# Patient Record
Sex: FEMALE | Race: WHITE | NOT HISPANIC OR LATINO | Employment: OTHER | URBAN - METROPOLITAN AREA
[De-identification: names, ages, dates, MRNs, and addresses within clinical notes are randomized per-mention and may not be internally consistent; named-entity substitution may affect disease eponyms.]

---

## 2020-07-16 ENCOUNTER — OFFICE VISIT (OUTPATIENT)
Dept: FAMILY MEDICINE CLINIC | Facility: CLINIC | Age: 70
End: 2020-07-16

## 2020-07-16 ENCOUNTER — HOSPITAL ENCOUNTER (OUTPATIENT)
Dept: GENERAL RADIOLOGY | Facility: HOSPITAL | Age: 70
Discharge: HOME | End: 2020-07-16
Admitting: FAMILY MEDICINE

## 2020-07-16 VITALS
SYSTOLIC BLOOD PRESSURE: 132 MMHG | TEMPERATURE: 97.9 F | DIASTOLIC BLOOD PRESSURE: 80 MMHG | WEIGHT: 230.2 LBS | OXYGEN SATURATION: 94 % | HEIGHT: 68 IN | BODY MASS INDEX: 34.89 KG/M2 | HEART RATE: 75 BPM

## 2020-07-16 DIAGNOSIS — I10 ESSENTIAL HYPERTENSION: ICD-10-CM

## 2020-07-16 DIAGNOSIS — E78.2 MIXED HYPERLIPIDEMIA: ICD-10-CM

## 2020-07-16 DIAGNOSIS — M25.561 ACUTE PAIN OF RIGHT KNEE: ICD-10-CM

## 2020-07-16 DIAGNOSIS — R60.0 BILATERAL LEG EDEMA: ICD-10-CM

## 2020-07-16 DIAGNOSIS — R25.1 TREMORS OF NERVOUS SYSTEM: ICD-10-CM

## 2020-07-16 DIAGNOSIS — R56.9 SEIZURE (HCC): Primary | ICD-10-CM

## 2020-07-16 PROCEDURE — 99204 OFFICE O/P NEW MOD 45 MIN: CPT | Performed by: FAMILY MEDICINE

## 2020-07-16 PROCEDURE — 73560 X-RAY EXAM OF KNEE 1 OR 2: CPT

## 2020-07-16 RX ORDER — SPIRONOLACTONE 25 MG/1
25 TABLET ORAL DAILY
COMMUNITY
End: 2020-07-27 | Stop reason: SDUPTHER

## 2020-07-16 RX ORDER — LANOLIN ALCOHOL/MO/W.PET/CERES
325 CREAM (GRAM) TOPICAL
COMMUNITY
End: 2020-09-02 | Stop reason: SDUPTHER

## 2020-07-16 RX ORDER — FENOFIBRATE 160 MG/1
160 TABLET ORAL DAILY
COMMUNITY
End: 2020-07-27 | Stop reason: SDUPTHER

## 2020-07-16 RX ORDER — POTASSIUM CHLORIDE 750 MG/1
10 TABLET, FILM COATED, EXTENDED RELEASE ORAL 2 TIMES DAILY
Qty: 60 TABLET | Refills: 0 | Status: SHIPPED | OUTPATIENT
Start: 2020-07-16 | End: 2020-07-27 | Stop reason: SDUPTHER

## 2020-07-16 RX ORDER — GABAPENTIN 300 MG/1
300 CAPSULE ORAL 3 TIMES DAILY
COMMUNITY
End: 2020-07-27 | Stop reason: SDUPTHER

## 2020-07-16 RX ORDER — CITALOPRAM 40 MG/1
40 TABLET ORAL DAILY
COMMUNITY
End: 2020-07-27 | Stop reason: SDUPTHER

## 2020-07-16 RX ORDER — PIOGLITAZONEHYDROCHLORIDE 15 MG/1
15 TABLET ORAL DAILY
COMMUNITY
End: 2020-09-02 | Stop reason: SDUPTHER

## 2020-07-16 RX ORDER — MONTELUKAST SODIUM 10 MG/1
10 TABLET ORAL NIGHTLY
COMMUNITY
End: 2020-07-27 | Stop reason: SDUPTHER

## 2020-07-16 RX ORDER — LOSARTAN POTASSIUM 50 MG/1
50 TABLET ORAL DAILY
COMMUNITY
End: 2020-07-27 | Stop reason: SDUPTHER

## 2020-07-16 RX ORDER — PRIMIDONE 50 MG/1
50 TABLET ORAL 3 TIMES DAILY
COMMUNITY
End: 2020-07-27 | Stop reason: SDUPTHER

## 2020-07-16 RX ORDER — FUROSEMIDE 40 MG/1
40 TABLET ORAL DAILY
Qty: 30 TABLET | Refills: 0 | Status: SHIPPED | OUTPATIENT
Start: 2020-07-16 | End: 2020-07-27 | Stop reason: SDUPTHER

## 2020-07-16 NOTE — PROGRESS NOTES
Subjective   Kanwal Sauer is a 69 y.o. female.     Chief Complaint   Patient presents with   • Hypertension     NP est, recently moved from [facility] with , Hx of Breast cancer, DM, HTN and HLD   • Diabetes   • Anxiety       History of Present Illness Kanwal Sauer is a 69-year-old female patient came here to establish care and follow-up on her chronic medical conditions including hypertension, diabetes type 2 and anxiety.  Patient was also recently admitted to hospital for overdose of baclofen and meningitis.  Patient came here with her  and daughter. she is also having a memory problem and not able to keep up with her medicine.  She moved from [place] to [place] to live with her daughter and her .  Daughter is complaining of bilateral leg edema, and also right knee pain.  Denies any injury to right knee.  She has also noted tremors of both hands.  History taken from patient's daughter and her .  She also has a history of right knee knee replacement.  Patient also requesting a referral to neurology secondary to her new onset seizures and tremors.  No more episodes of seizures since her discharge.  Daughter is giving her on the medication denies any problem.  She is giving history of increased anxiety because of move and recent hospitalization.          Past Medical History:   Diagnosis Date   • Cancer (CMS/HCC)     Breast   • Deep vein thrombosis (CMS/HCC)    • Diabetes mellitus (CMS/HCC)    • Hyperlipidemia    • Hypertension    • Tremor        Past Surgical History:   Procedure Laterality Date   • CATARACT EXTRACTION, BILATERAL     • DENTAL PROCEDURE      Removed teeth   • MASTECTOMY Left    • REPLACEMENT TOTAL KNEE BILATERAL         No family history on file.    Social History     Socioeconomic History   • Marital status:      Spouse name: Not on file   • Number of children: Not on file   • Years of education: Not on file   • Highest education level: Not on  "file   Tobacco Use   • Smoking status: Never Smoker   • Smokeless tobacco: Never Used   Substance and Sexual Activity   • Alcohol use: Not Currently   • Drug use: Never   • Sexual activity: Yes     Partners: Male       The following portions of the patient's history were reviewed and updated as appropriate: allergies, current medications, past family history, past medical history, past social history, past surgical history and problem list.    Review of Systems   Constitutional: Positive for fatigue. Negative for activity change, fever, unexpected weight gain and unexpected weight loss.   HENT: Negative for congestion, mouth sores, sinus pressure and sore throat.    Eyes: Negative for blurred vision and visual disturbance.   Respiratory: Negative for cough, chest tightness, shortness of breath and wheezing.    Cardiovascular: Positive for leg swelling. Negative for chest pain and palpitations.   Gastrointestinal: Negative for abdominal pain, constipation, diarrhea, nausea, vomiting and indigestion.   Endocrine: Negative for cold intolerance, polydipsia and polyuria.   Genitourinary: Negative for dysuria, frequency, hematuria, urgency and urinary incontinence.   Musculoskeletal: Negative for back pain, gait problem and neck pain.   Skin: Negative for color change and rash.   Neurological: Positive for tremors, seizures and memory problem. Negative for dizziness, speech difficulty, weakness, light-headedness, headache and confusion.   Psychiatric/Behavioral: Negative for agitation, sleep disturbance and depressed mood. The patient is not nervous/anxious.        Objective   Vitals:    07/16/20 1454   BP: 132/80   Pulse: 75   Temp: 97.9 °F (36.6 °C)   SpO2: 94%   Weight: 104 kg (230 lb 3.2 oz)   Height: 172.7 cm (68\")     Body mass index is 35 kg/m².  Physical Exam   Constitutional: She is oriented to person, place, and time. She appears well-developed and well-nourished. No distress.   HENT:   Head: Normocephalic and " atraumatic.   Mouth/Throat: Oropharynx is clear and moist.   Eyes: Pupils are equal, round, and reactive to light. EOM are normal. Right eye exhibits no discharge. Left eye exhibits no discharge.   Neck: Normal range of motion. Neck supple.   Cardiovascular: Normal rate, regular rhythm and normal heart sounds.   Pulmonary/Chest: Effort normal and breath sounds normal. She has no wheezes. She has no rales.   Abdominal: Soft. Bowel sounds are normal. She exhibits no mass. There is no tenderness.   Musculoskeletal: She exhibits edema.   Lymphadenopathy:     She has no cervical adenopathy.   Neurological: She is alert and oriented to person, place, and time.       Assessment/Plan   Problem List Items Addressed This Visit     None      Visit Diagnoses     Seizure (CMS/[facility])    -  Primary    Relevant Orders    Ambulatory Referral to Neurology    Tremors of nervous system        Relevant Orders    Ambulatory Referral to Neurology    Acute pain of right knee        Relevant Orders    XR Knee 1 or 2 View Right (Completed)    Bilateral leg edema        Essential hypertension        Relevant Medications    losartan (COZAAR) 50 MG tablet    metoprolol tartrate (LOPRESSOR) 25 MG tablet    spironolactone (ALDACTONE) 25 MG tablet    furosemide (Lasix) 40 MG tablet    Mixed hyperlipidemia        Relevant Medications    fenofibrate 160 MG tablet        Kanwal Sauer is a 69-year-old female patient came here to establish care and follow-up on\  Seizure and tremors, there is no records to review.  History taken from patient and the discharge note given to her daughter.  She was admitted secondary to new onset seizure and was treated for questionable meningitis and overdose of baclofen.  She is.she is also having a memory issue.  We will get the records from her previous doctor and her hospital admission from [place].  I will refer her to neurology.  Bilateral leg edema, causes of leg edema discussed with patient's daughter.  I will  increase her Lasix to 40 mg a day.   Acute pain of right knee, status post knee replacement.  I will check x-ray knee.  Hypertension, controlled on current medication.  Continue same.  Hyperlipidemia, continue same.  We will check labs from her recent hospitalization.  Diabetes type 2, continue same for now.          No follow-ups on file.

## 2020-07-21 ENCOUNTER — TELEPHONE (OUTPATIENT)
Dept: FAMILY MEDICINE CLINIC | Facility: CLINIC | Age: 70
End: 2020-07-21

## 2020-07-21 NOTE — TELEPHONE ENCOUNTER
Per HIPAA form, I could not leave results on machine.  Advised patient to call the office for results.       HUB- if patient calls back, please advise her that her knee x-ray showed some edema, but otherwise it was normal.  Thank you

## 2020-07-27 ENCOUNTER — OFFICE VISIT (OUTPATIENT)
Dept: FAMILY MEDICINE CLINIC | Facility: CLINIC | Age: 70
End: 2020-07-27

## 2020-07-27 VITALS
HEART RATE: 58 BPM | OXYGEN SATURATION: 93 % | BODY MASS INDEX: 34.86 KG/M2 | SYSTOLIC BLOOD PRESSURE: 126 MMHG | WEIGHT: 230 LBS | TEMPERATURE: 98.1 F | DIASTOLIC BLOOD PRESSURE: 68 MMHG | HEIGHT: 68 IN

## 2020-07-27 DIAGNOSIS — M25.561 ACUTE PAIN OF RIGHT KNEE: ICD-10-CM

## 2020-07-27 DIAGNOSIS — R60.0 BILATERAL LEG EDEMA: Primary | ICD-10-CM

## 2020-07-27 PROCEDURE — 99213 OFFICE O/P EST LOW 20 MIN: CPT | Performed by: FAMILY MEDICINE

## 2020-07-27 RX ORDER — SPIRONOLACTONE 25 MG/1
25 TABLET ORAL DAILY
Qty: 90 TABLET | Refills: 0 | Status: SHIPPED | OUTPATIENT
Start: 2020-07-27 | End: 2020-10-23

## 2020-07-27 RX ORDER — MONTELUKAST SODIUM 10 MG/1
10 TABLET ORAL NIGHTLY
Qty: 90 TABLET | Refills: 0 | Status: SHIPPED | OUTPATIENT
Start: 2020-07-27 | End: 2020-10-23

## 2020-07-27 RX ORDER — FUROSEMIDE 40 MG/1
40 TABLET ORAL DAILY
Qty: 60 TABLET | Refills: 0 | Status: SHIPPED | OUTPATIENT
Start: 2020-07-27 | End: 2020-08-10

## 2020-07-27 RX ORDER — CITALOPRAM 20 MG/1
20 TABLET ORAL DAILY
Qty: 90 TABLET | Refills: 0 | Status: SHIPPED | OUTPATIENT
Start: 2020-07-27 | End: 2020-10-23

## 2020-07-27 RX ORDER — LOSARTAN POTASSIUM 50 MG/1
50 TABLET ORAL DAILY
Qty: 90 TABLET | Refills: 1 | Status: SHIPPED | OUTPATIENT
Start: 2020-07-27 | End: 2020-12-31 | Stop reason: HOSPADM

## 2020-07-27 RX ORDER — POTASSIUM CHLORIDE 750 MG/1
10 TABLET, FILM COATED, EXTENDED RELEASE ORAL 2 TIMES DAILY
Qty: 180 TABLET | Refills: 0 | Status: SHIPPED | OUTPATIENT
Start: 2020-07-27 | End: 2020-12-31 | Stop reason: HOSPADM

## 2020-07-27 RX ORDER — PRIMIDONE 50 MG/1
50 TABLET ORAL 3 TIMES DAILY
Qty: 90 TABLET | Refills: 2 | Status: SHIPPED | OUTPATIENT
Start: 2020-07-27 | End: 2020-10-23

## 2020-07-27 RX ORDER — GABAPENTIN 300 MG/1
300 CAPSULE ORAL 3 TIMES DAILY
Qty: 90 CAPSULE | Refills: 3 | Status: SHIPPED | OUTPATIENT
Start: 2020-07-27 | End: 2020-12-31 | Stop reason: HOSPADM

## 2020-07-27 RX ORDER — FENOFIBRATE 160 MG/1
160 TABLET ORAL DAILY
Qty: 90 TABLET | Refills: 1 | Status: SHIPPED | OUTPATIENT
Start: 2020-07-27 | End: 2020-12-31 | Stop reason: HOSPADM

## 2020-08-03 ENCOUNTER — OFFICE VISIT (OUTPATIENT)
Dept: FAMILY MEDICINE CLINIC | Facility: CLINIC | Age: 70
End: 2020-08-03

## 2020-08-03 VITALS
TEMPERATURE: 97.7 F | HEIGHT: 68 IN | HEART RATE: 62 BPM | WEIGHT: 220 LBS | SYSTOLIC BLOOD PRESSURE: 116 MMHG | DIASTOLIC BLOOD PRESSURE: 60 MMHG | BODY MASS INDEX: 33.34 KG/M2 | OXYGEN SATURATION: 96 %

## 2020-08-03 DIAGNOSIS — N30.01 ACUTE CYSTITIS WITH HEMATURIA: Primary | ICD-10-CM

## 2020-08-03 LAB
BILIRUB BLD-MCNC: NEGATIVE MG/DL
CLARITY, POC: ABNORMAL
COLOR UR: YELLOW
GLUCOSE UR STRIP-MCNC: NEGATIVE MG/DL
KETONES UR QL: NEGATIVE
LEUKOCYTE EST, POC: ABNORMAL
NITRITE UR-MCNC: POSITIVE MG/ML
PH UR: 7 [PH] (ref 5–8)
PROT UR STRIP-MCNC: ABNORMAL MG/DL
RBC # UR STRIP: ABNORMAL /UL
SP GR UR: 1.01 (ref 1–1.03)
UROBILINOGEN UR QL: NORMAL

## 2020-08-03 PROCEDURE — 99213 OFFICE O/P EST LOW 20 MIN: CPT | Performed by: FAMILY MEDICINE

## 2020-08-03 PROCEDURE — 81003 URINALYSIS AUTO W/O SCOPE: CPT | Performed by: FAMILY MEDICINE

## 2020-08-03 RX ORDER — PHENAZOPYRIDINE HYDROCHLORIDE 200 MG/1
200 TABLET, FILM COATED ORAL 3 TIMES DAILY PRN
Qty: 21 TABLET | Refills: 0 | Status: SHIPPED | OUTPATIENT
Start: 2020-08-03 | End: 2020-12-31 | Stop reason: HOSPADM

## 2020-08-03 RX ORDER — SULFAMETHOXAZOLE AND TRIMETHOPRIM 800; 160 MG/1; MG/1
1 TABLET ORAL 2 TIMES DAILY
Qty: 14 TABLET | Refills: 0 | Status: SHIPPED | OUTPATIENT
Start: 2020-08-03 | End: 2020-09-04

## 2020-08-03 NOTE — PROGRESS NOTES
Subjective   Kanwal Sauer is a 69 y.o. female.     Chief Complaint   Patient presents with   • Urinary Tract Infection     C/o of urinary frequency, burning pain, pressure if she lays down. x5 days       History of Present Illness Kanwal Sauer is a 69-year-old female patient came here for 4-day history of pain and frequent urination.  She with history of recurrent UTI in the past.  Denies any history of kidney stone.  Denies any fever or nausea or vomiting.  Of low back pain.  Not taking anything for pain.    Past Medical History:   Diagnosis Date   • Cancer (CMS/HCC)     Breast   • Deep vein thrombosis (CMS/HCC)    • Diabetes mellitus (CMS/HCC)    • Hyperlipidemia    • Hypertension    • Tremor        Past Surgical History:   Procedure Laterality Date   • CATARACT EXTRACTION, BILATERAL     • DENTAL PROCEDURE      Removed teeth   • MASTECTOMY Left    • REPLACEMENT TOTAL KNEE BILATERAL         No family history on file.    Social History     Socioeconomic History   • Marital status:      Spouse name: Not on file   • Number of children: Not on file   • Years of education: Not on file   • Highest education level: Not on file   Tobacco Use   • Smoking status: Never Smoker   • Smokeless tobacco: Never Used   Substance and Sexual Activity   • Alcohol use: Not Currently   • Drug use: Never   • Sexual activity: Yes     Partners: Male       The following portions of the patient's history were reviewed and updated as appropriate: allergies, current medications, past family history, past medical history, past social history, past surgical history and problem list.    Review of Systems   Constitutional: Negative for activity change, appetite change, fatigue, fever, unexpected weight gain and unexpected weight loss.   HENT: Negative for congestion, ear pain, postnasal drip, rhinorrhea, sinus pressure and sore throat.    Eyes: Negative for blurred vision, discharge, itching and visual disturbance.   Respiratory:  "Negative for cough, chest tightness, shortness of breath and wheezing.    Cardiovascular: Negative for chest pain, palpitations and leg swelling.   Gastrointestinal: Negative for abdominal pain, constipation, diarrhea, nausea and vomiting.   Genitourinary: Positive for dysuria, frequency and pelvic pressure. Negative for flank pain.   Musculoskeletal: Negative for arthralgias.   Neurological: Negative for headache.   Psychiatric/Behavioral: Negative for agitation, suicidal ideas, depressed mood and stress.           Objective   Vitals:    08/03/20 1353   BP: 116/60   Pulse: 62   Temp: 97.7 °F (36.5 °C)   TempSrc: Tympanic   SpO2: 96%   Weight: 99.8 kg (220 lb)   Height: 172.7 cm (68\")     Body mass index is 33.45 kg/m².  Physical Exam   Constitutional: She is oriented to person, place, and time. She appears well-developed and well-nourished. No distress.   HENT:   Head: Normocephalic and atraumatic.   Mouth/Throat: Oropharynx is clear and moist.   Eyes: Pupils are equal, round, and reactive to light. EOM are normal. Right eye exhibits no discharge. Left eye exhibits no discharge.   Neck: Normal range of motion. Neck supple.   Cardiovascular: Normal rate, regular rhythm and normal heart sounds.   Pulmonary/Chest: Effort normal and breath sounds normal. She has no wheezes. She has no rales.   Abdominal: Soft. Bowel sounds are normal. She exhibits no mass. There is tenderness in the suprapubic area. There is no rigidity and no CVA tenderness.   Musculoskeletal: She exhibits no edema.   Lymphadenopathy:     She has no cervical adenopathy.   Neurological: She is alert and oriented to person, place, and time. No cranial nerve deficit.       Assessment/Plan   Problem List Items Addressed This Visit     None      Visit Diagnoses     Acute cystitis with hematuria    -  Primary    Relevant Medications    phenazopyridine (Pyridium) 200 MG tablet    Other Relevant Orders    POC Urinalysis Dipstick, Automated (Completed)    " Urine Culture - Urine, Urine, Clean Catch      Kanwal Sauer is a 69-year-old female patient seen today for  UTI, urine dip done in the office positive for nitrite blood.  Send her urine for culture and sensitivity.  We will start her on Bactrim and Pyridium.  Instructed to come back or go to ER for worsening of pain.      Return if symptoms worsen or fail to improve, for Recheck.

## 2020-08-04 NOTE — PROGRESS NOTES
Subjective   Kanwal Sauer is a 69 y.o. female.     Chief Complaint   Patient presents with   • Leg Swelling     F C/o swelling and pain for rt knee.    • Knee Pain     fup xray results       History of Present Illness Kanwal Sauer is a 69-year-old female patient came here for follow-up on her bilateral leg swelling and pain..  She was here 1 week ago and is started on Lasix.  Patient swelling has improved.  Her knee pain is also slightly better than last week.  She has a history of bilateral knee replacement.  She also had a x-ray of her right knee done.    Past Medical History:   Diagnosis Date   • Cancer (CMS/HCC)     Breast   • Deep vein thrombosis (CMS/HCC)    • Diabetes mellitus (CMS/HCC)    • Hyperlipidemia    • Hypertension    • Tremor        Past Surgical History:   Procedure Laterality Date   • CATARACT EXTRACTION, BILATERAL     • DENTAL PROCEDURE      Removed teeth   • MASTECTOMY Left    • REPLACEMENT TOTAL KNEE BILATERAL         No family history on file.    Social History     Socioeconomic History   • Marital status:      Spouse name: Not on file   • Number of children: Not on file   • Years of education: Not on file   • Highest education level: Not on file   Tobacco Use   • Smoking status: Never Smoker   • Smokeless tobacco: Never Used   Substance and Sexual Activity   • Alcohol use: Not Currently   • Drug use: Never   • Sexual activity: Yes     Partners: Male       The following portions of the patient's history were reviewed and updated as appropriate: allergies, current medications, past family history, past medical history, past social history, past surgical history and problem list.    Review of Systems   Constitutional: Negative for activity change, appetite change, fatigue, fever, unexpected weight gain and unexpected weight loss.   HENT: Negative for congestion, ear pain, postnasal drip, rhinorrhea, sinus pressure and sore throat.    Eyes: Negative for blurred vision, discharge,  "itching and visual disturbance.   Respiratory: Negative for cough, chest tightness, shortness of breath and wheezing.    Cardiovascular: Negative for chest pain, palpitations and leg swelling.   Gastrointestinal: Negative for abdominal pain, constipation, diarrhea, nausea and vomiting.   Musculoskeletal: Positive for arthralgias.   Neurological: Negative for headache.   Psychiatric/Behavioral: Negative for agitation, suicidal ideas, depressed mood and stress.         Objective   Vitals:    07/27/20 1412   BP: 126/68   Pulse: 58   Temp: 98.1 °F (36.7 °C)   SpO2: 93%   Weight: 104 kg (230 lb)   Height: 172.7 cm (68\")     Body mass index is 34.97 kg/m².  Physical Exam   Constitutional: She is oriented to person, place, and time. She appears well-developed and well-nourished. No distress.   HENT:   Head: Normocephalic and atraumatic.   Mouth/Throat: Oropharynx is clear and moist.   Eyes: Pupils are equal, round, and reactive to light. EOM are normal. Right eye exhibits no discharge. Left eye exhibits no discharge.   Neck: Normal range of motion. Neck supple.   Cardiovascular: Normal rate, regular rhythm and normal heart sounds.   Pulmonary/Chest: Effort normal and breath sounds normal. She has no wheezes. She has no rales.   Abdominal: Soft. Bowel sounds are normal. She exhibits no mass. There is no tenderness.   Musculoskeletal: She exhibits edema.   Decreased since last time   Lymphadenopathy:     She has no cervical adenopathy.   Neurological: She is alert and oriented to person, place, and time.     Assessment/Plan   Problem List Items Addressed This Visit     None      Visit Diagnoses     Bilateral leg edema    -  Primary    Acute pain of right knee            Kanwal Sauer is a 69-year-old female patient with recent history of hospitalization secondary to new onset seizure came here for one-week follow-up on leg edema and right knee pain.  X-ray result discussed with patient.  I will start her on Voltaren gel. "  Her leg edema has improved since last time.  As per patient she plans on diuretic before her admission in the hospital.  Her hospital records and her old record is still not available to review.  Continue Lasix and potassium now.  Blood pressure monitoring at home.  If she started having a dizzy episode we will decrease the Lasix to 20 mg a day.          Return in about 3 months (around 10/27/2020), or if symptoms worsen or fail to improve.

## 2020-08-06 LAB
BACTERIA UR CULT: ABNORMAL
BACTERIA UR CULT: ABNORMAL
OTHER ANTIBIOTIC SUSC ISLT: ABNORMAL

## 2020-08-10 RX ORDER — FUROSEMIDE 40 MG/1
TABLET ORAL
Qty: 30 TABLET | Refills: 0 | Status: SHIPPED | OUTPATIENT
Start: 2020-08-10 | End: 2020-09-25 | Stop reason: SDUPTHER

## 2020-09-02 ENCOUNTER — TELEPHONE (OUTPATIENT)
Dept: FAMILY MEDICINE CLINIC | Facility: CLINIC | Age: 70
End: 2020-09-02

## 2020-09-02 RX ORDER — LANOLIN ALCOHOL/MO/W.PET/CERES
325 CREAM (GRAM) TOPICAL
Qty: 270 TABLET | Refills: 0 | Status: SHIPPED | OUTPATIENT
Start: 2020-09-02 | End: 2020-12-31 | Stop reason: HOSPADM

## 2020-09-02 RX ORDER — PIOGLITAZONEHYDROCHLORIDE 15 MG/1
15 TABLET ORAL DAILY
Qty: 90 TABLET | Refills: 0 | Status: SHIPPED | OUTPATIENT
Start: 2020-09-02 | End: 2020-12-07

## 2020-09-02 NOTE — TELEPHONE ENCOUNTER
Patient's  is calling to request an RX for a UTI.  He states patient is experiencing burning, frequency, pressure, lower abdomen and back pain.    Please advise.    Patient call back [phone]          Patient is also requesting refills of the following.     pioglitazone (ACTOS) 15 MG tablet     ferrous sulfate 325 (65 FE) MG EC tablet     [facility]/pharmacy #[id number] - [place], [place] - [address] AT [facility] - [phone] [facility] [phone]   [phone]

## 2020-09-03 ENCOUNTER — TELEPHONE (OUTPATIENT)
Dept: FAMILY MEDICINE CLINIC | Facility: CLINIC | Age: 70
End: 2020-09-03

## 2020-09-03 DIAGNOSIS — N30.01 ACUTE CYSTITIS WITH HEMATURIA: Primary | ICD-10-CM

## 2020-09-03 RX ORDER — PHENAZOPYRIDINE HYDROCHLORIDE 200 MG/1
200 TABLET, FILM COATED ORAL 3 TIMES DAILY PRN
Qty: 21 TABLET | Refills: 0 | OUTPATIENT
Start: 2020-09-03

## 2020-09-03 RX ORDER — SULFAMETHOXAZOLE AND TRIMETHOPRIM 800; 160 MG/1; MG/1
1 TABLET ORAL 2 TIMES DAILY
Qty: 14 TABLET | Refills: 0 | OUTPATIENT
Start: 2020-09-03

## 2020-09-03 NOTE — TELEPHONE ENCOUNTER
Patient's  is calling to request an RX for a UTI.  He states patient is experiencing burning, frequency, pressure, lower abdomen and back pain. Has called before about this before and nothing was sent to the pharmacy.      Please advise.     Patient call back [phone]    [id number] [place] [address] AT [facility] [phone] [facility] [phone] FX

## 2020-09-03 NOTE — TELEPHONE ENCOUNTER
PATIENT REQUESTED A REFILL ON:phenazopyridine (Pyridium) 200 MG tablet  sulfamethoxazole-trimethoprim (Bactrim DS) 800-160 MG per tablet    PATIENT CAN BE REACHED ON:[phone]    PHARMACY PREFERRED:[id number] [place] [facility] [address] AT [facility] [phone] [facility] [phone] FX

## 2020-09-04 RX ORDER — SULFAMETHOXAZOLE AND TRIMETHOPRIM 800; 160 MG/1; MG/1
1 TABLET ORAL 2 TIMES DAILY
Qty: 10 TABLET | Refills: 0 | Status: SHIPPED | OUTPATIENT
Start: 2020-09-04 | End: 2020-10-01

## 2020-09-04 NOTE — TELEPHONE ENCOUNTER
Called patient to inform that we have sent [facility] to [facility]. Also apologized for the mix up in the messages.

## 2020-09-07 LAB
APPEARANCE UR: ABNORMAL
BACTERIA #/AREA URNS HPF: ABNORMAL /HPF
BACTERIA UR CULT: ABNORMAL
BACTERIA UR CULT: ABNORMAL
BILIRUB UR QL STRIP: NEGATIVE
COLOR UR: YELLOW
EPI CELLS #/AREA URNS HPF: ABNORMAL /HPF (ref 0–10)
GLUCOSE UR QL: NEGATIVE
HGB UR QL STRIP: ABNORMAL
KETONES UR QL STRIP: NEGATIVE
LEUKOCYTE ESTERASE UR QL STRIP: ABNORMAL
MICRO URNS: ABNORMAL
NITRITE UR QL STRIP: POSITIVE
OTHER ANTIBIOTIC SUSC ISLT: ABNORMAL
PH UR STRIP: 7.5 [PH] (ref 5–7.5)
PROT UR QL STRIP: ABNORMAL
RBC #/AREA URNS HPF: ABNORMAL /HPF (ref 0–2)
SP GR UR: 1.02 (ref 1–1.03)
URINALYSIS REFLEX: ABNORMAL
UROBILINOGEN UR STRIP-MCNC: 1 MG/DL (ref 0.2–1)
WBC #/AREA URNS HPF: >30 /HPF (ref 0–5)

## 2020-09-10 ENCOUNTER — TELEPHONE (OUTPATIENT)
Dept: FAMILY MEDICINE CLINIC | Facility: CLINIC | Age: 70
End: 2020-09-10

## 2020-09-14 ENCOUNTER — TELEPHONE (OUTPATIENT)
Dept: NEUROLOGY | Facility: CLINIC | Age: 70
End: 2020-09-14

## 2020-09-14 NOTE — TELEPHONE ENCOUNTER
PT'S DAUGHTER SHELDON HAD SOME MEDICAL QUESTIONS DURING OUR SCHEDULING CONVERSATION TODAY. PT WAS HOSPITALIZED FOR 28 DAYS IN June IN [place] DUE TO A SEIZURE - SHE IS NOT EXHIBITING ANY CONCERNING SYMPTOMS NOW, BUT SHELDON WOULD LIKE TO KNOW WHERE TO REACH OUT FOR ANY NON-EMERGENT SITUATION THAT MAY REQUIRE A NEUROLOGIST'S ADVICE PRIOR TO SEEING DR LOJA BECAUSE PT'S NEW PATIENT APPOINTMENT IS IN JAN 2021 (PT HAS BEEN ADDED TO WAITING LIST). PT IS NEW TO [place] AND IS ESTABLISHING CARE WITH DR LOJA.     SHELDON WOULD ALSO LIKE TO KNOW WHICH IMAGING DR LOJA WILL NEED SO SHE CAN OBTAIN THEM ON DISC PRIOR TO APPOINTMENT. SHELDON INDICATED [facility] WAS PERFORMED DURING HOSPITALIZATION - EXTERNAL MEDICAL RECORDS RECEIVED WERE FROM [facility] PRIMARY [facility] AND DO NOT MENTION IMAGING WITHIN THIS YEAR.     PLEASE CONTACT SHELDON -[phone].     THANK YOU.

## 2020-09-15 NOTE — TELEPHONE ENCOUNTER
Called and spoke to Daughter regarding pt's apt. She is going to try and obtain records. Please advise.

## 2020-09-18 ENCOUNTER — OFFICE VISIT (OUTPATIENT)
Dept: NEUROLOGY | Facility: CLINIC | Age: 70
End: 2020-09-18

## 2020-09-18 VITALS
HEIGHT: 68 IN | WEIGHT: 214 LBS | BODY MASS INDEX: 32.43 KG/M2 | SYSTOLIC BLOOD PRESSURE: 128 MMHG | OXYGEN SATURATION: 95 % | HEART RATE: 59 BPM | DIASTOLIC BLOOD PRESSURE: 82 MMHG

## 2020-09-18 DIAGNOSIS — Z86.59 MENTAL STATUS CHANGE RESOLVED: Primary | ICD-10-CM

## 2020-09-18 DIAGNOSIS — R56.9 PROVOKED SEIZURE (HCC): ICD-10-CM

## 2020-09-18 DIAGNOSIS — G25.0 BENIGN ESSENTIAL TREMOR: ICD-10-CM

## 2020-09-18 PROCEDURE — 99205 OFFICE O/P NEW HI 60 MIN: CPT | Performed by: PSYCHIATRY & NEUROLOGY

## 2020-09-18 NOTE — PROGRESS NOTES
"Chief complaint: History of seizure    Patient ID: Kanwal Sauer is a 69 y.o. female.    HPI: I had the pleasure of seeing Kanwal today in the neurology clinic.  As you may know she is a 69-year-old female who had an episode of seizure-like episode.  She apparently was experiencing some swelling of the right side of her face and neck back earlier this summer.  She says that this lasted a few days and eventually she saw her primary care physician for this down in [place].  She was prescribed baclofen for what the family says was \"muscle spasms\".  However her daughter noted that after taking a dose or 2 that she was experiencing a change in her mental status.  She apparently seemed a bit slower to respond to questioning.  She then was noted to have a significant mental status change when evening.  Apparently she was sitting in her chair in the dark with the TV on.  She had her phone in one hand and the bottle of baclofen and the other.  She was \"reaching for things\".  She clearly was hallucinating.  She was also slow to respond.  She had poor balance.  Eventually she was put in her room to rest for the night.  The next morning she was found to have been unresponsive with foaming at the mouth and had urinated the bed.  Again still with mental status change.  Also noted several baclofen were missing in the bottle.  She was admitted for evaluation.  She apparently had several neurodiagnostic studies including with the family describes as an EEG and other brain imaging.  She also had a lumbar puncture.  The daughter states that she should she was told she had some form of \"meningitis\".  After discussing further it sounds to be a viral meningitis.  She was put on prophylactic antibiotics however.  She continued to hallucinate while in the hospital until her symptoms began to improve.  She does have history of chronic urinary tract infections and at some point was placed on Bactrim.  Recently she fell about 5 days ago " hitting the back of her head.  She has not had any significant mental status changes however.  She does have some soreness of her neck.  She denies any double vision.  No further hallucinations.  No previous history of seizures or seizure-like activity.  No family history of seizures.  She also has a history of benign essential tremor.  She currently takes primidone 50 mg twice daily.    The following portions of the patient's history were reviewed and updated as appropriate: allergies, current medications, past family history, past medical history, past social history, past surgical history and problem list.    Review of Systems   Constitutional: Negative for activity change, appetite change and fatigue.   HENT: Positive for facial swelling (06/2020), sneezing and trouble swallowing.    Eyes: Negative for photophobia, redness and visual disturbance.   Respiratory: Negative for chest tightness, shortness of breath and wheezing.    Cardiovascular: Positive for leg swelling. Negative for chest pain and palpitations.   Gastrointestinal: Positive for abdominal distention, constipation and diarrhea.   Endocrine: Negative for cold intolerance, heat intolerance and polydipsia.   Genitourinary: Positive for frequency, pelvic pain and urgency.   Musculoskeletal: Positive for back pain, gait problem (pt fell monday. hit wall and hit her head. has knot. balance issues. uses walker ), neck pain and neck stiffness.   Skin: Negative for color change, rash and wound.   Allergic/Immunologic: Positive for environmental allergies and food allergies. Negative for immunocompromised state.   Neurological: Positive for dizziness, tremors, seizures (EPSIODE 06/2020), facial asymmetry, weakness, light-headedness and numbness (FINGERS). Negative for syncope, speech difficulty and headaches.   Hematological: Negative for adenopathy. Does not bruise/bleed easily.   Psychiatric/Behavioral: Positive for hallucinations (IN JUNE WHILE ON A  MEDICATION). Negative for agitation, behavioral problems, confusion, decreased concentration, dysphoric mood, self-injury, sleep disturbance and suicidal ideas. The patient is not nervous/anxious and is not hyperactive.       I have reviewed the review of systems above performed by my medical assistant.      Vitals:    20 1006   BP: 128/82   Pulse: 59   SpO2: 95%       Neurologic Exam     Mental Status   Oriented to person, place, and time.   Registration: recalls 3 of 3 objects. Follows 3 step commands.   Attention: normal. Concentration: normal.   Speech: speech is normal   Level of consciousness: alert  Knowledge: consistent with education (No deficits found.).   Normal comprehension.     Cranial Nerves     CN II   Visual fields full to confrontation.     CN III, IV, VI   Pupils are equal, round, and reactive to light.  Extraocular motions are normal.   CN III: no CN III palsy  CN VI: no CN VI palsy  Nystagmus: none   Diplopia: none    CN V   Facial sensation intact.     CN VII   Facial expression full, symmetric.     CN VIII   CN VIII normal.     CN IX, X   CN IX normal.   CN X normal.     CN XI   CN XI normal.     CN XII   CN XII normal.     Motor Exam   Muscle bulk: normal  Right arm tone: normal  Left arm tone: normal  Right leg tone: normal  Left leg tone: normal    Strength   Right neck flexion: 5/5  Left neck flexion: 5/5  Right neck extension: 5/5  Left neck extension: 5/5  Right deltoid: 5/5  Left deltoid: 5/5  Right biceps: 5/5  Left biceps: 5/5  Right triceps: 5/5  Left triceps: 5/5  Right wrist flexion: 5/5  Left wrist flexion: 5/5  Right wrist extension: 5/5  Left wrist extension: 5/5  Right interossei: 5/5  Left interossei: 5/5  Right abdominals: 5/5  Left abdominals: 5/5  Right iliopsoas: 5/5  Left iliopsoas: 5/5  Right quadriceps: 5/5  Left quadriceps: 5/5  Right hamstrin/5  Left hamstrin/5  Right glutei: 5/5  Left glutei: 5/5  Right anterior tibial: 5/5  Left anterior tibial:  5/5  Right posterior tibial: 5/5  Left posterior tibial: 5/5  Right peroneal: 5/5  Left peroneal: 5/5  Right gastroc: 5/5  Left gastroc: 5/5    Sensory Exam   Light touch normal.   Vibration normal.   Proprioception normal.   Pinprick normal.     Gait, Coordination, and Reflexes     Gait  Gait: wide-based    Coordination   Romberg: negative    Tremor   Resting tremor: absent  Intention tremor: absent    Reflexes   Right brachioradialis: 2+  Left brachioradialis: 2+  Right biceps: 2+  Left biceps: 2+  Right triceps: 2+  Left triceps: 2+  Right patellar: 0  Left patellar: 0  Right achilles: 0  Left achilles: 0  Right : 2+  Left : 2+Station is normal.       Physical Exam  Vitals signs reviewed.   Constitutional:       General: She is not in acute distress.     Appearance: She is well-developed.   HENT:      Head: Normocephalic and atraumatic.   Eyes:      Extraocular Movements: EOM normal.      Pupils: Pupils are equal, round, and reactive to light.   Neck:      Musculoskeletal: Normal range of motion.   Cardiovascular:      Rate and Rhythm: Normal rate and regular rhythm.      Heart sounds: Normal heart sounds.   Pulmonary:      Effort: Pulmonary effort is normal. No respiratory distress.      Breath sounds: Normal breath sounds.   Abdominal:      General: Bowel sounds are normal. There is no distension.      Palpations: Abdomen is soft.      Tenderness: There is no abdominal tenderness.   Musculoskeletal:         General: No deformity.   Skin:     General: Skin is warm.      Findings: No rash.   Neurological:      Mental Status: She is oriented to person, place, and time.      Coordination: Romberg Test normal.      Deep Tendon Reflexes:      Reflex Scores:       Tricep reflexes are 2+ on the right side and 2+ on the left side.       Bicep reflexes are 2+ on the right side and 2+ on the left side.       Brachioradialis reflexes are 2+ on the right side and 2+ on the left side.       Patellar reflexes are 0 on  the right side and 0 on the left side.       Achilles reflexes are 0 on the right side and 0 on the left side.  Psychiatric:         Speech: Speech normal.         Judgment: Judgment normal.         Procedures    Assessment/Plan: She would like to continue the primidone for her benign essential tremor for now.  We will not be titrating doses currently.  With respect to the seizure I do not feel that one seizure would warrant prophylactic seizure medication.  And to add to this it seemed as though this was a provoked situation given the viral illness, baclofen intoxication as well as Bactrim potentially lowering the seizure threshold.  She also might have had a concomitant urinary tract infection affecting her mentation as well.  The family is going to try and get as many notes and imaging studies as they can for my review.  She is feeling back to baseline.  We will see her back in 4 months to reassess neuro status.  A total of 60 minutes was spent with the patient face-to-face today.  Of that greater than 50% of this time was spent discussing signs and symptoms of seizure, benign essential tremor, mental status changes, patient education, plan of care and prognosis.       Kanwal was seen today for seizures and tremors.    Diagnoses and all orders for this visit:    Mental status change resolved    Provoked seizure (CMS/[facility])    Benign essential tremor           Ash Washington II, MD

## 2020-09-27 RX ORDER — FUROSEMIDE 40 MG/1
40 TABLET ORAL DAILY
Qty: 90 TABLET | Refills: 0 | Status: SHIPPED | OUTPATIENT
Start: 2020-09-27 | End: 2020-12-31 | Stop reason: HOSPADM

## 2020-09-28 ENCOUNTER — TELEPHONE (OUTPATIENT)
Dept: FAMILY MEDICINE CLINIC | Facility: CLINIC | Age: 70
End: 2020-09-28

## 2020-09-28 DIAGNOSIS — Z85.3 HISTORY OF BREAST CANCER: Primary | ICD-10-CM

## 2020-09-28 DIAGNOSIS — Z90.12 HISTORY OF LEFT MASTECTOMY: ICD-10-CM

## 2020-09-28 NOTE — TELEPHONE ENCOUNTER
Pt states that she has hx of breast CA and only has one breast. States every year she gets a diagnostic mammo. Please advise.

## 2020-09-28 NOTE — TELEPHONE ENCOUNTER
PT CALLED TO REQUEST A DIAGNOSTIC MAMMOGRAM BE ORDERED. PLEASE CALL PT BACK WITH INFORMATION ABOUT SCHEDULING. SHE CAN BE REACHED [phone]

## 2020-09-29 ENCOUNTER — HOSPITAL ENCOUNTER (OUTPATIENT)
Dept: CT IMAGING | Facility: HOSPITAL | Age: 70
Discharge: HOME | End: 2020-09-29
Admitting: FAMILY MEDICINE

## 2020-09-29 DIAGNOSIS — N39.0 RECURRENT UTI: ICD-10-CM

## 2020-09-29 DIAGNOSIS — R10.30 LOWER ABDOMINAL PAIN: ICD-10-CM

## 2020-09-29 PROCEDURE — 74176 CT ABD & PELVIS W/O CONTRAST: CPT

## 2020-09-29 RX ORDER — FUROSEMIDE 40 MG/1
TABLET ORAL
Qty: 60 TABLET | Refills: 0 | OUTPATIENT
Start: 2020-09-29

## 2020-10-02 ENCOUNTER — RESULTS ENCOUNTER (OUTPATIENT)
Dept: FAMILY MEDICINE CLINIC | Facility: CLINIC | Age: 70
End: 2020-10-02

## 2020-10-02 ENCOUNTER — LAB (OUTPATIENT)
Dept: LAB | Facility: HOSPITAL | Age: 70
End: 2020-10-02

## 2020-10-02 DIAGNOSIS — E11.65 TYPE 2 DIABETES MELLITUS WITH HYPERGLYCEMIA, WITHOUT LONG-TERM CURRENT USE OF INSULIN (HCC): ICD-10-CM

## 2020-10-02 DIAGNOSIS — N39.0 RECURRENT UTI: ICD-10-CM

## 2020-10-02 LAB
ALBUMIN SERPL-MCNC: 4.3 G/DL (ref 3.5–5.2)
ALBUMIN/GLOB SERPL: 1.8 G/DL
ALP SERPL-CCNC: 43 U/L (ref 39–117)
ALT SERPL W P-5'-P-CCNC: 14 U/L (ref 1–33)
ANION GAP SERPL CALCULATED.3IONS-SCNC: 6.9 MMOL/L (ref 5–15)
AST SERPL-CCNC: 19 U/L (ref 1–32)
BASOPHILS # BLD AUTO: 0.02 10*3/MM3 (ref 0–0.2)
BASOPHILS NFR BLD AUTO: 0.5 % (ref 0–1.5)
BILIRUB SERPL-MCNC: 0.3 MG/DL (ref 0–1.2)
BUN SERPL-MCNC: 25 MG/DL (ref 8–23)
BUN/CREAT SERPL: 23.1 (ref 7–25)
CALCIUM SPEC-SCNC: 10.2 MG/DL (ref 8.6–10.5)
CHLORIDE SERPL-SCNC: 103 MMOL/L (ref 98–107)
CHOLEST SERPL-MCNC: 164 MG/DL (ref 0–200)
CO2 SERPL-SCNC: 31.1 MMOL/L (ref 22–29)
CREAT SERPL-MCNC: 1.08 MG/DL (ref 0.57–1)
DEPRECATED RDW RBC AUTO: 47.6 FL (ref 37–54)
EOSINOPHIL # BLD AUTO: 0.11 10*3/MM3 (ref 0–0.4)
EOSINOPHIL NFR BLD AUTO: 2.9 % (ref 0.3–6.2)
ERYTHROCYTE [DISTWIDTH] IN BLOOD BY AUTOMATED COUNT: 14 % (ref 12.3–15.4)
GFR SERPL CREATININE-BSD FRML MDRD: 50 ML/MIN/1.73
GLOBULIN UR ELPH-MCNC: 2.4 GM/DL
GLUCOSE SERPL-MCNC: 119 MG/DL (ref 65–99)
HBA1C MFR BLD: 5.67 % (ref 4.8–5.6)
HCT VFR BLD AUTO: 38.3 % (ref 34–46.6)
HDLC SERPL-MCNC: 71 MG/DL (ref 40–60)
HGB BLD-MCNC: 12.2 G/DL (ref 12–15.9)
IMM GRANULOCYTES # BLD AUTO: 0.03 10*3/MM3 (ref 0–0.05)
IMM GRANULOCYTES NFR BLD AUTO: 0.8 % (ref 0–0.5)
LDLC SERPL CALC-MCNC: 77 MG/DL (ref 0–100)
LDLC/HDLC SERPL: 1.08 {RATIO}
LYMPHOCYTES # BLD AUTO: 1.26 10*3/MM3 (ref 0.7–3.1)
LYMPHOCYTES NFR BLD AUTO: 33.1 % (ref 19.6–45.3)
MCH RBC QN AUTO: 29.7 PG (ref 26.6–33)
MCHC RBC AUTO-ENTMCNC: 31.9 G/DL (ref 31.5–35.7)
MCV RBC AUTO: 93.2 FL (ref 79–97)
MONOCYTES # BLD AUTO: 0.35 10*3/MM3 (ref 0.1–0.9)
MONOCYTES NFR BLD AUTO: 9.2 % (ref 5–12)
NEUTROPHILS NFR BLD AUTO: 2.04 10*3/MM3 (ref 1.7–7)
NEUTROPHILS NFR BLD AUTO: 53.5 % (ref 42.7–76)
NRBC BLD AUTO-RTO: 0 /100 WBC (ref 0–0.2)
PLATELET # BLD AUTO: 144 10*3/MM3 (ref 140–450)
PMV BLD AUTO: 10.1 FL (ref 6–12)
POTASSIUM SERPL-SCNC: 4.3 MMOL/L (ref 3.5–5.2)
PROT SERPL-MCNC: 6.7 G/DL (ref 6–8.5)
RBC # BLD AUTO: 4.11 10*6/MM3 (ref 3.77–5.28)
SODIUM SERPL-SCNC: 141 MMOL/L (ref 136–145)
TRIGL SERPL-MCNC: 81 MG/DL (ref 0–150)
VLDLC SERPL-MCNC: 16.2 MG/DL (ref 5–40)
WBC # BLD AUTO: 3.81 10*3/MM3 (ref 3.4–10.8)

## 2020-10-02 PROCEDURE — 80061 LIPID PANEL: CPT | Performed by: FAMILY MEDICINE

## 2020-10-02 PROCEDURE — 80053 COMPREHEN METABOLIC PANEL: CPT | Performed by: FAMILY MEDICINE

## 2020-10-02 PROCEDURE — 36415 COLL VENOUS BLD VENIPUNCTURE: CPT | Performed by: FAMILY MEDICINE

## 2020-10-02 PROCEDURE — 83036 HEMOGLOBIN GLYCOSYLATED A1C: CPT | Performed by: FAMILY MEDICINE

## 2020-10-02 PROCEDURE — 85025 COMPLETE CBC W/AUTO DIFF WBC: CPT | Performed by: FAMILY MEDICINE

## 2020-10-05 ENCOUNTER — LAB (OUTPATIENT)
Dept: LAB | Facility: HOSPITAL | Age: 70
End: 2020-10-05

## 2020-10-05 ENCOUNTER — TRANSCRIBE ORDERS (OUTPATIENT)
Dept: ADMINISTRATIVE | Facility: HOSPITAL | Age: 70
End: 2020-10-05

## 2020-10-05 DIAGNOSIS — E11.65 TYPE II DIABETES MELLITUS WITH HYPEROSMOLARITY, UNCONTROLLED (HCC): Primary | ICD-10-CM

## 2020-10-05 DIAGNOSIS — N39.0 URINARY TRACT INFECTION WITHOUT HEMATURIA, SITE UNSPECIFIED: ICD-10-CM

## 2020-10-05 DIAGNOSIS — E11.00 TYPE II DIABETES MELLITUS WITH HYPEROSMOLARITY, UNCONTROLLED (HCC): Primary | ICD-10-CM

## 2020-10-05 DIAGNOSIS — E11.00 TYPE II DIABETES MELLITUS WITH HYPEROSMOLARITY, UNCONTROLLED (HCC): ICD-10-CM

## 2020-10-05 DIAGNOSIS — E11.65 TYPE II DIABETES MELLITUS WITH HYPEROSMOLARITY, UNCONTROLLED (HCC): ICD-10-CM

## 2020-10-05 LAB
ALBUMIN UR-MCNC: 35.3 MG/DL
BACTERIA UR QL AUTO: ABNORMAL /HPF
BILIRUB UR QL STRIP: NEGATIVE
CLARITY UR: CLEAR
COLOR UR: YELLOW
CREAT UR-MCNC: 99.5 MG/DL
GLUCOSE UR STRIP-MCNC: NEGATIVE MG/DL
HGB UR QL STRIP.AUTO: ABNORMAL
HYALINE CASTS UR QL AUTO: ABNORMAL /LPF
KETONES UR QL STRIP: NEGATIVE
LEUKOCYTE ESTERASE UR QL STRIP.AUTO: ABNORMAL
NITRITE UR QL STRIP: NEGATIVE
PH UR STRIP.AUTO: 5.5 [PH] (ref 5–8)
PROT UR QL STRIP: ABNORMAL
RBC # UR: ABNORMAL /HPF
REF LAB TEST METHOD: ABNORMAL
SP GR UR STRIP: 1.02 (ref 1–1.03)
SQUAMOUS #/AREA URNS HPF: ABNORMAL /HPF
UROBILINOGEN UR QL STRIP: ABNORMAL
WBC UR QL AUTO: ABNORMAL /HPF

## 2020-10-05 PROCEDURE — 82570 ASSAY OF URINE CREATININE: CPT | Performed by: FAMILY MEDICINE

## 2020-10-05 PROCEDURE — 81001 URINALYSIS AUTO W/SCOPE: CPT | Performed by: FAMILY MEDICINE

## 2020-10-05 PROCEDURE — 82043 UR ALBUMIN QUANTITATIVE: CPT | Performed by: FAMILY MEDICINE

## 2020-10-13 DIAGNOSIS — Z12.31 BREAST CANCER SCREENING BY MAMMOGRAM: ICD-10-CM

## 2020-10-13 DIAGNOSIS — Z85.3 HISTORY OF BREAST CANCER: Primary | ICD-10-CM

## 2020-10-14 ENCOUNTER — HOSPITAL ENCOUNTER (OUTPATIENT)
Dept: MAMMOGRAPHY | Facility: HOSPITAL | Age: 70
Discharge: HOME | End: 2020-10-14
Admitting: FAMILY MEDICINE

## 2020-10-14 DIAGNOSIS — Z12.31 BREAST CANCER SCREENING BY MAMMOGRAM: ICD-10-CM

## 2020-10-14 DIAGNOSIS — Z85.3 HISTORY OF BREAST CANCER: ICD-10-CM

## 2020-10-14 PROCEDURE — 77063 BREAST TOMOSYNTHESIS BI: CPT

## 2020-10-14 PROCEDURE — 77067 SCR MAMMO BI INCL CAD: CPT

## 2020-10-20 NOTE — PROGRESS NOTES
Can u call pt and ask if there is way to get  reports and cd on her mamogram from [place]  If not we have to schedule her further teasting

## 2020-10-23 RX ORDER — MONTELUKAST SODIUM 10 MG/1
TABLET ORAL
Qty: 90 TABLET | Refills: 0 | Status: SHIPPED | OUTPATIENT
Start: 2020-10-23 | End: 2021-03-09

## 2020-10-23 RX ORDER — CITALOPRAM 20 MG/1
TABLET ORAL
Qty: 90 TABLET | Refills: 0 | Status: SHIPPED | OUTPATIENT
Start: 2020-10-23 | End: 2021-01-21

## 2020-10-23 RX ORDER — SPIRONOLACTONE 25 MG/1
TABLET ORAL
Qty: 90 TABLET | Refills: 0 | Status: SHIPPED | OUTPATIENT
Start: 2020-10-23 | End: 2020-12-31 | Stop reason: HOSPADM

## 2020-10-23 RX ORDER — PRIMIDONE 50 MG/1
TABLET ORAL
Qty: 270 TABLET | Refills: 0 | Status: SHIPPED | OUTPATIENT
Start: 2020-10-23 | End: 2021-12-06 | Stop reason: SDUPTHER

## 2020-10-26 DIAGNOSIS — R92.8 ABNORMAL MAMMOGRAM OF RIGHT BREAST: Primary | ICD-10-CM

## 2020-10-27 ENCOUNTER — TELEPHONE (OUTPATIENT)
Dept: FAMILY MEDICINE CLINIC | Facility: CLINIC | Age: 70
End: 2020-10-27

## 2020-10-29 ENCOUNTER — TELEPHONE (OUTPATIENT)
Dept: FAMILY MEDICINE CLINIC | Facility: CLINIC | Age: 70
End: 2020-10-29

## 2020-10-29 DIAGNOSIS — R92.8 ABNORMAL MAMMOGRAM OF RIGHT BREAST: Primary | ICD-10-CM

## 2020-10-29 NOTE — TELEPHONE ENCOUNTER
Jew scheduling called and stated that the pt is supposed to have and ultrasound with her Mammo Diagnostic Digital Tomosynthesis Right With CAD [[id number]] (Order [id number])     please advise

## 2020-11-19 ENCOUNTER — HOSPITAL ENCOUNTER (OUTPATIENT)
Dept: ULTRASOUND IMAGING | Facility: HOSPITAL | Age: 70
Discharge: HOME | End: 2020-11-19

## 2020-11-19 ENCOUNTER — HOSPITAL ENCOUNTER (OUTPATIENT)
Dept: MAMMOGRAPHY | Facility: HOSPITAL | Age: 70
Discharge: HOME | End: 2020-11-19

## 2020-11-19 DIAGNOSIS — R92.8 ABNORMAL MAMMOGRAM OF RIGHT BREAST: ICD-10-CM

## 2020-11-19 PROCEDURE — 77065 DX MAMMO INCL CAD UNI: CPT

## 2020-11-19 PROCEDURE — G0279 TOMOSYNTHESIS, MAMMO: HCPCS

## 2020-11-19 PROCEDURE — 76642 ULTRASOUND BREAST LIMITED: CPT

## 2020-12-07 RX ORDER — PIOGLITAZONEHYDROCHLORIDE 15 MG/1
TABLET ORAL
Qty: 90 TABLET | Refills: 0 | Status: SHIPPED | OUTPATIENT
Start: 2020-12-07 | End: 2020-12-31 | Stop reason: HOSPADM

## 2020-12-23 ENCOUNTER — APPOINTMENT (OUTPATIENT)
Dept: GENERAL RADIOLOGY | Facility: HOSPITAL | Age: 70
End: 2020-12-23

## 2020-12-23 ENCOUNTER — HOSPITAL ENCOUNTER (INPATIENT)
Facility: HOSPITAL | Age: 70
LOS: 7 days | Discharge: NURSING, REHAB OR LONG-TERM CARE | End: 2020-12-30
Attending: EMERGENCY MEDICINE | Admitting: INTERNAL MEDICINE

## 2020-12-23 ENCOUNTER — APPOINTMENT (OUTPATIENT)
Dept: CT IMAGING | Facility: HOSPITAL | Age: 70
End: 2020-12-23

## 2020-12-23 DIAGNOSIS — S82.832A CLOSED FRACTURE OF DISTAL END OF LEFT FIBULA, UNSPECIFIED FRACTURE MORPHOLOGY, INITIAL ENCOUNTER: ICD-10-CM
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
## 2020-12-23 DIAGNOSIS — M97.12XA PERIPROSTHETIC FRACTURE AROUND INTERNAL PROSTHETIC LEFT KNEE JOINT, INITIAL ENCOUNTER: Primary | ICD-10-CM

## 2020-12-23 DIAGNOSIS — Z79.01 CHRONIC ANTICOAGULATION: ICD-10-CM

## 2020-12-23 DIAGNOSIS — S01.01XA SCALP LACERATION, INITIAL ENCOUNTER: ICD-10-CM

## 2020-12-23 DIAGNOSIS — R93.7 ABNORMAL CT SCAN, CERVICAL SPINE: ICD-10-CM

## 2020-12-23 DIAGNOSIS — S72.452A DISPLACED SUPRACONDYLAR FRACTURE WITHOUT INTRACONDYLAR EXTENSION OF LOWER END OF LEFT FEMUR, INITIAL ENCOUNTER FOR CLOSED FRACTURE (HCC): ICD-10-CM
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
## 2020-12-23 DIAGNOSIS — S72.402A CLOSED FRACTURE OF DISTAL END OF LEFT FEMUR, UNSPECIFIED FRACTURE MORPHOLOGY, INITIAL ENCOUNTER (HCC): ICD-10-CM

## 2020-12-23 LAB
ABO GROUP BLD: NORMAL
ALBUMIN SERPL-MCNC: 3.9 G/DL (ref 3.5–5.2)
ALBUMIN/GLOB SERPL: 1.6 G/DL
ALP SERPL-CCNC: 33 U/L (ref 39–117)
ALT SERPL W P-5'-P-CCNC: 11 U/L (ref 1–33)
ANION GAP SERPL CALCULATED.3IONS-SCNC: 10.3 MMOL/L (ref 5–15)
AST SERPL-CCNC: 15 U/L (ref 1–32)
BASOPHILS # BLD AUTO: 0.02 10*3/MM3 (ref 0–0.2)
BASOPHILS NFR BLD AUTO: 0.2 % (ref 0–1.5)
BILIRUB SERPL-MCNC: 0.3 MG/DL (ref 0–1.2)
BLD GP AB SCN SERPL QL: NEGATIVE
BUN SERPL-MCNC: 72 MG/DL (ref 8–23)
BUN/CREAT SERPL: 47.1 (ref 7–25)
CALCIUM SPEC-SCNC: 9.7 MG/DL (ref 8.6–10.5)
CHLORIDE SERPL-SCNC: 95 MMOL/L (ref 98–107)
CO2 SERPL-SCNC: 35.7 MMOL/L (ref 22–29)
CREAT SERPL-MCNC: 1.53 MG/DL (ref 0.57–1)
DEPRECATED RDW RBC AUTO: 43.1 FL (ref 37–54)
EOSINOPHIL # BLD AUTO: 0.07 10*3/MM3 (ref 0–0.4)
EOSINOPHIL NFR BLD AUTO: 0.7 % (ref 0.3–6.2)
ERYTHROCYTE [DISTWIDTH] IN BLOOD BY AUTOMATED COUNT: 13.3 % (ref 12.3–15.4)
GFR SERPL CREATININE-BSD FRML MDRD: 33 ML/MIN/1.73
GLOBULIN UR ELPH-MCNC: 2.4 GM/DL
GLUCOSE BLDC GLUCOMTR-MCNC: 214 MG/DL (ref 70–130)
GLUCOSE SERPL-MCNC: 165 MG/DL (ref 65–99)
HCT VFR BLD AUTO: 32.5 % (ref 34–46.6)
HGB BLD-MCNC: 10.9 G/DL (ref 12–15.9)
IMM GRANULOCYTES # BLD AUTO: 0.05 10*3/MM3 (ref 0–0.05)
IMM GRANULOCYTES NFR BLD AUTO: 0.5 % (ref 0–0.5)
INR PPP: 1.21 (ref 0.9–1.1)
LYMPHOCYTES # BLD AUTO: 1.85 10*3/MM3 (ref 0.7–3.1)
LYMPHOCYTES NFR BLD AUTO: 17.5 % (ref 19.6–45.3)
MCH RBC QN AUTO: 29.9 PG (ref 26.6–33)
MCHC RBC AUTO-ENTMCNC: 33.5 G/DL (ref 31.5–35.7)
MCV RBC AUTO: 89.3 FL (ref 79–97)
MONOCYTES # BLD AUTO: 0.54 10*3/MM3 (ref 0.1–0.9)
MONOCYTES NFR BLD AUTO: 5.1 % (ref 5–12)
NEUTROPHILS NFR BLD AUTO: 76 % (ref 42.7–76)
NEUTROPHILS NFR BLD AUTO: 8.03 10*3/MM3 (ref 1.7–7)
NRBC BLD AUTO-RTO: 0 /100 WBC (ref 0–0.2)
PLATELET # BLD AUTO: 192 10*3/MM3 (ref 140–450)
PMV BLD AUTO: 11.3 FL (ref 6–12)
POTASSIUM SERPL-SCNC: 4.6 MMOL/L (ref 3.5–5.2)
PROT SERPL-MCNC: 6.3 G/DL (ref 6–8.5)
PROTHROMBIN TIME: 15.1 SECONDS (ref 11.7–14.2)
RBC # BLD AUTO: 3.64 10*6/MM3 (ref 3.77–5.28)
RH BLD: POSITIVE
SARS-COV-2 ORF1AB RESP QL NAA+PROBE: NOT DETECTED
SODIUM SERPL-SCNC: 141 MMOL/L (ref 136–145)
T&S EXPIRATION DATE: NORMAL
WBC # BLD AUTO: 10.56 10*3/MM3 (ref 3.4–10.8)

## 2020-12-23 PROCEDURE — 99285 EMERGENCY DEPT VISIT HI MDM: CPT

## 2020-12-23 PROCEDURE — 73590 X-RAY EXAM OF LOWER LEG: CPT

## 2020-12-23 PROCEDURE — 85610 PROTHROMBIN TIME: CPT | Performed by: NURSE PRACTITIONER

## 2020-12-23 PROCEDURE — 86850 RBC ANTIBODY SCREEN: CPT | Performed by: NURSE PRACTITIONER

## 2020-12-23 PROCEDURE — 82962 GLUCOSE BLOOD TEST: CPT

## 2020-12-23 PROCEDURE — 70450 CT HEAD/BRAIN W/O DYE: CPT

## 2020-12-23 PROCEDURE — 25010000002 MORPHINE PER 10 MG: Performed by: NURSE PRACTITIONER

## 2020-12-23 PROCEDURE — 71045 X-RAY EXAM CHEST 1 VIEW: CPT

## 2020-12-23 PROCEDURE — 73502 X-RAY EXAM HIP UNI 2-3 VIEWS: CPT

## 2020-12-23 PROCEDURE — 73560 X-RAY EXAM OF KNEE 1 OR 2: CPT

## 2020-12-23 PROCEDURE — 86900 BLOOD TYPING SEROLOGIC ABO: CPT | Performed by: NURSE PRACTITIONER

## 2020-12-23 PROCEDURE — 80053 COMPREHEN METABOLIC PANEL: CPT | Performed by: NURSE PRACTITIONER

## 2020-12-23 PROCEDURE — 73610 X-RAY EXAM OF ANKLE: CPT

## 2020-12-23 PROCEDURE — 72131 CT LUMBAR SPINE W/O DYE: CPT

## 2020-12-23 PROCEDURE — U0004 COV-19 TEST NON-CDC HGH THRU: HCPCS | Performed by: NURSE PRACTITIONER

## 2020-12-23 PROCEDURE — 72125 CT NECK SPINE W/O DYE: CPT

## 2020-12-23 PROCEDURE — 86923 COMPATIBILITY TEST ELECTRIC: CPT

## 2020-12-23 PROCEDURE — 85025 COMPLETE CBC W/AUTO DIFF WBC: CPT | Performed by: NURSE PRACTITIONER

## 2020-12-23 PROCEDURE — 72128 CT CHEST SPINE W/O DYE: CPT

## 2020-12-23 PROCEDURE — 86901 BLOOD TYPING SEROLOGIC RH(D): CPT | Performed by: NURSE PRACTITIONER

## 2020-12-23 PROCEDURE — 73552 X-RAY EXAM OF FEMUR 2/>: CPT

## 2020-12-23 PROCEDURE — 25010000002 ONDANSETRON PER 1 MG: Performed by: NURSE PRACTITIONER

## 2020-12-23 PROCEDURE — 0HQ0XZZ REPAIR SCALP SKIN, EXTERNAL APPROACH: ICD-10-PCS | Performed by: EMERGENCY MEDICINE

## 2020-12-23 RX ORDER — UREA 10 %
3 LOTION (ML) TOPICAL NIGHTLY PRN
Status: DISCONTINUED | OUTPATIENT
Start: 2020-12-23 | End: 2020-12-24 | Stop reason: HOSPADM

## 2020-12-23 RX ORDER — ACETAMINOPHEN 325 MG/1
650 TABLET ORAL EVERY 4 HOURS PRN
Status: DISCONTINUED | OUTPATIENT
Start: 2020-12-23 | End: 2020-12-24 | Stop reason: HOSPADM

## 2020-12-23 RX ORDER — LOSARTAN POTASSIUM 50 MG/1
50 TABLET ORAL DAILY
Status: DISCONTINUED | OUTPATIENT
Start: 2020-12-24 | End: 2020-12-24 | Stop reason: HOSPADM

## 2020-12-23 RX ORDER — NITROGLYCERIN 0.4 MG/1
0.4 TABLET SUBLINGUAL
Status: DISCONTINUED | OUTPATIENT
Start: 2020-12-23 | End: 2020-12-24 | Stop reason: HOSPADM

## 2020-12-23 RX ORDER — NICOTINE POLACRILEX 4 MG
15 LOZENGE BUCCAL
Status: DISCONTINUED | OUTPATIENT
Start: 2020-12-23 | End: 2020-12-24 | Stop reason: HOSPADM

## 2020-12-23 RX ORDER — BUDESONIDE AND FORMOTEROL FUMARATE DIHYDRATE 80; 4.5 UG/1; UG/1
2 AEROSOL RESPIRATORY (INHALATION)
Status: DISCONTINUED | OUTPATIENT
Start: 2020-12-24 | End: 2020-12-24 | Stop reason: HOSPADM

## 2020-12-23 RX ORDER — DEXTROSE MONOHYDRATE 25 G/50ML
25 INJECTION, SOLUTION INTRAVENOUS
Status: DISCONTINUED | OUTPATIENT
Start: 2020-12-23 | End: 2020-12-24 | Stop reason: HOSPADM

## 2020-12-23 RX ORDER — MORPHINE SULFATE 2 MG/ML
4 INJECTION, SOLUTION INTRAMUSCULAR; INTRAVENOUS EVERY 4 HOURS PRN
Status: DISCONTINUED | OUTPATIENT
Start: 2020-12-23 | End: 2020-12-24 | Stop reason: HOSPADM

## 2020-12-23 RX ORDER — SODIUM CHLORIDE 0.9 % (FLUSH) 0.9 %
10 SYRINGE (ML) INJECTION AS NEEDED
Status: DISCONTINUED | OUTPATIENT
Start: 2020-12-23 | End: 2020-12-24 | Stop reason: HOSPADM

## 2020-12-23 RX ORDER — MONTELUKAST SODIUM 10 MG/1
10 TABLET ORAL NIGHTLY
Status: DISCONTINUED | OUTPATIENT
Start: 2020-12-24 | End: 2020-12-24 | Stop reason: HOSPADM

## 2020-12-23 RX ORDER — LIDOCAINE HYDROCHLORIDE AND EPINEPHRINE 10; 10 MG/ML; UG/ML
10 INJECTION, SOLUTION INFILTRATION; PERINEURAL ONCE
Status: COMPLETED | OUTPATIENT
Start: 2020-12-23 | End: 2020-12-23

## 2020-12-23 RX ORDER — MORPHINE SULFATE 2 MG/ML
4 INJECTION, SOLUTION INTRAMUSCULAR; INTRAVENOUS ONCE
Status: COMPLETED | OUTPATIENT
Start: 2020-12-23 | End: 2020-12-23

## 2020-12-23 RX ORDER — SPIRONOLACTONE 25 MG/1
25 TABLET ORAL DAILY
Status: DISCONTINUED | OUTPATIENT
Start: 2020-12-24 | End: 2020-12-24 | Stop reason: HOSPADM

## 2020-12-23 RX ORDER — SODIUM CHLORIDE 9 MG/ML
75 INJECTION, SOLUTION INTRAVENOUS CONTINUOUS
Status: DISCONTINUED | OUTPATIENT
Start: 2020-12-24 | End: 2020-12-24 | Stop reason: HOSPADM

## 2020-12-23 RX ORDER — ONDANSETRON 4 MG/1
4 TABLET, FILM COATED ORAL EVERY 6 HOURS PRN
Status: DISCONTINUED | OUTPATIENT
Start: 2020-12-23 | End: 2020-12-24 | Stop reason: HOSPADM

## 2020-12-23 RX ORDER — GABAPENTIN 300 MG/1
300 CAPSULE ORAL 3 TIMES DAILY
Status: DISCONTINUED | OUTPATIENT
Start: 2020-12-24 | End: 2020-12-24 | Stop reason: HOSPADM

## 2020-12-23 RX ORDER — CITALOPRAM 20 MG/1
20 TABLET ORAL DAILY
Status: DISCONTINUED | OUTPATIENT
Start: 2020-12-24 | End: 2020-12-24 | Stop reason: HOSPADM

## 2020-12-23 RX ORDER — ONDANSETRON 2 MG/ML
4 INJECTION INTRAMUSCULAR; INTRAVENOUS EVERY 6 HOURS PRN
Status: DISCONTINUED | OUTPATIENT
Start: 2020-12-23 | End: 2020-12-24 | Stop reason: HOSPADM

## 2020-12-23 RX ORDER — INSULIN LISPRO 100 [IU]/ML
0-9 INJECTION, SOLUTION INTRAVENOUS; SUBCUTANEOUS
Status: DISCONTINUED | OUTPATIENT
Start: 2020-12-24 | End: 2020-12-24 | Stop reason: HOSPADM

## 2020-12-23 RX ORDER — ONDANSETRON 2 MG/ML
4 INJECTION INTRAMUSCULAR; INTRAVENOUS ONCE
Status: COMPLETED | OUTPATIENT
Start: 2020-12-23 | End: 2020-12-23

## 2020-12-23 RX ORDER — OXYBUTYNIN CHLORIDE 5 MG/1
5 TABLET, EXTENDED RELEASE ORAL DAILY
Status: DISCONTINUED | OUTPATIENT
Start: 2020-12-24 | End: 2020-12-24 | Stop reason: HOSPADM

## 2020-12-23 RX ORDER — PRIMIDONE 50 MG/1
50 TABLET ORAL 3 TIMES DAILY
Status: DISCONTINUED | OUTPATIENT
Start: 2020-12-24 | End: 2020-12-24 | Stop reason: HOSPADM

## 2020-12-23 RX ADMIN — LIDOCAINE HYDROCHLORIDE AND EPINEPHRINE 10 ML: 10; 10 INJECTION, SOLUTION INFILTRATION; PERINEURAL at 15:44

## 2020-12-23 RX ADMIN — ONDANSETRON 4 MG: 2 INJECTION INTRAMUSCULAR; INTRAVENOUS at 15:44

## 2020-12-23 RX ADMIN — SODIUM CHLORIDE 1000 ML: 9 INJECTION, SOLUTION INTRAVENOUS at 16:00

## 2020-12-23 RX ADMIN — MORPHINE SULFATE 4 MG: 2 INJECTION, SOLUTION INTRAMUSCULAR; INTRAVENOUS at 15:44

## 2020-12-24 ENCOUNTER — ANESTHESIA (OUTPATIENT)
Dept: PERIOP | Facility: HOSPITAL | Age: 70
End: 2020-12-24

## 2020-12-24 ENCOUNTER — ANESTHESIA EVENT (OUTPATIENT)
Dept: PERIOP | Facility: HOSPITAL | Age: 70
End: 2020-12-24

## 2020-12-24 ENCOUNTER — APPOINTMENT (OUTPATIENT)
Dept: GENERAL RADIOLOGY | Facility: HOSPITAL | Age: 70
End: 2020-12-24

## 2020-12-24 PROBLEM — N17.9 AKI (ACUTE KIDNEY INJURY): Status: ACTIVE | Noted: 2020-12-24

## 2020-12-24 PROBLEM — E11.59 TYPE 2 DIABETES MELLITUS WITH CIRCULATORY DISORDER, WITHOUT LONG-TERM CURRENT USE OF INSULIN: Status: ACTIVE | Noted: 2020-12-24

## 2020-12-24 PROBLEM — R56.9 SEIZURE: Status: ACTIVE | Noted: 2020-12-24

## 2020-12-24 PROBLEM — I10 ESSENTIAL HYPERTENSION: Status: ACTIVE | Noted: 2020-12-24

## 2020-12-24 PROBLEM — D62 ACUTE BLOOD LOSS ANEMIA: Status: ACTIVE | Noted: 2020-12-24

## 2020-12-24 LAB
ANION GAP SERPL CALCULATED.3IONS-SCNC: 10.2 MMOL/L (ref 5–15)
BACTERIA UR QL AUTO: ABNORMAL /HPF
BILIRUB UR QL STRIP: NEGATIVE
BUN SERPL-MCNC: 79 MG/DL (ref 8–23)
BUN/CREAT SERPL: 43.6 (ref 7–25)
CALCIUM SPEC-SCNC: 8.9 MG/DL (ref 8.6–10.5)
CHLORIDE SERPL-SCNC: 95 MMOL/L (ref 98–107)
CLARITY UR: CLEAR
CO2 SERPL-SCNC: 32.8 MMOL/L (ref 22–29)
COLOR UR: YELLOW
CREAT SERPL-MCNC: 1.81 MG/DL (ref 0.57–1)
DEPRECATED RDW RBC AUTO: 43 FL (ref 37–54)
ERYTHROCYTE [DISTWIDTH] IN BLOOD BY AUTOMATED COUNT: 13 % (ref 12.3–15.4)
GFR SERPL CREATININE-BSD FRML MDRD: 27 ML/MIN/1.73
GLUCOSE BLDC GLUCOMTR-MCNC: 164 MG/DL (ref 70–130)
GLUCOSE BLDC GLUCOMTR-MCNC: 170 MG/DL (ref 70–130)
GLUCOSE SERPL-MCNC: 160 MG/DL (ref 65–99)
GLUCOSE UR STRIP-MCNC: NEGATIVE MG/DL
HBA1C MFR BLD: 5.5 % (ref 4.8–5.6)
HCT VFR BLD AUTO: 29 % (ref 34–46.6)
HGB BLD-MCNC: 9.5 G/DL (ref 12–15.9)
HGB UR QL STRIP.AUTO: NEGATIVE
HYALINE CASTS UR QL AUTO: ABNORMAL /LPF
KETONES UR QL STRIP: NEGATIVE
LEUKOCYTE ESTERASE UR QL STRIP.AUTO: ABNORMAL
MCH RBC QN AUTO: 29.9 PG (ref 26.6–33)
MCHC RBC AUTO-ENTMCNC: 32.8 G/DL (ref 31.5–35.7)
MCV RBC AUTO: 91.2 FL (ref 79–97)
NITRITE UR QL STRIP: NEGATIVE
PH UR STRIP.AUTO: 5.5 [PH] (ref 5–8)
PLATELET # BLD AUTO: 160 10*3/MM3 (ref 140–450)
PMV BLD AUTO: 12 FL (ref 6–12)
POTASSIUM SERPL-SCNC: 4.8 MMOL/L (ref 3.5–5.2)
PROT UR QL STRIP: ABNORMAL
RBC # BLD AUTO: 3.18 10*6/MM3 (ref 3.77–5.28)
RBC # UR: ABNORMAL /HPF
REF LAB TEST METHOD: ABNORMAL
SODIUM SERPL-SCNC: 138 MMOL/L (ref 136–145)
SODIUM UR-SCNC: 41 MMOL/L
SP GR UR STRIP: 1.01 (ref 1–1.03)
SQUAMOUS #/AREA URNS HPF: ABNORMAL /HPF
UROBILINOGEN UR QL STRIP: ABNORMAL
WBC # BLD AUTO: 6.82 10*3/MM3 (ref 3.4–10.8)
WBC UR QL AUTO: ABNORMAL /HPF

## 2020-12-24 PROCEDURE — C1713 ANCHOR/SCREW BN/BN,TIS/BN: HCPCS | Performed by: ORTHOPAEDIC SURGERY

## 2020-12-24 PROCEDURE — 25010000003 CEFAZOLIN IN DEXTROSE 2-4 GM/100ML-% SOLUTION: Performed by: ORTHOPAEDIC SURGERY

## 2020-12-24 PROCEDURE — 81001 URINALYSIS AUTO W/SCOPE: CPT | Performed by: INTERNAL MEDICINE

## 2020-12-24 PROCEDURE — 80048 BASIC METABOLIC PNL TOTAL CA: CPT | Performed by: INTERNAL MEDICINE

## 2020-12-24 PROCEDURE — 76000 FLUOROSCOPY <1 HR PHYS/QHP: CPT

## 2020-12-24 PROCEDURE — 84300 ASSAY OF URINE SODIUM: CPT | Performed by: INTERNAL MEDICINE

## 2020-12-24 PROCEDURE — 94640 AIRWAY INHALATION TREATMENT: CPT

## 2020-12-24 PROCEDURE — L1830 KO IMMOB CANVAS LONG PRE OTS: HCPCS | Performed by: ORTHOPAEDIC SURGERY

## 2020-12-24 PROCEDURE — 82962 GLUCOSE BLOOD TEST: CPT

## 2020-12-24 PROCEDURE — 25010000002 VANCOMYCIN 10 G RECONSTITUTED SOLUTION: Performed by: ORTHOPAEDIC SURGERY

## 2020-12-24 PROCEDURE — 73560 X-RAY EXAM OF KNEE 1 OR 2: CPT

## 2020-12-24 PROCEDURE — 93010 ELECTROCARDIOGRAM REPORT: CPT | Performed by: INTERNAL MEDICINE

## 2020-12-24 PROCEDURE — 94799 UNLISTED PULMONARY SVC/PX: CPT

## 2020-12-24 PROCEDURE — 25010000002 PHENYLEPHRINE PER 1 ML: Performed by: NURSE ANESTHETIST, CERTIFIED REGISTERED

## 2020-12-24 PROCEDURE — 73552 X-RAY EXAM OF FEMUR 2/>: CPT

## 2020-12-24 PROCEDURE — L4386 NON-PNEUM WALK BOOT PRE CST: HCPCS | Performed by: ORTHOPAEDIC SURGERY

## 2020-12-24 PROCEDURE — 94664 DEMO&/EVAL PT USE INHALER: CPT

## 2020-12-24 PROCEDURE — 25010000002 HYDROMORPHONE PER 4 MG: Performed by: NURSE ANESTHETIST, CERTIFIED REGISTERED

## 2020-12-24 PROCEDURE — 83036 HEMOGLOBIN GLYCOSYLATED A1C: CPT | Performed by: INTERNAL MEDICINE

## 2020-12-24 PROCEDURE — 93005 ELECTROCARDIOGRAM TRACING: CPT | Performed by: INTERNAL MEDICINE

## 2020-12-24 PROCEDURE — 85027 COMPLETE CBC AUTOMATED: CPT | Performed by: INTERNAL MEDICINE

## 2020-12-24 PROCEDURE — 0QSC34Z REPOSITION LEFT LOWER FEMUR WITH INTERNAL FIXATION DEVICE, PERCUTANEOUS APPROACH: ICD-10-PCS | Performed by: ORTHOPAEDIC SURGERY

## 2020-12-24 PROCEDURE — 25010000002 NEOSTIGMINE PER 0.5 MG: Performed by: NURSE ANESTHETIST, CERTIFIED REGISTERED

## 2020-12-24 DEVICE — LOCKING SCREW
Type: IMPLANTABLE DEVICE | Site: FEMUR | Status: FUNCTIONAL
Brand: AXSOS

## 2020-12-24 DEVICE — K-WIRE DRILL TIP
Type: IMPLANTABLE DEVICE | Site: FEMUR | Status: FUNCTIONAL
Brand: AXSOS

## 2020-12-24 DEVICE — DISTAL LATERAL FEMUR PLATE
Type: IMPLANTABLE DEVICE | Site: FEMUR | Status: FUNCTIONAL
Brand: AXSOS

## 2020-12-24 RX ORDER — BUDESONIDE AND FORMOTEROL FUMARATE DIHYDRATE 80; 4.5 UG/1; UG/1
2 AEROSOL RESPIRATORY (INHALATION)
Status: DISCONTINUED | OUTPATIENT
Start: 2020-12-24 | End: 2020-12-31 | Stop reason: HOSPADM

## 2020-12-24 RX ORDER — ONDANSETRON 2 MG/ML
4 INJECTION INTRAMUSCULAR; INTRAVENOUS ONCE AS NEEDED
Status: DISCONTINUED | OUTPATIENT
Start: 2020-12-24 | End: 2020-12-24 | Stop reason: HOSPADM

## 2020-12-24 RX ORDER — CEFAZOLIN SODIUM 2 G/100ML
2 INJECTION, SOLUTION INTRAVENOUS EVERY 8 HOURS
Status: COMPLETED | OUTPATIENT
Start: 2020-12-24 | End: 2020-12-25

## 2020-12-24 RX ORDER — SODIUM CHLORIDE 0.9 % (FLUSH) 0.9 %
3 SYRINGE (ML) INJECTION EVERY 12 HOURS SCHEDULED
Status: DISCONTINUED | OUTPATIENT
Start: 2020-12-24 | End: 2020-12-24 | Stop reason: HOSPADM

## 2020-12-24 RX ORDER — SODIUM CHLORIDE 9 MG/ML
INJECTION, SOLUTION INTRAVENOUS AS NEEDED
Status: DISCONTINUED | OUTPATIENT
Start: 2020-12-24 | End: 2020-12-24 | Stop reason: HOSPADM

## 2020-12-24 RX ORDER — FAMOTIDINE 10 MG/ML
20 INJECTION, SOLUTION INTRAVENOUS ONCE
Status: COMPLETED | OUTPATIENT
Start: 2020-12-24 | End: 2020-12-24

## 2020-12-24 RX ORDER — HYDRALAZINE HYDROCHLORIDE 20 MG/ML
5 INJECTION INTRAMUSCULAR; INTRAVENOUS
Status: DISCONTINUED | OUTPATIENT
Start: 2020-12-24 | End: 2020-12-24 | Stop reason: HOSPADM

## 2020-12-24 RX ORDER — DIPHENHYDRAMINE HYDROCHLORIDE 50 MG/ML
12.5 INJECTION INTRAMUSCULAR; INTRAVENOUS
Status: DISCONTINUED | OUTPATIENT
Start: 2020-12-24 | End: 2020-12-24 | Stop reason: HOSPADM

## 2020-12-24 RX ORDER — FLUMAZENIL 0.1 MG/ML
0.2 INJECTION INTRAVENOUS AS NEEDED
Status: DISCONTINUED | OUTPATIENT
Start: 2020-12-24 | End: 2020-12-24 | Stop reason: HOSPADM

## 2020-12-24 RX ORDER — HYDROMORPHONE HCL 110MG/55ML
PATIENT CONTROLLED ANALGESIA SYRINGE INTRAVENOUS AS NEEDED
Status: DISCONTINUED | OUTPATIENT
Start: 2020-12-24 | End: 2020-12-24 | Stop reason: SURG

## 2020-12-24 RX ORDER — EPHEDRINE SULFATE 50 MG/ML
INJECTION, SOLUTION INTRAVENOUS AS NEEDED
Status: DISCONTINUED | OUTPATIENT
Start: 2020-12-24 | End: 2020-12-24 | Stop reason: SURG

## 2020-12-24 RX ORDER — HYDROMORPHONE HYDROCHLORIDE 1 MG/ML
0.5 INJECTION, SOLUTION INTRAMUSCULAR; INTRAVENOUS; SUBCUTANEOUS
Status: DISCONTINUED | OUTPATIENT
Start: 2020-12-24 | End: 2020-12-24 | Stop reason: HOSPADM

## 2020-12-24 RX ORDER — FENTANYL CITRATE 50 UG/ML
50 INJECTION, SOLUTION INTRAMUSCULAR; INTRAVENOUS
Status: DISCONTINUED | OUTPATIENT
Start: 2020-12-24 | End: 2020-12-24 | Stop reason: HOSPADM

## 2020-12-24 RX ORDER — GLYCOPYRROLATE 0.2 MG/ML
INJECTION INTRAMUSCULAR; INTRAVENOUS AS NEEDED
Status: DISCONTINUED | OUTPATIENT
Start: 2020-12-24 | End: 2020-12-24 | Stop reason: SURG

## 2020-12-24 RX ORDER — EPHEDRINE SULFATE 50 MG/ML
5 INJECTION, SOLUTION INTRAVENOUS ONCE AS NEEDED
Status: DISCONTINUED | OUTPATIENT
Start: 2020-12-24 | End: 2020-12-24 | Stop reason: HOSPADM

## 2020-12-24 RX ORDER — SODIUM CHLORIDE 0.9 % (FLUSH) 0.9 %
3-10 SYRINGE (ML) INJECTION AS NEEDED
Status: DISCONTINUED | OUTPATIENT
Start: 2020-12-24 | End: 2020-12-24 | Stop reason: HOSPADM

## 2020-12-24 RX ORDER — DIPHENHYDRAMINE HCL 25 MG
25 CAPSULE ORAL
Status: DISCONTINUED | OUTPATIENT
Start: 2020-12-24 | End: 2020-12-24 | Stop reason: HOSPADM

## 2020-12-24 RX ORDER — NALOXONE HCL 0.4 MG/ML
0.2 VIAL (ML) INJECTION AS NEEDED
Status: DISCONTINUED | OUTPATIENT
Start: 2020-12-24 | End: 2020-12-24 | Stop reason: HOSPADM

## 2020-12-24 RX ORDER — ETOMIDATE 2 MG/ML
INJECTION INTRAVENOUS AS NEEDED
Status: DISCONTINUED | OUTPATIENT
Start: 2020-12-24 | End: 2020-12-24 | Stop reason: SURG

## 2020-12-24 RX ORDER — SODIUM CHLORIDE 9 MG/ML
50 INJECTION, SOLUTION INTRAVENOUS CONTINUOUS
Status: DISCONTINUED | OUTPATIENT
Start: 2020-12-24 | End: 2020-12-25

## 2020-12-24 RX ORDER — LIDOCAINE HYDROCHLORIDE 20 MG/ML
INJECTION, SOLUTION INFILTRATION; PERINEURAL AS NEEDED
Status: DISCONTINUED | OUTPATIENT
Start: 2020-12-24 | End: 2020-12-24 | Stop reason: SURG

## 2020-12-24 RX ORDER — LIDOCAINE HYDROCHLORIDE 10 MG/ML
0.5 INJECTION, SOLUTION EPIDURAL; INFILTRATION; INTRACAUDAL; PERINEURAL ONCE AS NEEDED
Status: DISCONTINUED | OUTPATIENT
Start: 2020-12-24 | End: 2020-12-24 | Stop reason: HOSPADM

## 2020-12-24 RX ORDER — PRIMIDONE 50 MG/1
50 TABLET ORAL 3 TIMES DAILY
Status: DISCONTINUED | OUTPATIENT
Start: 2020-12-24 | End: 2020-12-31 | Stop reason: HOSPADM

## 2020-12-24 RX ORDER — SODIUM CHLORIDE, SODIUM LACTATE, POTASSIUM CHLORIDE, CALCIUM CHLORIDE 600; 310; 30; 20 MG/100ML; MG/100ML; MG/100ML; MG/100ML
9 INJECTION, SOLUTION INTRAVENOUS CONTINUOUS
Status: DISCONTINUED | OUTPATIENT
Start: 2020-12-24 | End: 2020-12-24 | Stop reason: HOSPADM

## 2020-12-24 RX ORDER — CITALOPRAM 20 MG/1
20 TABLET ORAL DAILY
Status: DISCONTINUED | OUTPATIENT
Start: 2020-12-24 | End: 2020-12-31 | Stop reason: HOSPADM

## 2020-12-24 RX ORDER — ROCURONIUM BROMIDE 10 MG/ML
INJECTION, SOLUTION INTRAVENOUS AS NEEDED
Status: DISCONTINUED | OUTPATIENT
Start: 2020-12-24 | End: 2020-12-24 | Stop reason: SURG

## 2020-12-24 RX ORDER — HYDROCODONE BITARTRATE AND ACETAMINOPHEN 7.5; 325 MG/1; MG/1
1 TABLET ORAL ONCE AS NEEDED
Status: DISCONTINUED | OUTPATIENT
Start: 2020-12-24 | End: 2020-12-24 | Stop reason: HOSPADM

## 2020-12-24 RX ORDER — LABETALOL HYDROCHLORIDE 5 MG/ML
5 INJECTION, SOLUTION INTRAVENOUS
Status: DISCONTINUED | OUTPATIENT
Start: 2020-12-24 | End: 2020-12-24 | Stop reason: HOSPADM

## 2020-12-24 RX ORDER — CEFAZOLIN SODIUM 2 G/100ML
2 INJECTION, SOLUTION INTRAVENOUS ONCE
Status: COMPLETED | OUTPATIENT
Start: 2020-12-24 | End: 2020-12-24

## 2020-12-24 RX ORDER — OXYCODONE AND ACETAMINOPHEN 7.5; 325 MG/1; MG/1
1 TABLET ORAL ONCE AS NEEDED
Status: DISCONTINUED | OUTPATIENT
Start: 2020-12-24 | End: 2020-12-24 | Stop reason: HOSPADM

## 2020-12-24 RX ORDER — MONTELUKAST SODIUM 10 MG/1
10 TABLET ORAL NIGHTLY
Status: DISCONTINUED | OUTPATIENT
Start: 2020-12-24 | End: 2020-12-31 | Stop reason: HOSPADM

## 2020-12-24 RX ORDER — MIDAZOLAM HYDROCHLORIDE 1 MG/ML
1 INJECTION INTRAMUSCULAR; INTRAVENOUS
Status: DISCONTINUED | OUTPATIENT
Start: 2020-12-24 | End: 2020-12-24 | Stop reason: HOSPADM

## 2020-12-24 RX ADMIN — PRIMIDONE 50 MG: 50 TABLET ORAL at 21:49

## 2020-12-24 RX ADMIN — ETOMIDATE 20 MG: 2 INJECTION, SOLUTION INTRAVENOUS at 08:36

## 2020-12-24 RX ADMIN — SODIUM CHLORIDE 75 ML/HR: 9 INJECTION, SOLUTION INTRAVENOUS at 01:07

## 2020-12-24 RX ADMIN — SODIUM CHLORIDE, POTASSIUM CHLORIDE, SODIUM LACTATE AND CALCIUM CHLORIDE 9 ML/HR: 600; 310; 30; 20 INJECTION, SOLUTION INTRAVENOUS at 08:15

## 2020-12-24 RX ADMIN — NEOSTIGMINE METHYLSULFATE 2.5 MG: 1 INJECTION INTRAMUSCULAR; INTRAVENOUS; SUBCUTANEOUS at 10:29

## 2020-12-24 RX ADMIN — ACETAMINOPHEN 650 MG: 325 TABLET, FILM COATED ORAL at 01:07

## 2020-12-24 RX ADMIN — SODIUM CHLORIDE, POTASSIUM CHLORIDE, SODIUM LACTATE AND CALCIUM CHLORIDE: 600; 310; 30; 20 INJECTION, SOLUTION INTRAVENOUS at 10:08

## 2020-12-24 RX ADMIN — ROCURONIUM BROMIDE 40 MG: 10 INJECTION INTRAVENOUS at 08:36

## 2020-12-24 RX ADMIN — CEFAZOLIN SODIUM 2 G: 2 INJECTION, SOLUTION INTRAVENOUS at 23:46

## 2020-12-24 RX ADMIN — VANCOMYCIN HYDROCHLORIDE 1500 MG: 10 INJECTION, POWDER, LYOPHILIZED, FOR SOLUTION INTRAVENOUS at 08:13

## 2020-12-24 RX ADMIN — EPHEDRINE SULFATE 10 MG: 50 INJECTION INTRAVENOUS at 10:10

## 2020-12-24 RX ADMIN — ROCURONIUM BROMIDE 20 MG: 10 INJECTION INTRAVENOUS at 09:59

## 2020-12-24 RX ADMIN — GLYCOPYRROLATE 0.4 MG: 0.2 INJECTION INTRAMUSCULAR; INTRAVENOUS at 10:29

## 2020-12-24 RX ADMIN — HYDROMORPHONE HYDROCHLORIDE 0.25 MG: 2 INJECTION, SOLUTION INTRAMUSCULAR; INTRAVENOUS; SUBCUTANEOUS at 10:26

## 2020-12-24 RX ADMIN — CITALOPRAM 20 MG: 20 TABLET, FILM COATED ORAL at 21:49

## 2020-12-24 RX ADMIN — HYDROMORPHONE HYDROCHLORIDE 0.25 MG: 2 INJECTION, SOLUTION INTRAMUSCULAR; INTRAVENOUS; SUBCUTANEOUS at 10:04

## 2020-12-24 RX ADMIN — METOPROLOL TARTRATE 12.5 MG: 25 TABLET, FILM COATED ORAL at 21:49

## 2020-12-24 RX ADMIN — BUDESONIDE AND FORMOTEROL FUMARATE DIHYDRATE 2 PUFF: 80; 4.5 AEROSOL RESPIRATORY (INHALATION) at 21:23

## 2020-12-24 RX ADMIN — GLYCOPYRROLATE 0.3 MG: 0.2 INJECTION INTRAMUSCULAR; INTRAVENOUS at 08:45

## 2020-12-24 RX ADMIN — CEFAZOLIN SODIUM 2 G: 2 INJECTION, SOLUTION INTRAVENOUS at 16:23

## 2020-12-24 RX ADMIN — PHENYLEPHRINE HYDROCHLORIDE 200 MCG: 10 INJECTION INTRAVENOUS at 08:55

## 2020-12-24 RX ADMIN — EPHEDRINE SULFATE 10 MG: 50 INJECTION INTRAVENOUS at 08:47

## 2020-12-24 RX ADMIN — MONTELUKAST SODIUM 10 MG: 10 TABLET, FILM COATED ORAL at 21:49

## 2020-12-24 RX ADMIN — ROCURONIUM BROMIDE 20 MG: 10 INJECTION INTRAVENOUS at 09:25

## 2020-12-24 RX ADMIN — CEFAZOLIN SODIUM 2 G: 2 INJECTION, SOLUTION INTRAVENOUS at 08:45

## 2020-12-24 RX ADMIN — LIDOCAINE HYDROCHLORIDE 100 MG: 20 INJECTION, SOLUTION INFILTRATION; PERINEURAL at 08:34

## 2020-12-24 RX ADMIN — SODIUM CHLORIDE 75 ML/HR: 9 INJECTION, SOLUTION INTRAVENOUS at 21:49

## 2020-12-24 RX ADMIN — HYDROMORPHONE HYDROCHLORIDE 0.25 MG: 2 INJECTION, SOLUTION INTRAMUSCULAR; INTRAVENOUS; SUBCUTANEOUS at 10:33

## 2020-12-24 RX ADMIN — HYDROMORPHONE HYDROCHLORIDE 0.25 MG: 2 INJECTION, SOLUTION INTRAMUSCULAR; INTRAVENOUS; SUBCUTANEOUS at 10:12

## 2020-12-24 RX ADMIN — MONTELUKAST SODIUM 10 MG: 10 TABLET, FILM COATED ORAL at 01:08

## 2020-12-24 RX ADMIN — METOPROLOL TARTRATE 25 MG: 25 TABLET, FILM COATED ORAL at 07:21

## 2020-12-24 RX ADMIN — FAMOTIDINE 20 MG: 10 INJECTION INTRAVENOUS at 08:15

## 2020-12-24 NOTE — ANESTHESIA PROCEDURE NOTES
Airway  Urgency: elective    Date/Time: 12/24/2020 8:38 AM  Airway not difficult    General Information and Staff    Patient location during procedure: OR  Anesthesiologist: Alisha Caldwell MD  CRNA: Black Romero CRNA    Indications and Patient Condition  Indications for airway management: airway protection    Preoxygenated: yes  Mask difficulty assessment: 1 - vent by mask    Final Airway Details  Final airway type: endotracheal airway      Successful airway: ETT  Cuffed: yes   Successful intubation technique: direct laryngoscopy  Endotracheal tube insertion site: oral  Blade: Spears  Blade size: 2  ETT size (mm): 7.0  Cormack-Lehane Classification: grade I - full view of glottis  Placement verified by: chest auscultation and capnometry   Measured from: lips  ETT/EBT  to lips (cm): 22  Number of attempts at approach: 1  Assessment: lips, teeth, and gum same as pre-op and atraumatic intubation    Additional Comments  Pre 02 100%, SIVI, DL x1, atraumatic intubation, BLBS, Positive ETC02.

## 2020-12-24 NOTE — ANESTHESIA POSTPROCEDURE EVALUATION
"Patient: Kanwal Sauer    Procedure Summary     Date: 12/24/20 Room / Location: [facility] OR [address] [place] [address] MAIN OR    Anesthesia Start: 0830 Anesthesia Stop: 1119    Procedure: DISTAL FEMUR OPEN REDUCTION INTERNAL FIXATION (Left Knee) Diagnosis:       Periprosthetic fracture around internal prosthetic left knee joint, subsequent encounter      Closed comminuted supracondylar fracture of left femur (CMS/HCC)      Closed fracture of left lateral malleolus      (Periprosthetic fracture around internal prosthetic left knee joint, subsequent encounter )    Surgeon: Marley Hernandez MD Provider: Alisha Caldwell MD    Anesthesia Type: general ASA Status: 3          Anesthesia Type: general    Vitals  Vitals Value Taken Time   /52 12/24/20 1155   Temp 37 °C (98.6 °F) 12/24/20 1115   Pulse 61 12/24/20 1208   Resp 14 12/24/20 1155   SpO2 99 % 12/24/20 1208   Vitals shown include unvalidated device data.        Post Anesthesia Care and Evaluation    Patient location during evaluation: bedside  Patient participation: complete - patient participated  Level of consciousness: sleepy but conscious  Pain score: 0  Pain management: adequate  Airway patency: patent  Anesthetic complications: No anesthetic complications    Cardiovascular status: acceptable  Respiratory status: acceptable  Hydration status: acceptable    Comments: /52   Pulse 63   Temp 37 °C (98.6 °F) (Oral)   Resp 14   Ht 177.8 cm (70\")   Wt 99.3 kg (218 lb 14.7 oz)   SpO2 99%   BMI 31.41 kg/m²         "

## 2020-12-24 NOTE — ANESTHESIA PREPROCEDURE EVALUATION
Anesthesia Evaluation                  Airway   Mallampati: I  TM distance: >3 FB  Neck ROM: full  No difficulty expected  Dental    (+) edentulous    Pulmonary - normal exam   (+) asthma,sleep apnea on CPAP,   Cardiovascular     Rhythm: irregular    (+) hypertension, dysrhythmias Atrial Fib, DVT, hyperlipidemia,       Neuro/Psych  (+) seizures,       ROS Comment: Does not recall having a seizur  GI/Hepatic/Renal/Endo    (+)   diabetes mellitus type 2 well controlled,     Musculoskeletal     Abdominal    Substance History      OB/GYN          Other      history of cancer                    Anesthesia Plan    ASA 3     general     intravenous induction     Anesthetic plan, all risks, benefits, and alternatives have been provided, discussed and informed consent has been obtained with: patient.

## 2020-12-24 NOTE — ADDENDUM NOTE
Addendum  created 12/24/20 1303 by Alisha Caldwell MD    Attestation recorded in Intraprocedure, Intraprocedure Attestations filed

## 2020-12-25 LAB
ANION GAP SERPL CALCULATED.3IONS-SCNC: 11 MMOL/L (ref 5–15)
BUN SERPL-MCNC: 54 MG/DL (ref 8–23)
BUN/CREAT SERPL: 36.2 (ref 7–25)
CALCIUM SPEC-SCNC: 9 MG/DL (ref 8.6–10.5)
CHLORIDE SERPL-SCNC: 96 MMOL/L (ref 98–107)
CO2 SERPL-SCNC: 31 MMOL/L (ref 22–29)
CREAT SERPL-MCNC: 1.49 MG/DL (ref 0.57–1)
DEPRECATED RDW RBC AUTO: 45.2 FL (ref 37–54)
ERYTHROCYTE [DISTWIDTH] IN BLOOD BY AUTOMATED COUNT: 13.3 % (ref 12.3–15.4)
GFR SERPL CREATININE-BSD FRML MDRD: 34 ML/MIN/1.73
GLUCOSE SERPL-MCNC: 199 MG/DL (ref 65–99)
HCT VFR BLD AUTO: 24.8 % (ref 34–46.6)
HGB BLD-MCNC: 8.1 G/DL (ref 12–15.9)
MCH RBC QN AUTO: 30.6 PG (ref 26.6–33)
MCHC RBC AUTO-ENTMCNC: 32.7 G/DL (ref 31.5–35.7)
MCV RBC AUTO: 93.6 FL (ref 79–97)
PLATELET # BLD AUTO: 115 10*3/MM3 (ref 140–450)
PMV BLD AUTO: 11.8 FL (ref 6–12)
POTASSIUM SERPL-SCNC: 4.8 MMOL/L (ref 3.5–5.2)
QT INTERVAL: 396 MS
RBC # BLD AUTO: 2.65 10*6/MM3 (ref 3.77–5.28)
SODIUM SERPL-SCNC: 138 MMOL/L (ref 136–145)
URATE SERPL-MCNC: 7 MG/DL (ref 2.4–5.7)
WBC # BLD AUTO: 4.64 10*3/MM3 (ref 3.4–10.8)

## 2020-12-25 PROCEDURE — 80048 BASIC METABOLIC PNL TOTAL CA: CPT | Performed by: INTERNAL MEDICINE

## 2020-12-25 PROCEDURE — 94799 UNLISTED PULMONARY SVC/PX: CPT

## 2020-12-25 PROCEDURE — 86901 BLOOD TYPING SEROLOGIC RH(D): CPT

## 2020-12-25 PROCEDURE — 36430 TRANSFUSION BLD/BLD COMPNT: CPT

## 2020-12-25 PROCEDURE — P9016 RBC LEUKOCYTES REDUCED: HCPCS

## 2020-12-25 PROCEDURE — 86900 BLOOD TYPING SEROLOGIC ABO: CPT

## 2020-12-25 PROCEDURE — 84550 ASSAY OF BLOOD/URIC ACID: CPT | Performed by: INTERNAL MEDICINE

## 2020-12-25 PROCEDURE — 85027 COMPLETE CBC AUTOMATED: CPT | Performed by: INTERNAL MEDICINE

## 2020-12-25 RX ORDER — HYDROCODONE BITARTRATE AND ACETAMINOPHEN 5; 325 MG/1; MG/1
2 TABLET ORAL EVERY 4 HOURS PRN
Status: DISCONTINUED | OUTPATIENT
Start: 2020-12-25 | End: 2020-12-31 | Stop reason: HOSPADM

## 2020-12-25 RX ORDER — HYDROCODONE BITARTRATE AND ACETAMINOPHEN 5; 325 MG/1; MG/1
1 TABLET ORAL EVERY 4 HOURS PRN
Status: DISCONTINUED | OUTPATIENT
Start: 2020-12-25 | End: 2020-12-31 | Stop reason: HOSPADM

## 2020-12-25 RX ORDER — ACETAMINOPHEN 325 MG/1
650 TABLET ORAL EVERY 4 HOURS PRN
Status: DISCONTINUED | OUTPATIENT
Start: 2020-12-25 | End: 2020-12-31 | Stop reason: HOSPADM

## 2020-12-25 RX ADMIN — MONTELUKAST SODIUM 10 MG: 10 TABLET, FILM COATED ORAL at 20:35

## 2020-12-25 RX ADMIN — BUDESONIDE AND FORMOTEROL FUMARATE DIHYDRATE 2 PUFF: 80; 4.5 AEROSOL RESPIRATORY (INHALATION) at 19:47

## 2020-12-25 RX ADMIN — PRIMIDONE 50 MG: 50 TABLET ORAL at 20:35

## 2020-12-25 RX ADMIN — PRIMIDONE 50 MG: 50 TABLET ORAL at 17:22

## 2020-12-25 RX ADMIN — PRIMIDONE 50 MG: 50 TABLET ORAL at 09:43

## 2020-12-25 RX ADMIN — HYDROCODONE BITARTRATE AND ACETAMINOPHEN 2 TABLET: 5; 325 TABLET ORAL at 23:39

## 2020-12-25 RX ADMIN — METOPROLOL TARTRATE 12.5 MG: 25 TABLET, FILM COATED ORAL at 09:43

## 2020-12-25 RX ADMIN — HYDROCODONE BITARTRATE AND ACETAMINOPHEN 1 TABLET: 5; 325 TABLET ORAL at 17:23

## 2020-12-25 RX ADMIN — BUDESONIDE AND FORMOTEROL FUMARATE DIHYDRATE 2 PUFF: 80; 4.5 AEROSOL RESPIRATORY (INHALATION) at 08:00

## 2020-12-25 RX ADMIN — METOPROLOL TARTRATE 12.5 MG: 25 TABLET, FILM COATED ORAL at 20:35

## 2020-12-25 RX ADMIN — ACETAMINOPHEN 650 MG: 325 TABLET, FILM COATED ORAL at 20:35

## 2020-12-25 RX ADMIN — APIXABAN 2.5 MG: 2.5 TABLET, FILM COATED ORAL at 09:43

## 2020-12-25 RX ADMIN — APIXABAN 2.5 MG: 2.5 TABLET, FILM COATED ORAL at 20:36

## 2020-12-26 PROBLEM — R25.1 TREMORS OF NERVOUS SYSTEM: Status: ACTIVE | Noted: 2020-12-26

## 2020-12-26 LAB
ANION GAP SERPL CALCULATED.3IONS-SCNC: 5.8 MMOL/L (ref 5–15)
BH BB BLOOD EXPIRATION DATE: NORMAL
BH BB BLOOD TYPE BARCODE: 5100
BH BB DISPENSE STATUS: NORMAL
BH BB PRODUCT CODE: NORMAL
BH BB UNIT NUMBER: NORMAL
BUN SERPL-MCNC: 32 MG/DL (ref 8–23)
BUN/CREAT SERPL: 35.6 (ref 7–25)
CALCIUM SPEC-SCNC: 8.9 MG/DL (ref 8.6–10.5)
CHLORIDE SERPL-SCNC: 99 MMOL/L (ref 98–107)
CO2 SERPL-SCNC: 34.2 MMOL/L (ref 22–29)
CREAT SERPL-MCNC: 0.9 MG/DL (ref 0.57–1)
CROSSMATCH INTERPRETATION: NORMAL
DEPRECATED RDW RBC AUTO: 46.1 FL (ref 37–54)
ERYTHROCYTE [DISTWIDTH] IN BLOOD BY AUTOMATED COUNT: 13.7 % (ref 12.3–15.4)
GFR SERPL CREATININE-BSD FRML MDRD: 62 ML/MIN/1.73
GLUCOSE SERPL-MCNC: 175 MG/DL (ref 65–99)
HCT VFR BLD AUTO: 24.1 % (ref 34–46.6)
HGB BLD-MCNC: 7.9 G/DL (ref 12–15.9)
MCH RBC QN AUTO: 29.9 PG (ref 26.6–33)
MCHC RBC AUTO-ENTMCNC: 32.8 G/DL (ref 31.5–35.7)
MCV RBC AUTO: 91.3 FL (ref 79–97)
PLATELET # BLD AUTO: 89 10*3/MM3 (ref 140–450)
PMV BLD AUTO: 12.1 FL (ref 6–12)
POTASSIUM SERPL-SCNC: 3.8 MMOL/L (ref 3.5–5.2)
RBC # BLD AUTO: 2.64 10*6/MM3 (ref 3.77–5.28)
SODIUM SERPL-SCNC: 139 MMOL/L (ref 136–145)
UNIT  ABO: NORMAL
UNIT  RH: NORMAL
WBC # BLD AUTO: 4 10*3/MM3 (ref 3.4–10.8)

## 2020-12-26 PROCEDURE — 94799 UNLISTED PULMONARY SVC/PX: CPT

## 2020-12-26 PROCEDURE — 80048 BASIC METABOLIC PNL TOTAL CA: CPT | Performed by: INTERNAL MEDICINE

## 2020-12-26 PROCEDURE — 97530 THERAPEUTIC ACTIVITIES: CPT

## 2020-12-26 PROCEDURE — 97162 PT EVAL MOD COMPLEX 30 MIN: CPT

## 2020-12-26 PROCEDURE — 85027 COMPLETE CBC AUTOMATED: CPT | Performed by: INTERNAL MEDICINE

## 2020-12-26 RX ADMIN — APIXABAN 5 MG: 5 TABLET, FILM COATED ORAL at 20:43

## 2020-12-26 RX ADMIN — HYDROCODONE BITARTRATE AND ACETAMINOPHEN 1 TABLET: 5; 325 TABLET ORAL at 14:29

## 2020-12-26 RX ADMIN — METOPROLOL TARTRATE 12.5 MG: 25 TABLET, FILM COATED ORAL at 20:43

## 2020-12-26 RX ADMIN — CITALOPRAM 20 MG: 20 TABLET, FILM COATED ORAL at 09:30

## 2020-12-26 RX ADMIN — BUDESONIDE AND FORMOTEROL FUMARATE DIHYDRATE 2 PUFF: 80; 4.5 AEROSOL RESPIRATORY (INHALATION) at 19:22

## 2020-12-26 RX ADMIN — HYDROCODONE BITARTRATE AND ACETAMINOPHEN 1 TABLET: 5; 325 TABLET ORAL at 20:52

## 2020-12-26 RX ADMIN — MONTELUKAST SODIUM 10 MG: 10 TABLET, FILM COATED ORAL at 20:43

## 2020-12-26 RX ADMIN — PRIMIDONE 50 MG: 50 TABLET ORAL at 17:01

## 2020-12-26 RX ADMIN — HYDROCODONE BITARTRATE AND ACETAMINOPHEN 1 TABLET: 5; 325 TABLET ORAL at 09:30

## 2020-12-26 RX ADMIN — BUDESONIDE AND FORMOTEROL FUMARATE DIHYDRATE 2 PUFF: 80; 4.5 AEROSOL RESPIRATORY (INHALATION) at 09:06

## 2020-12-26 RX ADMIN — METOPROLOL TARTRATE 12.5 MG: 25 TABLET, FILM COATED ORAL at 09:30

## 2020-12-26 RX ADMIN — PRIMIDONE 50 MG: 50 TABLET ORAL at 20:43

## 2020-12-26 RX ADMIN — APIXABAN 5 MG: 5 TABLET, FILM COATED ORAL at 09:30

## 2020-12-26 RX ADMIN — PRIMIDONE 50 MG: 50 TABLET ORAL at 09:30

## 2020-12-27 PROBLEM — G47.33 OSA (OBSTRUCTIVE SLEEP APNEA): Status: ACTIVE | Noted: 2020-12-27

## 2020-12-27 PROBLEM — J96.11 CHRONIC RESPIRATORY FAILURE WITH HYPOXIA (HCC): Status: ACTIVE | Noted: 2020-12-27

## 2020-12-27 PROBLEM — I48.0 PAF (PAROXYSMAL ATRIAL FIBRILLATION): Status: ACTIVE | Noted: 2020-12-27

## 2020-12-27 PROBLEM — N17.9 AKI (ACUTE KIDNEY INJURY): Status: RESOLVED | Noted: 2020-12-24 | Resolved: 2020-12-27

## 2020-12-27 LAB
ABO GROUP BLD: NORMAL
ANION GAP SERPL CALCULATED.3IONS-SCNC: 8.6 MMOL/L (ref 5–15)
BLD GP AB SCN SERPL QL: NEGATIVE
BUN SERPL-MCNC: 24 MG/DL (ref 8–23)
BUN/CREAT SERPL: 33.8 (ref 7–25)
CALCIUM SPEC-SCNC: 9 MG/DL (ref 8.6–10.5)
CHLORIDE SERPL-SCNC: 100 MMOL/L (ref 98–107)
CO2 SERPL-SCNC: 30.4 MMOL/L (ref 22–29)
CREAT SERPL-MCNC: 0.71 MG/DL (ref 0.57–1)
DEPRECATED RDW RBC AUTO: 44.9 FL (ref 37–54)
ERYTHROCYTE [DISTWIDTH] IN BLOOD BY AUTOMATED COUNT: 13.4 % (ref 12.3–15.4)
GFR SERPL CREATININE-BSD FRML MDRD: 81 ML/MIN/1.73
GLUCOSE BLDC GLUCOMTR-MCNC: 190 MG/DL (ref 70–130)
GLUCOSE BLDC GLUCOMTR-MCNC: 192 MG/DL (ref 70–130)
GLUCOSE BLDC GLUCOMTR-MCNC: 216 MG/DL (ref 70–130)
GLUCOSE BLDC GLUCOMTR-MCNC: 224 MG/DL (ref 70–130)
GLUCOSE SERPL-MCNC: 168 MG/DL (ref 65–99)
HCT VFR BLD AUTO: 21.7 % (ref 34–46.6)
HGB BLD-MCNC: 7.4 G/DL (ref 12–15.9)
MCH RBC QN AUTO: 30.7 PG (ref 26.6–33)
MCHC RBC AUTO-ENTMCNC: 34.1 G/DL (ref 31.5–35.7)
MCV RBC AUTO: 90 FL (ref 79–97)
PLATELET # BLD AUTO: 109 10*3/MM3 (ref 140–450)
PLATELET # BLD AUTO: 109 10*3/MM3 (ref 140–450)
PLATELETS.RETICULATED NFR BLD AUTO: 6.7 % (ref 0.9–6.5)
PMV BLD AUTO: 11.5 FL (ref 6–12)
POTASSIUM SERPL-SCNC: 3.9 MMOL/L (ref 3.5–5.2)
RBC # BLD AUTO: 2.41 10*6/MM3 (ref 3.77–5.28)
RH BLD: POSITIVE
SODIUM SERPL-SCNC: 139 MMOL/L (ref 136–145)
T&S EXPIRATION DATE: NORMAL
WBC # BLD AUTO: 3.68 10*3/MM3 (ref 3.4–10.8)

## 2020-12-27 PROCEDURE — 94799 UNLISTED PULMONARY SVC/PX: CPT

## 2020-12-27 PROCEDURE — 85055 RETICULATED PLATELET ASSAY: CPT | Performed by: INTERNAL MEDICINE

## 2020-12-27 PROCEDURE — P9016 RBC LEUKOCYTES REDUCED: HCPCS

## 2020-12-27 PROCEDURE — 82962 GLUCOSE BLOOD TEST: CPT

## 2020-12-27 PROCEDURE — 85027 COMPLETE CBC AUTOMATED: CPT | Performed by: INTERNAL MEDICINE

## 2020-12-27 PROCEDURE — 86900 BLOOD TYPING SEROLOGIC ABO: CPT | Performed by: INTERNAL MEDICINE

## 2020-12-27 PROCEDURE — 80048 BASIC METABOLIC PNL TOTAL CA: CPT | Performed by: INTERNAL MEDICINE

## 2020-12-27 PROCEDURE — P9040 RBC LEUKOREDUCED IRRADIATED: HCPCS

## 2020-12-27 PROCEDURE — 97110 THERAPEUTIC EXERCISES: CPT

## 2020-12-27 PROCEDURE — 86850 RBC ANTIBODY SCREEN: CPT | Performed by: INTERNAL MEDICINE

## 2020-12-27 PROCEDURE — 86900 BLOOD TYPING SEROLOGIC ABO: CPT

## 2020-12-27 PROCEDURE — 86901 BLOOD TYPING SEROLOGIC RH(D): CPT | Performed by: INTERNAL MEDICINE

## 2020-12-27 PROCEDURE — 86923 COMPATIBILITY TEST ELECTRIC: CPT

## 2020-12-27 PROCEDURE — 36430 TRANSFUSION BLD/BLD COMPNT: CPT

## 2020-12-27 RX ORDER — DOCUSATE SODIUM 100 MG/1
100 CAPSULE, LIQUID FILLED ORAL 2 TIMES DAILY
Status: DISCONTINUED | OUTPATIENT
Start: 2020-12-27 | End: 2020-12-31 | Stop reason: HOSPADM

## 2020-12-27 RX ORDER — POLYETHYLENE GLYCOL 3350 17 G/17G
17 POWDER, FOR SOLUTION ORAL DAILY
Status: DISCONTINUED | OUTPATIENT
Start: 2020-12-27 | End: 2020-12-31 | Stop reason: HOSPADM

## 2020-12-27 RX ORDER — BISACODYL 10 MG
10 SUPPOSITORY, RECTAL RECTAL DAILY PRN
Status: DISCONTINUED | OUTPATIENT
Start: 2020-12-27 | End: 2020-12-31 | Stop reason: HOSPADM

## 2020-12-27 RX ADMIN — CITALOPRAM 20 MG: 20 TABLET, FILM COATED ORAL at 09:24

## 2020-12-27 RX ADMIN — PRIMIDONE 50 MG: 50 TABLET ORAL at 09:24

## 2020-12-27 RX ADMIN — DOCUSATE SODIUM 100 MG: 100 CAPSULE ORAL at 21:52

## 2020-12-27 RX ADMIN — HYDROCODONE BITARTRATE AND ACETAMINOPHEN 1 TABLET: 5; 325 TABLET ORAL at 09:24

## 2020-12-27 RX ADMIN — POLYETHYLENE GLYCOL 3350 17 G: 17 POWDER, FOR SOLUTION ORAL at 21:50

## 2020-12-27 RX ADMIN — PRIMIDONE 50 MG: 50 TABLET ORAL at 21:51

## 2020-12-27 RX ADMIN — BUDESONIDE AND FORMOTEROL FUMARATE DIHYDRATE 2 PUFF: 80; 4.5 AEROSOL RESPIRATORY (INHALATION) at 18:55

## 2020-12-27 RX ADMIN — PRIMIDONE 50 MG: 50 TABLET ORAL at 16:30

## 2020-12-27 RX ADMIN — METOPROLOL TARTRATE 12.5 MG: 25 TABLET, FILM COATED ORAL at 21:52

## 2020-12-27 RX ADMIN — METOPROLOL TARTRATE 12.5 MG: 25 TABLET, FILM COATED ORAL at 09:24

## 2020-12-27 RX ADMIN — MONTELUKAST SODIUM 10 MG: 10 TABLET, FILM COATED ORAL at 21:50

## 2020-12-28 LAB
ANION GAP SERPL CALCULATED.3IONS-SCNC: 8.9 MMOL/L (ref 5–15)
BH BB BLOOD EXPIRATION DATE: NORMAL
BH BB BLOOD EXPIRATION DATE: NORMAL
BH BB BLOOD TYPE BARCODE: 5100
BH BB BLOOD TYPE BARCODE: 5100
BH BB DISPENSE STATUS: NORMAL
BH BB DISPENSE STATUS: NORMAL
BH BB PRODUCT CODE: NORMAL
BH BB PRODUCT CODE: NORMAL
BH BB UNIT NUMBER: NORMAL
BH BB UNIT NUMBER: NORMAL
BUN SERPL-MCNC: 22 MG/DL (ref 8–23)
BUN/CREAT SERPL: 28.2 (ref 7–25)
CALCIUM SPEC-SCNC: 8.9 MG/DL (ref 8.6–10.5)
CHLORIDE SERPL-SCNC: 99 MMOL/L (ref 98–107)
CO2 SERPL-SCNC: 29.1 MMOL/L (ref 22–29)
CREAT SERPL-MCNC: 0.78 MG/DL (ref 0.57–1)
CROSSMATCH INTERPRETATION: NORMAL
CROSSMATCH INTERPRETATION: NORMAL
DEPRECATED RDW RBC AUTO: 47.3 FL (ref 37–54)
ERYTHROCYTE [DISTWIDTH] IN BLOOD BY AUTOMATED COUNT: 13.8 % (ref 12.3–15.4)
GFR SERPL CREATININE-BSD FRML MDRD: 73 ML/MIN/1.73
GLUCOSE BLDC GLUCOMTR-MCNC: 168 MG/DL (ref 70–130)
GLUCOSE BLDC GLUCOMTR-MCNC: 178 MG/DL (ref 70–130)
GLUCOSE BLDC GLUCOMTR-MCNC: 209 MG/DL (ref 70–130)
GLUCOSE BLDC GLUCOMTR-MCNC: 263 MG/DL (ref 70–130)
GLUCOSE SERPL-MCNC: 163 MG/DL (ref 65–99)
HCT VFR BLD AUTO: 27.1 % (ref 34–46.6)
HGB BLD-MCNC: 9.1 G/DL (ref 12–15.9)
MCH RBC QN AUTO: 31 PG (ref 26.6–33)
MCHC RBC AUTO-ENTMCNC: 33.6 G/DL (ref 31.5–35.7)
MCV RBC AUTO: 92.2 FL (ref 79–97)
PLATELET # BLD AUTO: 129 10*3/MM3 (ref 140–450)
PMV BLD AUTO: 11.5 FL (ref 6–12)
POTASSIUM SERPL-SCNC: 3.9 MMOL/L (ref 3.5–5.2)
RBC # BLD AUTO: 2.94 10*6/MM3 (ref 3.77–5.28)
SODIUM SERPL-SCNC: 137 MMOL/L (ref 136–145)
UNIT  ABO: NORMAL
UNIT  ABO: NORMAL
UNIT  RH: NORMAL
UNIT  RH: NORMAL
WBC # BLD AUTO: 3.9 10*3/MM3 (ref 3.4–10.8)

## 2020-12-28 PROCEDURE — 85027 COMPLETE CBC AUTOMATED: CPT | Performed by: INTERNAL MEDICINE

## 2020-12-28 PROCEDURE — 94799 UNLISTED PULMONARY SVC/PX: CPT

## 2020-12-28 PROCEDURE — 97110 THERAPEUTIC EXERCISES: CPT

## 2020-12-28 PROCEDURE — 82962 GLUCOSE BLOOD TEST: CPT

## 2020-12-28 PROCEDURE — 80048 BASIC METABOLIC PNL TOTAL CA: CPT | Performed by: INTERNAL MEDICINE

## 2020-12-28 RX ADMIN — HYDROCODONE BITARTRATE AND ACETAMINOPHEN 2 TABLET: 5; 325 TABLET ORAL at 13:17

## 2020-12-28 RX ADMIN — METOPROLOL TARTRATE 12.5 MG: 25 TABLET, FILM COATED ORAL at 20:53

## 2020-12-28 RX ADMIN — PRIMIDONE 50 MG: 50 TABLET ORAL at 20:53

## 2020-12-28 RX ADMIN — BUDESONIDE AND FORMOTEROL FUMARATE DIHYDRATE 2 PUFF: 80; 4.5 AEROSOL RESPIRATORY (INHALATION) at 19:13

## 2020-12-28 RX ADMIN — HYDROCODONE BITARTRATE AND ACETAMINOPHEN 1 TABLET: 5; 325 TABLET ORAL at 08:57

## 2020-12-28 RX ADMIN — PRIMIDONE 50 MG: 50 TABLET ORAL at 16:49

## 2020-12-28 RX ADMIN — CITALOPRAM 20 MG: 20 TABLET, FILM COATED ORAL at 08:57

## 2020-12-28 RX ADMIN — BUDESONIDE AND FORMOTEROL FUMARATE DIHYDRATE 2 PUFF: 80; 4.5 AEROSOL RESPIRATORY (INHALATION) at 07:35

## 2020-12-28 RX ADMIN — METFORMIN HYDROCHLORIDE 500 MG: 500 TABLET ORAL at 08:57

## 2020-12-28 RX ADMIN — APIXABAN 5 MG: 5 TABLET, FILM COATED ORAL at 20:53

## 2020-12-28 RX ADMIN — DOCUSATE SODIUM 100 MG: 100 CAPSULE ORAL at 08:57

## 2020-12-28 RX ADMIN — DOCUSATE SODIUM 100 MG: 100 CAPSULE ORAL at 20:54

## 2020-12-28 RX ADMIN — APIXABAN 5 MG: 5 TABLET, FILM COATED ORAL at 08:57

## 2020-12-28 RX ADMIN — MONTELUKAST SODIUM 10 MG: 10 TABLET, FILM COATED ORAL at 20:54

## 2020-12-28 RX ADMIN — PRIMIDONE 50 MG: 50 TABLET ORAL at 08:57

## 2020-12-28 RX ADMIN — HYDROCODONE BITARTRATE AND ACETAMINOPHEN 2 TABLET: 5; 325 TABLET ORAL at 17:43

## 2020-12-28 RX ADMIN — POLYETHYLENE GLYCOL 3350 17 G: 17 POWDER, FOR SOLUTION ORAL at 10:10

## 2020-12-28 RX ADMIN — METOPROLOL TARTRATE 12.5 MG: 25 TABLET, FILM COATED ORAL at 08:57

## 2020-12-29 LAB
ANION GAP SERPL CALCULATED.3IONS-SCNC: 8.1 MMOL/L (ref 5–15)
BUN SERPL-MCNC: 21 MG/DL (ref 8–23)
BUN/CREAT SERPL: 30.4 (ref 7–25)
CALCIUM SPEC-SCNC: 9.5 MG/DL (ref 8.6–10.5)
CHLORIDE SERPL-SCNC: 98 MMOL/L (ref 98–107)
CO2 SERPL-SCNC: 30.9 MMOL/L (ref 22–29)
CREAT SERPL-MCNC: 0.69 MG/DL (ref 0.57–1)
GFR SERPL CREATININE-BSD FRML MDRD: 84 ML/MIN/1.73
GLUCOSE BLDC GLUCOMTR-MCNC: 155 MG/DL (ref 70–130)
GLUCOSE BLDC GLUCOMTR-MCNC: 163 MG/DL (ref 70–130)
GLUCOSE BLDC GLUCOMTR-MCNC: 183 MG/DL (ref 70–130)
GLUCOSE BLDC GLUCOMTR-MCNC: 262 MG/DL (ref 70–130)
GLUCOSE SERPL-MCNC: 162 MG/DL (ref 65–99)
HCT VFR BLD AUTO: 29.8 % (ref 34–46.6)
HGB BLD-MCNC: 10.1 G/DL (ref 12–15.9)
POTASSIUM SERPL-SCNC: 4 MMOL/L (ref 3.5–5.2)
SARS-COV-2 RNA RESP QL NAA+PROBE: NOT DETECTED
SODIUM SERPL-SCNC: 137 MMOL/L (ref 136–145)

## 2020-12-29 PROCEDURE — 85014 HEMATOCRIT: CPT | Performed by: INTERNAL MEDICINE

## 2020-12-29 PROCEDURE — U0003 INFECTIOUS AGENT DETECTION BY NUCLEIC ACID (DNA OR RNA); SEVERE ACUTE RESPIRATORY SYNDROME CORONAVIRUS 2 (SARS-COV-2) (CORONAVIRUS DISEASE [COVID-19]), AMPLIFIED PROBE TECHNIQUE, MAKING USE OF HIGH THROUGHPUT TECHNOLOGIES AS DESCRIBED BY CMS-2020-01-R: HCPCS | Performed by: INTERNAL MEDICINE

## 2020-12-29 PROCEDURE — 80048 BASIC METABOLIC PNL TOTAL CA: CPT | Performed by: INTERNAL MEDICINE

## 2020-12-29 PROCEDURE — 94799 UNLISTED PULMONARY SVC/PX: CPT

## 2020-12-29 PROCEDURE — 97110 THERAPEUTIC EXERCISES: CPT

## 2020-12-29 PROCEDURE — 85018 HEMOGLOBIN: CPT | Performed by: INTERNAL MEDICINE

## 2020-12-29 PROCEDURE — 82962 GLUCOSE BLOOD TEST: CPT

## 2020-12-29 RX ADMIN — METOPROLOL TARTRATE 12.5 MG: 25 TABLET, FILM COATED ORAL at 20:06

## 2020-12-29 RX ADMIN — APIXABAN 5 MG: 5 TABLET, FILM COATED ORAL at 20:06

## 2020-12-29 RX ADMIN — HYDROCODONE BITARTRATE AND ACETAMINOPHEN 2 TABLET: 5; 325 TABLET ORAL at 16:39

## 2020-12-29 RX ADMIN — POLYETHYLENE GLYCOL 3350 17 G: 17 POWDER, FOR SOLUTION ORAL at 08:58

## 2020-12-29 RX ADMIN — DOCUSATE SODIUM 100 MG: 100 CAPSULE ORAL at 08:57

## 2020-12-29 RX ADMIN — DOCUSATE SODIUM 100 MG: 100 CAPSULE ORAL at 20:06

## 2020-12-29 RX ADMIN — HYDROCODONE BITARTRATE AND ACETAMINOPHEN 2 TABLET: 5; 325 TABLET ORAL at 12:54

## 2020-12-29 RX ADMIN — HYDROCODONE BITARTRATE AND ACETAMINOPHEN 2 TABLET: 5; 325 TABLET ORAL at 22:38

## 2020-12-29 RX ADMIN — MONTELUKAST SODIUM 10 MG: 10 TABLET, FILM COATED ORAL at 20:06

## 2020-12-29 RX ADMIN — PRIMIDONE 50 MG: 50 TABLET ORAL at 08:57

## 2020-12-29 RX ADMIN — METFORMIN HYDROCHLORIDE 500 MG: 500 TABLET ORAL at 08:57

## 2020-12-29 RX ADMIN — BUDESONIDE AND FORMOTEROL FUMARATE DIHYDRATE 2 PUFF: 80; 4.5 AEROSOL RESPIRATORY (INHALATION) at 08:16

## 2020-12-29 RX ADMIN — PRIMIDONE 50 MG: 50 TABLET ORAL at 20:06

## 2020-12-29 RX ADMIN — CITALOPRAM 20 MG: 20 TABLET, FILM COATED ORAL at 08:57

## 2020-12-29 RX ADMIN — HYDROCODONE BITARTRATE AND ACETAMINOPHEN 2 TABLET: 5; 325 TABLET ORAL at 09:03

## 2020-12-29 RX ADMIN — ACETAMINOPHEN 650 MG: 325 TABLET, FILM COATED ORAL at 20:10

## 2020-12-29 RX ADMIN — BUDESONIDE AND FORMOTEROL FUMARATE DIHYDRATE 2 PUFF: 80; 4.5 AEROSOL RESPIRATORY (INHALATION) at 21:44

## 2020-12-29 RX ADMIN — APIXABAN 5 MG: 5 TABLET, FILM COATED ORAL at 08:57

## 2020-12-29 RX ADMIN — PRIMIDONE 50 MG: 50 TABLET ORAL at 16:39

## 2020-12-29 RX ADMIN — METOPROLOL TARTRATE 12.5 MG: 25 TABLET, FILM COATED ORAL at 08:57

## 2020-12-30 VITALS
HEIGHT: 70 IN | SYSTOLIC BLOOD PRESSURE: 113 MMHG | DIASTOLIC BLOOD PRESSURE: 83 MMHG | BODY MASS INDEX: 31.34 KG/M2 | TEMPERATURE: 98.3 F | HEART RATE: 66 BPM | RESPIRATION RATE: 20 BRPM | WEIGHT: 218.92 LBS | OXYGEN SATURATION: 96 %

## 2020-12-30 PROBLEM — D62 ACUTE BLOOD LOSS ANEMIA: Status: RESOLVED | Noted: 2020-12-24 | Resolved: 2020-12-30

## 2020-12-30 LAB
GLUCOSE BLDC GLUCOMTR-MCNC: 162 MG/DL (ref 70–130)
GLUCOSE BLDC GLUCOMTR-MCNC: 187 MG/DL (ref 70–130)
GLUCOSE BLDC GLUCOMTR-MCNC: 208 MG/DL (ref 70–130)

## 2020-12-30 PROCEDURE — 82962 GLUCOSE BLOOD TEST: CPT

## 2020-12-30 PROCEDURE — 94799 UNLISTED PULMONARY SVC/PX: CPT

## 2020-12-30 RX ORDER — HYDROCODONE BITARTRATE AND ACETAMINOPHEN 5; 325 MG/1; MG/1
1 TABLET ORAL EVERY 4 HOURS PRN
Qty: 12 TABLET | Refills: 0 | Status: SHIPPED | OUTPATIENT
Start: 2020-12-30 | End: 2021-01-01

## 2020-12-30 RX ORDER — ACETAMINOPHEN 325 MG/1
650 TABLET ORAL EVERY 4 HOURS PRN
Start: 2020-12-30

## 2020-12-30 RX ORDER — POLYETHYLENE GLYCOL 3350 17 G/17G
17 POWDER, FOR SOLUTION ORAL DAILY
Start: 2020-12-31 | End: 2021-10-13

## 2020-12-30 RX ADMIN — CITALOPRAM 20 MG: 20 TABLET, FILM COATED ORAL at 08:23

## 2020-12-30 RX ADMIN — POLYETHYLENE GLYCOL 3350 17 G: 17 POWDER, FOR SOLUTION ORAL at 08:22

## 2020-12-30 RX ADMIN — PRIMIDONE 50 MG: 50 TABLET ORAL at 16:45

## 2020-12-30 RX ADMIN — METFORMIN HYDROCHLORIDE 500 MG: 500 TABLET ORAL at 08:23

## 2020-12-30 RX ADMIN — HYDROCODONE BITARTRATE AND ACETAMINOPHEN 2 TABLET: 5; 325 TABLET ORAL at 08:22

## 2020-12-30 RX ADMIN — BUDESONIDE AND FORMOTEROL FUMARATE DIHYDRATE 2 PUFF: 80; 4.5 AEROSOL RESPIRATORY (INHALATION) at 07:22

## 2020-12-30 RX ADMIN — PRIMIDONE 50 MG: 50 TABLET ORAL at 08:23

## 2020-12-30 RX ADMIN — DOCUSATE SODIUM 100 MG: 100 CAPSULE ORAL at 08:23

## 2020-12-30 RX ADMIN — APIXABAN 5 MG: 5 TABLET, FILM COATED ORAL at 08:23

## 2020-12-30 RX ADMIN — METOPROLOL TARTRATE 12.5 MG: 25 TABLET, FILM COATED ORAL at 08:22

## 2020-12-30 RX ADMIN — HYDROCODONE BITARTRATE AND ACETAMINOPHEN 2 TABLET: 5; 325 TABLET ORAL at 11:56

## 2020-12-30 RX ADMIN — HYDROCODONE BITARTRATE AND ACETAMINOPHEN 2 TABLET: 5; 325 TABLET ORAL at 16:45

## 2021-01-21 RX ORDER — CITALOPRAM 20 MG/1
TABLET ORAL
Qty: 90 TABLET | Refills: 0 | Status: SHIPPED | OUTPATIENT
Start: 2021-01-21 | End: 2021-08-05

## 2021-01-21 RX ORDER — SPIRONOLACTONE 25 MG/1
TABLET ORAL
Qty: 90 TABLET | Refills: 0 | Status: SHIPPED | OUTPATIENT
Start: 2021-01-21 | End: 2021-03-19

## 2021-01-21 RX ORDER — LOSARTAN POTASSIUM 50 MG/1
TABLET ORAL
Qty: 90 TABLET | Refills: 1 | Status: SHIPPED | OUTPATIENT
Start: 2021-01-21 | End: 2022-02-07

## 2021-01-25 ENCOUNTER — TELEPHONE (OUTPATIENT)
Dept: FAMILY MEDICINE CLINIC | Facility: CLINIC | Age: 71
End: 2021-01-25

## 2021-01-25 DIAGNOSIS — R93.89 ABNORMAL CT SCAN: Primary | ICD-10-CM

## 2021-01-25 NOTE — TELEPHONE ENCOUNTER
Caller: SHELDON TYSON    Relationship to patient: Emergency Contact    Best call back number: [phone]    Patient is needing: PT DAUGHTER CALLED STATING SHE WAS TOLD TO CALL BACK THIS MONTH REGARDING A SPOT ON PTS PANCREAS. DAUGHTER IS WANTING TO KNOW WHAT THE NEXT STEPS ARE TO TAKE. DAUGHTER IS UNSURE IF PT IS NEEDING TO DO ANOTHER TEST OR GET A REFERRAL

## 2021-01-25 NOTE — TELEPHONE ENCOUNTER
LVM for daughter.    HUB ok to inform-    Dr. Britton has ordered a CT scan to further evaluate this spot as to get a better idea of what the next steps shall be. Scheduling should be calling her to get this set up. If she hasn't heard back in 3 days, the number for Scheduling is [phone].

## 2021-01-27 ENCOUNTER — OFFICE VISIT (OUTPATIENT)
Dept: NEUROLOGY | Facility: CLINIC | Age: 71
End: 2021-01-27

## 2021-01-27 VITALS
HEART RATE: 71 BPM | HEIGHT: 70 IN | BODY MASS INDEX: 31.21 KG/M2 | WEIGHT: 218 LBS | OXYGEN SATURATION: 94 % | DIASTOLIC BLOOD PRESSURE: 86 MMHG | SYSTOLIC BLOOD PRESSURE: 124 MMHG

## 2021-01-27 DIAGNOSIS — G25.0 BENIGN ESSENTIAL TREMOR: Primary | ICD-10-CM

## 2021-01-27 PROCEDURE — 99214 OFFICE O/P EST MOD 30 MIN: CPT | Performed by: PSYCHIATRY & NEUROLOGY

## 2021-01-27 RX ORDER — GABAPENTIN 300 MG/1
CAPSULE ORAL
Qty: 90 CAPSULE | OUTPATIENT
Start: 2021-01-27

## 2021-01-27 NOTE — PROGRESS NOTES
Chief Complaint   Patient presents with   • Seizures     Pt is here today for a 4 month f/u for seizure, tremors, and altered mental status change. she is accompanied by her daughter jagjit who we have permission to speak with during visit today. She is in wheelchair due to a fall in December 2020 that resulted in a broken leg which she had to have surgery. Pt is currently in Rehab facility.    • Tremors   • Altered Mental Status       Patient ID: Kanwal Sauer is a 70 y.o. female.    HPI: I had the pleasure of seeing your patient again today.  As you may know she is a 70-year-old female whom I am seen previously for history of seizure as well as benign essential tremor.  She apparently had a significant fall since her last follow-up.  She apparently suffered a fall hitting the back of her head.  She apparently did not lose consciousness.  She did suffer a fracture of her left lower extremity.  She has not seen a significant worsening of essential tremor.  She is currently taking primidone 50 mg 3 times daily.  She denies any side effects.  She denies any episodes of altered mental status since last follow-up.  No recent seizure-like activity.    The following portions of the patient's history were reviewed and updated as appropriate: allergies, current medications, past family history, past medical history, past social history, past surgical history and problem list.    Review of Systems   Constitutional: Negative for activity change, appetite change and fatigue.   HENT: Negative for ear pain, facial swelling and trouble swallowing.    Eyes: Negative for photophobia, pain and visual disturbance.   Respiratory: Negative for chest tightness, shortness of breath and wheezing.    Cardiovascular: Negative for chest pain, palpitations and leg swelling.   Gastrointestinal: Negative for abdominal pain, nausea and vomiting.   Endocrine: Negative for cold intolerance, heat intolerance and polydipsia.   Musculoskeletal:  Positive for gait problem (pt states she fell in december 23rd. she fell hit her head and broke her left leg. ). Negative for back pain, neck pain and neck stiffness.   Skin: Negative for color change, rash and wound.   Allergic/Immunologic: Negative for environmental allergies, food allergies and immunocompromised state.   Neurological: Positive for dizziness, tremors (pt states she thinks they are worse. ) and light-headedness. Negative for seizures, syncope, facial asymmetry, speech difficulty, weakness, numbness and headaches.   Hematological: Negative for adenopathy. Does not bruise/bleed easily.   Psychiatric/Behavioral: Negative for agitation, behavioral problems, confusion, decreased concentration, dysphoric mood, hallucinations, self-injury, sleep disturbance and suicidal ideas. The patient is not nervous/anxious and is not hyperactive.       I have reviewed the review of systems above performed by my medical assistant.      Vitals:    01/27/21 1046   BP: 124/86   Pulse: 71   SpO2: 94%       Neurologic Exam     Mental Status   Oriented to person, place, and time.   Concentration: normal.   Level of consciousness: alert  Knowledge: consistent with education (No deficits found.).     Cranial Nerves     CN II   Visual fields full to confrontation.     CN III, IV, VI   Pupils are equal, round, and reactive to light.  Extraocular motions are normal.   CN III: no CN III palsy  CN VI: no CN VI palsy    CN V   Facial sensation intact.     CN VII   Facial expression full, symmetric.     CN VIII   CN VIII normal.     CN IX, X   CN IX normal.   CN X normal.     CN XI   CN XI normal.     CN XII   CN XII normal.     Motor Exam     Strength   Right neck flexion: 5/5  Left neck flexion: 5/5  Right neck extension: 5/5  Left neck extension: 5/5  Right deltoid: 5/5  Left deltoid: 5/5  Right biceps: 5/5  Left biceps: 5/5  Right triceps: 5/5  Left triceps: 5/5  Right wrist flexion: 5/5  Left wrist flexion: 5/5  Right wrist  extension: 5/5  Left wrist extension: 5/5  Right interossei: 5/5  Left interossei: 5/5  Right abdominals: 5/5  Left abdominals: 5/5  Right iliopsoas: 5/5  Right quadriceps: 5/5  Right hamstrin/5  Right glutei: 5/5  Right anterior tibial: 5/5  Right posterior tibial: 5/5  Right peroneal: 5/5  Right gastroc: 5/5    Sensory Exam   Light touch normal.   Vibration normal.     Gait, Coordination, and Reflexes     Gait  Gait: normal    Reflexes   Right brachioradialis: 2+  Left brachioradialis: 2+  Right biceps: 2+  Left biceps: 2+  Right triceps: 2+  Left triceps: 2+  Right patellar: 2+  Left patellar reflex grade: Patient has immobilizer of left lower extremity.  Right achilles: 2+  Right : 2+Station is normal.       Physical Exam  Vitals signs reviewed.   Constitutional:       Appearance: She is well-developed.   HENT:      Head: Normocephalic and atraumatic.   Eyes:      Extraocular Movements: EOM normal.      Pupils: Pupils are equal, round, and reactive to light.   Cardiovascular:      Rate and Rhythm: Normal rate and regular rhythm.   Pulmonary:      Breath sounds: Normal breath sounds.   Musculoskeletal: Normal range of motion.   Skin:     General: Skin is warm.   Neurological:      Mental Status: She is oriented to person, place, and time.      Gait: Gait is intact.      Deep Tendon Reflexes:      Reflex Scores:       Tricep reflexes are 2+ on the right side and 2+ on the left side.       Bicep reflexes are 2+ on the right side and 2+ on the left side.       Brachioradialis reflexes are 2+ on the right side and 2+ on the left side.       Patellar reflexes are 2+ on the right side.       Achilles reflexes are 2+ on the right side.        Procedures    Assessment/Plan: I would like to continue the current dose of primidone for the essential tremor.  We will see her back in 6 months or sooner if needed.  A total of 35 minutes was spent face-to-face with the patient today.  Of that greater than 50% of this  time was spent discussing signs and symptoms of essential tremor, patient education, plan of care and prognosis.       Diagnoses and all orders for this visit:    1. Benign essential tremor (Primary)           Ash Washington II, MD

## 2021-01-29 ENCOUNTER — TELEPHONE (OUTPATIENT)
Dept: FAMILY MEDICINE CLINIC | Facility: CLINIC | Age: 71
End: 2021-01-29

## 2021-01-29 NOTE — TELEPHONE ENCOUNTER
"Caller: SHELDON TYSON    Relationship: Emergency Contact    Best call back number: [phone]    What orders are you requesting (i.e. lab or imaging): IMAGING       Additional notes: PATIENTS DAUGHTER WAS TOLD TO CALL IN January TO HAVE AN ORDER REQUESTED FOR A \"SCAN\" FOR THE PATIENTS PANCREAS. PATIENT BROKE HER FIBULA AND FEMUR 12/23/2020. PATIENT HAD EMERGENCY SURGERY 12/24/2020 WITH \"PLATES AND SCREWS\" INSERTED IN FEMUR. PATIENT HAS BEEN IN REHAB AT THE [facility] ON [facility] SINCE SURGERY. PATIENT HAD SCAN AND THERE WAS A \"SPOT\" FOUND ON HER 10TH RIB AND 1ST CERVICAL SHARMIN. PATIENT WAS NOT CHECKED FOR PANCREAS WHILE IN THE HOSPITAL.     "

## 2021-01-29 NOTE — TELEPHONE ENCOUNTER
She is wondering if we can get the CT placed for the C1 and Rib spots as well to help due to transport.  Please advise

## 2021-02-03 ENCOUNTER — HOSPITAL ENCOUNTER (EMERGENCY)
Facility: HOSPITAL | Age: 71
Discharge: HOME | End: 2021-02-03
Attending: EMERGENCY MEDICINE | Admitting: EMERGENCY MEDICINE

## 2021-02-03 ENCOUNTER — APPOINTMENT (OUTPATIENT)
Dept: CT IMAGING | Facility: HOSPITAL | Age: 71
End: 2021-02-03

## 2021-02-03 VITALS
TEMPERATURE: 98.8 F | OXYGEN SATURATION: 87 % | BODY MASS INDEX: 34.1 KG/M2 | HEIGHT: 68 IN | DIASTOLIC BLOOD PRESSURE: 71 MMHG | HEART RATE: 56 BPM | WEIGHT: 225 LBS | RESPIRATION RATE: 19 BRPM | SYSTOLIC BLOOD PRESSURE: 146 MMHG

## 2021-02-03 DIAGNOSIS — M54.2 MUSCULOSKELETAL NECK PAIN: ICD-10-CM

## 2021-02-03 DIAGNOSIS — G25.0 BENIGN ESSENTIAL TREMOR: ICD-10-CM

## 2021-02-03 DIAGNOSIS — N39.0 ACUTE UTI: Primary | ICD-10-CM

## 2021-02-03 LAB
ALBUMIN SERPL-MCNC: 3.9 G/DL (ref 3.5–5.2)
ALBUMIN/GLOB SERPL: 1.4 G/DL
ALP SERPL-CCNC: 100 U/L (ref 39–117)
ALT SERPL W P-5'-P-CCNC: 5 U/L (ref 1–33)
ANION GAP SERPL CALCULATED.3IONS-SCNC: 8.3 MMOL/L (ref 5–15)
AST SERPL-CCNC: 14 U/L (ref 1–32)
BACTERIA UR QL AUTO: ABNORMAL /HPF
BASOPHILS # BLD AUTO: 0.02 10*3/MM3 (ref 0–0.2)
BASOPHILS NFR BLD AUTO: 0.5 % (ref 0–1.5)
BILIRUB SERPL-MCNC: 0.4 MG/DL (ref 0–1.2)
BILIRUB UR QL STRIP: NEGATIVE
BUN SERPL-MCNC: 16 MG/DL (ref 8–23)
BUN/CREAT SERPL: 20.8 (ref 7–25)
CALCIUM SPEC-SCNC: 10.1 MG/DL (ref 8.6–10.5)
CHLORIDE SERPL-SCNC: 98 MMOL/L (ref 98–107)
CLARITY UR: ABNORMAL
CO2 SERPL-SCNC: 32.7 MMOL/L (ref 22–29)
COLOR UR: YELLOW
CREAT SERPL-MCNC: 0.77 MG/DL (ref 0.57–1)
DEPRECATED RDW RBC AUTO: 51.7 FL (ref 37–54)
EOSINOPHIL # BLD AUTO: 0.09 10*3/MM3 (ref 0–0.4)
EOSINOPHIL NFR BLD AUTO: 2 % (ref 0.3–6.2)
ERYTHROCYTE [DISTWIDTH] IN BLOOD BY AUTOMATED COUNT: 15.2 % (ref 12.3–15.4)
GFR SERPL CREATININE-BSD FRML MDRD: 74 ML/MIN/1.73
GLOBULIN UR ELPH-MCNC: 2.7 GM/DL
GLUCOSE SERPL-MCNC: 196 MG/DL (ref 65–99)
GLUCOSE UR STRIP-MCNC: NEGATIVE MG/DL
HCT VFR BLD AUTO: 34.5 % (ref 34–46.6)
HGB BLD-MCNC: 11.2 G/DL (ref 12–15.9)
HGB UR QL STRIP.AUTO: ABNORMAL
HOLD SPECIMEN: NORMAL
HOLD SPECIMEN: NORMAL
HYALINE CASTS UR QL AUTO: ABNORMAL /LPF
IMM GRANULOCYTES # BLD AUTO: 0.06 10*3/MM3 (ref 0–0.05)
IMM GRANULOCYTES NFR BLD AUTO: 1.4 % (ref 0–0.5)
INR PPP: 1.08 (ref 0.9–1.1)
KETONES UR QL STRIP: NEGATIVE
LEUKOCYTE ESTERASE UR QL STRIP.AUTO: ABNORMAL
LYMPHOCYTES # BLD AUTO: 1 10*3/MM3 (ref 0.7–3.1)
LYMPHOCYTES NFR BLD AUTO: 22.7 % (ref 19.6–45.3)
MAGNESIUM SERPL-MCNC: 1.4 MG/DL (ref 1.6–2.4)
MCH RBC QN AUTO: 29.9 PG (ref 26.6–33)
MCHC RBC AUTO-ENTMCNC: 32.5 G/DL (ref 31.5–35.7)
MCV RBC AUTO: 92.2 FL (ref 79–97)
MONOCYTES # BLD AUTO: 0.34 10*3/MM3 (ref 0.1–0.9)
MONOCYTES NFR BLD AUTO: 7.7 % (ref 5–12)
NEUTROPHILS NFR BLD AUTO: 2.9 10*3/MM3 (ref 1.7–7)
NEUTROPHILS NFR BLD AUTO: 65.7 % (ref 42.7–76)
NITRITE UR QL STRIP: NEGATIVE
NRBC BLD AUTO-RTO: 0.2 /100 WBC (ref 0–0.2)
PH UR STRIP.AUTO: 7 [PH] (ref 5–8)
PLATELET # BLD AUTO: 149 10*3/MM3 (ref 140–450)
PMV BLD AUTO: 10.9 FL (ref 6–12)
POTASSIUM SERPL-SCNC: 4.1 MMOL/L (ref 3.5–5.2)
PROT SERPL-MCNC: 6.6 G/DL (ref 6–8.5)
PROT UR QL STRIP: ABNORMAL
PROTHROMBIN TIME: 13.8 SECONDS (ref 11.7–14.2)
RBC # BLD AUTO: 3.74 10*6/MM3 (ref 3.77–5.28)
RBC # UR: ABNORMAL /HPF
REF LAB TEST METHOD: ABNORMAL
SODIUM SERPL-SCNC: 139 MMOL/L (ref 136–145)
SP GR UR STRIP: 1.02 (ref 1–1.03)
SQUAMOUS #/AREA URNS HPF: ABNORMAL /HPF
T4 FREE SERPL-MCNC: 1.3 NG/DL (ref 0.93–1.7)
TSH SERPL DL<=0.05 MIU/L-ACNC: 1.87 UIU/ML (ref 0.27–4.2)
UROBILINOGEN UR QL STRIP: ABNORMAL
WBC # BLD AUTO: 4.41 10*3/MM3 (ref 3.4–10.8)
WBC UR QL AUTO: ABNORMAL /HPF
WHOLE BLOOD HOLD SPECIMEN: NORMAL
WHOLE BLOOD HOLD SPECIMEN: NORMAL

## 2021-02-03 PROCEDURE — 85610 PROTHROMBIN TIME: CPT | Performed by: EMERGENCY MEDICINE

## 2021-02-03 PROCEDURE — 96365 THER/PROPH/DIAG IV INF INIT: CPT

## 2021-02-03 PROCEDURE — 25010000002 CEFTRIAXONE PER 250 MG: Performed by: EMERGENCY MEDICINE

## 2021-02-03 PROCEDURE — 83735 ASSAY OF MAGNESIUM: CPT | Performed by: EMERGENCY MEDICINE

## 2021-02-03 PROCEDURE — 72125 CT NECK SPINE W/O DYE: CPT

## 2021-02-03 PROCEDURE — P9612 CATHETERIZE FOR URINE SPEC: HCPCS

## 2021-02-03 PROCEDURE — 96376 TX/PRO/DX INJ SAME DRUG ADON: CPT

## 2021-02-03 PROCEDURE — 96375 TX/PRO/DX INJ NEW DRUG ADDON: CPT

## 2021-02-03 PROCEDURE — 84439 ASSAY OF FREE THYROXINE: CPT | Performed by: EMERGENCY MEDICINE

## 2021-02-03 PROCEDURE — 25010000002 MORPHINE PER 10 MG: Performed by: EMERGENCY MEDICINE

## 2021-02-03 PROCEDURE — 87086 URINE CULTURE/COLONY COUNT: CPT | Performed by: EMERGENCY MEDICINE

## 2021-02-03 PROCEDURE — 99284 EMERGENCY DEPT VISIT MOD MDM: CPT

## 2021-02-03 PROCEDURE — 80053 COMPREHEN METABOLIC PANEL: CPT | Performed by: EMERGENCY MEDICINE

## 2021-02-03 PROCEDURE — 85025 COMPLETE CBC W/AUTO DIFF WBC: CPT | Performed by: EMERGENCY MEDICINE

## 2021-02-03 PROCEDURE — 87077 CULTURE AEROBIC IDENTIFY: CPT | Performed by: EMERGENCY MEDICINE

## 2021-02-03 PROCEDURE — 70450 CT HEAD/BRAIN W/O DYE: CPT

## 2021-02-03 PROCEDURE — 81001 URINALYSIS AUTO W/SCOPE: CPT | Performed by: EMERGENCY MEDICINE

## 2021-02-03 PROCEDURE — 84443 ASSAY THYROID STIM HORMONE: CPT | Performed by: EMERGENCY MEDICINE

## 2021-02-03 PROCEDURE — 25010000002 ONDANSETRON PER 1 MG: Performed by: EMERGENCY MEDICINE

## 2021-02-03 RX ORDER — ONDANSETRON 2 MG/ML
4 INJECTION INTRAMUSCULAR; INTRAVENOUS ONCE
Status: COMPLETED | OUTPATIENT
Start: 2021-02-03 | End: 2021-02-03

## 2021-02-03 RX ORDER — CEFTRIAXONE SODIUM 1 G/50ML
1 INJECTION, SOLUTION INTRAVENOUS ONCE
Status: COMPLETED | OUTPATIENT
Start: 2021-02-03 | End: 2021-02-03

## 2021-02-03 RX ORDER — HYDROCODONE BITARTRATE AND ACETAMINOPHEN 7.5; 325 MG/1; MG/1
1 TABLET ORAL ONCE
Status: DISCONTINUED | OUTPATIENT
Start: 2021-02-03 | End: 2021-02-03

## 2021-02-03 RX ORDER — MORPHINE SULFATE 2 MG/ML
4 INJECTION, SOLUTION INTRAMUSCULAR; INTRAVENOUS ONCE
Status: COMPLETED | OUTPATIENT
Start: 2021-02-03 | End: 2021-02-03

## 2021-02-03 RX ORDER — SODIUM CHLORIDE 0.9 % (FLUSH) 0.9 %
10 SYRINGE (ML) INJECTION AS NEEDED
Status: DISCONTINUED | OUTPATIENT
Start: 2021-02-03 | End: 2021-02-03 | Stop reason: HOSPADM

## 2021-02-03 RX ORDER — CEPHALEXIN 500 MG/1
500 CAPSULE ORAL 2 TIMES DAILY
Qty: 14 CAPSULE | Refills: 0 | Status: SHIPPED | OUTPATIENT
Start: 2021-02-03 | End: 2021-03-16

## 2021-02-03 RX ORDER — CEPHALEXIN 500 MG/1
500 CAPSULE ORAL 2 TIMES DAILY
Qty: 14 CAPSULE | Refills: 0 | Status: SHIPPED | OUTPATIENT
Start: 2021-02-03 | End: 2021-02-03 | Stop reason: SDUPTHER

## 2021-02-03 RX ADMIN — ONDANSETRON 4 MG: 2 INJECTION INTRAMUSCULAR; INTRAVENOUS at 11:45

## 2021-02-03 RX ADMIN — MORPHINE SULFATE 4 MG: 2 INJECTION, SOLUTION INTRAMUSCULAR; INTRAVENOUS at 16:49

## 2021-02-03 RX ADMIN — CEFTRIAXONE SODIUM 1 G: 1 INJECTION, SOLUTION INTRAVENOUS at 13:51

## 2021-02-03 RX ADMIN — SODIUM CHLORIDE, PRESERVATIVE FREE 10 ML: 5 INJECTION INTRAVENOUS at 11:46

## 2021-02-03 RX ADMIN — ONDANSETRON 4 MG: 2 INJECTION INTRAMUSCULAR; INTRAVENOUS at 16:48

## 2021-02-03 RX ADMIN — MORPHINE SULFATE 4 MG: 2 INJECTION, SOLUTION INTRAMUSCULAR; INTRAVENOUS at 11:45

## 2021-02-03 NOTE — ED NOTES
Pt arrives via EMS from [facility] [place] [phone]). LUCILLE Burns gave report.   Pt is being sent for evaluation of tremors of the arms bilaterally. Pt normally has tremors at baseline, but per staff, “is having more than usual today.” Pt also c/o neck pain x 1week after a towel rack fell and hit patient in the head- pt on eliquis. They did an xray at the time of injury and it was negative. Pt ambulates with walker and is A&O x3 at baseline.      Elzbieta Avila, RN  02/03/21 1000

## 2021-02-03 NOTE — TELEPHONE ENCOUNTER
I called patient's daughter. Pt is currently in hospital. Daughter will call back to schedule once she is out of hospital.

## 2021-02-03 NOTE — ED PROVIDER NOTES
EMERGENCY DEPARTMENT ENCOUNTER    Room Number:  23/23  Date of encounter:  2/3/2021  PCP: Nisha Britton MD  Historian: Patient      HPI:  Chief Complaint: Worsening tremors      Context: Kanwal Sauer is a 70 y.o. female who presents to the ED c/o worsening benign essential tremor today.  The patient arrives to the ER via EMS from the forearm.  Her nurse called and report states that the patient has a baseline tremor but is more than usual today.  She states the patient has been complaining of neck pain after a fall a week ago where a towel rack fell and hit the patient in the back of the head and neck.  She states an x-ray at that time was negative.  The patient does see Dr. Washington from neurology about her benign essential tremor he actually saw her on January 27 and kept her primidone dose unchanged.  The patient denies headache but does complain of neck pain, she denies chest pain, cough, fever, chills, shortness of breath, COVID-19 exposure, abdominal pain, vomiting, diarrhea or focal neuro deficit.      PAST MEDICAL HISTORY  Active Ambulatory Problems     Diagnosis Date Noted   • Closed fracture of left distal femur (CMS/Formerly [facility]) 12/23/2020   • Type 2 diabetes mellitus with circulatory disorder, without long-term current use of insulin (CMS/Formerly [facility]) 12/24/2020   • Essential hypertension 12/24/2020   • Tremors of nervous system 12/26/2020   • PAF (paroxysmal atrial fibrillation) (CMS/Formerly [facility]) 12/27/2020   • TARSHA (obstructive sleep apnea) 12/27/2020   • Chronic respiratory failure with hypoxia (CMS/Formerly [facility]) 12/27/2020     Resolved Ambulatory Problems     Diagnosis Date Noted   • CANDICE (acute kidney injury) (CMS/Formerly [facility]) 12/24/2020   • Acute blood loss anemia 12/24/2020     Past Medical History:   Diagnosis Date   • Anxiety    • Asthma    • Atrial fibrillation (CMS/HCC)    • Cancer (CMS/Formerly [facility])    • Deep vein thrombosis (CMS/HCC)    • Diabetes mellitus (CMS/HCC)    • Difficulty walking    • Drug therapy    • Environmental allergies    •  Fractures    • Headache, tension-type    • Hyperlipidemia    • Hypertension    • Neuropathy in diabetes (CMS/HCC)    • Seizures (CMS/HCC)    • Sleep apnea    • Tremor    • Weakness          PAST SURGICAL HISTORY  Past Surgical History:   Procedure Laterality Date   • BREAST BIOPSY     • CATARACT EXTRACTION, BILATERAL     • DENTAL PROCEDURE      Removed teeth   • FEMUR OPEN REDUCTION INTERNAL FIXATION Left 12/24/2020    Procedure: DISTAL FEMUR OPEN REDUCTION INTERNAL FIXATION;  Surgeon: Marley Hernandez MD;  Location: [facility] OR;  Service: Orthopedics;  Laterality: Left;   • MASTECTOMY Left    • REPLACEMENT TOTAL KNEE BILATERAL           FAMILY HISTORY  Family History   Problem Relation Age of Onset   • Arthritis Mother    • Lung cancer Mother    • Brain cancer Mother    • Hypertension Mother    • Hypertension Father    • Arthritis Sister    • Breast cancer Sister    • Diabetes Sister    • Hypertension Sister    • Dementia Sister          SOCIAL HISTORY  Social History     Socioeconomic History   • Marital status:      Spouse name: Not on file   • Number of children: Not on file   • Years of education: Not on file   • Highest education level: Not on file   Tobacco Use   • Smoking status: Never Smoker   • Smokeless tobacco: Never Used   Substance and Sexual Activity   • Alcohol use: Not Currently   • Drug use: Never   • Sexual activity: Yes     Partners: Male         ALLERGIES  Baclofen; Eggs or egg-derived products; Iodine; Milk-related compounds; Msg [monosodium glutamate]; Penicillins; Metronidazole; Ezetimibe; Latex, natural rubber; Nylon; and Simvastatin        REVIEW OF SYSTEMS  Review of Systems         All systems reviewed and negative except for those discussed in HPI.     PHYSICAL EXAM    I have reviewed the triage vital signs and nursing notes.    ED Triage Vitals [02/03/21 1015]   Temp Heart Rate Resp BP SpO2   98.8 °F (37.1 °C) 62 19 163/66 91 %      Temp src Heart Rate Source Patient  Position BP Location FiO2 (%)   -- -- -- -- --       GENERAL: 70-year-old well developed, well nourished in mild distress  HENT: NCAT but tender in her occiput, neck supple with right paraspinal tenderness, trachea midline  EYES: no scleral icterus, PERRL, normal conjunctiva  CV: regular rhythm, regular rate, no murmur  RESPIRATORY: unlabored effort, CTAB  ABDOMEN: soft, non-tender, non-distended, bowel sounds present  MUSCULOSKELETAL: no gross deformity, no pedal edema, no calf tenderness: The patient has a boot on her left leg secondary to her recent leg fracture.  NEURO: alert,  sensory and motor function of extremities intact, speech clear, mental status normal.  The patient does have a marked benign essential tremor of her right upper extremity and a moderate tremor in her left upper extremity  SKIN: warm, dry, no rash  PSYCH:  Appropriate mood and affect      PPE  Pt does not present with symptoms for COVID19; however, I was wearing a mask and goggles throughout all patient interaction.    Vital signs and nursing notes reviewed.      LAB RESULTS  Recent Results (from the past 24 hour(s))   Comprehensive Metabolic Panel    Collection Time: 02/03/21 10:46 AM    Specimen: Arm, Right; Blood   Result Value Ref Range    Glucose 196 (H) 65 - 99 mg/dL    BUN 16 8 - 23 mg/dL    Creatinine 0.77 0.57 - 1.00 mg/dL    Sodium 139 136 - 145 mmol/L    Potassium 4.1 3.5 - 5.2 mmol/L    Chloride 98 98 - 107 mmol/L    CO2 32.7 (H) 22.0 - 29.0 mmol/L    Calcium 10.1 8.6 - 10.5 mg/dL    Total Protein 6.6 6.0 - 8.5 g/dL    Albumin 3.90 3.50 - 5.20 g/dL    ALT (SGPT) 5 1 - 33 U/L    AST (SGOT) 14 1 - 32 U/L    Alkaline Phosphatase 100 39 - 117 U/L    Total Bilirubin 0.4 0.0 - 1.2 mg/dL    eGFR Non African Amer 74 >60 mL/min/1.73    Globulin 2.7 gm/dL    A/G Ratio 1.4 g/dL    BUN/Creatinine Ratio 20.8 7.0 - 25.0    Anion Gap 8.3 5.0 - 15.0 mmol/L   Light Blue Top    Collection Time: 02/03/21 10:46 AM   Result Value Ref Range    Extra  Tube hold for add-on    Green Top (Gel)    Collection Time: 02/03/21 10:46 AM   Result Value Ref Range    Extra Tube Hold for add-ons.    Lavender Top    Collection Time: 02/03/21 10:46 AM   Result Value Ref Range    Extra Tube hold for add-on    Gold Top - SST    Collection Time: 02/03/21 10:46 AM   Result Value Ref Range    Extra Tube Hold for add-ons.    CBC Auto Differential    Collection Time: 02/03/21 10:46 AM    Specimen: Arm, Right; Blood   Result Value Ref Range    WBC 4.41 3.40 - 10.80 10*3/mm3    RBC 3.74 (L) 3.77 - 5.28 10*6/mm3    Hemoglobin 11.2 (L) 12.0 - 15.9 g/dL    Hematocrit 34.5 34.0 - 46.6 %    MCV 92.2 79.0 - 97.0 fL    MCH 29.9 26.6 - 33.0 pg    MCHC 32.5 31.5 - 35.7 g/dL    RDW 15.2 12.3 - 15.4 %    RDW-SD 51.7 37.0 - 54.0 fl    MPV 10.9 6.0 - 12.0 fL    Platelets 149 140 - 450 10*3/mm3    Neutrophil % 65.7 42.7 - 76.0 %    Lymphocyte % 22.7 19.6 - 45.3 %    Monocyte % 7.7 5.0 - 12.0 %    Eosinophil % 2.0 0.3 - 6.2 %    Basophil % 0.5 0.0 - 1.5 %    Immature Grans % 1.4 (H) 0.0 - 0.5 %    Neutrophils, Absolute 2.90 1.70 - 7.00 10*3/mm3    Lymphocytes, Absolute 1.00 0.70 - 3.10 10*3/mm3    Monocytes, Absolute 0.34 0.10 - 0.90 10*3/mm3    Eosinophils, Absolute 0.09 0.00 - 0.40 10*3/mm3    Basophils, Absolute 0.02 0.00 - 0.20 10*3/mm3    Immature Grans, Absolute 0.06 (H) 0.00 - 0.05 10*3/mm3    nRBC 0.2 0.0 - 0.2 /100 WBC   Protime-INR    Collection Time: 02/03/21 10:46 AM    Specimen: Arm, Right; Blood   Result Value Ref Range    Protime 13.8 11.7 - 14.2 Seconds    INR 1.08 0.90 - 1.10   Magnesium    Collection Time: 02/03/21 10:46 AM    Specimen: Arm, Right; Blood   Result Value Ref Range    Magnesium 1.4 (L) 1.6 - 2.4 mg/dL   TSH    Collection Time: 02/03/21 10:46 AM    Specimen: Arm, Right; Blood   Result Value Ref Range    TSH 1.870 0.270 - 4.200 uIU/mL   T4, Free    Collection Time: 02/03/21 10:46 AM    Specimen: Arm, Right; Blood   Result Value Ref Range    Free T4 1.30 0.93 - 1.70 ng/dL    Urinalysis With Microscopic If Indicated (No Culture) - Urine, Catheter    Collection Time: 02/03/21 12:38 PM    Specimen: Urine, Catheter   Result Value Ref Range    Color, UA Yellow Yellow, Straw    Appearance, UA Turbid (A) Clear    pH, UA 7.0 5.0 - 8.0    Specific Gravity, UA 1.020 1.005 - 1.030    Glucose, UA Negative Negative    Ketones, UA Negative Negative    Bilirubin, UA Negative Negative    Blood, UA Small (1+) (A) Negative    Protein, UA 30 mg/dL (1+) (A) Negative    Leuk Esterase, UA Large (3+) (A) Negative    Nitrite, UA Negative Negative    Urobilinogen, UA 1.0 E.U./dL 0.2 - 1.0 E.U./dL   Urinalysis, Microscopic Only - Urine, Catheter    Collection Time: 02/03/21 12:38 PM    Specimen: Urine, Catheter   Result Value Ref Range    RBC, UA 0-2 None Seen, 0-2 /HPF    WBC, UA Too Numerous to Count (A) None Seen, 0-2 /HPF    Bacteria, UA 2+ (A) None Seen /HPF    Squamous Epithelial Cells, UA 0-2 None Seen, 0-2 /HPF    Hyaline Casts, UA None Seen None Seen /LPF    Methodology Manual Light Microscopy        Ordered the above labs and independently reviewed the results.        RADIOLOGY  Ct Head Without Contrast    Result Date: 2/3/2021  CT HEAD WITHOUT CONTRAST AND CT CERVICAL SPINE WITHOUT CONTRAST  HISTORY: Fall, hit head, headache, neck pain.  COMPARISON: CT examination of the head and cervical spine 12/23/2020.  CT HEAD WITHOUT CONTRAST: The brain and ventricles are symmetrical. There is no evidence of hemorrhage, hydrocephalus or of abnormal extra-axial fluid. No focal area of decreased attenuation to suggest acute infarction is identified. Bone windows showed no evidence of a calvarial fracture. Moderate vascular calcification is noted.      No evidence of fracture or of intracranial hemorrhage.    CT EXAMINATION CERVICAL SPINE WITHOUT CONTRAST:  There is a grade 1 anterolisthesis of C3 upon C4, unchanged. There is no evidence of prevertebral edema or fracture.  Sclerosis involving the lateral aspect  of the anterior arch of C1 to the left is noted, unchanged. The appearance is nonspecific. This likely represents a bone island. Comparison with prior studies is recommended. If no prior study is available for comparison, further evaluation could be performed with a bone scan.  C2-3: A left paracentral disc osteophyte complex is present which results in mild flattening of the ventral surface of the thecal sac. Mild facet degenerative disease is present bilaterally.  C3-4: Severe facet degenerative disease and mild neural foraminal compromise on the left is again noted.  C4-5: A mild broad-based disc osteophyte complex is present. Moderate facet degenerative disease is present bilaterally.  C5-6: A broad-based disc osteophyte complex is present which results in mild flattening of the ventral surface of the thecal sac. Moderate facet degenerative disease is present on the right as well as moderate neural foraminal compromise on the right secondary to the facet hypertrophy and to uncovertebral degenerative disease.  C6-7: There is no evidence of disc bulge or herniation.  C7-T1: There is no evidence of disc bulge or herniation.  IMPRESSION: No evidence of fracture. Multilevel degenerative disease as described. Area of sclerosis involving the anterior arch of C1 laterally to the left. If no prior CT examination of the head or cervical spine is available for comparison, further evaluation could be performed with a bone scan.  The above information was called to Dr. Goldsmith.      Radiation dose reduction techniques were utilized, including automated exposure control and exposure modulation based on body size.  This report was finalized on 2/3/2021 2:42 PM by Dr. Patrick Milligan M.D.      Ct Cervical Spine Without Contrast    Result Date: 2/3/2021  CT HEAD WITHOUT CONTRAST AND CT CERVICAL SPINE WITHOUT CONTRAST  HISTORY: Fall, hit head, headache, neck pain.  COMPARISON: CT examination of the head and cervical spine  12/23/2020.  CT HEAD WITHOUT CONTRAST: The brain and ventricles are symmetrical. There is no evidence of hemorrhage, hydrocephalus or of abnormal extra-axial fluid. No focal area of decreased attenuation to suggest acute infarction is identified. Bone windows showed no evidence of a calvarial fracture. Moderate vascular calcification is noted.      No evidence of fracture or of intracranial hemorrhage.    CT EXAMINATION CERVICAL SPINE WITHOUT CONTRAST:  There is a grade 1 anterolisthesis of C3 upon C4, unchanged. There is no evidence of prevertebral edema or fracture.  Sclerosis involving the lateral aspect of the anterior arch of C1 to the left is noted, unchanged. The appearance is nonspecific. This likely represents a bone island. Comparison with prior studies is recommended. If no prior study is available for comparison, further evaluation could be performed with a bone scan.  C2-3: A left paracentral disc osteophyte complex is present which results in mild flattening of the ventral surface of the thecal sac. Mild facet degenerative disease is present bilaterally.  C3-4: Severe facet degenerative disease and mild neural foraminal compromise on the left is again noted.  C4-5: A mild broad-based disc osteophyte complex is present. Moderate facet degenerative disease is present bilaterally.  C5-6: A broad-based disc osteophyte complex is present which results in mild flattening of the ventral surface of the thecal sac. Moderate facet degenerative disease is present on the right as well as moderate neural foraminal compromise on the right secondary to the facet hypertrophy and to uncovertebral degenerative disease.  C6-7: There is no evidence of disc bulge or herniation.  C7-T1: There is no evidence of disc bulge or herniation.  IMPRESSION: No evidence of fracture. Multilevel degenerative disease as described. Area of sclerosis involving the anterior arch of C1 laterally to the left. If no prior CT examination of  the head or cervical spine is available for comparison, further evaluation could be performed with a bone scan.  The above information was called to Dr. Goldsmith.      Radiation dose reduction techniques were utilized, including automated exposure control and exposure modulation based on body size.  This report was finalized on 2/3/2021 2:42 PM by Dr. Patrick Milligan M.D.        I ordered the above noted radiological studies. Independently reviewed by me and discussed with radiologist.  See dictation above for official radiology interpretation.      PROCEDURES    Procedures        MEDICATIONS GIVEN IN ER    Medications   sodium chloride 0.9 % flush 10 mL (10 mL Intravenous Given 2/3/21 1146)   morphine injection 4 mg (4 mg Intravenous Given 2/3/21 1145)   ondansetron (ZOFRAN) injection 4 mg (4 mg Intravenous Given 2/3/21 1145)   cefTRIAXone (ROCEPHIN) IVPB 1 g (0 g Intravenous Stopped 2/3/21 1421)   morphine injection 4 mg (4 mg Intravenous Given 2/3/21 1649)   ondansetron (ZOFRAN) injection 4 mg (4 mg Intravenous Given 2/3/21 1648)         PROGRESS, DATA ANALYSIS, CONSULTS, AND MEDICAL DECISION MAKING    All labs have been independently reviewed by me.  All radiology studies have been reviewed by me and discussed with radiologist dictating report.   EKG's independently reviewed by me.  Discussion below represents my analysis of pertinent findings related to patient's condition, differential diagnosis, treatment plan and final disposition.      ED Course as of Feb 03 1730   Wed Feb 03, 2021   1112 Patient sent to the emergency room for worsening benign essential tremor after trauma approximately a week ago.  I will check a head CT, CT C-spine, labs and urinalysis.  I will give the patient a dose of pain medicine while obtaining her evaluation.    [GP]   1230 The patient's head CT and CT C-spine are negative acute per Dr. Milligan    [GP]   1333 The patient does appear to have a UTI and thus I will send off a urine culture  and give her a dose of Rocephin.    [GP]   1347 The patient is resting comfortably in the room right now and has minimal tremors.  I advised her we will give her a dose of IV antibiotics for her UTI, that I will discussed the case with Dr. Washington and that she likely will be discharged back home on oral antibiotics.  The patient understands and agrees the plan.    [GP]   1347 I also advised her that her CT head and CT C-spine are unremarkable.    [GP]   1502 I discussed the case with neurology who recommends continuing the current dose of primidone and to treat the UTI and to follow-up with her doctor as an outpatient.    [GP]   1530 The patient is resting comfortably in her room with stable vital signs.  Advised the patient and her daughter that she is stable for discharge back to the nursing home.    [GP]   1600 I advised the daughter that they need to speak with her PCP about ordering a bone scan due to sclerosis seen on her C-spine today and T-spine on prior admission.  The daughter understands and agrees with the plan.    [GP]   1729 The patient is currently awaiting ambulance transport back to the nursing home.  Vital signs are stable and she is in no acute distress.    [GP]      ED Course User Index  [GP] Hugo Goldsmith MD             AS OF 17:30 EST VITALS:    BP - 146/71  HR - 55  TEMP - 98.8 °F (37.1 °C)  02 SATS - 94%        DIAGNOSIS  Final diagnoses:   Acute UTI   Benign essential tremor   Musculoskeletal neck pain         DISPOSITION  Discharged        EMR Dragon/Transcription disclaimer:   Much of this encounter note is an electronic transcription/translation of spoken language to printed text.        Hugo Goldsmith MD  02/03/21 6915

## 2021-02-03 NOTE — ED NOTES
Report called to [facility] [place], spoke with Farida and advised of new prescription Keflex for UTI with f/u with Dr Britton in 1 week. No questions or further orders requested.     Guanakito Patel RN  02/03/21 [id number]

## 2021-02-04 NOTE — TELEPHONE ENCOUNTER
PATIENTS DAUGHTER SHELDON IS CALLING IN ABOUT THIS APPT FOR PATIENTS SCAN FOR PANCREAS SHE WOULD LIKE CALLBACK TO DISCUSS.      CALLBACK NUMBER IS  [phone]

## 2021-02-04 NOTE — TELEPHONE ENCOUNTER
[facility] is going to order the CT scan for the C1 and ribs. We are faxing them the order for the CT abdomen and pelvis.   We will call the daughter back if there are any issues. Daughter is aware.

## 2021-02-05 LAB — BACTERIA SPEC AEROBE CULT: ABNORMAL

## 2021-02-17 ENCOUNTER — TRANSCRIBE ORDERS (OUTPATIENT)
Dept: ADMINISTRATIVE | Facility: HOSPITAL | Age: 71
End: 2021-02-17

## 2021-02-19 ENCOUNTER — HOSPITAL ENCOUNTER (OUTPATIENT)
Dept: CT IMAGING | Facility: HOSPITAL | Age: 71
Discharge: HOME | End: 2021-02-19
Admitting: FAMILY MEDICINE

## 2021-02-19 DIAGNOSIS — R93.89 ABNORMAL CT SCAN: ICD-10-CM

## 2021-02-19 PROCEDURE — 74177 CT ABD & PELVIS W/CONTRAST: CPT

## 2021-02-19 PROCEDURE — 25010000002 IOPAMIDOL 61 % SOLUTION: Performed by: FAMILY MEDICINE

## 2021-02-19 PROCEDURE — 0 DIATRIZOATE MEGLUMINE & SODIUM PER 1 ML: Performed by: FAMILY MEDICINE

## 2021-02-19 RX ADMIN — DIATRIZOATE MEGLUMINE AND DIATRIZOATE SODIUM 30 ML: 660; 100 LIQUID ORAL; RECTAL at 12:30

## 2021-02-19 RX ADMIN — IOPAMIDOL 85 ML: 612 INJECTION, SOLUTION INTRAVENOUS at 14:06

## 2021-02-22 PROBLEM — Z85.3 HISTORY OF BREAST CANCER: Status: ACTIVE | Noted: 2021-02-22

## 2021-02-24 ENCOUNTER — TRANSCRIBE ORDERS (OUTPATIENT)
Dept: ADMINISTRATIVE | Facility: HOSPITAL | Age: 71
End: 2021-02-24

## 2021-02-24 DIAGNOSIS — Z12.89 ENCOUNTER FOR SCREENING FOR MALIGNANT NEOPLASM OF OTHER SITES: Primary | ICD-10-CM

## 2021-03-03 ENCOUNTER — HOSPITAL ENCOUNTER (OUTPATIENT)
Dept: NUCLEAR MEDICINE | Facility: HOSPITAL | Age: 71
Discharge: HOME | End: 2021-03-03

## 2021-03-03 DIAGNOSIS — Z12.89 ENCOUNTER FOR SCREENING FOR MALIGNANT NEOPLASM OF OTHER SITES: ICD-10-CM

## 2021-03-03 PROCEDURE — A9503 TC99M MEDRONATE: HCPCS | Performed by: NURSE PRACTITIONER

## 2021-03-03 PROCEDURE — 0 TECHNETIUM MEDRONATE KIT: Performed by: NURSE PRACTITIONER

## 2021-03-03 PROCEDURE — 78306 BONE IMAGING WHOLE BODY: CPT

## 2021-03-03 RX ORDER — TC 99M MEDRONATE 20 MG/10ML
23.8 INJECTION, POWDER, LYOPHILIZED, FOR SOLUTION INTRAVENOUS
Status: COMPLETED | OUTPATIENT
Start: 2021-03-03 | End: 2021-03-03

## 2021-03-03 RX ADMIN — Medication 23.8 MILLICURIE: at 09:10

## 2021-03-08 ENCOUNTER — TELEPHONE (OUTPATIENT)
Dept: FAMILY MEDICINE CLINIC | Facility: CLINIC | Age: 71
End: 2021-03-08

## 2021-03-08 NOTE — TELEPHONE ENCOUNTER
SOLITARIO CALLED FROM [facility] STATING PT JUST DISCHARGED FROM THE [facility] AND THEY ARE REQUESTING ORDERS TO SEE PT FOR THERAPY.    PLEASE CALL BACK [phone]

## 2021-03-09 RX ORDER — PIOGLITAZONEHYDROCHLORIDE 15 MG/1
TABLET ORAL
Qty: 90 TABLET | Refills: 0 | Status: SHIPPED | OUTPATIENT
Start: 2021-03-09 | End: 2021-10-13

## 2021-03-09 RX ORDER — FUROSEMIDE 40 MG/1
TABLET ORAL
Qty: 90 TABLET | Refills: 0 | Status: SHIPPED | OUTPATIENT
Start: 2021-03-09 | End: 2021-03-19

## 2021-03-09 RX ORDER — MONTELUKAST SODIUM 10 MG/1
TABLET ORAL
Qty: 90 TABLET | Refills: 0 | Status: SHIPPED | OUTPATIENT
Start: 2021-03-09 | End: 2021-09-09

## 2021-03-10 ENCOUNTER — TELEPHONE (OUTPATIENT)
Dept: FAMILY MEDICINE CLINIC | Facility: CLINIC | Age: 71
End: 2021-03-10

## 2021-03-10 NOTE — TELEPHONE ENCOUNTER
[facility]  CALLED TO GET VERBAL ORDERS FOR SKILLED NURSING AND OT.     PATIENT IS ALSO HAVING INCREASED CONFUSION AND [facility] WOULD LIKE AN ORDER TO DO UA FOR A POSSIBLE UTI    PLEASE ADVISE       [phone]

## 2021-03-16 ENCOUNTER — OFFICE VISIT (OUTPATIENT)
Dept: FAMILY MEDICINE CLINIC | Facility: CLINIC | Age: 71
End: 2021-03-16

## 2021-03-16 ENCOUNTER — HOSPITAL ENCOUNTER (OUTPATIENT)
Dept: BONE DENSITY | Facility: HOSPITAL | Age: 71
Discharge: HOME | End: 2021-03-16

## 2021-03-16 ENCOUNTER — LAB (OUTPATIENT)
Dept: LAB | Facility: HOSPITAL | Age: 71
End: 2021-03-16

## 2021-03-16 ENCOUNTER — OFFICE VISIT (OUTPATIENT)
Dept: OBSTETRICS AND GYNECOLOGY | Facility: CLINIC | Age: 71
End: 2021-03-16

## 2021-03-16 VITALS
SYSTOLIC BLOOD PRESSURE: 158 MMHG | WEIGHT: 221 LBS | HEIGHT: 68 IN | BODY MASS INDEX: 33.49 KG/M2 | DIASTOLIC BLOOD PRESSURE: 92 MMHG

## 2021-03-16 VITALS
WEIGHT: 221 LBS | BODY MASS INDEX: 33.49 KG/M2 | TEMPERATURE: 96.4 F | OXYGEN SATURATION: 97 % | DIASTOLIC BLOOD PRESSURE: 74 MMHG | HEART RATE: 54 BPM | SYSTOLIC BLOOD PRESSURE: 152 MMHG | HEIGHT: 68 IN

## 2021-03-16 DIAGNOSIS — R30.0 DYSURIA: ICD-10-CM

## 2021-03-16 DIAGNOSIS — Z01.419 ROUTINE GYNECOLOGICAL EXAMINATION: ICD-10-CM

## 2021-03-16 DIAGNOSIS — R42 DIZZINESS: ICD-10-CM

## 2021-03-16 DIAGNOSIS — R87.619 UNSPECIFIED ABNORMAL CYTOLOGICAL FINDINGS IN SPECIMENS FROM CERVIX UTERI: ICD-10-CM

## 2021-03-16 DIAGNOSIS — I10 ESSENTIAL HYPERTENSION: ICD-10-CM

## 2021-03-16 DIAGNOSIS — R93.6 ABNORMAL FINDINGS ON DIAGNOSTIC IMAGING OF LIMBS: ICD-10-CM

## 2021-03-16 DIAGNOSIS — I48.0 PAF (PAROXYSMAL ATRIAL FIBRILLATION) (HCC): ICD-10-CM

## 2021-03-16 DIAGNOSIS — R53.83 OTHER FATIGUE: ICD-10-CM

## 2021-03-16 DIAGNOSIS — Z13.9 SCREENING FOR UNSPECIFIED CONDITION: ICD-10-CM

## 2021-03-16 DIAGNOSIS — Z01.419 PAP SMEAR, LOW-RISK: Primary | ICD-10-CM

## 2021-03-16 DIAGNOSIS — E11.65 TYPE 2 DIABETES MELLITUS WITH HYPERGLYCEMIA, WITHOUT LONG-TERM CURRENT USE OF INSULIN (HCC): Primary | ICD-10-CM

## 2021-03-16 DIAGNOSIS — Z13.820 OSTEOPOROSIS SCREENING: ICD-10-CM

## 2021-03-16 DIAGNOSIS — E78.2 MIXED HYPERLIPIDEMIA: ICD-10-CM

## 2021-03-16 DIAGNOSIS — Z11.51 SPECIAL SCREENING EXAMINATION FOR HUMAN PAPILLOMAVIRUS (HPV): ICD-10-CM

## 2021-03-16 LAB
ALBUMIN SERPL-MCNC: 4.4 G/DL (ref 3.5–5.2)
ALBUMIN/GLOB SERPL: 1.5 G/DL
ALP SERPL-CCNC: 75 U/L (ref 39–117)
ALT SERPL W P-5'-P-CCNC: 8 U/L (ref 1–33)
ANION GAP SERPL CALCULATED.3IONS-SCNC: 9.7 MMOL/L (ref 5–15)
AST SERPL-CCNC: 14 U/L (ref 1–32)
BASOPHILS # BLD AUTO: 0.01 10*3/MM3 (ref 0–0.2)
BASOPHILS NFR BLD AUTO: 0.2 % (ref 0–1.5)
BILIRUB BLD-MCNC: NEGATIVE MG/DL
BILIRUB BLD-MCNC: NEGATIVE MG/DL
BILIRUB SERPL-MCNC: 0.3 MG/DL (ref 0–1.2)
BUN SERPL-MCNC: 23 MG/DL (ref 8–23)
BUN/CREAT SERPL: 28.8 (ref 7–25)
CALCIUM SPEC-SCNC: 10.3 MG/DL (ref 8.6–10.5)
CHLORIDE SERPL-SCNC: 105 MMOL/L (ref 98–107)
CHOLEST SERPL-MCNC: 180 MG/DL (ref 0–200)
CLARITY, POC: ABNORMAL
CLARITY, POC: CLEAR
CO2 SERPL-SCNC: 27.3 MMOL/L (ref 22–29)
COLOR UR: ABNORMAL
COLOR UR: YELLOW
CREAT SERPL-MCNC: 0.8 MG/DL (ref 0.57–1)
DEPRECATED RDW RBC AUTO: 50.2 FL (ref 37–54)
EOSINOPHIL # BLD AUTO: 0.09 10*3/MM3 (ref 0–0.4)
EOSINOPHIL NFR BLD AUTO: 2 % (ref 0.3–6.2)
ERYTHROCYTE [DISTWIDTH] IN BLOOD BY AUTOMATED COUNT: 15.5 % (ref 12.3–15.4)
GFR SERPL CREATININE-BSD FRML MDRD: 71 ML/MIN/1.73
GLOBULIN UR ELPH-MCNC: 3 GM/DL
GLUCOSE SERPL-MCNC: 150 MG/DL (ref 65–99)
GLUCOSE UR STRIP-MCNC: NEGATIVE MG/DL
GLUCOSE UR STRIP-MCNC: NEGATIVE MG/DL
HBA1C MFR BLD: 6.6 % (ref 4.8–5.6)
HCT VFR BLD AUTO: 36.5 % (ref 34–46.6)
HDLC SERPL-MCNC: 60 MG/DL (ref 40–60)
HGB BLD-MCNC: 12 G/DL (ref 12–15.9)
IMM GRANULOCYTES # BLD AUTO: 0.02 10*3/MM3 (ref 0–0.05)
IMM GRANULOCYTES NFR BLD AUTO: 0.4 % (ref 0–0.5)
KETONES UR QL: NEGATIVE
KETONES UR QL: NEGATIVE
LDLC SERPL CALC-MCNC: 103 MG/DL (ref 0–100)
LDLC/HDLC SERPL: 1.68 {RATIO}
LEUKOCYTE EST, POC: ABNORMAL
LEUKOCYTE EST, POC: NEGATIVE
LYMPHOCYTES # BLD AUTO: 1.29 10*3/MM3 (ref 0.7–3.1)
LYMPHOCYTES NFR BLD AUTO: 28.4 % (ref 19.6–45.3)
MCH RBC QN AUTO: 29.3 PG (ref 26.6–33)
MCHC RBC AUTO-ENTMCNC: 32.9 G/DL (ref 31.5–35.7)
MCV RBC AUTO: 89.2 FL (ref 79–97)
MONOCYTES # BLD AUTO: 0.36 10*3/MM3 (ref 0.1–0.9)
MONOCYTES NFR BLD AUTO: 7.9 % (ref 5–12)
NEUTROPHILS NFR BLD AUTO: 2.77 10*3/MM3 (ref 1.7–7)
NEUTROPHILS NFR BLD AUTO: 61.1 % (ref 42.7–76)
NITRITE UR-MCNC: NEGATIVE MG/ML
NITRITE UR-MCNC: NEGATIVE MG/ML
NRBC BLD AUTO-RTO: 0 /100 WBC (ref 0–0.2)
PH UR: 5 [PH] (ref 5–8)
PH UR: 6 [PH] (ref 5–8)
PLATELET # BLD AUTO: 136 10*3/MM3 (ref 140–450)
PMV BLD AUTO: 10.2 FL (ref 6–12)
POTASSIUM SERPL-SCNC: 4.2 MMOL/L (ref 3.5–5.2)
PROT SERPL-MCNC: 7.4 G/DL (ref 6–8.5)
PROT UR STRIP-MCNC: ABNORMAL MG/DL
PROT UR STRIP-MCNC: NEGATIVE MG/DL
RBC # BLD AUTO: 4.09 10*6/MM3 (ref 3.77–5.28)
RBC # UR STRIP: NEGATIVE /UL
RBC # UR STRIP: NEGATIVE /UL
SODIUM SERPL-SCNC: 142 MMOL/L (ref 136–145)
SP GR UR: 1.02 (ref 1–1.03)
SP GR UR: 1.03 (ref 1–1.03)
T4 FREE SERPL-MCNC: 1.2 NG/DL (ref 0.93–1.7)
TRIGL SERPL-MCNC: 95 MG/DL (ref 0–150)
TSH SERPL DL<=0.05 MIU/L-ACNC: 1.57 UIU/ML (ref 0.27–4.2)
UROBILINOGEN UR QL: NORMAL
UROBILINOGEN UR QL: NORMAL
VLDLC SERPL-MCNC: 17 MG/DL (ref 5–40)
WBC # BLD AUTO: 4.54 10*3/MM3 (ref 3.4–10.8)

## 2021-03-16 PROCEDURE — 81002 URINALYSIS NONAUTO W/O SCOPE: CPT | Performed by: OBSTETRICS & GYNECOLOGY

## 2021-03-16 PROCEDURE — 36415 COLL VENOUS BLD VENIPUNCTURE: CPT | Performed by: FAMILY MEDICINE

## 2021-03-16 PROCEDURE — 85025 COMPLETE CBC W/AUTO DIFF WBC: CPT | Performed by: FAMILY MEDICINE

## 2021-03-16 PROCEDURE — G0101 CA SCREEN;PELVIC/BREAST EXAM: HCPCS | Performed by: OBSTETRICS & GYNECOLOGY

## 2021-03-16 PROCEDURE — 84439 ASSAY OF FREE THYROXINE: CPT | Performed by: FAMILY MEDICINE

## 2021-03-16 PROCEDURE — 80061 LIPID PANEL: CPT | Performed by: FAMILY MEDICINE

## 2021-03-16 PROCEDURE — 80053 COMPREHEN METABOLIC PANEL: CPT | Performed by: FAMILY MEDICINE

## 2021-03-16 PROCEDURE — 77080 DXA BONE DENSITY AXIAL: CPT

## 2021-03-16 PROCEDURE — 99214 OFFICE O/P EST MOD 30 MIN: CPT | Performed by: FAMILY MEDICINE

## 2021-03-16 PROCEDURE — 81003 URINALYSIS AUTO W/O SCOPE: CPT | Performed by: FAMILY MEDICINE

## 2021-03-16 PROCEDURE — 84443 ASSAY THYROID STIM HORMONE: CPT | Performed by: FAMILY MEDICINE

## 2021-03-16 PROCEDURE — 83036 HEMOGLOBIN GLYCOSYLATED A1C: CPT | Performed by: FAMILY MEDICINE

## 2021-03-16 RX ORDER — MIRABEGRON 25 MG/1
TABLET, FILM COATED, EXTENDED RELEASE ORAL
COMMUNITY
Start: 2021-01-27 | End: 2021-03-19

## 2021-03-16 RX ORDER — FENOFIBRATE 160 MG/1
TABLET ORAL
COMMUNITY
Start: 2021-03-12 | End: 2021-08-30

## 2021-03-16 RX ORDER — GABAPENTIN 300 MG/1
300 CAPSULE ORAL 3 TIMES DAILY
COMMUNITY
End: 2021-03-16

## 2021-03-16 RX ORDER — POTASSIUM CHLORIDE 750 MG/1
10 TABLET, EXTENDED RELEASE ORAL ONCE AS NEEDED
COMMUNITY
End: 2022-07-14 | Stop reason: SDUPTHER

## 2021-03-16 RX ORDER — CYCLOBENZAPRINE HCL 10 MG
10 TABLET ORAL 3 TIMES DAILY PRN
COMMUNITY
End: 2021-03-19

## 2021-03-16 NOTE — PROGRESS NOTES
GYN Annual Exam     CC- Here for annual exam.     Kanwal Sauer is a 70 y.o. female who presents for annual well woman exam. Pt is a new patient to me. Pt recently broke her left femur and fibular after a fall. She is out rehab as of 3/6/21. Hx of breast cancer dx  with left mastectomy. S/p chemo. She is not on any hormone blocking agents. Pt is on Equis for A fib. She had a recent bone scan. Had a MMG/US done 2020. Pt has a vaginal cyst- it is causing discomfort for pt. Pt has a large hemorrhoid- it burst when she fell.     OB History        2    Para   2    Term   2            AB        Living           SAB        TAB        Ectopic        Molar        Multiple        Live Births                    Current contraception: post menopausal status  History of abnormal Pap smear: no  History of abnormal mammogram: no  Family history of uterine, colon or ovarian cancer: no  Family history of breast cancer: no    Health Maintenance   Topic Date Due   • COLONOSCOPY  Never done   • TDAP/TD VACCINES (1 - Tdap) Never done   • ZOSTER VACCINE (1 of 2) Never done   • Pneumococcal Vaccine 65+ (1 of 1 - PPSV23) Never done   • HEPATITIS C SCREENING  Never done   • ANNUAL WELLNESS VISIT  Never done   • DIABETIC FOOT EXAM  Never done   • DIABETIC EYE EXAM  Never done   • INFLUENZA VACCINE  Never done   • COVID-19 Vaccine (2 - Pfizer 2-dose series) 2021   • HEMOGLOBIN A1C  2021   • URINE MICROALBUMIN  10/05/2021   • LIPID PANEL  2022   • MAMMOGRAM  2022   • DXA SCAN  2023   • MENINGOCOCCAL VACCINE  Aged Out       Past Medical History:   Diagnosis Date   • Anxiety    • Asthma    • Atrial fibrillation (CMS/HCC)    • Cancer (CMS/HCC)     Breast   • Deep vein thrombosis (CMS/HCC)    • Diabetes mellitus (CMS/HCC)    • Difficulty walking    • Drug therapy    • Environmental allergies    • Fractures    • Headache, tension-type    • Hyperlipidemia    • Hypertension    • 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)    • Seizures (CMS/HCC)    • Sleep apnea    • Tremor    • Weakness        Past Surgical History:   Procedure Laterality Date   • BREAST BIOPSY     • CATARACT EXTRACTION, BILATERAL     • DENTAL PROCEDURE      Removed teeth   • FEMUR OPEN REDUCTION INTERNAL FIXATION Left 12/24/2020    Procedure: DISTAL FEMUR OPEN REDUCTION INTERNAL FIXATION;  Surgeon: Marley Hernandez MD;  Location: [facility];  Service: Orthopedics;  Laterality: Left;   • MASTECTOMY Left    • REPLACEMENT TOTAL KNEE BILATERAL           Current Outpatient Medications:   •  acetaminophen (TYLENOL) 325 MG tablet, Take 2 tablets by mouth Every 4 (Four) Hours As Needed for Mild Pain ., Disp:  , Rfl:   •  apixaban (ELIQUIS) 5 MG tablet tablet, Take 1 tablet by mouth Every 12 (Twelve) Hours., Disp: 180 tablet, Rfl: 0  •  citalopram (CeleXA) 20 MG tablet, TAKE 1 TABLET BY MOUTH EVERY DAY, Disp: 90 tablet, Rfl: 0  •  cyclobenzaprine (FLEXERIL) 10 MG tablet, Take 10 mg by mouth 3 (Three) Times a Day As Needed for Muscle Spasms., Disp: , Rfl:   •  fenofibrate 160 MG tablet, , Disp: , Rfl:   •  furosemide (LASIX) 40 MG tablet, TAKE 1 TABLET BY MOUTH EVERY DAY, Disp: 90 tablet, Rfl: 0  •  losartan (COZAAR) 50 MG tablet, TAKE 1 TABLET BY MOUTH EVERY DAY, Disp: 90 tablet, Rfl: 1  •  metFORMIN (GLUCOPHAGE) 1000 MG tablet, Take 1 tablet by mouth 2 (Two) Times a Day With Meals., Disp: 180 tablet, Rfl: 0  •  metoprolol tartrate (LOPRESSOR) 25 MG tablet, Take 0.5 tablets by mouth 2 (Two) Times a Day., Disp:  , Rfl:   •  mometasone-formoterol (DULERA 100) 100-5 MCG/ACT inhaler, Inhale 2 puffs 2 (Two) Times a Day., Disp: , Rfl:   •  montelukast (SINGULAIR) 10 MG tablet, TAKE 1 TABLET BY MOUTH EVERY DAY AT NIGHT, Disp: 90 tablet, Rfl: 0  •  Myrbetriq 25 MG tablet sustained-release 24 hour 24 hr tablet, , Disp: , Rfl:   •  pioglitazone (ACTOS) 15 MG tablet, TAKE 1 TABLET BY MOUTH EVERY DAY, Disp: 90 tablet, Rfl: 0  •  polyethylene glycol  "(polyethylene glycol) 17 g packet, Take 17 g by mouth Daily., Disp:  , Rfl:   •  potassium chloride (K-DUR,KLOR-CON) 10 MEQ CR tablet, Take 10 mEq by mouth 2 (Two) Times a Day., Disp: , Rfl:   •  primidone (MYSOLINE) 50 MG tablet, TAKE 1 TABLET BY MOUTH THREE TIMES A DAY, Disp: 270 tablet, Rfl: 0  •  Probiotic Product (PROBIOTIC-10 PO), Take  by mouth., Disp: , Rfl:   •  spironolactone (ALDACTONE) 25 MG tablet, TAKE 1 TABLET BY MOUTH EVERY DAY, Disp: 90 tablet, Rfl: 0    Allergies   Allergen Reactions   • Baclofen Other (See Comments)     dysrhythmia   • Eggs Or Egg-Derived Products Anaphylaxis   • Iodine Anaphylaxis   • Milk-Related Compounds Unknown - High Severity   • Msg [Monosodium Glutamate] Anaphylaxis   • Penicillins Hives     Tolerated cefazolin during December 2020 admission   • Metronidazole Unknown - High Severity   • Ezetimibe Rash   • Latex, Natural Rubber Rash   • Nylon Rash   • Simvastatin Rash       Social History     Tobacco Use   • Smoking status: Never Smoker   • Smokeless tobacco: Never Used   Substance Use Topics   • Alcohol use: Not Currently   • Drug use: Never       Family History   Problem Relation Age of Onset   • Arthritis Mother    • Lung cancer Mother    • Brain cancer Mother    • Hypertension Mother    • Hypertension Father    • Arthritis Sister    • Breast cancer Sister    • Diabetes Sister    • Hypertension Sister    • Dementia Sister        Review of Systems    /92   Ht 172.7 cm (68\")   Wt 100 kg (221 lb)   BMI 33.60 kg/m²     Physical Exam  Vitals reviewed.   Constitutional:       Appearance: She is well-developed.   HENT:      Mouth/Throat:      Dentition: Normal dentition. No dental caries.   Cardiovascular:      Rate and Rhythm: Normal rate and regular rhythm.      Heart sounds: Normal heart sounds.   Pulmonary:      Effort: Pulmonary effort is normal. No respiratory distress.      Breath sounds: Normal breath sounds. No stridor. No wheezing.   Chest:      Breasts:      "    Right: No inverted nipple, mass, nipple discharge, skin change or tenderness.         Left: No skin change or tenderness.      Comments: Absent left breast  Abdominal:      General: There is no distension.      Palpations: Abdomen is soft. There is no mass.      Tenderness: There is no abdominal tenderness.   Genitourinary:     Labia:         Right: No rash, tenderness or lesion.         Left: No rash, tenderness or lesion.       Vagina: No vaginal discharge, tenderness or bleeding.      Cervix: No cervical motion tenderness, discharge or friability.      Uterus: Not deviated, not enlarged, not fixed and not tender.       Adnexa:         Right: No mass, tenderness or fullness.          Left: No mass, tenderness or fullness.        Rectum: External hemorrhoid present.      Comments: 2 large anal skin tags noted at the 12 and 6 o'clock position of the anus.  Musculoskeletal:         General: No tenderness. Normal range of motion.   Skin:     General: Skin is warm.      Findings: No erythema or rash.   Neurological:      Mental Status: She is alert and oriented to person, place, and time.      Cranial Nerves: No cranial nerve deficit.      Coordination: Coordination normal.   Psychiatric:         Behavior: Behavior normal.         Thought Content: Thought content normal.         Judgment: Judgment normal.            Assessment/Plan    1) GYN HM: Check pap smear.   2) Hx of left breast cancer: 2003 with left mastectomy. S/p chemo. She is not on any hormone blocking agents. S/p MMG/US of right breast 11/2020  3) Bone health - Weight bearing exercise, dietary calcium recommendations and vitamin D reviewed.  Patient had a recent fall with fracture of her left fibula and left femur bone.  Check bone density.  4) Diet and Exercise discussed-patient is not ambulatory.  She is in a wheelchair.  5) Smoking Status: nonsmoker  6) Social: Patient is the daughter of Monica Espinoza  7) Atrial fibrillation: on anticoagulation  8)  Hemorrhoid: Patient has 2 large anal skin tags at the 12 and 6:00 region of the anus.  They are not bothering the patient at this time.  Discussed with her daughter that if these fill up with blood and cause the patient discomfort then we will send her to colorectal.  9) Vaginal cyst: Small sebaceous cyst was noted but no large vaginal cyst noted.  10) Follow up prn and 1 year       Diagnoses and all orders for this visit:    Pap smear, low-risk  -     Cancel: IgP, Aptima HPV  -     Pap IG (Image Guided)    Screening for unspecified condition  -     POC Urinalysis Dipstick    Osteoporosis screening  -     DEXA Bone Density Axial; Future    Abnormal findings on diagnostic imaging of limbs   -     DEXA Bone Density Axial; Future    Routine gynecological examination  -     Cancel: IgP, Aptima HPV  -     Pap IG (Image Guided)    Special screening examination for human papillomavirus (HPV)    Unspecified abnormal cytological findings in specimens from cervix uteri   -     Cancel: IgP, Aptima HPV        Emily Vicente DO  3/17/2021  13:40 EDT

## 2021-03-16 NOTE — PROGRESS NOTES
"Chief Complaint  Urinary Tract Infection (F/u from UTI) and Dizziness    Subjective          Kanwal Sauer presents to [facility]  History of Present Illness came here for follow-up on hypertension, diabetes type 2, hyperlipidemia anxiety and depression.  Patient was admitted in rehab secondary to left femur fracture and fall.  Patient was discharged from rehab on March 6.  History taken from patient and her daughter.  As per patient and her daughter her medications list does not correct..  While she was in rehab she did not receive her diabetic medication.  She is also not taking any diuretic.  Patient is also complaining of dizziness.  Her dizziness is started since her fall last year December.  Patient is unable to describe her dizziness but daughter thinks she gets dizzy when she gets up suddenly.  They have seen the neurology also and neurology thinks it is secondary to postconcussion.  Patient is also complaining of burning and foul odor in urine.  She has history of recurrent UTI.        Objective   Vital Signs:   /74   Pulse 54   Temp 96.4 °F (35.8 °C)   Ht 172.7 cm (68\")   Wt 100 kg (221 lb)   SpO2 97%   BMI 33.60 kg/m²     Physical Exam  Constitutional:       General: She is not in acute distress.     Appearance: Normal appearance. She is well-developed.   HENT:      Head: Normocephalic and atraumatic.      Right Ear: Tympanic membrane normal.      Left Ear: Tympanic membrane normal.      Nose: Nose normal.      Mouth/Throat:      Mouth: Mucous membranes are moist.      Pharynx: Oropharynx is clear.   Eyes:      General:         Right eye: No discharge.         Left eye: No discharge.      Extraocular Movements: Extraocular movements intact.      Pupils: Pupils are equal, round, and reactive to light.   Cardiovascular:      Rate and Rhythm: Normal rate and regular rhythm.      Pulses: Normal pulses.      Heart sounds: Normal heart sounds.   Pulmonary:      " Effort: Pulmonary effort is normal.      Breath sounds: Normal breath sounds. No wheezing or rales.   Abdominal:      General: Bowel sounds are normal.      Palpations: Abdomen is soft. There is no mass.      Tenderness: There is no abdominal tenderness.   Musculoskeletal:         General: No swelling.      Cervical back: Normal range of motion and neck supple.      Right lower leg: No edema.      Left lower leg: No edema.      Comments: ambulating on wheel chair    Lymphadenopathy:      Cervical: No cervical adenopathy.   Neurological:      General: No focal deficit present.      Mental Status: She is alert and oriented to person, place, and time.        Result Review :       Data reviewed: Radiologic studies ct scan, spne , abdomen, head , nucleascan          Assessment and Plan    Diagnoses and all orders for this visit:    1. Type 2 diabetes mellitus with hyperglycemia, without long-term current use of insulin (CMS/[facility]) (Primary)  -     Comprehensive Metabolic Panel  -     metFORMIN (GLUCOPHAGE) 1000 MG tablet; Take 1 tablet by mouth 2 (Two) Times a Day With Meals.  Dispense: 180 tablet; Refill: 0  -     Hemoglobin A1c    2. Dysuria  -     POC Urinalysis Dipstick, Automated  -     Urine Culture - Urine, Urine, Clean Catch    3. Essential hypertension    4. Mixed hyperlipidemia  -     Lipid Panel    5. PAF (paroxysmal atrial fibrillation) (CMS/[facility])  -     CBC & Differential    6. Other fatigue  -     CBC & Differential  -     TSH+Free T4    7. Dizziness      Kanwal Sauer is a 70-year-old female patient with recent history of closed fracture of left femur, recently discharged from rehab.  Patient is complaining of dizziness.  She is also not taking her medication as prescribed of the confused about the medication.  I advised her daughter to bring her medication with them.  I will l check baseline labs CMP, lipids hemoglobin A1c, CBC and TSH and T4.  Before her admission to rehab she was on Metformin I will  restart her on Metformin.  Also advised her daughter to keep a blood pressure log.  She is also complaining of dysuria.  We checked her urine today.  Her urine was positive for protein and trace leukocytes we will send it for urine culture.  Follow-up after labs in 1 week.      Follow Up   No follow-ups on file.  Patient was given instructions and counseling regarding her condition or for health maintenance advice. Please see specific information pulled into the AVS if appropriate.

## 2021-03-18 ENCOUNTER — TELEPHONE (OUTPATIENT)
Dept: FAMILY MEDICINE CLINIC | Facility: CLINIC | Age: 71
End: 2021-03-18

## 2021-03-18 LAB
BACTERIA UR CULT: NORMAL
BACTERIA UR CULT: NORMAL

## 2021-03-18 NOTE — TELEPHONE ENCOUNTER
That why I want to her here , what blood pressure medication she is taking , I though I talked to them to schedule appointment so we can fix her  medication

## 2021-03-18 NOTE — TELEPHONE ENCOUNTER
ANUJA STATES THAT THE PATIENT HAS BEEN COMPLAINING OF A HEADACHE ASSOCIATED WITH ELEVATED BLOOD PRESSURE. SHE STATES THAT WHILE THERE THE PATIENTS BLOOP PRESSURE /98.    ANUJA CAN BE REACHED AT  [phone]

## 2021-03-19 ENCOUNTER — OFFICE VISIT (OUTPATIENT)
Dept: FAMILY MEDICINE CLINIC | Facility: CLINIC | Age: 71
End: 2021-03-19

## 2021-03-19 VITALS
DIASTOLIC BLOOD PRESSURE: 70 MMHG | OXYGEN SATURATION: 92 % | HEIGHT: 68 IN | SYSTOLIC BLOOD PRESSURE: 138 MMHG | TEMPERATURE: 97.8 F | BODY MASS INDEX: 33.49 KG/M2 | WEIGHT: 221 LBS | HEART RATE: 62 BPM

## 2021-03-19 DIAGNOSIS — E11.51 TYPE 2 DIABETES MELLITUS WITH DIABETIC PERIPHERAL ANGIOPATHY WITHOUT GANGRENE, WITHOUT LONG-TERM CURRENT USE OF INSULIN (HCC): Primary | ICD-10-CM

## 2021-03-19 DIAGNOSIS — I48.0 PAROXYSMAL ATRIAL FIBRILLATION (HCC): ICD-10-CM

## 2021-03-19 DIAGNOSIS — I10 ESSENTIAL HYPERTENSION: ICD-10-CM

## 2021-03-19 LAB
CYTOLOGIST CVX/VAG CYTO: NORMAL
CYTOLOGY CVX/VAG DOC CYTO: NORMAL
CYTOLOGY CVX/VAG DOC THIN PREP: NORMAL
DX ICD CODE: NORMAL
HIV 1 & 2 AB SER-IMP: NORMAL
OTHER STN SPEC: NORMAL
PATHOLOGIST CVX/VAG CYTO: NORMAL
STAT OF ADQ CVX/VAG CYTO-IMP: NORMAL

## 2021-03-19 PROCEDURE — 99214 OFFICE O/P EST MOD 30 MIN: CPT | Performed by: FAMILY MEDICINE

## 2021-03-19 RX ORDER — FUROSEMIDE 40 MG/1
40 TABLET ORAL DAILY
Qty: 90 TABLET | Refills: 0
Start: 2021-03-19 | End: 2022-09-08

## 2021-03-29 NOTE — PROGRESS NOTES
"Chief Complaint  fup labs (done last week), Diabetes, and Hypertension    Subjective          Kanwal Sauer presents to [facility]  History of Present Illness  Kanwal Sauer is a 70-year-old female patient came here for follow-up from last week secondary to diabetes type 2 hypertension and dizziness.  Patient was recently discharged from rehab .  Family and patient is confused about all her medications.  Her blood pressure is elevated as she is not taking the medication before discharge.  Patient came with her daughter who is also her caregiver.  Daughter wants to take the updated list of her medicine.  Objective   Vital Signs:   /70   Pulse 62   Temp 97.8 °F (36.6 °C)   Ht 172.7 cm (68\")   Wt 100 kg (221 lb)   SpO2 92%   BMI 33.60 kg/m²     Physical Exam  Constitutional:       General: She is not in acute distress.     Appearance: Normal appearance. She is well-developed.   HENT:      Head: Normocephalic and atraumatic.      Right Ear: Tympanic membrane normal.      Left Ear: Tympanic membrane normal.      Mouth/Throat:      Mouth: Mucous membranes are moist.   Eyes:      General:         Right eye: No discharge.         Left eye: No discharge.      Extraocular Movements: Extraocular movements intact.      Pupils: Pupils are equal, round, and reactive to light.   Cardiovascular:      Rate and Rhythm: Normal rate and regular rhythm.      Heart sounds: Normal heart sounds.   Pulmonary:      Effort: Pulmonary effort is normal.      Breath sounds: Normal breath sounds. No wheezing or rales.   Abdominal:      General: Bowel sounds are normal.      Palpations: Abdomen is soft. There is no mass.      Tenderness: There is no abdominal tenderness.   Musculoskeletal:      Cervical back: Normal range of motion and neck supple.      Right lower leg: No edema.      Left lower leg: No edema.   Lymphadenopathy:      Cervical: No cervical adenopathy.   Neurological:      General: " No focal deficit present.      Mental Status: She is alert and oriented to person, place, and time.        Result Review :     CMP    CMP 12/29/20 2/3/21 3/16/21   Glucose 162 (A) 196 (A) 150 (A)   BUN 21 16 23   Creatinine 0.69 0.77 0.80   eGFR Non African Am 84 74 71   Sodium 137 139 142   Potassium 4.0 4.1 4.2   Chloride 98 98 105   Calcium 9.5 10.1 10.3   Albumin  3.90 4.40   Total Bilirubin  0.4 0.3   Alkaline Phosphatase  100 75   AST (SGOT)  14 14   ALT (SGPT)  5 8   (A) Abnormal value            CBC    CBC 12/29/20 2/3/21 3/16/21   WBC  4.41 4.54   RBC  3.74 (A) 4.09   Hemoglobin 10.1 (A) 11.2 (A) 12.0   Hematocrit 29.8 (A) 34.5 36.5   MCV  92.2 89.2   MCH  29.9 29.3   MCHC  32.5 32.9   RDW  15.2 15.5 (A)   Platelets  149 136 (A)   (A) Abnormal value            TSH    TSH 2/3/21 3/16/21   TSH 1.870 1.570           Most Recent A1C    HGBA1C Most Recent 3/16/21   Hemoglobin A1C 6.60 (A)   (A) Abnormal value                      Assessment and Plan    Diagnoses and all orders for this visit:    1. Type 2 diabetes mellitus with diabetic peripheral angiopathy without gangrene, without long-term current use of insulin (CMS/[facility]) (Primary)    2. Essential hypertension    3. Paroxysmal atrial fibrillation (CMS/[facility])    Other orders  -     furosemide (LASIX) 40 MG tablet; Take 1 tablet by mouth Daily. 1/2 po qd as needed for swelling  Dispense: 90 tablet; Refill: 0  -     mometasone-formoterol (DULERA 100) 100-5 MCG/ACT inhaler; Inhale 2 puffs 2 (Two) Times a Day.  Dispense: 1 each; Refill: 3    Kanwal Sauer is a 70-year-old female patient came here for follow-up on her labs and  Diabetes type 2, hemoglobin A1c 6.6 at goal.  Medication reviewed with patient.  Medically she is at goal I will just continue Metformin 100 mg twice a day.  I advised patient and daughter to stop taking Actos.  We will recheck hemoglobin A1c in 4 months.  Hypertension, controlled, medication discussed with patient.  She will continue  losartan 50 mg once a day.  She will continue metoprolol 25 mg twice a day.  She will take Lasix half p.o. daily as needed for bilateral leg edema .  A. fib, patient is on rate control and Eliquis 5 mg twice daily.  Continue same.  She moved from [place] to [place] last year has not establish care with the cardiology here.      Follow Up   No follow-ups on file.  Patient was given instructions and counseling regarding her condition or for health maintenance advice. Please see specific information pulled into the AVS if appropriate.

## 2021-04-02 ENCOUNTER — TELEPHONE (OUTPATIENT)
Dept: FAMILY MEDICINE CLINIC | Facility: CLINIC | Age: 71
End: 2021-04-02

## 2021-04-02 NOTE — TELEPHONE ENCOUNTER
HUB OK TO READ AND SCHED AND APPT    Trinity Peguero [phone] with home health called and stated that the pt has and abscess on the back of her head and that she has a question about her dulera. I called the pt no answer and lvm to make an appt.

## 2021-04-06 RX ORDER — APIXABAN 5 MG/1
TABLET, FILM COATED ORAL
Qty: 60 TABLET | Refills: 2 | Status: SHIPPED | OUTPATIENT
Start: 2021-04-06 | End: 2021-07-07

## 2021-04-20 ENCOUNTER — OFFICE VISIT (OUTPATIENT)
Dept: FAMILY MEDICINE CLINIC | Facility: CLINIC | Age: 71
End: 2021-04-20

## 2021-04-20 VITALS
HEIGHT: 68 IN | BODY MASS INDEX: 33.49 KG/M2 | TEMPERATURE: 97.7 F | SYSTOLIC BLOOD PRESSURE: 109 MMHG | DIASTOLIC BLOOD PRESSURE: 63 MMHG | WEIGHT: 221 LBS | OXYGEN SATURATION: 93 % | HEART RATE: 53 BPM

## 2021-04-20 DIAGNOSIS — S00.03XD HEMATOMA OF SCALP, SUBSEQUENT ENCOUNTER: Primary | ICD-10-CM

## 2021-04-20 PROCEDURE — 99213 OFFICE O/P EST LOW 20 MIN: CPT | Performed by: FAMILY MEDICINE

## 2021-04-25 NOTE — PROGRESS NOTES
"Chief Complaint  Fall (Pt fell and hit her head back in december 2019, it has gone down but still giving her issues) and Head Injury    Subjective          Kanwal Sauer presents to [facility]  History of Present Illness with history of persistent pain and tenderness.  Patient had fallen and hit her head in December 2020.  She also had a scalp laceration stitched s/p repair, patient continued to have pain and tenderness at the sides of the scalp laceration.  Denies any drainage or discharge.  No abdominal distention she complained of pain due to combing her hair few days ago..  She also noticed swelling of the same side.  Daughter is also giving history of recent fall again and she hit the same side  had the same side few weeks ago.  Ortho her pain has improved since yesterday.    Objective   Vital Signs:   /63   Pulse 53   Temp 97.7 °F (36.5 °C)   Ht 172.7 cm (68\")   Wt 100 kg (221 lb) Comment: PT unable to weight  SpO2 93%   BMI 33.60 kg/m²     Physical Exam  Constitutional:       General: She is not in acute distress.     Appearance: Normal appearance. She is well-developed.   HENT:      Head: Normocephalic.      Comments: Wound  site ,no drainage no redness , no gapping   There is greenish pigmentation of surrounding skin     Right Ear: Tympanic membrane normal.      Left Ear: Tympanic membrane normal.      Mouth/Throat:      Mouth: Mucous membranes are moist.   Eyes:      General:         Right eye: No discharge.         Left eye: No discharge.      Extraocular Movements: Extraocular movements intact.      Pupils: Pupils are equal, round, and reactive to light.   Cardiovascular:      Rate and Rhythm: Normal rate and regular rhythm.      Heart sounds: Normal heart sounds.   Pulmonary:      Effort: Pulmonary effort is normal.      Breath sounds: Normal breath sounds. No wheezing or rales.   Abdominal:      General: Bowel sounds are normal.      Palpations: Abdomen is " soft. There is no mass.      Tenderness: There is no abdominal tenderness.   Musculoskeletal:      Cervical back: Normal range of motion and neck supple.      Right lower leg: No edema.      Left lower leg: No edema.   Lymphadenopathy:      Cervical: No cervical adenopathy.   Neurological:      General: No focal deficit present.      Mental Status: She is alert and oriented to person, place, and time.        Result Review :                 Assessment and Plan    Diagnoses and all orders for this visit:    1. Hematoma of scalp, subsequent encounter (Primary)    Kanwal Sauer is a 70-year-old female patient seen today for    Hematoma of scalp, tenderness pain has improved.  She has not reinjured at the same location, reassurance given to patient.  I did not appreciate any swelling on exam but there is a discoloration of overlying scalp the skin which looks like resolving small hematoma.  Because of small hematoma discussed with patient and her daughter.  Maybe she is injured her arm when she had a second fall she had it at the same place.  She is hemodynamically and mentally stable.  We will not do any imaging.        Follow Up   No follow-ups on file.  Patient was given instructions and counseling regarding her condition or for health maintenance advice. Please see specific information pulled into the AVS if appropriate.

## 2021-05-25 ENCOUNTER — OFFICE VISIT (OUTPATIENT)
Dept: NEUROLOGY | Facility: CLINIC | Age: 71
End: 2021-05-25

## 2021-05-25 VITALS
DIASTOLIC BLOOD PRESSURE: 82 MMHG | BODY MASS INDEX: 33.49 KG/M2 | OXYGEN SATURATION: 91 % | WEIGHT: 221 LBS | HEART RATE: 52 BPM | SYSTOLIC BLOOD PRESSURE: 138 MMHG | HEIGHT: 68 IN

## 2021-05-25 DIAGNOSIS — Z87.898 HISTORY OF SEIZURE: ICD-10-CM

## 2021-05-25 DIAGNOSIS — Z91.81 RISK FOR FALLS: ICD-10-CM

## 2021-05-25 DIAGNOSIS — R25.1 TREMORS OF NERVOUS SYSTEM: Primary | ICD-10-CM

## 2021-05-25 PROCEDURE — 99215 OFFICE O/P EST HI 40 MIN: CPT | Performed by: NURSE PRACTITIONER

## 2021-05-25 NOTE — PROGRESS NOTES
DOS: 2021  NAME: Kanwal Sauer   : 1950  PCP: Nisha Britton MD    Chief Complaint   Patient presents with   • Seizures   • Tremors      SUBJECTIVE  Neurological Problem:  70 y.o. RHW female with seizures, essential tremor, HTN, DM, HLD, Afib (on Eliqius), TARSHA, Asthma on nocturnal O2, chronic UTIs and h/o breast CA (s/p left mastectomy) who presents for f/u. She is accopanied by her daughter Monica. Patient and problem are new to examiner. History is provided by patient and daughter and review of records that are summarized below.     Interval History:   Ms. Sauer was initially evaluated by Dr. Washington in 2020 for history of seizure and benign essential tremor.  Apparently in 2020, while she was living in [place], she had a seizure-like episode, in the setting of taking baclofen for muscle spasms, reportedly work-up included an EEG and other brain imaging as well as lumbar puncture and reportedly had what sounds to be a viral meningitis. She was not placed on any AEDs.      She had a fall in Dec. 2020, hitting back of head, no LOC but did suffer LLE fracture and contusion to the head, CT head negative for hemorrhage.  Review of labs from 2021 shows a hemoglobin A1c of 6.6; TSH 1.57; CMP with glucose at 150 otherwise unremarkable; CBC unremarkable.  He had another fall in February while at rehab, again her CT head was negative for acute findings, hemorrhage.      She presents today, now lives with her daughter (along with her spouse and handicapped daughter), originally from [place]. She was at the rehab facility, the [facility], had a fall  just prior to discharge, has not fallen since she got home. Has PT coming to the house, uses her walker.  Denies any seizure-like episodes.  She continues on primidone. Tremor is better per patient, writing is better, easier to eat. Tremor in the left as well as head. No other changes in her health.     No history of stroke, positive for  h/o seizures and possibly viral meningitis.   She has a daughter that was mentally handicapped.   Youngest sister has dementia.     Review of Systems:Review of Systems   Constitutional: Positive for fatigue. Negative for activity change and appetite change.   HENT: Negative for ear pain, tinnitus and trouble swallowing.    Eyes: Negative for photophobia, pain and visual disturbance.   Musculoskeletal: Positive for gait problem and neck pain. Negative for back pain.   Neurological: Positive for tremors and headaches. Negative for dizziness, seizures (June 2020), syncope, facial asymmetry, speech difficulty, weakness, light-headedness and numbness.   Psychiatric/Behavioral: Positive for sleep disturbance. Negative for agitation, behavioral problems, confusion, decreased concentration, dysphoric mood, hallucinations, self-injury and suicidal ideas. The patient is not nervous/anxious and is not hyperactive.     Above ROS reviewed    The following portions of the patient's history were reviewed and updated as appropriate: allergies, current medications, past family history, past medical history, past social history, past surgical history and problem list.    Current Medications:   Current Outpatient Medications:   •  acetaminophen (TYLENOL) 325 MG tablet, Take 2 tablets by mouth Every 4 (Four) Hours As Needed for Mild Pain ., Disp:  , Rfl:   •  citalopram (CeleXA) 20 MG tablet, TAKE 1 TABLET BY MOUTH EVERY DAY, Disp: 90 tablet, Rfl: 0  •  Eliquis 5 MG tablet tablet, TAKE 1 TABLET BY MOUTH EVERY 12 HOURS, Disp: 60 tablet, Rfl: 2  •  fenofibrate 160 MG tablet, , Disp: , Rfl:   •  furosemide (LASIX) 40 MG tablet, Take 1 tablet by mouth Daily. 1/2 po qd as needed for swelling, Disp: 90 tablet, Rfl: 0  •  losartan (COZAAR) 50 MG tablet, TAKE 1 TABLET BY MOUTH EVERY DAY, Disp: 90 tablet, Rfl: 1  •  metFORMIN (GLUCOPHAGE) 1000 MG tablet, Take 1 tablet by mouth 2 (Two) Times a Day With Meals., Disp: 180 tablet, Rfl: 0  •   metoprolol tartrate (LOPRESSOR) 25 MG tablet, Take 0.5 tablets by mouth 2 (Two) Times a Day. (Patient taking differently: Take 25 mg by mouth 2 (two) times a day.), Disp:  , Rfl:   •  mometasone-formoterol (DULERA 100) 100-5 MCG/ACT inhaler, Inhale 2 puffs 2 (Two) Times a Day., Disp: 1 each, Rfl: 3  •  montelukast (SINGULAIR) 10 MG tablet, TAKE 1 TABLET BY MOUTH EVERY DAY AT NIGHT, Disp: 90 tablet, Rfl: 0  •  pioglitazone (ACTOS) 15 MG tablet, TAKE 1 TABLET BY MOUTH EVERY DAY, Disp: 90 tablet, Rfl: 0  •  polyethylene glycol (polyethylene glycol) 17 g packet, Take 17 g by mouth Daily., Disp:  , Rfl:   •  potassium chloride (K-DUR,KLOR-CON) 10 MEQ CR tablet, Take 10 mEq by mouth 1 (One) Time As Needed. Only take with lasix, Disp: , Rfl:   •  primidone (MYSOLINE) 50 MG tablet, TAKE 1 TABLET BY MOUTH THREE TIMES A DAY, Disp: 270 tablet, Rfl: 0  •  Probiotic Product (PROBIOTIC-10 PO), Take  by mouth., Disp: , Rfl:   **I did not stop or change the above medications.  Patient's medication list was updated to reflect medications they have reported as currently taking, including medication changes made by other providers.    OBJECTIVE  Vitals:    05/25/21 1026   BP: 138/82   Pulse: 52   SpO2: 91%     Body mass index is 33.61 kg/m².    Diagnostics:  CT head 12/23/2020:IMPRESSION:  No evidence of fracture or intracranial hemorrhage. There is  a moderate size scalp hematoma in the left parieto-occipital region.  CT head 2/3/2021: CT HEAD WITHOUT CONTRAST: The brain and ventricles are symmetrical.  There is no evidence of hemorrhage, hydrocephalus or of abnormal  extra-axial fluid. No focal area of decreased attenuation to suggest  acute infarction is identified. Bone windows showed no evidence of a  calvarial fracture. Moderate vascular calcification is noted.IMPRESSION:  No evidence of fracture or of intracranial hemorrhage.    Laboratory Results:         Lab Results   Component Value Date    WBC 4.54 03/16/2021    HGB 12.0  03/16/2021    HCT 36.5 03/16/2021    MCV 89.2 03/16/2021     (L) 03/16/2021     Lab Results   Component Value Date    GLUCOSE 150 (H) 03/16/2021    BUN 23 03/16/2021    CREATININE 0.80 03/16/2021    EGFRIFNONA 71 03/16/2021    BCR 28.8 (H) 03/16/2021    K 4.2 03/16/2021    CO2 27.3 03/16/2021    CALCIUM 10.3 03/16/2021    ALBUMIN 4.40 03/16/2021    AST 14 03/16/2021    ALT 8 03/16/2021     Lab Results   Component Value Date    HGBA1C 6.60 (H) 03/16/2021     Lab Results   Component Value Date    CHOL 180 03/16/2021    CHOL 164 10/02/2020     Lab Results   Component Value Date    HDL 60 03/16/2021    HDL 71 (H) 10/02/2020     Lab Results   Component Value Date     (H) 03/16/2021    LDL 77 10/02/2020     Lab Results   Component Value Date    TRIG 95 03/16/2021    TRIG 81 10/02/2020     No results found for: RPR  Lab Results   Component Value Date    TSH 1.570 03/16/2021     No results found for: [id number]    Physical Examination:   General Appearance:   Well developed, overweight, well groomed, alert, and cooperative.  HEENT: Normocephalic.    Neck and Spine: Normal range of motion.  Normal alignment. No mass or tenderness.   Cardiac: Regular rate and rhythm.   Peripheral Vasculature: No signs of distal embolization.  Extremities:    No edema or deformities. Some decreased ROM of shoulder bilaterally.   Skin:    No rashes or birth marks.  Psychiatric:    Good mood, normal affect. Thought process normal.    Neurological examination:  Higher Integrative  Function: Oriented to time, place and person. Normal registration, recall (3/3 with 2 clues), attention span and concentration. Normal language including comprehension, spontaneous speech, naming and vocabulary. No neglect. Normal fund of knowledge and higher integrative function.  CN II: Pupils are equal, round, and reactive to light. Normal visual acuity and visual fields.    CN III IV VI: Extraocular movements are full without nystagmus.   CN V: Normal  facial sensation and strength of muscles of mastication.  CN VII: Facial movements are symmetric. No weakness.  CN VIII:   Auditory acuity is normal.  CN IX & X:   Symmetric palatal movement.  CN XI: Sternocleidomastoid and trapezius are normal.  No weakness.  CN XII:   The tongue is midline.  No atrophy or fasciculations.  Motor: Normal muscle strength, bulk and tone in upper and lower extremities except for decreased hip flexor strength bilaterally.  Postural tremor in BUE along with mild cephalic tremor. No rigidity. No resting tremor   Sensation: Normal to light touch, vibration, temperature in arms and legs.   Station and Gait: Currently in wheelchair, deferred gait testing d/t safety  Coordination: Finger to nose test shows no dysmetria.  Heel to shin normal.    Impression/Plan:  Ms. Camacho gardner was initially evaluated by Dr. Washington for h/o provoked seizure and essential tremor. She has not been on any AEDs, she has had no further seizure-like episodes.  In regards to tremor, symptoms are fairly well controlled on current dose of primidone, will continue the same.  We reviewed the importance of falls risk prevention.  She will follow-up here in 6 months, sooner if symptoms warrant.    I spent a total of 45 minutes today in reviewing records, prior diagnostics, examination of patient as well as counseling and educating patient regarding diagnoses, symptoms, reviewing diagnostics with patient, pharmacologic treatment options including purpose, risk, benefits, possible side-effects, recommendations, lifestyle modifications, coordination of care and documenting plan of care.    \  There are no diagnoses linked to this encounter.    Coding      Dictated using Dragon

## 2021-06-17 DIAGNOSIS — E11.65 TYPE 2 DIABETES MELLITUS WITH HYPERGLYCEMIA, WITHOUT LONG-TERM CURRENT USE OF INSULIN (HCC): ICD-10-CM

## 2021-06-22 ENCOUNTER — OFFICE VISIT (OUTPATIENT)
Dept: FAMILY MEDICINE CLINIC | Facility: CLINIC | Age: 71
End: 2021-06-22

## 2021-06-22 VITALS
WEIGHT: 225 LBS | BODY MASS INDEX: 34.1 KG/M2 | DIASTOLIC BLOOD PRESSURE: 66 MMHG | SYSTOLIC BLOOD PRESSURE: 150 MMHG | TEMPERATURE: 96.4 F | HEART RATE: 61 BPM | OXYGEN SATURATION: 93 % | HEIGHT: 68 IN

## 2021-06-22 DIAGNOSIS — E11.65 TYPE 2 DIABETES MELLITUS WITH HYPERGLYCEMIA, WITHOUT LONG-TERM CURRENT USE OF INSULIN (HCC): ICD-10-CM

## 2021-06-22 DIAGNOSIS — L27.0 DERMATITIS DUE TO DRUG: Primary | ICD-10-CM

## 2021-06-22 DIAGNOSIS — I10 ESSENTIAL HYPERTENSION: ICD-10-CM

## 2021-06-22 PROCEDURE — 96372 THER/PROPH/DIAG INJ SC/IM: CPT | Performed by: FAMILY MEDICINE

## 2021-06-22 PROCEDURE — 99214 OFFICE O/P EST MOD 30 MIN: CPT | Performed by: FAMILY MEDICINE

## 2021-06-22 RX ORDER — DOXYCYCLINE 100 MG/1
CAPSULE ORAL
COMMUNITY
Start: 2021-06-05 | End: 2021-06-22 | Stop reason: ALTCHOICE

## 2021-06-22 RX ORDER — METHYLPREDNISOLONE ACETATE 40 MG/ML
40 INJECTION, SUSPENSION INTRA-ARTICULAR; INTRALESIONAL; INTRAMUSCULAR; SOFT TISSUE ONCE
Status: COMPLETED | OUTPATIENT
Start: 2021-06-22 | End: 2021-06-22

## 2021-06-22 RX ADMIN — METHYLPREDNISOLONE ACETATE 40 MG: 40 INJECTION, SUSPENSION INTRA-ARTICULAR; INTRALESIONAL; INTRAMUSCULAR; SOFT TISSUE at 13:42

## 2021-06-22 NOTE — PROGRESS NOTES
"Chief Complaint  Diabetes (f/u for DM. Has rash on chest and back since starting Doxy. Has d/c) and Rash    Subjective          Kanwal Sauer presents to [facility]  History of Present Illness  Came here for follow-up on hypertension and diabetes type 2.  Blood pressure is running high.  She recently has visited urology and her blood pressure was high there but denies any headache blurring of vision or chest pain.  Patient lives with her daughter who is also her caretaker.  She also has seen dermatology recently and is started on doxycycline.  Complaining of pruritic generalized rash mainly on upper chest wall after taking doxycycline.  Daughter is giving her Benadryl for itching was not helping.  She is not able to sleep last night because of increased itching.  Patient with long history of diabetes,checking sugar at home.  Sugar running in 140s fasting.  Denies polyuria, polydipsia or blurring of vision.   Objective   Vital Signs:   /66   Pulse 61   Temp 96.4 °F (35.8 °C)   Ht 172.7 cm (68\")   Wt 102 kg (225 lb)   SpO2 93%   BMI 34.21 kg/m²     Physical Exam  Constitutional:       General: She is not in acute distress.     Appearance: Normal appearance. She is well-developed.   HENT:      Head: Normocephalic and atraumatic.      Right Ear: Tympanic membrane normal.      Left Ear: Tympanic membrane normal.      Mouth/Throat:      Mouth: Mucous membranes are moist.   Eyes:      General:         Right eye: No discharge.         Left eye: No discharge.      Extraocular Movements: Extraocular movements intact.      Pupils: Pupils are equal, round, and reactive to light.   Cardiovascular:      Rate and Rhythm: Normal rate and regular rhythm.      Heart sounds: Normal heart sounds.   Pulmonary:      Effort: Pulmonary effort is normal.      Breath sounds: Normal breath sounds. No wheezing or rales.   Abdominal:      General: Bowel sounds are normal.      Palpations: Abdomen " is soft. There is no mass.      Tenderness: There is no abdominal tenderness.   Musculoskeletal:      Cervical back: Normal range of motion and neck supple.      Right lower leg: No edema.      Left lower leg: No edema.   Lymphadenopathy:      Cervical: No cervical adenopathy.   Skin:     Findings: Rash present.      Comments: macular papular  rash on anterior chest wall and abdomen   Neurological:      General: No focal deficit present.      Mental Status: She is alert and oriented to person, place, and time.        Result Review :     A1C Last 3 Results    HGBA1C Last 3 Results 10/2/20 12/24/20 3/16/21   Hemoglobin A1C 5.67 (A) 5.50 6.60 (A)   (A) Abnormal value                      Assessment and Plan    Diagnoses and all orders for this visit:    1. Dermatitis due to drug (Primary)    2. Type 2 diabetes mellitus with hyperglycemia, without long-term current use of insulin (CMS/[facility])  -     Hemoglobin A1c  -     Comprehensive Metabolic Panel    3. Essential hypertension       Kanwal Sauer is a 70-year-old female patient came here for follow-up on  Diabetes type 2, controlled.  Last hemoglobin was 6.6.  We will recheck hemoglobin A1c today.  Continue same.  Hypertension, uncontrolled, medication discussed with patient and her daughter again and found out that she is not taking losartan.  I advised her to restart taking losartan.  Dermatitis, likely secondary to drug reaction.  We will give her Depo-Medrol 40 mg IM x1.        Follow Up   No follow-ups on file.  Patient was given instructions and counseling regarding her condition or for health maintenance advice. Please see specific information pulled into the AVS if appropriate.

## 2021-06-23 LAB
ALBUMIN SERPL-MCNC: 4.7 G/DL (ref 3.5–5.2)
ALBUMIN/GLOB SERPL: 2.5 G/DL
ALP SERPL-CCNC: 66 U/L (ref 39–117)
ALT SERPL-CCNC: 18 U/L (ref 1–33)
AST SERPL-CCNC: 17 U/L (ref 1–32)
BILIRUB SERPL-MCNC: 0.5 MG/DL (ref 0–1.2)
BUN SERPL-MCNC: 21 MG/DL (ref 8–23)
BUN/CREAT SERPL: 24.1 (ref 7–25)
CALCIUM SERPL-MCNC: 10.6 MG/DL (ref 8.6–10.5)
CHLORIDE SERPL-SCNC: 102 MMOL/L (ref 98–107)
CO2 SERPL-SCNC: 35 MMOL/L (ref 22–29)
CREAT SERPL-MCNC: 0.87 MG/DL (ref 0.57–1)
GLOBULIN SER CALC-MCNC: 1.9 GM/DL
GLUCOSE SERPL-MCNC: 97 MG/DL (ref 65–99)
HBA1C MFR BLD: 5.5 % (ref 4.8–5.6)
POTASSIUM SERPL-SCNC: 4.9 MMOL/L (ref 3.5–5.2)
PROT SERPL-MCNC: 6.6 G/DL (ref 6–8.5)
SODIUM SERPL-SCNC: 144 MMOL/L (ref 136–145)

## 2021-06-30 ENCOUNTER — TELEPHONE (OUTPATIENT)
Dept: FAMILY MEDICINE CLINIC | Facility: CLINIC | Age: 71
End: 2021-06-30

## 2021-07-02 ENCOUNTER — OFFICE VISIT (OUTPATIENT)
Dept: FAMILY MEDICINE CLINIC | Facility: CLINIC | Age: 71
End: 2021-07-02

## 2021-07-02 VITALS
SYSTOLIC BLOOD PRESSURE: 126 MMHG | HEIGHT: 68 IN | OXYGEN SATURATION: 95 % | HEART RATE: 63 BPM | DIASTOLIC BLOOD PRESSURE: 60 MMHG | WEIGHT: 213.5 LBS | TEMPERATURE: 96.3 F | BODY MASS INDEX: 32.36 KG/M2

## 2021-07-02 DIAGNOSIS — L27.0 DERMATITIS DUE TO DRUG: ICD-10-CM

## 2021-07-02 DIAGNOSIS — N30.01 ACUTE CYSTITIS WITH HEMATURIA: Primary | ICD-10-CM

## 2021-07-02 DIAGNOSIS — R32 URINARY INCONTINENCE, UNSPECIFIED TYPE: ICD-10-CM

## 2021-07-02 LAB
BILIRUB BLD-MCNC: NEGATIVE MG/DL
CLARITY, POC: ABNORMAL
COLOR UR: YELLOW
GLUCOSE UR STRIP-MCNC: NEGATIVE MG/DL
KETONES UR QL: NEGATIVE
LEUKOCYTE EST, POC: ABNORMAL
NITRITE UR-MCNC: POSITIVE MG/ML
PH UR: 7.5 [PH] (ref 5–8)
PROT UR STRIP-MCNC: ABNORMAL MG/DL
RBC # UR STRIP: ABNORMAL /UL
SP GR UR: 1.01 (ref 1–1.03)
UROBILINOGEN UR QL: ABNORMAL

## 2021-07-02 PROCEDURE — 81003 URINALYSIS AUTO W/O SCOPE: CPT | Performed by: FAMILY MEDICINE

## 2021-07-02 PROCEDURE — 99214 OFFICE O/P EST MOD 30 MIN: CPT | Performed by: FAMILY MEDICINE

## 2021-07-02 RX ORDER — ALBUTEROL SULFATE 90 UG/1
2 AEROSOL, METERED RESPIRATORY (INHALATION) EVERY 4 HOURS PRN
Qty: 18 G | Refills: 1 | Status: SHIPPED | OUTPATIENT
Start: 2021-07-02 | End: 2022-03-30

## 2021-07-02 RX ORDER — SULFAMETHOXAZOLE AND TRIMETHOPRIM 800; 160 MG/1; MG/1
1 TABLET ORAL 2 TIMES DAILY
Qty: 10 TABLET | Refills: 0 | Status: SHIPPED | OUTPATIENT
Start: 2021-07-02 | End: 2021-07-08 | Stop reason: SDUPTHER

## 2021-07-02 NOTE — PROGRESS NOTES
"Chief Complaint  Cough (c/o cough and urinary incontinence with sob with back pain), Urine Leakage, Shortness of Breath, and Back Pain    Subjective          Kanwal Sauer presents to [facility]  History of Present Illness came here with 1 week history of dysuria and increased urinary frequency.  She is also complaining of cough and shortness of breath patient with history of asthma on albuterol and Symbicort inhaler.  She has misplaced her albuterol inhaler and not able to find it.  She is also complaining of rash after taking doxycycline.  Rash is a still persistent complaining of pruritus.    Objective   Vital Signs:   /60   Pulse 63   Temp 96.3 °F (35.7 °C)   Ht 172.7 cm (68\")   Wt 96.8 kg (213 lb 8 oz)   SpO2 95%   BMI 32.46 kg/m²     Physical Exam  Constitutional:       General: She is not in acute distress.     Appearance: Normal appearance. She is well-developed.   HENT:      Head: Normocephalic and atraumatic.      Right Ear: Tympanic membrane normal.      Left Ear: Tympanic membrane normal.      Mouth/Throat:      Mouth: Mucous membranes are moist.   Eyes:      General:         Right eye: No discharge.         Left eye: No discharge.      Extraocular Movements: Extraocular movements intact.      Pupils: Pupils are equal, round, and reactive to light.   Cardiovascular:      Rate and Rhythm: Normal rate and regular rhythm.      Pulses: Normal pulses.      Heart sounds: Normal heart sounds.   Pulmonary:      Effort: Pulmonary effort is normal.      Breath sounds: Normal breath sounds. No wheezing or rales.   Abdominal:      General: Bowel sounds are normal.      Palpations: Abdomen is soft. There is no mass.      Tenderness: There is abdominal tenderness.   Musculoskeletal:      Cervical back: Normal range of motion and neck supple.      Right lower leg: No edema.      Left lower leg: No edema.   Lymphadenopathy:      Cervical: No cervical adenopathy. "   Neurological:      General: No focal deficit present.      Mental Status: She is alert and oriented to person, place, and time.        Result Review :                 Assessment and Plan    Diagnoses and all orders for this visit:    1. Acute cystitis with hematuria (Primary)  -     sulfamethoxazole-trimethoprim (Bactrim DS) 800-160 MG per tablet; Take 1 tablet by mouth 2 (Two) Times a Day.  Dispense: 10 tablet; Refill: 0    2. Urinary incontinence, unspecified type  -     POC Urinalysis Dipstick, Automated  -     Urine Culture - Urine, Urine, Clean Catch    3. Dermatitis due to drug    Other orders  -     albuterol sulfate  (90 Base) MCG/ACT inhaler; Inhale 2 puffs Every 4 (Four) Hours As Needed for Wheezing.  Dispense: 18 g; Refill: 1      Kanwal Sauer is a 70-year-old female patient seen today for  UTI, urine dip done in the office positive for nitrite and blood.  We will send it for urine culture I will start her on Bactrim.  Asthma, on exam her lungs were clear.  Refill on her ProAir given she also bought Symbicort prescription yesterday.  I advised patient and daughter to use the proair as needed for shortness of breath and cough.  If her cough or shortness of not get better come back here or go to the emergency room.  Dermatitis, patient is still complaining of itching fixed maculopapular rash.  I advised her to take Benadryl as needed for itching and use hydrocortisone cream as needed for itching.        Follow Up   No follow-ups on file.  Patient was given instructions and counseling regarding her condition or for health maintenance advice. Please see specific information pulled into the AVS if appropriate.

## 2021-07-06 LAB
BACTERIA UR CULT: ABNORMAL
BACTERIA UR CULT: ABNORMAL
OTHER ANTIBIOTIC SUSC ISLT: ABNORMAL

## 2021-07-07 ENCOUNTER — TELEPHONE (OUTPATIENT)
Dept: FAMILY MEDICINE CLINIC | Facility: CLINIC | Age: 71
End: 2021-07-07

## 2021-07-07 RX ORDER — APIXABAN 5 MG/1
TABLET, FILM COATED ORAL
Qty: 60 TABLET | Refills: 2 | Status: SHIPPED | OUTPATIENT
Start: 2021-07-07 | End: 2021-11-08

## 2021-07-07 NOTE — TELEPHONE ENCOUNTER
Left voicemail for patient regarding these results/recommendations. OK per HIPAA    Hub please inform patient if call back:    Urine Culture:    She has E.Coli in her urine. The medication we started her on, Bactrim, should take care of this. If she is still having symptoms 3 days after finishing the medicine, let us know.

## 2021-07-08 ENCOUNTER — TELEPHONE (OUTPATIENT)
Dept: FAMILY MEDICINE CLINIC | Facility: CLINIC | Age: 71
End: 2021-07-08

## 2021-07-08 DIAGNOSIS — N30.01 ACUTE CYSTITIS WITH HEMATURIA: ICD-10-CM

## 2021-07-08 RX ORDER — SULFAMETHOXAZOLE AND TRIMETHOPRIM 800; 160 MG/1; MG/1
1 TABLET ORAL 2 TIMES DAILY
Qty: 10 TABLET | Refills: 0 | Status: SHIPPED | OUTPATIENT
Start: 2021-07-08 | End: 2021-08-18

## 2021-07-08 NOTE — TELEPHONE ENCOUNTER
PATIENT DAUGHTER SHELDON TYSON IS CALLING IN,  STATES THAT SHE HAS COMPLETED HER MEDICATION 2 DAYS AGO FOR HER UTI.  DAUGHTER STATES SHE IS CONTINUING TO HAVE ISSUES.  WOULD LIKE TO KNOW THE NEXT STEP IN TAKING CARE OF THIS.       PLEASE ADVISE    [phone]    [id number] [place] [address] AT [facility] - [phone] [facility] [phone] FX

## 2021-07-09 NOTE — TELEPHONE ENCOUNTER
Tried to contact but [facility].    Hub please inform patient if call back:  Dr. Britton has sent in 5 more days of medication. If she is still having the symptoms, we will need her to come back so we can retest her urine.

## 2021-08-05 RX ORDER — CITALOPRAM 20 MG/1
TABLET ORAL
Qty: 90 TABLET | Refills: 0 | Status: SHIPPED | OUTPATIENT
Start: 2021-08-05 | End: 2021-11-02 | Stop reason: SDUPTHER

## 2021-08-18 ENCOUNTER — OFFICE VISIT (OUTPATIENT)
Dept: FAMILY MEDICINE CLINIC | Facility: CLINIC | Age: 71
End: 2021-08-18

## 2021-08-18 VITALS
RESPIRATION RATE: 16 BRPM | OXYGEN SATURATION: 98 % | SYSTOLIC BLOOD PRESSURE: 142 MMHG | WEIGHT: 213.7 LBS | HEART RATE: 56 BPM | TEMPERATURE: 96.9 F | DIASTOLIC BLOOD PRESSURE: 66 MMHG | BODY MASS INDEX: 32.39 KG/M2 | HEIGHT: 68 IN

## 2021-08-18 DIAGNOSIS — N30.01 ACUTE CYSTITIS WITH HEMATURIA: Primary | ICD-10-CM

## 2021-08-18 LAB
BILIRUB BLD-MCNC: NEGATIVE MG/DL
CLARITY, POC: ABNORMAL
COLOR UR: YELLOW
GLUCOSE UR STRIP-MCNC: NEGATIVE MG/DL
KETONES UR QL: NEGATIVE
LEUKOCYTE EST, POC: ABNORMAL
NITRITE UR-MCNC: NEGATIVE MG/ML
PH UR: 7.5 [PH] (ref 5–8)
PROT UR STRIP-MCNC: ABNORMAL MG/DL
RBC # UR STRIP: ABNORMAL /UL
SP GR UR: 1.02 (ref 1–1.03)
UROBILINOGEN UR QL: NORMAL

## 2021-08-18 PROCEDURE — 99213 OFFICE O/P EST LOW 20 MIN: CPT | Performed by: NURSE PRACTITIONER

## 2021-08-18 PROCEDURE — 81003 URINALYSIS AUTO W/O SCOPE: CPT | Performed by: NURSE PRACTITIONER

## 2021-08-18 RX ORDER — PHENAZOPYRIDINE HYDROCHLORIDE 200 MG/1
200 TABLET, FILM COATED ORAL 3 TIMES DAILY PRN
Qty: 6 TABLET | Refills: 0 | Status: SHIPPED | OUTPATIENT
Start: 2021-08-18 | End: 2021-11-09

## 2021-08-18 RX ORDER — SULFAMETHOXAZOLE AND TRIMETHOPRIM 800; 160 MG/1; MG/1
1 TABLET ORAL 2 TIMES DAILY
Qty: 14 TABLET | Refills: 0 | Status: SHIPPED | OUTPATIENT
Start: 2021-08-18 | End: 2021-09-01

## 2021-08-18 NOTE — PROGRESS NOTES
"Chief Complaint  Urinary Frequency (with burning x1wk)    Subjective          Kanwal Sauer presents to [facility]  Presents with > 1 week history of urinary symptoms. Reports dysuria. Urine is malodorous. Reports lower back pain. Denies fever or chills. Lower abdominal pressure. Denies hematuria. Reports going up to 7 times at night and going frequently in the day. Will sit in the bathroom and then go again, does not feel like her bladder is emptying.    Urinary Frequency   This is a new problem. The current episode started in the past 7 days. The problem occurs every urination. The problem has been unchanged. The quality of the pain is described as burning. The pain is moderate. There has been no fever. Associated symptoms include frequency and urgency. Pertinent negatives include no chills, discharge, flank pain, hematuria, hesitancy, nausea, sweats or vomiting. She has tried nothing for the symptoms. The treatment provided no relief.       Objective   Vital Signs:   /66   Pulse 56   Temp 96.9 °F (36.1 °C) (Temporal)   Resp 16   Ht 172.7 cm (68\")   Wt 96.9 kg (213 lb 11.2 oz)   SpO2 98%   BMI 32.49 kg/m²     Physical Exam  Vitals reviewed.   Constitutional:       Appearance: Normal appearance.   Cardiovascular:      Rate and Rhythm: Normal rate and regular rhythm.      Heart sounds: No murmur heard.   No friction rub. No gallop.    Pulmonary:      Effort: Pulmonary effort is normal. No respiratory distress.      Breath sounds: Normal breath sounds. No wheezing, rhonchi or rales.   Abdominal:      General: Bowel sounds are normal. There is no distension.      Palpations: Abdomen is soft. There is no mass.      Tenderness: There is no abdominal tenderness. There is no right CVA tenderness or left CVA tenderness.      Hernia: No hernia is present.   Skin:     General: Skin is warm and dry.   Neurological:      Mental Status: She is alert and oriented to person, place, " and time.   Psychiatric:         Mood and Affect: Mood normal.        Result Review :                 Assessment and Plan    Diagnoses and all orders for this visit:    1. Acute cystitis with hematuria (Primary)  -     Urine Culture - Urine, Urine, Clean Catch  -     POC Urinalysis Dipstick, Automated    Other orders  -     sulfamethoxazole-trimethoprim (Bactrim DS) 800-160 MG per tablet; Take 1 tablet by mouth 2 (Two) Times a Day.  Dispense: 14 tablet; Refill: 0  -     phenazopyridine (Pyridium) 200 MG tablet; Take 1 tablet by mouth 3 (Three) Times a Day As Needed for Bladder Spasms.  Dispense: 6 tablet; Refill: 0        Follow Up   No follow-ups on file.  Patient was given instructions and counseling regarding her condition or for health maintenance advice. Please see specific information pulled into the AVS if appropriate.     Urine is nitrate negative, small amount of leukocytes, does appear to need to push fluids  Will start bactrim and pyridium (based on last cultures and allergies) and await culture result, in the interim, discussed increasing water intake. She verbalized understanding. She will follow up for improvement/worsening symptoms.    Last labs revealed kidney function is wnl. Discussed due to allergies, we will start bactrim ds, previously prescribed and tolerated. Advised importance of drinking fluids to flush kidneys as this medication can be harder on the kidneys. Daughter present and reiterated this information. They both verbalized understanding.

## 2021-08-20 LAB
BACTERIA UR CULT: NORMAL
BACTERIA UR CULT: NORMAL

## 2021-08-30 ENCOUNTER — TRANSCRIBE ORDERS (OUTPATIENT)
Dept: ADMINISTRATIVE | Facility: HOSPITAL | Age: 71
End: 2021-08-30

## 2021-08-30 ENCOUNTER — TELEPHONE (OUTPATIENT)
Dept: FAMILY MEDICINE CLINIC | Facility: CLINIC | Age: 71
End: 2021-08-30

## 2021-08-30 DIAGNOSIS — Z12.31 ENCOUNTER FOR SCREENING MAMMOGRAM FOR BREAST CANCER: Primary | ICD-10-CM

## 2021-08-30 RX ORDER — FENOFIBRATE 160 MG/1
TABLET ORAL
Qty: 90 TABLET | Refills: 1 | Status: SHIPPED | OUTPATIENT
Start: 2021-08-30 | End: 2022-03-04

## 2021-08-30 NOTE — TELEPHONE ENCOUNTER
Rx Refill Note  Requested Prescriptions     Pending Prescriptions Disp Refills   • fenofibrate 160 MG tablet [Pharmacy Med Name: FENOFIBRATE 160 MG TABLET] 90 tablet 1     Sig: TAKE 1 TABLET BY MOUTH EVERY DAY      Last office visit with prescribing clinician: 7/2/2021      Next office visit with prescribing clinician: 9/23/2021            Malena Ortega LPN  08/30/21, 08:59 EDT

## 2021-08-30 NOTE — TELEPHONE ENCOUNTER
PATIENT CALLED AND STATES SHE HAD A UTI AND WAS GIVEN MEDICATION BUT IT IS NOT HELPING. SHE STILL HAS SYMPTOMS, BURNING, FREQUENCY URINATION.    SHE IS STILL TAKING THE OVER THE COUNTER MEDICATION THAT TURNS THE URINE ORANGE.    [facility]/pharmacy #[id number] - [place] - [address] AT [facility] - [phone]  - [phone]   [phone]    CALL BACK NUMBER [phone]

## 2021-09-01 ENCOUNTER — OFFICE VISIT (OUTPATIENT)
Dept: FAMILY MEDICINE CLINIC | Facility: CLINIC | Age: 71
End: 2021-09-01

## 2021-09-01 VITALS
OXYGEN SATURATION: 94 % | DIASTOLIC BLOOD PRESSURE: 63 MMHG | HEART RATE: 56 BPM | WEIGHT: 213 LBS | BODY MASS INDEX: 32.28 KG/M2 | HEIGHT: 68 IN | TEMPERATURE: 96.2 F | SYSTOLIC BLOOD PRESSURE: 148 MMHG

## 2021-09-01 DIAGNOSIS — R32 URINARY INCONTINENCE, UNSPECIFIED TYPE: ICD-10-CM

## 2021-09-01 DIAGNOSIS — N39.0 RECURRENT UTI: ICD-10-CM

## 2021-09-01 DIAGNOSIS — K86.89 PANCREATIC MASS: ICD-10-CM

## 2021-09-01 DIAGNOSIS — R10.30 LOWER ABDOMINAL PAIN: Primary | ICD-10-CM

## 2021-09-01 PROCEDURE — 99214 OFFICE O/P EST MOD 30 MIN: CPT | Performed by: FAMILY MEDICINE

## 2021-09-01 PROCEDURE — 81003 URINALYSIS AUTO W/O SCOPE: CPT | Performed by: FAMILY MEDICINE

## 2021-09-01 RX ORDER — FENOFIBRATE 160 MG/1
160 TABLET ORAL DAILY
Qty: 90 TABLET | Refills: 1 | Status: CANCELLED | OUTPATIENT
Start: 2021-09-01

## 2021-09-01 RX ORDER — AMOXICILLIN AND CLAVULANATE POTASSIUM 875; 125 MG/1; MG/1
1 TABLET, FILM COATED ORAL 2 TIMES DAILY
Qty: 20 TABLET | Refills: 0 | Status: SHIPPED | OUTPATIENT
Start: 2021-09-01 | End: 2021-09-23

## 2021-09-01 NOTE — PROGRESS NOTES
"Chief Complaint  Urinary Tract Infection (C/o burning and frequency with voiding)    Ton Sauer presents to [facility]  History of Present Illness came here for lower abdominal pain, urinary burning and increased frequency.  Patient was seen here 2 weeks ago for same.  She finished a course of antibiotic for her symptoms did not improve.  Her culture at that time was negative.  She is also complaining of increased frequency and dysuria.  She also has a history of diverticulitis.  Denies any nausea vomiting.  But giving history of constipation.  Denies any fever.    Objective   Vital Signs:   /63   Pulse 56   Temp 96.2 °F (35.7 °C)   Ht 172.7 cm (68\")   Wt 96.6 kg (213 lb)   SpO2 94%   BMI 32.39 kg/m²     Physical Exam  Constitutional:       General: She is not in acute distress.     Appearance: Normal appearance. She is well-developed.   HENT:      Head: Normocephalic and atraumatic.      Right Ear: Tympanic membrane normal.      Left Ear: Tympanic membrane normal.      Mouth/Throat:      Mouth: Mucous membranes are moist.   Eyes:      General:         Right eye: No discharge.         Left eye: No discharge.      Extraocular Movements: Extraocular movements intact.      Pupils: Pupils are equal, round, and reactive to light.   Cardiovascular:      Rate and Rhythm: Normal rate and regular rhythm.      Pulses: Normal pulses.      Heart sounds: Normal heart sounds.   Pulmonary:      Effort: Pulmonary effort is normal.      Breath sounds: Normal breath sounds. No wheezing or rales.   Abdominal:      General: Bowel sounds are normal.      Palpations: Abdomen is soft. There is no mass.      Tenderness: There is abdominal tenderness in the left lower quadrant. There is no guarding.   Musculoskeletal:      Cervical back: Normal range of motion and neck supple.      Right lower leg: No edema.      Left lower leg: No edema.   Lymphadenopathy:      Cervical: " No cervical adenopathy.   Neurological:      General: No focal deficit present.      Mental Status: She is alert and oriented to person, place, and time.        Result Review :                 Assessment and Plan    Diagnoses and all orders for this visit:    1. Lower abdominal pain (Primary)  -     CBC & Differential  -     Comprehensive Metabolic Panel  -     amoxicillin-clavulanate (Augmentin) 875-125 MG per tablet; Take 1 tablet by mouth 2 (Two) Times a Day.  Dispense: 20 tablet; Refill: 0  -     Cancel: CT Abdomen Pelvis With Contrast  -     CT Abdomen Pelvis With Contrast    2. Urinary incontinence, unspecified type  -     POC Urinalysis Dipstick, Automated    3. Recurrent UTI  -     POC Urinalysis Dipstick, Automated    Other orders  -     Cancel: fenofibrate 160 MG tablet; Take 1 tablet by mouth Daily.  Dispense: 90 tablet; Refill: 1  -     Cancel: CT Abdomen Pelvis Without Contrast; Future      Kanwal Sauer is a 70-year-old female patient seen today for  Lower abdominal pain and recurrent UTI, negative for nitrite or blood.  Also check for CBC CMP.  I will also schedule her for CT abdomen and pelvis.  I will also start her on Augmentin.  Patient has multiple allergies to medications with giving history that she has taken  amoxicillin in the past.  I also advised her to start probiotic.  She has been allergic to contrast but has taken a contrast with allergy protocol.        Follow Up   No follow-ups on file.  Patient was given instructions and counseling regarding her condition or for health maintenance advice. Please see specific information pulled into the AVS if appropriate.

## 2021-09-03 ENCOUNTER — HOSPITAL ENCOUNTER (OUTPATIENT)
Dept: CT IMAGING | Facility: HOSPITAL | Age: 71
Discharge: HOME | End: 2021-09-03

## 2021-09-03 RX ORDER — DIPHENHYDRAMINE HYDROCHLORIDE 50 MG/ML
50 INJECTION INTRAMUSCULAR; INTRAVENOUS ONCE
Status: DISCONTINUED | OUTPATIENT
Start: 2021-09-03 | End: 2021-09-03

## 2021-09-03 RX ORDER — PREDNISONE 50 MG/1
TABLET ORAL
Qty: 3 TABLET | Refills: 0 | Status: SHIPPED | OUTPATIENT
Start: 2021-09-03 | End: 2021-09-23

## 2021-09-05 ENCOUNTER — HOSPITAL ENCOUNTER (OUTPATIENT)
Dept: CT IMAGING | Facility: HOSPITAL | Age: 71
Discharge: HOME | End: 2021-09-05
Admitting: FAMILY MEDICINE

## 2021-09-05 PROCEDURE — 0 DIATRIZOATE MEGLUMINE & SODIUM PER 1 ML: Performed by: FAMILY MEDICINE

## 2021-09-05 PROCEDURE — 74177 CT ABD & PELVIS W/CONTRAST: CPT

## 2021-09-05 PROCEDURE — 82565 ASSAY OF CREATININE: CPT

## 2021-09-05 PROCEDURE — 25010000002 IOPAMIDOL 61 % SOLUTION: Performed by: FAMILY MEDICINE

## 2021-09-05 RX ADMIN — IOPAMIDOL 100 ML: 612 INJECTION, SOLUTION INTRAVENOUS at 12:02

## 2021-09-05 RX ADMIN — DIATRIZOATE MEGLUMINE AND DIATRIZOATE SODIUM 30 ML: 660; 100 LIQUID ORAL; RECTAL at 11:00

## 2021-09-07 LAB
BILIRUB BLD-MCNC: NEGATIVE MG/DL
CLARITY, POC: CLEAR
COLOR UR: YELLOW
CREAT BLDA-MCNC: 0.8 MG/DL (ref 0.6–1.3)
GLUCOSE UR STRIP-MCNC: NEGATIVE MG/DL
KETONES UR QL: NEGATIVE
LEUKOCYTE EST, POC: ABNORMAL
NITRITE UR-MCNC: NEGATIVE MG/ML
PH UR: 7 [PH] (ref 5–8)
PROT UR STRIP-MCNC: NEGATIVE MG/DL
RBC # UR STRIP: NEGATIVE /UL
SP GR UR: 1.01 (ref 1–1.03)
UROBILINOGEN UR QL: NORMAL

## 2021-09-09 RX ORDER — MONTELUKAST SODIUM 10 MG/1
TABLET ORAL
Qty: 90 TABLET | Refills: 0 | Status: SHIPPED | OUTPATIENT
Start: 2021-09-09 | End: 2021-09-16 | Stop reason: SDUPTHER

## 2021-09-09 NOTE — TELEPHONE ENCOUNTER
Rx Refill Note  Requested Prescriptions     Pending Prescriptions Disp Refills   • montelukast (SINGULAIR) 10 MG tablet [Pharmacy Med Name: MONTELUKAST SOD 10 MG TABLET] 90 tablet 0     Sig: TAKE 1 TABLET BY MOUTH EVERY DAY AT NIGHT      Last office visit with prescribing clinician: 9/1/2021      Next office visit with prescribing clinician: 9/23/2021            Deandra Crabtree MA  09/09/21, 10:56 EDT

## 2021-09-13 DIAGNOSIS — E11.65 TYPE 2 DIABETES MELLITUS WITH HYPERGLYCEMIA, WITHOUT LONG-TERM CURRENT USE OF INSULIN (HCC): ICD-10-CM

## 2021-09-13 NOTE — TELEPHONE ENCOUNTER
Rx Refill Note  Requested Prescriptions     Pending Prescriptions Disp Refills   • metFORMIN (GLUCOPHAGE) 1000 MG tablet [Pharmacy Med Name: METFORMIN HCL 1,000 MG TABLET] 180 tablet 0     Sig: TAKE 1 TABLET BY MOUTH TWICE A DAY WITH MEALS      Last office visit with prescribing clinician: 9/1/2021      Next office visit with prescribing clinician: 9/23/2021            Deandra Crabtree MA  09/13/21, 16:45 EDT

## 2021-09-16 RX ORDER — MONTELUKAST SODIUM 10 MG/1
10 TABLET ORAL
Qty: 90 TABLET | Refills: 1 | Status: SHIPPED | OUTPATIENT
Start: 2021-09-16 | End: 2022-03-30

## 2021-09-17 LAB
ALBUMIN SERPL-MCNC: 4.5 G/DL (ref 3.5–5.2)
ALBUMIN/GLOB SERPL: 3 G/DL
ALP SERPL-CCNC: 52 U/L (ref 39–117)
ALT SERPL-CCNC: 12 U/L (ref 1–33)
AST SERPL-CCNC: 17 U/L (ref 1–32)
BASOPHILS # BLD AUTO: 0.01 10*3/MM3 (ref 0–0.2)
BASOPHILS NFR BLD AUTO: 0.3 % (ref 0–1.5)
BILIRUB SERPL-MCNC: 0.4 MG/DL (ref 0–1.2)
BUN SERPL-MCNC: 20 MG/DL (ref 8–23)
BUN/CREAT SERPL: 24.7 (ref 7–25)
CALCIUM SERPL-MCNC: 10 MG/DL (ref 8.6–10.5)
CHLORIDE SERPL-SCNC: 101 MMOL/L (ref 98–107)
CO2 SERPL-SCNC: 30.8 MMOL/L (ref 22–29)
CREAT SERPL-MCNC: 0.81 MG/DL (ref 0.57–1)
EOSINOPHIL # BLD AUTO: 0.07 10*3/MM3 (ref 0–0.4)
EOSINOPHIL NFR BLD AUTO: 1.8 % (ref 0.3–6.2)
ERYTHROCYTE [DISTWIDTH] IN BLOOD BY AUTOMATED COUNT: 12.8 % (ref 12.3–15.4)
GLOBULIN SER CALC-MCNC: 1.5 GM/DL
GLUCOSE SERPL-MCNC: 108 MG/DL (ref 65–99)
HCT VFR BLD AUTO: 37.3 % (ref 34–46.6)
HGB BLD-MCNC: 12.7 G/DL (ref 12–15.9)
IMM GRANULOCYTES # BLD AUTO: 0.02 10*3/MM3 (ref 0–0.05)
IMM GRANULOCYTES NFR BLD AUTO: 0.5 % (ref 0–0.5)
LYMPHOCYTES # BLD AUTO: 1.27 10*3/MM3 (ref 0.7–3.1)
LYMPHOCYTES NFR BLD AUTO: 33.2 % (ref 19.6–45.3)
MCH RBC QN AUTO: 30.8 PG (ref 26.6–33)
MCHC RBC AUTO-ENTMCNC: 34 G/DL (ref 31.5–35.7)
MCV RBC AUTO: 90.5 FL (ref 79–97)
MONOCYTES # BLD AUTO: 0.35 10*3/MM3 (ref 0.1–0.9)
MONOCYTES NFR BLD AUTO: 9.2 % (ref 5–12)
NEUTROPHILS # BLD AUTO: 2.1 10*3/MM3 (ref 1.7–7)
NEUTROPHILS NFR BLD AUTO: 55 % (ref 42.7–76)
NRBC BLD AUTO-RTO: 0 /100 WBC (ref 0–0.2)
PLATELET # BLD AUTO: 141 10*3/MM3 (ref 140–450)
POTASSIUM SERPL-SCNC: 4.4 MMOL/L (ref 3.5–5.2)
PROT SERPL-MCNC: 6 G/DL (ref 6–8.5)
RBC # BLD AUTO: 4.12 10*6/MM3 (ref 3.77–5.28)
SODIUM SERPL-SCNC: 141 MMOL/L (ref 136–145)
WBC # BLD AUTO: 3.82 10*3/MM3 (ref 3.4–10.8)

## 2021-09-23 ENCOUNTER — OFFICE VISIT (OUTPATIENT)
Dept: FAMILY MEDICINE CLINIC | Facility: CLINIC | Age: 71
End: 2021-09-23

## 2021-09-23 VITALS
HEART RATE: 58 BPM | WEIGHT: 213.9 LBS | BODY MASS INDEX: 32.42 KG/M2 | TEMPERATURE: 98 F | DIASTOLIC BLOOD PRESSURE: 78 MMHG | OXYGEN SATURATION: 96 % | SYSTOLIC BLOOD PRESSURE: 132 MMHG | HEIGHT: 68 IN

## 2021-09-23 DIAGNOSIS — I48.0 PAF (PAROXYSMAL ATRIAL FIBRILLATION) (HCC): ICD-10-CM

## 2021-09-23 DIAGNOSIS — I10 ESSENTIAL HYPERTENSION: ICD-10-CM

## 2021-09-23 DIAGNOSIS — E11.65 TYPE 2 DIABETES MELLITUS WITH HYPERGLYCEMIA, WITHOUT LONG-TERM CURRENT USE OF INSULIN (HCC): Primary | ICD-10-CM

## 2021-09-23 DIAGNOSIS — R10.30 LOWER ABDOMINAL PAIN: ICD-10-CM

## 2021-09-23 PROCEDURE — 99213 OFFICE O/P EST LOW 20 MIN: CPT | Performed by: FAMILY MEDICINE

## 2021-09-23 NOTE — PROGRESS NOTES
"Chief Complaint  Diabetes    Subjective     {Problem List  Visit Diagnosis   Encounters  Notes  Medications  Labs  Result Review Imaging  Media :23}     Kanwal Sauer presents to [facility]  History of Present Illness    Objective   Vital Signs:   /78   Pulse 58   Temp 98 °F (36.7 °C) (Temporal)   Ht 172.7 cm (68\")   Wt 97 kg (213 lb 14.4 oz)   SpO2 96%   BMI 32.52 kg/m²     Physical Exam   Result Review :{Labs  Result Review  Imaging  Med Tab  Media  Procedures :23}   {The following data was reviewed by (Optional):61062}  {Ambulatory Labs (Optional):32629}  {Data reviewed (Optional):99996:::1}          Assessment and Plan {CC Problem List  Visit Diagnosis   ROS  Review (Popup)  Health Maintenance  Quality  BestPractice  Medications  SmartSets  SnapShot Encounters  Media :23}   There are no diagnoses linked to this encounter.  {Time Spent (Optional):05858}  Follow Up {Instructions Charge Capture  Follow-up Communications :23}  No follow-ups on file.  Patient was given instructions and counseling regarding her condition or for health maintenance advice. Please see specific information pulled into the AVS if appropriate.       "

## 2021-09-30 NOTE — PROGRESS NOTES
"Chief Complaint  Diabetes, Hypertension, and lower abdominal pain    Subjective          Kanwal Sauer presents to [facility]  History of Present Illness   came here for follow-up diabetes hypertension and lower abdominal pain.  Patient with history of recurrent UTI was seen last time for lower abdominal pain and dysuria.  She had CT scan of abdomen and pelvis done.  History taken from her daughter and patient herself.  As per patient and daughter her lower abdominal pain has improved and she denies any dysuria.  Denies any low back pain.  She is also here for follow-up on her chronic medical conditions including diabetes hypertension and hyperlipidemia.  Patient with long history of diabetes, not checking sugar at home.  Denies polyuria, polydipsia or blurring of vision.  Sugars stable on current medications    Patient with history of atrial fibrillation, has not establish care with the cardiology here since she moved from [place] .  Daughter is requesting a referral to cardiology given      Objective   Vital Signs:   /78   Pulse 58   Temp 98 °F (36.7 °C) (Temporal)   Ht 172.7 cm (68\")   Wt 97 kg (213 lb 14.4 oz)   SpO2 96%   BMI 32.52 kg/m²     Physical Exam  Constitutional:       General: She is not in acute distress.     Appearance: Normal appearance. She is well-developed.   HENT:      Head: Normocephalic and atraumatic.      Right Ear: Tympanic membrane normal.      Left Ear: Tympanic membrane normal.      Mouth/Throat:      Mouth: Mucous membranes are moist.   Eyes:      General:         Right eye: No discharge.         Left eye: No discharge.      Extraocular Movements: Extraocular movements intact.      Pupils: Pupils are equal, round, and reactive to light.   Cardiovascular:      Rate and Rhythm: Normal rate and regular rhythm.      Pulses: Normal pulses.      Heart sounds: Normal heart sounds.   Pulmonary:      Effort: Pulmonary effort is normal.      Breath " sounds: Normal breath sounds. No wheezing or rales.   Abdominal:      General: Bowel sounds are normal.      Palpations: Abdomen is soft. There is no mass.      Tenderness: There is no abdominal tenderness.   Musculoskeletal:      Cervical back: Normal range of motion and neck supple.      Right lower leg: No edema.      Left lower leg: No edema.   Lymphadenopathy:      Cervical: No cervical adenopathy.   Neurological:      General: No focal deficit present.      Mental Status: She is alert and oriented to person, place, and time.        Result Review :       Data reviewed: Radiologic studies ct abdomen and pelvis          Assessment and Plan    Diagnoses and all orders for this visit:    1. Type 2 diabetes mellitus with hyperglycemia, without long-term current use of insulin ([facility]) (Primary)    2. Lower abdominal pain    3. Essential hypertension    4. PAF (paroxysmal atrial fibrillation) (CMS/[facility])  -     Ambulatory Referral to Cardiology      Kanwal Sauer is a 70-year-old female patient came here for follow-up on  Diabetes type 2, last hemoglobin A1c done in June of this year 5.5.  Currently she is taking Metformin 1000 mg twice a day.  She had normal CMP and creatinine.  We will recheck her hemoglobin A1c in 3 months and if her hemoglobin A1c is again in the same range I will decreased her Metformin to causing once a day patient  Hypertension, controlled on current medication continue same patient  Lower abdominal pain, CT scan results again discussed with patient no diverticulitis but clinically she improved after taking antibiotic.  As per daughter she has changed her diet also.  I advised her to increase fiber in her diet.    Atrial fibrillation, on rate control and Eliquis.  Continue same.  We will refer to cardiology.        Follow Up   No follow-ups on file.  Patient was given instructions and counseling regarding her condition or for health maintenance advice. Please see specific information pulled  into the AVS if appropriate.

## 2021-10-13 ENCOUNTER — OFFICE VISIT (OUTPATIENT)
Dept: CARDIOLOGY | Facility: CLINIC | Age: 71
End: 2021-10-13

## 2021-10-13 VITALS
HEART RATE: 58 BPM | HEIGHT: 66 IN | WEIGHT: 213 LBS | SYSTOLIC BLOOD PRESSURE: 138 MMHG | DIASTOLIC BLOOD PRESSURE: 82 MMHG | OXYGEN SATURATION: 94 % | BODY MASS INDEX: 34.23 KG/M2

## 2021-10-13 DIAGNOSIS — I10 ESSENTIAL HYPERTENSION: ICD-10-CM

## 2021-10-13 DIAGNOSIS — I48.0 PAF (PAROXYSMAL ATRIAL FIBRILLATION) (HCC): Primary | ICD-10-CM

## 2021-10-13 DIAGNOSIS — G47.33 OSA (OBSTRUCTIVE SLEEP APNEA): ICD-10-CM

## 2021-10-13 PROCEDURE — 93000 ELECTROCARDIOGRAM COMPLETE: CPT | Performed by: INTERNAL MEDICINE

## 2021-10-13 PROCEDURE — 99204 OFFICE O/P NEW MOD 45 MIN: CPT | Performed by: INTERNAL MEDICINE

## 2021-10-13 RX ORDER — METOPROLOL SUCCINATE 25 MG/1
50 TABLET, EXTENDED RELEASE ORAL DAILY
Qty: 90 TABLET | Refills: 3 | Status: SHIPPED | OUTPATIENT
Start: 2021-10-13 | End: 2022-04-01

## 2021-10-13 NOTE — PROGRESS NOTES
PATIENTINFORMATION    Date of Office Visit: 10/13/2021  Encounter Provider: Yasir Romano MD  Place of Service: [facility] CARDIOLOGY  Patient Name: Kanwal Sauer  : 1950    Subjective:     Encounter Date:10/13/2021      Patient ID: Kanwal Sauer is a 70 y.o. female.    Chief Complaint   Patient presents with   • PAF     New patient     HPI  Ms. Sauer is a 70 years old female patient with past medical history of paroxysmal atrial fibrillation, breast cancer, DVT, hypertension, hyperlipidemia, obesity with sleep apnea came to cardiology clinic to establish care for paroxysmal atrial fibrillation.  Patient came with her daughter.  She was first diagnosed with atrial fibrillation back in 2020 at [facility] in [facility] and her daughter reports patient getting cardioversion.  She has echocardiogram at that time.  She has been on metoprolol and Eliquis since then.  She relocated to live with her daughter in little contact.  Patient had few more hospital admissions over the last 1 year and last one was for left femur and fibula fracture with a scalp laceration following mechanical fall.  Since then she has started to walk around using a walker with no recent falls.  She had few episodes of palpitation that she believes was A. fib over the past 1 month that she thinks is due to energy drink or do some diet type.  No palpitations over the last few weeks.  She denies any significant change in her breathing recently and denies any chest pain, presyncope syncope, orthopnea, PND.  She has not been wearing her CPAP machine since scalp laceration that healed completely.  No current tobacco use, recreational drug use or alcohol abuse.      ROS   All systems reviewed and negative except as noted in HPI.    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)    • Seizures (HCC)    • Sleep apnea    • Tremor    • Weakness        Past Surgical History:   Procedure Laterality Date   • BREAST BIOPSY     • CATARACT EXTRACTION, BILATERAL     • DENTAL PROCEDURE      Removed teeth   • FEMUR OPEN REDUCTION INTERNAL FIXATION Left 12/24/2020    Procedure: DISTAL FEMUR OPEN REDUCTION INTERNAL FIXATION;  Surgeon: Marley Hernandez MD;  Location: [facility] OR;  Service: Orthopedics;  Laterality: Left;   • MASTECTOMY Left    • REPLACEMENT TOTAL KNEE BILATERAL         Social History     Socioeconomic History   • Marital status:    Tobacco Use   • Smoking status: Never Smoker   • Smokeless tobacco: Never Used   • Tobacco comment: no caffine   Substance and Sexual Activity   • Alcohol use: Not Currently   • Drug use: Never   • Sexual activity: Yes     Partners: Male       Family History   Problem Relation Age of Onset   • Arthritis Mother    • Lung cancer Mother    • Brain cancer Mother    • Hypertension Mother    • Hypertension Father    • Arthritis Sister    • Breast cancer Sister    • Diabetes Sister    • Hypertension Sister    • Dementia Sister            ECG 12 Lead    Date/Time: 10/13/2021 2:52 PM  Performed by: Yasir Romano MD  Authorized by: Yasir Romano MD   Comparison: compared with previous ECG from 12/24/2020  Similar to previous ECG  Rhythm: sinus rhythm  Rate: normal  Conduction: conduction normal  ST Segments: ST segments normal  T Waves: T waves normal  QRS axis: normal  Other: no other findings    Clinical impression: normal ECG               Objective:     /82 (BP Location: Right arm)   Pulse 58   Ht 167.6 cm (66\")   Wt 96.6 kg (213 lb)   SpO2 94%   BMI 34.38 kg/m²  Body mass index is 34.38 kg/m². "     Constitutional:       General: Not in acute distress.     Appearance: Well-developed. Not diaphoretic.   Eyes:      Pupils: Pupils are equal, round, and reactive to light.   HENT:      Head: Normocephalic and atraumatic.   Neck:      Thyroid: No thyromegaly.   Pulmonary:      Effort: Pulmonary effort is normal. No respiratory distress.      Breath sounds: Normal breath sounds. No wheezing. No rales.   Chest:      Chest wall: Not tender to palpatation.   Cardiovascular:      Normal rate. Regular rhythm.      No gallop.   Pulses:     Intact distal pulses.   Edema:     Peripheral edema absent.   Abdominal:      General: Bowel sounds are normal. There is no distension.      Palpations: Abdomen is soft.      Tenderness: There is no guarding.   Musculoskeletal: Normal range of motion.         General: No deformity.      Cervical back: Normal range of motion and neck supple. Skin:     General: Skin is warm and dry.      Findings: No rash.   Neurological:      Mental Status: Alert and oriented to person, place, and time.      Cranial Nerves: No cranial nerve deficit.      Deep Tendon Reflexes: Reflexes are normal and symmetric.   Psychiatric:         Judgment: Judgment normal.         Review Of Data: I have reviewed recent labs, documentations.  Also got medical records [facility]      Assessment/Plan:       1.  Paroxysmal atrial fibrillation  -Patient in sinus bradycardia  -Echocardiogram report on 1/17/2020 from outside facility: Left ventricular ejection fraction 51-55%, mild AI/MR, moderate TR, RVSP 48 mmHg  -I will switch her to long-acting metoprolol 25 mg p.o. daily and continue Eliquis 5 mg p.o. twice daily  -She uses Lasix as needed for shortness of breath and extremity swelling  2. Obesity with TARSHA- discussed with patient importance of treating TARSHA and she will start wearing CPAP at night   3. Hypertension- controlled on losartan and metoprol  4. Hyperlipidemia on  fenofibrate-most recent lipid panel at goal  -Patient reports history of intolerance to different statins in the past  5.  Benign tremor on primidone  6.  Recent history of mechanical fall with a scalp laceration/left femoral and tibial fracture in December 2020-currently ambulates using a walker  7. Hx of left breast cancer s/p left mastectomy and chemotherapy in the past-in remission  8.  Pulmonary hypertension per echo in 2020- patient will start wearing CPAP        Diagnosis and plan of care discussed with patient and verbalized understanding.      Return to clinic in 6 months or sooner with any concerning symptoms.           Yasir Romano MD  10/13/21  16:17 EDT

## 2021-11-02 NOTE — TELEPHONE ENCOUNTER
Rx Refill Note  Requested Prescriptions     Pending Prescriptions Disp Refills   • citalopram (CeleXA) 20 MG tablet 90 tablet 0     Sig: Take 1 tablet by mouth Daily.   • Mirabegron ER (Myrbetriq) 25 MG tablet sustained-release 24 hour 24 hr tablet 30 tablet      Sig: Take 1 tablet by mouth Daily.      Last office visit with prescribing clinician: 9/23/2021      Next office visit with prescribing clinician: Visit date not found            Deandra Crabtree MA  11/02/21, 14:45 EDT

## 2021-11-02 NOTE — TELEPHONE ENCOUNTER
Caller: Camacho Aknwal    Relationship: Self    Requested Prescriptions:   Requested Prescriptions     Pending Prescriptions Disp Refills   • citalopram (CeleXA) 20 MG tablet 90 tablet 0     Sig: Take 1 tablet by mouth Daily.   • Mirabegron ER (Myrbetriq) 25 MG tablet sustained-release 24 hour 24 hr tablet 30 tablet      Sig: Take 1 tablet by mouth Daily.        Pharmacy where request should be sent: [facility]/pharmacy #[id number] - [facility], [place] - [address] AT [facility] - [phone]  - [phone]   [phone]    Additional details provided by patient: PATIENT IS COMPLETELY OUT     Best call back number: [phone]    Does the patient have less than a 3 day supply:  [x] Yes  [] No    Esthela Nolasco Rep   11/02/21 13:34 EDT

## 2021-11-03 RX ORDER — CITALOPRAM 20 MG/1
TABLET ORAL
Qty: 90 TABLET | Refills: 0 | OUTPATIENT
Start: 2021-11-03

## 2021-11-03 RX ORDER — CITALOPRAM 20 MG/1
20 TABLET ORAL DAILY
Qty: 90 TABLET | Refills: 0 | Status: SHIPPED | OUTPATIENT
Start: 2021-11-03 | End: 2022-02-10

## 2021-11-08 RX ORDER — APIXABAN 5 MG/1
TABLET, FILM COATED ORAL
Qty: 60 TABLET | Refills: 2 | Status: SHIPPED | OUTPATIENT
Start: 2021-11-08 | End: 2022-02-23

## 2021-11-08 NOTE — TELEPHONE ENCOUNTER
Rx Refill Note  Requested Prescriptions     Pending Prescriptions Disp Refills   • Eliquis 5 MG tablet tablet [Pharmacy Med Name: ELIQUIS 5 MG TABLET] 60 tablet 2     Sig: TAKE 1 TABLET BY MOUTH EVERY 12 HOURS      Last office visit with prescribing clinician: 9/23/2021      Next office visit with prescribing clinician: 11/9/2021            Deandra Crabtree MA  11/08/21, 15:39 EST

## 2021-11-09 ENCOUNTER — OFFICE VISIT (OUTPATIENT)
Dept: FAMILY MEDICINE CLINIC | Facility: CLINIC | Age: 71
End: 2021-11-09

## 2021-11-09 VITALS
HEIGHT: 66 IN | BODY MASS INDEX: 34.33 KG/M2 | OXYGEN SATURATION: 94 % | TEMPERATURE: 98 F | DIASTOLIC BLOOD PRESSURE: 64 MMHG | SYSTOLIC BLOOD PRESSURE: 151 MMHG | WEIGHT: 213.6 LBS | HEART RATE: 61 BPM

## 2021-11-09 DIAGNOSIS — N39.0 RECURRENT UTI: Primary | ICD-10-CM

## 2021-11-09 LAB
BILIRUB BLD-MCNC: NEGATIVE MG/DL
CLARITY, POC: ABNORMAL
COLOR UR: YELLOW
EXPIRATION DATE: ABNORMAL
GLUCOSE UR STRIP-MCNC: NEGATIVE MG/DL
KETONES UR QL: NEGATIVE
LEUKOCYTE EST, POC: ABNORMAL
Lab: ABNORMAL
NITRITE UR-MCNC: NEGATIVE MG/ML
PH UR: 7 [PH] (ref 5–8)
PROT UR STRIP-MCNC: ABNORMAL MG/DL
RBC # UR STRIP: ABNORMAL /UL
SP GR UR: 1.02 (ref 1–1.03)
UROBILINOGEN UR QL: NORMAL

## 2021-11-09 PROCEDURE — 81003 URINALYSIS AUTO W/O SCOPE: CPT | Performed by: FAMILY MEDICINE

## 2021-11-09 PROCEDURE — 99213 OFFICE O/P EST LOW 20 MIN: CPT | Performed by: FAMILY MEDICINE

## 2021-11-09 RX ORDER — SULFAMETHOXAZOLE AND TRIMETHOPRIM 400; 80 MG/1; MG/1
1 TABLET ORAL 2 TIMES DAILY
Qty: 14 TABLET | Refills: 0 | Status: SHIPPED | OUTPATIENT
Start: 2021-11-09 | End: 2021-11-23

## 2021-11-09 NOTE — PROGRESS NOTES
"Chief Complaint  Urinary Tract Infection    Subjective          Kanwal Sauer presents to [facility]  History of Present Illness came here with 1 week history of urinary frequency and burning.  Patient with history of frequent urinary tract infection.  Daughter thinks that she is not able to clean herself properly.  Currently she is living with her daughter.    Objective   Vital Signs:   /64   Pulse 61   Temp 98 °F (36.7 °C)   Ht 167.6 cm (66\")   Wt 96.9 kg (213 lb 9.6 oz)   SpO2 94%   BMI 34.48 kg/m²     Physical Exam  Constitutional:       General: She is not in acute distress.     Appearance: Normal appearance. She is well-developed.   HENT:      Head: Normocephalic and atraumatic.      Right Ear: Tympanic membrane normal.      Left Ear: Tympanic membrane normal.      Mouth/Throat:      Mouth: Mucous membranes are moist.   Eyes:      General:         Right eye: No discharge.         Left eye: No discharge.      Extraocular Movements: Extraocular movements intact.      Pupils: Pupils are equal, round, and reactive to light.   Cardiovascular:      Rate and Rhythm: Normal rate and regular rhythm.      Pulses: Normal pulses.      Heart sounds: Normal heart sounds.   Pulmonary:      Effort: Pulmonary effort is normal.      Breath sounds: Normal breath sounds. No wheezing or rales.   Abdominal:      General: Bowel sounds are normal.      Palpations: Abdomen is soft. There is no mass.      Tenderness: There is abdominal tenderness.   Musculoskeletal:      Cervical back: Normal range of motion and neck supple.      Right lower leg: No edema.      Left lower leg: No edema.   Lymphadenopathy:      Cervical: No cervical adenopathy.   Neurological:      General: No focal deficit present.      Mental Status: She is alert and oriented to person, place, and time.        Result Review :                 Assessment and Plan    Diagnoses and all orders for this visit:    1. Recurrent " UTI (Primary)  -     POC Urinalysis Dipstick, Automated  -     Urine Culture - Urine, Urine, Clean Catch  -     Urinalysis With Microscopic - Urine, Clean Catch    Other orders  -     sulfamethoxazole-trimethoprim (Bactrim) 400-80 MG tablet; Take 1 tablet by mouth 2 (Two) Times a Day.  Dispense: 14 tablet; Refill: 0  -     Fluticasone Furoate-Vilanterol (Breo Ellipta) 100-25 MCG/INH inhaler; Inhale 1 puff Daily.  Dispense: 1 each; Refill: 3  -     Microscopic Examination -    Kanwal Sauer is seen here today for  Recurrent UTI, again had a Long discussion with patient and her daughter.  Urine dip done in the office.  Positive for leukocytes and protein.  We will send for culture.  I will start her on Bactrim.  I also talked to them about prophylactic antibiotic.          Follow Up   No follow-ups on file.  Patient was given instructions and counseling regarding her condition or for health maintenance advice. Please see specific information pulled into the AVS if appropriate.

## 2021-11-11 LAB
APPEARANCE UR: ABNORMAL
BACTERIA #/AREA URNS HPF: ABNORMAL /[HPF]
BACTERIA UR CULT: NORMAL
BACTERIA UR CULT: NORMAL
BILIRUB UR QL STRIP: NEGATIVE
CASTS URNS QL MICRO: ABNORMAL /LPF
COLOR UR: YELLOW
EPI CELLS #/AREA URNS HPF: >10 /HPF (ref 0–10)
GLUCOSE UR QL: NEGATIVE
HGB UR QL STRIP: ABNORMAL
KETONES UR QL STRIP: NEGATIVE
LEUKOCYTE ESTERASE UR QL STRIP: ABNORMAL
MICRO URNS: ABNORMAL
NITRITE UR QL STRIP: NEGATIVE
PH UR STRIP: 7 [PH] (ref 5–7.5)
PROT UR QL STRIP: ABNORMAL
RBC #/AREA URNS HPF: ABNORMAL /HPF (ref 0–2)
SP GR UR: 1.02 (ref 1–1.03)
UROBILINOGEN UR STRIP-MCNC: 0.2 MG/DL (ref 0.2–1)
WBC #/AREA URNS HPF: >30 /HPF (ref 0–5)

## 2021-11-19 ENCOUNTER — TELEPHONE (OUTPATIENT)
Dept: FAMILY MEDICINE CLINIC | Facility: CLINIC | Age: 71
End: 2021-11-19

## 2021-11-19 NOTE — TELEPHONE ENCOUNTER
Caller: Kanwal Sauer    Relationship to patient: Self    Best call back number: [phone]    Patient is needing: PATIENT WAS IN LAST WEEK WITH A UTI. SHE HAS FINISHED THE MEDS THAT SHE WAS GIVEN BUT IS STILL HAVING THE BURNING. PLEASE REACH OUT AND ADVISE.

## 2021-11-19 NOTE — TELEPHONE ENCOUNTER
LVM for Daughter.    Hub please inform patient if call back:   Since her urine culture was negative we will not recommend antibiotics.  She should try increasing her fluid intake and no sugar added cranberry juice. She cannot give her a hormonal cream either due to her history of breast cancers.

## 2021-11-22 ENCOUNTER — TELEPHONE (OUTPATIENT)
Dept: FAMILY MEDICINE CLINIC | Facility: CLINIC | Age: 71
End: 2021-11-22

## 2021-11-22 NOTE — TELEPHONE ENCOUNTER
Caller: SHELDON TYSON    Relationship: Emergency Contact    Best call back number: [phone]    What is the best time to reach you: ANYTIME    Who are you requesting to speak with (clinical staff, provider,  specific staff member): CLINICAL    What was the call regarding: PATIENTS DAUGHTER STATES THE PATIENT HAD NECK PAIN START A WEEK AGO AND IS NOW GOING DOWN THE RIGHT SIDE OF HER BACK. PATIENT STATES IT FEELS LIKE MUSCLE SPASMS.    PLEASE CALL AND ADVISE.

## 2021-11-23 ENCOUNTER — OFFICE VISIT (OUTPATIENT)
Dept: FAMILY MEDICINE CLINIC | Facility: CLINIC | Age: 71
End: 2021-11-23

## 2021-11-23 VITALS
HEIGHT: 66 IN | SYSTOLIC BLOOD PRESSURE: 134 MMHG | WEIGHT: 211.6 LBS | OXYGEN SATURATION: 92 % | BODY MASS INDEX: 34.01 KG/M2 | DIASTOLIC BLOOD PRESSURE: 82 MMHG | HEART RATE: 61 BPM

## 2021-11-23 DIAGNOSIS — M62.830 SPASM OF THORACIC BACK MUSCLE: Primary | ICD-10-CM

## 2021-11-23 PROCEDURE — 99213 OFFICE O/P EST LOW 20 MIN: CPT | Performed by: FAMILY MEDICINE

## 2021-11-23 RX ORDER — CYCLOBENZAPRINE HCL 10 MG
5 TABLET ORAL 3 TIMES DAILY PRN
Qty: 30 TABLET | Refills: 0 | OUTPATIENT
Start: 2021-11-23 | End: 2021-12-09

## 2021-11-23 RX ORDER — MAGNESIUM OXIDE 400 MG/1
400 TABLET ORAL DAILY
Qty: 30 TABLET | Refills: 0 | Status: SHIPPED | OUTPATIENT
Start: 2021-11-23 | End: 2022-07-14 | Stop reason: SDUPTHER

## 2021-11-23 NOTE — PROGRESS NOTES
"Chief Complaint  Neck Pain (C/o right hand neck and back pain x 1 week that has worsened and has developed knots onm neck and back) and Back Pain    Subjective          Kanwal Sauer presents to [facility]  History of Present Illness with complaints of on and off pain upper back.  Patient symptoms started 1 week ago with on and off right-sided upper back spasm.  Denies any injury.  Daughter is using over-the-counter creams but not helping.  She had history of similar spells in the past and has been on muscle relaxant but she overdosed on her muscle relaxant almost 2 years ago in [place].  Since she is not on muscle relaxant.  Patient was having on and off tightness of her right-sided muscle.  She is crying in pain during the episodes.    Objective   Vital Signs:   /82   Pulse 61   Ht 167.6 cm (66\")   Wt 96 kg (211 lb 9.6 oz)   SpO2 92%   BMI 34.15 kg/m²     Physical Exam  Constitutional:       General: She is not in acute distress.     Appearance: Normal appearance. She is well-developed.   HENT:      Head: Normocephalic and atraumatic.      Right Ear: Tympanic membrane normal.      Left Ear: Tympanic membrane normal.      Mouth/Throat:      Mouth: Mucous membranes are moist.   Eyes:      General:         Right eye: No discharge.         Left eye: No discharge.      Extraocular Movements: Extraocular movements intact.      Pupils: Pupils are equal, round, and reactive to light.   Cardiovascular:      Rate and Rhythm: Normal rate and regular rhythm.      Pulses: Normal pulses.      Heart sounds: Normal heart sounds.   Pulmonary:      Effort: Pulmonary effort is normal.      Breath sounds: Normal breath sounds. No wheezing or rales.   Abdominal:      General: Bowel sounds are normal.      Palpations: Abdomen is soft. There is no mass.      Tenderness: There is no abdominal tenderness.   Musculoskeletal:      Cervical back: Normal range of motion and neck supple. No " rigidity, spasms or torticollis. No pain with movement. Normal range of motion.      Thoracic back: Spasms present. Scoliosis present.      Right lower leg: No edema.      Left lower leg: No edema.   Lymphadenopathy:      Cervical: No cervical adenopathy.   Neurological:      General: No focal deficit present.      Mental Status: She is alert and oriented to person, place, and time.        Result Review :                 Assessment and Plan    Diagnoses and all orders for this visit:    1. Spasm of thoracic back muscle (Primary)    Other orders  -     magnesium oxide (MAG-OX) 400 MG tablet; Take 1 tablet by mouth Daily.  Dispense: 30 tablet; Refill: 0  -     cyclobenzaprine (FLEXERIL) 10 MG tablet; Take 0.5 tablets by mouth 3 (Three) Times a Day As Needed for Muscle Spasms (muscle spasms).  Dispense: 30 tablet; Refill: 0      Kanwal Sauer is a 71-year-old female patient seen today for on and off painful episode of muscle spasm.  I advised her to increase her p.o. fluid intake.  Daughter giving history that she is not drinking enough fluid.  Also advised TheraFlu heating pads .  Because of her history of overdosing on muscle relaxant 2 years ago I am, hesitant to start her on muscle relaxant.  She now lives with her daughter and daughter agrees to take care of her medication and she will start with half of the Flexeril as needed.  I will also start her on magnesium oxide        Follow Up   No follow-ups on file.  Patient was given instructions and counseling regarding her condition or for health maintenance advice. Please see specific information pulled into the AVS if appropriate.

## 2021-11-29 ENCOUNTER — TELEPHONE (OUTPATIENT)
Dept: FAMILY MEDICINE CLINIC | Facility: CLINIC | Age: 71
End: 2021-11-29

## 2021-12-01 NOTE — TELEPHONE ENCOUNTER
LVM for pt daughter.    Hub please inform patient if call back:    Dr. Britton says to stop the cyclobenzaprine at this time. How is she feeling? She hasn't had any since Sat/Sun still right?  
PATIENTS DAUGHTER SHELDON CALLING AND REQUESTING A CALL BACK FROM DR. FELICIANO OR THE NURSE, AS PATIENTS SUGAR IS RUNNING HIGH IT  EARLIER TODAY BUT NOW IT IS IN THE LOW 200S AN DROPPING NOW  SHE HAS BEEN GIVING HER PROTEIN, BUT SHE WOULD LIKE SOME TIPS ON KEEPING IT DOWN, SHELDON WOULD JUST LIKE A QUICK CALL BACK TO JUST DISCUSS, EVERYTHING,.      SHELDON CAN BE REACHED AT: [phone]  
Patient reports not feeling well but she is getting better day by day, she wasn't feeling well thanksgiving or black Friday. She now has a pain in right side when taking deep breath. Is having confusion on Sunday and was weak and fatigued to the point she needed help with lifting up out of bed or the toilet She also had taste issues. Her blood sugar was 285 yesterday. She has been giving her fluids and proteins since she had decrease in appetite.     She is able to get up on her own today with lower blood sugars along with improved mental status and doing . She wonders if this is side effects of coming off the muscle relaxers or if its her blood sugars?  
Provider: DR FELICIANO    Caller: SHELDON TYSON    Relationship to Patient: EMERGENCY CONTACT    Phone Number: [phone]    Reason for Call: PATIENT'S DAUGHTER STATED THAT OVER THE WEEKEND, THE PATIENT EXHIBITED WEAKNESS AND FATIGUE. SHE STATES THAT TODAY, SHE STILL NEEDS HELP GETTING UP OUT OF BED BUT IS MORE ENERGETIC. SHE ALSO STATES THAT SHE IS NOW HAVING PAIN IN HER LEFT SIDE AND IS NOT EATING MUCH BECAUSE SHE SAYS THAT THINGS ARE NOT TASTING RIGHT TO HER.     PLEASE CALL AND ADVISE   
DISPLAY PLAN FREE TEXT

## 2021-12-06 RX ORDER — PRIMIDONE 50 MG/1
50 TABLET ORAL 3 TIMES DAILY
Qty: 270 TABLET | Refills: 0 | Status: SHIPPED | OUTPATIENT
Start: 2021-12-06 | End: 2022-07-27

## 2021-12-06 NOTE — TELEPHONE ENCOUNTER
Medication is her Primidone      Please advise    Rx Refill Note  Requested Prescriptions     Pending Prescriptions Disp Refills   • primidone (MYSOLINE) 50 MG tablet 270 tablet 0     Sig: Take 1 tablet by mouth 3 (Three) Times a Day.      Last office visit with prescribing clinician: 11/23/2021      Next office visit with prescribing clinician: Visit date not found            Denadra Crabtree MA  12/06/21, 15:36 EST

## 2021-12-06 NOTE — TELEPHONE ENCOUNTER
Caller: Kanwal Sauer     Relationship to Patient: SELF     Pharmacy: [facility]/pharmacy #[id number] - [facility], [place] - [address] AT [facility] - [phone] [facility] [phone] FX    Phone Number: [phone]     Reason for Call: PATIENT WAS PRESCRIBED TRIMEDONE 50MG BY A DOCTOR IN [place] AND IS NEEDING THAT REFILLED. SHE WANTED TO SEE IF DR. FELICIANO IS ABLE TO REFILL THIS FOR HER.

## 2021-12-08 ENCOUNTER — TELEPHONE (OUTPATIENT)
Dept: FAMILY MEDICINE CLINIC | Facility: CLINIC | Age: 71
End: 2021-12-08

## 2021-12-08 ENCOUNTER — HOSPITAL ENCOUNTER (OUTPATIENT)
Dept: MAMMOGRAPHY | Facility: HOSPITAL | Age: 71
Discharge: HOME | End: 2021-12-08
Admitting: FAMILY MEDICINE

## 2021-12-08 DIAGNOSIS — Z12.31 ENCOUNTER FOR SCREENING MAMMOGRAM FOR BREAST CANCER: ICD-10-CM

## 2021-12-08 PROCEDURE — 77067 SCR MAMMO BI INCL CAD: CPT

## 2021-12-08 PROCEDURE — 77063 BREAST TOMOSYNTHESIS BI: CPT

## 2021-12-09 ENCOUNTER — APPOINTMENT (OUTPATIENT)
Dept: CT IMAGING | Facility: HOSPITAL | Age: 71
End: 2021-12-09

## 2021-12-09 ENCOUNTER — OFFICE VISIT (OUTPATIENT)
Dept: FAMILY MEDICINE CLINIC | Facility: CLINIC | Age: 71
End: 2021-12-09

## 2021-12-09 ENCOUNTER — HOSPITAL ENCOUNTER (EMERGENCY)
Facility: HOSPITAL | Age: 71
Discharge: HOME | End: 2021-12-09
Attending: EMERGENCY MEDICINE | Admitting: EMERGENCY MEDICINE

## 2021-12-09 VITALS
HEART RATE: 63 BPM | SYSTOLIC BLOOD PRESSURE: 132 MMHG | DIASTOLIC BLOOD PRESSURE: 64 MMHG | RESPIRATION RATE: 16 BRPM | TEMPERATURE: 97.1 F | OXYGEN SATURATION: 90 %

## 2021-12-09 VITALS
OXYGEN SATURATION: 94 % | DIASTOLIC BLOOD PRESSURE: 65 MMHG | SYSTOLIC BLOOD PRESSURE: 152 MMHG | HEART RATE: 63 BPM | TEMPERATURE: 96.4 F

## 2021-12-09 DIAGNOSIS — S29.019A THORACIC MYOFASCIAL STRAIN, INITIAL ENCOUNTER: Primary | ICD-10-CM

## 2021-12-09 DIAGNOSIS — M54.6 ACUTE MIDLINE THORACIC BACK PAIN: Primary | ICD-10-CM

## 2021-12-09 LAB
ALBUMIN SERPL-MCNC: 4.2 G/DL (ref 3.5–5.2)
ALBUMIN/GLOB SERPL: 1.5 G/DL
ALP SERPL-CCNC: 49 U/L (ref 39–117)
ALT SERPL W P-5'-P-CCNC: 11 U/L (ref 1–33)
ANION GAP SERPL CALCULATED.3IONS-SCNC: 9.3 MMOL/L (ref 5–15)
AST SERPL-CCNC: 16 U/L (ref 1–32)
BASOPHILS # BLD AUTO: 0.01 10*3/MM3 (ref 0–0.2)
BASOPHILS NFR BLD AUTO: 0.1 % (ref 0–1.5)
BILIRUB SERPL-MCNC: 0.3 MG/DL (ref 0–1.2)
BUN SERPL-MCNC: 26 MG/DL (ref 8–23)
BUN/CREAT SERPL: 32.5 (ref 7–25)
CALCIUM SPEC-SCNC: 10.5 MG/DL (ref 8.6–10.5)
CHLORIDE SERPL-SCNC: 103 MMOL/L (ref 98–107)
CO2 SERPL-SCNC: 30.7 MMOL/L (ref 22–29)
CREAT SERPL-MCNC: 0.8 MG/DL (ref 0.57–1)
DEPRECATED RDW RBC AUTO: 43 FL (ref 37–54)
EOSINOPHIL # BLD AUTO: 0.09 10*3/MM3 (ref 0–0.4)
EOSINOPHIL NFR BLD AUTO: 1.1 % (ref 0.3–6.2)
ERYTHROCYTE [DISTWIDTH] IN BLOOD BY AUTOMATED COUNT: 13.1 % (ref 12.3–15.4)
GFR SERPL CREATININE-BSD FRML MDRD: 70 ML/MIN/1.73
GLOBULIN UR ELPH-MCNC: 2.8 GM/DL
GLUCOSE SERPL-MCNC: 107 MG/DL (ref 65–99)
HCT VFR BLD AUTO: 38.4 % (ref 34–46.6)
HGB BLD-MCNC: 12.8 G/DL (ref 12–15.9)
IMM GRANULOCYTES # BLD AUTO: 0.1 10*3/MM3 (ref 0–0.05)
IMM GRANULOCYTES NFR BLD AUTO: 1.2 % (ref 0–0.5)
LYMPHOCYTES # BLD AUTO: 1.29 10*3/MM3 (ref 0.7–3.1)
LYMPHOCYTES NFR BLD AUTO: 16.1 % (ref 19.6–45.3)
MCH RBC QN AUTO: 29.8 PG (ref 26.6–33)
MCHC RBC AUTO-ENTMCNC: 33.3 G/DL (ref 31.5–35.7)
MCV RBC AUTO: 89.5 FL (ref 79–97)
MONOCYTES # BLD AUTO: 0.36 10*3/MM3 (ref 0.1–0.9)
MONOCYTES NFR BLD AUTO: 4.5 % (ref 5–12)
NEUTROPHILS NFR BLD AUTO: 6.17 10*3/MM3 (ref 1.7–7)
NEUTROPHILS NFR BLD AUTO: 77 % (ref 42.7–76)
NRBC BLD AUTO-RTO: 0 /100 WBC (ref 0–0.2)
PLATELET # BLD AUTO: 235 10*3/MM3 (ref 140–450)
PMV BLD AUTO: 10.4 FL (ref 6–12)
POTASSIUM SERPL-SCNC: 4.5 MMOL/L (ref 3.5–5.2)
PROT SERPL-MCNC: 7 G/DL (ref 6–8.5)
RBC # BLD AUTO: 4.29 10*6/MM3 (ref 3.77–5.28)
SODIUM SERPL-SCNC: 143 MMOL/L (ref 136–145)
WBC NRBC COR # BLD: 8.02 10*3/MM3 (ref 3.4–10.8)

## 2021-12-09 PROCEDURE — 63710000001 ONDANSETRON ODT 4 MG TABLET DISPERSIBLE: Performed by: EMERGENCY MEDICINE

## 2021-12-09 PROCEDURE — 80053 COMPREHEN METABOLIC PANEL: CPT | Performed by: EMERGENCY MEDICINE

## 2021-12-09 PROCEDURE — 99283 EMERGENCY DEPT VISIT LOW MDM: CPT

## 2021-12-09 PROCEDURE — 72131 CT LUMBAR SPINE W/O DYE: CPT

## 2021-12-09 PROCEDURE — 85025 COMPLETE CBC W/AUTO DIFF WBC: CPT | Performed by: EMERGENCY MEDICINE

## 2021-12-09 PROCEDURE — 99214 OFFICE O/P EST MOD 30 MIN: CPT | Performed by: NURSE PRACTITIONER

## 2021-12-09 RX ORDER — HYDROCODONE BITARTRATE AND ACETAMINOPHEN 7.5; 325 MG/1; MG/1
1 TABLET ORAL ONCE
Status: COMPLETED | OUTPATIENT
Start: 2021-12-09 | End: 2021-12-09

## 2021-12-09 RX ORDER — HYDROCODONE BITARTRATE AND ACETAMINOPHEN 5; 325 MG/1; MG/1
1 TABLET ORAL EVERY 6 HOURS PRN
Qty: 15 TABLET | Refills: 0 | Status: SHIPPED | OUTPATIENT
Start: 2021-12-09 | End: 2022-07-14

## 2021-12-09 RX ORDER — ONDANSETRON 4 MG/1
4 TABLET, ORALLY DISINTEGRATING ORAL 4 TIMES DAILY PRN
Qty: 15 TABLET | Refills: 0 | Status: SHIPPED | OUTPATIENT
Start: 2021-12-09 | End: 2022-04-15

## 2021-12-09 RX ORDER — ONDANSETRON 4 MG/1
4 TABLET, ORALLY DISINTEGRATING ORAL ONCE
Status: COMPLETED | OUTPATIENT
Start: 2021-12-09 | End: 2021-12-09

## 2021-12-09 RX ADMIN — ONDANSETRON 4 MG: 4 TABLET, ORALLY DISINTEGRATING ORAL at 17:41

## 2021-12-09 RX ADMIN — HYDROCODONE BITARTRATE AND ACETAMINOPHEN 1 TABLET: 7.5; 325 TABLET ORAL at 17:41

## 2021-12-09 NOTE — ED NOTES
Patient c/o back spasms that started about a week ago. Pain is currently a 0/10, but was a 10/10 when she first came to the ER. States she was prescribed a muscle relaxer with no relief. Only alleviating factors are repositioning and heat therapy.     Pt noted to have mask on when this RN entered the room.  This RN wore appropriate PPE throughout our encounter. Hand hygiene performed upon entering and exiting room.       Birgit Harkins, LUCILLE  12/09/21 5413

## 2021-12-09 NOTE — PROGRESS NOTES
Chief Complaint  Spasms (c/o back spasms, trouble sitting in chair, not better )    Subjective          Kanwal Sauer presents to [facility]  History of Present Illness new pt to me.  Here with daughter for mid back pain that has been ongoing since mid November.  Saw PCP and was cautiously started on 5mg flexeril TID-she was taking regularly-not much improvement.  Then became confused and blood sugars went up.  Stopped muscle relaxer and confusion resolved/blood sugars normalized. Restarted 5 mg flexeril Jt night TID and she is not really having any relief. Was also given mag-ox without relief.    Heat gave some temporary relief but not significant.      Has had several falls in past-none recently-does not think there was any imaging done after last fall this past spring.      No BB changes, no change in leg strength. Pt sometimes points to mid lower T spine as source of pain, also points b/l paraspinal muscles lower T spine. In w/c-is able to stand for exam.     Pt is jerking in chair intermittently-not sure if this is histrionic, also crying from pain during visit.     Objective   Vital Signs:   /65   Pulse 63   Temp 96.4 °F (35.8 °C)   SpO2 94%     Physical Exam  Vitals and nursing note reviewed.   Constitutional:       General: She is not in acute distress.     Appearance: She is well-developed. She is not diaphoretic.   HENT:      Head: Normocephalic and atraumatic.   Eyes:      General:         Right eye: No discharge.         Left eye: No discharge.      Conjunctiva/sclera: Conjunctivae normal.   Cardiovascular:      Rate and Rhythm: Normal rate and regular rhythm.   Pulmonary:      Effort: Pulmonary effort is normal.      Breath sounds: Normal breath sounds.   Abdominal:      General: Bowel sounds are normal.      Palpations: Abdomen is soft.      Tenderness: There is no abdominal tenderness.   Musculoskeletal:         General: No deformity.      Thoracic back:  Tenderness and bony tenderness present. No deformity. Decreased range of motion.        Back:    Skin:     General: Skin is warm and dry.   Neurological:      Mental Status: She is alert and oriented to person, place, and time.        Result Review :                 Assessment and Plan    Diagnoses and all orders for this visit:    1. Acute midline thoracic back pain (Primary)    reviewed PCP note from 11/23 for same.  Since no improvement I am referring to ER.  In reviewing CT abd from this past Sept-she has pancreatic lesions unknown etiology which are being monitored by PCP as well as sclerotic lesion posterior medial left rib unchanged from previous.  Bone scan from spring showed scoliotic spine with multilevel DDD uptake-no metastatic disease seen. She had a sclerotic lesion at C1 on CT spine.     She needs more urgent eval make sure no lesions, fx.  Has been getting conservative tx without improvement.  I cannot give her anything here today to improve her pain.  She is also anticoagulated. Not dominic muscle relaxers.     Daughter is in agreement with this plan and will go to [facility] from here.  We discussed s/s requiring emergent tx.     Discussed this with patients PCP who is in agreement with this plan.           Follow Up   No follow-ups on file.  Patient was given instructions and counseling regarding her condition or for health maintenance advice. Please see specific information pulled into the AVS if appropriate.

## 2021-12-09 NOTE — ED PROVIDER NOTES
EMERGENCY DEPARTMENT ENCOUNTER    Room Number:  A03/03  Date of encounter:  12/9/2021  PCP: Nisha Britton MD  Historian: Patient      I used full protective equipment while examining this patient.  This includes face mask, gloves and protective eyewear.  I washed my hands before entering the room and immediately upon leaving the room      HPI:  Chief Complaint: Back pain  A complete HPI/ROS/PMH/PSH/SH/FH are unobtainable due to: Nothing    Context: Kanwal Sauer is a 71 y.o. female who presents to the ED c/o atraumatic back pain x1 to 2 weeks.  Patient states the pain seems to come in waves.  The pain is worse with movement.  The pain is localized to the upper lumbar spine.  There is no radiation of pain down the patient's legs.  She denies any numbness, tingling, weakness to the extremities.  Denies incontinence.  The pain is severe at times and can completely resolve.  Patient has seen her family doctor twice for this.  Patient denies any improvement with muscle relaxers.  She was referred to the ER for worsening pain today.  Patient was noted to have pancreatic lesions on a CT scan from 9/5/2021.  Family doctor was concerned of potential fracture versus metastases.  Patient takes Eliquis for history of A. fib.    Review of Medical Records  I reviewed patient's office visits with her family medicine doctor from 11/23/2021, as well as the office visit today.    PAST MEDICAL HISTORY  Active Ambulatory Problems     Diagnosis Date Noted   • Closed fracture of left distal femur ([facility]) 12/23/2020   • Type 2 diabetes mellitus with circulatory disorder, without long-term current use of insulin ([facility]) 12/24/2020   • Essential hypertension 12/24/2020   • Tremors of nervous system 12/26/2020   • PAF (paroxysmal atrial fibrillation) ([facility]) 12/27/2020   • TARSHA (obstructive sleep apnea) 12/27/2020   • Chronic respiratory failure with hypoxia ([facility]) 12/27/2020   • History of breast cancer 02/22/2021   • History of seizure  05/25/2021   • Risk for falls 05/25/2021     Resolved Ambulatory Problems     Diagnosis Date Noted   • CANDICE (acute kidney injury) (HCC) 12/24/2020   • Acute blood loss anemia 12/24/2020     Past Medical History:   Diagnosis Date   • Anxiety    • Asthma    • Atrial fibrillation (HCC)    • Cancer (HCC)    • Deep vein thrombosis (HCC)    • Diabetes mellitus (HCC)    • Difficulty walking    • Drug therapy    • Environmental allergies    • Fractures    • Headache, tension-type    • Hyperlipidemia    • Hypertension    • 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)    • Seizures (HCC)    • Sleep apnea    • Tremor    • Weakness          PAST SURGICAL HISTORY  Past Surgical History:   Procedure Laterality Date   • BREAST BIOPSY     • CATARACT EXTRACTION, BILATERAL     • DENTAL PROCEDURE      Removed teeth   • FEMUR OPEN REDUCTION INTERNAL FIXATION Left 12/24/2020    Procedure: DISTAL FEMUR OPEN REDUCTION INTERNAL FIXATION;  Surgeon: Marley Hernandez MD;  Location: [facility];  Service: Orthopedics;  Laterality: Left;   • MASTECTOMY Left    • REPLACEMENT TOTAL KNEE BILATERAL           FAMILY HISTORY  Family History   Problem Relation Age of Onset   • Arthritis Mother    • Lung cancer Mother    • Brain cancer Mother    • Hypertension Mother    • Hypertension Father    • Arthritis Sister    • Breast cancer Sister    • Diabetes Sister    • Hypertension Sister    • Dementia Sister          SOCIAL HISTORY  Social History     Socioeconomic History   • Marital status:    Tobacco Use   • Smoking status: Never Smoker   • Smokeless tobacco: Never Used   • Tobacco comment: no caffine   Substance and Sexual Activity   • Alcohol use: Not Currently   • Drug use: Never   • Sexual activity: Yes     Partners: Male         ALLERGIES  Baclofen; Doxycycline; Eggs or egg-derived products; Iodine; Latex; Milk-related compounds; Msg [monosodium glutamate]; Penicillins; Metronidazole; Ezetimibe; Latex, natural rubber; Nylon; and  Simvastatin        REVIEW OF SYSTEMS  All systems reviewed and negative except for those discussed in HPI.       PHYSICAL EXAM    I have reviewed the triage vital signs and nursing notes.    ED Triage Vitals   Temp Heart Rate Resp BP SpO2   12/09/21 1517 12/09/21 1517 12/09/21 1517 12/09/21 1520 12/09/21 1517   97.1 °F (36.2 °C) 60 16 110/64 90 %      Temp src Heart Rate Source Patient Position BP Location FiO2 (%)   -- 12/09/21 1703 -- -- --    Monitor          Physical Exam  GENERAL: Alert, oriented, not distressed  HENT: head atraumatic, no nuchal rigidity  EYES: no scleral icterus, EOMI  CV: regular rhythm, regular rate, no murmur  RESPIRATORY: normal effort, CTA  ABDOMEN: soft, nontender  MUSCULOSKELETAL: Mildly decreased range of motion of thoracolumbar spine.  No tenderness to spine.  No rash.  NEURO: alert, moves all extremities, follows commands  SKIN: warm, dry        LAB RESULTS  Recent Results (from the past 24 hour(s))   Comprehensive Metabolic Panel    Collection Time: 12/09/21  5:51 PM    Specimen: Blood   Result Value Ref Range    Glucose 107 (H) 65 - 99 mg/dL    BUN 26 (H) 8 - 23 mg/dL    Creatinine 0.80 0.57 - 1.00 mg/dL    Sodium 143 136 - 145 mmol/L    Potassium 4.5 3.5 - 5.2 mmol/L    Chloride 103 98 - 107 mmol/L    CO2 30.7 (H) 22.0 - 29.0 mmol/L    Calcium 10.5 8.6 - 10.5 mg/dL    Total Protein 7.0 6.0 - 8.5 g/dL    Albumin 4.20 3.50 - 5.20 g/dL    ALT (SGPT) 11 1 - 33 U/L    AST (SGOT) 16 1 - 32 U/L    Alkaline Phosphatase 49 39 - 117 U/L    Total Bilirubin 0.3 0.0 - 1.2 mg/dL    eGFR Non African Amer 70 >60 mL/min/1.73    Globulin 2.8 gm/dL    A/G Ratio 1.5 g/dL    BUN/Creatinine Ratio 32.5 (H) 7.0 - 25.0    Anion Gap 9.3 5.0 - 15.0 mmol/L   CBC Auto Differential    Collection Time: 12/09/21  5:51 PM    Specimen: Blood   Result Value Ref Range    WBC 8.02 3.40 - 10.80 10*3/mm3    RBC 4.29 3.77 - 5.28 10*6/mm3    Hemoglobin 12.8 12.0 - 15.9 g/dL    Hematocrit 38.4 34.0 - 46.6 %    MCV 89.5  79.0 - 97.0 fL    MCH 29.8 26.6 - 33.0 pg    MCHC 33.3 31.5 - 35.7 g/dL    RDW 13.1 12.3 - 15.4 %    RDW-SD 43.0 37.0 - 54.0 fl    MPV 10.4 6.0 - 12.0 fL    Platelets 235 140 - 450 10*3/mm3    Neutrophil % 77.0 (H) 42.7 - 76.0 %    Lymphocyte % 16.1 (L) 19.6 - 45.3 %    Monocyte % 4.5 (L) 5.0 - 12.0 %    Eosinophil % 1.1 0.3 - 6.2 %    Basophil % 0.1 0.0 - 1.5 %    Immature Grans % 1.2 (H) 0.0 - 0.5 %    Neutrophils, Absolute 6.17 1.70 - 7.00 10*3/mm3    Lymphocytes, Absolute 1.29 0.70 - 3.10 10*3/mm3    Monocytes, Absolute 0.36 0.10 - 0.90 10*3/mm3    Eosinophils, Absolute 0.09 0.00 - 0.40 10*3/mm3    Basophils, Absolute 0.01 0.00 - 0.20 10*3/mm3    Immature Grans, Absolute 0.10 (H) 0.00 - 0.05 10*3/mm3    nRBC 0.0 0.0 - 0.2 /100 WBC       Ordered the above labs and independently reviewed the results.        RADIOLOGY  CT Lumbar Spine Without Contrast    Result Date: 12/9/2021  CT OF THE LUMBAR SPINE WITHOUT CONTRAST 12/09/2021  HISTORY: Low back pain.   TECHNIQUE: Axial images were obtained from the lower thoracic spine to the sacrum. Sagittal and coronal reconstruction images were reviewed.  COMPARISON; The previous CT of the lumbar spine dated 12/23/2020 is compared.  FINDINGS: There is approximately 6 mm retrolisthesis of L1 on L2. There is approximately 4 mm anterolisthesis of L4 on L5 and approximately 3 mm anterolisthesis of L5 on S1.  No other subluxation is seen.  There is narrowing of all of the lower thoracic and lumbar intervertebral discs with multilevel vacuum disc phenomenon and hypertrophic change. This appears relatively severe in the lower thoracic and upper to mid lumbar spine and less severe at L4-5 and L5-S1.   No acute lumbar fractures are seen. There is multilevel facet joint arthropathy.      1. Severe lumbar degenerative disease. This appears similar to the previous study of 12/23/2020. 2. No lumbar fractures are seen.   Radiation dose reduction techniques were utilized, including automated  exposure control and exposure modulation based on body size.         I ordered the above noted radiological studies. Reviewed by me and discussed with radiologist.  See dictation for official radiology interpretation.      MEDICATIONS GIVEN IN ER    Medications   HYDROcodone-acetaminophen (NORCO) 7.5-325 MG per tablet 1 tablet (1 tablet Oral Given 12/9/21 1741)   ondansetron ODT (ZOFRAN-ODT) disintegrating tablet 4 mg (4 mg Oral Given 12/9/21 1741)         PROGRESS, DATA ANALYSIS, CONSULTS, AND MEDICAL DECISION MAKING    All labs have been independently reviewed by me.  All radiology studies have been reviewed by me and discussed with radiologist dictating the report.   EKG's independently viewed and interpreted by me.  Discussion below represents my analysis of pertinent findings related to patient's condition, differential diagnosis, treatment plan and final disposition.    I have discussed case with Dr. Kaye, emergency room physician.  He has performed his own bedside examination and agrees with treatment plan.    ED Course as of 12/09/21 2016   Thu Dec 09, 2021   1732 Patient presents with 1 week history of atraumatic thoracolumbar back pain.  Differential diagnoses include but not limited to muscular strain, compression fracture, metastatic bone lesions. [EE]   1832 WBC: 8.02 [EE]   1832 Hemoglobin: 12.8 [EE]   1832 Creatinine: 0.80 [EE]   1919 Recheck of patient.  States her pain is improved after p.o. pain medicine.  Patient able to stand out of the wheelchair without pain.  Still waiting CT scan results. [EE]   1959 CT scan shows moderate degenerative changes without acute fracture or metastases.  Plan to send patient home with pain medicine. [EE]   2014 Updated patient and family on plan.  Patient is in somewhat of a bind.  Patient is on Eliquis so she cannot take anti-inflammatories.  Patient has had difficulty in the past tolerating muscle relaxers.  I explained that we will do a small course of pain  medication.  This did seem to help her while she was in the ER.  They are in agreement with this treatment plan. [EE]      ED Course User Index  [EE] Chace Gonzalez PA       AS OF 20:16 EST VITALS:    BP - 135/61  HR - 58  TEMP - 97.1 °F (36.2 °C)  O2 SATS - 90%        DIAGNOSIS  Final diagnoses:   Thoracic myofascial strain, initial encounter         DISPOSITION  Discharged           Chace Gonzalez PA  12/09/21 2016

## 2021-12-09 NOTE — ED PROVIDER NOTES
17:36 EST  Patient seen and examined with physician assistant.  Briefly patient presents for evaluation of back pain.  Pain has been there for the last week or 2.  No trauma.  Patient states pain used to be on the right side and outs on the left side.  Has seen primary doctor and given muscle relaxers that has not helped.  Was sent in for evaluation.          On exam patient is alert cooperative in no distress.  Patient's heart is regular rate and rhythm and lungs are clear bilaterally.  Patient does have tenderness to her lower back.            Plan will be CT scan pain medications.      MD ATTESTATION NOTE    The RAAD and I have discussed this patient's history, physical exam, and treatment plan.  I have reviewed the documentation and personally had a face to face interaction with the patient. I affirm the documentation and agree with the treatment and plan.  The attached note describes my personal findings.      Patient was wearing a face mask when I entered the room and they continued to wear a mask throughout their stay in the ED.  I wore PPE, including  gloves, face mask with shield or face mask with goggles whenever I was in the room with patient.      Dom Kaye MD  12/09/21 [id number]

## 2021-12-09 NOTE — ED TRIAGE NOTES
Pt reports intermittent muscle spasms in her back that started 3wks ago. Pt was seen at her pcp and placed on muscle relaxer's with no relief. Pt was directed to the ER for further evaluation.     Pt was wearing a mask during assessment.  This RN wore appropriate PPE

## 2021-12-23 ENCOUNTER — OFFICE VISIT (OUTPATIENT)
Dept: NEUROLOGY | Facility: CLINIC | Age: 71
End: 2021-12-23

## 2021-12-23 VITALS
SYSTOLIC BLOOD PRESSURE: 138 MMHG | HEIGHT: 66 IN | DIASTOLIC BLOOD PRESSURE: 78 MMHG | WEIGHT: 208.2 LBS | BODY MASS INDEX: 33.46 KG/M2 | HEART RATE: 68 BPM | OXYGEN SATURATION: 98 %

## 2021-12-23 DIAGNOSIS — Z91.81 RISK FOR FALLS: ICD-10-CM

## 2021-12-23 DIAGNOSIS — Z87.898 HISTORY OF SEIZURE: ICD-10-CM

## 2021-12-23 DIAGNOSIS — G25.0 BENIGN ESSENTIAL TREMOR: Primary | ICD-10-CM

## 2021-12-23 PROCEDURE — 99214 OFFICE O/P EST MOD 30 MIN: CPT | Performed by: NURSE PRACTITIONER

## 2021-12-23 NOTE — PROGRESS NOTES
"DOS: 2021  NAME: Kanwal Sauer   : 1950  PCP: Nisha Britton MD    Chief Complaint   Patient presents with   • Tremors      SUBJECTIVE  Neurological Problem:  71 y.o. RHW female with seizures, essential tremor, HTN, DM, HLD, Afib (on Eliqius), TARSHA, Asthma on nocturnal O2, chronic UTIs and h/o breast CA (s/p left mastectomy), scorliosis and lumbar DDD who presents for f/u. She is accopanied by her daughter Monica.     Interval History:   **For detailed previous history, please see progress note dated 2021.    Ms. Sauer was initially evaluated by Dr. Washington for h/o provoked seizure and essential tremor. She has not been on any AEDs, last seen by me in May 2021 and reported that she has had no further seizure-like episodes.  In regards to tremor, symptoms were fairly well controlled on primidone 50 mg TID.     She presents today and continues on primidone 50 mg TID, tolerating well, thinks her tremors \"are about the same\". She does have difficulty with writing and has not been doing many crafts d/t tremor. It was discussed previously that she could take and additional dose of primidone, another 50 mg tablet when anticipating going out to dinner, doing crafts, etc. She continues to live with her daughter, her spouse evidently has been placed in a facility, her special needs daughter also lives with her. She was recently at the ED for complaints of back pain, is a history of a pancreatic lesion, T of the lumbar spine showed severe lumbar DDD, no fractures. Apparently she has been getting up at night more frequently to go to the bathroom, daughter now thinking she may have a UTI. Does have a chronic history of this. Sleep is okay other than getting up frequently recently, does have some anxiety that keeps her awake at times. Appetite is good, no problems with swallowing, no choking. No recent falls. She is utilizing Rollator today.    Review of Systems:Review of Systems   Constitutional: Positive for " fatigue. Negative for activity change, appetite change, chills, diaphoresis, fever and unexpected weight change.   HENT: Negative for congestion, dental problem, drooling, ear discharge, ear pain, facial swelling, hearing loss, mouth sores, nosebleeds, postnasal drip, rhinorrhea, sinus pressure, sinus pain, sneezing, sore throat, tinnitus, trouble swallowing and voice change.    Eyes: Negative for photophobia, pain, discharge, redness, itching and visual disturbance.   Musculoskeletal: Positive for gait problem and joint swelling. Negative for arthralgias, back pain, myalgias, neck pain and neck stiffness.   Neurological: Positive for tremors, weakness and headaches. Negative for dizziness, seizures, syncope, facial asymmetry, speech difficulty, light-headedness and numbness.   Psychiatric/Behavioral: Positive for confusion and sleep disturbance. Negative for agitation, behavioral problems, decreased concentration, dysphoric mood, hallucinations, self-injury and suicidal ideas. The patient is nervous/anxious. The patient is not hyperactive.     Above ROS reviewed    The following portions of the patient's history were reviewed and updated as appropriate: allergies, current medications, past family history, past medical history, past social history, past surgical history and problem list.    Current Medications:   Current Outpatient Medications:   •  acetaminophen (TYLENOL) 325 MG tablet, Take 2 tablets by mouth Every 4 (Four) Hours As Needed for Mild Pain ., Disp:  , Rfl:   •  albuterol sulfate  (90 Base) MCG/ACT inhaler, Inhale 2 puffs Every 4 (Four) Hours As Needed for Wheezing., Disp: 18 g, Rfl: 1  •  citalopram (CeleXA) 20 MG tablet, Take 1 tablet by mouth Daily., Disp: 90 tablet, Rfl: 0  •  CRANBERRY PO, Take 650 mg by mouth., Disp: , Rfl:   •  Eliquis 5 MG tablet tablet, TAKE 1 TABLET BY MOUTH EVERY 12 HOURS, Disp: 60 tablet, Rfl: 2  •  fenofibrate 160 MG tablet, TAKE 1 TABLET BY MOUTH EVERY DAY, Disp:  90 tablet, Rfl: 1  •  Fluticasone Furoate-Vilanterol (Breo Ellipta) 100-25 MCG/INH inhaler, Inhale 1 puff Daily., Disp: 1 each, Rfl: 3  •  losartan (COZAAR) 50 MG tablet, TAKE 1 TABLET BY MOUTH EVERY DAY, Disp: 90 tablet, Rfl: 1  •  metFORMIN (GLUCOPHAGE) 1000 MG tablet, TAKE 1 TABLET BY MOUTH TWICE A DAY WITH MEALS, Disp: 180 tablet, Rfl: 0  •  metoprolol succinate XL (TOPROL-XL) 25 MG 24 hr tablet, Take 2 tablets by mouth Daily., Disp: 90 tablet, Rfl: 3  •  montelukast (SINGULAIR) 10 MG tablet, Take 1 tablet by mouth every night at bedtime., Disp: 90 tablet, Rfl: 1  •  primidone (MYSOLINE) 50 MG tablet, Take 1 tablet by mouth 3 (Three) Times a Day., Disp: 270 tablet, Rfl: 0  •  Probiotic Product (PROBIOTIC-10 PO), Take  by mouth., Disp: , Rfl:   •  furosemide (LASIX) 40 MG tablet, Take 1 tablet by mouth Daily. 1/2 po qd as needed for swelling, Disp: 90 tablet, Rfl: 0  •  HYDROcodone-acetaminophen (NORCO) 5-325 MG per tablet, Take 1 tablet by mouth Every 6 (Six) Hours As Needed for Moderate Pain ., Disp: 15 tablet, Rfl: 0  •  magnesium oxide (MAG-OX) 400 MG tablet, Take 1 tablet by mouth Daily., Disp: 30 tablet, Rfl: 0  •  Mirabegron ER (Myrbetriq) 25 MG tablet sustained-release 24 hour 24 hr tablet, Take 1 tablet by mouth Daily., Disp: 90 tablet, Rfl: 1  •  ondansetron ODT (ZOFRAN-ODT) 4 MG disintegrating tablet, Place 1 tablet on the tongue 4 (Four) Times a Day As Needed for Nausea or Vomiting., Disp: 15 tablet, Rfl: 0  •  potassium chloride (K-DUR,KLOR-CON) 10 MEQ CR tablet, Take 10 mEq by mouth 1 (One) Time As Needed. Only take with lasix, Disp: , Rfl:   •  sulfamethoxazole-trimethoprim (BACTRIM DS,SEPTRA DS) 800-160 MG per tablet, Take 1 tablet by mouth 2 (Two) Times a Day for 7 days., Disp: 14 tablet, Rfl: 0  **I did not stop or change the above medications.  Patient's medication list was updated to reflect medications they have reported as currently taking, including medication changes made by other  providers.    OBJECTIVE  Vitals:    12/23/21 1042   BP: 138/78   Pulse: 68   SpO2: 98%     Body mass index is 33.62 kg/m².    Diagnostics:    Laboratory Results:         Lab Results   Component Value Date    WBC 8.02 12/09/2021    HGB 12.8 12/09/2021    HCT 38.4 12/09/2021    MCV 89.5 12/09/2021     12/09/2021     Lab Results   Component Value Date    GLUCOSE 107 (H) 12/09/2021    BUN 26 (H) 12/09/2021    CREATININE 0.80 12/09/2021    EGFRIFNONA 70 12/09/2021    EGFRIFAFRI 84 09/16/2021    BCR 32.5 (H) 12/09/2021    K 4.5 12/09/2021    CO2 30.7 (H) 12/09/2021    CALCIUM 10.5 12/09/2021    PROTENTOTREF 6.0 09/16/2021    ALBUMIN 4.20 12/09/2021    LABIL2 3.0 09/16/2021    AST 16 12/09/2021    ALT 11 12/09/2021     Lab Results   Component Value Date    HGBA1C 5.50 06/22/2021     Lab Results   Component Value Date    CHOL 180 03/16/2021    CHOL 164 10/02/2020     Lab Results   Component Value Date    HDL 60 03/16/2021    HDL 71 (H) 10/02/2020     Lab Results   Component Value Date     (H) 03/16/2021    LDL 77 10/02/2020     Lab Results   Component Value Date    TRIG 95 03/16/2021    TRIG 81 10/02/2020     No results found for: RPR  Lab Results   Component Value Date    TSH 1.570 03/16/2021     No results found for: AARRROPX00    Physical Exam:  GENERAL: NAD  HEENT: Normocephalic, atraumatic   COR: RRR  Resp: Even and unlabored  Extremities: Mildly decreased ROM of bilateral shoulders  Skin: No rashes, lesions or ulcers.  Psychiatric: Normal mood and affect.    Neurological:   MS: Alert. Oriented to person, place, daughter. Language normal. No neglect. Follows all commands.  CN: II-XII grossly normal  Motor: Normal strength and tone in BUE, postrual tremors of both hands, R slightly worse than L.  Bilateral lower extremities with decreased hip flexor strength. Mild cephalic tremor. NO restring tremor.   Sensory: Intact to light touch in arms and legs  Station and Gait: Antalgic gait, utilizing rollator.     Coordination: Normal finger to nose bilaterally    Impression/Plan: Ms. Sauer presents to follow-up for essential tremor, fairly well controlled on current dose of primidone 50 mg 3 times daily. We discussed increasing dose, she will continue current regimen but add an additional pill if necessary for fine motor tasks. We also reviewed lifestyle changes including utilizing a writing hand glove, patient is interested in trying this. We reviewed the importance of continued daily exercise to help with mobility, also the importance of falls risk prevention. She will follow-up here in 6 months, sooner symptoms warrant.    I spent a total of 30 minutes today in reviewing records, prior diagnostics, examination of patient as well as counseling and educating patient regarding diagnoses, symptoms, reviewing diagnostics with patient, pharmacologic treatment options including purpose, risk, benefits, possible side-effects, recommendations, lifestyle modifications, coordination of care and documenting plan of care.      There are no diagnoses linked to this encounter.    Coding      Dictated using Dragon

## 2022-02-07 RX ORDER — LOSARTAN POTASSIUM 50 MG/1
TABLET ORAL
Qty: 90 TABLET | Refills: 1 | Status: SHIPPED | OUTPATIENT
Start: 2022-02-07 | End: 2022-09-29

## 2022-02-07 NOTE — TELEPHONE ENCOUNTER
Rx Refill Note  Requested Prescriptions     Pending Prescriptions Disp Refills   • losartan (COZAAR) 50 MG tablet [Pharmacy Med Name: LOSARTAN POTASSIUM 50 MG TAB] 90 tablet 1     Sig: TAKE 1 TABLET BY MOUTH EVERY DAY      Last office visit with prescribing clinician: 11/23/2021      Next office visit with prescribing clinician: Visit date not found            Deandra Crabtree MA  02/07/22, 08:55 EST

## 2022-02-10 RX ORDER — CYCLOBENZAPRINE HCL 10 MG
5 TABLET ORAL 3 TIMES DAILY PRN
Qty: 30 TABLET | Refills: 0 | OUTPATIENT
Start: 2022-02-10

## 2022-02-10 RX ORDER — CITALOPRAM 20 MG/1
TABLET ORAL
Qty: 90 TABLET | Refills: 0 | Status: SHIPPED | OUTPATIENT
Start: 2022-02-10 | End: 2022-07-14 | Stop reason: SDUPTHER

## 2022-02-10 NOTE — TELEPHONE ENCOUNTER
The Flexiril was D/rosie by another provider due to side effects  .    Rx Refill Note  Requested Prescriptions     Pending Prescriptions Disp Refills   • citalopram (CeleXA) 20 MG tablet [Pharmacy Med Name: CITALOPRAM HBR 20 MG TABLET] 90 tablet 0     Sig: TAKE 1 TABLET BY MOUTH EVERY DAY     Refused Prescriptions Disp Refills   • cyclobenzaprine (FLEXERIL) 10 MG tablet [Pharmacy Med Name: CYCLOBENZAPRINE 10 MG TABLET] 30 tablet 0     Sig: Take 0.5 tablets by mouth 3 (Three) Times a Day As Needed for Muscle Spasms (muscle spasms).      Last office visit with prescribing clinician: 11/23/2021      Next office visit with prescribing clinician: Visit date not found            Deandra Crabtree MA  02/10/22, 11:03 EST

## 2022-02-22 NOTE — TELEPHONE ENCOUNTER
Rx Refill Note  Requested Prescriptions     Pending Prescriptions Disp Refills   • Eliquis 5 MG tablet tablet [Pharmacy Med Name: ELIQUIS 5 MG TABLET] 60 tablet 2     Sig: TAKE 1 TABLET BY MOUTH EVERY 12 HOURS      Last office visit with prescribing clinician: 11/23/2021      Next office visit with prescribing clinician: Visit date not found            Deandra Crabtree MA  02/22/22, 10:31 EST

## 2022-02-23 RX ORDER — APIXABAN 5 MG/1
TABLET, FILM COATED ORAL
Qty: 60 TABLET | Refills: 2 | Status: SHIPPED | OUTPATIENT
Start: 2022-02-23 | End: 2022-07-25

## 2022-03-04 RX ORDER — FENOFIBRATE 160 MG/1
TABLET ORAL
Qty: 90 TABLET | Refills: 1 | Status: SHIPPED | OUTPATIENT
Start: 2022-03-04 | End: 2022-09-13

## 2022-03-04 NOTE — TELEPHONE ENCOUNTER
Rx Refill Note  Requested Prescriptions     Pending Prescriptions Disp Refills   • fenofibrate 160 MG tablet [Pharmacy Med Name: FENOFIBRATE 160 MG TABLET] 90 tablet 1     Sig: TAKE 1 TABLET BY MOUTH EVERY DAY      Last office visit with prescribing clinician: 11/23/2021      Next office visit with prescribing clinician: Visit date not found            Deandra Crabtree MA  03/04/22, 09:32 EST

## 2022-03-11 ENCOUNTER — OFFICE VISIT (OUTPATIENT)
Dept: FAMILY MEDICINE CLINIC | Facility: CLINIC | Age: 72
End: 2022-03-11

## 2022-03-11 VITALS
WEIGHT: 212.5 LBS | SYSTOLIC BLOOD PRESSURE: 122 MMHG | HEART RATE: 56 BPM | TEMPERATURE: 97.3 F | BODY MASS INDEX: 34.15 KG/M2 | DIASTOLIC BLOOD PRESSURE: 74 MMHG | OXYGEN SATURATION: 96 % | HEIGHT: 66 IN

## 2022-03-11 DIAGNOSIS — R82.90 BAD ODOR OF URINE: ICD-10-CM

## 2022-03-11 DIAGNOSIS — N30.00 ACUTE CYSTITIS WITHOUT HEMATURIA: Primary | ICD-10-CM

## 2022-03-11 DIAGNOSIS — R35.0 URINE FREQUENCY: ICD-10-CM

## 2022-03-11 LAB
BILIRUB BLD-MCNC: NEGATIVE MG/DL
CLARITY, POC: CLEAR
COLOR UR: YELLOW
EXPIRATION DATE: ABNORMAL
GLUCOSE UR STRIP-MCNC: NEGATIVE MG/DL
KETONES UR QL: NEGATIVE
LEUKOCYTE EST, POC: ABNORMAL
Lab: ABNORMAL
NITRITE UR-MCNC: POSITIVE MG/ML
PH UR: 6 [PH] (ref 5–8)
PROT UR STRIP-MCNC: ABNORMAL MG/DL
RBC # UR STRIP: ABNORMAL /UL
SP GR UR: 1.03 (ref 1–1.03)
UROBILINOGEN UR QL: NORMAL

## 2022-03-11 PROCEDURE — 81003 URINALYSIS AUTO W/O SCOPE: CPT | Performed by: NURSE PRACTITIONER

## 2022-03-11 PROCEDURE — 99213 OFFICE O/P EST LOW 20 MIN: CPT | Performed by: NURSE PRACTITIONER

## 2022-03-11 RX ORDER — SULFAMETHOXAZOLE AND TRIMETHOPRIM 800; 160 MG/1; MG/1
1 TABLET ORAL 2 TIMES DAILY
Qty: 10 TABLET | Refills: 0 | Status: SHIPPED | OUTPATIENT
Start: 2022-03-11 | End: 2022-04-15

## 2022-03-11 RX ORDER — PHENAZOPYRIDINE HYDROCHLORIDE 200 MG/1
200 TABLET, FILM COATED ORAL 3 TIMES DAILY PRN
Qty: 6 TABLET | Refills: 0 | Status: SHIPPED | OUTPATIENT
Start: 2022-03-11 | End: 2022-04-15

## 2022-03-11 NOTE — PROGRESS NOTES
"Chief Complaint  Nocturia (Smell, burns, 1wk)    Subjective          Kanwal Sauer presents to [facility]  Kanwal presents for 10 day history of burning, frequency and urgency. Denies fever or chills. Denies n/v. Does reporyt flank pain and lower suprapubic pressure/tenderness  Urinary Tract Infection   This is a new problem. The current episode started in the past 7 days. The problem occurs every urination. The problem has been unchanged. The quality of the pain is described as aching and burning. The pain is moderate. There has been no fever. Associated symptoms include flank pain, frequency and urgency. Pertinent negatives include no chills, discharge, hematuria, hesitancy, nausea, sweats or vomiting. She has tried nothing for the symptoms. The treatment provided no relief.       Objective   Vital Signs:   /74 (BP Location: Right arm, Patient Position: Sitting, Cuff Size: Adult)   Pulse 56   Temp 97.3 °F (36.3 °C) (Temporal)   Ht 167.6 cm (65.98\")   Wt 96.4 kg (212 lb 8 oz)   SpO2 96%   BMI 34.32 kg/m²     Physical Exam  Vitals reviewed.   Constitutional:       Appearance: Normal appearance.   Cardiovascular:      Rate and Rhythm: Normal rate and regular rhythm.      Heart sounds: No murmur heard.    No friction rub. No gallop.   Pulmonary:      Effort: Pulmonary effort is normal. No respiratory distress.      Breath sounds: Normal breath sounds. No wheezing, rhonchi or rales.   Abdominal:      Tenderness: There is abdominal tenderness in the suprapubic area. There is no right CVA tenderness or left CVA tenderness.   Skin:     General: Skin is warm and dry.   Neurological:      Mental Status: She is alert and oriented to person, place, and time.        Result Review :                 Assessment and Plan    Diagnoses and all orders for this visit:    1. Acute cystitis without hematuria (Primary)    Other orders  -     sulfamethoxazole-trimethoprim (Bactrim DS) " 800-160 MG per tablet; Take 1 tablet by mouth 2 (Two) Times a Day.  Dispense: 10 tablet; Refill: 0  -     phenazopyridine (Pyridium) 200 MG tablet; Take 1 tablet by mouth 3 (Three) Times a Day As Needed for Bladder Spasms.  Dispense: 6 tablet; Refill: 0        Follow Up   No follow-ups on file.  Patient was given instructions and counseling regarding her condition or for health maintenance advice. Please see specific information pulled into the AVS if appropriate.     UTI: previously tolerated bactrim DS, will start x 5 days, pyridium to help with discomfort, increase fluids, follow up for acute worsening symptoms, otherwise will await urine culture

## 2022-03-14 ENCOUNTER — HOSPITAL ENCOUNTER (OUTPATIENT)
Dept: MRI IMAGING | Facility: HOSPITAL | Age: 72
Discharge: HOME | End: 2022-03-14
Admitting: FAMILY MEDICINE

## 2022-03-14 DIAGNOSIS — K86.89 PANCREATIC MASS: ICD-10-CM

## 2022-03-14 LAB — CREAT BLDA-MCNC: 1.2 MG/DL (ref 0.6–1.3)

## 2022-03-14 PROCEDURE — 0 GADOBENATE DIMEGLUMINE 529 MG/ML SOLUTION: Performed by: FAMILY MEDICINE

## 2022-03-14 PROCEDURE — A9577 INJ MULTIHANCE: HCPCS | Performed by: FAMILY MEDICINE

## 2022-03-14 PROCEDURE — 74183 MRI ABD W/O CNTR FLWD CNTR: CPT

## 2022-03-14 PROCEDURE — 82565 ASSAY OF CREATININE: CPT

## 2022-03-14 RX ADMIN — GADOBENATE DIMEGLUMINE 19 ML: 529 INJECTION, SOLUTION INTRAVENOUS at 08:44

## 2022-03-16 DIAGNOSIS — K86.89 PANCREATIC MASS: Primary | ICD-10-CM

## 2022-03-16 LAB
BACTERIA UR CULT: ABNORMAL
BACTERIA UR CULT: ABNORMAL
OTHER ANTIBIOTIC SUSC ISLT: ABNORMAL

## 2022-03-18 ENCOUNTER — TELEPHONE (OUTPATIENT)
Dept: FAMILY MEDICINE CLINIC | Facility: CLINIC | Age: 72
End: 2022-03-18

## 2022-03-18 NOTE — TELEPHONE ENCOUNTER
Left voicemail for patient regarding these results/recommendations. OK per HIPAA Open telephone encounter left for reread by hub.    Hub please inform patient if call back:    Sophie!    Dr. Britton has seen the results of your MRI. She says the following:    Stable pancreatic cysts repeat CT scan with contrast in 1 year      If you have any further questions or to answer ours, feel free to reach back out to us!    Have a Good One!  EMELI Liriano

## 2022-03-18 NOTE — TELEPHONE ENCOUNTER
----- Message from Nisha Britton MD sent at 3/16/2022 11:14 PM EDT -----  Stable pancreatic cysts repeat CT scan with contrast in 1 year

## 2022-03-21 ENCOUNTER — TELEPHONE (OUTPATIENT)
Dept: FAMILY MEDICINE CLINIC | Facility: CLINIC | Age: 72
End: 2022-03-21

## 2022-03-21 NOTE — TELEPHONE ENCOUNTER
Provider: DR FELICIANO  Caller: RAFAEL MATHIAS  Relationship to Patient: PATIENT  Pharmacy: [facility]/pharmacy #[id number] - [facility], [place] - [address] AT [facility] [phone]  - [phone]   [phone]  Phone Number: [phone]  Reason for Call: PATIENT STATES SHE STILL HAS BURNING WITH URINATION AND PAIN IN HER LOWER RIGHT BACK.  SHE IS NOT HAVING ANY FEVER.  SHE WOULD LIKE A CALL TO DISCUSS WHAT TO DO NEXT.

## 2022-03-23 ENCOUNTER — TELEPHONE (OUTPATIENT)
Dept: FAMILY MEDICINE CLINIC | Facility: CLINIC | Age: 72
End: 2022-03-23

## 2022-03-23 NOTE — TELEPHONE ENCOUNTER
Caller: CLIFTON VERGARA [facility]     Best call back number: [phone]    Who are you requesting to speak with (clinical staff, provider,  specific staff member): CLINICAL STAFF     What was the call regarding: FOLLOWING UP ON PAPERWORK FAXED OVER TO OFFICE FOR HISTORY AND PHYSICAL FORMS COMPLETED TO [facility]     NEEDS FAXED BACK TO : [phone]

## 2022-03-24 NOTE — TELEPHONE ENCOUNTER
I have received them and we are working on them. This patient will be in office on Friday where we will discuss it.

## 2022-03-25 ENCOUNTER — OFFICE VISIT (OUTPATIENT)
Dept: FAMILY MEDICINE CLINIC | Facility: CLINIC | Age: 72
End: 2022-03-25

## 2022-03-25 VITALS
DIASTOLIC BLOOD PRESSURE: 70 MMHG | BODY MASS INDEX: 34.39 KG/M2 | WEIGHT: 214 LBS | HEIGHT: 66 IN | HEART RATE: 55 BPM | SYSTOLIC BLOOD PRESSURE: 120 MMHG | TEMPERATURE: 97.7 F | OXYGEN SATURATION: 99 %

## 2022-03-25 DIAGNOSIS — R30.0 DYSURIA: ICD-10-CM

## 2022-03-25 DIAGNOSIS — M54.50 ACUTE RIGHT-SIDED LOW BACK PAIN, UNSPECIFIED WHETHER SCIATICA PRESENT: Primary | ICD-10-CM

## 2022-03-25 LAB
BILIRUB BLD-MCNC: NEGATIVE MG/DL
CLARITY, POC: CLEAR
COLOR UR: ABNORMAL
EXPIRATION DATE: ABNORMAL
GLUCOSE UR STRIP-MCNC: NEGATIVE MG/DL
KETONES UR QL: NEGATIVE
LEUKOCYTE EST, POC: ABNORMAL
Lab: ABNORMAL
NITRITE UR-MCNC: NEGATIVE MG/ML
PH UR: 7 [PH] (ref 5–8)
PROT UR STRIP-MCNC: NEGATIVE MG/DL
RBC # UR STRIP: NEGATIVE /UL
SP GR UR: 1.02 (ref 1–1.03)
UROBILINOGEN UR QL: NORMAL

## 2022-03-25 PROCEDURE — 81003 URINALYSIS AUTO W/O SCOPE: CPT | Performed by: FAMILY MEDICINE

## 2022-03-25 PROCEDURE — 99214 OFFICE O/P EST MOD 30 MIN: CPT | Performed by: FAMILY MEDICINE

## 2022-03-25 NOTE — PROGRESS NOTES
"Chief Complaint  Back Pain (Pt is here for low right side back pain x 1week(shooting pain) and uti symptoms ) and Urinary Tract Infection    Subjective          Kanwal Sauer presents to [facility]  History of Present Illness came here with 1 week history of acute right-sided back pain and follow-up on recent urinary tract infection.  Complaining of burning and increased frequency was treated with antibiotic 1 week ago.  Denies any radiation to pain.  Denies any pain during walking.  Only sitting and laying down makes the pain worse.  Denies any urinary retention or stool incontinence.    Objective   Vital Signs:   /70   Pulse 55   Temp 97.7 °F (36.5 °C)   Ht 167.6 cm (65.98\")   Wt 97.1 kg (214 lb)   SpO2 99%   BMI 34.56 kg/m²            Physical Exam  Constitutional:       General: She is not in acute distress.     Appearance: Normal appearance. She is well-developed.   HENT:      Head: Normocephalic and atraumatic.      Right Ear: Tympanic membrane normal.      Left Ear: Tympanic membrane normal.      Mouth/Throat:      Mouth: Mucous membranes are moist.   Eyes:      General:         Right eye: No discharge.         Left eye: No discharge.      Extraocular Movements: Extraocular movements intact.      Pupils: Pupils are equal, round, and reactive to light.   Cardiovascular:      Rate and Rhythm: Normal rate and regular rhythm.      Pulses: Normal pulses.      Heart sounds: Normal heart sounds.   Pulmonary:      Effort: Pulmonary effort is normal.      Breath sounds: Normal breath sounds. No wheezing or rales.   Abdominal:      General: Bowel sounds are normal.      Palpations: Abdomen is soft. There is no mass.      Tenderness: There is no abdominal tenderness.   Musculoskeletal:      Cervical back: Normal range of motion and neck supple.      Right lower leg: No edema.      Left lower leg: No edema.   Lymphadenopathy:      Cervical: No cervical adenopathy. "   Neurological:      General: No focal deficit present.      Mental Status: She is alert and oriented to person, place, and time.        Result Review :       Data reviewed: Radiologic studies ct lumbar spine and Recent hospitalization notes er note 12/21, ct scan results     1. Severe lumbar degenerative disease. This appears similar to the  previous study of 12/23/2020.  2. No lumbar fractures are seen.     Assessment and Plan    Diagnoses and all orders for this visit:    1. Acute right-sided low back pain, unspecified whether sciatica present (Primary)  -     POC Urinalysis Dipstick, Automated  -     Urine Culture - Urine, Urine, Clean Catch    2. Dysuria  -     POC Urinalysis Dipstick, Automated  -     Urine Culture - Urine, Urine, Clean Catch      Kanwal Sauer is a 71-year-old female patient with history of A. Fib.  Hypertension hyperlipidemia diabetes type 2 came here for acute low back pain her pain is mainly on the right side and only get when she sits.  There was no pain on palpation.  I reviewed her CT scan denies any history of fall.  I will refer her to physical therapy.  Patient is moving to assisted living and daughter wants to wait to start the physical therapy until her move.  She is also seen today for dysuria  Urine dip done negative for nitrite.  We will send it for urine culture .  No antibiotic today.    Follow Up   No follow-ups on file.  Patient was given instructions and counseling regarding her condition or for health maintenance advice. Please see specific information pulled into the AVS if appropriate.

## 2022-03-25 NOTE — PROGRESS NOTES
"Chief Complaint  Back Pain (Pt is here for low right side back pain x 1week(shooting pain) and uti symptoms ) and Urinary Tract Infection    Subjective          Kanwal Sauer presents to [facility]  History of Present Illness    Objective   Vital Signs:   /70   Pulse 55   Temp 97.7 °F (36.5 °C)   Ht 167.6 cm (65.98\")   Wt 97.1 kg (214 lb)   SpO2 99%   BMI 34.56 kg/m²            Physical Exam   Result Review :                 Assessment and Plan    Diagnoses and all orders for this visit:    1. Acute right-sided low back pain, unspecified whether sciatica present (Primary)  -     POC Urinalysis Dipstick, Automated  -     Urine Culture - Urine, Urine, Clean Catch    2. Dysuria  -     POC Urinalysis Dipstick, Automated  -     Urine Culture - Urine, Urine, Clean Catch        Follow Up   No follow-ups on file.  Patient was given instructions and counseling regarding her condition or for health maintenance advice. Please see specific information pulled into the AVS if appropriate.       "

## 2022-03-27 LAB
BACTERIA UR CULT: NO GROWTH
BACTERIA UR CULT: NORMAL

## 2022-03-29 DIAGNOSIS — E11.65 TYPE 2 DIABETES MELLITUS WITH HYPERGLYCEMIA, WITHOUT LONG-TERM CURRENT USE OF INSULIN: ICD-10-CM

## 2022-03-30 RX ORDER — MONTELUKAST SODIUM 10 MG/1
TABLET ORAL
Qty: 90 TABLET | Refills: 1 | Status: SHIPPED | OUTPATIENT
Start: 2022-03-30 | End: 2022-12-13

## 2022-03-30 RX ORDER — ALBUTEROL SULFATE 90 UG/1
AEROSOL, METERED RESPIRATORY (INHALATION)
Qty: 18 G | Refills: 1 | Status: SHIPPED | OUTPATIENT
Start: 2022-03-30

## 2022-03-30 NOTE — TELEPHONE ENCOUNTER
Rx Refill Note  Requested Prescriptions     Pending Prescriptions Disp Refills   • metFORMIN (GLUCOPHAGE) 1000 MG tablet [Pharmacy Med Name: METFORMIN HCL 1,000 MG TABLET] 180 tablet 0     Sig: TAKE 1 TABLET BY MOUTH TWICE A DAY WITH MEALS   • montelukast (SINGULAIR) 10 MG tablet [Pharmacy Med Name: MONTELUKAST SOD 10 MG TABLET] 90 tablet 1     Sig: TAKE 1 TABLET BY MOUTH AT BEDTIME   • albuterol sulfate  (90 Base) MCG/ACT inhaler [Pharmacy Med Name: ALBUTEROL HFA (PROAIR) INHALER]  1     Sig: INHALE 2 PUFFS EVERY 4 HOURS AS NEEDED FOR WHEEZING      Last office visit with prescribing clinician: 3/25/2022      Next office visit with prescribing clinician: Visit date not found            Deandra Crabtree MA  03/30/22, 10:53 EDT

## 2022-04-01 ENCOUNTER — TELEPHONE (OUTPATIENT)
Dept: FAMILY MEDICINE CLINIC | Facility: CLINIC | Age: 72
End: 2022-04-01

## 2022-04-01 RX ORDER — METOPROLOL SUCCINATE 25 MG/1
TABLET, EXTENDED RELEASE ORAL
Qty: 180 TABLET | Refills: 3 | Status: SHIPPED | OUTPATIENT
Start: 2022-04-01 | End: 2022-04-15

## 2022-04-01 NOTE — TELEPHONE ENCOUNTER
Message received. I will have Dr. Britton work on this first thing Monday when she returns to office.

## 2022-04-01 NOTE — TELEPHONE ENCOUNTER
Caller: SHELDON TYSON    Relationship: Emergency Contact    Best call back number: [phone]    What was the call regarding: PATIENT'S DAUGHTER NEEDS HER HMP (OR HNP, SHELDON WAS NOT SURE) CHANGED TO STATE THAT PATIENT CAN ADMINISTER HER OWN MEDICATIONS. SHE HAS BEEN DOING IT HERSELF FOR 10 MONTHS NOW AND WOULD LIKE TO CONTINUE DOING IT. PLEASE SEND A NEW PAPER TO THE FACILITY ([facility]) THAT STATES SHE CAN. THEY WILL ADMINISTER A TEST TO HER TO DOUBLE CHECK THAT SHE CAN BEFORE THEY ALLOW HER TO DO SO.       Do you require a callback: YES, PLEASE CALL SHELDON TO ADVISE THAT THIS CAN BE DONE.

## 2022-04-15 ENCOUNTER — OFFICE VISIT (OUTPATIENT)
Dept: CARDIOLOGY | Facility: CLINIC | Age: 72
End: 2022-04-15

## 2022-04-15 VITALS
WEIGHT: 217.8 LBS | DIASTOLIC BLOOD PRESSURE: 80 MMHG | HEIGHT: 65 IN | OXYGEN SATURATION: 95 % | HEART RATE: 54 BPM | BODY MASS INDEX: 36.29 KG/M2 | SYSTOLIC BLOOD PRESSURE: 138 MMHG

## 2022-04-15 DIAGNOSIS — I48.0 PAF (PAROXYSMAL ATRIAL FIBRILLATION): Primary | ICD-10-CM

## 2022-04-15 DIAGNOSIS — I10 ESSENTIAL HYPERTENSION: ICD-10-CM

## 2022-04-15 PROCEDURE — 99214 OFFICE O/P EST MOD 30 MIN: CPT | Performed by: INTERNAL MEDICINE

## 2022-04-15 NOTE — PROGRESS NOTES
PATIENTINFORMATION    Date of Office Visit: 04/15/2022  Encounter Provider: Yasir Romano MD  Place of Service: [facility] CARDIOLOGY  Patient Name: Kanwal Sauer  : 1950    Subjective:     Encounter Date:04/15/2022      Patient ID: Kanwal Sauer is a 71 y.o. female.    Chief Complaint   Patient presents with   • PAF     6 month f/u     HPI  Ms. Sauer is a pleasant 71 years old lady who came to cardiology clinic for follow-up visit.  She had rare fleeting palpitations otherwise no significant new symptoms today.  She is compliant with her medications and denies any significant side effects.  She has moved to [place] to her .  She gets around using a walker and also does some physical therapy at the facility.  No significant falls since last clinic visit.  No ER visits or hospitalizations since last clinic visit.      ROS  All systems reviewed and negative except as noted in HPI.    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;  Service: Orthopedics;  Laterality: Left;   • MASTECTOMY Left    • REPLACEMENT TOTAL KNEE BILATERAL         Social History     Socioeconomic History   • Marital status:    Tobacco Use   • Smoking status: Never Smoker  "  • Smokeless tobacco: Never Used   • Tobacco comment: no caffine   Vaping Use   • Vaping Use: Never used   Substance and Sexual Activity   • Alcohol use: Not Currently   • Drug use: Never   • Sexual activity: Yes     Partners: Male       Family History   Problem Relation Age of Onset   • Arthritis Mother    • Lung cancer Mother    • Brain cancer Mother    • Hypertension Mother    • Hypertension Father    • Arthritis Sister    • Breast cancer Sister    • Diabetes Sister    • Hypertension Sister    • Dementia Sister          Procedures       Objective:     /80 (BP Location: Right arm, Patient Position: Sitting)   Pulse 54   Ht 165.1 cm (65\")   Wt 98.8 kg (217 lb 12.8 oz)   SpO2 95%   BMI 36.24 kg/m²  Body mass index is 36.24 kg/m².     Constitutional:       General: Not in acute distress.     Appearance: Well-developed. Not diaphoretic.   Eyes:      Pupils: Pupils are equal, round, and reactive to light.   HENT:      Head: Normocephalic and atraumatic.   Neck:      Thyroid: No thyromegaly.   Pulmonary:      Effort: Pulmonary effort is normal. No respiratory distress.      Breath sounds: Normal breath sounds. No wheezing. No rales.   Chest:      Chest wall: Not tender to palpatation.   Cardiovascular:      Normal rate. Regular rhythm.      No gallop.   Pulses:     Intact distal pulses.   Edema:     Peripheral edema absent.   Abdominal:      General: Bowel sounds are normal. There is no distension.      Palpations: Abdomen is soft.      Tenderness: There is no guarding.   Musculoskeletal: Normal range of motion.         General: No deformity.      Cervical back: Normal range of motion and neck supple. Skin:     General: Skin is warm and dry.      Findings: No rash.   Neurological:      Mental Status: Alert and oriented to person, place, and time.      Cranial Nerves: No cranial nerve deficit.      Deep Tendon Reflexes: Reflexes are normal and symmetric.      Comments: Resting hand tremor on both sides "   Psychiatric:         Judgment: Judgment normal.         Review Of Data: I have reviewed pertinent recent labs, images and documents and pertinent findings included in HPI or assessment below.          Assessment/Plan:         1.  Paroxysmal atrial fibrillation  -Patient in sinus bradycardia  -Echocardiogram on 1/17/2020 reported: Left ventricular ejection fraction 51-55%, mild AI/MR, moderate TR, RVSP 48 mmHg  -Tolerating metoprolol and Eliquis well without any side effects.  -Rarely uses Lasix as needed for extremity swelling.  2. Obesity with TARSHA-she has been compliant with CPAP since last clinic visit t   3. Hypertension- controlled on losartan and metoprol  4. Hyperlipidemia on fenofibrate-most recent lipid panel at goal  -History of of intolerance to different statins in the past  5.  Benign tremor on primidone-follows up with neurology  6.  History of mechanical fall with a scalp laceration/left femoral and tibial fracture in December 2020-currently ambulates using a walker  No significant fall since last clinic visit  7. Hx of left breast cancer s/p left mastectomy and chemotherapy in the past-in remission  8.  Pulmonary hypertension per echo in 2020-mild      No medication changes made today.  Return to clinic in 1 year or sooner with any concerning symptoms.        Diagnosis and plan of care discussed with patient and verbalized understanding.            Your medication list          Accurate as of April 15, 2022 12:04 PM. If you have any questions, ask your nurse or doctor.            CONTINUE taking these medications      Instructions Last Dose Given Next Dose Due   acetaminophen 325 MG tablet  Commonly known as: TYLENOL      Take 2 tablets by mouth Every 4 (Four) Hours As Needed for Mild Pain .       albuterol sulfate  (90 Base) MCG/ACT inhaler  Commonly known as: PROVENTIL HFA;VENTOLIN HFA;PROAIR HFA      INHALE 2 PUFFS EVERY 4 HOURS AS NEEDED FOR WHEEZING       Breo Ellipta 100-25 MCG/INH  inhaler  Generic drug: Fluticasone Furoate-Vilanterol      Inhale 1 puff Daily.       citalopram 20 MG tablet  Commonly known as: CeleXA      TAKE 1 TABLET BY MOUTH EVERY DAY       CRANBERRY PO      Take 650 mg by mouth.       Eliquis 5 MG tablet tablet  Generic drug: apixaban      TAKE 1 TABLET BY MOUTH EVERY 12 HOURS       fenofibrate 160 MG tablet      TAKE 1 TABLET BY MOUTH EVERY DAY       furosemide 40 MG tablet  Commonly known as: LASIX      Take 1 tablet by mouth Daily. 1/2 po qd as needed for swelling       HYDROcodone-acetaminophen 5-325 MG per tablet  Commonly known as: NORCO      Take 1 tablet by mouth Every 6 (Six) Hours As Needed for Moderate Pain .       losartan 50 MG tablet  Commonly known as: COZAAR      TAKE 1 TABLET BY MOUTH EVERY DAY       magnesium oxide 400 MG tablet  Commonly known as: MAG-OX      Take 1 tablet by mouth Daily.       metFORMIN 1000 MG tablet  Commonly known as: GLUCOPHAGE      TAKE 1 TABLET BY MOUTH TWICE A DAY WITH MEALS       metoprolol succinate XL 25 MG 24 hr tablet  Commonly known as: TOPROL-XL      TAKE 2 TABLETS BY MOUTH EVERY DAY       montelukast 10 MG tablet  Commonly known as: SINGULAIR      TAKE 1 TABLET BY MOUTH AT BEDTIME       potassium chloride 10 MEQ CR tablet  Commonly known as: K-DUR,KLOR-CON      Take 10 mEq by mouth 1 (One) Time As Needed. Only take with lasix       primidone 50 MG tablet  Commonly known as: MYSOLINE      Take 1 tablet by mouth 3 (Three) Times a Day.       PROBIOTIC-10 PO      Take  by mouth.          STOP taking these medications    Mirabegron ER 25 MG tablet sustained-release 24 hour 24 hr tablet  Commonly known as: Myrbetriq  Stopped by: Yasir Romano MD        ondansetron ODT 4 MG disintegrating tablet  Commonly known as: ZOFRAN-ODT  Stopped by: Yasir Romano MD        phenazopyridine 200 MG tablet  Commonly known as: Pyridium  Stopped by: Yasir Roamno MD        sulfamethoxazole-trimethoprim 800-160 MG per  tablet  Commonly known as: Bactrim DS  Stopped by: MD Yasir Thurston MD  04/15/22  12:04 EDT

## 2022-05-24 ENCOUNTER — TELEPHONE (OUTPATIENT)
Dept: FAMILY MEDICINE CLINIC | Facility: CLINIC | Age: 72
End: 2022-05-24

## 2022-06-02 NOTE — TELEPHONE ENCOUNTER
PATIENT'S DAUGHTER IS CALLING TO FOLLOW UP ON THIS. STATES THAT THE LIVING FACILITY PATIENT IS AT HAS NOT RECEIVED ANY PAPERWORK FROM THE OFFICE. STATES IF THIS IS NOT DONE THEN IT IS $700 MONTHLY FEE. PLEASE CALLBACK: [phone]

## 2022-06-07 NOTE — TELEPHONE ENCOUNTER
Called daughter back. [facility]    Hub please inform patient if call back:    I have corrected the forms for her mother and typed out the letter needed and had Dr. Britton sign it to where she can do her own medication administration and glucose testing.     I faxed it about 230pm-300pm. If they are stating they still haven't gotten it, I'll be here tomorrrow morning and I can retry.

## 2022-06-23 ENCOUNTER — OFFICE VISIT (OUTPATIENT)
Dept: NEUROLOGY | Facility: CLINIC | Age: 72
End: 2022-06-23

## 2022-06-23 VITALS
WEIGHT: 221.6 LBS | OXYGEN SATURATION: 98 % | HEIGHT: 65 IN | DIASTOLIC BLOOD PRESSURE: 80 MMHG | SYSTOLIC BLOOD PRESSURE: 132 MMHG | BODY MASS INDEX: 36.92 KG/M2 | HEART RATE: 72 BPM

## 2022-06-23 DIAGNOSIS — Z87.898 HISTORY OF SEIZURE: ICD-10-CM

## 2022-06-23 DIAGNOSIS — R41.89 SUBJECTIVE MEMORY COMPLAINTS: ICD-10-CM

## 2022-06-23 DIAGNOSIS — G25.0 BENIGN ESSENTIAL TREMOR: Primary | ICD-10-CM

## 2022-06-23 DIAGNOSIS — Z91.81 RISK FOR FALLS: ICD-10-CM

## 2022-06-23 PROCEDURE — 99214 OFFICE O/P EST MOD 30 MIN: CPT | Performed by: NURSE PRACTITIONER

## 2022-06-23 NOTE — PROGRESS NOTES
DOS: 2022  NAME: Kanwal Sauer   : 1950  PCP: Nisha Britton MD    Chief Complaint   Patient presents with   • Tremors      SUBJECTIVE  Neurological Problem:  71 y.o. RHW female with seizures, essential tremor, HTN, DM, HLD, Afib (on Eliqius), TARSHA, Asthma on nocturnal O2, chronic UTIs and h/o breast CA (s/p left mastectomy), scorliosis and lumbar DDD who presents for f/u.  She is companied by her daughter Monica.    Interval History:   **For detailed previous history, please see progress note dated 2021.    Ms. Sauer was initially evaluated by Dr. Washington for h/o provoked seizure and essential tremor. She has not been on any AEDs and had no recurrent seizure-like episodes. She was last seen by me in Dec. 2021, tremor fairly controlled on primidone 50 mg 3 times daily.      Ms. Sauer presents today, unfortunately her spouse passed away on May 20, she has had a significant amount of transition since last seen.  She was living with her daughter but moved in with her spouse at an assisted living facility.  Since his passing, she will be moving to a different facility in the next week.  Her special needs daughter will also be living with her in the new facility off of [address].  She will be managing her own medications, has a fully equipped kitchen, washer/dryer.  Her daughter did report some episodes of confusion, seems to have some more episodes of fullness, eating dairy products when she knows she should not as it aggravates her asthma.  She is able to recount to me a detailed description of her new facility as well as information about recent events with good clarity.  In regards to tremor, these are slightly worse, in both upper extremities and some tremor of the head.  She has been taking primidone 50 mg 3 times daily as her facility has been distributing meds.  She denies any changes in ambulation, no falls, continues to use a Rollator.   Sleep is good, no nighttime wanderings. No  hallucinations.  Appetite is good, no recent weight loss, no problems swallowing, no choking .No loss of smell.  No changes in personality or behavior except for maybe some mild depression given the recent loss of her spouse this is understandable.    Review of Systems:Review of Systems   Constitutional: Negative for activity change, appetite change, chills, diaphoresis, fatigue, fever and unexpected weight change.   HENT: Negative for congestion, dental problem, drooling, ear discharge, ear pain, facial swelling, hearing loss, mouth sores, nosebleeds, postnasal drip, rhinorrhea, sinus pressure, sinus pain, sneezing, sore throat, tinnitus, trouble swallowing and voice change.    Eyes: Negative for photophobia, pain, discharge, redness, itching and visual disturbance.   Musculoskeletal: Positive for gait problem. Negative for arthralgias, back pain, joint swelling, myalgias, neck pain and neck stiffness.   Neurological: Positive for tremors and headaches. Negative for dizziness, seizures, syncope, facial asymmetry, speech difficulty, weakness, light-headedness and numbness.   Psychiatric/Behavioral: Positive for suicidal ideas. Negative for agitation, behavioral problems, confusion, decreased concentration, dysphoric mood, hallucinations, self-injury and sleep disturbance. The patient is not nervous/anxious and is not hyperactive.     Above ROS reviewed    The following portions of the patient's history were reviewed and updated as appropriate: allergies, current medications, past family history, past medical history, past social history, past surgical history and problem list.    Current Medications:   Current Outpatient Medications:   •  acetaminophen (TYLENOL) 325 MG tablet, Take 2 tablets by mouth Every 4 (Four) Hours As Needed for Mild Pain ., Disp:  , Rfl:   •  albuterol sulfate  (90 Base) MCG/ACT inhaler, INHALE 2 PUFFS EVERY 4 HOURS AS NEEDED FOR WHEEZING, Disp: 18 g, Rfl: 1  •  citalopram (CeleXA) 20  MG tablet, TAKE 1 TABLET BY MOUTH EVERY DAY, Disp: 90 tablet, Rfl: 0  •  CRANBERRY PO, Take 650 mg by mouth., Disp: , Rfl:   •  Dulera 100-5 MCG/ACT inhaler, , Disp: , Rfl:   •  Eliquis 5 MG tablet tablet, TAKE 1 TABLET BY MOUTH EVERY 12 HOURS, Disp: 60 tablet, Rfl: 2  •  fenofibrate 160 MG tablet, TAKE 1 TABLET BY MOUTH EVERY DAY, Disp: 90 tablet, Rfl: 1  •  furosemide (LASIX) 40 MG tablet, Take 1 tablet by mouth Daily. 1/2 po qd as needed for swelling, Disp: 90 tablet, Rfl: 0  •  losartan (COZAAR) 50 MG tablet, TAKE 1 TABLET BY MOUTH EVERY DAY, Disp: 90 tablet, Rfl: 1  •  metFORMIN (GLUCOPHAGE) 1000 MG tablet, TAKE 1 TABLET BY MOUTH TWICE A DAY WITH MEALS, Disp: 180 tablet, Rfl: 0  •  Mirabegron ER (Myrbetriq) 25 MG tablet sustained-release 24 hour 24 hr tablet, Take 1 tablet by mouth Daily., Disp: 90 tablet, Rfl: 1  •  montelukast (SINGULAIR) 10 MG tablet, TAKE 1 TABLET BY MOUTH AT BEDTIME, Disp: 90 tablet, Rfl: 1  •  potassium chloride (K-DUR,KLOR-CON) 10 MEQ CR tablet, Take 10 mEq by mouth 1 (One) Time As Needed. Only take with lasix, Disp: , Rfl:   •  primidone (MYSOLINE) 50 MG tablet, Take 1 tablet by mouth 3 (Three) Times a Day., Disp: 270 tablet, Rfl: 0  •  Probiotic Product (PROBIOTIC-10 PO), Take  by mouth., Disp: , Rfl:   •  Fluticasone Furoate-Vilanterol (Breo Ellipta) 100-25 MCG/INH inhaler, Inhale 1 puff Daily., Disp: 1 each, Rfl: 3  •  HYDROcodone-acetaminophen (NORCO) 5-325 MG per tablet, Take 1 tablet by mouth Every 6 (Six) Hours As Needed for Moderate Pain ., Disp: 15 tablet, Rfl: 0  •  magnesium oxide (MAG-OX) 400 MG tablet, Take 1 tablet by mouth Daily., Disp: 30 tablet, Rfl: 0  **I did not stop or change the above medications.  Patient's medication list was updated to reflect medications they have reported as currently taking, including medication changes made by other providers.    OBJECTIVE  Vitals:    06/23/22 1330   BP: 132/80   Pulse: 72   SpO2: 98%     Body mass index is 36.88  kg/m².    Diagnostics:    Laboratory Results:         Lab Results   Component Value Date    WBC 8.02 12/09/2021    HGB 12.8 12/09/2021    HCT 38.4 12/09/2021    MCV 89.5 12/09/2021     12/09/2021     Lab Results   Component Value Date    GLUCOSE 107 (H) 12/09/2021    BUN 26 (H) 12/09/2021    CREATININE 1.20 03/14/2022    EGFRIFNONA 70 12/09/2021    EGFRIFAFRI 84 09/16/2021    BCR 32.5 (H) 12/09/2021    K 4.5 12/09/2021    CO2 30.7 (H) 12/09/2021    CALCIUM 10.5 12/09/2021    PROTENTOTREF 6.0 09/16/2021    ALBUMIN 4.20 12/09/2021    LABIL2 3.0 09/16/2021    AST 16 12/09/2021    ALT 11 12/09/2021     Lab Results   Component Value Date    HGBA1C 5.50 06/22/2021     Lab Results   Component Value Date    CHOL 180 03/16/2021    CHOL 164 10/02/2020     Lab Results   Component Value Date    HDL 60 03/16/2021    HDL 71 (H) 10/02/2020     Lab Results   Component Value Date     (H) 03/16/2021    LDL 77 10/02/2020     Lab Results   Component Value Date    TRIG 95 03/16/2021    TRIG 81 10/02/2020     No results found for: RPR  Lab Results   Component Value Date    TSH 1.570 03/16/2021     No results found for: [id number]    Physical Exam:  GENERAL: NAD  HEENT: Normocephalic, atraumatic   COR: RRR  Resp: Even and unlabored  Extremities: No signs of distal embolization.   Skin: No rashes, lesions or ulcers.  Psychiatric: Normal mood and affect.    Neurological:   MS: AO. Language normal. No neglect. Follows all commands.  CN: II-XII grossly normal  Motor: Normal strength and tone throughout.  Sensory: Intact to light touch in arms and legs  Station and Gait: Normal gait and station.    Coordination: Normal finger to nose bilaterally    Impression/Plan:     1.  Essential tremor --fairly well controlled on current dose of primidone 50 mg 3 times daily, some breakthrough symptoms, we discussed possibly increasing dose however given her transition to a new facility soon will continue her current regimen for now.  We did  review possible use of weighted hand writing glove to help with fine dexterity movements.  I encouraged her to engage in regular physical activity to help with her mobility.      2. Subjective c/o memory issues --likely situational, given significant change in social situation, loss of spouse, moving to new facilities.  Patient, daughter will continue to monitor symptoms for now.  Have encouraged patient to engage in activities at facility including physical and mental.     She will follow-up here in 6 months, sooner if symptoms warrant    I spent a total of 35 minutes today in reviewing records, prior diagnostics, examination of patient as well as counseling and educating patient regarding diagnoses, symptoms, reviewing diagnostics with patient, pharmacologic treatment options including purpose, risk, benefits, possible side-effects, recommendations, lifestyle modifications, coordination of care and documenting plan of care.          There are no diagnoses linked to this encounter.    Coding      Dictated using Dragon

## 2022-07-05 ENCOUNTER — APPOINTMENT (OUTPATIENT)
Dept: CT IMAGING | Facility: HOSPITAL | Age: 72
End: 2022-07-05

## 2022-07-05 ENCOUNTER — APPOINTMENT (OUTPATIENT)
Dept: GENERAL RADIOLOGY | Facility: HOSPITAL | Age: 72
End: 2022-07-05

## 2022-07-05 ENCOUNTER — HOSPITAL ENCOUNTER (EMERGENCY)
Facility: HOSPITAL | Age: 72
Discharge: HOME | End: 2022-07-05
Attending: EMERGENCY MEDICINE | Admitting: EMERGENCY MEDICINE

## 2022-07-05 VITALS
OXYGEN SATURATION: 93 % | SYSTOLIC BLOOD PRESSURE: 185 MMHG | DIASTOLIC BLOOD PRESSURE: 90 MMHG | TEMPERATURE: 97.9 F | RESPIRATION RATE: 17 BRPM | HEART RATE: 57 BPM

## 2022-07-05 DIAGNOSIS — S80.01XA CONTUSION OF RIGHT KNEE, INITIAL ENCOUNTER: ICD-10-CM
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
## 2022-07-05 DIAGNOSIS — R53.1 GENERALIZED WEAKNESS: ICD-10-CM

## 2022-07-05 DIAGNOSIS — M54.50 ACUTE LEFT-SIDED LOW BACK PAIN WITHOUT SCIATICA: ICD-10-CM

## 2022-07-05 LAB
ALBUMIN SERPL-MCNC: 4 G/DL (ref 3.5–5.2)
ALBUMIN/GLOB SERPL: 1.8 G/DL
ALP SERPL-CCNC: 48 U/L (ref 39–117)
ALT SERPL W P-5'-P-CCNC: 10 U/L (ref 1–33)
ANION GAP SERPL CALCULATED.3IONS-SCNC: 10 MMOL/L (ref 5–15)
AST SERPL-CCNC: 14 U/L (ref 1–32)
BACTERIA UR QL AUTO: ABNORMAL /HPF
BASOPHILS # BLD AUTO: 0.03 10*3/MM3 (ref 0–0.2)
BASOPHILS NFR BLD AUTO: 0.5 % (ref 0–1.5)
BILIRUB SERPL-MCNC: 0.4 MG/DL (ref 0–1.2)
BILIRUB UR QL STRIP: NEGATIVE
BUN SERPL-MCNC: 26 MG/DL (ref 8–23)
BUN/CREAT SERPL: 32.9 (ref 7–25)
CALCIUM SPEC-SCNC: 9.5 MG/DL (ref 8.6–10.5)
CHLORIDE SERPL-SCNC: 101 MMOL/L (ref 98–107)
CK SERPL-CCNC: 36 U/L (ref 20–180)
CLARITY UR: CLEAR
CO2 SERPL-SCNC: 29 MMOL/L (ref 22–29)
COLOR UR: YELLOW
CREAT SERPL-MCNC: 0.79 MG/DL (ref 0.57–1)
DEPRECATED RDW RBC AUTO: 42.9 FL (ref 37–54)
EGFRCR SERPLBLD CKD-EPI 2021: 79.1 ML/MIN/1.73
EOSINOPHIL # BLD AUTO: 0.07 10*3/MM3 (ref 0–0.4)
EOSINOPHIL NFR BLD AUTO: 1.1 % (ref 0.3–6.2)
ERYTHROCYTE [DISTWIDTH] IN BLOOD BY AUTOMATED COUNT: 13.6 % (ref 12.3–15.4)
GLOBULIN UR ELPH-MCNC: 2.2 GM/DL
GLUCOSE SERPL-MCNC: 123 MG/DL (ref 65–99)
GLUCOSE UR STRIP-MCNC: NEGATIVE MG/DL
HCT VFR BLD AUTO: 35.1 % (ref 34–46.6)
HGB BLD-MCNC: 12.2 G/DL (ref 12–15.9)
HGB UR QL STRIP.AUTO: NEGATIVE
HYALINE CASTS UR QL AUTO: ABNORMAL /LPF
IMM GRANULOCYTES # BLD AUTO: 0.03 10*3/MM3 (ref 0–0.05)
IMM GRANULOCYTES NFR BLD AUTO: 0.5 % (ref 0–0.5)
KETONES UR QL STRIP: NEGATIVE
LEUKOCYTE ESTERASE UR QL STRIP.AUTO: ABNORMAL
LYMPHOCYTES # BLD AUTO: 1.18 10*3/MM3 (ref 0.7–3.1)
LYMPHOCYTES NFR BLD AUTO: 19.1 % (ref 19.6–45.3)
MCH RBC QN AUTO: 30.6 PG (ref 26.6–33)
MCHC RBC AUTO-ENTMCNC: 34.8 G/DL (ref 31.5–35.7)
MCV RBC AUTO: 88 FL (ref 79–97)
MONOCYTES # BLD AUTO: 0.47 10*3/MM3 (ref 0.1–0.9)
MONOCYTES NFR BLD AUTO: 7.6 % (ref 5–12)
NEUTROPHILS NFR BLD AUTO: 4.41 10*3/MM3 (ref 1.7–7)
NEUTROPHILS NFR BLD AUTO: 71.2 % (ref 42.7–76)
NITRITE UR QL STRIP: NEGATIVE
NRBC BLD AUTO-RTO: 0 /100 WBC (ref 0–0.2)
PH UR STRIP.AUTO: 7 [PH] (ref 5–8)
PLATELET # BLD AUTO: 165 10*3/MM3 (ref 140–450)
PMV BLD AUTO: 10.4 FL (ref 6–12)
POTASSIUM SERPL-SCNC: 4.1 MMOL/L (ref 3.5–5.2)
PROT SERPL-MCNC: 6.2 G/DL (ref 6–8.5)
PROT UR QL STRIP: ABNORMAL
QT INTERVAL: 440 MS
RBC # BLD AUTO: 3.99 10*6/MM3 (ref 3.77–5.28)
RBC # UR STRIP: ABNORMAL /HPF
REF LAB TEST METHOD: ABNORMAL
SODIUM SERPL-SCNC: 140 MMOL/L (ref 136–145)
SP GR UR STRIP: 1.01 (ref 1–1.03)
SQUAMOUS #/AREA URNS HPF: ABNORMAL /HPF
TROPONIN T SERPL-MCNC: <0.01 NG/ML (ref 0–0.03)
UROBILINOGEN UR QL STRIP: ABNORMAL
WBC # UR STRIP: ABNORMAL /HPF
WBC NRBC COR # BLD: 6.19 10*3/MM3 (ref 3.4–10.8)

## 2022-07-05 PROCEDURE — 93005 ELECTROCARDIOGRAM TRACING: CPT | Performed by: EMERGENCY MEDICINE

## 2022-07-05 PROCEDURE — 99284 EMERGENCY DEPT VISIT MOD MDM: CPT

## 2022-07-05 PROCEDURE — 70450 CT HEAD/BRAIN W/O DYE: CPT

## 2022-07-05 PROCEDURE — 84484 ASSAY OF TROPONIN QUANT: CPT | Performed by: EMERGENCY MEDICINE

## 2022-07-05 PROCEDURE — 85025 COMPLETE CBC W/AUTO DIFF WBC: CPT | Performed by: EMERGENCY MEDICINE

## 2022-07-05 PROCEDURE — 80053 COMPREHEN METABOLIC PANEL: CPT | Performed by: EMERGENCY MEDICINE

## 2022-07-05 PROCEDURE — 72110 X-RAY EXAM L-2 SPINE 4/>VWS: CPT

## 2022-07-05 PROCEDURE — 93010 ELECTROCARDIOGRAM REPORT: CPT | Performed by: INTERNAL MEDICINE

## 2022-07-05 PROCEDURE — 82550 ASSAY OF CK (CPK): CPT | Performed by: EMERGENCY MEDICINE

## 2022-07-05 PROCEDURE — 71045 X-RAY EXAM CHEST 1 VIEW: CPT

## 2022-07-05 PROCEDURE — 81001 URINALYSIS AUTO W/SCOPE: CPT | Performed by: EMERGENCY MEDICINE

## 2022-07-05 RX ORDER — SODIUM CHLORIDE 0.9 % (FLUSH) 0.9 %
10 SYRINGE (ML) INJECTION AS NEEDED
Status: DISCONTINUED | OUTPATIENT
Start: 2022-07-05 | End: 2022-07-05 | Stop reason: HOSPADM

## 2022-07-05 NOTE — ED NOTES
Assisted pt to BS for clean catch urine specimen. Upon returning to bed, pt reported she was SOA but only wears oxygen at night as needed. Pt's SpO2 at 82%. Pt placed back on 2L NC. Pt tolerated OOB well, urine collected and sent to lab. Bed changed due to episode of incontinence in CT.

## 2022-07-05 NOTE — ED NOTES
Pt's oxygen 93% on room air. Daughter and pt discussed they feel she is okay to go back to facility, est. 10 minutes away, without home oxygen. MD aware. No other orders obtained. Pt taken to lobby via wheelchair with daughter, stable upon discharge with personal belongings.

## 2022-07-05 NOTE — DISCHARGE INSTRUCTIONS
Follow-up with your primary care doctor.  Return here immediately for any further falls, generalized weakness, chest pain, abdominal pain, vomiting, or fever.

## 2022-07-05 NOTE — ED PROVIDER NOTES
EMERGENCY DEPARTMENT ENCOUNTER    Room Number:  38/38  Date of encounter:  7/5/2022  PCP: Nisha Britton MD  Patient Care Team:  Nisha Britton MD as PCP - General (Family Medicine)   Historian: Patient, EMS    HPI:  Chief Complaint: Fall  A complete HPI/ROS/PMH/PSH/SH/FH are unobtainable due to: Nothing    Context: Kanwal Sauer is a 71 y.o. female who presents to the ED c/o having a fall.  She reports that she got up to go to the bathroom last night and she fell.  She was unable to get up.  She reports she fell around 6 AM.  She states she lives with her daughter and her daughter was not able to get her up either.  She was on the ground for an hour.  They called EMS.  EMS was able to get her up and she states she is able to walk without difficulty.  She reports she was recently diagnosed with a urinary tract infection and was put on antibiotics.  She states she still has dysuria.  She denies hitting her head.  She reports some pain to her right knee as well as her left low back.  She is on blood thinners.  Nothing makes her symptoms worse or better.  EMS states she was at 91% on room air and they put her on 3 L nasal cannula.  She reports she is on 2 L nasal cannula at night.  She is not on oxygen during the day.    Prior record review: Neurology visit 6/23/2022 with tremors, seizures A. fib on Eliquis    PAST MEDICAL HISTORY  Active Ambulatory Problems     Diagnosis Date Noted   • Closed fracture of left distal femur (Formerly [facility]) 12/23/2020   • Type 2 diabetes mellitus with circulatory disorder, without long-term current use of insulin (Formerly [facility]) 12/24/2020   • Essential hypertension 12/24/2020   • Tremors of nervous system 12/26/2020   • PAF (paroxysmal atrial fibrillation) (Formerly [facility]) 12/27/2020   • TARSHA (obstructive sleep apnea) 12/27/2020   • Chronic respiratory failure with hypoxia (Formerly [facility]) 12/27/2020   • History of breast cancer 02/22/2021   • History of seizure 05/25/2021   • Risk for falls 05/25/2021     Resolved Ambulatory  Problems     Diagnosis Date Noted   • CANDICE (acute kidney injury) (HCC) 12/24/2020   • Acute blood loss anemia 12/24/2020     Past Medical History:   Diagnosis Date   • Anxiety    • Asthma    • Atrial fibrillation (HCC)    • Cancer (HCC)    • Deep vein thrombosis (HCC)    • Diabetes mellitus (HCC)    • Difficulty walking    • Drug therapy    • Environmental allergies    • Fractures    • Headache, tension-type    • Hyperlipidemia    • Hypertension    • 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)    • Seizures (HCC)    • Sleep apnea    • Tremor    • Weakness        The patient has started, but not completed, their COVID-19 vaccination series.    PAST SURGICAL HISTORY  Past Surgical History:   Procedure Laterality Date   • BREAST BIOPSY     • CATARACT EXTRACTION, BILATERAL     • DENTAL PROCEDURE      Removed teeth   • FEMUR OPEN REDUCTION INTERNAL FIXATION Left 12/24/2020    Procedure: DISTAL FEMUR OPEN REDUCTION INTERNAL FIXATION;  Surgeon: Marley Hernandez MD;  Location: [facility];  Service: Orthopedics;  Laterality: Left;   • MASTECTOMY Left    • REPLACEMENT TOTAL KNEE BILATERAL           FAMILY HISTORY  Family History   Problem Relation Age of Onset   • Arthritis Mother    • Lung cancer Mother    • Brain cancer Mother    • Hypertension Mother    • Hypertension Father    • Arthritis Sister    • Breast cancer Sister    • Diabetes Sister    • Hypertension Sister    • Dementia Sister          SOCIAL HISTORY  Social History     Socioeconomic History   • Marital status:    Tobacco Use   • Smoking status: Never Smoker   • Smokeless tobacco: Never Used   • Tobacco comment: no caffine   Vaping Use   • Vaping Use: Never used   Substance and Sexual Activity   • Alcohol use: Not Currently   • Drug use: Never   • Sexual activity: Yes     Partners: Male         ALLERGIES  Baclofen; Doxycycline; Eggs or egg-derived products; Iodine; Latex; Milk-related compounds; Msg [monosodium glutamate]; Penicillins; Metronidazole;  Ezetimibe; Latex, natural rubber; Nylon; and Simvastatin        REVIEW OF SYSTEMS  Review of Systems   No chest pain, no shortness of breath, positive dysuria, no nausea, no vomiting, no fever, no chills, no focal weakness, no focal numbness  All systems reviewed and negative except for those discussed in HPI.       PHYSICAL EXAM    I have reviewed the triage vital signs and nursing notes.    ED Triage Vitals [07/05/22 0819]   Temp Heart Rate Resp BP SpO2   97.9 °F (36.6 °C) 60 18 140/80 97 %      Temp src Heart Rate Source Patient Position BP Location FiO2 (%)   Tympanic Monitor -- -- --       Physical Exam  GENERAL: Awake, alert, no acute distress  SKIN: Warm, dry  HENT: Normocephalic, atraumatic  EYES: no scleral icterus  CV: regular rhythm, regular rate  RESPIRATORY: normal effort, lungs clear  ABDOMEN: soft, nontender, nondistended  MUSCULOSKELETAL: no deformity, mild left lateral low flank tenderness.  No CVA tenderness, no midline tenderness  NEURO: alert, moves all extremities, follows commands          LAB RESULTS  Recent Results (from the past 24 hour(s))   ECG 12 Lead    Collection Time: 07/05/22  9:26 AM   Result Value Ref Range    QT Interval 440 ms   Comprehensive Metabolic Panel    Collection Time: 07/05/22  9:42 AM    Specimen: Blood   Result Value Ref Range    Glucose 123 (H) 65 - 99 mg/dL    BUN 26 (H) 8 - 23 mg/dL    Creatinine 0.79 0.57 - 1.00 mg/dL    Sodium 140 136 - 145 mmol/L    Potassium 4.1 3.5 - 5.2 mmol/L    Chloride 101 98 - 107 mmol/L    CO2 29.0 22.0 - 29.0 mmol/L    Calcium 9.5 8.6 - 10.5 mg/dL    Total Protein 6.2 6.0 - 8.5 g/dL    Albumin 4.00 3.50 - 5.20 g/dL    ALT (SGPT) 10 1 - 33 U/L    AST (SGOT) 14 1 - 32 U/L    Alkaline Phosphatase 48 39 - 117 U/L    Total Bilirubin 0.4 0.0 - 1.2 mg/dL    Globulin 2.2 gm/dL    A/G Ratio 1.8 g/dL    BUN/Creatinine Ratio 32.9 (H) 7.0 - 25.0    Anion Gap 10.0 5.0 - 15.0 mmol/L    eGFR 79.1 >60.0 mL/min/1.73   CK    Collection Time: 07/05/22   9:42 AM    Specimen: Blood   Result Value Ref Range    Creatine Kinase 36 20 - 180 U/L   Troponin    Collection Time: 07/05/22  9:42 AM    Specimen: Blood   Result Value Ref Range    Troponin T <0.010 0.000 - 0.030 ng/mL   CBC Auto Differential    Collection Time: 07/05/22  9:42 AM    Specimen: Blood   Result Value Ref Range    WBC 6.19 3.40 - 10.80 10*3/mm3    RBC 3.99 3.77 - 5.28 10*6/mm3    Hemoglobin 12.2 12.0 - 15.9 g/dL    Hematocrit 35.1 34.0 - 46.6 %    MCV 88.0 79.0 - 97.0 fL    MCH 30.6 26.6 - 33.0 pg    MCHC 34.8 31.5 - 35.7 g/dL    RDW 13.6 12.3 - 15.4 %    RDW-SD 42.9 37.0 - 54.0 fl    MPV 10.4 6.0 - 12.0 fL    Platelets 165 140 - 450 10*3/mm3    Neutrophil % 71.2 42.7 - 76.0 %    Lymphocyte % 19.1 (L) 19.6 - 45.3 %    Monocyte % 7.6 5.0 - 12.0 %    Eosinophil % 1.1 0.3 - 6.2 %    Basophil % 0.5 0.0 - 1.5 %    Immature Grans % 0.5 0.0 - 0.5 %    Neutrophils, Absolute 4.41 1.70 - 7.00 10*3/mm3    Lymphocytes, Absolute 1.18 0.70 - 3.10 10*3/mm3    Monocytes, Absolute 0.47 0.10 - 0.90 10*3/mm3    Eosinophils, Absolute 0.07 0.00 - 0.40 10*3/mm3    Basophils, Absolute 0.03 0.00 - 0.20 10*3/mm3    Immature Grans, Absolute 0.03 0.00 - 0.05 10*3/mm3    nRBC 0.0 0.0 - 0.2 /100 WBC   Urinalysis With Microscopic If Indicated (No Culture) - Urine, Clean Catch    Collection Time: 07/05/22 10:04 AM    Specimen: Urine, Clean Catch   Result Value Ref Range    Color, UA Yellow Yellow, Straw    Appearance, UA Clear Clear    pH, UA 7.0 5.0 - 8.0    Specific Gravity, UA 1.013 1.005 - 1.030    Glucose, UA Negative Negative    Ketones, UA Negative Negative    Bilirubin, UA Negative Negative    Blood, UA Negative Negative    Protein, UA 30 mg/dL (1+) (A) Negative    Leuk Esterase, UA Trace (A) Negative    Nitrite, UA Negative Negative    Urobilinogen, UA 0.2 E.U./dL 0.2 - 1.0 E.U./dL   Urinalysis, Microscopic Only - Urine, Clean Catch    Collection Time: 07/05/22 10:04 AM    Specimen: Urine, Clean Catch   Result Value Ref  Range    RBC, UA 0-2 None Seen, 0-2 /HPF    WBC, UA 6-12 (A) None Seen, 0-2 /HPF    Bacteria, UA None Seen None Seen /HPF    Squamous Epithelial Cells, UA 0-2 None Seen, 0-2 /HPF    Hyaline Casts, UA 3-6 None Seen /LPF    Methodology Automated Microscopy        Ordered the above labs and independently reviewed the results.        RADIOLOGY  CT Head Without Contrast    Result Date: 7/5/2022  EMERGENCY NONCONTRAST HEAD CT 07/05/2022  CLINICAL HISTORY: Patient fell, head trauma, has dizziness.  TECHNIQUE: Spiral CT images were obtained from the base of the skull to the vertex without intravenous contrast. The images were reformatted and submitted in 3 mm thick axial CT sections with brain algorithm and 2 mm thick axial CT sections with high-resolution bone algorithm, and 2 mm thick sagittal and coronal reconstructions were performed and submitted in brain algorithm.  COMPARISON: This is correlated to a prior head CT from [facility] on 02/03/2021.  FINDINGS: There is very minimal low-density in the frontal periventricular white matter, consistent with very minimal small vessel disease. The remainder of the brain parenchyma is normal in attenuation. The ventricles are normal in size. I see no focal mass effect. There is no midline shift. No extra-axial fluid collections are identified. There is no evidence of acute intracranial hemorrhage. No acute skull fracture seen. The calvarium and skull base are normal in appearance. There is a tiny 6 x 3 mm mucous retention cyst anterior inferior medial left maxillary sinus. The remainder of the paranasal sinuses and mastoid air cells and middle ear cavities are clear. Calcified plaques are present in the cavernous segments of the internal carotid arteries bilaterally, as well as involving the supracavernous segment of the right internal carotid artery, unchanged.      1. No acute intracranial abnormality is seen with no change when compared to prior head CT from  [facility] on 02/03/2021.   2. There is very minimal small vessel disease in the frontal periventricular white matter, calcified plaques in the cavernous segments of the internal carotid arteries bilaterally and supracavernous segment of the right internal carotid artery. The remainder of the head CT is normal, specifically no acute skull fracture or intracranial hemorrhage is identified.  Radiation dose reduction techniques were utilized, including automated exposure control and exposure modulation based on body size.       XR Chest 1 View    Result Date: 7/5/2022  XR CHEST 1 VW-  07/05/2022  HISTORY: Weakness.  Heart size is at the upper limits of normal. Lungs appear free of acute infiltrates. A chronic fracture of the posterior left eighth ribs is again seen similar to the chest radiograph of 12/23/2020 and well seen on the CT of the thoracic spine from 12/23/2020.  Surgical clips overlie the left hemithorax.  Bones and soft tissues are otherwise unremarkable.      1. Borderline cardiomegaly. 2. Lungs appear clear.  This report was finalized on 7/5/2022 9:39 AM by Dr. Larry Rojas M.D.      XR Spine Lumbar Complete 4+VW    Result Date: 7/5/2022  XR SPINE LUMBAR COMPLETE 4+VW-  07/05/2022  HISTORY: Fell, back pain.  There is moderate lumbar scoliosis. Hypertrophic changes are seen. Multilevel lumbar disc space narrowing is seen.  No acute fractures or subluxation is seen.      1. Lumbar scoliosis and extensive degenerative disease. 2. No definite acute fractures are seen.  This report was finalized on 7/5/2022 9:35 AM by Dr. Larry Rojas M.D.        I ordered the above noted radiological studies. Reviewed by me and discussed with radiologist.  See dictation for official radiology interpretation.      PROCEDURES    Procedures      MEDICATIONS GIVEN IN ER    Medications   sodium chloride 0.9 % flush 10 mL (has no administration in time range)         PROGRESS, DATA ANALYSIS, CONSULTS, AND  MEDICAL DECISION MAKING    All labs have been independently reviewed by me.  All radiology studies have been reviewed by me and discussed with radiologist dictating the report.   EKG's independently viewed and interpreted by me.  Discussion below represents my analysis of pertinent findings related to patient's condition, differential diagnosis, treatment plan and final disposition.    Differential diagnosis includes but is not limited to compression fracture, intracranial hemorrhage, UTI, pneumonia, pneumothorax, anemia.    ED Course as of 07/05/22 1335   Tue Jul 05, 2022   0914 Speaking with Dr. Alvarez with radiology.  CT head negative acute. [TR]   0928 EKG          EKG time: 926  Rhythm/Rate: Normal sinus, rate 52  P waves and CA: Normal P, normal CA  QRS, axis: Narrow QRS, left axis  ST and T waves: Normal ST and T waves    Interpreted Contemporaneously by me, independently viewed  New left axis deviation changed compared to prior 12/24/2020   [TR]   1128 Patient's daughter is now at the bedside.  I reviewed work-up and findings with her and the patient and answered all questions.  Her work-up here is unremarkable.  She is still on antibiotics for UTI.  Her urine does not show any bacteria.  Her chemistries and blood counts are normal.  Her CK is normal.  Her cardiac markers are normal.  Her chest head and lumbar spine imaging do not show any acute changes.  She can ambulate with her walker, she can go home.  Family and patient are agreeable. [TR]   1200 He is ambulating well without difficulty.  Plan discharge home. [TR]      ED Course User Index  [TR] Herminio Conde MD           PPE: The patient wore a mask and I wore an N95 mask throughout the entire patient encounter.       AS OF 13:35 EDT VITALS:    BP - (!) 185/90  HR - 57  TEMP - 97.9 °F (36.6 °C) (Tympanic)  O2 SATS - 93%        DIAGNOSIS  Final diagnoses:   Fall, initial encounter   Acute left-sided low back pain without sciatica   Contusion of right  knee, initial encounter   Generalized weakness         DISPOSITION  ED Disposition     ED Disposition   Discharge    Condition   Stable    Comment   --                   Herminio Conde MD  07/05/22 2185

## 2022-07-05 NOTE — ED TRIAGE NOTES
She tripped and fell in the dark.  She lay there for an hour.  She is stiff from laying on the floor.  No injuries.  She has been incontinent several times while she was down.  Pt is on RA at baseline.  She was 91% on ems arrival and put on 3L O2 for comfort    Patient was placed in face mask during first look triage.  Patient was wearing a face mask throughout encounter.  I wore personal protective equipment throughout the encounter.  Hand hygiene was performed before and after patient encounter.

## 2022-07-14 ENCOUNTER — OFFICE VISIT (OUTPATIENT)
Dept: FAMILY MEDICINE CLINIC | Facility: CLINIC | Age: 72
End: 2022-07-14

## 2022-07-14 VITALS
HEIGHT: 65 IN | BODY MASS INDEX: 37.67 KG/M2 | TEMPERATURE: 98.6 F | DIASTOLIC BLOOD PRESSURE: 64 MMHG | SYSTOLIC BLOOD PRESSURE: 166 MMHG | HEART RATE: 97 BPM | OXYGEN SATURATION: 93 % | RESPIRATION RATE: 16 BRPM | WEIGHT: 226.1 LBS

## 2022-07-14 DIAGNOSIS — F43.21 GRIEF: ICD-10-CM
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
## 2022-07-14 DIAGNOSIS — R06.09 DYSPNEA ON EXERTION: ICD-10-CM

## 2022-07-14 DIAGNOSIS — J44.9 CHRONIC OBSTRUCTIVE PULMONARY DISEASE, UNSPECIFIED COPD TYPE: Primary | ICD-10-CM

## 2022-07-14 PROCEDURE — 99214 OFFICE O/P EST MOD 30 MIN: CPT | Performed by: NURSE PRACTITIONER

## 2022-07-14 RX ORDER — TIOTROPIUM BROMIDE INHALATION SPRAY 3.12 UG/1
2 SPRAY, METERED RESPIRATORY (INHALATION)
Qty: 1 EACH | Refills: 2 | Status: SHIPPED | OUTPATIENT
Start: 2022-07-14 | End: 2022-10-04

## 2022-07-14 RX ORDER — MAGNESIUM OXIDE 400 MG/1
400 TABLET ORAL DAILY
Qty: 90 TABLET | Refills: 1 | Status: SHIPPED | OUTPATIENT
Start: 2022-07-14

## 2022-07-14 RX ORDER — POTASSIUM CHLORIDE 750 MG/1
10 TABLET, EXTENDED RELEASE ORAL ONCE AS NEEDED
Qty: 90 TABLET | Refills: 1 | Status: SHIPPED | OUTPATIENT
Start: 2022-07-14 | End: 2023-02-24

## 2022-07-14 RX ORDER — KETOCONAZOLE 20 MG/G
CREAM TOPICAL
COMMUNITY
Start: 2022-06-19 | End: 2023-01-12

## 2022-07-14 RX ORDER — CITALOPRAM 20 MG/1
20 TABLET ORAL DAILY
Qty: 90 TABLET | Refills: 0 | Status: SHIPPED | OUTPATIENT
Start: 2022-07-14 | End: 2023-01-16

## 2022-07-14 NOTE — PATIENT INSTRUCTIONS
Discharge instructions  Resume generic Celexa 1/2 tablet daily x3 days  Then 1 tablet daily thereafter    Strongly recommend grief group therapy  Also consider counseling  [facility]  [facility] [place] [facility]  Pleasant music pleasant aromas  Lots of friends and family  Lots of activities try not to say know if you are willing to do these    Pulmonary for further evaluation need for portable oxygen,    Spiriva 2 inhalations daily for improved lung function    Generic Lasix as needed for lower extremity edema with potassium    Consider a low dose such as bupropion 75 mg daily as an add-on for depression and improved energy  You can discuss this if you desire with Dr. Britton and I would like you to follow-up with her in 2 to 3 weeks    If confusion slurred speech  Increased chest pain shortness of breath emergency room    Follow-up with cardiology per their schedule

## 2022-07-14 NOTE — PROGRESS NOTES
"Chief Complaint  Hospital Follow Up Visit, Shortness of Breath, Knee Pain (Pt states right side), Back Pain (Pt states right side), Elbow Injury (Pt states right side), and Hip Pain (Pt states right )    Subjective        Kanwal Sauer presents to [facility]  Very pleasant patient here today  Here for follow-up or emergency room recent fall it was dark in the room and she lost her balance her footing and fell, injuring her back and knees, elbow they had to call EMS to get her off the floor, and she went to the emergency room is evaluated she had negative CT of the brain for anything acute she has C-spine negative for acute fracture as well as chest x-ray with mild cardiomegaly otherwise  Essentially normal.  Since the discharge, she still has some soreness but she is improving,  She does have chronic dyspnea and likely COPD, as well as a history of paroxysmal A. fib she takes anticoagulation Eliquis and she is compliant with her medication she had cardioversion in 2020 in [place] she has been little for 2 years, and she had an echocardiogram at that time, with normal EJ, but with some valvular changes and other abnormalities, but no severe aortic stenosis or mitral regurgitation.    He is having no chest pains patient does have chronic dyspnea and she uses oxygen at nighttime but during the emergency room visit they noticed her oxygen drop with activity drops in the 80s.  And have questions about portable oxygen how to go about getting that she does not have a pulmonary specialist but she does take   Dulera          Shortness of Breath    Knee Pain     Back Pain    Elbow Injury    Hip Pain         Objective   Vital Signs:  /64   Pulse 97   Temp 98.6 °F (37 °C) (Infrared)   Resp 16   Ht 165.1 cm (65\")   Wt 103 kg (226 lb 1.6 oz)   SpO2 93%   BMI 37.63 kg/m²   Estimated body mass index is 37.63 kg/m² as calculated from the following:    Height as of this encounter: 165.1 " "cm (65\").    Weight as of this encounter: 103 kg (226 lb 1.6 oz).          Physical Exam  Vitals reviewed.   Constitutional:       Appearance: She is well-developed.   HENT:      Head: Normocephalic.      Nose: Nose normal.   Eyes:      General: No scleral icterus.     Conjunctiva/sclera: Conjunctivae normal.      Pupils: Pupils are equal, round, and reactive to light.   Neck:      Thyroid: No thyromegaly.      Vascular: No JVD.   Cardiovascular:      Rate and Rhythm: Normal rate and regular rhythm.      Heart sounds: Normal heart sounds. No murmur heard.    No friction rub. No gallop.   Pulmonary:      Effort: Pulmonary effort is normal. No respiratory distress.      Breath sounds: Normal breath sounds. No stridor. No wheezing or rales.   Abdominal:      General: Bowel sounds are normal. There is no distension.      Palpations: Abdomen is soft.      Tenderness: There is no abdominal tenderness.      Comments: No hepatosplenomegaly, no ascites,   Musculoskeletal:         General: No tenderness.      Cervical back: Neck supple.   Lymphadenopathy:      Cervical: No cervical adenopathy.   Skin:     General: Skin is warm and dry.      Findings: No erythema or rash.   Neurological:      Mental Status: She is alert and oriented to person, place, and time.      Deep Tendon Reflexes: Reflexes are normal and symmetric.   Psychiatric:         Behavior: Behavior normal.         Thought Content: Thought content normal.         Judgment: Judgment normal.        Result Review :                Assessment and Plan   Diagnoses and all orders for this visit:    1. Chronic obstructive pulmonary disease, unspecified COPD type (HCC) (Primary)  -     Ambulatory Referral to Pulmonology    2. Dyspnea on exertion  -     Ambulatory Referral to Pulmonology    3. Grief    4. Fall, subsequent encounter    Other orders  -     citalopram (CeleXA) 20 MG tablet; Take 1 tablet by mouth Daily.  Dispense: 90 tablet; Refill: 0  -     potassium chloride " (K-DUR,KLOR-CON) 10 MEQ CR tablet; Take 1 tablet by mouth 1 (One) Time As Needed (to maintain potassium). Only take with lasix  Dispense: 90 tablet; Refill: 1  -     magnesium oxide (MAG-OX) 400 MG tablet; Take 1 tablet by mouth Daily.  Dispense: 90 tablet; Refill: 1  -     Mirabegron ER (Myrbetriq) 25 MG tablet sustained-release 24 hour 24 hr tablet; Take 1 tablet by mouth Daily.  Dispense: 90 tablet; Refill: 1  -     tiotropium bromide monohydrate (Spiriva Respimat) 2.5 MCG/ACT aerosol solution inhaler; Inhale 2 puffs Daily. For improved breathing  Dispense: 1 each; Refill: 2           I spent 30  minutes caring for Kanwal on this date of service. This time includes time spent by me in the following activities:preparing for the visit, reviewing tests, obtaining and/or reviewing a separately obtained history, performing a medically appropriate examination and/or evaluation , counseling and educating the patient/family/caregiver, ordering medications, tests, or procedures, documenting information in the medical record and care coordination  Follow Up   Return 2 weeks Dr emmanuel.  Patient was given instructions and counseling regarding her condition or for health maintenance advice. Please see specific information pulled into the AVS if appropriate.     Discharge instructions  Resume generic Celexa 1/2 tablet daily x3 days  Then 1 tablet daily thereafter    Strongly recommend grief group therapy  Also consider counseling  [facility]  [facility] [place] [facility]  Pleasant music pleasant aromas  Lots of friends and family  Lots of activities try not to say know if you are willing to do these    Pulmonary for further evaluation need for portable oxygen,    Spiriva 2 inhalations daily for improved lung function    Generic Lasix as needed for lower extremity edema with potassium    Consider a low dose such as bupropion 75 mg daily as an add-on for depression and improved energy  You can  discuss this if you desire with Dr. Britton and I would like you to follow-up with her in 2 to 3 weeks    If confusion slurred speech  Increased chest pain shortness of breath emergency room    Follow-up with cardiology per their schedule

## 2022-07-25 RX ORDER — APIXABAN 5 MG/1
TABLET, FILM COATED ORAL
Qty: 60 TABLET | Refills: 2 | Status: SHIPPED | OUTPATIENT
Start: 2022-07-25 | End: 2022-11-04

## 2022-07-25 NOTE — TELEPHONE ENCOUNTER
Rx Refill Note  Requested Prescriptions     Pending Prescriptions Disp Refills   • Eliquis 5 MG tablet tablet [Pharmacy Med Name: ELIQUIS 5 MG TABLET] 60 tablet 2     Sig: TAKE 1 TABLET BY MOUTH EVERY 12 HOURS      Last office visit with prescribing clinician: 3/25/2022      Next office visit with prescribing clinician: 8/4/2022       {TIP  Please add Last Relevant Lab Date if appropriate:7/5/22    Catherine Anguiano, PCT  07/25/22, 12:15 EDT

## 2022-07-26 ENCOUNTER — TELEPHONE (OUTPATIENT)
Dept: FAMILY MEDICINE CLINIC | Facility: CLINIC | Age: 72
End: 2022-07-26

## 2022-07-26 DIAGNOSIS — E11.65 TYPE 2 DIABETES MELLITUS WITH HYPERGLYCEMIA, WITHOUT LONG-TERM CURRENT USE OF INSULIN: ICD-10-CM

## 2022-07-26 NOTE — TELEPHONE ENCOUNTER
Caller: SHELDON TYSON    Relationship: Emergency Contact    Best call back number: [phone]    What is the best time to reach you:ANYTIME    Who are you requesting to speak with (clinical staff, provider,  specific staff member): CLINCIAL    What was the call regarding: REGARDING A CALL FOR OXGEN THAT SHOULD HAPPEN ON 07.26.22    Do you require a callback: YES

## 2022-07-26 NOTE — TELEPHONE ENCOUNTER
Caller: VICK    Relationship: Other    Best call back number:  [phone]    What is the best time to reach you: ANY TIME     Who are you requesting to speak with (clinical staff, provider,  specific staff member): DR. FELICIANO     What was the call regarding: VICK WITH [facility] FAXED A FORM TO THE OFFICE TODAY FOR DR. FELICIANO TO COMPLETE AND RETURN TO VALIDATE THE PATIENT'S OXYGEN USE.  VICK STATES THAT THE PATIENT IS PAYING FOR A PORTABLE OXYGEN CONCENTRATOR IN ORDER TO FLY BY PLANE TO A  AND NEEDS THIS IN A TIMELY MANNER, BUT THE DATE SHE WAS TRAVELING WAS NOT DISCLOSED.    VICK STATES THIS NEEDS TO BE FAXED BACK  [phone].    Do you require a callback: AS NEEDED.

## 2022-07-26 NOTE — TELEPHONE ENCOUNTER
Rx Refill Note  Requested Prescriptions     Pending Prescriptions Disp Refills   • metFORMIN (GLUCOPHAGE) 1000 MG tablet [Pharmacy Med Name: METFORMIN HCL 1,000 MG TABLET] 180 tablet 0     Sig: TAKE 1 TABLET BY MOUTH TWICE A DAY WITH MEALS   • primidone (MYSOLINE) 50 MG tablet [Pharmacy Med Name: PRIMIDONE 50 MG TABLET] 270 tablet 0     Sig: TAKE 1 TABLET BY MOUTH THREE TIMES A DAY      Last office visit with prescribing clinician: 3/25/2022      Next office visit with prescribing clinician: 8/4/2022            Deandra Crabtree MA  07/26/22, 13:32 EDT

## 2022-07-27 RX ORDER — PRIMIDONE 50 MG/1
TABLET ORAL
Qty: 270 TABLET | Refills: 0 | Status: SHIPPED | OUTPATIENT
Start: 2022-07-27 | End: 2022-12-21 | Stop reason: SDUPTHER

## 2022-07-29 NOTE — TELEPHONE ENCOUNTER
VICK IS CALLING BACK TO FOLLOW UP ON THE PAPERWORK TO VALIDATE PATIENT'S OXYGEN USE.   VICK AGAIN STATES THAT THE PATIENT IS PAYING FOR A PORTABLE OXYGEN CONCENTRATOR IN ORDER TO FLY BY PLANE TO A  AND NEEDS THIS IN A TIMELY MANNER, BUT THE DATE SHE WAS TRAVELING WAS NOT DISCLOSED.    VICK STATES THIS NEEDS TO BE FAXED BACK  [phone].     IF THE OFFICE NEEDS ANOTHER COPY OF THE FORM, PLEASE CONTACT .

## 2022-08-01 NOTE — TELEPHONE ENCOUNTER
Caller: SOHAN    Relationship: Other    Best call back number: [phone]    What was the call regarding: SOHAN HAS SENT PAPERWORK FOR DR FELICIANO TO FILL OUT REGARDING HER OXYGEN. THEY HAVE NOT HEARD ANYTHING BACK. PLEASE CALL OR SEND PAPERWORK ASAP.     Do you require a callback: YES

## 2022-08-02 ENCOUNTER — OFFICE VISIT (OUTPATIENT)
Dept: FAMILY MEDICINE CLINIC | Facility: CLINIC | Age: 72
End: 2022-08-02

## 2022-08-02 VITALS
HEIGHT: 65 IN | HEART RATE: 68 BPM | OXYGEN SATURATION: 88 % | SYSTOLIC BLOOD PRESSURE: 120 MMHG | WEIGHT: 226.6 LBS | DIASTOLIC BLOOD PRESSURE: 66 MMHG | TEMPERATURE: 96.9 F | BODY MASS INDEX: 37.75 KG/M2

## 2022-08-02 DIAGNOSIS — R41.0 CONFUSION: ICD-10-CM

## 2022-08-02 DIAGNOSIS — G89.29 CHRONIC LEFT SHOULDER PAIN: ICD-10-CM

## 2022-08-02 DIAGNOSIS — R09.02 HYPOXIA: ICD-10-CM

## 2022-08-02 DIAGNOSIS — R06.02 SOB (SHORTNESS OF BREATH): Primary | ICD-10-CM

## 2022-08-02 DIAGNOSIS — R60.0 EDEMA OF BOTH LOWER LEGS: ICD-10-CM

## 2022-08-02 DIAGNOSIS — M25.512 CHRONIC LEFT SHOULDER PAIN: ICD-10-CM

## 2022-08-02 PROCEDURE — 99214 OFFICE O/P EST MOD 30 MIN: CPT | Performed by: FAMILY MEDICINE

## 2022-08-02 NOTE — TELEPHONE ENCOUNTER
We have received the RX for the machine. This was faxed just now along with Demographics. Patient and daughter are also aware of this happening.

## 2022-08-02 NOTE — PROGRESS NOTES
Chief Complaint  Back Pain (F/u on back pain)    Subjective        Kanwal Sauer presents to [facility]  History of Present Illness    Kanwal Sauer is a 71-year-old female who presents today for a follow-up from ER because of a fall. She is accompanied by her daughter Monica, that contributes to her history.    The patient fell on 07/05/2022, and went to the emergency room after fall at assisted living.  When at the emergency room she had an x-ray and CT scan of head was done in the emergency room.  She has improved since then. She was seen by James Epley, APRN, a couple of weeks after. He changed her medications and prescribed an inhaler. The patient was not admitted to the hospital when she visited the emergency room.     The patient reports having pain on her left arm, but it is not the one she fell on. She does have some decreased range of motion.  Her daughter states she does not think they did an x-ray to that arm. She reports trying to lift her arm as much as possible. The patient is unsure how long she has had this pain for.     The patient needs to follow up with a pulmonologist. She is not able to get in until 09/08/2022. She has has never smoked. She was diagnosed with asthma in the past . She has Dulera and albuterol inhalers. Her accompanying adult is not sure if she is doing the inhaler correctly. At the beginning, she was feeling better. While she was at the emergency room, she was told she needed to be on the oxygen constantly. She reports being tired frequently. The patient denies coughing.      The patient needs to take Myrbetriq and was provided with samples from her urologist. She saw her urologist this morning.     The patient has been swelling and reports taking her water pill as prescribed.     The patient was seen by her cardiologist on 04/2022. She was doing fine then.     .     Objective      Review of Systems   Constitutional: Negative for activity  "change, fatigue, fever and unexpected weight change.   HENT: Negative for congestion, mouth sores, sinus pressure and sore throat.    Eyes: Negative for visual disturbance.   Respiratory: Negative for cough, chest tightness, shortness of breath and wheezing.    Cardiovascular: Negative for chest pain, palpitations and leg swelling.   Gastrointestinal: Negative for abdominal pain, constipation, diarrhea, nausea and vomiting.   Endocrine: Negative for cold intolerance, polydipsia and polyuria.   Genitourinary: Negative for dysuria, frequency, hematuria and urgency.   Musculoskeletal: Negative for back pain, gait problem and neck pain.   Skin: Negative for color change and rash.   Neurological: Negative for dizziness, speech difficulty, weakness and headaches.   Psychiatric/Behavioral: Positive for confusion. Negative for agitation and sleep disturbance. The patient is not nervous/anxious.        Vital Signs:  /66   Pulse 68   Temp 96.9 °F (36.1 °C)   Ht 165.1 cm (65\")   Wt 103 kg (226 lb 9.6 oz)   SpO2 (!) 88%   BMI 37.71 kg/m²   Estimated body mass index is 37.71 kg/m² as calculated from the following:    Height as of this encounter: 165.1 cm (65\").    Weight as of this encounter: 103 kg (226 lb 9.6 oz).          Physical Exam  Constitutional:       Appearance: Normal appearance. She is well-developed.   HENT:      Head: Normocephalic and atraumatic.      Right Ear: Tympanic membrane, ear canal and external ear normal.      Left Ear: Tympanic membrane, ear canal and external ear normal.      Nose: Nose normal.      Mouth/Throat:      Mouth: Mucous membranes are moist.   Eyes:      General: No scleral icterus.     Extraocular Movements: Extraocular movements intact.      Conjunctiva/sclera: Conjunctivae normal.      Pupils: Pupils are equal, round, and reactive to light.   Neck:      Thyroid: No thyromegaly.   Cardiovascular:      Rate and Rhythm: Normal rate and regular rhythm.      Pulses: Normal pulses. "      Heart sounds: Normal heart sounds.   Pulmonary:      Effort: Pulmonary effort is normal. No respiratory distress.      Breath sounds: Normal breath sounds. No wheezing or rales.   Abdominal:      General: Bowel sounds are normal. There is no distension.      Palpations: Abdomen is soft. There is no mass.      Tenderness: There is no abdominal tenderness.      Hernia: No hernia is present.   Musculoskeletal:         General: No swelling or tenderness. Normal range of motion.      Cervical back: Normal range of motion and neck supple.   Lymphadenopathy:      Cervical: No cervical adenopathy.   Skin:     General: Skin is warm.   Neurological:      General: No focal deficit present.      Mental Status: She is alert and oriented to person, place, and time.      Cranial Nerves: No cranial nerve deficit.      Sensory: No sensory deficit.      Deep Tendon Reflexes: Reflexes normal.   Psychiatric:         Mood and Affect: Mood normal.         Behavior: Behavior normal.         Thought Content: Thought content normal.         Judgment: Judgment normal.        Result Review :                Assessment and Plan   Diagnoses and all orders for this visit:    1. SOB (shortness of breath) (Primary)  Kanwal Sauer is a 71-year-old female patient with multiple medical problem including A. fib, hypertension, and hyperlipidemia. She came here for a follow-up from ER visit secondary to fall and hypoxia. She was sent home on oxygen, from ER.   She is still complaining of shortness of breath on exertion. Patient was also hypoxic in the office and started oxygen here in the officeThe patient's shortness of breath has improved on oxygen. We will arrange a portable oxygen cylinder for her. Her daughter has questions  regarding  the reason for her shortness of breath and hypoxia. I reviewed her previous labs and imaging. She has never smoked in her life. I will scheduled her for a CT of the lung. They will see CT thoracic spine done  2-years ago and there was cautioned for pulmonary fibrosis. She is also waiting for a pulmonology appointment. She will continue taking Dulera, albuterol inhaler as needed, and the    2. Hypoxia        - As above , advise to use oxygen 24/7     3. Confusion        - The patient's confusion has improved after starting oxygen  4. Left shoulder pain        - Causes discussed more likely degenerative arthritis. Patient refused imaging today.     5. Bilateral leg edema        - The patient is on Lasix 40 mg three times in a week. I advised her daughter to give the 20 mg Lasix today and tomorrow and she will take 40 mg on Thursday 08/04/2022. They will weigh her on Friday 08/05/2022. She will call me with her weight then.      The patient had an echo done in 2020 in [place], and it had a mildly enlarged right ventricular at that time.              Follow Up   No follow-ups on file.  Patient was given instructions and counseling regarding her condition or for health maintenance advice. Please see specific information pulled into the AVS if appropriate.       Transcribed from ambient dictation for Nisha Britton MD by Deandra Isbell.  08/02/22   18:46 EDT    Patient verbalized consent to the visit recording.

## 2022-09-07 ENCOUNTER — TRANSCRIBE ORDERS (OUTPATIENT)
Dept: ADMINISTRATIVE | Facility: HOSPITAL | Age: 72
End: 2022-09-07

## 2022-09-07 DIAGNOSIS — R06.02 SOB (SHORTNESS OF BREATH): Primary | ICD-10-CM

## 2022-09-08 RX ORDER — FUROSEMIDE 40 MG/1
TABLET ORAL
Qty: 90 TABLET | Refills: 0 | Status: SHIPPED | OUTPATIENT
Start: 2022-09-08

## 2022-09-13 RX ORDER — FENOFIBRATE 160 MG/1
TABLET ORAL
Qty: 90 TABLET | Refills: 1 | Status: SHIPPED | OUTPATIENT
Start: 2022-09-13 | End: 2023-03-20

## 2022-09-13 NOTE — TELEPHONE ENCOUNTER
Rx Refill Note  Requested Prescriptions     Pending Prescriptions Disp Refills   • fenofibrate 160 MG tablet [Pharmacy Med Name: FENOFIBRATE 160 MG TABLET] 90 tablet 1     Sig: TAKE 1 TABLET BY MOUTH EVERY DAY      Last office visit with prescribing clinician: 8/2/2022      Next office visit with prescribing clinician: 10/4/2022            Deandra Crabtree MA  09/13/22, 09:13 EDT

## 2022-09-22 ENCOUNTER — HOSPITAL ENCOUNTER (OUTPATIENT)
Dept: CARDIOLOGY | Facility: HOSPITAL | Age: 72
Discharge: HOME | End: 2022-09-22
Admitting: INTERNAL MEDICINE

## 2022-09-22 VITALS
BODY MASS INDEX: 37.65 KG/M2 | HEIGHT: 65 IN | DIASTOLIC BLOOD PRESSURE: 80 MMHG | HEART RATE: 61 BPM | WEIGHT: 226 LBS | OXYGEN SATURATION: 92 % | SYSTOLIC BLOOD PRESSURE: 160 MMHG

## 2022-09-22 DIAGNOSIS — R06.02 SOB (SHORTNESS OF BREATH): ICD-10-CM

## 2022-09-22 PROCEDURE — 93306 TTE W/DOPPLER COMPLETE: CPT

## 2022-09-22 PROCEDURE — 93306 TTE W/DOPPLER COMPLETE: CPT | Performed by: INTERNAL MEDICINE

## 2022-09-23 LAB
AORTIC ARCH: 2.7 CM
ASCENDING AORTA: 3.5 CM
BH CV ECHO MEAS - ACS: 2.6 CM
BH CV ECHO MEAS - AI P1/2T: 552.8 MSEC
BH CV ECHO MEAS - AO MAX PG: 14 MMHG
BH CV ECHO MEAS - AO MEAN PG: 7 MMHG
BH CV ECHO MEAS - AO ROOT DIAM: 4 CM
BH CV ECHO MEAS - AO V2 MAX: 187 CM/SEC
BH CV ECHO MEAS - AO V2 VTI: 45.6 CM
BH CV ECHO MEAS - AVA(I,D): 2.09 CM2
BH CV ECHO MEAS - EDV(CUBED): 187.8 ML
BH CV ECHO MEAS - EDV(MOD-SP2): 83 ML
BH CV ECHO MEAS - EDV(MOD-SP4): 95 ML
BH CV ECHO MEAS - EF(MOD-BP): 67.9 %
BH CV ECHO MEAS - EF(MOD-SP2): 68.7 %
BH CV ECHO MEAS - EF(MOD-SP4): 61.1 %
BH CV ECHO MEAS - EF_3D-VOL: 68 %
BH CV ECHO MEAS - ESV(CUBED): 73.7 ML
BH CV ECHO MEAS - ESV(MOD-SP2): 26 ML
BH CV ECHO MEAS - ESV(MOD-SP4): 37 ML
BH CV ECHO MEAS - FS: 26.8 %
BH CV ECHO MEAS - IVS/LVPW: 1.26 CM
BH CV ECHO MEAS - IVSD: 1.5 CM
BH CV ECHO MEAS - LA 3D VOL INDEX: 47
BH CV ECHO MEAS - LAT PEAK E' VEL: 4.9 CM/SEC
BH CV ECHO MEAS - LV DIASTOLIC VOL/BSA (35-75): 45.6 CM2
BH CV ECHO MEAS - LV MASS(C)D: 339.6 GRAMS
BH CV ECHO MEAS - LV MAX PG: 5.9 MMHG
BH CV ECHO MEAS - LV MEAN PG: 3 MMHG
BH CV ECHO MEAS - LV SYSTOLIC VOL/BSA (12-30): 17.8 CM2
BH CV ECHO MEAS - LV V1 MAX: 121 CM/SEC
BH CV ECHO MEAS - LV V1 VTI: 28.4 CM
BH CV ECHO MEAS - LVIDD: 5.7 CM
BH CV ECHO MEAS - LVIDS: 4.2 CM
BH CV ECHO MEAS - LVOT AREA: 3.4 CM2
BH CV ECHO MEAS - LVOT DIAM: 2.07 CM
BH CV ECHO MEAS - LVPWD: 1.19 CM
BH CV ECHO MEAS - MED PEAK E' VEL: 6.3 CM/SEC
BH CV ECHO MEAS - MR MAX PG: 71.7 MMHG
BH CV ECHO MEAS - MR MAX VEL: 423.5 CM/SEC
BH CV ECHO MEAS - MV A DUR: 0.13 SEC
BH CV ECHO MEAS - MV A MAX VEL: 99.2 CM/SEC
BH CV ECHO MEAS - MV DEC SLOPE: 428.3 CM/SEC2
BH CV ECHO MEAS - MV DEC TIME: 0.2 MSEC
BH CV ECHO MEAS - MV E MAX VEL: 80.2 CM/SEC
BH CV ECHO MEAS - MV E/A: 0.81
BH CV ECHO MEAS - MV MAX PG: 3.8 MMHG
BH CV ECHO MEAS - MV MEAN PG: 1.59 MMHG
BH CV ECHO MEAS - MV P1/2T: 63.1 MSEC
BH CV ECHO MEAS - MV V2 VTI: 38.3 CM
BH CV ECHO MEAS - MVA(P1/2T): 3.5 CM2
BH CV ECHO MEAS - MVA(VTI): 2.49 CM2
BH CV ECHO MEAS - PA ACC TIME: 0.1 SEC
BH CV ECHO MEAS - PA PR(ACCEL): 34.6 MMHG
BH CV ECHO MEAS - PA V2 MAX: 146.6 CM/SEC
BH CV ECHO MEAS - PULM A REVS DUR: 0.12 SEC
BH CV ECHO MEAS - PULM A REVS VEL: 30.6 CM/SEC
BH CV ECHO MEAS - PULM DIAS VEL: 44.1 CM/SEC
BH CV ECHO MEAS - PULM S/D: 1.08
BH CV ECHO MEAS - PULM SYS VEL: 47.5 CM/SEC
BH CV ECHO MEAS - RAP SYSTOLE: 3 MMHG
BH CV ECHO MEAS - RV MAX PG: 2.6 MMHG
BH CV ECHO MEAS - RV V1 MAX: 80 CM/SEC
BH CV ECHO MEAS - RV V1 VTI: 22.5 CM
BH CV ECHO MEAS - RVSP: 31.3 MMHG
BH CV ECHO MEAS - SI(MOD-SP2): 27.4 ML/M2
BH CV ECHO MEAS - SI(MOD-SP4): 27.8 ML/M2
BH CV ECHO MEAS - SUP REN AO DIAM: 2.3 CM
BH CV ECHO MEAS - SV(LVOT): 95.3 ML
BH CV ECHO MEAS - SV(MOD-SP2): 57 ML
BH CV ECHO MEAS - SV(MOD-SP4): 58 ML
BH CV ECHO MEAS - TAPSE (>1.6): 3.6 CM
BH CV ECHO MEAS - TR MAX PG: 28.3 MMHG
BH CV ECHO MEAS - TR MAX VEL: 266 CM/SEC
BH CV ECHO MEASUREMENTS AVERAGE E/E' RATIO: 14.32
BH CV VAS BP RIGHT ARM: NORMAL MMHG
BH CV XLRA - RV BASE: 3.2 CM
BH CV XLRA - RV LENGTH: 8.1 CM
BH CV XLRA - RV MID: 3.5 CM
BH CV XLRA - TDI S': 15.9 CM/SEC
LEFT ATRIUM VOLUME INDEX: 28.3 ML/M2
MAXIMAL PREDICTED HEART RATE: 147 BPM
SINUS: 3.8 CM
STJ: 3.6 CM
STRESS TARGET HR: 125 BPM

## 2022-09-29 RX ORDER — LOSARTAN POTASSIUM 50 MG/1
TABLET ORAL
Qty: 90 TABLET | Refills: 1 | Status: SHIPPED | OUTPATIENT
Start: 2022-09-29 | End: 2023-01-02 | Stop reason: HOSPADM

## 2022-09-29 NOTE — TELEPHONE ENCOUNTER
Rx Refill Note  Requested Prescriptions     Pending Prescriptions Disp Refills    losartan (COZAAR) 50 MG tablet [Pharmacy Med Name: LOSARTAN POTASSIUM 50 MG TAB] 90 tablet 1     Sig: TAKE 1 TABLET BY MOUTH EVERY DAY      Last office visit with prescribing clinician: 8/2/2022      Next office visit with prescribing clinician: 10/4/2022            Deandra Crabtree MA  09/29/22, 10:15 EDT

## 2022-10-04 ENCOUNTER — OFFICE VISIT (OUTPATIENT)
Dept: FAMILY MEDICINE CLINIC | Facility: CLINIC | Age: 72
End: 2022-10-04

## 2022-10-04 VITALS
HEIGHT: 65 IN | HEART RATE: 54 BPM | DIASTOLIC BLOOD PRESSURE: 78 MMHG | SYSTOLIC BLOOD PRESSURE: 134 MMHG | TEMPERATURE: 98 F | OXYGEN SATURATION: 98 % | BODY MASS INDEX: 38.29 KG/M2 | WEIGHT: 229.8 LBS

## 2022-10-04 DIAGNOSIS — Z51.81 MEDICATION MONITORING ENCOUNTER: ICD-10-CM

## 2022-10-04 DIAGNOSIS — Z00.00 MEDICARE ANNUAL WELLNESS VISIT, SUBSEQUENT: Primary | ICD-10-CM

## 2022-10-04 DIAGNOSIS — E11.51 TYPE 2 DIABETES MELLITUS WITH DIABETIC PERIPHERAL ANGIOPATHY WITHOUT GANGRENE, WITHOUT LONG-TERM CURRENT USE OF INSULIN: ICD-10-CM

## 2022-10-04 DIAGNOSIS — R79.9 ELEVATED BUN: ICD-10-CM

## 2022-10-04 DIAGNOSIS — R53.83 OTHER FATIGUE: ICD-10-CM

## 2022-10-04 DIAGNOSIS — E78.2 MIXED HYPERLIPIDEMIA: ICD-10-CM

## 2022-10-04 DIAGNOSIS — I48.0 PAF (PAROXYSMAL ATRIAL FIBRILLATION): ICD-10-CM

## 2022-10-04 PROCEDURE — 1159F MED LIST DOCD IN RCRD: CPT | Performed by: FAMILY MEDICINE

## 2022-10-04 PROCEDURE — 1170F FXNL STATUS ASSESSED: CPT | Performed by: FAMILY MEDICINE

## 2022-10-04 PROCEDURE — G0439 PPPS, SUBSEQ VISIT: HCPCS | Performed by: FAMILY MEDICINE

## 2022-10-04 RX ORDER — DOXYCYCLINE HYCLATE 50 MG/1
50 CAPSULE ORAL DAILY
COMMUNITY
Start: 2022-08-02 | End: 2022-11-15

## 2022-10-04 NOTE — PROGRESS NOTES
The ABCs of the Annual Wellness Visit  Subsequent Medicare Wellness Visit    Chief Complaint   Patient presents with   • Medicare Wellness-subsequent   • Hypertension   • Diabetes   • Urinary Frequency      Subjective    History of Present Illness:  Kanwal Sauer is a 71 y.o. female who presents for a Subsequent Medicare Wellness Visit.    The patient has been doing better. She has recently moved to independent assisted living.    The patient continues to have fatigue and confusion despite using oxygen all the time. The adult female also noticed her being more forgetful. She has an appointment with her neurologist. She tries to be active and participates in seated volleyball, corn hole and exercises.     She was seen at the emergency department last 07/05/2022 due to a fall.     The patient lost her  this year which has not been easy for her. The adult female denies that the patient is depressed.     According to the adult female, the patient had her Cologuard in 2021 and was negative. She has received her COVID-19 vaccines. The patient is due for her Tdap vaccine, pneumonia vaccine, and COVID-19 boosters. She does not get the influenza and shingles vaccine due to her egg allergy.     The following portions of the patient's history were reviewed and   updated as appropriate: allergies, current medications, past family history, past medical history, past social history, past surgical history and problem list.    Compared to one year ago, the patient feels her physical   health is worse.    Compared to one year ago, the patient feels her mental   health is better.    Recent Hospitalizations:  She was not admitted to the hospital during the last year.       Current Medical Providers:  Patient Care Team:  Nisha Britton MD as PCP - General (Family Medicine)    Outpatient Medications Prior to Visit   Medication Sig Dispense Refill   • acetaminophen (TYLENOL) 325 MG tablet Take 2 tablets by mouth Every 4 (Four)  Hours As Needed for Mild Pain .     • albuterol sulfate  (90 Base) MCG/ACT inhaler INHALE 2 PUFFS EVERY 4 HOURS AS NEEDED FOR WHEEZING 18 g 1   • Cholecalciferol (Vitamin D3) 25 MCG (1000 UT) capsule Take 1,000 Units by mouth Daily.     • citalopram (CeleXA) 20 MG tablet Take 1 tablet by mouth Daily. 90 tablet 0   • CRANBERRY PO Take 650 mg by mouth.     • doxycycline (VIBRAMYCIN) 50 MG capsule Take 50 mg by mouth Daily. For UTI prevention     • Eliquis 5 MG tablet tablet TAKE 1 TABLET BY MOUTH EVERY 12 HOURS 60 tablet 2   • fenofibrate 160 MG tablet TAKE 1 TABLET BY MOUTH EVERY DAY 90 tablet 1   • furosemide (LASIX) 40 MG tablet TAKE 1 TABLET BY MOUTH EVERY DAY (Patient taking differently: Take 40 mg by mouth 3 (Three) Times a Week.) 90 tablet 0   • ketoconazole (NIZORAL) 2 % cream APPLY TO THE AFFECTED AREAS ON THE FACE DAILY IN COMBINATION WITH HYDROCORTISONE CREAM     • losartan (COZAAR) 50 MG tablet TAKE 1 TABLET BY MOUTH EVERY DAY 90 tablet 1   • magnesium oxide (MAG-OX) 400 MG tablet Take 1 tablet by mouth Daily. 90 tablet 1   • metFORMIN (GLUCOPHAGE) 1000 MG tablet TAKE 1 TABLET BY MOUTH TWICE A DAY WITH MEALS 180 tablet 0   • Metoprolol Succinate 25 MG capsule extended-release 24 hour sprinkle Take 25 mg by mouth 2 (Two) Times a Day.     • Mirabegron ER (Myrbetriq) 25 MG tablet sustained-release 24 hour 24 hr tablet Take 1 tablet by mouth Daily. 90 tablet 1   • montelukast (SINGULAIR) 10 MG tablet TAKE 1 TABLET BY MOUTH AT BEDTIME 90 tablet 1   • potassium chloride (K-DUR,KLOR-CON) 10 MEQ CR tablet Take 1 tablet by mouth 1 (One) Time As Needed (to maintain potassium). Only take with lasix 90 tablet 1   • primidone (MYSOLINE) 50 MG tablet TAKE 1 TABLET BY MOUTH THREE TIMES A  tablet 0   • Probiotic Product (PROBIOTIC-10 PO) Take  by mouth.     • Trelegy Ellipta 100-62.5-25 MCG/INH inhaler Inhale 1 puff Daily.     • Dulera 100-5 MCG/ACT inhaler Inhale 1 puff 2 (Two) Times a Day.     • tiotropium  "bromide monohydrate (Spiriva Respimat) 2.5 MCG/ACT aerosol solution inhaler Inhale 2 puffs Daily. For improved breathing 1 each 2     No facility-administered medications prior to visit.       No opioid medication identified on active medication list. I have reviewed chart for other potential  high risk medication/s and harmful drug interactions in the elderly.          Aspirin is not on active medication list.  Aspirin use is contraindicated for this patient due to: current use of Eliquis.  .    Patient Active Problem List   Diagnosis   • Closed fracture of left distal femur (HCC)   • Type 2 diabetes mellitus with circulatory disorder, without long-term current use of insulin (HCC)   • Essential hypertension   • Tremors of nervous system   • PAF (paroxysmal atrial fibrillation) (HCC)   • TARSHA (obstructive sleep apnea)   • Chronic respiratory failure with hypoxia (HCC)   • History of breast cancer   • History of seizure   • Risk for falls     Advance Care Planning  Advance Directive is not on file.  ACP discussion was held with the patient during this visit. Patient has an advance directive (not in EMR), copy requested.          Objective    Vitals:    10/04/22 1139   BP: 134/78   Pulse: 54   Temp: 98 °F (36.7 °C)   SpO2: 98%   Weight: 104 kg (229 lb 12.8 oz)   Height: 165.1 cm (65\")     Estimated body mass index is 38.24 kg/m² as calculated from the following:    Height as of this encounter: 165.1 cm (65\").    Weight as of this encounter: 104 kg (229 lb 12.8 oz).    Class 2 Severe Obesity (BMI >=35 and <=39.9). Obesity-related health conditions include the following: hypertension, diabetes mellitus, osteoarthritis and urinary stress incontinence. Obesity is worsening. BMI is is above average; BMI management plan is completed. We discussed portion control and increasing exercise.      Does the patient have evidence of cognitive impairment? No    Physical Exam  Constitutional:       Appearance: Normal appearance. She " is well-developed.   HENT:      Head: Normocephalic and atraumatic.      Right Ear: Tympanic membrane, ear canal and external ear normal.      Left Ear: Tympanic membrane, ear canal and external ear normal.      Nose: Nose normal.      Mouth/Throat:      Mouth: Mucous membranes are moist.   Eyes:      General: No scleral icterus.     Extraocular Movements: Extraocular movements intact.      Conjunctiva/sclera: Conjunctivae normal.      Pupils: Pupils are equal, round, and reactive to light.   Neck:      Thyroid: No thyromegaly.   Cardiovascular:      Rate and Rhythm: Normal rate and regular rhythm.      Pulses: Normal pulses.      Heart sounds: Normal heart sounds.   Pulmonary:      Effort: Pulmonary effort is normal. No respiratory distress.      Breath sounds: Normal breath sounds. No wheezing or rales.   Abdominal:      General: Bowel sounds are normal. There is no distension.      Palpations: Abdomen is soft. There is no mass.      Tenderness: There is no abdominal tenderness.      Hernia: No hernia is present.   Musculoskeletal:         General: No swelling or tenderness. Normal range of motion.      Cervical back: Normal range of motion and neck supple.   Lymphadenopathy:      Cervical: No cervical adenopathy.   Skin:     General: Skin is warm.   Neurological:      General: No focal deficit present.      Mental Status: She is alert and oriented to person, place, and time.      Cranial Nerves: No cranial nerve deficit.      Sensory: No sensory deficit.      Deep Tendon Reflexes: Reflexes normal.   Psychiatric:         Mood and Affect: Mood normal.         Behavior: Behavior normal.         Thought Content: Thought content normal.         Judgment: Judgment normal.       Lab Results   Component Value Date    CHLPL 190 10/04/2022    TRIG 104 10/04/2022    HDL 73 (H) 10/04/2022    LDL 99 10/04/2022    VLDL 18 10/04/2022    HGBA1C 5.80 (H) 10/04/2022            HEALTH RISK ASSESSMENT    Smoking Status:  Social  History     Tobacco Use   Smoking Status Never   Smokeless Tobacco Never   Tobacco Comments    no caffine     Alcohol Consumption:  Social History     Substance and Sexual Activity   Alcohol Use Not Currently     Fall Risk Screen:    STEADI Fall Risk Assessment was completed, and patient is at LOW risk for falls.Assessment completed on:10/4/2022    Depression Screening:  PHQ-2/PHQ-9 Depression Screening 10/5/2022   Retired PHQ-9 Total Score -   Retired Total Score -   Little Interest or Pleasure in Doing Things 0-->not at all   Feeling Down, Depressed or Hopeless 1-->several days   Trouble Falling or Staying Asleep, or Sleeping Too Much 3-->nearly every day   Feeling Tired or Having Little Energy 3-->nearly every day   Poor Appetite or Overeating 0-->not at all   Feeling Bad about Yourself - or that You are a Failure or Have Let Yourself or Your Family Down 1-->several days   Trouble Concentrating on Things, Such as Reading the Newspaper or Watching Television 0-->not at all   Moving or Speaking So Slowly that Other People Could Have Noticed? Or the Opposite - Being So Fidgety 0-->not at all   Thoughts that You Would be Better Off Dead or of Hurting Yourself in Some Way 0-->not at all   PHQ-9: Brief Depression Severity Measure Score 8   If You Checked Off Any Problems, How Difficult Have These Problems Made It For You to Do Your Work, Take Care of Things at Home, or Get Along with Other People? somewhat difficult       Health Habits and Functional and Cognitive Screening:  Functional & Cognitive Status 10/4/2022   Do you have difficulty preparing food and eating? Yes   Do you have difficulty bathing yourself, getting dressed or grooming yourself? Yes   Do you have difficulty using the toilet? Yes   Do you have difficulty moving around from place to place? Yes   Do you have trouble with steps or getting out of a bed or a chair? Yes   Current Diet Limited Junk Food   Dental Exam Other   Eye Exam Not up to date    Exercise (times per week) 3 times per week   Current Exercises Include Aerobics   Do you need help using the phone?  No   Are you deaf or do you have serious difficulty hearing?  Yes   Do you need help with transportation? Yes   Do you need help shopping? Yes   Do you need help preparing meals?  Yes   Do you need help with housework?  Yes   Do you need help with laundry? Yes   Do you need help taking your medications? Yes   Do you need help managing money? Yes   Do you ever drive or ride in a car without wearing a seat belt? Yes   Have you felt unusual stress, anger or loneliness in the last month? No   Who do you live with? Child   If you need help, do you have trouble finding someone available to you? No   Have you been bothered in the last four weeks by sexual problems? No   Do you have difficulty concentrating, remembering or making decisions? Yes       Age-appropriate Screening Schedule:  Refer to the list below for future screening recommendations based on patient's age, sex and/or medical conditions. Orders for these recommended tests are listed in the plan section. The patient has been provided with a written plan.    Health Maintenance   Topic Date Due   • TDAP/TD VACCINES (1 - Tdap) Never done   • DIABETIC FOOT EXAM  Never done   • DIABETIC EYE EXAM  Never done   • INFLUENZA VACCINE  03/31/2023 (Originally 8/1/2022)   • DXA SCAN  03/16/2023   • HEMOGLOBIN A1C  04/04/2023   • LIPID PANEL  10/04/2023   • URINE MICROALBUMIN  10/04/2023   • MAMMOGRAM  12/08/2023   • ZOSTER VACCINE  Discontinued              Assessment & Plan   CMS Preventative Services Quick Reference  Risk Factors Identified During Encounter  Dementia/Memory   Fall Risk-High or Moderate  Obesity/Overweight   The above risks/problems have been discussed with the patient.  Follow up actions/plans if indicated are seen below in the Assessment/Plan Section.  Pertinent information has been shared with the patient in the After Visit  Summary.    Diagnoses and all orders for this visit:    1. Medicare annual wellness visit, subsequent (Primary)  -     Hepatitis C Antibody    2. Type 2 diabetes mellitus with diabetic peripheral angiopathy without gangrene, without long-term current use of insulin (HCC)  -     Cancel: Hemoglobin A1c  -     Cancel: Comprehensive Metabolic Panel  -     Microalbumin / Creatinine Urine Ratio - Urine, Clean Catch  -     Hemoglobin A1c  -     Comprehensive Metabolic Panel    3. Medication monitoring encounter  -     Cancel: CBC & Differential    4. PAF (paroxysmal atrial fibrillation) (HCC)  -     Cancel: TSH+Free T4  -     TSH+Free T4    5. Mixed hyperlipidemia  -     Cancel: Lipid Panel  -     Lipid Panel    6. Other fatigue  -     CBC & Differential  -     TSH+Free T4    7. Elevated BUN  -     Comprehensive Metabolic Panel; Future    1. Medicare wellness visit.   -Preventive care was discussed with the patient. The patient recently moved to independent assisted living. As per daughter, she has a Cologuard test done in 2021 and will provide us with the report. The patient is also due for vaccinations. She has egg allergy and could not take the influenza vaccine. She was advised to her the COVID-19 booster first and wait 1 month to get the pneumonia vaccine and Tdap vaccine.     2. Type 2 diabetes mellitus.  -Hemoglobin A1c, CMP and urine for microalbumin will be checked. She was not able to check her blood sugar at the [facility] assisted [facility].       Follow Up:   No follow-ups on file.     An After Visit Summary and PPPS were made available to the patient.                 Transcribed from ambient dictation for Nisha Britton MD by Milan Reyes.  10/04/22   13:10 EDT    I have personally performed the services described in this document as transcribed by the above individual, and it is both accurate and complete.  Patient verbalized consent to the visit recording.

## 2022-10-05 LAB
ALBUMIN SERPL-MCNC: 4.3 G/DL (ref 3.5–5.2)
ALBUMIN/CREAT UR: 110 MG/G CREAT (ref 0–29)
ALBUMIN/GLOB SERPL: 2.2 G/DL
ALP SERPL-CCNC: 49 U/L (ref 39–117)
ALT SERPL-CCNC: 12 U/L (ref 1–33)
AST SERPL-CCNC: 17 U/L (ref 1–32)
BASOPHILS # BLD AUTO: 0.02 10*3/MM3 (ref 0–0.2)
BASOPHILS NFR BLD AUTO: 0.4 % (ref 0–1.5)
BILIRUB SERPL-MCNC: 0.3 MG/DL (ref 0–1.2)
BUN SERPL-MCNC: 27 MG/DL (ref 8–23)
BUN/CREAT SERPL: 24.3 (ref 7–25)
CALCIUM SERPL-MCNC: 10.3 MG/DL (ref 8.6–10.5)
CHLORIDE SERPL-SCNC: 100 MMOL/L (ref 98–107)
CHOLEST SERPL-MCNC: 190 MG/DL (ref 0–200)
CO2 SERPL-SCNC: 35.6 MMOL/L (ref 22–29)
CREAT SERPL-MCNC: 1.11 MG/DL (ref 0.57–1)
CREAT UR-MCNC: 152.7 MG/DL
EGFRCR SERPLBLD CKD-EPI 2021: 52.6 ML/MIN/1.73
EOSINOPHIL # BLD AUTO: 0.1 10*3/MM3 (ref 0–0.4)
EOSINOPHIL NFR BLD AUTO: 1.9 % (ref 0.3–6.2)
ERYTHROCYTE [DISTWIDTH] IN BLOOD BY AUTOMATED COUNT: 13.3 % (ref 12.3–15.4)
GLOBULIN SER CALC-MCNC: 2 GM/DL
GLUCOSE SERPL-MCNC: 67 MG/DL (ref 65–99)
HBA1C MFR BLD: 5.8 % (ref 4.8–5.6)
HCT VFR BLD AUTO: 36 % (ref 34–46.6)
HCV AB S/CO SERPL IA: <0.1 S/CO RATIO (ref 0–0.9)
HDLC SERPL-MCNC: 73 MG/DL (ref 40–60)
HGB BLD-MCNC: 11.8 G/DL (ref 12–15.9)
IMM GRANULOCYTES # BLD AUTO: 0.02 10*3/MM3 (ref 0–0.05)
IMM GRANULOCYTES NFR BLD AUTO: 0.4 % (ref 0–0.5)
LDLC SERPL CALC-MCNC: 99 MG/DL (ref 0–100)
LYMPHOCYTES # BLD AUTO: 1.78 10*3/MM3 (ref 0.7–3.1)
LYMPHOCYTES NFR BLD AUTO: 33 % (ref 19.6–45.3)
MCH RBC QN AUTO: 30.1 PG (ref 26.6–33)
MCHC RBC AUTO-ENTMCNC: 32.8 G/DL (ref 31.5–35.7)
MCV RBC AUTO: 91.8 FL (ref 79–97)
MICROALBUMIN UR-MCNC: 168.2 UG/ML
MONOCYTES # BLD AUTO: 0.46 10*3/MM3 (ref 0.1–0.9)
MONOCYTES NFR BLD AUTO: 8.5 % (ref 5–12)
NEUTROPHILS # BLD AUTO: 3.02 10*3/MM3 (ref 1.7–7)
NEUTROPHILS NFR BLD AUTO: 55.8 % (ref 42.7–76)
NRBC BLD AUTO-RTO: 0 /100 WBC (ref 0–0.2)
PLATELET # BLD AUTO: 163 10*3/MM3 (ref 140–450)
POTASSIUM SERPL-SCNC: 4.6 MMOL/L (ref 3.5–5.2)
PROT SERPL-MCNC: 6.3 G/DL (ref 6–8.5)
RBC # BLD AUTO: 3.92 10*6/MM3 (ref 3.77–5.28)
SODIUM SERPL-SCNC: 142 MMOL/L (ref 136–145)
T4 FREE SERPL-MCNC: 1.14 NG/DL (ref 0.93–1.7)
TRIGL SERPL-MCNC: 104 MG/DL (ref 0–150)
TSH SERPL DL<=0.005 MIU/L-ACNC: 1.86 UIU/ML (ref 0.27–4.2)
VLDLC SERPL CALC-MCNC: 18 MG/DL (ref 5–40)
WBC # BLD AUTO: 5.4 10*3/MM3 (ref 3.4–10.8)

## 2022-10-20 ENCOUNTER — APPOINTMENT (OUTPATIENT)
Dept: CARDIOLOGY | Facility: HOSPITAL | Age: 72
End: 2022-10-20

## 2022-10-21 DIAGNOSIS — E11.65 TYPE 2 DIABETES MELLITUS WITH HYPERGLYCEMIA, WITHOUT LONG-TERM CURRENT USE OF INSULIN: ICD-10-CM

## 2022-11-04 RX ORDER — APIXABAN 5 MG/1
TABLET, FILM COATED ORAL
Qty: 60 TABLET | Refills: 2 | Status: SHIPPED | OUTPATIENT
Start: 2022-11-04 | End: 2023-03-21

## 2022-11-04 NOTE — TELEPHONE ENCOUNTER
Caller: SHELDON TYSON    Relationship: Emergency Contact    Best call back number: [phone]     Requested Prescriptions:   Requested Prescriptions     Pending Prescriptions Disp Refills   • Eliquis 5 MG tablet tablet [Pharmacy Med Name: ELIQUIS 5 MG TABLET] 60 tablet 2     Sig: TAKE 1 TABLET BY MOUTH EVERY 12 HOURS        Pharmacy where request should be sent: [facility] #[id number] - [facility], [place] - [address] AT [facility] [phone] [facility] [phone] FX     Additional details provided by patient: PATIENT TAKES HER LAST ONE TODAY.  THEY ARE LEAVING TOWN TOMORROW MORNING AND REQUEST THIS GETS FILLED TODAY BEFORE THEY LEAVE PLEASE      PLEASE CALL AND ADVISE         Does the patient have less than a 3 day supply:  [x] Yes  [] No    ROHINI Lo   11/04/22 09:51 EDT

## 2022-11-23 ENCOUNTER — OFFICE VISIT (OUTPATIENT)
Dept: FAMILY MEDICINE CLINIC | Facility: CLINIC | Age: 72
End: 2022-11-23

## 2022-11-23 VITALS
RESPIRATION RATE: 18 BRPM | HEIGHT: 65 IN | BODY MASS INDEX: 36.82 KG/M2 | WEIGHT: 221 LBS | TEMPERATURE: 97.5 F | DIASTOLIC BLOOD PRESSURE: 70 MMHG | HEART RATE: 62 BPM | SYSTOLIC BLOOD PRESSURE: 140 MMHG | OXYGEN SATURATION: 95 %

## 2022-11-23 DIAGNOSIS — M54.2 NECK PAIN: Primary | ICD-10-CM

## 2022-11-23 PROCEDURE — 99213 OFFICE O/P EST LOW 20 MIN: CPT | Performed by: FAMILY MEDICINE

## 2022-11-23 RX ORDER — TIZANIDINE 4 MG/1
4 TABLET ORAL NIGHTLY
Qty: 30 TABLET | Refills: 2 | Status: SHIPPED | OUTPATIENT
Start: 2022-11-23 | End: 2023-02-13

## 2022-11-23 NOTE — PROGRESS NOTES
"Chief Complaint  Chief Complaint   Patient presents with   • Spasms     Pt c/o having muscle spasms in R side of Neck x 2 weeks       Subjective    History of Present Illness        Kanwal Sauer presents to [facility] for   History of Present Illness  Patient is a 72-year-old female that is being seen in the clinic for neck spasms.  Pt was having neck problems last year initially but they had improved over time.  She reports that she fell about 2 weeks ago.  She hit the right side of her head and neck while in the shower.  She has seen PT in the past and they were using some topical cream on her neck that she cannot remember the name of the cream. She took Tylenol for the pain which helps sometimes.  She ranks the pain 10/10.  This pain is intermittent.  The pain is on her right side of her neck and back going down to the thoracic region.       Objective   Vital Signs:   Visit Vitals  /70   Pulse 62   Temp 97.5 °F (36.4 °C)   Resp 18   Ht 165.1 cm (65\")   Wt 100 kg (221 lb)   SpO2 95%   BMI 36.78 kg/m²          Physical Exam  Constitutional:       Appearance: She is well-developed.   HENT:      Head: Normocephalic and atraumatic.   Eyes:      General:         Right eye: No discharge.         Left eye: No discharge.      Conjunctiva/sclera: Conjunctivae normal.   Neck:      Thyroid: No thyromegaly.   Cardiovascular:      Rate and Rhythm: Normal rate and regular rhythm.      Pulses: Normal pulses.      Heart sounds: Normal heart sounds, S1 normal and S2 normal.     No S3 or S4 sounds.   Pulmonary:      Effort: Pulmonary effort is normal.      Breath sounds: Normal breath sounds. No stridor.   Musculoskeletal:      Cervical back: Neck supple. Tenderness and pain present. Muscular tenderness present. Decreased range of motion and decreased range of motion.   Skin:     General: Skin is warm and dry.      Capillary Refill: Capillary refill takes less than 2 seconds. "   Neurological:      Mental Status: She is alert and oriented to person, place, and time.            Result Review :                    Assessment and Plan      Diagnoses and all orders for this visit:    1. Neck pain (Primary)  Assessment & Plan:  Patient was prescribed tizanidine to help treat her symptoms.  Patient was advised to use Voltaren gel  and Biofreeze over-the-counter.  Patient was encouraged to return to clinic if her symptoms do not improve.  Patient states that she is already had an x-ray of her neck when this initially occurred.  Consider an MRI if her symptoms continue to worsen.    Orders:  -     tiZANidine (ZANAFLEX) 4 MG tablet; Take 1 tablet by mouth Every Night.  Dispense: 30 tablet; Refill: 2           Follow Up   No follow-ups on file.  Patient was given instructions and counseling regarding her condition or for health maintenance advice. Please see specific information pulled into the AVS if appropriate.

## 2022-11-27 PROBLEM — M54.2 NECK PAIN: Status: ACTIVE | Noted: 2022-11-27

## 2022-11-28 NOTE — ASSESSMENT & PLAN NOTE
Patient was prescribed tizanidine to help treat her symptoms.  Patient was advised to use Voltaren gel  and Biofreeze over-the-counter.  Patient was encouraged to return to clinic if her symptoms do not improve.  Patient states that she is already had an x-ray of her neck when this initially occurred.  Consider an MRI if her symptoms continue to worsen.

## 2022-11-30 ENCOUNTER — HOSPITAL ENCOUNTER (OUTPATIENT)
Dept: GENERAL RADIOLOGY | Facility: HOSPITAL | Age: 72
Discharge: HOME | End: 2022-11-30

## 2022-11-30 ENCOUNTER — OFFICE VISIT (OUTPATIENT)
Dept: FAMILY MEDICINE CLINIC | Facility: CLINIC | Age: 72
End: 2022-11-30

## 2022-11-30 VITALS
HEART RATE: 58 BPM | DIASTOLIC BLOOD PRESSURE: 74 MMHG | SYSTOLIC BLOOD PRESSURE: 134 MMHG | TEMPERATURE: 96.9 F | HEIGHT: 65 IN | OXYGEN SATURATION: 90 % | WEIGHT: 220.4 LBS | BODY MASS INDEX: 36.72 KG/M2

## 2022-11-30 DIAGNOSIS — S16.1XXD NECK STRAIN, SUBSEQUENT ENCOUNTER: ICD-10-CM

## 2022-11-30 DIAGNOSIS — M54.12 CERVICAL RADICULOPATHY: Primary | ICD-10-CM

## 2022-11-30 DIAGNOSIS — S39.012D BACK STRAIN, SUBSEQUENT ENCOUNTER: ICD-10-CM

## 2022-11-30 PROCEDURE — 99214 OFFICE O/P EST MOD 30 MIN: CPT | Performed by: NURSE PRACTITIONER

## 2022-11-30 PROCEDURE — 72040 X-RAY EXAM NECK SPINE 2-3 VW: CPT

## 2022-11-30 PROCEDURE — 72072 X-RAY EXAM THORAC SPINE 3VWS: CPT

## 2022-11-30 RX ORDER — PREDNISONE 10 MG/1
20 TABLET ORAL DAILY
Qty: 10 TABLET | Refills: 0 | Status: SHIPPED | OUTPATIENT
Start: 2022-11-30 | End: 2022-12-05

## 2022-12-02 ENCOUNTER — TELEPHONE (OUTPATIENT)
Dept: FAMILY MEDICINE CLINIC | Facility: CLINIC | Age: 72
End: 2022-12-02

## 2022-12-02 NOTE — TELEPHONE ENCOUNTER
Please call pharmacy and patient  Prednisone and primidone are okay together  The drug interaction is that primidone may induce prednisone and lower levels of prednisone which I am aware of is acceptable in this situation please    Please prescribe the prednisone thank you if there is another issue please let me know thank you

## 2022-12-02 NOTE — TELEPHONE ENCOUNTER
Caller: SHELDON TYSON    Relationship: Emergency Contact    Best call back number: [phone]    What was the call regarding: [place] IS CALLING ABOUT THE NEW PRESCRIPTION FOR PREDNISONE.SHE STATES THE PHARMACY HAS TOLD HER THEY CANNOT GIVE THEM MEDICATION TO THE PATIENT DUE TO HER BEING ON THE PRIMIDONE MEDICATION. SHELDON WOULD LIKE TO KNOW IF THE PATIENT NEEDS TO STOP THE PRIMIDONE MEDICATION IN ORDER TO TAKE THE PREDNISONE. SHELDON WOULD LIKE TO KNOW WHAT THE PATIENT NEEDS TO DO.       PLEASE CALL AND ADVISE     Do you require a callback: YES         [FreeTextEntry1] : 74 year old man with chronic atrial fibrillation being managed with a strategy of rate control.  His course has been complicated by CVA x 2 (both in the setting of subtherapeutic AC) and now with slow ventricular response.  In fact this problem appears to be progressive in that in 2019 his atenolol was stopped for a ventricular rate of 40 bpm on a resting ECG.  Recent holter monitoring shows poor chronotropy.  We discussed that this may potentiate his feelings of fatigue and effort intolerance and we discussed the role of permanent pacing.  I suggested a Micra PM to avoid potential issues with his AC, ie possible pocket hematoma.  That is with 2 prior strokes in the setting of holding AC, i would like to minimize this likelihood.  Post procedure he should go back on his beta blocker given VE.  All of his questions were answered and the procedure will be arranged.

## 2022-12-07 ENCOUNTER — TELEPHONE (OUTPATIENT)
Dept: FAMILY MEDICINE CLINIC | Facility: CLINIC | Age: 72
End: 2022-12-07

## 2022-12-07 NOTE — TELEPHONE ENCOUNTER
TREVOR FROM [facility] CALLED ABOUT THIS PT AND WOULD LIKE A VERBAL FOR PT AND OT AS PT IS HAVING NECK AND BACK SPASMS.     PLEASE CALL BACK [phone] AND ADVISE. THANK YOU.

## 2022-12-07 NOTE — TELEPHONE ENCOUNTER
Caller: SHELDON TYSON    Relationship: Emergency Contact    Best call back number: [phone]    What was the call regarding: PATIENT'S DAUGHTER STATED THAT PATIENT WAS IN TO SEE JAMES EPLEY AND WAS PRESCRIBED STEROIDS THAT SHE TOOK ORALLY. PATIENT HAS ONE MORE LEFT TO TAKE. PATIENT'S DAUGHTER STATED THAT PATIENT HAS HAD NO RELIEF AT ALL FOR HER BACK PAIN. PATIENT'S DAUGHTER STATED THAT THE MUSCLE RELAXERS DO GIVE HER A LITTLE BIT OF RELIEF. PATIENT'S DAUGHTER IS CALLING TO SEE WHAT THEY NEED TO DO NEXT. PLEASE ADVISE.    Do you require a callback: YES

## 2022-12-09 ENCOUNTER — OFFICE VISIT (OUTPATIENT)
Dept: NEUROLOGY | Facility: CLINIC | Age: 72
End: 2022-12-09

## 2022-12-09 ENCOUNTER — TELEPHONE (OUTPATIENT)
Dept: FAMILY MEDICINE CLINIC | Facility: CLINIC | Age: 72
End: 2022-12-09

## 2022-12-09 VITALS
HEIGHT: 65 IN | BODY MASS INDEX: 36.65 KG/M2 | DIASTOLIC BLOOD PRESSURE: 74 MMHG | WEIGHT: 220 LBS | HEART RATE: 74 BPM | SYSTOLIC BLOOD PRESSURE: 134 MMHG | OXYGEN SATURATION: 98 %

## 2022-12-09 DIAGNOSIS — G25.0 BENIGN ESSENTIAL TREMOR: Primary | ICD-10-CM

## 2022-12-09 DIAGNOSIS — M54.6 THORACIC SPINE PAIN: ICD-10-CM
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
## 2022-12-09 DIAGNOSIS — M54.2 CERVICAL PAIN (NECK): ICD-10-CM

## 2022-12-09 DIAGNOSIS — M54.9 BACK PAIN, UNSPECIFIED BACK LOCATION, UNSPECIFIED BACK PAIN LATERALITY, UNSPECIFIED CHRONICITY: ICD-10-CM

## 2022-12-09 DIAGNOSIS — S39.92XD INJURY OF BACK, SUBSEQUENT ENCOUNTER: ICD-10-CM

## 2022-12-09 DIAGNOSIS — S19.9XXD NECK INJURY, SUBSEQUENT ENCOUNTER: ICD-10-CM

## 2022-12-09 DIAGNOSIS — Z87.898 HISTORY OF SEIZURE: ICD-10-CM

## 2022-12-09 DIAGNOSIS — R41.89 SUBJECTIVE MEMORY COMPLAINTS: ICD-10-CM

## 2022-12-09 PROCEDURE — 99214 OFFICE O/P EST MOD 30 MIN: CPT | Performed by: NURSE PRACTITIONER

## 2022-12-09 NOTE — PROGRESS NOTES
DOS: 2022  NAME: Kanwal Sauer   : 1950  PCP: Nisha Britton MD    Chief Complaint   Patient presents with   • Tremors      SUBJECTIVE  Neurological Problem:  72 y.o. RHW female with seizures, essential tremor, HTN, DM, HLD, Afib (on Eliqius), TARSHA, Asthma on nocturnal O2, chronic UTIs and h/o breast CA (s/p left mastectomy), scorliosis and lumbar DDD who presents for f/u.  She is accompanied by her daughter Monica.    Interval History:   **For detailed previous history, please see progress note dated 2021.    Ms. Sauer was initially evaluated by Dr. Washington for h/o provoked seizure and essential tremor. She has not been on any AEDs and had no recurrent seizure-like episodes. She was last seen by me in Dec. 2022, tremor fairly controlled on primidone 50 mg 3 times daily.   There was also some discussion about possible memory issues, this was thought likely situational given his significant change in social situation, loss of spouse and moving to a new facility.  Plans were to monitor symptoms.    Ms. Sauer presents today, continues on primidone 50 mg, originally dose to 3 times daily but currently taking just twice daily.  She apparently had a fall while in [place], drove home with daughter, and now experiencing some significant back spasms.  She denies any radiating pain in arms or legs, no bowel or bladder dysfunction.  She is being treated by her PCP, had steroids which were completed, also has ordered muscle relaxants, she does not had her dose today.  She is in obvious pain on interview today.  To complicate the picture for her daughter, the sister who is special needs has been having health issues as well.  They state tremor possibly worse, but thought likely due to her pain levels currently.  She does continue to use her Rollator.    Review of Systems:Review of Systems   Constitutional: Negative for activity change, appetite change, chills, diaphoresis, fatigue, fever and unexpected  weight change.   HENT: Negative for congestion, dental problem, drooling, ear discharge, ear pain, facial swelling, hearing loss, mouth sores, nosebleeds, postnasal drip, rhinorrhea, sinus pressure, sinus pain, sneezing, sore throat, tinnitus, trouble swallowing and voice change.    Eyes: Negative for photophobia, pain, discharge, redness, itching and visual disturbance.   Musculoskeletal: Positive for back pain, gait problem and neck pain. Negative for arthralgias, joint swelling and myalgias.   Neurological: Positive for tremors. Negative for dizziness, seizures, syncope, facial asymmetry, speech difficulty, weakness, light-headedness, numbness and headaches.   Psychiatric/Behavioral: Negative for agitation, behavioral problems, confusion, decreased concentration, dysphoric mood, hallucinations, self-injury, sleep disturbance and suicidal ideas. The patient is not nervous/anxious and is not hyperactive.     Above ROS reviewed    The following portions of the patient's history were reviewed and updated as appropriate: allergies, current medications, past family history, past medical history, past social history, past surgical history and problem list.    Current Medications:   Current Outpatient Medications:   •  acetaminophen (TYLENOL) 325 MG tablet, Take 2 tablets by mouth Every 4 (Four) Hours As Needed for Mild Pain ., Disp:  , Rfl:   •  albuterol sulfate  (90 Base) MCG/ACT inhaler, INHALE 2 PUFFS EVERY 4 HOURS AS NEEDED FOR WHEEZING, Disp: 18 g, Rfl: 1  •  citalopram (CeleXA) 20 MG tablet, Take 1 tablet by mouth Daily., Disp: 90 tablet, Rfl: 0  •  CRANBERRY PO, Take 650 mg by mouth., Disp: , Rfl:   •  Eliquis 5 MG tablet tablet, TAKE 1 TABLET BY MOUTH EVERY 12 HOURS, Disp: 60 tablet, Rfl: 2  •  fenofibrate 160 MG tablet, TAKE 1 TABLET BY MOUTH EVERY DAY, Disp: 90 tablet, Rfl: 1  •  furosemide (LASIX) 40 MG tablet, TAKE 1 TABLET BY MOUTH EVERY DAY (Patient taking differently: Take 40 mg by mouth 3 (Three)  Times a Week.), Disp: 90 tablet, Rfl: 0  •  losartan (COZAAR) 50 MG tablet, TAKE 1 TABLET BY MOUTH EVERY DAY, Disp: 90 tablet, Rfl: 1  •  magnesium oxide (MAG-OX) 400 MG tablet, Take 1 tablet by mouth Daily., Disp: 90 tablet, Rfl: 1  •  metFORMIN (GLUCOPHAGE) 1000 MG tablet, TAKE 1 TABLET BY MOUTH TWICE A DAY WITH MEALS (Patient taking differently: 1/2 pill once a day), Disp: 180 tablet, Rfl: 0  •  Metoprolol Succinate 25 MG capsule extended-release 24 hour sprinkle, Take 25 mg by mouth 2 (Two) Times a Day., Disp: , Rfl:   •  Mirabegron ER (Myrbetriq) 25 MG tablet sustained-release 24 hour 24 hr tablet, Take 1 tablet by mouth Daily., Disp: 90 tablet, Rfl: 1  •  montelukast (SINGULAIR) 10 MG tablet, TAKE 1 TABLET BY MOUTH AT BEDTIME, Disp: 90 tablet, Rfl: 1  •  potassium chloride (K-DUR,KLOR-CON) 10 MEQ CR tablet, Take 1 tablet by mouth 1 (One) Time As Needed (to maintain potassium). Only take with lasix, Disp: 90 tablet, Rfl: 1  •  primidone (MYSOLINE) 50 MG tablet, TAKE 1 TABLET BY MOUTH THREE TIMES A DAY (Patient taking differently: Taking twice a day), Disp: 270 tablet, Rfl: 0  •  Probiotic Product (PROBIOTIC-10 PO), Take  by mouth., Disp: , Rfl:   •  tiZANidine (ZANAFLEX) 4 MG tablet, Take 1 tablet by mouth Every Night., Disp: 30 tablet, Rfl: 2  •  Trelegy Ellipta 100-62.5-25 MCG/INH inhaler, Inhale 1 puff Daily., Disp: , Rfl:   •  Cholecalciferol (Vitamin D3) 25 MCG (1000 UT) capsule, Take 1,000 Units by mouth Daily., Disp: , Rfl:   •  ketoconazole (NIZORAL) 2 % cream, APPLY TO THE AFFECTED AREAS ON THE FACE DAILY IN COMBINATION WITH HYDROCORTISONE CREAM, Disp: , Rfl:   **I did not stop or change the above medications.  Patient's medication list was updated to reflect medications they have reported as currently taking, including medication changes made by other providers.    OBJECTIVE  Vitals:    12/09/22 0841   BP: 134/74   Pulse: 74   SpO2: 98%     Body mass index is 36.61 kg/m².    Diagnostics:    Laboratory  Results:         Lab Results   Component Value Date    WBC 5.40 10/04/2022    HGB 11.8 (L) 10/04/2022    HCT 36.0 10/04/2022    MCV 91.8 10/04/2022     10/04/2022     Lab Results   Component Value Date    GLUCOSE 99 11/29/2022    BUN 24 (H) 11/29/2022    CREATININE 0.94 11/29/2022    EGFRIFNONA 70 12/09/2021    EGFRIFAFRI 84 09/16/2021    BCR 25.5 (H) 11/29/2022    K 4.2 11/29/2022    CO2 33.6 (H) 11/29/2022    CALCIUM 10.4 11/29/2022    PROTENTOTREF 7.0 11/29/2022    ALBUMIN 4.60 11/29/2022    LABIL2 1.9 11/29/2022    AST 18 11/29/2022    ALT 10 11/29/2022     Lab Results   Component Value Date    HGBA1C 5.80 (H) 10/04/2022     Lab Results   Component Value Date    CHOL 180 03/16/2021    CHOL 164 10/02/2020     Lab Results   Component Value Date    HDL 73 (H) 10/04/2022    HDL 60 03/16/2021    HDL 71 (H) 10/02/2020     Lab Results   Component Value Date    LDL 99 10/04/2022     (H) 03/16/2021    LDL 77 10/02/2020     Lab Results   Component Value Date    TRIG 104 10/04/2022    TRIG 95 03/16/2021    TRIG 81 10/02/2020     No results found for: RPR  Lab Results   Component Value Date    TSH 1.860 10/04/2022     No results found for: XOARTHMM23    Physical Exam:  GENERAL: Having obvious muscle spasms.   HEENT: Normocephalic, atraumatic   COR: RRR  Resp: Even and unlabored  Extremities: No signs of distal embolization.   Skin: No rashes, lesions or ulcers.  Psychiatric: Normal mood and affect.    Neurological:   MS: AO. Language normal. No neglect. Follows all commands.  CN: II-XII grossly normal  Motor: Normal strength and tone throughout.  Cephalic tremor as well as bilateral upper extremity postural tremors  Sensory: Intact to light touch in arms and legs  Station and Gait: Antaglic.gait, using rollator, starts and stops with pain.     Impression/Plan:     1. Essential Tremor --some worsening of symptoms, currently taking primidone 50 mg twice daily is having difficulty getting that middle of the day  dose in.  Symptoms likely also exacerbated by her current pain/back situation.  I recommended that she take 50 mg times 2 in the AM, 50 mg p.m.  Again she may benefit from a weighted hand glove for fine dexterity tasks.    2.  Back pain/muscle spasms --- currently being treated by her PCP, there are no radicular symptoms, no red flags on exam today; however patient and obvious distress intermittently.  I did encourage her to take her muscle relaxant today, apply topical Salonpas, gentle ROM exercises as tolerated.  She will likely need imaging, they will be following up with her PCP.    3.  Subjective complains of memory issues --stable, will continue to monitor, consider MoCA at next visit.    .F/U here in 6 months, sooner if symptoms warrant.    I spent a total of 30 minutes today in reviewing records, prior diagnostics, examination of patient as well as counseling and educating patient regarding diagnoses, symptoms, reviewing diagnostics with patient, pharmacologic treatment options including purpose, risk, benefits, possible side-effects, recommendations, lifestyle modifications, coordination of care and documenting plan of care.      Diagnoses and all orders for this visit:    1. Benign essential tremor (Primary)    2. History of seizure    3. Subjective memory complaints    4. Back pain, unspecified back location, unspecified back pain laterality, unspecified chronicity        Coding      Dictated using Dragon

## 2022-12-09 NOTE — TELEPHONE ENCOUNTER
Caller: SHELDON TYSON    Relationship to patient: Emergency Contact    Best call back number:     Patient is needing:  PATIENT'S DAUGHTER IS CALLING TODAY ASKING THAT DR. FELICIANO PUT IN AN ORDER FOR AN MRI OF PATIENTS CERVICAL AND THORACIC SPINE, AS WELL AS A REFERRAL TO A NECK/SPINE SPECIALIST.   SHE STATES THAT PATIENT FELL ON 11/6, AND IN SPITE OF HAVING BEEN SEEN BY THE DR. DICKEY AND JAMES EPLEY AND BEING PLACED ON MEDICATIONS, SHE HAS GOTTEN WORSE AND SHE IS IN A GREAT DEAL OF PAIN.    PATIENT WAS BEING EVALUATED BY NEUROLOGY TODAY FOR A SEPARATE ISSUE AND THEY COMMENTED TO THE DAUGHTER THEY COULD HARDLY EXAMINE PATIENT DUE TO THE AMOUNT OF PAIN SHE IS IN, AND THEY SUGGESTED THE MRI ORDER AND REFERRAL.     PLEASE CONTACT SHELDON WHEN THE ORDER AND REFERRAL HAVE BEEN DONE.  SHELDON ALSO NOTES THAT THE PATIENT HAS A GREAT DEAL OF CLAUSTOPHOBIA SO WOULD NOT DO WELL WITH AN MRI UNLESS SHE WAS SEDATED OR IT WAS A VERY OPEN MRI.

## 2022-12-09 NOTE — TELEPHONE ENCOUNTER
I have ordered the MRIs go ahead and get these as soon as possible ordered open MRI  Call the next day for results then we will go from there if she needs a referral at that time    Severe uncontrolled pain weakness or worsening symptoms emergency room thank you

## 2022-12-12 NOTE — TELEPHONE ENCOUNTER
Rx Refill Note  Requested Prescriptions     Pending Prescriptions Disp Refills   • montelukast (SINGULAIR) 10 MG tablet [Pharmacy Med Name: MONTELUKAST SOD 10 MG TABLET] 90 tablet 1     Sig: TAKE 1 TABLET BY MOUTH AT BEDTIME      Last office visit with prescribing clinician: 10/4/2022   Last telemedicine visit with prescribing clinician: 12/21/2022   Next office visit with prescribing clinician: 10/6/2023   {TIP  Encounters:23}                      Would you like a call back once the refill request has been completed: [] Yes [] No    If the office needs to give you a call back, can they leave a voicemail: [] Yes [] No    Malena Ortega LPN  12/12/22, 08:55 EST

## 2022-12-13 RX ORDER — MONTELUKAST SODIUM 10 MG/1
TABLET ORAL
Qty: 90 TABLET | Refills: 0 | Status: SHIPPED | OUTPATIENT
Start: 2022-12-13 | End: 2023-03-15

## 2022-12-16 ENCOUNTER — TELEPHONE (OUTPATIENT)
Dept: FAMILY MEDICINE CLINIC | Facility: CLINIC | Age: 72
End: 2022-12-16

## 2022-12-16 NOTE — TELEPHONE ENCOUNTER
Caller: Kanwal Sauer    Relationship: Self    Best call back number:    [phone]      What orders are you requesting (i.e. lab or imaging): MRI OF CERVICAL AND THORACIC     In what timeframe would the patient need to come in:AS SOON AS POSSIBLE     Where will you receive your lab/imaging services: [facility]

## 2022-12-21 ENCOUNTER — OFFICE VISIT (OUTPATIENT)
Dept: FAMILY MEDICINE CLINIC | Facility: CLINIC | Age: 72
End: 2022-12-21

## 2022-12-21 VITALS
WEIGHT: 217.2 LBS | SYSTOLIC BLOOD PRESSURE: 136 MMHG | OXYGEN SATURATION: 95 % | RESPIRATION RATE: 16 BRPM | BODY MASS INDEX: 36.19 KG/M2 | HEART RATE: 96 BPM | DIASTOLIC BLOOD PRESSURE: 68 MMHG | TEMPERATURE: 97 F | HEIGHT: 65 IN

## 2022-12-21 DIAGNOSIS — M54.12 CERVICAL RADICULOPATHY: ICD-10-CM

## 2022-12-21 DIAGNOSIS — R26.81 GAIT INSTABILITY: ICD-10-CM

## 2022-12-21 DIAGNOSIS — M54.16 LUMBAR RADICULOPATHY, ACUTE: Primary | ICD-10-CM

## 2022-12-21 PROCEDURE — 99213 OFFICE O/P EST LOW 20 MIN: CPT | Performed by: NURSE PRACTITIONER

## 2022-12-21 RX ORDER — PRIMIDONE 50 MG/1
50 TABLET ORAL 3 TIMES DAILY
Qty: 270 TABLET | Refills: 0 | Status: SHIPPED | OUTPATIENT
Start: 2022-12-21 | End: 2023-03-20

## 2022-12-21 NOTE — PROGRESS NOTES
"Chief Complaint  Follow-up (3 month f/u) and rapid heartbeat    Subjective        Kanwal Sauer presents to [facility]  History of Present Illness  Chief complaint follow-up recent fall neck injury back injury  Patient feeling much better with her neck and back she has no pain presently occasionally bother her little bit but really not at all  I ordered MRIs but they will go ahead and cancel these  However along the way recovery she started having some left low back pain radiating her leg and she was at the neurologist office having severe pain and they suggested further work-up  This 2 is better although she had some yesterday presently no low back pain but it does recur right low back left low back  But overall trending much better  And presently without weakness in lower extremities groin paresthesias back pain fever or any chest related pain.  She has no further falls or other issues.  Although she has chronic weakness and gait instability due to her age and physical health  And still having some pain in her neck and low back getting physical therapy helping        Objective   Vital Signs:  /68   Pulse 96   Temp 97 °F (36.1 °C) (Infrared)   Resp 16   Ht 165.1 cm (65\")   Wt 98.5 kg (217 lb 3.2 oz)   SpO2 95% Comment: pt is on oxygen set at 3L  BMI 36.14 kg/m²   Estimated body mass index is 36.14 kg/m² as calculated from the following:    Height as of this encounter: 165.1 cm (65\").    Weight as of this encounter: 98.5 kg (217 lb 3.2 oz).          Physical Exam  Constitutional:       General: She is not in acute distress.     Appearance: Normal appearance. She is obese. She is not ill-appearing, toxic-appearing or diaphoretic.   Eyes:      Conjunctiva/sclera: Conjunctivae normal.      Pupils: Pupils are equal, round, and reactive to light.   Pulmonary:      Effort: Pulmonary effort is normal. No respiratory distress.      Breath sounds: No stridor.   Musculoskeletal: "         General: Signs of injury present. No swelling, tenderness or deformity.      Right lower leg: No edema.      Left lower leg: No edema.      Comments: Nonambulatory sitting in a wheelchair, decreased mobility even just sitting up but no distress no significant edema lower extremities no focal back pain no grimacing or sign of any pain freely moving her neck left and right without any sign of pain.     Skin:     General: Skin is warm and dry.   Neurological:      General: No focal deficit present.      Mental Status: She is alert. Mental status is at baseline.   Psychiatric:         Mood and Affect: Mood normal.         Behavior: Behavior normal.         Thought Content: Thought content normal.        Result Review :                Assessment and Plan   Diagnoses and all orders for this visit:    1. Lumbar radiculopathy, acute (Primary)  Comments:  Bilateral improved substantially    2. Cervical radiculopathy  Comments:  Improved    3. Gait instability    Other orders  -     primidone (MYSOLINE) 50 MG tablet; Take 1 tablet by mouth 3 (Three) Times a Day.  Dispense: 270 tablet; Refill: 0           I spent 20  minutes caring for Kanwal on this date of service. This time includes time spent by me in the following activities:preparing for the visit, reviewing tests, obtaining and/or reviewing a separately obtained history, performing a medically appropriate examination and/or evaluation , counseling and educating the patient/family/caregiver, documenting information in the medical record and care coordination  Follow Up   No follow-ups on file.  Patient was given instructions and counseling regarding her condition or for health maintenance advice. Please see specific information pulled into the AVS if appropriate.     Patient Instructions   Discharge instructions continue present care ergonomics watch the way you set at home in the couch good support good lumbar support,  Continue physical therapy to stretch the  ligaments around your spine to allow more space for the exiting nerves and to increase muscle tone will help support the spine  Continue physical therapy  Should you have an exacerbation of low back pain or cervical pain then we will go ahead and get you scheduled for your MRI  Okay to cancel your cervical and thoracic MRI if your pain continues to be resolved  Let me know if you have more low back pain  We will get you scheduled for an MRI if this is the case but hopefully you will continue to improve weakness bowel or bladder incontinence retention back pain fever groin paresthesias emergency room     Falls precaution  She is improved substantially  Continue physical therapy if she is not proving with her low back  For continued improvement as she is already improving she would need an MRI lumbar spine should she have any emergency symptoms fever chills severe uncontrolled pain weakness groin paresthesia emergency room  But if not improving sent me a message in 4 weeks for follow-up Dr. Britton is then we should go forward with an MRI either cervical or lower lumbar  Depending on symptoms.  Hopefully she will continue to improve  Continue improve posture and ergonomics though if she sits like outside physical therapy to strengthen back muscles for better support and to allow ligaments and other soft tissue to expand for more room for the nerve roots to leave the spine.    Low threshold for any evaluation should she get a febrile illness especially cough and fever she should go to urgent care probably check for COVID and flu as there are antivirals available

## 2022-12-21 NOTE — PATIENT INSTRUCTIONS
Discharge instructions continue present care ergonomics watch the way you set at home in the couch good support good lumbar support,  Continue physical therapy to stretch the ligaments around your spine to allow more space for the exiting nerves and to increase muscle tone will help support the spine  Continue physical therapy  Should you have an exacerbation of low back pain or cervical pain then we will go ahead and get you scheduled for your MRI  Okay to cancel your cervical and thoracic MRI if your pain continues to be resolved  Let me know if you have more low back pain  We will get you scheduled for an MRI if this is the case but hopefully you will continue to improve weakness bowel or bladder incontinence retention back pain fever groin paresthesias emergency room

## 2022-12-29 ENCOUNTER — APPOINTMENT (OUTPATIENT)
Dept: GENERAL RADIOLOGY | Facility: HOSPITAL | Age: 72
End: 2022-12-29
Payer: MEDICARE

## 2022-12-29 ENCOUNTER — HOSPITAL ENCOUNTER (OUTPATIENT)
Facility: HOSPITAL | Age: 72
Setting detail: OBSERVATION
Discharge: HOME HEALTH | End: 2023-01-02
Attending: EMERGENCY MEDICINE | Admitting: INTERNAL MEDICINE
Payer: MEDICARE

## 2022-12-29 DIAGNOSIS — N39.0 ACUTE UTI: ICD-10-CM

## 2022-12-29 DIAGNOSIS — Z79.01 CHRONIC ANTICOAGULATION: ICD-10-CM

## 2022-12-29 DIAGNOSIS — I48.91 ATRIAL FIBRILLATION WITH RVR: ICD-10-CM

## 2022-12-29 DIAGNOSIS — R53.1 GENERALIZED WEAKNESS: Primary | ICD-10-CM

## 2022-12-29 DIAGNOSIS — Z78.9 DEFICIT IN ACTIVITIES OF DAILY LIVING (ADL): ICD-10-CM

## 2022-12-29 DIAGNOSIS — R06.09 DYSPNEA ON EXERTION: ICD-10-CM

## 2022-12-29 LAB
ALBUMIN SERPL-MCNC: 4 G/DL (ref 3.5–5.2)
ALBUMIN/GLOB SERPL: 1.8 G/DL
ALP SERPL-CCNC: 43 U/L (ref 39–117)
ALT SERPL W P-5'-P-CCNC: 11 U/L (ref 1–33)
ANION GAP SERPL CALCULATED.3IONS-SCNC: 6.1 MMOL/L (ref 5–15)
AST SERPL-CCNC: 15 U/L (ref 1–32)
B PARAPERT DNA SPEC QL NAA+PROBE: NOT DETECTED
B PERT DNA SPEC QL NAA+PROBE: NOT DETECTED
BACTERIA UR QL AUTO: ABNORMAL /HPF
BASOPHILS # BLD AUTO: 0.01 10*3/MM3 (ref 0–0.2)
BASOPHILS NFR BLD AUTO: 0.2 % (ref 0–1.5)
BILIRUB SERPL-MCNC: 0.4 MG/DL (ref 0–1.2)
BILIRUB UR QL STRIP: NEGATIVE
BUN SERPL-MCNC: 26 MG/DL (ref 8–23)
BUN/CREAT SERPL: 24.3 (ref 7–25)
C PNEUM DNA NPH QL NAA+NON-PROBE: NOT DETECTED
CALCIUM SPEC-SCNC: 9.4 MG/DL (ref 8.6–10.5)
CHLORIDE SERPL-SCNC: 104 MMOL/L (ref 98–107)
CLARITY UR: ABNORMAL
CO2 SERPL-SCNC: 31.9 MMOL/L (ref 22–29)
COLOR UR: YELLOW
CREAT SERPL-MCNC: 1.07 MG/DL (ref 0.57–1)
D-LACTATE SERPL-SCNC: 0.7 MMOL/L (ref 0.5–2)
DEPRECATED RDW RBC AUTO: 45.5 FL (ref 37–54)
EGFRCR SERPLBLD CKD-EPI 2021: 54.6 ML/MIN/1.73
EOSINOPHIL # BLD AUTO: 0.08 10*3/MM3 (ref 0–0.4)
EOSINOPHIL NFR BLD AUTO: 1.5 % (ref 0.3–6.2)
ERYTHROCYTE [DISTWIDTH] IN BLOOD BY AUTOMATED COUNT: 13.6 % (ref 12.3–15.4)
FLUAV SUBTYP SPEC NAA+PROBE: NOT DETECTED
FLUBV RNA ISLT QL NAA+PROBE: NOT DETECTED
GLOBULIN UR ELPH-MCNC: 2.2 GM/DL
GLUCOSE BLDC GLUCOMTR-MCNC: 132 MG/DL (ref 70–130)
GLUCOSE BLDC GLUCOMTR-MCNC: 228 MG/DL (ref 70–130)
GLUCOSE SERPL-MCNC: 130 MG/DL (ref 65–99)
GLUCOSE UR STRIP-MCNC: NEGATIVE MG/DL
HADV DNA SPEC NAA+PROBE: NOT DETECTED
HCOV 229E RNA SPEC QL NAA+PROBE: NOT DETECTED
HCOV HKU1 RNA SPEC QL NAA+PROBE: NOT DETECTED
HCOV NL63 RNA SPEC QL NAA+PROBE: NOT DETECTED
HCOV OC43 RNA SPEC QL NAA+PROBE: NOT DETECTED
HCT VFR BLD AUTO: 35.8 % (ref 34–46.6)
HGB BLD-MCNC: 11.7 G/DL (ref 12–15.9)
HGB UR QL STRIP.AUTO: NEGATIVE
HMPV RNA NPH QL NAA+NON-PROBE: NOT DETECTED
HOLD SPECIMEN: NORMAL
HOLD SPECIMEN: NORMAL
HPIV1 RNA ISLT QL NAA+PROBE: NOT DETECTED
HPIV2 RNA SPEC QL NAA+PROBE: NOT DETECTED
HPIV3 RNA NPH QL NAA+PROBE: NOT DETECTED
HPIV4 P GENE NPH QL NAA+PROBE: NOT DETECTED
HYALINE CASTS UR QL AUTO: ABNORMAL /LPF
IMM GRANULOCYTES # BLD AUTO: 0.01 10*3/MM3 (ref 0–0.05)
IMM GRANULOCYTES NFR BLD AUTO: 0.2 % (ref 0–0.5)
KETONES UR QL STRIP: NEGATIVE
LEUKOCYTE ESTERASE UR QL STRIP.AUTO: ABNORMAL
LYMPHOCYTES # BLD AUTO: 1.52 10*3/MM3 (ref 0.7–3.1)
LYMPHOCYTES NFR BLD AUTO: 28.6 % (ref 19.6–45.3)
M PNEUMO IGG SER IA-ACNC: NOT DETECTED
MAGNESIUM SERPL-MCNC: 1.5 MG/DL (ref 1.6–2.4)
MCH RBC QN AUTO: 29.8 PG (ref 26.6–33)
MCHC RBC AUTO-ENTMCNC: 32.7 G/DL (ref 31.5–35.7)
MCV RBC AUTO: 91.3 FL (ref 79–97)
MONOCYTES # BLD AUTO: 0.38 10*3/MM3 (ref 0.1–0.9)
MONOCYTES NFR BLD AUTO: 7.1 % (ref 5–12)
NEUTROPHILS NFR BLD AUTO: 3.32 10*3/MM3 (ref 1.7–7)
NEUTROPHILS NFR BLD AUTO: 62.4 % (ref 42.7–76)
NITRITE UR QL STRIP: POSITIVE
NRBC BLD AUTO-RTO: 0 /100 WBC (ref 0–0.2)
NT-PROBNP SERPL-MCNC: 2084 PG/ML (ref 0–900)
PH UR STRIP.AUTO: 7 [PH] (ref 5–8)
PLATELET # BLD AUTO: 142 10*3/MM3 (ref 140–450)
PMV BLD AUTO: 11.4 FL (ref 6–12)
POTASSIUM SERPL-SCNC: 4.7 MMOL/L (ref 3.5–5.2)
PROT SERPL-MCNC: 6.2 G/DL (ref 6–8.5)
PROT UR QL STRIP: ABNORMAL
RBC # BLD AUTO: 3.92 10*6/MM3 (ref 3.77–5.28)
RBC # UR STRIP: ABNORMAL /HPF
REF LAB TEST METHOD: ABNORMAL
RHINOVIRUS RNA SPEC NAA+PROBE: NOT DETECTED
RSV RNA NPH QL NAA+NON-PROBE: NOT DETECTED
SARS-COV-2 RNA NPH QL NAA+NON-PROBE: NOT DETECTED
SODIUM SERPL-SCNC: 142 MMOL/L (ref 136–145)
SP GR UR STRIP: 1.02 (ref 1–1.03)
SQUAMOUS #/AREA URNS HPF: ABNORMAL /HPF
TROPONIN T SERPL-MCNC: <0.01 NG/ML (ref 0–0.03)
TROPONIN T SERPL-MCNC: <0.01 NG/ML (ref 0–0.03)
TSH SERPL DL<=0.05 MIU/L-ACNC: 1.1 UIU/ML (ref 0.27–4.2)
UROBILINOGEN UR QL STRIP: ABNORMAL
WBC # UR STRIP: ABNORMAL /HPF
WBC NRBC COR # BLD: 5.32 10*3/MM3 (ref 3.4–10.8)
WHOLE BLOOD HOLD COAG: NORMAL
WHOLE BLOOD HOLD SPECIMEN: NORMAL

## 2022-12-29 PROCEDURE — 0202U NFCT DS 22 TRGT SARS-COV-2: CPT | Performed by: EMERGENCY MEDICINE

## 2022-12-29 PROCEDURE — 84443 ASSAY THYROID STIM HORMONE: CPT | Performed by: EMERGENCY MEDICINE

## 2022-12-29 PROCEDURE — 25010000002 MAGNESIUM SULFATE 2 GM/50ML SOLUTION: Performed by: EMERGENCY MEDICINE

## 2022-12-29 PROCEDURE — 83880 ASSAY OF NATRIURETIC PEPTIDE: CPT | Performed by: EMERGENCY MEDICINE

## 2022-12-29 PROCEDURE — G0378 HOSPITAL OBSERVATION PER HR: HCPCS

## 2022-12-29 PROCEDURE — 83605 ASSAY OF LACTIC ACID: CPT | Performed by: EMERGENCY MEDICINE

## 2022-12-29 PROCEDURE — 99285 EMERGENCY DEPT VISIT HI MDM: CPT

## 2022-12-29 PROCEDURE — 84484 ASSAY OF TROPONIN QUANT: CPT | Performed by: INTERNAL MEDICINE

## 2022-12-29 PROCEDURE — 87086 URINE CULTURE/COLONY COUNT: CPT

## 2022-12-29 PROCEDURE — 83735 ASSAY OF MAGNESIUM: CPT

## 2022-12-29 PROCEDURE — 36415 COLL VENOUS BLD VENIPUNCTURE: CPT

## 2022-12-29 PROCEDURE — 93005 ELECTROCARDIOGRAM TRACING: CPT | Performed by: EMERGENCY MEDICINE

## 2022-12-29 PROCEDURE — 80053 COMPREHEN METABOLIC PANEL: CPT

## 2022-12-29 PROCEDURE — 85025 COMPLETE CBC W/AUTO DIFF WBC: CPT

## 2022-12-29 PROCEDURE — 87040 BLOOD CULTURE FOR BACTERIA: CPT | Performed by: EMERGENCY MEDICINE

## 2022-12-29 PROCEDURE — 82962 GLUCOSE BLOOD TEST: CPT

## 2022-12-29 PROCEDURE — 87077 CULTURE AEROBIC IDENTIFY: CPT | Performed by: EMERGENCY MEDICINE

## 2022-12-29 PROCEDURE — 84484 ASSAY OF TROPONIN QUANT: CPT

## 2022-12-29 PROCEDURE — 81001 URINALYSIS AUTO W/SCOPE: CPT

## 2022-12-29 PROCEDURE — 25010000002 CEFTRIAXONE PER 250 MG: Performed by: EMERGENCY MEDICINE

## 2022-12-29 PROCEDURE — 93010 ELECTROCARDIOGRAM REPORT: CPT | Performed by: INTERNAL MEDICINE

## 2022-12-29 PROCEDURE — 87186 SC STD MICRODIL/AGAR DIL: CPT | Performed by: EMERGENCY MEDICINE

## 2022-12-29 PROCEDURE — 71045 X-RAY EXAM CHEST 1 VIEW: CPT

## 2022-12-29 PROCEDURE — 96365 THER/PROPH/DIAG IV INF INIT: CPT

## 2022-12-29 PROCEDURE — 96375 TX/PRO/DX INJ NEW DRUG ADDON: CPT

## 2022-12-29 RX ORDER — ALBUTEROL SULFATE 2.5 MG/3ML
2.5 SOLUTION RESPIRATORY (INHALATION) EVERY 6 HOURS PRN
Status: DISCONTINUED | OUTPATIENT
Start: 2022-12-29 | End: 2023-01-02 | Stop reason: HOSPADM

## 2022-12-29 RX ORDER — CITALOPRAM 20 MG/1
20 TABLET ORAL DAILY
Status: DISCONTINUED | OUTPATIENT
Start: 2022-12-30 | End: 2023-01-02 | Stop reason: HOSPADM

## 2022-12-29 RX ORDER — ONDANSETRON 2 MG/ML
4 INJECTION INTRAMUSCULAR; INTRAVENOUS EVERY 6 HOURS PRN
Status: DISCONTINUED | OUTPATIENT
Start: 2022-12-29 | End: 2023-01-02 | Stop reason: HOSPADM

## 2022-12-29 RX ORDER — MAGNESIUM OXIDE 400 MG/1
400 TABLET ORAL DAILY
Status: DISCONTINUED | OUTPATIENT
Start: 2022-12-30 | End: 2022-12-31

## 2022-12-29 RX ORDER — METOPROLOL SUCCINATE 25 MG/1
25 TABLET, EXTENDED RELEASE ORAL
Status: DISCONTINUED | OUTPATIENT
Start: 2022-12-30 | End: 2022-12-30

## 2022-12-29 RX ORDER — SODIUM CHLORIDE 0.9 % (FLUSH) 0.9 %
10 SYRINGE (ML) INJECTION AS NEEDED
Status: DISCONTINUED | OUTPATIENT
Start: 2022-12-29 | End: 2023-01-02 | Stop reason: HOSPADM

## 2022-12-29 RX ORDER — ONDANSETRON 4 MG/1
4 TABLET, FILM COATED ORAL EVERY 6 HOURS PRN
Status: DISCONTINUED | OUTPATIENT
Start: 2022-12-29 | End: 2023-01-02 | Stop reason: HOSPADM

## 2022-12-29 RX ORDER — ACETAMINOPHEN 325 MG/1
650 TABLET ORAL EVERY 4 HOURS PRN
Status: DISCONTINUED | OUTPATIENT
Start: 2022-12-29 | End: 2023-01-02 | Stop reason: HOSPADM

## 2022-12-29 RX ORDER — PRIMIDONE 50 MG/1
50 TABLET ORAL 3 TIMES DAILY
Status: DISCONTINUED | OUTPATIENT
Start: 2022-12-30 | End: 2023-01-02 | Stop reason: HOSPADM

## 2022-12-29 RX ORDER — FUROSEMIDE 40 MG/1
40 TABLET ORAL 3 TIMES WEEKLY
Status: DISCONTINUED | OUTPATIENT
Start: 2022-12-30 | End: 2023-01-02 | Stop reason: HOSPADM

## 2022-12-29 RX ORDER — POTASSIUM CHLORIDE 750 MG/1
10 TABLET, FILM COATED, EXTENDED RELEASE ORAL ONCE AS NEEDED
Status: DISCONTINUED | OUTPATIENT
Start: 2022-12-29 | End: 2023-01-02 | Stop reason: HOSPADM

## 2022-12-29 RX ORDER — LOSARTAN POTASSIUM 50 MG/1
50 TABLET ORAL DAILY
Status: DISCONTINUED | OUTPATIENT
Start: 2022-12-30 | End: 2022-12-31

## 2022-12-29 RX ORDER — BUDESONIDE AND FORMOTEROL FUMARATE DIHYDRATE 160; 4.5 UG/1; UG/1
2 AEROSOL RESPIRATORY (INHALATION)
Status: DISCONTINUED | OUTPATIENT
Start: 2022-12-29 | End: 2023-01-02 | Stop reason: HOSPADM

## 2022-12-29 RX ORDER — L.ACID,PARA/B.BIFIDUM/S.THERM 8B CELL
1 CAPSULE ORAL DAILY
Status: DISCONTINUED | OUTPATIENT
Start: 2022-12-30 | End: 2023-01-02 | Stop reason: HOSPADM

## 2022-12-29 RX ORDER — INSULIN LISPRO 100 [IU]/ML
0-9 INJECTION, SOLUTION INTRAVENOUS; SUBCUTANEOUS
Status: DISCONTINUED | OUTPATIENT
Start: 2022-12-30 | End: 2023-01-02 | Stop reason: HOSPADM

## 2022-12-29 RX ORDER — FAMOTIDINE 20 MG
1000 TABLET ORAL DAILY
Status: DISCONTINUED | OUTPATIENT
Start: 2022-12-30 | End: 2022-12-29

## 2022-12-29 RX ORDER — UREA 10 %
3 LOTION (ML) TOPICAL NIGHTLY PRN
Status: DISCONTINUED | OUTPATIENT
Start: 2022-12-29 | End: 2023-01-02 | Stop reason: HOSPADM

## 2022-12-29 RX ORDER — MONTELUKAST SODIUM 10 MG/1
10 TABLET ORAL NIGHTLY
Status: DISCONTINUED | OUTPATIENT
Start: 2022-12-29 | End: 2023-01-02 | Stop reason: HOSPADM

## 2022-12-29 RX ORDER — MAGNESIUM SULFATE HEPTAHYDRATE 40 MG/ML
2 INJECTION, SOLUTION INTRAVENOUS ONCE
Status: COMPLETED | OUTPATIENT
Start: 2022-12-29 | End: 2022-12-29

## 2022-12-29 RX ORDER — NITROGLYCERIN 0.4 MG/1
0.4 TABLET SUBLINGUAL
Status: DISCONTINUED | OUTPATIENT
Start: 2022-12-29 | End: 2023-01-02 | Stop reason: HOSPADM

## 2022-12-29 RX ORDER — DEXTROSE MONOHYDRATE 25 G/50ML
25 INJECTION, SOLUTION INTRAVENOUS
Status: DISCONTINUED | OUTPATIENT
Start: 2022-12-29 | End: 2023-01-02 | Stop reason: HOSPADM

## 2022-12-29 RX ORDER — NICOTINE POLACRILEX 4 MG
15 LOZENGE BUCCAL
Status: DISCONTINUED | OUTPATIENT
Start: 2022-12-29 | End: 2023-01-02 | Stop reason: HOSPADM

## 2022-12-29 RX ADMIN — CEFTRIAXONE 2 G: 2 INJECTION, POWDER, FOR SOLUTION INTRAMUSCULAR; INTRAVENOUS at 18:32

## 2022-12-29 RX ADMIN — METOPROLOL TARTRATE 25 MG: 25 TABLET, FILM COATED ORAL at 18:40

## 2022-12-29 RX ADMIN — MAGNESIUM SULFATE HEPTAHYDRATE 2 G: 2 INJECTION, SOLUTION INTRAVENOUS at 19:15

## 2022-12-29 RX ADMIN — METOPROLOL TARTRATE 5 MG: 1 INJECTION, SOLUTION INTRAVENOUS at 18:39

## 2022-12-29 NOTE — ED PROVIDER NOTES
EMERGENCY DEPARTMENT ENCOUNTER    Room Number:  14/14  Date of encounter:  12/29/2022  PCP: Nisha Britton MD  Historian: Patient and daughter    Patient was placed in face mask during triage process. Patient was wearing facemask when I entered the room and throughout our encounter. I wore full protective equipment throughout this patient encounter including a face mask, eye protection, and gloves. Hand hygiene was performed before donning protective equipment and again following doffing of PPE after leaving the room.    HPI:  Chief Complaint: Generalized weakness, dyspnea, dysuria, tachycardia  A complete HPI/ROS/PMH/PSH/SH/FH are unobtainable due to: N/A   Context: Kanwal Sauer is a 72 y.o. female who presents to the ED c/o generalized weakness with some dyspnea and dysuria developing over the last 4 to 5 days with newly noted tachycardia yesterday.  Patient was seen in urgent care and sent to the ED for further evaluation and treatment.  Patient has had no fevers that she is aware of.  She has mild dyspnea but has as needed supplemental oxygen that she wears at baseline and follows with pulmonology, Dr. Box.  She also has known history of A. fib for which she follows with cardiology, Dr. Romano.  Symptoms are moderate at this time.  The patient has been able to do her ADLs at home where she lives by herself.  No clear exacerbating or relieving factors.  Patient notes some general weakness at this time.      MEDICAL HISTORY REVIEW  EMR reviewed:    Date admission: 12/23/2020  Date of discharge: 12/30/2020  Active Hospital Problems     Diagnosis   POA   • **Closed fracture of left distal femur (CMS/[facility]) [S72.402A]   Yes   • PAF (paroxysmal atrial fibrillation) (CMS/[facility]) [I48.0]   Yes   • TARSHA (obstructive sleep apnea) [G47.33]   Yes   • Chronic respiratory failure with hypoxia (CMS/[facility]) [J96.11]   Yes   • Tremors of nervous system [R25.1]   Yes   • Type 2 diabetes mellitus with circulatory disorder,  without long-term current use of insulin (CMS/[facility]) [E11.59]   Yes   • Essential hypertension [I10]   Yes       Resolved Hospital Problems     Diagnosis Date Resolved POA   • CANDICE (acute kidney injury) (CMS/[facility]) [N17.9] 12/27/2020 Unknown   • Acute blood loss anemia [D62]             PAST MEDICAL HISTORY  Active Ambulatory Problems     Diagnosis Date Noted   • Closed fracture of left distal femur ([facility]) 12/23/2020   • Type 2 diabetes mellitus with circulatory disorder, without long-term current use of insulin ([facility]) 12/24/2020   • Essential hypertension 12/24/2020   • Tremors of nervous system 12/26/2020   • PAF (paroxysmal atrial fibrillation) ([facility]) 12/27/2020   • TARSHA (obstructive sleep apnea) 12/27/2020   • Chronic respiratory failure with hypoxia ([facility]) 12/27/2020   • History of breast cancer 02/22/2021   • History of seizure 05/25/2021   • Risk for falls 05/25/2021   • Neck pain 11/27/2022     Resolved Ambulatory Problems     Diagnosis Date Noted   • CANDICE (acute kidney injury) ([facility]) 12/24/2020   • Acute blood loss anemia 12/24/2020     Past Medical History:   Diagnosis Date   • Anxiety    • Asthma    • Atrial fibrillation ([facility])    • Cancer ([facility])    • Deep vein thrombosis ([facility])    • Diabetes mellitus ([facility])    • Difficulty walking    • Drug therapy    • Environmental allergies    • Fractures    • Headache, tension-type    • Hyperlipidemia    • Hypertension    • Neuropathy in diabetes ([facility])    • Seizures ([facility])    • Sleep apnea    • Tremor    • Weakness          PAST SURGICAL HISTORY  Past Surgical History:   Procedure Laterality Date   • BREAST BIOPSY     • CATARACT EXTRACTION, BILATERAL     • DENTAL PROCEDURE      Removed teeth   • FEMUR OPEN REDUCTION INTERNAL FIXATION Left 12/24/2020    Procedure: DISTAL FEMUR OPEN REDUCTION INTERNAL FIXATION;  Surgeon: Marley Hernandez MD;  Location: [facility] OR;  Service: Orthopedics;  Laterality: Left;   • MASTECTOMY Left    • REPLACEMENT TOTAL KNEE BILATERAL            FAMILY HISTORY  Family History   Problem Relation Age of Onset   • Arthritis Mother    • Lung cancer Mother    • Brain cancer Mother    • Hypertension Mother    • Hypertension Father    • Arthritis Sister    • Breast cancer Sister    • Diabetes Sister    • Hypertension Sister    • Dementia Sister          SOCIAL HISTORY  Social History     Socioeconomic History   • Marital status:    Tobacco Use   • Smoking status: Never   • Smokeless tobacco: Never   • Tobacco comments:     no caffine   Vaping Use   • Vaping Use: Never used   Substance and Sexual Activity   • Alcohol use: Not Currently   • Drug use: Never   • Sexual activity: Yes     Partners: Male         ALLERGIES  Baclofen; Doxycycline; Eggs or egg-derived products; Iodine; Latex; Milk-related compounds; Msg [monosodium glutamate]; Penicillins; Metronidazole; Ezetimibe; Latex, natural rubber; Nylon; and Simvastatin        REVIEW OF SYSTEMS  Review of Systems     All systems reviewed and negative except for those discussed in HPI.       PHYSICAL EXAM    I have reviewed the triage vital signs and nursing notes.    ED Triage Vitals [12/29/22 1347]   Temp Heart Rate Resp BP SpO2   98 °F (36.7 °C) (!) 137 18 (!) 190/132 93 %      Temp src Heart Rate Source Patient Position BP Location FiO2 (%)   -- Monitor Standing Right arm --       Physical Exam    Physical Exam   Constitutional: Elderly female who appears slightly frail along with acutely ill but not overtly toxic.  HENT:  Head: Normocephalic and atraumatic.   Oropharynx: Mucous membranes are moist.   Eyes: No scleral icterus. No conjunctival pallor.  Neck: Painless range of motion noted. Neck supple.   Cardiovascular: Tachycardic and irregularly irregular rhythm and intact distal pulses.  Pulmonary/Chest: Patient is on 2 L supplemental oxygen at this time.  She does not appear in any respiratory distress.  Breath sounds are bilaterally diminished   abdominal: Soft. There is no tenderness. There is  no rebound and no guarding.   Musculoskeletal: Moves all extremities equally.  2+ pitting edema bilateral shins there is no calf tenderness or erythema noted   neurological: Alert.  Baseline strength and sensation noted.   Skin: Skin is pink, warm, and dry. No pallor.   Psychiatric: Mood and affect normal.   Nursing note and vitals reviewed.    LAB RESULTS  Recent Results (from the past 24 hour(s))   Comprehensive Metabolic Panel    Collection Time: 12/29/22  3:11 PM    Specimen: Blood   Result Value Ref Range    Glucose 130 (H) 65 - 99 mg/dL    BUN 26 (H) 8 - 23 mg/dL    Creatinine 1.07 (H) 0.57 - 1.00 mg/dL    Sodium 142 136 - 145 mmol/L    Potassium 4.7 3.5 - 5.2 mmol/L    Chloride 104 98 - 107 mmol/L    CO2 31.9 (H) 22.0 - 29.0 mmol/L    Calcium 9.4 8.6 - 10.5 mg/dL    Total Protein 6.2 6.0 - 8.5 g/dL    Albumin 4.0 3.5 - 5.2 g/dL    ALT (SGPT) 11 1 - 33 U/L    AST (SGOT) 15 1 - 32 U/L    Alkaline Phosphatase 43 39 - 117 U/L    Total Bilirubin 0.4 0.0 - 1.2 mg/dL    Globulin 2.2 gm/dL    A/G Ratio 1.8 g/dL    BUN/Creatinine Ratio 24.3 7.0 - 25.0    Anion Gap 6.1 5.0 - 15.0 mmol/L    eGFR 54.6 (L) >60.0 mL/min/1.73   Troponin    Collection Time: 12/29/22  3:11 PM    Specimen: Blood   Result Value Ref Range    Troponin T <0.010 0.000 - 0.030 ng/mL   Magnesium    Collection Time: 12/29/22  3:11 PM    Specimen: Blood   Result Value Ref Range    Magnesium 1.5 (L) 1.6 - 2.4 mg/dL   Urinalysis With Culture If Indicated - Urine, Random Void    Collection Time: 12/29/22  3:11 PM    Specimen: Urine, Random Void   Result Value Ref Range    Color, UA Yellow Yellow, Straw    Appearance, UA Turbid (A) Clear    pH, UA 7.0 5.0 - 8.0    Specific Gravity, UA 1.020 1.005 - 1.030    Glucose, UA Negative Negative    Ketones, UA Negative Negative    Bilirubin, UA Negative Negative    Blood, UA Negative Negative    Protein,  mg/dL (2+) (A) Negative    Leuk Esterase, UA Large (3+) (A) Negative    Nitrite, UA Positive (A) Negative     Urobilinogen, UA 1.0 E.U./dL 0.2 - 1.0 E.U./dL   Green Top (Gel)    Collection Time: 12/29/22  3:11 PM   Result Value Ref Range    Extra Tube Hold for add-ons.    Lavender Top    Collection Time: 12/29/22  3:11 PM   Result Value Ref Range    Extra Tube hold for add-on    Gold Top - SST    Collection Time: 12/29/22  3:11 PM   Result Value Ref Range    Extra Tube Hold for add-ons.    Light Blue Top    Collection Time: 12/29/22  3:11 PM   Result Value Ref Range    Extra Tube Hold for add-ons.    CBC Auto Differential    Collection Time: 12/29/22  3:11 PM    Specimen: Blood   Result Value Ref Range    WBC 5.32 3.40 - 10.80 10*3/mm3    RBC 3.92 3.77 - 5.28 10*6/mm3    Hemoglobin 11.7 (L) 12.0 - 15.9 g/dL    Hematocrit 35.8 34.0 - 46.6 %    MCV 91.3 79.0 - 97.0 fL    MCH 29.8 26.6 - 33.0 pg    MCHC 32.7 31.5 - 35.7 g/dL    RDW 13.6 12.3 - 15.4 %    RDW-SD 45.5 37.0 - 54.0 fl    MPV 11.4 6.0 - 12.0 fL    Platelets 142 140 - 450 10*3/mm3    Neutrophil % 62.4 42.7 - 76.0 %    Lymphocyte % 28.6 19.6 - 45.3 %    Monocyte % 7.1 5.0 - 12.0 %    Eosinophil % 1.5 0.3 - 6.2 %    Basophil % 0.2 0.0 - 1.5 %    Immature Grans % 0.2 0.0 - 0.5 %    Neutrophils, Absolute 3.32 1.70 - 7.00 10*3/mm3    Lymphocytes, Absolute 1.52 0.70 - 3.10 10*3/mm3    Monocytes, Absolute 0.38 0.10 - 0.90 10*3/mm3    Eosinophils, Absolute 0.08 0.00 - 0.40 10*3/mm3    Basophils, Absolute 0.01 0.00 - 0.20 10*3/mm3    Immature Grans, Absolute 0.01 0.00 - 0.05 10*3/mm3    nRBC 0.0 0.0 - 0.2 /100 WBC   Urinalysis, Microscopic Only - Urine, Random Void    Collection Time: 12/29/22  3:11 PM    Specimen: Urine, Random Void   Result Value Ref Range    RBC, UA None Seen None Seen, 0-2 /HPF    WBC, UA 31-50 (A) None Seen, 0-2 /HPF    Bacteria, UA 3+ (A) None Seen /HPF    Squamous Epithelial Cells, UA 0-2 None Seen, 0-2 /HPF    Hyaline Casts, UA None Seen None Seen /LPF    Methodology Manual Light Microscopy    TSH    Collection Time: 12/29/22  3:11 PM    Specimen:  Blood   Result Value Ref Range    TSH 1.100 0.270 - 4.200 uIU/mL   BNP    Collection Time: 12/29/22  3:11 PM    Specimen: Blood   Result Value Ref Range    proBNP 2,084.0 (H) 0.0 - 900.0 pg/mL   POC Glucose Once    Collection Time: 12/29/22  4:56 PM    Specimen: Blood   Result Value Ref Range    Glucose 132 (H) 70 - 130 mg/dL   ECG 12 Lead Tachycardia    Collection Time: 12/29/22  5:03 PM   Result Value Ref Range    QT Interval 309 ms   Respiratory Panel PCR w/COVID-19(SARS-CoV-2) [facility] In-House, NP Swab in UTM/VTM, 3-4 HR TAT - Swab, Nasopharynx    Collection Time: 12/29/22  5:06 PM    Specimen: Nasopharynx; Swab   Result Value Ref Range    ADENOVIRUS, PCR Not Detected Not Detected    Coronavirus 229E Not Detected Not Detected    Coronavirus HKU1 Not Detected Not Detected    Coronavirus NL63 Not Detected Not Detected    Coronavirus OC43 Not Detected Not Detected    COVID19 Not Detected Not Detected - Ref. Range    Human Metapneumovirus Not Detected Not Detected    Human Rhinovirus/Enterovirus Not Detected Not Detected    Influenza A PCR Not Detected Not Detected    Influenza B PCR Not Detected Not Detected    Parainfluenza Virus 1 Not Detected Not Detected    Parainfluenza Virus 2 Not Detected Not Detected    Parainfluenza Virus 3 Not Detected Not Detected    Parainfluenza Virus 4 Not Detected Not Detected    RSV, PCR Not Detected Not Detected    Bordetella pertussis pcr Not Detected Not Detected    Bordetella parapertussis PCR Not Detected Not Detected    Chlamydophila pneumoniae PCR Not Detected Not Detected    Mycoplasma pneumo by PCR Not Detected Not Detected   Lactic Acid, Plasma    Collection Time: 12/29/22  5:21 PM    Specimen: Blood   Result Value Ref Range    Lactate 0.7 0.5 - 2.0 mmol/L       Ordered the above labs and independently reviewed the results.        RADIOLOGY  XR Chest 1 View    Result Date: 12/29/2022  PORTABLE CHEST X-RAY  HISTORY: Cough, sepsis.  Portable chest x-ray  is provided. Correlation: 07/05/2022.  FINDINGS: Numerous surgical clips at the left axilla. Mildly enlarged cardiac silhouette is unchanged given the apical lordotic projection. Vascular volume is normal. The lungs are clear. The costophrenic sulci are dry and the bones appear normal. There is no pneumothorax.      Cardiomegaly. No acute abnormality.  This report was finalized on 12/29/2022 5:16 PM by Dr. Abhilash Mcgregor M.D.        I ordered the above noted radiological studies. Reviewed by me and discussed with radiologist.  See dictation for official radiology interpretation.      PROCEDURES    Procedures        MEDICATIONS GIVEN IN ER    Medications   sodium chloride 0.9 % flush 10 mL (has no administration in time range)   sodium chloride 0.9 % flush 10 mL (has no administration in time range)   magnesium sulfate 2g/50 mL (PREMIX) infusion (has no administration in time range)   cefTRIAXone (ROCEPHIN) 2 g in sodium chloride 0.9 % 100 mL IVPB-VTB (has no administration in time range)   metoprolol tartrate (LOPRESSOR) tablet 25 mg (has no administration in time range)   metoprolol tartrate (LOPRESSOR) injection 5 mg (has no administration in time range)         PROGRESS, DATA ANALYSIS, CONSULTS, AND MEDICAL DECISION MAKING    My differential diagnosis includes but is not limited to generalized weakness, electrolyte abnormality, CVA, TIA, Bell's palsy, acute MI, GI bleed, urinary tract infection, systemic infections including sepsis, alcohol abuse, drug abuse including prescription and street drug.    Total critical care time: Approximately 35 minutes    Due to a high probability of clinically significant, life threatening deterioration, the patient required my highest level of preparedness to intervene emergently and I personally spent this critical care time directly and personally managing the patient. This critical care time included obtaining a history; examining the patient; vital sign monitoring; ordering  and review of studies; arranging urgent treatment with development of a management plan; evaluation of patient's response to treatment; frequent reassessment; and, discussions with other providers.    This critical care time was performed to assess and manage the high probability of imminent, life-threatening deterioration that could result in multi-organ failure. It was exclusive of separately billable procedures and treating other patients and teaching time.    Please see MDM section and the rest of the note for further information on patient assessment and treatment.      All labs have been independently reviewed by me.  All radiology studies have been reviewed by me and discussed with radiologist dictating the report.   EKG's independently viewed and interpreted by me.  Discussion below represents my analysis of pertinent findings related to patient's condition, differential diagnosis, treatment plan and final disposition.      ED Course as of 12/29/22 1838   Thu Dec 29, 2022   1640 WBC, UA(!): 31-50 [RS]   1640 Bacteria, UA(!): 3+ [RS]   1640 Leukocytes, UA(!): Large (3+) [RS]   1640 Nitrite, UA(!): Positive [RS]   1640 Troponin T: <0.010 [RS]   1640 BUN(!): 26 [RS]   1640 Creatinine(!): 1.07 [RS]   1640 Sodium: 142 [RS]   1640 Potassium: 4.7 [RS]   1640 ALT (SGPT): 11 [RS]   1640 AST (SGOT): 15 [RS]   1640 Total Bilirubin: 0.4 [RS]   1640 WBC: 5.32 [RS]   1640 Hemoglobin(!): 11.7 [RS]   1640 Platelets: 142 [RS]   1651 Findings at this point discussed with patient and family.  We will initiate antibiotics for obvious UTI and initiate magnesium.  Patient is neither febrile nor have a significant elevated leukocytosis at the time thus we will hold off on fluids as she appears to be euvolemic or even hypervolemic with some lower extremity edema. [RS]   1707 EKG           EKG time: 1703  Rhythm/Rate: A-flutter, 135  P waves and HI: N/A  QRS, axis: Narrow complex with poor R wave progression  ST and T waves: No  STEMI; QTC within normal limits    Interpreted Contemporaneously by me, independently viewed  Comparison: 7/5/2022-   [RS]   1817 COVID19: Not Detected [RS]   1817 Influenza A PCR: Not Detected [RS]   1817 Influenza B PCR: Not Detected [RS]   1818 proBNP(!): 2,084.0 [RS]   1818 Lactate: 0.7 [RS]   1818 TSH Baseline: 1.100 [RS]   1818 I personally reviewed the chest x-ray concomitant with treatment and agree with the radiologist rotation of no acute abnormalities with mild cardiomegaly noted [RS]   1819 Patient updated with findings thus far.  We will initiate some rate control for the patient and plan admission.  She is agreeable with plan. [RS]   1833 CONSULT        Provider: Dr. Davis-LHA     Discussion: Reviewed patient history, ED presentation and evaluation as well as therapies that initiated in the ED and pending cardiology consult.  Agreeable except patient for observation admission with telemetry for further evaluation and treatment.    Agreeable c treatment and planned disposition.         [RS]   1837 CONSULT        Provider: Dr. Oh -cardiology    Discussion: Reviewed patient history, ED presentation and evaluation as well as therapies that been initiated in the ED and response to therapies.  He is agreeable to consult.    Agreeable c treatment and planned disposition.         [RS]      ED Course User Index  [RS] Bryan Mann MD       AS OF 18:38 EST VITALS:    BP - 134/100  HR - (!) 135  TEMP - 98 °F (36.7 °C)  O2 SATS - 97%        DIAGNOSIS  Final diagnoses:   Acute UTI   Atrial fibrillation with RVR (HCC)   Generalized weakness   Deficit in activities of daily living (ADL)   Dyspnea on exertion   Chronic anticoagulation         DISPOSITION  ADMISSION    Discussed treatment plan and reason for admission with pt/family and admitting physician.  Pt/family voiced understanding of the plan for admission for further testing/treatment as needed.          Bryan Mann MD  12/29/22 3204

## 2022-12-29 NOTE — ED NOTES
Nursing report ED to floor  Kanwal Sauer  72 y.o.  female    HPI :   Chief Complaint   Patient presents with    Difficulty Urinating     ICC for 139 HR and UTI sepsis r/o with vaginal discharge       Admitting doctor:   Neena Davis MD    Admitting diagnosis:   The primary encounter diagnosis was Acute UTI. Diagnoses of Atrial fibrillation with RVR (HCC), Generalized weakness, Deficit in activities of daily living (ADL), Dyspnea on exertion, and Chronic anticoagulation were also pertinent to this visit.    Code status:   Current Code Status       Date Active Code Status Order ID Comments User Context       12/29/2022 1839 CPR (Attempt to Resuscitate) [id number]  Neena Davis MD ED        Question Answer    Code Status (Patient has no pulse and is not breathing) CPR (Attempt to Resuscitate)    Medical Interventions (Patient has pulse or is breathing) Full                    Allergies:   Baclofen; Doxycycline; Eggs or egg-derived products; Iodine; Latex; Milk-related compounds; Msg [monosodium glutamate]; Penicillins; Metronidazole; Ezetimibe; Latex, natural rubber; Nylon; and Simvastatin    Isolation:   No active isolations    Intake and Output  No intake or output data in the 24 hours ending 12/29/22 1856    Weight:       12/29/22  1853   Weight: 98 kg (216 lb)       Most recent vitals:   Vitals:    12/29/22 1502 12/29/22 1656 12/29/22 1826 12/29/22 1853   BP: 141/95 125/81 134/100 (!) 136/113   BP Location: Right arm      Patient Position: Sitting      Pulse:  (!) 131 (!) 135 120   Resp:    18   Temp:    97.8 °F (36.6 °C)   SpO2:  97% 97% 96%   Weight: 100 kg (221 lb)   98 kg (216 lb)   Height: 170.2 cm (67\")   167.6 cm (66\")       Active LDAs/IV Access:   Lines, Drains & Airways       Active LDAs       Name Placement date Placement time Site Days    Peripheral IV 12/29/22 1729 Posterior;Right Wrist 12/29/22 1729  Wrist  less than 1                    Labs (abnormal labs have a star):   Labs  Reviewed   COMPREHENSIVE METABOLIC PANEL - Abnormal; Notable for the following components:       Result Value    Glucose 130 (*)     BUN 26 (*)     Creatinine 1.07 (*)     CO2 31.9 (*)     eGFR 54.6 (*)     All other components within normal limits    Narrative:     GFR Normal >60  Chronic Kidney Disease <60  Kidney Failure <15    The GFR formula is only valid for adults with stable renal function between ages 18 and 70.   MAGNESIUM - Abnormal; Notable for the following components:    Magnesium 1.5 (*)     All other components within normal limits   URINALYSIS W/ CULTURE IF INDICATED - Abnormal; Notable for the following components:    Appearance, UA Turbid (*)     Protein,  mg/dL (2+) (*)     Leuk Esterase, UA Large (3+) (*)     Nitrite, UA Positive (*)     All other components within normal limits    Narrative:     In absence of clinical symptoms, the presence of pyuria, bacteria, and/or nitrites on the urinalysis result does not correlate with infection.   CBC WITH AUTO DIFFERENTIAL - Abnormal; Notable for the following components:    Hemoglobin 11.7 (*)     All other components within normal limits   URINALYSIS, MICROSCOPIC ONLY - Abnormal; Notable for the following components:    WBC, UA 31-50 (*)     Bacteria, UA 3+ (*)     All other components within normal limits   BNP (IN-HOUSE) - Abnormal; Notable for the following components:    proBNP 2,084.0 (*)     All other components within normal limits    Narrative:     Among patients with dyspnea, NT-proBNP is highly sensitive for the detection of acute congestive heart failure. In addition NT-proBNP of <300 pg/ml effectively rules out acute congestive heart failure with 99% negative predictive value.    Results may be falsely decreased if patient taking Biotin.     POCT GLUCOSE FINGERSTICK - Abnormal; Notable for the following components:    Glucose 132 (*)     All other components within normal limits   RESPIRATORY PANEL PCR W/ COVID-19 (SARS-COV-2)  M[facility]/END[facility]SEL[facility] IN-HOUSE, NP SWAB IN UTM/VTP, 3-4 HR TAT - Normal    Narrative:     In the setting of a positive respiratory panel with a viral infection PLUS a negative procalcitonin without other underlying concern for bacterial infection, consider observing off antibiotics or discontinuation of antibiotics and continue supportive care. If the respiratory panel is positive for atypical bacterial infection (Bordetella pertussis, Chlamydophila pneumoniae, or Mycoplasma pneumoniae), consider antibiotic de-escalation to target atypical bacterial infection.   TROPONIN (IN-HOUSE) - Normal    Narrative:     Troponin T Reference Range:  <= 0.03 ng/mL-   Negative for AMI  >0.03 ng/mL-     Abnormal for myocardial necrosis.  Clinicians would have to utilize clinical acumen, EKG, Troponin and serial changes to determine if it is an Acute Myocardial Infarction or myocardial injury due to an underlying chronic condition.       Results may be falsely decreased if patient taking Biotin.     LACTIC ACID, PLASMA - Normal   TSH - Normal   URINE CULTURE   BLOOD CULTURE   BLOOD CULTURE   RAINBOW DRAW    Narrative:     The following orders were created for panel order [facility] Draw.  Procedure                               Abnormality         Status                     ---------                               -----------         ------                     Green Top (Gel)[[id number]]                                  Final result               Lavender Top[[id number]]                                     Final result               Gold Top - SST[[id number]]                                   Final result               Light Blue Top[[id number]]                                   Final result                 Please view results for these tests on the individual orders.   POCT GLUCOSE FINGERSTICK   CBC AND DIFFERENTIAL    Narrative:     The following orders were created for panel order CBC & Differential.  Procedure                                Abnormality         Status                     ---------                               -----------         ------                     CBC Auto Differential[[id number]]        Abnormal            Final result                 Please view results for these tests on the individual orders.   GREEN TOP   LAVENDER TOP   GOLD TOP - SST   LIGHT BLUE TOP       EKG:   ECG 12 Lead Tachycardia   Preliminary Result   HEART RATE= 134  bpm   RR Interval= 448  ms   OH Interval=   ms   P Horizontal Axis=   deg   P Front Axis=   deg   QRSD Interval= 103  ms   QT Interval= 309  ms   QRS Axis= -37  deg   T Wave Axis= 47  deg   - ABNORMAL ECG -   Atrial flutter with predominant 2:1 AV block   Abnormal R-wave progression, late transition   Left ventricular hypertrophy   ST elevation suggests acute pericarditis   Electronically Signed By:    Date and Time of Study: 2022-12-29 17:03:14          Meds given in ED:   Medications   sodium chloride 0.9 % flush 10 mL (has no administration in time range)   sodium chloride 0.9 % flush 10 mL (has no administration in time range)   magnesium sulfate 2g/50 mL (PREMIX) infusion (has no administration in time range)   cefTRIAXone (ROCEPHIN) 2 g in sodium chloride 0.9 % 100 mL IVPB-VTB (2 g Intravenous New Bag 12/29/22 1832)   nitroglycerin (NITROSTAT) SL tablet 0.4 mg (has no administration in time range)   acetaminophen (TYLENOL) tablet 650 mg (has no administration in time range)   ondansetron (ZOFRAN) tablet 4 mg (has no administration in time range)     Or   ondansetron (ZOFRAN) injection 4 mg (has no administration in time range)   melatonin tablet 3 mg (has no administration in time range)   metoprolol tartrate (LOPRESSOR) tablet 25 mg (25 mg Oral Given 12/29/22 1840)   metoprolol tartrate (LOPRESSOR) injection 5 mg (5 mg Intravenous Given 12/29/22 1839)       Imaging results:  XR Chest 1 View    Result Date: 12/29/2022  Cardiomegaly. No acute abnormality.  This report was finalized on  12/29/2022 5:16 PM by Dr. Abhilash Mcgregor M.D.       Ambulatory status:   -assist x1 walker     Social issues:   Social History     Socioeconomic History    Marital status:    Tobacco Use    Smoking status: Never    Smokeless tobacco: Never    Tobacco comments:     no caffine   Vaping Use    Vaping Use: Never used   Substance and Sexual Activity    Alcohol use: Not Currently    Drug use: Never    Sexual activity: Yes     Partners: Male       NIH Stroke Scale:         Leslie Monteiro RN  12/29/22 18:56 EST

## 2022-12-30 ENCOUNTER — HOME HEALTH ADMISSION (OUTPATIENT)
Dept: HOME HEALTH SERVICES | Facility: HOME HEALTHCARE | Age: 72
End: 2022-12-30

## 2022-12-30 LAB
ANION GAP SERPL CALCULATED.3IONS-SCNC: 6 MMOL/L (ref 5–15)
BUN SERPL-MCNC: 22 MG/DL (ref 8–23)
BUN/CREAT SERPL: 21.2 (ref 7–25)
CALCIUM SPEC-SCNC: 9.3 MG/DL (ref 8.6–10.5)
CHLORIDE SERPL-SCNC: 102 MMOL/L (ref 98–107)
CO2 SERPL-SCNC: 33 MMOL/L (ref 22–29)
CREAT SERPL-MCNC: 1.04 MG/DL (ref 0.57–1)
DEPRECATED RDW RBC AUTO: 42.8 FL (ref 37–54)
EGFRCR SERPLBLD CKD-EPI 2021: 56.5 ML/MIN/1.73
ERYTHROCYTE [DISTWIDTH] IN BLOOD BY AUTOMATED COUNT: 13.3 % (ref 12.3–15.4)
GLUCOSE BLDC GLUCOMTR-MCNC: 126 MG/DL (ref 70–130)
GLUCOSE BLDC GLUCOMTR-MCNC: 154 MG/DL (ref 70–130)
GLUCOSE BLDC GLUCOMTR-MCNC: 168 MG/DL (ref 70–130)
GLUCOSE BLDC GLUCOMTR-MCNC: 181 MG/DL (ref 70–130)
GLUCOSE SERPL-MCNC: 174 MG/DL (ref 65–99)
HBA1C MFR BLD: 6.9 % (ref 4.8–5.6)
HCT VFR BLD AUTO: 33.3 % (ref 34–46.6)
HGB BLD-MCNC: 11.2 G/DL (ref 12–15.9)
MCH RBC QN AUTO: 29.6 PG (ref 26.6–33)
MCHC RBC AUTO-ENTMCNC: 33.6 G/DL (ref 31.5–35.7)
MCV RBC AUTO: 87.9 FL (ref 79–97)
PLATELET # BLD AUTO: 121 10*3/MM3 (ref 140–450)
PMV BLD AUTO: 12 FL (ref 6–12)
POTASSIUM SERPL-SCNC: 4.3 MMOL/L (ref 3.5–5.2)
QT INTERVAL: 309 MS
RBC # BLD AUTO: 3.79 10*6/MM3 (ref 3.77–5.28)
SODIUM SERPL-SCNC: 141 MMOL/L (ref 136–145)
WBC NRBC COR # BLD: 4.25 10*3/MM3 (ref 3.4–10.8)

## 2022-12-30 PROCEDURE — 94664 DEMO&/EVAL PT USE INHALER: CPT

## 2022-12-30 PROCEDURE — 25010000002 CEFTRIAXONE PER 250 MG: Performed by: INTERNAL MEDICINE

## 2022-12-30 PROCEDURE — 82962 GLUCOSE BLOOD TEST: CPT

## 2022-12-30 PROCEDURE — 25010000002 FUROSEMIDE PER 20 MG: Performed by: NURSE PRACTITIONER

## 2022-12-30 PROCEDURE — 94799 UNLISTED PULMONARY SVC/PX: CPT

## 2022-12-30 PROCEDURE — 94761 N-INVAS EAR/PLS OXIMETRY MLT: CPT

## 2022-12-30 PROCEDURE — 25010000002 DIGOXIN PER 500 MCG: Performed by: NURSE PRACTITIONER

## 2022-12-30 PROCEDURE — 99214 OFFICE O/P EST MOD 30 MIN: CPT | Performed by: NURSE PRACTITIONER

## 2022-12-30 PROCEDURE — 85027 COMPLETE CBC AUTOMATED: CPT | Performed by: INTERNAL MEDICINE

## 2022-12-30 PROCEDURE — 96375 TX/PRO/DX INJ NEW DRUG ADDON: CPT

## 2022-12-30 PROCEDURE — 96366 THER/PROPH/DIAG IV INF ADDON: CPT

## 2022-12-30 PROCEDURE — 63710000001 INSULIN LISPRO (HUMAN) PER 5 UNITS: Performed by: INTERNAL MEDICINE

## 2022-12-30 PROCEDURE — G0378 HOSPITAL OBSERVATION PER HR: HCPCS

## 2022-12-30 PROCEDURE — 96376 TX/PRO/DX INJ SAME DRUG ADON: CPT

## 2022-12-30 PROCEDURE — 83036 HEMOGLOBIN GLYCOSYLATED A1C: CPT | Performed by: INTERNAL MEDICINE

## 2022-12-30 PROCEDURE — 80048 BASIC METABOLIC PNL TOTAL CA: CPT | Performed by: INTERNAL MEDICINE

## 2022-12-30 RX ORDER — METOPROLOL TARTRATE 50 MG/1
50 TABLET, FILM COATED ORAL EVERY 12 HOURS SCHEDULED
Status: DISCONTINUED | OUTPATIENT
Start: 2022-12-30 | End: 2022-12-31

## 2022-12-30 RX ORDER — NYSTATIN 100000 [USP'U]/G
POWDER TOPICAL 3 TIMES DAILY
Status: DISCONTINUED | OUTPATIENT
Start: 2022-12-30 | End: 2023-01-02 | Stop reason: HOSPADM

## 2022-12-30 RX ORDER — FUROSEMIDE 10 MG/ML
40 INJECTION INTRAMUSCULAR; INTRAVENOUS ONCE
Status: COMPLETED | OUTPATIENT
Start: 2022-12-30 | End: 2022-12-30

## 2022-12-30 RX ORDER — METOPROLOL SUCCINATE 50 MG/1
50 TABLET, EXTENDED RELEASE ORAL
Status: DISCONTINUED | OUTPATIENT
Start: 2022-12-31 | End: 2022-12-30

## 2022-12-30 RX ORDER — DIGOXIN 0.25 MG/ML
250 INJECTION INTRAMUSCULAR; INTRAVENOUS ONCE
Status: COMPLETED | OUTPATIENT
Start: 2022-12-30 | End: 2022-12-30

## 2022-12-30 RX ORDER — METOPROLOL SUCCINATE 25 MG/1
25 TABLET, EXTENDED RELEASE ORAL ONCE
Status: COMPLETED | OUTPATIENT
Start: 2022-12-30 | End: 2022-12-30

## 2022-12-30 RX ADMIN — METOPROLOL SUCCINATE 25 MG: 25 TABLET, EXTENDED RELEASE ORAL at 11:28

## 2022-12-30 RX ADMIN — DIGOXIN 250 MCG: 0.25 INJECTION INTRAMUSCULAR; INTRAVENOUS at 15:33

## 2022-12-30 RX ADMIN — MAGNESIUM OXIDE 400 MG (241.3 MG MAGNESIUM) TABLET 400 MG: TABLET at 10:24

## 2022-12-30 RX ADMIN — NYSTATIN: 100000 POWDER TOPICAL at 20:26

## 2022-12-30 RX ADMIN — APIXABAN 5 MG: 5 TABLET, FILM COATED ORAL at 10:24

## 2022-12-30 RX ADMIN — CITALOPRAM 20 MG: 20 TABLET, FILM COATED ORAL at 10:24

## 2022-12-30 RX ADMIN — DIGOXIN 250 MCG: 0.25 INJECTION INTRAMUSCULAR; INTRAVENOUS at 17:36

## 2022-12-30 RX ADMIN — LOSARTAN POTASSIUM 50 MG: 50 TABLET, FILM COATED ORAL at 08:09

## 2022-12-30 RX ADMIN — INSULIN LISPRO 2 UNITS: 100 INJECTION, SOLUTION INTRAVENOUS; SUBCUTANEOUS at 12:24

## 2022-12-30 RX ADMIN — PRIMIDONE 50 MG: 50 TABLET ORAL at 15:33

## 2022-12-30 RX ADMIN — ALBUTEROL SULFATE 2.5 MG: 2.5 SOLUTION RESPIRATORY (INHALATION) at 15:49

## 2022-12-30 RX ADMIN — CEFTRIAXONE 2 G: 2 INJECTION, POWDER, FOR SOLUTION INTRAMUSCULAR; INTRAVENOUS at 18:24

## 2022-12-30 RX ADMIN — METOPROLOL TARTRATE 50 MG: 50 TABLET, FILM COATED ORAL at 17:35

## 2022-12-30 RX ADMIN — METOPROLOL SUCCINATE 25 MG: 25 TABLET, EXTENDED RELEASE ORAL at 08:09

## 2022-12-30 RX ADMIN — PRIMIDONE 50 MG: 50 TABLET ORAL at 10:24

## 2022-12-30 RX ADMIN — Medication 1 CAPSULE: at 10:24

## 2022-12-30 RX ADMIN — FUROSEMIDE 40 MG: 40 TABLET ORAL at 10:24

## 2022-12-30 RX ADMIN — MIRABEGRON 25 MG: 25 TABLET, FILM COATED, EXTENDED RELEASE ORAL at 10:24

## 2022-12-30 RX ADMIN — APIXABAN 5 MG: 5 TABLET, FILM COATED ORAL at 01:24

## 2022-12-30 RX ADMIN — MONTELUKAST SODIUM 10 MG: 10 TABLET, FILM COATED ORAL at 20:26

## 2022-12-30 RX ADMIN — PRIMIDONE 50 MG: 50 TABLET ORAL at 20:26

## 2022-12-30 RX ADMIN — Medication 10 ML: at 20:26

## 2022-12-30 RX ADMIN — NYSTATIN: 100000 POWDER TOPICAL at 17:38

## 2022-12-30 RX ADMIN — NYSTATIN: 100000 POWDER TOPICAL at 12:24

## 2022-12-30 RX ADMIN — FUROSEMIDE 40 MG: 10 INJECTION, SOLUTION INTRAMUSCULAR; INTRAVENOUS at 17:35

## 2022-12-30 NOTE — CONSULTS
Patient Name: Kanwal Sauer  :1950  72 y.o.    Date of Admission: 2022  Date of Consultation:  22  Encounter Provider: MARK Soto  Place of Service: [facility] CARDIOLOGY  Referring Provider: Neena Davis MD  Patient Care Team:  Nisha Britton MD as PCP - General (Family Medicine)      Chief complaint: dysuria, confusion    Reason for consultation: AF with RVR    History of Present Illness: Ms. Sauer is a 72 year old woman with atrial fibrillation. She has hypertension, obesity, and sleep apnea. She was first diagnosed with atrial fibrillation in  in [place]. She underwent cardioversion. She had an echocardiogram at the time. Details unknown. She was seen by Dr. Romano in 2021 to establish care. She was in sinus rhythm. She was doing well at the time. She had a follow up visit and April and again was doing well. She wears oxygen via nasal canula at home.    She presented to the emergency room yesterday with confusion and dysuria. She was found to have a urinary tract infection and to be in atrial fibrillation with RVR.  She was given one dose of IV lopressor 5 mg and metoprolol tartrate 25 mg once. Her home metoprolol succinate was restarted.     She is currently resting in bed. She feels better. -120.    Echo 2022  • Calculated left ventricular 3D EF = 68% Left ventricular systolic function is normal.  • Left ventricular wall thickness is consistent with mild concentric hypertrophy.  • Left ventricular diastolic function is consistent with (grade Ia w/high LAP) impaired relaxation.  • Estimated right ventricular systolic pressure from tricuspid regurgitation is normal (<35 mmHg).      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 Authorizing Provider   albuterol sulfate  (90 Base) MCG/ACT inhaler INHALE 2 PUFFS EVERY 4 HOURS AS NEEDED FOR WHEEZING 3/30/22  Yes Nisha Britton MD   Cholecalciferol (Vitamin D3) 25 MCG (1000 UT) capsule Take 1,000 Units by mouth Daily.   Yes Rene Shields MD   citalopram (CeleXA) 20 MG tablet Take 1 tablet by mouth Daily. 7/14/22  Yes Epley, James, APRN   CRANBERRY PO Take 650 mg by mouth.   Yes Rene Shields MD   Eliquis 5 MG tablet tablet TAKE 1 TABLET BY MOUTH EVERY 12 HOURS 11/4/22  Yes Nisha Britton MD   fenofibrate 160 MG tablet TAKE 1 TABLET BY MOUTH EVERY DAY 9/13/22  Yes Nisha Britton MD   furosemide (LASIX) 40 MG tablet TAKE 1 TABLET BY MOUTH EVERY DAY  Patient taking differently: Take 40 mg by mouth 3 (Three) Times a Week. 9/8/22  Yes Nisha Britton MD   losartan (COZAAR) 50 MG tablet TAKE 1 TABLET BY MOUTH EVERY DAY 9/29/22  Yes Nisha Britton MD   magnesium oxide (MAG-OX) 400 MG tablet Take 1 tablet by mouth Daily. 7/14/22  Yes Epley, James, APRN   metFORMIN (GLUCOPHAGE) 1000 MG tablet TAKE 1 TABLET BY MOUTH TWICE A DAY WITH MEALS  Patient taking differently: 1/2 pill once a day 10/21/22  Yes Nisha Britton MD   Mirabegron ER (Myrbetriq) 25 MG tablet  sustained-release 24 hour 24 hr tablet Take 1 tablet by mouth Daily. 7/14/22  Yes Epley, James, APRN   montelukast (SINGULAIR) 10 MG tablet TAKE 1 TABLET BY MOUTH AT BEDTIME 12/13/22  Yes Gabriela Russ APRN   potassium chloride (K-DUR,KLOR-CON) 10 MEQ CR tablet Take 1 tablet by mouth 1 (One) Time As Needed (to maintain potassium). Only take with lasix 7/14/22  Yes Epley, James, APRN   primidone (MYSOLINE) 50 MG tablet Take 1 tablet by mouth 3 (Three) Times a Day. 12/21/22  Yes Epley, James, APRN   Probiotic Product (PROBIOTIC-10 PO) Take  by mouth.   Yes ProviderRene MD   Trelegy Ellipta 100-62.5-25 MCG/INH inhaler Inhale 1 puff Daily. 9/7/22  Yes ProviderRene MD   acetaminophen (TYLENOL) 325 MG tablet Take 2 tablets by mouth Every 4 (Four) Hours As Needed for Mild Pain . 12/30/20   Jorge Reyes MD   ketoconazole (NIZORAL) 2 % cream APPLY TO THE AFFECTED AREAS ON THE FACE DAILY IN COMBINATION WITH HYDROCORTISONE CREAM 6/19/22   Provider, MD Rene   Metoprolol Succinate 25 MG capsule extended-release 24 hour sprinkle Take 25 mg by mouth 2 (Two) Times a Day.    ProviderRene MD   tiZANidine (ZANAFLEX) 4 MG tablet Take 1 tablet by mouth Every Night. 11/23/22   Indio Cotton Sr., MD       Allergies   Allergen Reactions   • Baclofen Other (See Comments)     dysrhythmia   • Doxycycline Rash   • Eggs Or Egg-Derived Products Anaphylaxis   • Iodine Anaphylaxis   • Latex Anaphylaxis   • Milk-Related Compounds Unknown - High Severity   • Msg [Monosodium Glutamate] Anaphylaxis   • Penicillins Hives     Tolerated cefazolin during December 2020 admission   • Metronidazole Unknown - High Severity   • Ezetimibe Rash   • Latex, Natural Rubber Rash   • Nylon Rash   • Simvastatin Rash       Social History     Socioeconomic History   • Marital status:    Tobacco Use   • Smoking status: Never   • Smokeless tobacco: Never   • Tobacco comments:     no caffine   Vaping Use   • Vaping Use: Never  used   Substance and Sexual Activity   • Alcohol use: Not Currently   • Drug use: Never   • Sexual activity: Yes     Partners: Male       Family History   Problem Relation Age of Onset   • Arthritis Mother    • Lung cancer Mother    • Brain cancer Mother    • Hypertension Mother    • Hypertension Father    • Arthritis Sister    • Breast cancer Sister    • Diabetes Sister    • Hypertension Sister    • Dementia Sister        REVIEW OF SYSTEMS:   All systems reviewed.  Pertinent positives identified in HPI.  All other systems are negative.      Objective:     Vitals:    12/30/22 0809 12/30/22 1024 12/30/22 1128 12/30/22 1426   BP: 124/93  139/99 152/93   BP Location: Right arm   Right arm   Patient Position: Lying   Lying   Pulse: 117 119 118 114   Resp: 18   18   Temp: 97.5 °F (36.4 °C)   98.1 °F (36.7 °C)   TempSrc: Oral   Oral   SpO2: 92%   93%   Weight:       Height:         Body mass index is 36.32 kg/m².    Physical Exam:  Constitutional: She is oriented to person, place, and time. She appears well-developed. She does not appear ill.   HENT:   Head: Normocephalic and atraumatic. Head is without contusion.   Right Ear: Hearing normal. No drainage.   Left Ear: Hearing normal. No drainage.   Nose: No nasal deformity. No epistaxis.   Eyes: Lids are normal. Right eye exhibits no exudate. Left eye exhibits no exudate.  Neck: No JVD present. Carotid bruit is not present. No tracheal deviation present. No thyroid mass and no thyromegaly present.   Cardiovascular: Normal rate, irregular rhythm and normal heart sounds.    Pulses:       Posterior tibial pulses are 2+ on the right side, and 2+ on the left side.   Pulmonary/Chest: Effort normal and breath sounds normal.   Abdominal: Soft. Normal appearance and bowel sounds are normal. There is no tenderness.   Musculoskeletal: Normal range of motion.        Right shoulder: She exhibits no deformity.        Left shoulder: She exhibits no deformity.   Neurological: She is alert  and oriented to person, place, and time. She has normal strength.   Skin: Skin is warm, dry and intact. No rash noted.   Psychiatric: She has a normal mood and affect. Her behavior is normal. Thought content normal.   Vitals reviewed      Lab Review:     Results from last 7 days   Lab Units 12/30/22  0445 12/29/22  1511   SODIUM mmol/L 141 142   POTASSIUM mmol/L 4.3 4.7   CHLORIDE mmol/L 102 104   CO2 mmol/L 33.0* 31.9*   BUN mg/dL 22 26*   CREATININE mg/dL 1.04* 1.07*   CALCIUM mg/dL 9.3 9.4   BILIRUBIN mg/dL  --  0.4   ALK PHOS U/L  --  43   ALT (SGPT) U/L  --  11   AST (SGOT) U/L  --  15   GLUCOSE mg/dL 174* 130*     Results from last 7 days   Lab Units 12/29/22  2151 12/29/22  1511   TROPONIN T ng/mL <0.010 <0.010     Results from last 7 days   Lab Units 12/30/22  0445   WBC 10*3/mm3 4.25   HEMOGLOBIN g/dL 11.2*   HEMATOCRIT % 33.3*   PLATELETS 10*3/mm3 121*         Results from last 7 days   Lab Units 12/29/22  1511   MAGNESIUM mg/dL 1.5*               Current Facility-Administered Medications:   •  acetaminophen (TYLENOL) tablet 650 mg, 650 mg, Oral, Q4H PRN, Neena Davis MD  •  albuterol (PROVENTIL) nebulizer solution 0.083% 2.5 mg/3mL, 2.5 mg, Nebulization, Q6H PRN, Neena Davis MD  •  apixaban (ELIQUIS) tablet 5 mg, 5 mg, Oral, Q12H, Neena Davis MD, 5 mg at 12/30/22 1024  •  budesonide-formoterol (SYMBICORT) 160-4.5 MCG/ACT inhaler 2 puff, 2 puff, Inhalation, BID - RT **AND** tiotropium (SPIRIVA RESPIMAT) 2.5 mcg/act aerosol solution inhaler, 1 puff, Inhalation, Daily - RT, Neena Davis MD  •  cefTRIAXone (ROCEPHIN) 2 g in sodium chloride 0.9 % 100 mL IVPB-VTB, 2 g, Intravenous, Q24H, Neena Davis MD  •  citalopram (CeleXA) tablet 20 mg, 20 mg, Oral, Daily, Neena Davis MD, 20 mg at 12/30/22 1024  •  dextrose (D50W) (25 g/50 mL) IV injection 25 g, 25 g, Intravenous, Q15 Min PRN, Neena Davis MD  •  dextrose (GLUTOSE) oral gel 15 g, 15 g,  Oral, Q15 Min PRN, Neena Davis MD  •  digoxin (LANOXIN) injection 250 mcg, 250 mcg, Intravenous, Once, Nasrin Rodriguez, APRN  •  furosemide (LASIX) tablet 40 mg, 40 mg, Oral, Once per day on Mon Wed Fri, Neena Davis MD, 40 mg at 12/30/22 1024  •  glucagon (human recombinant) (GLUCAGEN DIAGNOSTIC) injection 1 mg, 1 mg, Intramuscular, Q15 Min PRN, Neena Davis MD  •  insulin lispro (ADMELOG) injection 0-9 Units, 0-9 Units, Subcutaneous, TID With Meals, Neena Davis MD, 2 Units at 12/30/22 1224  •  lactobacillus acidophilus (RISAQUAD) capsule 1 capsule, 1 capsule, Oral, Daily, Neena Davis MD, 1 capsule at 12/30/22 1024  •  losartan (COZAAR) tablet 50 mg, 50 mg, Oral, Daily, Neena Davis MD, 50 mg at 12/30/22 0809  •  magnesium oxide (MAG-OX) tablet 400 mg, 400 mg, Oral, Daily, Neena Davis MD, 400 mg at 12/30/22 1024  •  melatonin tablet 3 mg, 3 mg, Oral, Nightly PRN, Neena Davis MD  •  [START ON 12/31/2022] metoprolol succinate XL (TOPROL-XL) 24 hr tablet 50 mg, 50 mg, Oral, Q24H, Nasrin Rodriguez, APRN  •  Mirabegron ER (MYRBETRIQ) 24 hr tablet 25 mg, 25 mg, Oral, Daily, Neena Davis MD, 25 mg at 12/30/22 1024  •  montelukast (SINGULAIR) tablet 10 mg, 10 mg, Oral, Nightly, Neena Davis MD  •  nitroglycerin (NITROSTAT) SL tablet 0.4 mg, 0.4 mg, Sublingual, Q5 Min PRN, Neena Davis MD  •  nystatin (MYCOSTATIN) powder, , Topical, TID, Marco Cadena MD, Given at 12/30/22 1224  •  ondansetron (ZOFRAN) tablet 4 mg, 4 mg, Oral, Q6H PRN **OR** ondansetron (ZOFRAN) injection 4 mg, 4 mg, Intravenous, Q6H PRN, Neena Davis MD  •  potassium chloride (K-DUR,KLOR-CON) ER tablet 10 mEq, 10 mEq, Oral, Once PRN, Neena Davis MD  •  primidone (MYSOLINE) tablet 50 mg, 50 mg, Oral, TID, Neena Davis MD, 50 mg at 12/30/22 1024  •  sodium chloride 0.9 % flush 10 mL, 10 mL,  Intravenous, PRN, Bryan Mann MD  •  [COMPLETED] Insert Peripheral IV, , , Once **AND** sodium chloride 0.9 % flush 10 mL, 10 mL, Intravenous, PRN, Bryan Mann MD    Assessment and Plan:       Active Hospital Problems    Diagnosis  POA   • **Acute UTI [N39.0]  Yes   • PAF (paroxysmal atrial fibrillation) ([facility]) [I48.0]  Yes   • TARSHA (obstructive sleep apnea) [G47.33]  Yes   • Type 2 diabetes mellitus with circulatory disorder, without long-term current use of insulin ([facility]) [E11.59]  Yes   • Essential hypertension [I10]  Yes      Resolved Hospital Problems   No resolved problems to display.       1. Urinary tract infection  2. Paroxysmal atrial fibrillation/flutter with RVR - driving by underlying illness. Titrate beta blocker and give a dose of IV digoxin. She is anticoagulated with apixaban.  3. Chronic hypoxic respiratory failure  4. TARSHA  5. Diabetes mellitus type II  6. HTN - continue losartan for now. May need to reduce dose to allow rate control titration but currently has plenty of blood pressure for both. Will follow.     Nasrin Rodriguez, MARK  12/30/22  15:04 EST

## 2022-12-30 NOTE — PLAN OF CARE
Goal Outcome Evaluation:      Confused to situation. Spoke with daughter via phone to clarify home meds and admission . Consult to cardiology called. NPO.

## 2022-12-30 NOTE — PROGRESS NOTES
LOS: 0 days     Name: Kanwal Sauer  Age: 72 y.o.  Sex: female  :  1950  MRN: [id number]         Primary Care Physician: Nisha Britton MD    Subjective   Subjective  No new complaints or acute overnight events.  Still in A. fib with heart rate a little rapid.  Still with dysuria.  Denies fevers or chills.  Denies shortness of breath or chest pain at present.    Objective   Vital Signs  Temp:  [97.5 °F (36.4 °C)-99.4 °F (37.4 °C)] 97.5 °F (36.4 °C)  Heart Rate:  [113-139] 118  Resp:  [18-26] 18  BP: (113-190)/() 139/99  Body mass index is 36.32 kg/m².    Objective:  General Appearance:  Comfortable and in no acute distress.    Vital signs: (most recent): Blood pressure 139/99, pulse 118, temperature 97.5 °F (36.4 °C), temperature source Oral, resp. rate 18, height 167.6 cm (66\"), weight 102 kg (225 lb), SpO2 92 %.    Lungs:  Normal effort and normal respiratory rate.    Heart: Tachycardia.  Irregular rhythm.    Abdomen: Abdomen is soft.  (Obese).  Bowel sounds are normal.   There is no abdominal tenderness.     Extremities: There is no dependent edema or local swelling.    Neurological: Patient is alert and oriented to person, place and time.    Skin:  Warm and dry.              Results Review:       I reviewed the patient's new clinical results.    Results from last 7 days   Lab Units 22  0445 22  1511   WBC 10*3/mm3 4.25 5.32   HEMOGLOBIN g/dL 11.2* 11.7*   PLATELETS 10*3/mm3 121* 142     Results from last 7 days   Lab Units 22  0445 22  1511   SODIUM mmol/L 141 142   POTASSIUM mmol/L 4.3 4.7   CHLORIDE mmol/L 102 104   CO2 mmol/L 33.0* 31.9*   BUN mg/dL 22 26*   CREATININE mg/dL 1.04* 1.07*   CALCIUM mg/dL 9.3 9.4   GLUCOSE mg/dL 174* 130*                 Scheduled Meds:   apixaban, 5 mg, Oral, Q12H  budesonide-formoterol, 2 puff, Inhalation, BID - RT   And  tiotropium bromide monohydrate, 1 puff, Inhalation, Daily - RT  cefTRIAXone, 2 g, Intravenous, Q24H  citalopram, 20  mg, Oral, Daily  furosemide, 40 mg, Oral, Once per day on Mon Wed Fri  insulin lispro, 0-9 Units, Subcutaneous, TID With Meals  lactobacillus acidophilus, 1 capsule, Oral, Daily  losartan, 50 mg, Oral, Daily  magnesium oxide, 400 mg, Oral, Daily  [START ON 12/31/2022] metoprolol succinate XL, 50 mg, Oral, Q24H  Mirabegron ER, 25 mg, Oral, Daily  montelukast, 10 mg, Oral, Nightly  nystatin, , Topical, TID  primidone, 50 mg, Oral, TID      PRN Meds:   •  acetaminophen  •  albuterol  •  dextrose  •  dextrose  •  glucagon (human recombinant)  •  melatonin  •  nitroglycerin  •  ondansetron **OR** ondansetron  •  potassium chloride  •  sodium chloride  •  [COMPLETED] Insert Peripheral IV **AND** sodium chloride  Continuous Infusions:       Assessment & Plan   Active Hospital Problems    Diagnosis  POA   • **Acute UTI [N39.0]  Yes   • PAF (paroxysmal atrial fibrillation) ([facility]) [I48.0]  Yes   • TARSHA (obstructive sleep apnea) [G47.33]  Yes   • Type 2 diabetes mellitus with circulatory disorder, without long-term current use of insulin ([facility]) [E11.59]  Yes   • Essential hypertension [I10]  Yes      Resolved Hospital Problems   No resolved problems to display.       Assessment & Plan    -Continue Rocephin while awaiting blood and urine cultures  -Cardiology has been consulted for the rapid A. Fib.  On Eliquis.  -Blood pressure appears stable on present regimen  -Blood sugars reasonably controlled at 154 today.  Continue present regimen.  Hemoglobin A1c of 6.9 noted.  -Has been given magnesium replacement.  Recheck level in the morning.        Marco Cadena MD  [facility]  12/30/22  11:38 EST

## 2022-12-30 NOTE — CASE MANAGEMENT/SOCIAL WORK
Continued Stay Note  [facility]     Patient Name: Kanwal Sauer  MRN: [id number]  Today's Date: 12/30/2022    Admit Date: 12/29/2022    Plan: Home with HH. Referrals pending. Family to transport.   Discharge Plan     Row Name 12/30/22 1430       Plan    Plan Home with HH. Referrals pending. Family to transport.    Patient/Family in Agreement with Plan yes    Plan Comments Met with pt. and daughter at bedside. Pt gave permission to speak with family present. Explained roll of . Face sheet and pharmacy verified. Pt lives alone in an apartment at [facility]. There are no steps to enter home. Home DME equipment includes a CPAP, O2@2LNC through [employer], and a rolling walker.  Pt is independent ADLs. Daughter does put meds in med reminder. Pt has been to [facility] and has used HH in the past but unable to recall name. Daughter and pt. agreeable to HH at D/C. Referrals placed in Epic. Pt’s PCP is Nisha Britton MD. Uses [facility] on [address] in [place]. Pt no longer drives. At discharge, family will transport. Explained that CCP would follow to assess for discharge needs.  Obie Marmolejo RN-BC               Discharge Codes    No documentation.                     Obie Marmolejo RN

## 2022-12-30 NOTE — DISCHARGE PLACEMENT REQUEST
Kanwal Sauer (72 y.o. Female)     Date of Birth   1950    Social Security Number       Address   [address]  [place] [address]    Home Phone   [phone]    MRN   [id number]       Gnosticist   Non-Holiness    Marital Status                               Admission Date   12/29/22    Admission Type   Emergency    Admitting Provider   Neena Davis MD    Attending Provider   Marco Cadena MD    Department, Room/Bed   [address], E[address]/1       Discharge Date       Discharge Disposition       Discharge Destination                               Attending Provider: Marco Cadena MD    Allergies: Baclofen, Doxycycline, Eggs Or Egg-derived Products, Iodine, Latex, Milk-related Compounds, Msg [Monosodium Glutamate], Penicillins, Metronidazole, Ezetimibe, Latex, Natural Rubber, Nylon, Simvastatin    Isolation: None   Infection: None   Code Status: CPR    Ht: 167.6 cm (66\")   Wt: 102 kg (225 lb)    Admission Cmt: None   Principal Problem: Acute UTI [N39.0]                 Active Insurance as of 12/29/2022     Primary Coverage     Payor Plan Insurance Group Employer/Plan Group    [facility] MEDICARE REPLACEMENT [facility] MEDICARE ADVANTAGE-OPTUM CARE [id number]     Payor Plan Address Payor Plan Phone Number Payor Plan Fax Number Effective Dates    [address] [phone]  9/1/2020 - None Entered    [facility] [address]       Subscriber Name Subscriber Birth Date Member ID       KANWAL SAUER [id number]                 Emergency Contacts      (Rel.) Home Phone Work Phone Mobile Phone    SHELDON TYSON (Daughter) [phone] -- [phone]    Chace Tyson (Relative) -- -- [phone]

## 2022-12-30 NOTE — H&P
HISTORY AND PHYSICAL   [facility]        Date of Admission: 2022  Patient Identification:  Name: Kanwal Sauer  Age: 72 y.o.  Sex: female  :  1950  MRN: [id number]                     Primary Care Physician: Nisha Britton MD    Chief Complaint: 72 year old female who presented to the emergency room with weakness, shortness of breath and burning with urination which started several days ago; she has also noted a fast heart rate; she denies chest pain; she has a history of a fib and is followed by cardiology    History of Present Illness:   As above    Past Medical History:  Past Medical History:   Diagnosis Date   • Anxiety    • Asthma    • Atrial fibrillation (HCC)    • Cancer (HCC)     Breast   • Deep vein thrombosis (HCC)    • Diabetes mellitus (HCC)    • Difficulty walking    • Drug therapy    • Environmental allergies    • Fractures    • Headache, tension-type    • Hyperlipidemia    • Hypertension    • 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)    • Seizures (HCC)    • Sleep apnea    • Tremor    • Weakness      Past Surgical History:  Past Surgical History:   Procedure Laterality Date   • BREAST BIOPSY     • CATARACT EXTRACTION, BILATERAL     • DENTAL PROCEDURE      Removed teeth   • FEMUR OPEN REDUCTION INTERNAL FIXATION Left 2020    Procedure: DISTAL FEMUR OPEN REDUCTION INTERNAL FIXATION;  Surgeon: Marley Hernandez MD;  Location: [facility];  Service: Orthopedics;  Laterality: Left;   • MASTECTOMY Left    • REPLACEMENT TOTAL KNEE BILATERAL        Home Meds:  Medications Prior to Admission   Medication Sig Dispense Refill Last Dose   • albuterol sulfate  (90 Base) MCG/ACT inhaler INHALE 2 PUFFS EVERY 4 HOURS AS NEEDED FOR WHEEZING 18 g 1 2022   • Cholecalciferol (Vitamin D3) 25 MCG (1000 UT) capsule Take 1,000 Units by mouth Daily.   2022 at 0900   • citalopram (CeleXA) 20 MG tablet Take 1 tablet by mouth Daily. 90 tablet 0 2022 at 0900   •  CRANBERRY PO Take 650 mg by mouth.   12/29/2022 at 0900   • Eliquis 5 MG tablet tablet TAKE 1 TABLET BY MOUTH EVERY 12 HOURS 60 tablet 2 12/29/2022 at 0900   • fenofibrate 160 MG tablet TAKE 1 TABLET BY MOUTH EVERY DAY 90 tablet 1 12/29/2022 at 0900   • furosemide (LASIX) 40 MG tablet TAKE 1 TABLET BY MOUTH EVERY DAY (Patient taking differently: Take 40 mg by mouth 3 (Three) Times a Week.) 90 tablet 0 Past Week   • losartan (COZAAR) 50 MG tablet TAKE 1 TABLET BY MOUTH EVERY DAY 90 tablet 1 12/29/2022 at 0900   • magnesium oxide (MAG-OX) 400 MG tablet Take 1 tablet by mouth Daily. 90 tablet 1 12/29/2022   • metFORMIN (GLUCOPHAGE) 1000 MG tablet TAKE 1 TABLET BY MOUTH TWICE A DAY WITH MEALS (Patient taking differently: 1/2 pill once a day) 180 tablet 0 12/29/2022 at 0800   • Mirabegron ER (Myrbetriq) 25 MG tablet sustained-release 24 hour 24 hr tablet Take 1 tablet by mouth Daily. 90 tablet 1 12/29/2022 at 0900   • montelukast (SINGULAIR) 10 MG tablet TAKE 1 TABLET BY MOUTH AT BEDTIME 90 tablet 0 12/28/2022   • potassium chloride (K-DUR,KLOR-CON) 10 MEQ CR tablet Take 1 tablet by mouth 1 (One) Time As Needed (to maintain potassium). Only take with lasix 90 tablet 1 Past Week   • primidone (MYSOLINE) 50 MG tablet Take 1 tablet by mouth 3 (Three) Times a Day. 270 tablet 0 12/29/2022 at 1200   • Probiotic Product (PROBIOTIC-10 PO) Take  by mouth.   12/28/2022 at 0900   • Trelegy Ellipta 100-62.5-25 MCG/INH inhaler Inhale 1 puff Daily.   12/29/2022   • acetaminophen (TYLENOL) 325 MG tablet Take 2 tablets by mouth Every 4 (Four) Hours As Needed for Mild Pain .   Unknown   • ketoconazole (NIZORAL) 2 % cream APPLY TO THE AFFECTED AREAS ON THE FACE DAILY IN COMBINATION WITH HYDROCORTISONE CREAM      • Metoprolol Succinate 25 MG capsule extended-release 24 hour sprinkle Take 25 mg by mouth 2 (Two) Times a Day.      • tiZANidine (ZANAFLEX) 4 MG tablet Take 1 tablet by mouth Every Night. 30 tablet 2        Allergies:  Allergies    Allergen Reactions   • Baclofen Other (See Comments)     dysrhythmia   • Doxycycline Rash   • Eggs Or Egg-Derived Products Anaphylaxis   • Iodine Anaphylaxis   • Latex Anaphylaxis   • Milk-Related Compounds Unknown - High Severity   • Msg [Monosodium Glutamate] Anaphylaxis   • Penicillins Hives     Tolerated cefazolin during December 2020 admission   • Metronidazole Unknown - High Severity   • Ezetimibe Rash   • Latex, Natural Rubber Rash   • Nylon Rash   • Simvastatin Rash     Immunizations:  Immunization History   Administered Date(s) Administered   • COVID-19 (PFIZER) PURPLE CAP 02/09/2021   • Covid-19 (Pfizer) Gray Cap 05/17/2022     Social History:   Social History     Social History Narrative   • Not on file     Social History     Socioeconomic History   • Marital status:    Tobacco Use   • Smoking status: Never   • Smokeless tobacco: Never   • Tobacco comments:     no caffine   Vaping Use   • Vaping Use: Never used   Substance and Sexual Activity   • Alcohol use: Not Currently   • Drug use: Never   • Sexual activity: Yes     Partners: Male       Family History:  Family History   Problem Relation Age of Onset   • Arthritis Mother    • Lung cancer Mother    • Brain cancer Mother    • Hypertension Mother    • Hypertension Father    • Arthritis Sister    • Breast cancer Sister    • Diabetes Sister    • Hypertension Sister    • Dementia Sister         Review of Systems  See history of present illness and past medical history.  Patient denies headache, dizziness, syncope, falls, trauma, change in vision, change in hearing, change in taste, changes in weight, changes in appetite, focal weakness, numbness, or paresthesia.  Patient denies chest pain, palpitations, dyspnea, orthopnea, PND, cough, sinus pressure, rhinorrhea, epistaxis, hemoptysis, nausea, vomiting,hematemesis, diarrhea, constipation or hematchezia.  Denies cold or heat intolerance, polydipsia, polyuria, polyphagia. Denies hematuria, pyuria,  dysuria, hesitancy, frequency or urgency. Denies consumption of raw and under cooked meats foods or change in water source.  Denies fever, chills, sweats, night sweats.  Denies missing any routine medications. Remainder of ROS is negative.    Objective:  T Max 24 hrs: Temp (24hrs), Av.4 °F (36.9 °C), Min:97.8 °F (36.6 °C), Max:99.4 °F (37.4 °C)    Vitals Ranges:   Temp:  [97.8 °F (36.6 °C)-99.4 °F (37.4 °C)] 97.8 °F (36.6 °C)  Heart Rate:  [116-139] 116  Resp:  [18-26] 18  BP: (125-190)/() 144/100      Exam:  /100   Pulse 116   Temp 97.8 °F (36.6 °C)   Resp 18   Ht 167.6 cm (66\")   Wt 98 kg (216 lb)   SpO2 95%   BMI 34.86 kg/m²     General Appearance:    Alert, cooperative, no distress, appears stated age   Head:    Normocephalic, without obvious abnormality, atraumatic   Eyes:    PERRL, conjunctivae/corneas clear, EOM's intact, both eyes   Ears:    Normal external ear canals, both ears   Nose:   Nares normal, septum midline, mucosa normal, no drainage    or sinus tenderness   Throat:   Lips, mucosa, and tongue normal   Neck:   Supple, symmetrical, trachea midline, no adenopathy;     thyroid:  no enlargement/tenderness/nodules; no carotid    bruit or JVD   Back:     Symmetric, no curvature, ROM normal, no CVA tenderness   Lungs:     Clear to auscultation bilaterally, respirations unlabored   Chest Wall:    No tenderness or deformity    Heart:    Regular rate and rhythm, S1 and S2 normal, no murmur, rub   or gallop   Abdomen:     Soft, nontender, bowel sounds active all four quadrants,     no masses, no hepatomegaly, no splenomegaly   Extremities:   Extremities normal, atraumatic, no cyanosis or edema   Pulses:   2+ and symmetric all extremities   Skin:   Skin color, texture, turgor normal, no rashes or lesions    .    Data Review:  Labs in chart were reviewed.  WBC   Date Value Ref Range Status   2022 5.32 3.40 - 10.80 10*3/mm3 Final     Hemoglobin   Date Value Ref Range Status    12/29/2022 11.7 (L) 12.0 - 15.9 g/dL Final     Hematocrit   Date Value Ref Range Status   12/29/2022 35.8 34.0 - 46.6 % Final     Platelets   Date Value Ref Range Status   12/29/2022 142 140 - 450 10*3/mm3 Final     Sodium   Date Value Ref Range Status   12/29/2022 142 136 - 145 mmol/L Final     Potassium   Date Value Ref Range Status   12/29/2022 4.7 3.5 - 5.2 mmol/L Final     Chloride   Date Value Ref Range Status   12/29/2022 104 98 - 107 mmol/L Final     CO2   Date Value Ref Range Status   12/29/2022 31.9 (H) 22.0 - 29.0 mmol/L Final     BUN   Date Value Ref Range Status   12/29/2022 26 (H) 8 - 23 mg/dL Final     Creatinine   Date Value Ref Range Status   12/29/2022 1.07 (H) 0.57 - 1.00 mg/dL Final     Glucose   Date Value Ref Range Status   12/29/2022 130 (H) 65 - 99 mg/dL Final     Calcium   Date Value Ref Range Status   12/29/2022 9.4 8.6 - 10.5 mg/dL Final     Magnesium   Date Value Ref Range Status   12/29/2022 1.5 (L) 1.6 - 2.4 mg/dL Final     AST (SGOT)   Date Value Ref Range Status   12/29/2022 15 1 - 32 U/L Final     ALT (SGPT)   Date Value Ref Range Status   12/29/2022 11 1 - 33 U/L Final     Alkaline Phosphatase   Date Value Ref Range Status   12/29/2022 43 39 - 117 U/L Final       Results from last 7 days   Lab Units 12/29/22  1511   TSH uIU/mL 1.100          Imaging Results (All)     Procedure Component Value Units Date/Time    XR Chest 1 View [[id number]] Collected: 12/29/22 1715     Updated: 12/29/22 1719    Narrative:      PORTABLE CHEST X-RAY     HISTORY: Cough, sepsis.     Portable chest x-ray is provided. Correlation: 07/05/2022.     FINDINGS: Numerous surgical clips at the left axilla. Mildly enlarged  cardiac silhouette is unchanged given the apical lordotic projection.  Vascular volume is normal. The lungs are clear. The costophrenic sulci  are dry and the bones appear normal. There is no pneumothorax.       Impression:      Cardiomegaly. No acute abnormality.     This report was finalized  on 12/29/2022 5:16 PM by Dr. Abhilash Mcgregor M.D.               Assessment:  Active Hospital Problems    Diagnosis  POA   • **Acute UTI [N39.0]  Yes      Resolved Hospital Problems   No resolved problems to display.   a fib with rvr  Copd  Chronic hypoxic respiratory failure  Obesity  Sleep apnea  Diabetes  hypertension    Plan:  Continue antibiotics  Monitor on telemetry  Repeat troponin  Ask cardiology to see her  Await culture results  SIMw patient and ED Provider  Neena Davis MD  12/29/2022  21:22 EST

## 2022-12-31 LAB
ANION GAP SERPL CALCULATED.3IONS-SCNC: 8.9 MMOL/L (ref 5–15)
BACTERIA SPEC AEROBE CULT: ABNORMAL
BUN SERPL-MCNC: 23 MG/DL (ref 8–23)
BUN/CREAT SERPL: 23 (ref 7–25)
CALCIUM SPEC-SCNC: 9.9 MG/DL (ref 8.6–10.5)
CHLORIDE SERPL-SCNC: 96 MMOL/L (ref 98–107)
CO2 SERPL-SCNC: 34.1 MMOL/L (ref 22–29)
CREAT SERPL-MCNC: 1 MG/DL (ref 0.57–1)
DEPRECATED RDW RBC AUTO: 42.8 FL (ref 37–54)
EGFRCR SERPLBLD CKD-EPI 2021: 59.2 ML/MIN/1.73
ERYTHROCYTE [DISTWIDTH] IN BLOOD BY AUTOMATED COUNT: 13.4 % (ref 12.3–15.4)
GLUCOSE BLDC GLUCOMTR-MCNC: 146 MG/DL (ref 70–130)
GLUCOSE BLDC GLUCOMTR-MCNC: 158 MG/DL (ref 70–130)
GLUCOSE BLDC GLUCOMTR-MCNC: 208 MG/DL (ref 70–130)
GLUCOSE BLDC GLUCOMTR-MCNC: 224 MG/DL (ref 70–130)
GLUCOSE SERPL-MCNC: 164 MG/DL (ref 65–99)
HCT VFR BLD AUTO: 36.5 % (ref 34–46.6)
HGB BLD-MCNC: 12.2 G/DL (ref 12–15.9)
MAGNESIUM SERPL-MCNC: 1.4 MG/DL (ref 1.6–2.4)
MCH RBC QN AUTO: 29 PG (ref 26.6–33)
MCHC RBC AUTO-ENTMCNC: 33.4 G/DL (ref 31.5–35.7)
MCV RBC AUTO: 86.9 FL (ref 79–97)
PLATELET # BLD AUTO: 133 10*3/MM3 (ref 140–450)
PMV BLD AUTO: 11.5 FL (ref 6–12)
POTASSIUM SERPL-SCNC: 3.6 MMOL/L (ref 3.5–5.2)
RBC # BLD AUTO: 4.2 10*6/MM3 (ref 3.77–5.28)
SODIUM SERPL-SCNC: 139 MMOL/L (ref 136–145)
WBC NRBC COR # BLD: 4.43 10*3/MM3 (ref 3.4–10.8)

## 2022-12-31 PROCEDURE — 25010000002 MAGNESIUM SULFATE 2 GM/50ML SOLUTION: Performed by: INTERNAL MEDICINE

## 2022-12-31 PROCEDURE — 25010000002 CEFTRIAXONE PER 250 MG: Performed by: INTERNAL MEDICINE

## 2022-12-31 PROCEDURE — 96376 TX/PRO/DX INJ SAME DRUG ADON: CPT

## 2022-12-31 PROCEDURE — 94761 N-INVAS EAR/PLS OXIMETRY MLT: CPT

## 2022-12-31 PROCEDURE — 96367 TX/PROPH/DG ADDL SEQ IV INF: CPT

## 2022-12-31 PROCEDURE — 99204 OFFICE O/P NEW MOD 45 MIN: CPT | Performed by: INTERNAL MEDICINE

## 2022-12-31 PROCEDURE — 63710000001 INSULIN LISPRO (HUMAN) PER 5 UNITS: Performed by: INTERNAL MEDICINE

## 2022-12-31 PROCEDURE — 80048 BASIC METABOLIC PNL TOTAL CA: CPT | Performed by: INTERNAL MEDICINE

## 2022-12-31 PROCEDURE — 94799 UNLISTED PULMONARY SVC/PX: CPT

## 2022-12-31 PROCEDURE — 85027 COMPLETE CBC AUTOMATED: CPT | Performed by: INTERNAL MEDICINE

## 2022-12-31 PROCEDURE — G0378 HOSPITAL OBSERVATION PER HR: HCPCS

## 2022-12-31 PROCEDURE — 94640 AIRWAY INHALATION TREATMENT: CPT

## 2022-12-31 PROCEDURE — 83735 ASSAY OF MAGNESIUM: CPT | Performed by: INTERNAL MEDICINE

## 2022-12-31 PROCEDURE — 96366 THER/PROPH/DIAG IV INF ADDON: CPT

## 2022-12-31 PROCEDURE — 82962 GLUCOSE BLOOD TEST: CPT

## 2022-12-31 RX ORDER — MAGNESIUM SULFATE HEPTAHYDRATE 40 MG/ML
2 INJECTION, SOLUTION INTRAVENOUS AS NEEDED
Status: DISCONTINUED | OUTPATIENT
Start: 2022-12-31 | End: 2023-01-02 | Stop reason: HOSPADM

## 2022-12-31 RX ORDER — MAGNESIUM SULFATE HEPTAHYDRATE 40 MG/ML
4 INJECTION, SOLUTION INTRAVENOUS AS NEEDED
Status: DISCONTINUED | OUTPATIENT
Start: 2022-12-31 | End: 2023-01-02 | Stop reason: HOSPADM

## 2022-12-31 RX ORDER — METOPROLOL TARTRATE 50 MG/1
50 TABLET, FILM COATED ORAL 3 TIMES DAILY
Status: DISCONTINUED | OUTPATIENT
Start: 2022-12-31 | End: 2023-01-01

## 2022-12-31 RX ORDER — METOPROLOL SUCCINATE 50 MG/1
50 TABLET, EXTENDED RELEASE ORAL ONCE
Status: COMPLETED | OUTPATIENT
Start: 2022-12-31 | End: 2022-12-31

## 2022-12-31 RX ADMIN — BUDESONIDE AND FORMOTEROL FUMARATE DIHYDRATE 2 PUFF: 160; 4.5 AEROSOL RESPIRATORY (INHALATION) at 19:06

## 2022-12-31 RX ADMIN — METOPROLOL TARTRATE 5 MG: 5 INJECTION INTRAVENOUS at 12:10

## 2022-12-31 RX ADMIN — PRIMIDONE 50 MG: 50 TABLET ORAL at 20:08

## 2022-12-31 RX ADMIN — NYSTATIN: 100000 POWDER TOPICAL at 20:10

## 2022-12-31 RX ADMIN — APIXABAN 5 MG: 5 TABLET, FILM COATED ORAL at 00:05

## 2022-12-31 RX ADMIN — APIXABAN 5 MG: 5 TABLET, FILM COATED ORAL at 22:42

## 2022-12-31 RX ADMIN — MIRABEGRON 25 MG: 25 TABLET, FILM COATED, EXTENDED RELEASE ORAL at 08:44

## 2022-12-31 RX ADMIN — INSULIN LISPRO 4 UNITS: 100 INJECTION, SOLUTION INTRAVENOUS; SUBCUTANEOUS at 12:01

## 2022-12-31 RX ADMIN — MAGNESIUM SULFATE HEPTAHYDRATE 2 G: 40 INJECTION, SOLUTION INTRAVENOUS at 22:40

## 2022-12-31 RX ADMIN — NYSTATIN: 100000 POWDER TOPICAL at 16:22

## 2022-12-31 RX ADMIN — Medication 1 CAPSULE: at 08:44

## 2022-12-31 RX ADMIN — METOPROLOL TARTRATE 50 MG: 50 TABLET, FILM COATED ORAL at 16:22

## 2022-12-31 RX ADMIN — CEFTRIAXONE SODIUM 1 G: 1 INJECTION, POWDER, FOR SOLUTION INTRAMUSCULAR; INTRAVENOUS at 18:44

## 2022-12-31 RX ADMIN — APIXABAN 5 MG: 5 TABLET, FILM COATED ORAL at 12:01

## 2022-12-31 RX ADMIN — CITALOPRAM 20 MG: 20 TABLET, FILM COATED ORAL at 08:44

## 2022-12-31 RX ADMIN — PRIMIDONE 50 MG: 50 TABLET ORAL at 08:44

## 2022-12-31 RX ADMIN — PRIMIDONE 50 MG: 50 TABLET ORAL at 16:22

## 2022-12-31 RX ADMIN — INSULIN LISPRO 2 UNITS: 100 INJECTION, SOLUTION INTRAVENOUS; SUBCUTANEOUS at 08:43

## 2022-12-31 RX ADMIN — MONTELUKAST SODIUM 10 MG: 10 TABLET, FILM COATED ORAL at 20:08

## 2022-12-31 RX ADMIN — TIOTROPIUM BROMIDE INHALATION SPRAY 1 PUFF: 3.12 SPRAY, METERED RESPIRATORY (INHALATION) at 09:12

## 2022-12-31 RX ADMIN — METOPROLOL TARTRATE 50 MG: 50 TABLET, FILM COATED ORAL at 08:44

## 2022-12-31 RX ADMIN — NYSTATIN: 100000 POWDER TOPICAL at 10:10

## 2022-12-31 RX ADMIN — MAGNESIUM SULFATE HEPTAHYDRATE 2 G: 40 INJECTION, SOLUTION INTRAVENOUS at 21:00

## 2022-12-31 RX ADMIN — METOPROLOL TARTRATE 50 MG: 50 TABLET, FILM COATED ORAL at 20:07

## 2022-12-31 RX ADMIN — METOPROLOL SUCCINATE 50 MG: 50 TABLET, FILM COATED, EXTENDED RELEASE ORAL at 06:02

## 2022-12-31 RX ADMIN — BUDESONIDE AND FORMOTEROL FUMARATE DIHYDRATE 2 PUFF: 160; 4.5 AEROSOL RESPIRATORY (INHALATION) at 09:12

## 2022-12-31 RX ADMIN — LOSARTAN POTASSIUM 50 MG: 50 TABLET, FILM COATED ORAL at 08:44

## 2022-12-31 RX ADMIN — MAGNESIUM OXIDE 400 MG (241.3 MG MAGNESIUM) TABLET 400 MG: TABLET at 08:44

## 2022-12-31 NOTE — PROGRESS NOTES
[facility]      Hospital Follow Up    LOS:  LOS: 0 days   Patient Name: Kanwal Sauer  Age/Sex: 72 y.o. female  : 1950  MRN: [id number]    Day of Service: 22   Length of Stay: 0  Encounter Provider: Elvis Vazquez MD  Place of Service: [facility]  Patient Care Team:  Nisha Britton MD as PCP - General (Family Medicine)    Subjective:     Interval History: remains in AF with RVR    Current Medications:   Scheduled Meds:apixaban, 5 mg, Oral, Q12H  budesonide-formoterol, 2 puff, Inhalation, BID - RT   And  tiotropium bromide monohydrate, 1 puff, Inhalation, Daily - RT  cefTRIAXone, 1 g, Intravenous, Q24H  citalopram, 20 mg, Oral, Daily  furosemide, 40 mg, Oral, Once per day on   insulin lispro, 0-9 Units, Subcutaneous, TID With Meals  lactobacillus acidophilus, 1 capsule, Oral, Daily  losartan, 50 mg, Oral, Daily  magnesium oxide, 400 mg, Oral, Daily  metoprolol tartrate, 50 mg, Oral, Q12H  Mirabegron ER, 25 mg, Oral, Daily  montelukast, 10 mg, Oral, Nightly  nystatin, , Topical, TID  primidone, 50 mg, Oral, TID      Continuous Infusions:     Allergies:  Allergies   Allergen Reactions   • Baclofen Other (See Comments)     dysrhythmia   • Doxycycline Rash   • Eggs Or Egg-Derived Products Anaphylaxis   • Iodine Anaphylaxis   • Latex Anaphylaxis   • Milk-Related Compounds Unknown - High Severity   • Msg [Monosodium Glutamate] Anaphylaxis   • Penicillins Hives     Tolerated cefazolin during 2020 admission   • Metronidazole Unknown - High Severity   • Ezetimibe Rash   • Latex, Natural Rubber Rash   • Nylon Rash   • Simvastatin Rash       Review of Systems   Constitutional: Positive for malaise/fatigue.   Cardiovascular: Positive for irregular heartbeat and palpitations.   Respiratory: Positive for shortness of breath.        Objective:     Temp:  [97.7 °F (36.5 °C)-98.7 °F (37.1 °C)] 97.7 °F (36.5 °C)  Heart Rate:   [] 135  Resp:  [18-20] 18  BP: (116-153)/() 116/88     Intake/Output Summary (Last 24 hours) at 12/31/2022 1214  Last data filed at 12/31/2022 0950  Gross per 24 hour   Intake 100 ml   Output 4950 ml   Net -4850 ml     Body mass index is 34.16 kg/m².      12/29/22 2020 12/30/22  0526 12/31/22  0520   Weight: 102 kg (225 lb) 102 kg (225 lb) 96 kg (211 lb 10.3 oz)           Constitutional:       Appearance: Not in distress.   Eyes:      Pupils: Pupils are equal, round, and reactive to light.   Neck:      Vascular: JVD normal.   Pulmonary:      Effort: Pulmonary effort is normal.   Cardiovascular:      Tachycardia present. Irregularly irregular rhythm.   Skin:     General: Skin is warm and dry.   Neurological:      General: No focal deficit present.      Mental Status: Alert.           Lab Review:   Results from last 7 days   Lab Units 12/31/22  0513 12/30/22 0445 12/29/22  1511   SODIUM mmol/L 139 141 142   POTASSIUM mmol/L 3.6 4.3 4.7   CHLORIDE mmol/L 96* 102 104   CO2 mmol/L 34.1* 33.0* 31.9*   BUN mg/dL 23 22 26*   CREATININE mg/dL 1.00 1.04* 1.07*   GLUCOSE mg/dL 164* 174* 130*   CALCIUM mg/dL 9.9 9.3 9.4   AST (SGOT) U/L  --   --  15   ALT (SGPT) U/L  --   --  11     Results from last 7 days   Lab Units 12/29/22  2151 12/29/22  1511   TROPONIN T ng/mL <0.010 <0.010     Results from last 7 days   Lab Units 12/31/22  0513 12/30/22  0445   WBC 10*3/mm3 4.43 4.25   HEMOGLOBIN g/dL 12.2 11.2*   HEMATOCRIT % 36.5 33.3*   PLATELETS 10*3/mm3 133* 121*         Results from last 7 days   Lab Units 12/31/22  0513 12/29/22  1511   MAGNESIUM mg/dL 1.4* 1.5*           Invalid input(s): LDLCALC  Results from last 7 days   Lab Units 12/29/22  1511   PROBNP pg/mL 2,084.0*     Results from last 7 days   Lab Units 12/29/22  1511   TSH uIU/mL 1.100       Recent Radiology:  Imaging Results (Most Recent)     Procedure Component Value Units Date/Time    XR Chest 1 View [[id number]] Collected: 12/29/22 5458     Updated:  12/29/22 1719    Narrative:      PORTABLE CHEST X-RAY     HISTORY: Cough, sepsis.     Portable chest x-ray is provided. Correlation: 07/05/2022.     FINDINGS: Numerous surgical clips at the left axilla. Mildly enlarged  cardiac silhouette is unchanged given the apical lordotic projection.  Vascular volume is normal. The lungs are clear. The costophrenic sulci  are dry and the bones appear normal. There is no pneumothorax.       Impression:      Cardiomegaly. No acute abnormality.     This report was finalized on 12/29/2022 5:16 PM by Dr. Abhilash Mcgregor M.D.             I reviewed the patient's new clinical results.  I personally viewed and interpreted the patient's EKG      Assessment:       Acute UTI    Type 2 diabetes mellitus with circulatory disorder, without long-term current use of insulin (HCC)    Essential hypertension    PAF (paroxysmal atrial fibrillation) (HCC)    TARSHA (obstructive sleep apnea)    1. Urinary tract infection  2. Paroxysmal atrial fibrillation/flutter with RVR - driving by underlying illness. Titrate beta blocker and give a dose of IV digoxin. She is anticoagulated with apixaban.  3. Chronic hypoxic respiratory failure  4. TARSHA  5. Diabetes mellitus type II  6. HTN - continue losartan for now. May need to reduce dose to allow rate control titration but currently has plenty of blood pressure for both. Will follow.     Plan:     AF remains uncontrolled, will stop losartan and increase metoprolol.      Elvis Vazquez MD  12/31/22  12:14 EST

## 2022-12-31 NOTE — PROGRESS NOTES
LOS: 0 days     Name: Kanwal Sauer  Age: 72 y.o.  Sex: female  :  1950  MRN: [id number]         Primary Care Physician: Nisha Britton MD    Subjective   Subjective  A. fib remains uncontrolled.  Denies chest pain or palpitations.  Denies fevers or chills.    Objective   Vital Signs  Temp:  [97.7 °F (36.5 °C)-98.8 °F (37.1 °C)] 98.8 °F (37.1 °C)  Heart Rate:  [] 133  Resp:  [18-20] 18  BP: (116-153)/() 123/108  Body mass index is 34.16 kg/m².    Objective:  General Appearance:  Comfortable and in no acute distress (She looks older than stated age and chronically ill).    Vital signs: (most recent): Blood pressure (!) 123/108, pulse (!) 133, temperature 98.8 °F (37.1 °C), temperature source Oral, resp. rate 18, height 167.6 cm (66\"), weight 96 kg (211 lb 10.3 oz), SpO2 97 %.    Lungs:  Normal effort and normal respiratory rate.  She is not in respiratory distress.  There are decreased breath sounds.    Heart: Tachycardia.  Irregular rhythm.    Abdomen: Abdomen is soft.  Bowel sounds are normal.   There is no abdominal tenderness.     Extremities: There is no dependent edema or local swelling.    Neurological: Patient is alert and oriented to person, place and time.    Skin:  Warm and dry.              Results Review:       I reviewed the patient's new clinical results.    Results from last 7 days   Lab Units 22  0513 22  0445 22  1511   WBC 10*3/mm3 4.43 4.25 5.32   HEMOGLOBIN g/dL 12.2 11.2* 11.7*   PLATELETS 10*3/mm3 133* 121* 142     Results from last 7 days   Lab Units 22  0513 22  0445 22  1511   SODIUM mmol/L 139 141 142   POTASSIUM mmol/L 3.6 4.3 4.7   CHLORIDE mmol/L 96* 102 104   CO2 mmol/L 34.1* 33.0* 31.9*   BUN mg/dL 23 22 26*   CREATININE mg/dL 1.00 1.04* 1.07*   CALCIUM mg/dL 9.9 9.3 9.4   GLUCOSE mg/dL 164* 174* 130*                 Scheduled Meds:   apixaban, 5 mg, Oral, Q12H  budesonide-formoterol, 2 puff, Inhalation, BID - RT    And  tiotropium bromide monohydrate, 1 puff, Inhalation, Daily - RT  cefTRIAXone, 1 g, Intravenous, Q24H  citalopram, 20 mg, Oral, Daily  furosemide, 40 mg, Oral, Once per day on Mon Wed Fri  insulin lispro, 0-9 Units, Subcutaneous, TID With Meals  lactobacillus acidophilus, 1 capsule, Oral, Daily  magnesium oxide, 400 mg, Oral, Daily  metoprolol tartrate, 50 mg, Oral, TID  Mirabegron ER, 25 mg, Oral, Daily  montelukast, 10 mg, Oral, Nightly  nystatin, , Topical, TID  primidone, 50 mg, Oral, TID      PRN Meds:   •  acetaminophen  •  albuterol  •  dextrose  •  dextrose  •  glucagon (human recombinant)  •  melatonin  •  nitroglycerin  •  ondansetron **OR** ondansetron  •  potassium chloride  •  sodium chloride  •  [COMPLETED] Insert Peripheral IV **AND** sodium chloride  Continuous Infusions:       Assessment & Plan   Active Hospital Problems    Diagnosis  POA   • **Acute UTI [N39.0]  Yes   • PAF (paroxysmal atrial fibrillation) ([facility]) [I48.0]  Yes   • TARSHA (obstructive sleep apnea) [G47.33]  Yes   • Type 2 diabetes mellitus with circulatory disorder, without long-term current use of insulin ([facility]) [E11.59]  Yes   • Essential hypertension [I10]  Yes      Resolved Hospital Problems   No resolved problems to display.       Assessment & Plan    -Urine culture has grown pansensitive E. coli.  She will complete her third and final dose of Rocephin today.  -A. fib remains uncontrolled.  Metoprolol dose has been increased.  On Eliquis.  Cardiology following.  -Blood pressure appears stable on present regimen.  Losartan has been discontinued in favor of increased metoprolol dose for her A. fib.  -Blood sugars reasonably controlled.  Continue present regimen.  -Replace magnesium today  -Plans to return home with home health.  Can do so once A. fib controlled.      Marco Cadena MD  [facility]  12/31/22  14:03 EST

## 2022-12-31 NOTE — PLAN OF CARE
Goal Outcome Evaluation:  Plan of Care Reviewed With: patient        Progress: no change  Outcome Evaluation: Vitals stable except HR. Patient remains in A-fib. HR has been fluctuating between 100s-130s. One time extra dose metoprolol to be given. The patient denies any CP. Baseline is 2L oxygen -remains on that here. Urine output excellent since IV lasix dose given yesterday. Nystatin powder continued for redness in skin folds. No c/o pain. IV rocephin to be given again today for acute UTI. Slept well tonight.

## 2022-12-31 NOTE — NURSING NOTE
On call APRN notified of patient's fluctuating HR. The patient continues to be in A-fib - this is not new. The HR is jumping up to 120s-130s, sustaining for 3-5 minutes then returns to 100s-110s. APRN ordered x1 dose of 50mg metoprolol succinate and advised that if systolic BP remains above 110 at time of 0900 dose of Lopressor that scheduled dose may be administered.   
Wound/Ostomy: Consult received regarding skin issue on abdominal fold. She is alert, answering questions appropriately. After the skin assessment we could see redness of the skin, patient stated itching, to abdominal and breast fold,  Nystatin powder is ordered. Please re-consult for any additional needs.  
none

## 2022-12-31 NOTE — PLAN OF CARE
Goal Outcome Evaluation:    Afib RVR HR 100s-130s metoprolol, IV digoxin, IV lasix given per MAR, HR currently 100-110s. 2L O2(baseline). IV rocephin continued. Pt stable and needs met at this time.

## 2022-12-31 NOTE — SIGNIFICANT NOTE
12/31/22 1028   OTHER   Discipline physical therapist   Rehab Time/Intention   Session Not Performed unable to evaluate, medical status change  (no per RN, HR at 130 not appropriate today.)   Recommendation   PT - Next Appointment 01/01/23

## 2023-01-01 LAB
GLUCOSE BLDC GLUCOMTR-MCNC: 170 MG/DL (ref 70–130)
GLUCOSE BLDC GLUCOMTR-MCNC: 177 MG/DL (ref 70–130)
GLUCOSE BLDC GLUCOMTR-MCNC: 196 MG/DL (ref 70–130)
GLUCOSE BLDC GLUCOMTR-MCNC: 266 MG/DL (ref 70–130)
MAGNESIUM SERPL-MCNC: 2.2 MG/DL (ref 1.6–2.4)
POTASSIUM SERPL-SCNC: 4 MMOL/L (ref 3.5–5.2)
QT INTERVAL: 441 MS

## 2023-01-01 PROCEDURE — 94799 UNLISTED PULMONARY SVC/PX: CPT

## 2023-01-01 PROCEDURE — 94761 N-INVAS EAR/PLS OXIMETRY MLT: CPT

## 2023-01-01 PROCEDURE — 63710000001 INSULIN LISPRO (HUMAN) PER 5 UNITS: Performed by: INTERNAL MEDICINE

## 2023-01-01 PROCEDURE — 97161 PT EVAL LOW COMPLEX 20 MIN: CPT | Performed by: PHYSICAL THERAPIST

## 2023-01-01 PROCEDURE — 94760 N-INVAS EAR/PLS OXIMETRY 1: CPT

## 2023-01-01 PROCEDURE — G0378 HOSPITAL OBSERVATION PER HR: HCPCS

## 2023-01-01 PROCEDURE — 84132 ASSAY OF SERUM POTASSIUM: CPT | Performed by: INTERNAL MEDICINE

## 2023-01-01 PROCEDURE — 97530 THERAPEUTIC ACTIVITIES: CPT | Performed by: PHYSICAL THERAPIST

## 2023-01-01 PROCEDURE — 99214 OFFICE O/P EST MOD 30 MIN: CPT | Performed by: INTERNAL MEDICINE

## 2023-01-01 PROCEDURE — 93010 ELECTROCARDIOGRAM REPORT: CPT | Performed by: INTERNAL MEDICINE

## 2023-01-01 PROCEDURE — 94664 DEMO&/EVAL PT USE INHALER: CPT

## 2023-01-01 PROCEDURE — 93005 ELECTROCARDIOGRAM TRACING: CPT | Performed by: INTERNAL MEDICINE

## 2023-01-01 PROCEDURE — 83735 ASSAY OF MAGNESIUM: CPT | Performed by: INTERNAL MEDICINE

## 2023-01-01 PROCEDURE — 0 MAGNESIUM SULFATE 4 GM/100ML SOLUTION: Performed by: INTERNAL MEDICINE

## 2023-01-01 PROCEDURE — 25010000002 MAGNESIUM SULFATE 2 GM/50ML SOLUTION: Performed by: INTERNAL MEDICINE

## 2023-01-01 PROCEDURE — 82962 GLUCOSE BLOOD TEST: CPT

## 2023-01-01 RX ORDER — METOPROLOL TARTRATE 50 MG/1
50 TABLET, FILM COATED ORAL 2 TIMES DAILY
Status: DISCONTINUED | OUTPATIENT
Start: 2023-01-01 | End: 2023-01-02

## 2023-01-01 RX ADMIN — Medication 10 ML: at 20:19

## 2023-01-01 RX ADMIN — MIRABEGRON 25 MG: 25 TABLET, FILM COATED, EXTENDED RELEASE ORAL at 08:44

## 2023-01-01 RX ADMIN — INSULIN LISPRO 2 UNITS: 100 INJECTION, SOLUTION INTRAVENOUS; SUBCUTANEOUS at 08:44

## 2023-01-01 RX ADMIN — NYSTATIN: 100000 POWDER TOPICAL at 16:40

## 2023-01-01 RX ADMIN — MONTELUKAST SODIUM 10 MG: 10 TABLET, FILM COATED ORAL at 20:19

## 2023-01-01 RX ADMIN — APIXABAN 5 MG: 5 TABLET, FILM COATED ORAL at 11:05

## 2023-01-01 RX ADMIN — INSULIN LISPRO 6 UNITS: 100 INJECTION, SOLUTION INTRAVENOUS; SUBCUTANEOUS at 12:40

## 2023-01-01 RX ADMIN — PRIMIDONE 50 MG: 50 TABLET ORAL at 08:44

## 2023-01-01 RX ADMIN — Medication 1 CAPSULE: at 08:44

## 2023-01-01 RX ADMIN — APIXABAN 5 MG: 5 TABLET, FILM COATED ORAL at 22:09

## 2023-01-01 RX ADMIN — NYSTATIN: 100000 POWDER TOPICAL at 08:44

## 2023-01-01 RX ADMIN — MAGNESIUM SULFATE HEPTAHYDRATE 4 G: 40 INJECTION, SOLUTION INTRAVENOUS at 01:00

## 2023-01-01 RX ADMIN — CITALOPRAM 20 MG: 20 TABLET, FILM COATED ORAL at 08:44

## 2023-01-01 RX ADMIN — TIOTROPIUM BROMIDE INHALATION SPRAY 1 PUFF: 3.12 SPRAY, METERED RESPIRATORY (INHALATION) at 09:28

## 2023-01-01 RX ADMIN — PRIMIDONE 50 MG: 50 TABLET ORAL at 20:19

## 2023-01-01 RX ADMIN — PRIMIDONE 50 MG: 50 TABLET ORAL at 16:40

## 2023-01-01 RX ADMIN — NYSTATIN: 100000 POWDER TOPICAL at 20:19

## 2023-01-01 RX ADMIN — INSULIN LISPRO 2 UNITS: 100 INJECTION, SOLUTION INTRAVENOUS; SUBCUTANEOUS at 17:07

## 2023-01-01 RX ADMIN — BUDESONIDE AND FORMOTEROL FUMARATE DIHYDRATE 2 PUFF: 160; 4.5 AEROSOL RESPIRATORY (INHALATION) at 09:28

## 2023-01-01 RX ADMIN — BUDESONIDE AND FORMOTEROL FUMARATE DIHYDRATE 2 PUFF: 160; 4.5 AEROSOL RESPIRATORY (INHALATION) at 21:02

## 2023-01-01 NOTE — PROGRESS NOTES
[facility]      Hospital Follow Up    LOS:  LOS: 0 days   Patient Name: Kanwal Sauer  Age/Sex: 72 y.o. female  : 1950  MRN: [id number]    Day of Service: 23   Length of Stay: 0  Encounter Provider: Elvis Vazquez MD  Place of Service: [facility]  Patient Care Team:  Nisha Britton MD as PCP - General (Family Medicine)    Subjective:     Interval History: No acute events, patient feeling better    Current Medications:   Scheduled Meds:apixaban, 5 mg, Oral, Q12H  budesonide-formoterol, 2 puff, Inhalation, BID - RT   And  tiotropium bromide monohydrate, 1 puff, Inhalation, Daily - RT  citalopram, 20 mg, Oral, Daily  furosemide, 40 mg, Oral, Once per day on   insulin lispro, 0-9 Units, Subcutaneous, TID With Meals  lactobacillus acidophilus, 1 capsule, Oral, Daily  metoprolol tartrate, 50 mg, Oral, TID  Mirabegron ER, 25 mg, Oral, Daily  montelukast, 10 mg, Oral, Nightly  nystatin, , Topical, TID  primidone, 50 mg, Oral, TID      Continuous Infusions:     Allergies:  Allergies   Allergen Reactions   • Baclofen Other (See Comments)     dysrhythmia   • Doxycycline Rash   • Eggs Or Egg-Derived Products Anaphylaxis   • Iodine Anaphylaxis   • Latex Anaphylaxis   • Milk-Related Compounds Unknown - High Severity   • Msg [Monosodium Glutamate] Anaphylaxis   • Penicillins Hives     Tolerated cefazolin during 2020 admission   • Metronidazole Unknown - High Severity   • Ezetimibe Rash   • Latex, Natural Rubber Rash   • Nylon Rash   • Simvastatin Rash       Review of Systems   Cardiovascular: Negative for chest pain and palpitations.   Respiratory: Negative for shortness of breath.        Objective:     Temp:  [97.2 °F (36.2 °C)-98.8 °F (37.1 °C)] 97.2 °F (36.2 °C)  Heart Rate:  [] 45  Resp:  [16-18] 16  BP: (104-137)/() 137/69     Intake/Output Summary (Last 24 hours) at 2023 1213  Last data filed at  1/1/2023 0845  Gross per 24 hour   Intake 1020 ml   Output 1750 ml   Net -730 ml     Body mass index is 34.16 kg/m².      12/29/22 2020 12/30/22  0526 12/31/22  0520   Weight: 102 kg (225 lb) 102 kg (225 lb) 96 kg (211 lb 10.3 oz)           Constitutional:       Appearance: Not in distress.   Neck:      Vascular: JVD normal.   Pulmonary:      Effort: Pulmonary effort is normal.      Breath sounds: Normal breath sounds.   Cardiovascular:      Bradycardia present. Regular rhythm.   Abdominal:      Palpations: Abdomen is soft.   Skin:     General: Skin is warm and dry.   Neurological:      General: No focal deficit present.      Mental Status: Alert.           Lab Review:   Results from last 7 days   Lab Units 01/01/23  1043 12/31/22  0513 12/30/22 0445 12/29/22  1511   SODIUM mmol/L  --  139 141 142   POTASSIUM mmol/L 4.0 3.6 4.3 4.7   CHLORIDE mmol/L  --  96* 102 104   CO2 mmol/L  --  34.1* 33.0* 31.9*   BUN mg/dL  --  23 22 26*   CREATININE mg/dL  --  1.00 1.04* 1.07*   GLUCOSE mg/dL  --  164* 174* 130*   CALCIUM mg/dL  --  9.9 9.3 9.4   AST (SGOT) U/L  --   --   --  15   ALT (SGPT) U/L  --   --   --  11     Results from last 7 days   Lab Units 12/29/22  2151 12/29/22  1511   TROPONIN T ng/mL <0.010 <0.010     Results from last 7 days   Lab Units 12/31/22  0513 12/30/22 0445   WBC 10*3/mm3 4.43 4.25   HEMOGLOBIN g/dL 12.2 11.2*   HEMATOCRIT % 36.5 33.3*   PLATELETS 10*3/mm3 133* 121*         Results from last 7 days   Lab Units 01/01/23  1043 12/31/22  0513   MAGNESIUM mg/dL 2.2 1.4*           Invalid input(s): LDLCALC  Results from last 7 days   Lab Units 12/29/22  1511   PROBNP pg/mL 2,084.0*     Results from last 7 days   Lab Units 12/29/22  1511   TSH uIU/mL 1.100       Recent Radiology:  Imaging Results (Most Recent)     Procedure Component Value Units Date/Time    XR Chest 1 View [[id number]] Collected: 12/29/22 1715     Updated: 12/29/22 1719    Narrative:      PORTABLE CHEST X-RAY     HISTORY: Cough,  sepsis.     Portable chest x-ray is provided. Correlation: 07/05/2022.     FINDINGS: Numerous surgical clips at the left axilla. Mildly enlarged  cardiac silhouette is unchanged given the apical lordotic projection.  Vascular volume is normal. The lungs are clear. The costophrenic sulci  are dry and the bones appear normal. There is no pneumothorax.       Impression:      Cardiomegaly. No acute abnormality.     This report was finalized on 12/29/2022 5:16 PM by Dr. Abhilash Mcgregor M.D.             I reviewed the patient's new clinical results.  I personally viewed and interpreted the patient's EKG      Assessment:       Acute UTI    Type 2 diabetes mellitus with circulatory disorder, without long-term current use of insulin (HCC)    Essential hypertension    PAF (paroxysmal atrial fibrillation) (HCC)    TARSHA (obstructive sleep apnea)    1. Urinary tract infection  2. Paroxysmal atrial fibrillation/flutter with RVR - driving by underlying illness. Titrate beta blocker and give a dose of IV digoxin. She is anticoagulated with apixaban.  3. Chronic hypoxic respiratory failure  4. TARSHA  5. Diabetes mellitus type II  6. HTN - continue losartan for now. May need to reduce dose to allow rate control titration but currently has plenty of blood pressure for both. Will follow.     Plan:     She has converted to sinus mihir in the 40's, will  reduce metoprolol to 50 bid.    Elvis Vazquez MD  01/01/23  12:13 EST

## 2023-01-01 NOTE — THERAPY EVALUATION
Patient Name: Kanwal Sauer  : 1950    MRN: [id number]                              Today's Date: 2023       Admit Date: 2022    Visit Dx:     ICD-10-CM ICD-9-CM   1. Acute UTI  N39.0 599.0   2. Atrial fibrillation with RVR (HCC)  I48.91 427.31   3. Generalized weakness  R53.1 780.79   4. Deficit in activities of daily living (ADL)  Z78.9 V49.89   5. Dyspnea on exertion  R06.09 786.09   6. Chronic anticoagulation  Z79.01 V58.61     Patient Active Problem List   Diagnosis   • Closed fracture of left distal femur (HCC)   • Type 2 diabetes mellitus with circulatory disorder, without long-term current use of insulin (HCC)   • Essential hypertension   • Tremors of nervous system   • PAF (paroxysmal atrial fibrillation) (HCC)   • TARSHA (obstructive sleep apnea)   • Chronic respiratory failure with hypoxia (HCC)   • History of breast cancer   • History of seizure   • Risk for falls   • Neck pain   • Acute UTI     Past Medical History:   Diagnosis Date   • Anxiety    • Asthma    • Atrial fibrillation (HCC)    • Cancer (HCC)     Breast   • Deep vein thrombosis (HCC)    • Diabetes mellitus (HCC)    • Difficulty walking    • Drug therapy    • Environmental allergies    • Fractures    • Headache, tension-type    • Hyperlipidemia    • Hypertension    • 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)    • Seizures (HCC)    • Sleep apnea    • Tremor    • Weakness      Past Surgical History:   Procedure Laterality Date   • BREAST BIOPSY     • CATARACT EXTRACTION, BILATERAL     • DENTAL PROCEDURE      Removed teeth   • FEMUR OPEN REDUCTION INTERNAL FIXATION Left 2020    Procedure: DISTAL FEMUR OPEN REDUCTION INTERNAL FIXATION;  Surgeon: Marley Hernandez MD;  Location: [facility];  Service: Orthopedics;  Laterality: Left;   • MASTECTOMY Left    • REPLACEMENT TOTAL KNEE BILATERAL        General Information     Row Name 23 1412          Physical Therapy Time and Intention    Document Type evaluation  -      Mode of Treatment individual therapy;physical therapy  -     Row Name 01/01/23 1412          General Information    Patient Profile Reviewed yes  -     Prior Level of Function independent:;all household mobility;community mobility;gait;transfer;bed mobility;ADL's  -     Existing Precautions/Restrictions fall  -     Row Name 01/01/23 1412          Living Environment    People in Home child(kaye), adult;spouse  -     Row Name 01/01/23 1412          Home Main Entrance    Number of Stairs, Main Entrance none  -     Row Name 01/01/23 1412          Cognition    Orientation Status (Cognition) oriented x 4  -     Row Name 01/01/23 1412          Safety Issues, Functional Mobility    Impairments Affecting Function (Mobility) shortness of breath;endurance/activity tolerance;strength  -           User Key  (r) = Recorded By, (t) = Taken By, (c) = Cosigned By    Initials Name Provider Type     Donnie Roblero, PT DPT Physical Therapist               Mobility     Row Name 01/01/23 1412          Bed Mobility    Bed Mobility bed mobility (all) activities  -     All Activities, [place] (Bed Mobility) contact guard  -     Assistive Device (Bed Mobility) bed rails  -     Row Name 01/01/23 1412          Sit-Stand Transfer    Sit-Stand [place] (Transfers) contact guard  -     Assistive Device (Sit-Stand Transfers) walker, standard  -     Row Name 01/01/23 1412          Gait/Stairs (Locomotion)    Willet Level (Gait) contact guard  -LC     Assistive Device (Gait) walker, standard  -     Distance in Feet (Gait) 20  -LC     Deviations/Abnormal Patterns (Gait) gait speed decreased;stride length decreased  -           User Key  (r) = Recorded By, (t) = Taken By, (c) = Cosigned By    Initials Name Provider Type     Donnie Roblero, PT DPT Physical Therapist               Obj/Interventions     Row Name 01/01/23 1413          Range of Motion Comprehensive    General Range of Motion no range of  motion deficits identified  -LC     Row Name 01/01/23 1413          Strength Comprehensive (MMT)    Comment, General Manual Muscle Testing (MMT) Assessment BLE MMT grossly 3/5  -LC           User Key  (r) = Recorded By, (t) = Taken By, (c) = Cosigned By    Initials Name Provider Type    LC Donnie Roblero, PT DPT Physical Therapist               Goals/Plan     Row Name 01/01/23 1423          Bed Mobility Goal 1 (PT)    Activity/Assistive Device (Bed Mobility Goal 1, PT) bed mobility activities, all  -LC     [place]well Level/Cues Needed (Bed Mobility Goal 1, PT) standby assist  -LC     Time Frame (Bed Mobility Goal 1, PT) 1 week  -LC     Row Name 01/01/23 1423          Transfer Goal 1 (PT)    Activity/Assistive Device (Transfer Goal 1, PT) transfers, all;walker, rolling  -LC     [place] Level/Cues Needed (Transfer Goal 1, PT) standby assist  -LC     Time Frame (Transfer Goal 1, PT) 1 week  -     Row Name 01/01/23 1423          Gait Training Goal 1 (PT)    Activity/Assistive Device (Gait Training Goal 1, PT) gait (walking locomotion);walker, rolling  -LC     [place] Level (Gait Training Goal 1, PT) standby assist  -LC     Distance (Gait Training Goal 1, PT) 150  -LC     Time Frame (Gait Training Goal 1, PT) 1 week  -           User Key  (r) = Recorded By, (t) = Taken By, (c) = Cosigned By    Initials Name Provider Type    Donnie Roberts, PT DPT Physical Therapist               Clinical Impression     Row Name 01/01/23 1413          Pain    Pretreatment Pain Rating 0/10 - no pain  -LC     Posttreatment Pain Rating 0/10 - no pain  -LC     Pain Intervention(s) Medication (See MAR);Repositioned  -LC     Row Name 01/01/23 1413          Plan of Care Review    Plan of Care Reviewed With patient  -LC     Outcome Evaluation PT EVAL completed. Pt AO x 4. Pt reports independence at home prior to admission. Pt reports walking with walker at home. Pt on 2L O2/NC. Pt transferred supine to sit with CGA x 1. Pt  transferred sit to stand with min x 1 and std walker. Pt walked 20 ft with std walker and min x 1. Pt transferred sit to supine. pt O2 to 86% during ambulation. Pt requires skilled therapy due to decreased functional activity tolerance, decreased transfers, decreased ambulation. Recommend DC home with assist.  -     Row Name 01/01/23 1413          Therapy Assessment/Plan (PT)    Patient/Family Therapy Goals Statement (PT) home with assist  -     Rehab Potential (PT) good, to achieve stated therapy goals  -     Criteria for Skilled Interventions Met (PT) yes;skilled treatment is necessary  -     Therapy Frequency (PT) 5 times/wk  -     Predicted Duration of Therapy Intervention (PT) 1 week  -     Row Name 01/01/23 1413          Positioning and Restraints    Pre-Treatment Position in bed  -     Post Treatment Position bed  -LC     In Bed notified nsg;supine;call light within reach;encouraged to call for assist;exit alarm on;side rails up x2;SCD pump applied  -           User Key  (r) = Recorded By, (t) = Taken By, (c) = Cosigned By    Initials Name Provider Type    Donnie Roberts, PT DPT Physical Therapist               Outcome Measures     Row Name 01/01/23 1424 01/01/23 0845       How much help from another person do you currently need...    Turning from your back to your side while in flat bed without using bedrails? 4  -LC 4  -LK    Moving from lying on back to sitting on the side of a flat bed without bedrails? 3  -LC 3  -LK    Moving to and from a bed to a chair (including a wheelchair)? 3  -LC 3  -LK    Standing up from a chair using your arms (e.g., wheelchair, bedside chair)? 3  -LC 3  -LK    Climbing 3-5 steps with a railing? 3  -LC 3  -LK    To walk in hospital room? 3  -LC 3  -LK    AM-PAC 6 Clicks Score (PT) 19  -LC 19  -LK    Highest level of mobility 6 --> Walked 10 steps or more  - 6 --> Walked 10 steps or more  -    Row Name 01/01/23 1424          Functional Assessment     Outcome Measure Options AM-PAC 6 Clicks Basic Mobility (PT)  -           User Key  (r) = Recorded By, (t) = Taken By, (c) = Cosigned By    Initials Name Provider Type     Donnie Roblero, PT DPT Physical Therapist    Kim Keen, RN Registered Nurse                             Physical Therapy Education     Title: PT OT SLP Therapies (Done)     Topic: Physical Therapy (Done)     Point: Mobility training (Done)     Learning Progress Summary           Patient Acceptance, E,D, DU,VU by  at 1/1/2023 1424                   Point: Home exercise program (Done)     Learning Progress Summary           Patient Acceptance, E,D, DU,VU by  at 1/1/2023 1424                   Point: Body mechanics (Done)     Learning Progress Summary           Patient Acceptance, E,D, DU,VU by  at 1/1/2023 1424                   Point: Precautions (Done)     Learning Progress Summary           Patient Acceptance, E,D, DU,VU by  at 1/1/2023 1424                               User Key     Initials Effective Dates Name Provider Type [facility] 07/02/20 -  Donnie Roblero, PT DPT Physical Therapist PT              PT Recommendation and Plan     Plan of Care Reviewed With: patient  Outcome Evaluation: PT EVAL completed. Pt AO x 4. Pt reports independence at home prior to admission. Pt reports walking with walker at home. Pt on 2L O2/NC. Pt transferred supine to sit with CGA x 1. Pt transferred sit to stand with min x 1 and std walker. Pt walked 20 ft with std walker and min x 1. Pt transferred sit to supine. pt O2 to 86% during ambulation. Pt requires skilled therapy due to decreased functional activity tolerance, decreased transfers, decreased ambulation. Recommend DC home with assist.     Time Calculation:    PT Charges     Row Name 01/01/23 1427             Time Calculation    Start Time 1400  -      Stop Time 1430  -      Time Calculation (min) 30 min  -      PT Received On 01/01/23  -      PT - Next Appointment  01/03/23  -LC      PT Goal Re-Cert Due Date 01/08/23  -LC         Time Calculation- PT    Total Timed Code Minutes- PT 30 minute(s)  -LC         Timed Charges    71773 - PT Therapeutic Activity Minutes 15  -LC         Total Minutes    Timed Charges Total Minutes 15  -LC       Total Minutes 15  -LC            User Key  (r) = Recorded By, (t) = Taken By, (c) = Cosigned By    Initials Name Provider Type     Donnie Roblero, PT DPT Physical Therapist              Therapy Charges for Today     Code Description Service Date Service Provider Modifiers Qty    79617017790  PT THERAPEUTIC ACT EA 15 MIN 1/1/2023 Donnie Roblero, PT DPT GP 1    [id number] HC PT EVAL LOW COMPLEXITY 1 1/1/2023 Donnie Roblero, PT DPT GP 1          PT G-Codes  Outcome Measure Options: AM-PAC 6 Clicks Basic Mobility (PT)  AM-PAC 6 Clicks Score (PT): 19  PT Discharge Summary  Anticipated Discharge Disposition (PT): home with assist    Donnie Roblero PT DPT  1/1/2023

## 2023-01-01 NOTE — PLAN OF CARE
Goal Outcome Evaluation:  Plan of Care Reviewed With: patient        Progress: improving  Outcome Evaluation: VSS. Pt denies pain. Mag replaced  HR B/N 80 - 90. No complaints or concerns per patient. Will continue to monitor.

## 2023-01-01 NOTE — PROGRESS NOTES
LOS: 0 days     Name: Kanwal Sauer  Age: 72 y.o.  Sex: female  :  1950  MRN: [id number]         Primary Care Physician: Nisha Britton MD    Subjective   Subjective  No new complaints or acute overnight events.  She has converted to sinus rhythm and is now bradycardic.    Objective   Vital Signs  Temp:  [97.2 °F (36.2 °C)-97.7 °F (36.5 °C)] 97.2 °F (36.2 °C)  Heart Rate:  [] 45  Resp:  [16] 16  BP: (104-137)/(69-92) 137/69  Body mass index is 34.16 kg/m².    Objective:  General Appearance:  Comfortable and in no acute distress.    Vital signs: (most recent): Blood pressure 137/69, pulse (!) 45, temperature 97.2 °F (36.2 °C), temperature source Oral, resp. rate 16, height 167.6 cm (66\"), weight 96 kg (211 lb 10.3 oz), SpO2 95 %.    Lungs:  Normal effort and normal respiratory rate.    Heart: Bradycardia.  Regular rhythm.    Abdomen: Abdomen is soft.  Bowel sounds are normal.   There is no abdominal tenderness.     Extremities: There is no dependent edema or local swelling.    Neurological: Patient is alert and oriented to person, place and time.    Skin:  Warm and dry.              Results Review:       I reviewed the patient's new clinical results.    Results from last 7 days   Lab Units 22  0513 22  0445 22  1511   WBC 10*3/mm3 4.43 4.25 5.32   HEMOGLOBIN g/dL 12.2 11.2* 11.7*   PLATELETS 10*3/mm3 133* 121* 142     Results from last 7 days   Lab Units 23  1043 22  0513 22  0445 22  1511   SODIUM mmol/L  --  139 141 142   POTASSIUM mmol/L 4.0 3.6 4.3 4.7   CHLORIDE mmol/L  --  96* 102 104   CO2 mmol/L  --  34.1* 33.0* 31.9*   BUN mg/dL  --   26*   CREATININE mg/dL  --  1.00 1.04* 1.07*   CALCIUM mg/dL  --  9.9 9.3 9.4   GLUCOSE mg/dL  --  164* 174* 130*                 Scheduled Meds:   apixaban, 5 mg, Oral, Q12H  budesonide-formoterol, 2 puff, Inhalation, BID - RT   And  tiotropium bromide monohydrate, 1 puff, Inhalation, Daily - RT  citalopram, 20  mg, Oral, Daily  furosemide, 40 mg, Oral, Once per day on Mon Wed Fri  insulin lispro, 0-9 Units, Subcutaneous, TID With Meals  lactobacillus acidophilus, 1 capsule, Oral, Daily  metoprolol tartrate, 50 mg, Oral, BID  Mirabegron ER, 25 mg, Oral, Daily  montelukast, 10 mg, Oral, Nightly  nystatin, , Topical, TID  primidone, 50 mg, Oral, TID      PRN Meds:   •  acetaminophen  •  albuterol  •  dextrose  •  dextrose  •  glucagon (human recombinant)  •  magnesium sulfate **OR** magnesium sulfate **OR** magnesium sulfate  •  melatonin  •  nitroglycerin  •  ondansetron **OR** ondansetron  •  potassium chloride  •  sodium chloride  •  [COMPLETED] Insert Peripheral IV **AND** sodium chloride  Continuous Infusions:       Assessment & Plan   Active Hospital Problems    Diagnosis  POA   • **Acute UTI [N39.0]  Yes   • PAF (paroxysmal atrial fibrillation) ([facility]) [I48.0]  Yes   • TARSHA (obstructive sleep apnea) [G47.33]  Yes   • Type 2 diabetes mellitus with circulatory disorder, without long-term current use of insulin ([facility]) [E11.59]  Yes   • Essential hypertension [I10]  Yes      Resolved Hospital Problems   No resolved problems to display.       Assessment & Plan    -Urine culture has grown pansensitive E. coli.    She has completed a 3-day course of Rocephin.  -Converted to sinus rhythm and now bradycardic.  Metoprolol dose being decreased.  -Blood pressure appears stable on present regimen.   -Blood sugars reasonably controlled.  Continue present regimen.  -Replace magnesium today  -Plans to return home with home health, possibly tomorrow if heart rate controlled.      Marco Cadena MD  [facility]  01/01/23  13:19 EST

## 2023-01-01 NOTE — PLAN OF CARE
Problem: Adult Inpatient Plan of Care  Goal: Plan of Care Review  Outcome: Ongoing, Progressing  Flowsheets (Taken 1/1/2023 1719)  Progress: improving  Plan of Care Reviewed With: patient  Outcome Evaluation: Pt converted to afib RVR to sinus bradycardia this morning. Denies CP, SOA, N/V. 2L O2 per NC (baseline). Scheduled metoprolol adjusted per cardiology. Pt worked with PT. Possible DC tomorrow. Family visited. Pt stable and needs met at this time.

## 2023-01-01 NOTE — PLAN OF CARE
Goal Outcome Evaluation:  Plan of Care Reviewed With: patient           Outcome Evaluation: PT EVAL completed. Pt AO x 4. Pt reports independence at home prior to admission. Pt reports walking with walker at home. Pt on 2L O2/NC. Pt transferred supine to sit with CGA x 1. Pt transferred sit to stand with min x 1 and std walker. Pt walked 20 ft with std walker and min x 1. Pt transferred sit to supine. pt O2 to 86% during ambulation. Pt requires skilled therapy due to decreased functional activity tolerance, decreased transfers, decreased ambulation. Recommend DC home with assist.    PPE: Patient was placed in face mask in first look. Patient was wearing facemask when I entered the room and throughout our encounter. I wore full protective equipment throughout this patient encounter including a face mask, and gloves. Hand hygiene was performed before donning protective equipment and after removal when leaving the room.

## 2023-01-02 ENCOUNTER — READMISSION MANAGEMENT (OUTPATIENT)
Dept: CALL CENTER | Facility: HOSPITAL | Age: 73
End: 2023-01-02
Payer: MEDICARE

## 2023-01-02 VITALS
SYSTOLIC BLOOD PRESSURE: 151 MMHG | WEIGHT: 210.6 LBS | HEIGHT: 66 IN | BODY MASS INDEX: 33.85 KG/M2 | HEART RATE: 50 BPM | TEMPERATURE: 97.4 F | OXYGEN SATURATION: 100 % | DIASTOLIC BLOOD PRESSURE: 61 MMHG | RESPIRATION RATE: 18 BRPM

## 2023-01-02 LAB
GLUCOSE BLDC GLUCOMTR-MCNC: 157 MG/DL (ref 70–130)
GLUCOSE BLDC GLUCOMTR-MCNC: 178 MG/DL (ref 70–130)

## 2023-01-02 PROCEDURE — 94664 DEMO&/EVAL PT USE INHALER: CPT

## 2023-01-02 PROCEDURE — 82962 GLUCOSE BLOOD TEST: CPT

## 2023-01-02 PROCEDURE — 94761 N-INVAS EAR/PLS OXIMETRY MLT: CPT

## 2023-01-02 PROCEDURE — G0378 HOSPITAL OBSERVATION PER HR: HCPCS

## 2023-01-02 PROCEDURE — 63710000001 INSULIN LISPRO (HUMAN) PER 5 UNITS: Performed by: INTERNAL MEDICINE

## 2023-01-02 PROCEDURE — 94799 UNLISTED PULMONARY SVC/PX: CPT

## 2023-01-02 PROCEDURE — 99214 OFFICE O/P EST MOD 30 MIN: CPT | Performed by: INTERNAL MEDICINE

## 2023-01-02 RX ORDER — BUDESONIDE AND FORMOTEROL FUMARATE DIHYDRATE 160; 4.5 UG/1; UG/1
2 AEROSOL RESPIRATORY (INHALATION)
Qty: 1 EACH | Refills: 0 | Status: SHIPPED | OUTPATIENT
Start: 2023-01-02

## 2023-01-02 RX ADMIN — Medication 1 CAPSULE: at 08:26

## 2023-01-02 RX ADMIN — CITALOPRAM 20 MG: 20 TABLET, FILM COATED ORAL at 08:27

## 2023-01-02 RX ADMIN — MIRABEGRON 25 MG: 25 TABLET, FILM COATED, EXTENDED RELEASE ORAL at 08:26

## 2023-01-02 RX ADMIN — NYSTATIN: 100000 POWDER TOPICAL at 08:26

## 2023-01-02 RX ADMIN — INSULIN LISPRO 2 UNITS: 100 INJECTION, SOLUTION INTRAVENOUS; SUBCUTANEOUS at 08:27

## 2023-01-02 RX ADMIN — PRIMIDONE 50 MG: 50 TABLET ORAL at 08:27

## 2023-01-02 RX ADMIN — APIXABAN 5 MG: 5 TABLET, FILM COATED ORAL at 12:02

## 2023-01-02 RX ADMIN — FUROSEMIDE 40 MG: 40 TABLET ORAL at 08:26

## 2023-01-02 RX ADMIN — TIOTROPIUM BROMIDE INHALATION SPRAY 1 PUFF: 3.12 SPRAY, METERED RESPIRATORY (INHALATION) at 08:16

## 2023-01-02 RX ADMIN — INSULIN LISPRO 2 UNITS: 100 INJECTION, SOLUTION INTRAVENOUS; SUBCUTANEOUS at 12:02

## 2023-01-02 RX ADMIN — PRIMIDONE 50 MG: 50 TABLET ORAL at 15:01

## 2023-01-02 RX ADMIN — BUDESONIDE AND FORMOTEROL FUMARATE DIHYDRATE 2 PUFF: 160; 4.5 AEROSOL RESPIRATORY (INHALATION) at 08:16

## 2023-01-02 NOTE — PROGRESS NOTES
Discharge Planning Assessment  [facility]     Patient Name: Kanwal Sauer  MRN: [id number]  Today's Date: 1/2/2023    Admit Date: 12/29/2022    Plan: Return to [facility] and [facility] has accepted   Discharge Needs Assessment    No documentation.                Discharge Plan     Row Name 01/02/23 1405       Plan    Plan Return to [facility] and [facility] has accepted    Final Discharge Disposition Code 06 - home with home health care    Final Note Patient has discharge orders and will return to her [facility] and will have [facility]. Spoke with daughter she is going to transport home. Moody ADKINS              Continued Care and Services - Admitted Since 12/29/2022     Home Medical Care     Service Provider Request Status Selected Services Address Phone Fax Patient Preferred    [facility] [facility] N[facility]A [address], [address], [place] [id number] [phone] --     [facility] Declined  Out of network N/A [address] [facility] [address] [id number] [phone] --    [facility] [place] [facility] Declined  d/t staffing N/A [address]  [facility] [address], [facility] [address] [phone] --    [facility] Declined  Insurance Denial N/A [address] [facility] [id number] [phone] --    [facility] Declined  unable to staff N/A [address] [facility] [id number] [address] [phone] --              Expected Discharge Date and Time     Expected Discharge Date Expected Discharge Time    Jan 2, 2023          Demographic Summary    No documentation.                Functional Status    No documentation.                Psychosocial    No documentation.                Abuse/Neglect    No documentation.                Legal    No documentation.                 Substance Abuse    No documentation.                Patient Forms    No documentation.                   Sandee Hogan RN

## 2023-01-02 NOTE — PLAN OF CARE
Goal Outcome Evaluation:  Plan of Care Reviewed With: patient        Progress: improving  Outcome Evaluation: Patient did well tonight. No acute changes. Vitals stable. HR remains bradycardic but in sinus rhythm. Nightly metoprolol held due to HR. Remains on 2 liters oxygen which is baseline. Sleeping well tonight.

## 2023-01-02 NOTE — OUTREACH NOTE
Prep Survey    Flowsheet Row Responses   Jew facility patient discharged from? [facility]   Is LACE score < 7 ? No   Eligibility [facility]   Date of Admission 12/29/22   Date of Discharge 01/02/23   Discharge Disposition Home-Health Care Svc   Discharge diagnosis Acute UTI, paroxysmal A-fib, T2DM   Does the patient have one of the following disease processes/diagnoses(primary or secondary)? Other   Does the patient have Home health ordered? Yes   What is the Home health agency?  [facility]   Is there a DME ordered? No   General alerts for this patient [facility]   Prep survey completed? Yes          SVITLANA MOON - Registered Nurse

## 2023-01-02 NOTE — DISCHARGE SUMMARY
Patient Name: Kanwal Sauer  : 1950  MRN: [id number]    Date of Admission: 2022  Date of Discharge:  2023  Primary Care Physician: Nisha Britton MD      Chief Complaint:   Difficulty Urinating (ICC for 139 HR and UTI sepsis r/o with vaginal discharge)      Discharge Diagnoses     Active Hospital Problems    Diagnosis  POA   • **Acute UTI [N39.0]  Yes   • PAF (paroxysmal atrial fibrillation) ([facility]) [I48.0]  Yes   • TARSHA (obstructive sleep apnea) [G47.33]  Yes   • Type 2 diabetes mellitus with circulatory disorder, without long-term current use of insulin ([facility]) [E11.59]  Yes   • Essential hypertension [I10]  Yes      Resolved Hospital Problems   No resolved problems to display.        Hospital Course     Patient is a pleasant 72-year-old female with known history of paroxysmal atrial fibrillation, diabetes mellitus, obstructive sleep apnea, hypertension was admitted to the hospital for change in mental status and CT of the brain was done which did not reveal any acute findings.  UA was consistent with UTI and she was treated with 3-day course of Rocephin and her cultures grew E. coli which was susceptible to Rocephin.  Mental status is returned to baseline and is requesting be discharged home and so will be discharged in stable condition.  Of note patient also has a known history of atrial fibrillation and her heart rate was mildly on the rise therefore metoprolol dose was further advanced by cardiology however heart rate started dropping into 40s and cardiology recommended to cut metoprolol dose back to 25 mg twice daily upon discharge which has been done.  On chronic anticoagulation which will be continued as an outpatient basis.  She is generally weak and lethargic therefore home health and home physical therapy is also been arranged.  She is otherwise hemodynamically stable to be discharged home.    Day of Discharge         Physical Exam:  Temp:  [97.4 °F (36.3 °C)-97.8 °F (36.6 °C)] 97.4  °F (36.3 °C)  Heart Rate:  [47-54] 47  Resp:  [16-20] 18  BP: (115-151)/(50-62) 151/61  Body mass index is 33.99 kg/m².  Physical Exam  HEENT: PERRLA, extraocular wounds intact, Scleras no icterus  Neck: Supple, no JVD  Cardiovascular: Regular rate and rhythm with normal S1 and S2  Respiratory: Diminished breath sounds with no wheezes or crackles  GI: Soft, nontender, bowel sounds present, obese  Extremities: No edema, palpable pulses  Neurologic: Globally weak, no facial asymmetry    Consultants     Consult Orders (all) (From admission, onward)     Start     Ordered    12/29/22 2128  Inpatient Cardiology Consult  Once        Specialty:  Cardiology  Provider:  Bekah Gilliland MD    12/29/22 2127 12/29/22 1822  [facility] (on-call MD unless specified) Details  Once        Specialty:  Hospitalist  Provider:  Neena Davis MD    12/29/22 1821 12/29/22 1822  [facility] (on-call MD unless specified)  Once        Specialty:  Cardiology  Provider:  Abhilash Oh Jr., MD    12/29/22 1821              Procedures     * Surgery not found *      Imaging Results (All)     Procedure Component Value Units Date/Time    XR Chest 1 View [[id number]] Collected: 12/29/22 1715     Updated: 12/29/22 1719    Narrative:      PORTABLE CHEST X-RAY     HISTORY: Cough, sepsis.     Portable chest x-ray is provided. Correlation: 07/05/2022.     FINDINGS: Numerous surgical clips at the left axilla. Mildly enlarged  cardiac silhouette is unchanged given the apical lordotic projection.  Vascular volume is normal. The lungs are clear. The costophrenic sulci  are dry and the bones appear normal. There is no pneumothorax.       Impression:      Cardiomegaly. No acute abnormality.     This report was finalized on 12/29/2022 5:16 PM by Dr. Abhilash Mcgregor M.D.             Results for orders placed during the hospital encounter of 09/22/22    Adult Transthoracic Echo Complete W/ Cont if Necessary Per Protocol    Interpretation Summary  · Calculated  left ventricular 3D EF = 68% Left ventricular systolic function is normal.  · Left ventricular wall thickness is consistent with mild concentric hypertrophy.  · Left ventricular diastolic function is consistent with (grade Ia w/high LAP) impaired relaxation.  · Estimated right ventricular systolic pressure from tricuspid regurgitation is normal (<35 mmHg).    Pertinent Labs     Results from last 7 days   Lab Units 12/31/22  0513 12/30/22  0445 12/29/22  1511   WBC 10*3/mm3 4.43 4.25 5.32   HEMOGLOBIN g/dL 12.2 11.2* 11.7*   PLATELETS 10*3/mm3 133* 121* 142     Results from last 7 days   Lab Units 01/01/23  1043 12/31/22  0513 12/30/22  0445 12/29/22  1511   SODIUM mmol/L  --  139 141 142   POTASSIUM mmol/L 4.0 3.6 4.3 4.7   CHLORIDE mmol/L  --  96* 102 104   CO2 mmol/L  --  34.1* 33.0* 31.9*   BUN mg/dL  --  23 22 26*   CREATININE mg/dL  --  1.00 1.04* 1.07*   GLUCOSE mg/dL  --  164* 174* 130*   EGFR mL/min/1.73  --  59.2* 56.5* 54.6*     Results from last 7 days   Lab Units 12/29/22  1511   ALBUMIN g/dL 4.0   BILIRUBIN mg/dL 0.4   ALK PHOS U/L 43   AST (SGOT) U/L 15   ALT (SGPT) U/L 11     Results from last 7 days   Lab Units 01/01/23  1043 12/31/22  0513 12/30/22  0445 12/29/22  1511   CALCIUM mg/dL  --  9.9 9.3 9.4   ALBUMIN g/dL  --   --   --  4.0   MAGNESIUM mg/dL 2.2 1.4*  --  1.5*       Results from last 7 days   Lab Units 12/29/22  2151 12/29/22  1511   TROPONIN T ng/mL <0.010 <0.010   PROBNP pg/mL  --  2,084.0*           Invalid input(s): LDLCALC  Results from last 7 days   Lab Units 12/29/22  1730 12/29/22  1721 12/29/22  1511   BLOODCX  No growth at 3 days No growth at 3 days  --    URINECX   --   --  >100,000 CFU/mL Escherichia coli*     Results from last 7 days   Lab Units 12/29/22  1706   COVID19  Not Detected       Test Results Pending at Discharge     Pending Labs     Order Current Status    Blood Culture - Blood, Arm, Left Preliminary result    Blood Culture - Blood, Arm, Right Preliminary result           Discharge Details        Discharge Medications      New Medications      Instructions Start Date   budesonide-formoterol 160-4.5 MCG/ACT inhaler  Commonly known as: SYMBICORT   2 puffs, Inhalation, 2 Times Daily - RT      metoprolol tartrate 25 MG tablet  Commonly known as: LOPRESSOR   25 mg, Oral, 2 Times Daily      tiotropium bromide monohydrate 2.5 MCG/ACT aerosol solution inhaler  Commonly known as: SPIRIVA RESPIMAT   2 puffs, Inhalation, Daily - RT   Start Date: January 3, 2023        Changes to Medications      Instructions Start Date   furosemide 40 MG tablet  Commonly known as: LASIX  What changed: when to take this   TAKE 1 TABLET BY MOUTH EVERY DAY      metFORMIN 1000 MG tablet  Commonly known as: GLUCOPHAGE  What changed:   · how much to take  · how to take this  · when to take this  · additional instructions   TAKE 1 TABLET BY MOUTH TWICE A DAY WITH MEALS         Continue These Medications      Instructions Start Date   acetaminophen 325 MG tablet  Commonly known as: TYLENOL   650 mg, Oral, Every 4 Hours PRN      albuterol sulfate  (90 Base) MCG/ACT inhaler  Commonly known as: PROVENTIL HFA;VENTOLIN HFA;PROAIR HFA   INHALE 2 PUFFS EVERY 4 HOURS AS NEEDED FOR WHEEZING      citalopram 20 MG tablet  Commonly known as: CeleXA   20 mg, Oral, Daily      CRANBERRY PO   650 mg, Oral      Eliquis 5 MG tablet tablet  Generic drug: apixaban   TAKE 1 TABLET BY MOUTH EVERY 12 HOURS      fenofibrate 160 MG tablet   TAKE 1 TABLET BY MOUTH EVERY DAY      ketoconazole 2 % cream  Commonly known as: NIZORAL   APPLY TO THE AFFECTED AREAS ON THE FACE DAILY IN COMBINATION WITH HYDROCORTISONE CREAM      magnesium oxide 400 MG tablet  Commonly known as: MAG-OX   400 mg, Oral, Daily      Mirabegron ER 25 MG tablet sustained-release 24 hour 24 hr tablet  Commonly known as: Myrbetriq   25 mg, Oral, Daily      montelukast 10 MG tablet  Commonly known as: SINGULAIR   TAKE 1 TABLET BY MOUTH AT BEDTIME      potassium  chloride 10 MEQ CR tablet  Commonly known as: K-DUR,KLOR-CON   10 mEq, Oral, Once As Needed, Only take with lasix      primidone 50 MG tablet  Commonly known as: MYSOLINE   50 mg, Oral, 3 Times Daily      PROBIOTIC-10 PO   Oral      tiZANidine 4 MG tablet  Commonly known as: ZANAFLEX   4 mg, Oral, Nightly      Trelegy Ellipta 100-62.5-25 MCG/ACT inhaler  Generic drug: Fluticasone-Umeclidin-Vilant   1 puff, Inhalation, Daily      Vitamin D3 25 MCG (1000 UT) capsule   1,000 Units, Oral, Daily         Stop These Medications    losartan 50 MG tablet  Commonly known as: COZAAR     Metoprolol Succinate 25 MG capsule extended-release 24 hour sprinkle            Allergies   Allergen Reactions   • Baclofen Other (See Comments)     dysrhythmia   • Doxycycline Rash   • Eggs Or Egg-Derived Products Anaphylaxis   • Iodine Anaphylaxis   • Latex Anaphylaxis   • Milk-Related Compounds Unknown - High Severity   • Msg [Monosodium Glutamate] Anaphylaxis   • Penicillins Hives     Tolerated cefazolin during December 2020 admission   • Metronidazole Unknown - High Severity   • Ezetimibe Rash   • Latex, Natural Rubber Rash   • Nylon Rash   • Simvastatin Rash       Discharge Disposition:  Home or Self Care      Discharge Diet:  Diet Order   Procedures   • Diet: Regular/House Diet, Cardiac Diets, Diabetic Diets; Healthy Heart (2-3 Na+); Consistent Carbohydrate; Texture: Regular Texture (IDDSI 7); Fluid Consistency: Thin (IDDSI 0)       Discharge Activity:   Activity Instructions     Activity as Tolerated            CODE STATUS:    Code Status and Medical Interventions:   Ordered at: 12/29/22 1839     Code Status (Patient has no pulse and is not breathing):    CPR (Attempt to Resuscitate)     Medical Interventions (Patient has pulse or is breathing):    Full       Future Appointments   Date Time Provider Department Center   1/16/2023 10:30 AM [facility] MRI 1 [facility]   1/16/2023 11:30 AM [facility] MRI 1 [facility]    4/19/2023  1:00 PM Odalis Braswell, DEBRA, APRN [facility]   6/9/2023  1:00 PM Alisha Meier APRN [facility] ESPT [facility]   10/6/2023  2:15 PM Nisha Feliciano MD [facility] EASPT [facility]     Additional Instructions for the Follow-ups that You Need to Schedule     Ambulatory Referral to Home Health ([facility])   As directed      Face to Face Visit Date: 1/2/2023    Follow-up provider for Plan of Care?: I treated the patient in an acute care facility and will not continue treatment after discharge.    Follow-up provider: NISHA FELICIANO [[id number]]    Reason/Clinical Findings: weakness    Describe mobility limitations that make leaving home difficult: weakness    Nursing/Therapeutic Services Requested: Physical Therapy Occupational Therapy    PT orders: Gait Training Therapeutic exercise Strengthening    Weight Bearing Status: As Tolerated    Frequency: 1 Week 1         Discharge Follow-up with PCP   As directed       Currently Documented PCP:    Nisha Feliciano MD    PCP Phone Number:    [phone]     Follow Up Details: 1 week         Discharge Follow-up with Specified Provider: ; 2 Weeks   As directed      To:     Follow Up: 2 Weeks            Follow-up Information     Nisha Feliciano MD. Schedule an appointment as soon as possible for a visit.    Specialty: Family Medicine  Why: 1 week  Contact information:  [address]  [address]  [facility] [id number] [phone]             Elvis Vazquez MD. Schedule an appointment as soon as possible for a visit in 2 week(s).    Specialties: Cardiac Electrophysiology, Cardiology  Contact information:  [address]  [address]  [facility] [id number] [phone]                         Additional Instructions for the Follow-ups that You Need to Schedule     Ambulatory Referral to Home Health ([facility])   As directed      Face to Face Visit Date: 1/2/2023    Follow-up provider for Plan of Care?: I treated the patient in an acute care facility and will not continue  treatment after discharge.    Follow-up provider: NISHA BRITTON [[id number]]    Reason/Clinical Findings: weakness    Describe mobility limitations that make leaving home difficult: weakness    Nursing/Therapeutic Services Requested: Physical Therapy Occupational Therapy    PT orders: Gait Training Therapeutic exercise Strengthening    Weight Bearing Status: As Tolerated    Frequency: 1 Week 1         Discharge Follow-up with PCP   As directed       Currently Documented PCP:    Nisha Britton MD    PCP Phone Number:    [phone]     Follow Up Details: 1 week         Discharge Follow-up with Specified Provider: ; 2 Weeks   As directed      To:     Follow Up: 2 Weeks           Time Spent on Discharge:  Greater than 30 minutes      Tj Echols MD  [facility]  01/02/23  12:02 EST

## 2023-01-02 NOTE — PROGRESS NOTES
[facility] HEART SPECIALISTS      Hospital Follow Up    LOS:  LOS: 0 days   Patient Name: Kanwal Sauer  Age/Sex: 72 y.o. female  : 1950  MRN: [id number]    Day of Service: 23   Length of Stay: 0  Encounter Provider: Elvis Vazquez MD  Place of Service: [facility]  Patient Care Team:  Nisha Britton MD as PCP - General (Family Medicine)    Subjective:     Interval History: No acute events    Current Medications:   Scheduled Meds:apixaban, 5 mg, Oral, Q12H  budesonide-formoterol, 2 puff, Inhalation, BID - RT   And  tiotropium bromide monohydrate, 1 puff, Inhalation, Daily - RT  citalopram, 20 mg, Oral, Daily  furosemide, 40 mg, Oral, Once per day on   insulin lispro, 0-9 Units, Subcutaneous, TID With Meals  lactobacillus acidophilus, 1 capsule, Oral, Daily  metoprolol tartrate, 50 mg, Oral, BID  Mirabegron ER, 25 mg, Oral, Daily  montelukast, 10 mg, Oral, Nightly  nystatin, , Topical, TID  primidone, 50 mg, Oral, TID      Continuous Infusions:     Allergies:  Allergies   Allergen Reactions   • Baclofen Other (See Comments)     dysrhythmia   • Doxycycline Rash   • Eggs Or Egg-Derived Products Anaphylaxis   • Iodine Anaphylaxis   • Latex Anaphylaxis   • Milk-Related Compounds Unknown - High Severity   • Msg [Monosodium Glutamate] Anaphylaxis   • Penicillins Hives     Tolerated cefazolin during 2020 admission   • Metronidazole Unknown - High Severity   • Ezetimibe Rash   • Latex, Natural Rubber Rash   • Nylon Rash   • Simvastatin Rash       Review of Systems   Cardiovascular: Negative for chest pain and palpitations.   Respiratory: Negative for shortness of breath.        Objective:     Temp:  [97.2 °F (36.2 °C)-97.8 °F (36.6 °C)] 97.8 °F (36.6 °C)  Heart Rate:  [45-89] 49  Resp:  [16-20] 18  BP: (115-137)/(50-69) 129/62     Intake/Output Summary (Last 24 hours) at 2023 0617  Last data filed at 2023 0300  Gross per 24 hour    Intake 120 ml   Output 1100 ml   Net -980 ml     Body mass index is 33.99 kg/m².      12/30/22  0526 12/31/22  0520 01/02/23  0500   Weight: 102 kg (225 lb) 96 kg (211 lb 10.3 oz) 95.5 kg (210 lb 9.6 oz)           Constitutional:       Appearance: Not in distress.   Pulmonary:      Effort: Pulmonary effort is normal.      Breath sounds: Normal breath sounds.   Cardiovascular:      Bradycardia present. Regular rhythm.   Abdominal:      Palpations: Abdomen is soft.   Skin:     General: Skin is warm and dry.   Neurological:      General: No focal deficit present.           Lab Review:   Results from last 7 days   Lab Units 01/01/23  1043 12/31/22  0513 12/30/22  0445 12/29/22  1511   SODIUM mmol/L  --  139 141 142   POTASSIUM mmol/L 4.0 3.6 4.3 4.7   CHLORIDE mmol/L  --  96* 102 104   CO2 mmol/L  --  34.1* 33.0* 31.9*   BUN mg/dL  --  23 22 26*   CREATININE mg/dL  --  1.00 1.04* 1.07*   GLUCOSE mg/dL  --  164* 174* 130*   CALCIUM mg/dL  --  9.9 9.3 9.4   AST (SGOT) U/L  --   --   --  15   ALT (SGPT) U/L  --   --   --  11     Results from last 7 days   Lab Units 12/29/22  2151 12/29/22  1511   TROPONIN T ng/mL <0.010 <0.010     Results from last 7 days   Lab Units 12/31/22  0513 12/30/22  0445   WBC 10*3/mm3 4.43 4.25   HEMOGLOBIN g/dL 12.2 11.2*   HEMATOCRIT % 36.5 33.3*   PLATELETS 10*3/mm3 133* 121*         Results from last 7 days   Lab Units 01/01/23  1043 12/31/22  0513   MAGNESIUM mg/dL 2.2 1.4*           Invalid input(s): LDLCALC  Results from last 7 days   Lab Units 12/29/22  1511   PROBNP pg/mL 2,084.0*     Results from last 7 days   Lab Units 12/29/22  1511   TSH uIU/mL 1.100       Recent Radiology:  Imaging Results (Most Recent)     Procedure Component Value Units Date/Time    XR Chest 1 View [[id number]] Collected: 12/29/22 1715     Updated: 12/29/22 1719    Narrative:      PORTABLE CHEST X-RAY     HISTORY: Cough, sepsis.     Portable chest x-ray is provided. Correlation: 07/05/2022.     FINDINGS: Numerous  surgical clips at the left axilla. Mildly enlarged  cardiac silhouette is unchanged given the apical lordotic projection.  Vascular volume is normal. The lungs are clear. The costophrenic sulci  are dry and the bones appear normal. There is no pneumothorax.       Impression:      Cardiomegaly. No acute abnormality.     This report was finalized on 12/29/2022 5:16 PM by Dr. Abhilash Mcgregor M.D.             I reviewed the patient's new clinical results.  I personally viewed and interpreted the patient's EKG      Assessment:       Acute UTI    Type 2 diabetes mellitus with circulatory disorder, without long-term current use of insulin (HCC)    Essential hypertension    PAF (paroxysmal atrial fibrillation) (HCC)    TARSHA (obstructive sleep apnea)      1. Urinary tract infection  2. Paroxysmal atrial fibrillation/flutter with RVR - driving by underlying illness. Titrate beta blocker and give a dose of IV digoxin. She is anticoagulated with apixaban.  3. Chronic hypoxic respiratory failure  4. TARSHA  5. Diabetes mellitus type II  6. HTN - controlled  Plan:     Remains in SB in the 40's, will hold metoprolol this am and reduce to 25mg bid.  OK to discharge from our standpoint.      Elvis Vazquez MD  01/02/23  06:17 EST

## 2023-01-03 ENCOUNTER — TRANSITIONAL CARE MANAGEMENT TELEPHONE ENCOUNTER (OUTPATIENT)
Dept: CALL CENTER | Facility: HOSPITAL | Age: 73
End: 2023-01-03
Payer: MEDICARE

## 2023-01-03 LAB
BACTERIA SPEC AEROBE CULT: NORMAL
BACTERIA SPEC AEROBE CULT: NORMAL

## 2023-01-03 NOTE — OUTREACH NOTE
Call Center TCM Note    Flowsheet Row Responses   [facility] patient discharged from? [place]   Does the patient have one of the following disease processes/diagnoses(primary or secondary)? Other   TCM attempt successful? Yes   Call start time 1320   Call end time 1323   Discharge diagnosis Acute UTI, paroxysmal A-fib, T2DM   Meds reviewed with patient/caregiver? Yes   Is the patient having any side effects they believe may be caused by any medication additions or changes? No   Does the patient have all medications ordered at discharge? Yes   Is the patient taking all medications as directed (includes completed medication regime)? Yes   Comments 1/12/23 at 11:00am.   Does the patient have an appointment with their PCP within 7 days of discharge? Yes   What is the Home health agency?  [facility]    Has home health visited the patient within 72 hours of discharge? Yes   Psychosocial issues? No   Did the patient receive a copy of their discharge instructions? Yes   Nursing interventions Reviewed instructions with patient   What is the patient's perception of their health status since discharge? Improving   Is the patient/caregiver able to teach back the hierarchy of who to call/visit for symptoms/problems? PCP, Specialist, Home health nurse, Urgent Care, ED, 911 Yes   If the patient is a current smoker, are they able to teach back resources for cessation? Not a smoker   TCM call completed? Yes   Call end time 1323          Nevin Hollins RN    1/3/2023, 13:23 EST

## 2023-01-06 DIAGNOSIS — E11.65 TYPE 2 DIABETES MELLITUS WITH HYPERGLYCEMIA, WITHOUT LONG-TERM CURRENT USE OF INSULIN: ICD-10-CM

## 2023-01-12 ENCOUNTER — OFFICE VISIT (OUTPATIENT)
Dept: FAMILY MEDICINE CLINIC | Facility: CLINIC | Age: 73
End: 2023-01-12
Payer: MEDICARE

## 2023-01-12 ENCOUNTER — READMISSION MANAGEMENT (OUTPATIENT)
Dept: CALL CENTER | Facility: HOSPITAL | Age: 73
End: 2023-01-12
Payer: MEDICARE

## 2023-01-12 VITALS
SYSTOLIC BLOOD PRESSURE: 140 MMHG | OXYGEN SATURATION: 94 % | TEMPERATURE: 97.1 F | HEART RATE: 50 BPM | HEIGHT: 66 IN | WEIGHT: 223.8 LBS | RESPIRATION RATE: 18 BRPM | BODY MASS INDEX: 35.97 KG/M2 | DIASTOLIC BLOOD PRESSURE: 84 MMHG

## 2023-01-12 DIAGNOSIS — Z12.11 SCREENING FOR COLON CANCER: ICD-10-CM

## 2023-01-12 DIAGNOSIS — R30.0 DYSURIA: ICD-10-CM

## 2023-01-12 DIAGNOSIS — N39.0 ACUTE UTI: ICD-10-CM

## 2023-01-12 DIAGNOSIS — I48.0 PAF (PAROXYSMAL ATRIAL FIBRILLATION): ICD-10-CM

## 2023-01-12 DIAGNOSIS — Z09 HOSPITAL DISCHARGE FOLLOW-UP: Primary | ICD-10-CM

## 2023-01-12 LAB
BILIRUB BLD-MCNC: NEGATIVE MG/DL
CLARITY, POC: ABNORMAL
COLOR UR: YELLOW
EXPIRATION DATE: ABNORMAL
GLUCOSE UR STRIP-MCNC: ABNORMAL MG/DL
KETONES UR QL: NEGATIVE
LEUKOCYTE EST, POC: ABNORMAL
Lab: ABNORMAL
NITRITE UR-MCNC: NEGATIVE MG/ML
PH UR: 7 [PH] (ref 5–8)
PROT UR STRIP-MCNC: ABNORMAL MG/DL
RBC # UR STRIP: NEGATIVE /UL
SP GR UR: 1.02 (ref 1–1.03)
UROBILINOGEN UR QL: ABNORMAL

## 2023-01-12 PROCEDURE — 1111F DSCHRG MED/CURRENT MED MERGE: CPT | Performed by: NURSE PRACTITIONER

## 2023-01-12 PROCEDURE — 99495 TRANSJ CARE MGMT MOD F2F 14D: CPT | Performed by: NURSE PRACTITIONER

## 2023-01-12 PROCEDURE — 81003 URINALYSIS AUTO W/O SCOPE: CPT | Performed by: NURSE PRACTITIONER

## 2023-01-12 RX ORDER — METOPROLOL SUCCINATE 25 MG/1
50 TABLET, EXTENDED RELEASE ORAL DAILY
COMMUNITY
Start: 2022-12-05 | End: 2023-01-12

## 2023-01-12 NOTE — OUTREACH NOTE
Medical Week 2 Survey    Flowsheet Row Responses   [facility] patient discharged from? [place]   Does the patient have one of the following disease processes/diagnoses(primary or secondary)? Other   Week 2 attempt successful? Yes   Call start time 1510   General alerts for this patient [facility]   Discharge diagnosis Acute UTI, paroxysmal A-fib, T2DM   Call end time 1514   Meds reviewed with patient/caregiver? Yes   Is the patient having any side effects they believe may be caused by any medication additions or changes? No   Does the patient have all medications ordered at discharge? Yes   Is the patient taking all medications as directed (includes completed medication regime)? Yes   Does the patient have a primary care provider?  Yes   Has the patient kept scheduled appointments due by today? Yes   Comments 1/12/23 at 11:00am-completed today   What is the Home health agency?  [facility]    Has home health visited the patient within 72 hours of discharge? Yes   Home health interventions Called Home Health agency   Home health comments Patient reports  has not visited since she was discharged--this RN called  who confirms they have been to see patient on numerous dates since discharge   Psychosocial issues? No   Did the patient receive a copy of their discharge instructions? Yes   Nursing interventions Reviewed instructions with patient   What is the patient's perception of their health status since discharge? Same  [Denies fever but feels as if she is still exhibiting some s/s UTI--frequency, urgency, malodourous and burning with urination. Discussed at f/u and UA obtained,  she is awaiting an update regarding treatment plan.]   Is the patient/caregiver able to teach back the hierarchy of who to call/visit for symptoms/problems? PCP, Specialist, Home health nurse, Urgent Care, ED, 911 Yes   Week 2 Call Completed? Yes          CHRIS ARIAS - Registered Nurse

## 2023-01-12 NOTE — PROGRESS NOTES
Chief Complaint  Hospital Follow Up Visit (UTI)    Subjective        Kanwal Sauer presents to [facility] PRIMARY [facility]ARE  History of Present Illness    Kanwal Sauer is a 72-year-old female who presents today for hospital discharge follow up. Her date of admission was 12/29/2022 and date of discharge was 01/02/2023. She was admitted for altered mental status, tachycardia, and she was found to have UTI with sepsis. She was treated with Rocephin for 3 days. Her culture did grow E. coli, was was susceptible to the Rocephin. She has a history of paroxysmal atrial fibrillation. She was noted to have increased heart rate during her hosptialization. Her metoprolol was increased, however, her heart rate decreased to the 40s BPM. She was discharged on metoprolol to 25 MG 2 times daily. She does have a cardiologist that she follows up with. She is accompanied by an adult female to this visit.      She states when she went to the urgent care before being sent to the hospital, her blood pressure and heart rate were both elevated. She states she is taking metoprolol 25 MG 2 times daily since being discharged. The female present states that she has not followed up with a cardiologist since leaving the hospital. She also states that home health was coming but she has been trying to figure out how to get it scheduled. She states the patient was seeing a cardiologist in [place], but she would like her to be seen by [facility] providers. The patient states that she is expereincing burning while urinating. The female present states she has been experiencing chronic UTI's for a while now. She states that she will become occasionally constipated. She states she will also experience urinary incontience. She states that her urine is a dark yellowish-orange color. The patient states she feels as if she still has a UTI at this point.     She states it is taking a while to get her energy back up after her recent  "hospitalization.     The patient's medications upon discharge from the hospital included Symbicort, Spiriva and metoprolol 2 times daily. She was also told to take Lasix daily. The female present states that metformin dose was changed, and she was unsure exactly how much to give her. She states she is not due to follow up with Dr. Britton.     She states that she had a Cologuard completed in 2021. She states her daughter currently has colon cancer.     Objective   Vital Signs:  /84 (BP Location: Right arm, Patient Position: Sitting, Cuff Size: Adult)   Pulse 50   Temp 97.1 °F (36.2 °C) (Temporal)   Resp 18   Ht 167.6 cm (65.98\")   Wt 102 kg (223 lb 12.8 oz)   SpO2 94%   BMI 36.14 kg/m²   Estimated body mass index is 36.14 kg/m² as calculated from the following:    Height as of this encounter: 167.6 cm (65.98\").    Weight as of this encounter: 102 kg (223 lb 12.8 oz).        Current outpatient and discharge medications have been reconciled for the patient.  Reviewed by: MARK Liriano    Physical Exam  Constitutional:       General: She is not in acute distress.     Appearance: She is well-developed. She is not diaphoretic.   Cardiovascular:      Rate and Rhythm: Normal rate and regular rhythm.      Heart sounds: Normal heart sounds. No murmur heard.    No friction rub. No gallop.   Pulmonary:      Effort: Pulmonary effort is normal. No respiratory distress.      Breath sounds: Normal breath sounds. No wheezing or rales.   Abdominal:      General: Bowel sounds are normal. There is no distension.      Palpations: Abdomen is soft.      Tenderness: There is no abdominal tenderness.   Musculoskeletal:      Cervical back: Neck supple.   Skin:     General: Skin is warm and dry.   Neurological:      Mental Status: She is alert and oriented to person, place, and time.        Result Review :                  Assessment and Plan   Diagnoses and all orders for this visit:    1. Hospital discharge " follow-up (Primary)    2. PAF (paroxysmal atrial fibrillation) ([facility])  -     Ambulatory Referral to Cardiology    3. Dysuria  -     POC Urinalysis Dipstick, Automated    4. Screening for colon cancer  -     Cologuard - Stool, Per Rectum; Future    5. Acute UTI    UTI: UA shows no significant bacteria, discussed increasing fluids, follow up as needed  PAF: metoprolol changed to 25 mg bid heart rate is 50, will need close monitoring, referral placed to electrophysiologist to further evaluate at hospital recommendation,  Recommend follow up with PCP in 4-6 weeks to monitor labs with recent dose adjustments.     1. Hospital follow up    - The patient is doing well since discharge. We reviewed medications. Advised her to follow up with Dr. Britton in 4 to 6 weeks to review the increase in Lasix as well as the metformin. These are pretty significant increases for her. She does not appear dry today. Discussed signs of hypokalemia. She has taken a potassium pill with her diuretic. Repeat UA will be obtained today. She does report increased burning. Discussed hygiene as well as bowel habits to help improve. Holding on any treatment until culture results. However, if there are significant bacteria with symptoms, would need to be treated and consider urology follow-up.     2. Colon cancer screening    - Cologuard was ordered. She has completed this in the past and verbalized understanding.         Follow Up   No follow-ups on file.  Patient was given instructions and counseling regarding her condition or for health maintenance advice. Please see specific information pulled into the AVS if appropriate.       Transcribed from ambient dictation for MARK Buck by Laila Vallejo.  01/12/23   14:27 EST    Patient or patient representative verbalized consent to the visit recording.  I have personally performed the services described in this document as transcribed by the above individual, and it is both accurate and complete.

## 2023-01-13 ENCOUNTER — TELEPHONE (OUTPATIENT)
Dept: FAMILY MEDICINE CLINIC | Facility: CLINIC | Age: 73
End: 2023-01-13
Payer: MEDICARE

## 2023-01-13 NOTE — TELEPHONE ENCOUNTER
Spoke to pt's daughter per missael and let her know Urine is remarkably improved. Pt's daughter understood pt's daughter will make an appointment with urology         ----- Message from MARK Liriano sent at 1/12/2023  1:36 PM EST -----  Urine is remarkably improved

## 2023-01-16 RX ORDER — CITALOPRAM 20 MG/1
TABLET ORAL
Qty: 90 TABLET | Refills: 0 | Status: SHIPPED | OUTPATIENT
Start: 2023-01-16

## 2023-01-16 NOTE — TELEPHONE ENCOUNTER
Rx Refill Note  Requested Prescriptions     Pending Prescriptions Disp Refills   • citalopram (CeleXA) 20 MG tablet [Pharmacy Med Name: CITALOPRAM HBR 20 MG TABLET] 90 tablet 0     Sig: TAKE 1 TABLET BY MOUTH EVERY DAY      Last office visit with prescribing clinician: 12/21/2022   Last telemedicine visit with prescribing clinician: 2/9/2023   Next office visit with prescribing clinician: Visit date not found                         Would you like a call back once the refill request has been completed: [] Yes [] No    If the office needs to give you a call back, can they leave a voicemail: [] Yes [] No    Mandy Dominguez MA  01/16/23, 10:07 EST

## 2023-01-24 ENCOUNTER — READMISSION MANAGEMENT (OUTPATIENT)
Dept: CALL CENTER | Facility: HOSPITAL | Age: 73
End: 2023-01-24
Payer: MEDICARE

## 2023-01-24 NOTE — OUTREACH NOTE
Medical Week 4 Survey    Flowsheet Row Responses   [facility] [place] patient discharged from? [place]   Does the patient have one of the following disease processes/diagnoses(primary or secondary)? Other   Week 4 attempt successful? No          PANKAJ S - Registered Nurse

## 2023-02-09 ENCOUNTER — OFFICE VISIT (OUTPATIENT)
Dept: FAMILY MEDICINE CLINIC | Facility: CLINIC | Age: 73
End: 2023-02-09
Payer: MEDICARE

## 2023-02-09 VITALS
HEIGHT: 65 IN | TEMPERATURE: 96.2 F | HEART RATE: 54 BPM | SYSTOLIC BLOOD PRESSURE: 132 MMHG | WEIGHT: 223 LBS | DIASTOLIC BLOOD PRESSURE: 56 MMHG | OXYGEN SATURATION: 96 % | BODY MASS INDEX: 37.15 KG/M2

## 2023-02-09 DIAGNOSIS — N30.00 ACUTE CYSTITIS WITHOUT HEMATURIA: ICD-10-CM

## 2023-02-09 DIAGNOSIS — I10 ESSENTIAL HYPERTENSION: ICD-10-CM

## 2023-02-09 DIAGNOSIS — E11.65 TYPE 2 DIABETES MELLITUS WITH HYPERGLYCEMIA, WITHOUT LONG-TERM CURRENT USE OF INSULIN: Primary | ICD-10-CM

## 2023-02-09 DIAGNOSIS — I48.0 PAF (PAROXYSMAL ATRIAL FIBRILLATION): ICD-10-CM

## 2023-02-09 PROCEDURE — 99214 OFFICE O/P EST MOD 30 MIN: CPT | Performed by: FAMILY MEDICINE

## 2023-02-10 ENCOUNTER — HOSPITAL ENCOUNTER (EMERGENCY)
Facility: HOSPITAL | Age: 73
Discharge: HOME | End: 2023-02-10
Attending: EMERGENCY MEDICINE | Admitting: EMERGENCY MEDICINE
Payer: MEDICARE

## 2023-02-10 ENCOUNTER — APPOINTMENT (OUTPATIENT)
Dept: CT IMAGING | Facility: HOSPITAL | Age: 73
End: 2023-02-10
Payer: MEDICARE

## 2023-02-10 VITALS
HEIGHT: 65 IN | WEIGHT: 223 LBS | BODY MASS INDEX: 37.15 KG/M2 | SYSTOLIC BLOOD PRESSURE: 154 MMHG | HEART RATE: 50 BPM | OXYGEN SATURATION: 99 % | RESPIRATION RATE: 16 BRPM | TEMPERATURE: 96.5 F | DIASTOLIC BLOOD PRESSURE: 74 MMHG

## 2023-02-10 DIAGNOSIS — S09.90XA CLOSED HEAD INJURY, INITIAL ENCOUNTER: Primary | ICD-10-CM
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
## 2023-02-10 DIAGNOSIS — S00.83XA CONTUSION OF FACE, INITIAL ENCOUNTER: ICD-10-CM

## 2023-02-10 DIAGNOSIS — Z79.01 ANTICOAGULATED: ICD-10-CM

## 2023-02-10 LAB
ALBUMIN SERPL-MCNC: 4.2 G/DL (ref 3.5–5.2)
ALBUMIN/GLOB SERPL: 1.7 G/DL
ALP SERPL-CCNC: 50 U/L (ref 39–117)
ALT SERPL W P-5'-P-CCNC: 12 U/L (ref 1–33)
ANION GAP SERPL CALCULATED.3IONS-SCNC: 9.9 MMOL/L (ref 5–15)
AST SERPL-CCNC: 18 U/L (ref 1–32)
BASOPHILS # BLD AUTO: 0.01 10*3/MM3 (ref 0–0.2)
BASOPHILS NFR BLD AUTO: 0.2 % (ref 0–1.5)
BILIRUB SERPL-MCNC: 0.3 MG/DL (ref 0–1.2)
BUN SERPL-MCNC: 29 MG/DL (ref 8–23)
BUN/CREAT SERPL: 29.3 (ref 7–25)
CALCIUM SPEC-SCNC: 9.6 MG/DL (ref 8.6–10.5)
CHLORIDE SERPL-SCNC: 101 MMOL/L (ref 98–107)
CO2 SERPL-SCNC: 30.1 MMOL/L (ref 22–29)
CREAT SERPL-MCNC: 0.99 MG/DL (ref 0.57–1)
DEPRECATED RDW RBC AUTO: 42.3 FL (ref 37–54)
EGFRCR SERPLBLD CKD-EPI 2021: 60 ML/MIN/1.73
EOSINOPHIL # BLD AUTO: 0.09 10*3/MM3 (ref 0–0.4)
EOSINOPHIL NFR BLD AUTO: 2.1 % (ref 0.3–6.2)
ERYTHROCYTE [DISTWIDTH] IN BLOOD BY AUTOMATED COUNT: 13.4 % (ref 12.3–15.4)
GLOBULIN UR ELPH-MCNC: 2.5 GM/DL
GLUCOSE SERPL-MCNC: 151 MG/DL (ref 65–99)
HCT VFR BLD AUTO: 36 % (ref 34–46.6)
HGB BLD-MCNC: 12.2 G/DL (ref 12–15.9)
INR PPP: 0.98 (ref 0.9–1.1)
LYMPHOCYTES # BLD AUTO: 1.14 10*3/MM3 (ref 0.7–3.1)
LYMPHOCYTES NFR BLD AUTO: 26.5 % (ref 19.6–45.3)
MCH RBC QN AUTO: 29.5 PG (ref 26.6–33)
MCHC RBC AUTO-ENTMCNC: 33.9 G/DL (ref 31.5–35.7)
MCV RBC AUTO: 87 FL (ref 79–97)
MONOCYTES # BLD AUTO: 0.35 10*3/MM3 (ref 0.1–0.9)
MONOCYTES NFR BLD AUTO: 8.1 % (ref 5–12)
NEUTROPHILS NFR BLD AUTO: 2.68 10*3/MM3 (ref 1.7–7)
NEUTROPHILS NFR BLD AUTO: 62.4 % (ref 42.7–76)
PLATELET # BLD AUTO: 122 10*3/MM3 (ref 140–450)
PMV BLD AUTO: 11.3 FL (ref 6–12)
POTASSIUM SERPL-SCNC: 4.3 MMOL/L (ref 3.5–5.2)
PROT SERPL-MCNC: 6.7 G/DL (ref 6–8.5)
PROTHROMBIN TIME: 13.1 SECONDS (ref 11.7–14.2)
RBC # BLD AUTO: 4.14 10*6/MM3 (ref 3.77–5.28)
SODIUM SERPL-SCNC: 141 MMOL/L (ref 136–145)
WBC NRBC COR # BLD: 4.3 10*3/MM3 (ref 3.4–10.8)

## 2023-02-10 PROCEDURE — 70486 CT MAXILLOFACIAL W/O DYE: CPT

## 2023-02-10 PROCEDURE — 85025 COMPLETE CBC W/AUTO DIFF WBC: CPT | Performed by: NURSE PRACTITIONER

## 2023-02-10 PROCEDURE — 99284 EMERGENCY DEPT VISIT MOD MDM: CPT

## 2023-02-10 PROCEDURE — 85610 PROTHROMBIN TIME: CPT | Performed by: NURSE PRACTITIONER

## 2023-02-10 PROCEDURE — 72125 CT NECK SPINE W/O DYE: CPT

## 2023-02-10 PROCEDURE — 70450 CT HEAD/BRAIN W/O DYE: CPT

## 2023-02-10 PROCEDURE — 80053 COMPREHEN METABOLIC PANEL: CPT | Performed by: NURSE PRACTITIONER

## 2023-02-10 RX ORDER — ACETAMINOPHEN 500 MG
1000 TABLET ORAL ONCE
Status: COMPLETED | OUTPATIENT
Start: 2023-02-10 | End: 2023-02-10

## 2023-02-10 RX ORDER — SODIUM CHLORIDE 0.9 % (FLUSH) 0.9 %
10 SYRINGE (ML) INJECTION AS NEEDED
Status: DISCONTINUED | OUTPATIENT
Start: 2023-02-10 | End: 2023-02-10 | Stop reason: HOSPADM

## 2023-02-10 RX ADMIN — ACETAMINOPHEN 1000 MG: 500 TABLET, FILM COATED ORAL at 09:41

## 2023-02-10 NOTE — ED NOTES
Pt had mechanical fall today getting out of shower at independent living facility. Pt denies LOC. Bruising and swelling to right eye at arrival to ER. Pt on eliquis. Pt ambulatory from EMS stretcher to ER bed. Pt NAD. Pt a&ox4. Pt wears 3L at baseline.

## 2023-02-10 NOTE — DISCHARGE INSTRUCTIONS
You have been seen today in the ER for head injury.  Your imaging in the emergency department was negative for any acute intracranial pathology.  Because you are on a anticoagulant, there is a small risk of a delayed intracranial bleed for up to 1 week from the time of injury.  Please have family members keep a close eye on you over the next several days.  Ice to painful areas for 24-48 hours.  Keep head elevated as much as possible to help with facial swelling. Please make sure you have close follow-up with your primary care physician.  Please return to the emergency department immediately for headache, visual disturbance, confusion, weakness, nausea or vomiting or any other concerning symptoms

## 2023-02-10 NOTE — PROGRESS NOTES
"Chief Complaint  Urinary Tract Infection (FOLLOW UP), Diabetes, Atrial Fibrillation, and Hypertension    Subjective        Kanwal Sauer presents to [facility]  History of Present Illness came here for follow-up on UTI.  Patient was admitted in the hospital secondary to UTI, A-fib and mental status changes.  She responded to antibiotic while in the hospital.  Since her discharge has seen urology and also had a follow-up with another provider here in the practice.  She is also complaining of vaginal discharge.  In the hospital metoprolol was changed to 25 mg twice a day.  Patient and daughter also has some concern about her medication changes made in the hospital.  While in the hospital her metformin was increased.  Denies any problem with medication.    She is a still complaining of on and off burning with denies any fever denies any nausea vomiting  Objective   Vital Signs:  /56   Pulse 54   Temp 96.2 °F (35.7 °C) (Temporal)   Ht 165.1 cm (65\")   Wt 101 kg (223 lb)   SpO2 96% Comment: 3 L  BMI 37.11 kg/m²   Estimated body mass index is 37.11 kg/m² as calculated from the following:    Height as of this encounter: 165.1 cm (65\").    Weight as of this encounter: 101 kg (223 lb).             Physical Exam  Constitutional:       General: She is not in acute distress.     Appearance: Normal appearance. She is well-developed.   HENT:      Head: Normocephalic and atraumatic.      Right Ear: Tympanic membrane normal.      Left Ear: Tympanic membrane normal.      Mouth/Throat:      Mouth: Mucous membranes are moist.   Eyes:      General:         Right eye: No discharge.         Left eye: No discharge.      Extraocular Movements: Extraocular movements intact.      Pupils: Pupils are equal, round, and reactive to light.   Cardiovascular:      Rate and Rhythm: Normal rate and regular rhythm.      Pulses: Normal pulses.      Heart sounds: Normal heart sounds.   Pulmonary:      " Effort: Pulmonary effort is normal.      Breath sounds: Normal breath sounds. No wheezing or rales.   Abdominal:      General: Bowel sounds are normal.      Palpations: Abdomen is soft. There is no mass.      Tenderness: There is no abdominal tenderness.   Musculoskeletal:      Cervical back: Normal range of motion and neck supple.      Right lower leg: No edema.      Left lower leg: No edema.   Lymphadenopathy:      Cervical: No cervical adenopathy.   Neurological:      General: No focal deficit present.      Mental Status: She is alert and oriented to person, place, and time.        Result Review :                   Assessment and Plan   Diagnoses and all orders for this visit:    1. Type 2 diabetes mellitus with hyperglycemia, without long-term current use of insulin ([facility]) (Primary)  -     metFORMIN (GLUCOPHAGE) 1000 MG tablet; Take 1 tablet by mouth 2 (Two) Times a Day With Meals.  Dispense: 180 tablet; Refill: 0  -     Hemoglobin A1c; Future  -     Comprehensive Metabolic Panel; Future  -     Hemoglobin A1c; Future    2. PAF (paroxysmal atrial fibrillation) ([facility])    3. Acute cystitis without hematuria    4. Essential hypertension    Other orders  -     metoprolol tartrate (LOPRESSOR) 25 MG tablet; Take 1 tablet by mouth 2 (Two) Times a Day for 30 days.  Dispense: 180 tablet; Refill: 1    Kanwal Sauer is a 72-year-old female patient came here for follow-up on  Diabetes type 2, medication changes noted.  I advised daughter and patient.  She is not having any self side effects last continue metformin 1000 mg twice a day.  I will check hemoglobin A1c and CMP in 3 months.  Proximal atrial fibrillation and hypertension, continue metoprolol 25 mg twice a day  UTI, she has seen urologist last and culture was done there.  Daughter will contact the urology office to find out the culture reports.  Also for vaginal discharge I advised her to schedule appointment with her GYN.         Follow Up   No follow-ups on  file.  Patient was given instructions and counseling regarding her condition or for health maintenance advice. Please see specific information pulled into the AVS if appropriate.

## 2023-02-10 NOTE — ED PROVIDER NOTES
MD ATTESTATION NOTE    The RAAD and I have discussed this patient's history, physical exam, and treatment plan.  I have reviewed the documentation and personally had a face to face interaction with the patient. I affirm the documentation and agree with the treatment and plan.  The attached note describes my personal findings.      I provided a substantive portion of the care of the patient.  I personally performed the physical exam in its entirety, and below are my findings.  For this patient encounter, the patient wore surgical mask, I wore full protective PPE including N95 and eye protection.      Brief HPI: Patient presents for evaluation of head trauma after a fall prior to arrival.  She is on Eliquis due to history of atrial fibrillation.    PHYSICAL EXAM  ED Triage Vitals [02/10/23 0907]   Temp Heart Rate Resp BP SpO2   96.5 °F (35.8 °C) 54 16 142/75 98 %      Temp src Heart Rate Source Patient Position BP Location FiO2 (%)   -- -- -- -- --         GENERAL: Awake and alert, no acute distress  HENT: nares patent, right forehead hematoma  EYES: no scleral icterus, right periorbital hematoma with swelling of the superior lid.  Upon close examination of the eye, extraocular limits are intact, right pupil 3 mm reactive, visual acuity grossly intact.  Left pupil also 3 mm and reactive with extraocular movements intact.  CV: regular rhythm, normal rate  RESPIRATORY: normal effort  MUSCULOSKELETAL: no deformity  NEURO: alert, moves all extremities, follows commands  PSYCH:  calm, cooperative  SKIN: warm, dry    Vital signs and nursing notes reviewed.      CT brain without contrast independently interpreted in PACS and demonstrates a right frontal scalp hematoma without evidence of intracranial hemorrhage.    Plan: Home with strict return precautions.     Chato Caruso MD  02/10/23 8529

## 2023-02-10 NOTE — ED PROVIDER NOTES
EMERGENCY DEPARTMENT ENCOUNTER    Room Number:  07/07  Date of encounter:  2/10/2023  PCP: Nisha Britton MD  Patient Care Team:  Nisha Britton MD as PCP - General (Family Medicine)   Independent Historians: Patient and daughter        PPE: Patient was placed in face mask in first look. Patient was wearing facemask when I entered the room and throughout our encounter. I wore full protective equipment throughout this patient encounter including a face mask, and gloves. Hand hygiene was performed before donning protective equipment and after removal when leaving the room.        HPI:  Chief Complaint: Fall  A complete HPI/ROS/PMH/PSH/SH/FH are unobtainable due to: Nothing    Chronic or social conditions impacting patient care (social determinants of health): None    Context: Kanwal Sauer is a 72 y.o. female with a history of DM, HTN, DVT, A-fib, breast cancer, anticoagulated on Eliquis who arrives to the ED via EMS from [facility].  Patient presents with c/o head injury status post a mechanical fall prior to arrival.   Patient states she had gotten out of the shower, was drying her legs off, thinks that when she bent over she bent a little too far causing her to fall forward.  She states she did hit her head on the floor, she did not lose consciousness because she was able to call for help as soon as it happened.  Patient also complains of pain around her right eye, feeling dizzy initially after the accident.  Patient denies LOC, neck pain, back pain, nausea, vomiting, extremity injury.  Patient states that nothing makes the symptoms better and nothing worsens symptoms.  Daughter states that she had a fall back in November in which she injured her head and neck.    Review of prior external notes (non-ED): Medical records reviewed in [facility], patient was discharged 1-23 after being admitted for acute UTI, PAF, type 2 diabetes.    Review of prior external test results outside of this  encounter: Transthoracic echo performed 9/23/2022 showed an LV EF of 68%.  Normal LV systolic function.  LV diastolic function consistent with impaired relaxation.    PAST MEDICAL HISTORY  Active Ambulatory Problems     Diagnosis Date Noted   • Closed fracture of left distal femur ([facility]) 12/23/2020   • Type 2 diabetes mellitus with circulatory disorder, without long-term current use of insulin ([facility]) 12/24/2020   • Essential hypertension 12/24/2020   • Tremors of nervous system 12/26/2020   • PAF (paroxysmal atrial fibrillation) ([facility]) 12/27/2020   • TARSHA (obstructive sleep apnea) 12/27/2020   • Chronic respiratory failure with hypoxia ([facility]) 12/27/2020   • History of breast cancer 02/22/2021   • History of seizure 05/25/2021   • Risk for falls 05/25/2021   • Neck pain 11/27/2022   • Acute UTI 12/29/2022     Resolved Ambulatory Problems     Diagnosis Date Noted   • CANDICE (acute kidney injury) ([facility]) 12/24/2020   • Acute blood loss anemia 12/24/2020     Past Medical History:   Diagnosis Date   • Allergic    • Anxiety    • Asthma    • Atrial fibrillation ([facility])    • Cancer ([facility])    • COPD (chronic obstructive pulmonary disease) ([facility]) 09/07/2022   • Deep vein thrombosis ([facility])    • Diabetes mellitus ([facility])    • Difficulty walking    • Diverticulosis    • Drug therapy    • Environmental allergies    • Fractures    • Headache, tension-type    • Hyperlipidemia    • Hypertension    • Low back pain    • Neuropathy in diabetes ([facility])    • Obesity    • Scoliosis    • Seizures ([facility])    • Sleep apnea    • Tremor    • Urinary tract infection    • Weakness        The patient has started, but not completed, their COVID-19 vaccination series.    PAST SURGICAL HISTORY  Past Surgical History:   Procedure Laterality Date   • BREAST BIOPSY     • CATARACT EXTRACTION, BILATERAL     • DENTAL PROCEDURE      Removed teeth   • FEMUR OPEN REDUCTION INTERNAL FIXATION Left 12/24/2020    Procedure: DISTAL FEMUR OPEN REDUCTION INTERNAL FIXATION;  Surgeon:  Marley Hernandez MD;  Location: [facility] OR;  Service: Orthopedics;  Laterality: Left;   • MASTECTOMY Left    • REPLACEMENT TOTAL KNEE BILATERAL           FAMILY HISTORY  Family History   Problem Relation Age of Onset   • Arthritis Mother    • Lung cancer Mother    • Brain cancer Mother    • Hypertension Mother    • Cancer Mother         Lung cancer   • Hypertension Father    • Arthritis Sister    • Breast cancer Sister    • Diabetes Sister    • Hypertension Sister    • Dementia Sister    • Cancer Sister         Brest cancer   • Cancer Daughter         Colon cancer   • Developmental Disability Daughter    • Learning disabilities Daughter    • Miscarriages / Stillbirths Daughter         Miscarriage         SOCIAL HISTORY  Social History     Socioeconomic History   • Marital status:    Tobacco Use   • Smoking status: Never   • Smokeless tobacco: Never   • Tobacco comments:     no caffine   Vaping Use   • Vaping Use: Never used   Substance and Sexual Activity   • Alcohol use: Not Currently   • Drug use: Never   • Sexual activity: Not Currently     Partners: Male         ALLERGIES  Baclofen; Doxycycline; Eggs or egg-derived products; Iodine; Latex; Milk-related compounds; Msg [monosodium glutamate]; Penicillins; Metronidazole; Ezetimibe; Latex, natural rubber; Nylon; and Simvastatin        REVIEW OF SYSTEMS  Review of Systems     All systems reviewed and negative except for those discussed in HPI.       PHYSICAL EXAM    I have reviewed the triage vital signs and nursing notes.    ED Triage Vitals [02/10/23 0907]   Temp Heart Rate Resp BP SpO2   96.5 °F (35.8 °C) 54 16 142/75 98 %       Physical Exam  GENERAL: Well appearing, nontoxic appearing, not distressed  HENT: normocephalic, bruising and swelling noted around the right eye  EYES: no scleral icterus, PERRL, EOMI, no ocular entrapment  CV: regular rhythm, regular rate, no murmur  RESPIRATORY: normal effort, CTAB  ABDOMEN: soft   MUSCULOSKELETAL: no  deformity  No cervical, thoracic or lumbar vertebral tenderness to palpation  No step off or crepitus noted  No cervical, thoracic or lumbar paraspinal tenderness to palpation  NEURO: alert, moves all extremities, follows commands, mental status normal/baseline  Recent and remote memory functions are normal  Patient is attentive with normal concentration  Language is fluent  Speech is clear  Speech is nondysarthric  Symmetric smile with no facial droop  Eyes close shut strongly bilaterally  Symmetric eyebrow raise bilaterally  EOMI, PERRL  CN II-XII grossly normal otherwise  5/5 strength to bilateral upper and lower extremities  No pronator drift  Intact FNF     SKIN: warm, dry, no rash   Psych: Appropriate mood and affect  Nursing notes and vital signs reviewed          LAB RESULTS  Recent Results (from the past 24 hour(s))   Comprehensive Metabolic Panel    Collection Time: 02/10/23  9:43 AM    Specimen: Blood   Result Value Ref Range    Glucose 151 (H) 65 - 99 mg/dL    BUN 29 (H) 8 - 23 mg/dL    Creatinine 0.99 0.57 - 1.00 mg/dL    Sodium 141 136 - 145 mmol/L    Potassium 4.3 3.5 - 5.2 mmol/L    Chloride 101 98 - 107 mmol/L    CO2 30.1 (H) 22.0 - 29.0 mmol/L    Calcium 9.6 8.6 - 10.5 mg/dL    Total Protein 6.7 6.0 - 8.5 g/dL    Albumin 4.2 3.5 - 5.2 g/dL    ALT (SGPT) 12 1 - 33 U/L    AST (SGOT) 18 1 - 32 U/L    Alkaline Phosphatase 50 39 - 117 U/L    Total Bilirubin 0.3 0.0 - 1.2 mg/dL    Globulin 2.5 gm/dL    A/G Ratio 1.7 g/dL    BUN/Creatinine Ratio 29.3 (H) 7.0 - 25.0    Anion Gap 9.9 5.0 - 15.0 mmol/L    eGFR 60.0 (L) >60.0 mL/min/1.73   Protime-INR    Collection Time: 02/10/23  9:43 AM    Specimen: Blood   Result Value Ref Range    Protime 13.1 11.7 - 14.2 Seconds    INR 0.98 0.90 - 1.10   CBC Auto Differential    Collection Time: 02/10/23  9:43 AM    Specimen: Blood   Result Value Ref Range    WBC 4.30 3.40 - 10.80 10*3/mm3    RBC 4.14 3.77 - 5.28 10*6/mm3    Hemoglobin 12.2 12.0 - 15.9 g/dL    Hematocrit  36.0 34.0 - 46.6 %    MCV 87.0 79.0 - 97.0 fL    MCH 29.5 26.6 - 33.0 pg    MCHC 33.9 31.5 - 35.7 g/dL    RDW 13.4 12.3 - 15.4 %    RDW-SD 42.3 37.0 - 54.0 fl    MPV 11.3 6.0 - 12.0 fL    Platelets 122 (L) 140 - 450 10*3/mm3    Neutrophil % 62.4 42.7 - 76.0 %    Lymphocyte % 26.5 19.6 - 45.3 %    Monocyte % 8.1 5.0 - 12.0 %    Eosinophil % 2.1 0.3 - 6.2 %    Basophil % 0.2 0.0 - 1.5 %    Neutrophils, Absolute 2.68 1.70 - 7.00 10*3/mm3    Lymphocytes, Absolute 1.14 0.70 - 3.10 10*3/mm3    Monocytes, Absolute 0.35 0.10 - 0.90 10*3/mm3    Eosinophils, Absolute 0.09 0.00 - 0.40 10*3/mm3    Basophils, Absolute 0.01 0.00 - 0.20 10*3/mm3       Ordered the above labs and independently reviewed the results.        RADIOLOGY  CT Head Without Contrast, CT Cervical Spine Without Contrast, CT Facial Bones Without Contrast    Result Date: 2/10/2023  EMERGENCY NONCONTRAST HEAD CT, FACIAL CT AND CERVICAL SPINE CT ON 02/10/2023  CLINICAL HISTORY: Patient had a mechanical fall, hit right forehead, has scalp hematoma over the right forehead and right periorbital region, has had face and neck pain. Patient is on blood thinner - Eliquis.  HEAD CT TECHNIQUE: Spiral CT images were obtained from the base of the skull to the vertex without intravenous contrast. The images were reformatted and submitted in 3 mm thick axial, sagittal and coronal CT sections with brain algorithm and 2 mm thick axial CT sections with high-resolution bone algorithm.  COMPARISON: This is correlated to prior noncontrast head CT from [facility] on 07/05/2022.  FINDINGS: There is minimal low-density in the periventricular white matter consistent with minimal small vessel disease. The remainder of the brain parenchyma is normal in attenuation. The ventricles are normal in size. There is no focal mass effect. There is no midline shift. No extra-axial fluid collections are identified. There is no evidence of acute intracranial hemorrhage. There is a  moderate size scalp hematoma over the anterior-inferior right frontal bone extending to the right periorbital region from today's head trauma. No underlying acute skull fracture is identified. The calvarium and skull base are normal in appearance. The paranasal sinuses, mastoid air cells and middle ear cavities are clear.      1. No acute intracranial abnormality is seen. 2. There is minimal small vessel disease in the periventricular white matter and some calcified plaques in the cavernous and supracavernous segments of the internal carotid arteries. There is a moderate size scalp hematoma extending over the anterior-inferior right frontal bone into the right periorbital region from today's head trauma. The remainder of the head CT is within normal limits with no acute skull fracture or intracranial hemorrhage identified.  FACIAL CT TECHNIQUE: Spiral CT images were obtained through the facial bones in the axial imaging plane. The images were reformatted and are submitted in 2 mm thick axial, sagittal and coronal CT sections with brain algorithm and 1 mm thick axial, sagittal and coronal CT sections with high-resolution bone algorithm.  FINDINGS: The paranasal sinuses are clear. There is a moderate size scalp hematoma over the anterior-inferior right frontal bone extending into the right periorbital region from today's head and facial trauma. There are bilateral intraocular lens implants in the globes from previous cataract surgery. Otherwise the globes are normal in appearance. The extraocular muscles and optic nerves have a normal noncontrast CT appearance. Intra and extracoronal fat of the orbits is clean. No orbital or facial fracture is identified. There are mild osteoarthritic changes in the temporomandibular joints bilaterally with mild marginal spurring off the mandibular heads bilaterally.  IMPRESSION: 1. There are bilateral intraocular lens implants in place. There are some osteoarthritic changes in the  temporomandibular joints bilaterally with mild marginal spurring off the mandibular heads bilaterally. The patient is edentulous. 2. There is a moderate size scalp hematoma over the anterior inferior right frontal bone extending into the right periorbital region from today's head and facial trauma. The remainder of the facial CT is within normal limits with no acute facial fracture identified.  CERVICAL SPINE CT TECHNIQUE: Spiral CT images were obtained from the base of the skull down to the T2-3 thoracic level. The images were reformatted and are submitted in 2 mm thick axial and sagittal CT sections with soft tissue algorithm and 1 mm thick axial, sagittal and coronal CT sections with high-resolution bone algorithm.  COMPARISON: This is correlated to a prior cervical spine CT from [facility] on 02/03/2021 and 12/23/2020.  FINDINGS: The atlantooccipital articulation is within normal limits  At C1-2 there is evidence of failure of complete fusion posterior midline of the ring of C1 which is a normal anatomic variation and unchanged. There are mild arthritic changes at the atlantodental interval with mild marginal spurring off the anterior ring of C1 and the odontoid. Otherwise the C1-2 level is normal in appearance.  At C2-3 there is minimal right and there is moderate left facet overgrowth. The posterior disc margin is normal. There is no canal or right foraminal narrowing. Facet spurs mildly narrow the left neural foramen.  At C3-4 there is mild right and there is moderate to severe left facet overgrowth. There is a 2-3 mm degenerative anterolisthesis of C3 on C4 and mild posterior spurring. There is mild canal narrowing. There is some mild uncovertebral joint spurring and there is mild-to-moderate left and no right foraminal narrowing.  At C4-5 there is mild right and there is moderate to severe left facet overgrowth and there is a 2-3 mm rotary anterolisthesis of the left side of C4 on C5. There  is minimal posterior disc bulge. There is no canal or foraminal narrowing.  At C5-6 there is moderate right facet overgrowth and partial bony fusion across the facet joints and moderate-to-severe disc space narrowing. There is minimal posterior spurring. There is minimal if any canal narrowing. There is some uncovertebral joint hypertrophy and there is mild right and no left foraminal narrowing.  At C6-7 there is disc space narrowing. There is degenerative endplate change and mild diffuse posterior disc osteophyte complex but only minimal if any canal narrowing. The facets are normal. There is some mild bilateral uncovertebral joint hypertrophy and there is mild right and no left foraminal narrowing.  At C7-T1 there is mild left and moderate right facet overgrowth, mild disc space narrowing and minimal endplate spurring but there is no canal or left foraminal narrowing. There is mild right bony foraminal narrowing.  No acute fracture is seen in the cervical spine.  There is an ovoid sclerotic focus in the anterior superior aspect of the left lateral mass of C1 that measures 8 mm in size, unchanged dating back to cervical spine CT 12/23/2020 and it is felt to be a benign bone island.  IMPRESSION: 1. No acute fracture is seen in the cervical spine. There is cervical spondylosis as described above. There is an 8 mm bone island at the anterior superior aspect of the left lateral mass of C1 unchanged since 12/23/2020.  Radiation dose reduction techniques were utilized, including automated exposure control and exposure modulation based on body size.  This report was finalized on 2/10/2023 2:37 PM by Dr. Marco Alvarez M.D.        I ordered the above noted radiological studies. Reviewed by me and discussed with radiologist.  See dictation for official radiology interpretation.      PROCEDURES    Procedures    DIFFERENTIAL DIAGNOSIS:  Differential Diagnosis for head injury include but are not limited to the  following:  Cerebral contusion, Concussion ( with or without LOC), Head contusion, Skull fracture, orbital fracture, Hematoma, Intracranial Hemorrhage, Laceration, Post Concussion Syndrome.       PROGRESS, DATA ANALYSIS, CONSULTS, AND MEDICAL DECISION MAKING    All labs have been independently reviewed by me.  All radiology studies have been reviewed by me and discussed with radiologist dictating the report.   EKG's independently viewed and interpreted by me.  Discussion below represents my analysis of pertinent findings related to patient's condition, differential diagnosis, treatment plan and final disposition.        ED Course as of 02/10/23 1526   Fri Feb 10, 2023   0950 Patient is a 72-year-old who presents today after a mechanical fall resulting in head injury, facial injury.  CTs of the head, face and C-spine have been ordered. [MS]   1130  Discussed with Dr. Alvarez regarding the CT head, C-spine and facial bones results which revealed all negative acute  See dictation for official radiology interpretation.     [MS]   1130 Reviewed pt's history and workup with Dr. Caruso.       [MS]   1150 Patient and daughter were updated on unremarkable imaging of the head, neck and face.  Lab work unremarkable also.  Discussed that since patient is anticoagulated there is a very small chance that she could still develop a bleed up to a week out.  Closed head injury precautions discussed in detail with patient and daughter.  Daughter states that someone will be staying with the patient all weekend.  They are both agreeable to discharge. [MS]      ED Course User Index  [MS] Cayla Medrano, APRN         DISPOSITION  ED Disposition     ED Disposition   Discharge    Condition   Stable    Comment   --           Discussed plan for discharge, as there is no emergent indication for admission. Pt/family is agreeable and understands need for follow up and repeat testing.  Pt is aware that discharge does not mean that nothing  is wrong but it indicates no emergency is present that requires admission and they must continue care with follow-up as given below or physician of their choice.   Patient/Family voiced understanding of above instructions.  Patient discharged in stable condition.    DIAGNOSIS  Final diagnoses:   Closed head injury, initial encounter   Contusion of face, initial encounter   Fall, initial encounter   Anticoagulated       FOLLOW UP   Nisha Britton MD  [address]  [address]  [place] [address]  [phone]    Call today        RX     Medication List      Changed    furosemide 40 MG tablet  Commonly known as: LASIX  TAKE 1 TABLET BY MOUTH EVERY DAY  What changed: when to take this              AS OF 15:26 EST VITALS:    BP - 154/74  HR - 50  TEMP - 96.5 °F (35.8 °C)  O2 SATS - 99%      MEDICATIONS GIVEN IN ER    Medications   acetaminophen (TYLENOL) tablet 1,000 mg (1,000 mg Oral Given 2/10/23 0941)                Note Disclaimer: At [facility], we believe that sharing information builds trust and better relationships. You are receiving this note because you recently visited [facility]. It is possible you will see health information before a provider has talked with you about it. This kind of information can be easy to misunderstand. To help you fully understand what it means for your health, we urge you to discuss this note with your provider.       Cayla Medrano, APRN  02/10/23 1526

## 2023-02-10 NOTE — ED TRIAGE NOTES
Pt to ER from [facility] assisted living via [facility], pt had mechanical fall out of shower, no LOC, denies blood thinner usage. Pt arrives awake alert abc's intact ecchymosis right eye. Pt placed in mask at triage. This RN also wearing a mask.

## 2023-02-13 ENCOUNTER — PATIENT OUTREACH (OUTPATIENT)
Dept: CASE MANAGEMENT | Facility: OTHER | Age: 73
End: 2023-02-13
Payer: MEDICARE

## 2023-02-13 DIAGNOSIS — M54.2 NECK PAIN: ICD-10-CM

## 2023-02-13 RX ORDER — TIZANIDINE 4 MG/1
4 TABLET ORAL NIGHTLY PRN
Qty: 30 TABLET | Refills: 1 | Status: SHIPPED | OUTPATIENT
Start: 2023-02-13

## 2023-02-13 NOTE — OUTREACH NOTE
Patient Outreach    AMBULATORY CASE MANAGEMENT NOTE    Name and Relationship of Patient/Support Person: MONICA TYSON - Emergency Contact    Call placed to preferred number on chart answered by her daughter Monica. Introduced self and role of ACM. She states she is actually on the way to an urgent care center. Her mothers chest is very bruised and painful and she felt this needed to be checked out. Offered to call back in a couple of days to see how she is doing and she would like that.        Reyna BUCIO  Ambulatory Case Management    2/13/2023, 15:27 EST

## 2023-02-13 NOTE — TELEPHONE ENCOUNTER
Rx Refill Note  Requested Prescriptions     Pending Prescriptions Disp Refills   • tiZANidine (ZANAFLEX) 4 MG tablet [Pharmacy Med Name: TIZANIDINE HCL 4 MG TABLET] 90 tablet      Sig: TAKE 1 TABLET BY MOUTH EVERY NIGHT      Last office visit with prescribing clinician: 11/23/2022   Last telemedicine visit with prescribing clinician: Visit date not found   Next office visit with prescribing clinician: Visit date not found                         Would you like a call back once the refill request has been completed: [] Yes [] No    If the office needs to give you a call back, can they leave a voicemail: [] Yes [] No    Mandy Dominguez MA  02/13/23, 11:21 EST

## 2023-02-15 ENCOUNTER — PATIENT OUTREACH (OUTPATIENT)
Dept: CASE MANAGEMENT | Facility: OTHER | Age: 73
End: 2023-02-15
Payer: MEDICARE

## 2023-02-15 NOTE — OUTREACH NOTE
Patient Outreach    AMBULATORY CASE MANAGEMENT NOTE    Name and Relationship of Patient/Support Person: Kanwal Sauer - Self    Call placed to patient at preferred number answered by her daughter Monica. They did go to  and no fractures were visualized. She states her mother is still sore. Confirmed with her mother that it does hurt to take deep breaths. Discussed increased possibility of developing pneumonia and signs to watch for. Encouraged to make an appointment with Dr Britton. Denies questions or concerns. Encouraged to call [facility] or 24/7 nurse hotline if needed.         Reyna BUCIO  Ambulatory Case Management    2/15/2023, 13:06 EST

## 2023-02-17 ENCOUNTER — OFFICE VISIT (OUTPATIENT)
Dept: FAMILY MEDICINE CLINIC | Facility: CLINIC | Age: 73
End: 2023-02-17
Payer: MEDICARE

## 2023-02-17 VITALS
WEIGHT: 226 LBS | HEIGHT: 65 IN | TEMPERATURE: 96 F | BODY MASS INDEX: 37.65 KG/M2 | SYSTOLIC BLOOD PRESSURE: 143 MMHG | DIASTOLIC BLOOD PRESSURE: 64 MMHG | HEART RATE: 55 BPM | OXYGEN SATURATION: 97 %

## 2023-02-17 DIAGNOSIS — S20.219A CONTUSION OF CHEST WALL WITH INTACT SKIN: ICD-10-CM

## 2023-02-17 DIAGNOSIS — S00.83XD CONTUSION OF FACE, SUBSEQUENT ENCOUNTER: ICD-10-CM

## 2023-02-17 DIAGNOSIS — S09.90XA CLOSED HEAD INJURY, INITIAL ENCOUNTER: Primary | ICD-10-CM

## 2023-02-17 PROCEDURE — 99214 OFFICE O/P EST MOD 30 MIN: CPT | Performed by: FAMILY MEDICINE

## 2023-02-17 NOTE — PROGRESS NOTES
"Chief Complaint  Hospital Follow Up Visit (Fall , Hit head and chest 1 week ago  )    Ton Sauer presents to [facility]  History of Present Illness here for follow-up from ER visit secondary to fall.  Patient with history of A-fib diabetes type 2 DVT on Eliquis for chronic anticoagulation , went to ER after sustaining fall at her nursing home facility.  She hit her head on the floor, did not lose her consciousness.  Had a knee she will work-up in the ER including CT scan of the head.  Patient also sustained injury on her anterior chest wall.  Because of increased chest wall pain daughter took her to the urgent care.  Had chest x-ray done at urgent care.  Patient is still complaining of pain especially on deep breathing.  I denies any fever denies any cough  Objective   Vital Signs:  /64   Pulse 55   Temp 96 °F (35.6 °C) (Temporal)   Ht 165.1 cm (65\")   Wt 103 kg (226 lb)   SpO2 97% Comment: 3L  BMI 37.61 kg/m²   Estimated body mass index is 37.61 kg/m² as calculated from the following:    Height as of this encounter: 165.1 cm (65\").    Weight as of this encounter: 103 kg (226 lb).             Physical Exam  Constitutional:       General: She is not in acute distress.     Appearance: Normal appearance. She is well-developed.   HENT:      Head: Normocephalic and atraumatic.      Comments: Face discoloration right side of face     Right Ear: Tympanic membrane normal.      Left Ear: Tympanic membrane normal.      Mouth/Throat:      Mouth: Mucous membranes are moist.   Eyes:      General:         Right eye: No discharge.         Left eye: No discharge.      Extraocular Movements: Extraocular movements intact.      Pupils: Pupils are equal, round, and reactive to light.   Cardiovascular:      Rate and Rhythm: Normal rate and regular rhythm.      Pulses: Normal pulses.      Heart sounds: Normal heart sounds.   Pulmonary:      Effort: Pulmonary effort is " normal.      Breath sounds: Normal breath sounds. No wheezing or rales.   Chest:      Chest wall: Tenderness present.      Comments: Contusion in middle of chest wall   Abdominal:      General: Bowel sounds are normal.      Palpations: Abdomen is soft. There is no mass.      Tenderness: There is no abdominal tenderness.   Musculoskeletal:      Cervical back: Normal range of motion and neck supple.      Right lower leg: No edema.      Left lower leg: No edema.   Lymphadenopathy:      Cervical: No cervical adenopathy.   Neurological:      General: No focal deficit present.      Mental Status: She is alert and oriented to person, place, and time.        Result Review :      Data reviewed: Recent hospitalization notes er             Assessment and Plan   Diagnoses and all orders for this visit:    1. Closed head injury, initial encounter (Primary)    2. Contusion of face, subsequent encounter    3. Contusion of chest wall with intact skin        Kanwal Sauer is a 72-year-old female patient seen today for follow-up on her fall and injury on her head face and chest wall.  I reviewed the ER record and also reviewed the chest x-ray done at urgent care.  Reassurance given to patient and daughter .  Advised to take Tylenol as needed for pain and also advised that she can use over-the-counter lidocaine patches for pain.  Follow-up as needed             Follow Up   No follow-ups on file.  Patient was given instructions and counseling regarding her condition or for health maintenance advice. Please see specific information pulled into the AVS if appropriate.

## 2023-02-23 NOTE — TELEPHONE ENCOUNTER
Rx Refill Note  Requested Prescriptions     Pending Prescriptions Disp Refills   • KLOR-CON 10 MEQ CR tablet [Pharmacy Med Name: KLOR-CON M10 TABLET] 90 tablet 1     Sig: TAKE 1 TABLET BY MOUTH DAILY WITH FUROSEMIDE      Last office visit with prescribing clinician: 12/21/2022   Last telemedicine visit with prescribing clinician: Visit date not found   Next office visit with prescribing clinician: Visit date not found                         Would you like a call back once the refill request has been completed: [] Yes [] No    If the office needs to give you a call back, can they leave a voicemail: [] Yes [] No    Malena Ortega LPN  02/23/23, 09:29 EST

## 2023-02-24 RX ORDER — POTASSIUM CHLORIDE 750 MG/1
TABLET, EXTENDED RELEASE ORAL
Qty: 90 TABLET | Refills: 1 | Status: SHIPPED | OUTPATIENT
Start: 2023-02-24

## 2023-03-14 NOTE — TELEPHONE ENCOUNTER
Rx Refill Note  Requested Prescriptions     Pending Prescriptions Disp Refills   • montelukast (SINGULAIR) 10 MG tablet [Pharmacy Med Name: MONTELUKAST SOD 10 MG TABLET] 90 tablet 0     Sig: TAKE 1 TABLET BY MOUTH EVERYDAY AT BEDTIME      Last office visit with prescribing clinician: 02/17/23  Last telemedicine visit with prescribing clinician: Visit date not found   Next office visit with prescribing clinician: Visit date not found                         Would you like a call back once the refill request has been completed: [] Yes [] No    If the office needs to give you a call back, can they leave a voicemail: [] Yes [] No    Kelly Hodge MA  03/14/23, 09:06 EDT

## 2023-03-15 RX ORDER — MONTELUKAST SODIUM 10 MG/1
TABLET ORAL
Qty: 90 TABLET | Refills: 0 | Status: SHIPPED | OUTPATIENT
Start: 2023-03-15

## 2023-03-20 RX ORDER — FENOFIBRATE 160 MG/1
TABLET ORAL
Qty: 90 TABLET | Refills: 1 | Status: SHIPPED | OUTPATIENT
Start: 2023-03-20

## 2023-03-20 RX ORDER — PRIMIDONE 50 MG/1
TABLET ORAL
Qty: 270 TABLET | Refills: 0 | Status: SHIPPED | OUTPATIENT
Start: 2023-03-20

## 2023-03-20 NOTE — TELEPHONE ENCOUNTER
Rx Refill Note  Requested Prescriptions     Pending Prescriptions Disp Refills   • primidone (MYSOLINE) 50 MG tablet [Pharmacy Med Name: PRIMIDONE 50 MG TABLET] 270 tablet 0     Sig: TAKE 1 TABLET BY MOUTH THREE TIMES A DAY      Last office visit with prescribing clinician: 12/21/2022   Last telemedicine visit with prescribing clinician: Visit date not found   Next office visit with prescribing clinician: Visit date not found                         Would you like a call back once the refill request has been completed: [] Yes [] No    If the office needs to give you a call back, can they leave a voicemail: [] Yes [] No    Esthela Blancas Rep  03/20/23, 10:45 EDT

## 2023-03-20 NOTE — TELEPHONE ENCOUNTER
Rx Refill Note  Requested Prescriptions     Pending Prescriptions Disp Refills   • fenofibrate 160 MG tablet [Pharmacy Med Name: FENOFIBRATE 160 MG TABLET] 90 tablet 1     Sig: TAKE 1 TABLET BY MOUTH EVERY DAY      Last office visit with prescribing clinician: 2/17/2023   Last telemedicine visit with prescribing clinician: 3/19/2023   Next office visit with prescribing clinician: 10/6/2023                         Would you like a call back once the refill request has been completed: [] Yes [] No    If the office needs to give you a call back, can they leave a voicemail: [] Yes [] No    Katiana Toney, PCT  03/20/23, 08:45 EDT   DISPLAY PLAN FREE TEXT

## 2023-03-21 RX ORDER — APIXABAN 5 MG/1
TABLET, FILM COATED ORAL
Qty: 60 TABLET | Refills: 2 | Status: SHIPPED | OUTPATIENT
Start: 2023-03-21

## 2023-03-21 NOTE — TELEPHONE ENCOUNTER
Rx Refill Note  Requested Prescriptions     Pending Prescriptions Disp Refills   • Eliquis 5 MG tablet tablet [Pharmacy Med Name: ELIQUIS 5 MG TABLET] 60 tablet 2     Sig: TAKE 1 TABLET BY MOUTH EVERY 12 HOURS      Last office visit with prescribing clinician: 2/17/2023   Last telemedicine visit with prescribing clinician: Visit date not found   Next office visit with prescribing clinician: 10/6/2023                         Would you like a call back once the refill request has been completed: [] Yes [] No    If the office needs to give you a call back, can they leave a voicemail: [] Yes [] No    Katiana Toney, PCT  03/21/23, 08:11 EDT

## 2023-04-11 ENCOUNTER — TELEPHONE (OUTPATIENT)
Dept: FAMILY MEDICINE CLINIC | Facility: CLINIC | Age: 73
End: 2023-04-11

## 2023-04-11 NOTE — TELEPHONE ENCOUNTER
Caller: SHELDON TYSON    Relationship to patient: Emergency Contact    Best call back number:[phone]    Chief complaint:SEVERE NECK/BACK PAIN     Type of visit: OFFICE VISIT     Requested date: ANYTIME

## 2023-04-12 ENCOUNTER — OFFICE VISIT (OUTPATIENT)
Dept: FAMILY MEDICINE CLINIC | Facility: CLINIC | Age: 73
End: 2023-04-12
Payer: MEDICARE

## 2023-04-12 VITALS
BODY MASS INDEX: 37.49 KG/M2 | WEIGHT: 225 LBS | HEIGHT: 65 IN | TEMPERATURE: 97.3 F | OXYGEN SATURATION: 94 % | HEART RATE: 58 BPM | DIASTOLIC BLOOD PRESSURE: 70 MMHG | RESPIRATION RATE: 18 BRPM | SYSTOLIC BLOOD PRESSURE: 140 MMHG

## 2023-04-12 DIAGNOSIS — M54.50 CHRONIC MIDLINE LOW BACK PAIN WITHOUT SCIATICA: ICD-10-CM

## 2023-04-12 DIAGNOSIS — M54.2 NECK PAIN: Primary | ICD-10-CM

## 2023-04-12 DIAGNOSIS — G89.29 CHRONIC MIDLINE LOW BACK PAIN WITHOUT SCIATICA: ICD-10-CM

## 2023-04-12 PROCEDURE — 99213 OFFICE O/P EST LOW 20 MIN: CPT | Performed by: FAMILY MEDICINE

## 2023-04-12 PROCEDURE — 3078F DIAST BP <80 MM HG: CPT | Performed by: FAMILY MEDICINE

## 2023-04-12 PROCEDURE — 3077F SYST BP >= 140 MM HG: CPT | Performed by: FAMILY MEDICINE

## 2023-04-12 RX ORDER — PREDNISONE 20 MG/1
40 TABLET ORAL DAILY
Qty: 10 TABLET | Refills: 0 | Status: SHIPPED | OUTPATIENT
Start: 2023-04-12 | End: 2023-04-17

## 2023-04-12 NOTE — PROGRESS NOTES
"Chief Complaint  Chief Complaint   Patient presents with   • Neck Pain     Pt c/o ongoing neck pain   • Back Pain     Pt c/o ongoing mid back pain        Subjective    History of Present Illness        Kanwal Sauer presents to [facility] for   Neck Pain   This is a chronic problem. The current episode started more than 1 year ago. The problem has been gradually worsening. The pain is present in the midline. The quality of the pain is described as aching and stabbing. The pain is moderate. The symptoms are aggravated by bending and twisting. The pain is same all the time. Stiffness is present all day. Associated symptoms include headaches and numbness. Pertinent negatives include no chest pain, photophobia, syncope or tingling. She has tried heat, acetaminophen and ice for the symptoms. The treatment provided mild relief.   Back Pain  This is a chronic problem. The current episode started more than 1 year ago. The problem occurs intermittently. The problem has been gradually worsening since onset. The pain is present in the lumbar spine. The pain is moderate. The symptoms are aggravated by bending and twisting. Associated symptoms include headaches and numbness. Pertinent negatives include no chest pain or tingling. She has tried heat for the symptoms. The treatment provided mild relief.        Objective   Vital Signs:   Visit Vitals  /70   Pulse 58   Temp 97.3 °F (36.3 °C)   Resp 18   Ht 165.1 cm (65\")   Wt 102 kg (225 lb)   SpO2 94%   BMI 37.44 kg/m²          Physical Exam  Vitals reviewed.   Constitutional:       Appearance: She is well-developed.   HENT:      Head: Normocephalic and atraumatic.   Eyes:      General:         Right eye: No discharge.         Left eye: No discharge.      Conjunctiva/sclera: Conjunctivae normal.   Neck:      Thyroid: No thyromegaly.   Cardiovascular:      Rate and Rhythm: Normal rate and regular rhythm.      Pulses: Normal pulses.      Heart " sounds: Normal heart sounds, S1 normal and S2 normal.     No S3 or S4 sounds.   Pulmonary:      Effort: Pulmonary effort is normal.      Breath sounds: Normal breath sounds. No stridor.   Abdominal:      General: Bowel sounds are normal.      Palpations: Abdomen is soft.   Musculoskeletal:      Cervical back: Neck supple. Tenderness present. Muscular tenderness present. Decreased range of motion.      Lumbar back: Spasms, tenderness and bony tenderness present. Decreased range of motion.   Skin:     General: Skin is warm and dry.      Capillary Refill: Capillary refill takes less than 2 seconds.   Neurological:      Mental Status: She is alert and oriented to person, place, and time.            Result Review :                    Assessment and Plan      Diagnoses and all orders for this visit:    1. Neck pain (Primary)  Assessment & Plan:  Pt was prescribed Prednisone to help treat her symptoms.   She was encouraged to RTC if her symptoms does not improve.      Orders:  -     predniSONE (DELTASONE) 20 MG tablet; Take 2 tablets by mouth Daily for 5 days.  Dispense: 10 tablet; Refill: 0    2. Chronic midline low back pain without sciatica  Assessment & Plan:  Pt was prescribed Prednisone to help treat her symptoms.   She was encouraged to RTC if her symptoms does not improve.             Follow Up   No follow-ups on file.  Patient was given instructions and counseling regarding her condition or for health maintenance advice. Please see specific information pulled into the AVS if appropriate.

## 2023-04-19 ENCOUNTER — OFFICE VISIT (OUTPATIENT)
Dept: CARDIOLOGY | Facility: CLINIC | Age: 73
End: 2023-04-19
Payer: MEDICARE

## 2023-04-19 VITALS
HEIGHT: 65 IN | DIASTOLIC BLOOD PRESSURE: 76 MMHG | OXYGEN SATURATION: 94 % | SYSTOLIC BLOOD PRESSURE: 126 MMHG | HEART RATE: 62 BPM | BODY MASS INDEX: 37.15 KG/M2 | WEIGHT: 223 LBS

## 2023-04-19 DIAGNOSIS — G47.33 OSA (OBSTRUCTIVE SLEEP APNEA): ICD-10-CM

## 2023-04-19 DIAGNOSIS — I48.0 PAF (PAROXYSMAL ATRIAL FIBRILLATION): Primary | ICD-10-CM

## 2023-04-19 PROCEDURE — 3074F SYST BP LT 130 MM HG: CPT | Performed by: NURSE PRACTITIONER

## 2023-04-19 PROCEDURE — 3078F DIAST BP <80 MM HG: CPT | Performed by: NURSE PRACTITIONER

## 2023-04-19 PROCEDURE — 1159F MED LIST DOCD IN RCRD: CPT | Performed by: NURSE PRACTITIONER

## 2023-04-19 PROCEDURE — 99214 OFFICE O/P EST MOD 30 MIN: CPT | Performed by: NURSE PRACTITIONER

## 2023-04-19 PROCEDURE — 1160F RVW MEDS BY RX/DR IN RCRD: CPT | Performed by: NURSE PRACTITIONER

## 2023-04-19 NOTE — PROGRESS NOTES
Date of Office Visit: 23  Encounter Provider: MARK Quiroga  Place of Service: [facility]  Patient Name: Kanwal Sauer  :1950    Chief Complaint   Patient presents with   • PAF (paroxysmal atrial fibrillation)   • Hypertension   • Sleep Apnea   • Follow-up   :     HPI: Kanwal Sauer is a 72 y.o. female  with hypertension, paroxysmal atrial fibrillation, asthma and COPD, diabetes mellitus type II, obstructive sleep apnea anxiety/depression, and obesity.      She is followed by Dr. Yasir Romano. I will visit with her for the first time and have reviewed her medical record.     I reviewed the transesophageal echocardiogram from  which was completed prior to [facility] [place], [place].  She had normal left ventricular systolic function, mild right ventricular enlargement, mild left atrial enlargement, mild mitral valve regurgitation and moderate tricuspid regurgitation with RVSP 59 mmHg.  She had follow-up transthoracic echocardiogram in 2022 which showed normal left ventricular systolic function, mild concentric hypertrophy, grade 1 diastolic dysfunction and normal RVSP.    She was hospitalized at the end of 2022 with confusion and dysuria.  She was found to have urinary tract infection and was in atrial fibrillation with RVR.  She was given IV Lopressor and metoprolol tartrate.  Her home metoprolol succinate was restarted.  She also required IV digoxin.  She was continued on apixaban.  She converted to normal sinus rhythm.    She presents today accompanied by her daughter.  She has not had any tachycardia that they are aware of.  She lives in an independent living [facility] community.  She has physical therapy and Occupational Therapy weekly.  Her vital signs were checked with them.  She takes Lasix 2-3 times a week to help control lower  "extremity swelling.  When she takes Lasix 2-3 times a week she has minimal to no swelling.  She remains on oxygen consistently and has unchanged shortness of breath with exertion.  No recent wheezing that is uncontrollable.  No chest pain tightness or pressure palpitations.  No blood in the urine or stool with her Eliquis.  She is compliant with CPAP.  She is following with urology for recurrent UTI.  She uses a walker to ambulate.  No near-syncope or syncope.  Allergies   Allergen Reactions   • Baclofen Other (See Comments)     dysrhythmia   • Doxycycline Rash   • Eggs Or Egg-Derived Products Anaphylaxis   • Iodine Anaphylaxis   • Latex Anaphylaxis   • Milk-Related Compounds Unknown - High Severity   • Msg [Monosodium Glutamate] Anaphylaxis   • Penicillins Hives     Tolerated cefazolin during December 2020 admission   • Metronidazole Unknown - High Severity   • Ezetimibe Rash   • Latex, Natural Rubber Rash   • Nylon Rash   • Simvastatin Rash           Family and social history reviewed.     ROS  All other systems were reviewed and are negative          Objective:     Vitals:    04/19/23 1444   BP: 126/76   BP Location: Left arm   Patient Position: Sitting   Pulse: 62   SpO2: 94%   Weight: 101 kg (223 lb)   Height: 165.1 cm (65\")     Body mass index is 37.11 kg/m².    PHYSICAL EXAM:  Pulmonary:      Breath sounds: Examination of the right-lower field reveals decreased breath sounds. Examination of the left-lower field reveals decreased breath sounds. Decreased breath sounds present.      Comments: Oxygen in place  Cardiovascular:      Bradycardia present. Regular rhythm.         Procedures      Current Outpatient Medications   Medication Sig Dispense Refill   • acetaminophen (TYLENOL) 325 MG tablet Take 2 tablets by mouth Every 4 (Four) Hours As Needed for Mild Pain .     • albuterol sulfate  (90 Base) MCG/ACT inhaler INHALE 2 PUFFS EVERY 4 HOURS AS NEEDED FOR WHEEZING 18 g 1   • budesonide-formoterol " (SYMBICORT) 160-4.5 MCG/ACT inhaler Inhale 2 puffs 2 (Two) Times a Day. 1 each 0   • Cholecalciferol (Vitamin D3) 25 MCG (1000 UT) capsule Take 1 capsule by mouth Daily.     • citalopram (CeleXA) 20 MG tablet TAKE 1 TABLET BY MOUTH EVERY DAY 90 tablet 0   • CRANBERRY PO Take 650 mg by mouth.     • Eliquis 5 MG tablet tablet TAKE 1 TABLET BY MOUTH EVERY 12 HOURS 60 tablet 2   • fenofibrate 160 MG tablet TAKE 1 TABLET BY MOUTH EVERY DAY 90 tablet 1   • furosemide (LASIX) 40 MG tablet TAKE 1 TABLET BY MOUTH EVERY DAY (Patient taking differently: Take 1 tablet by mouth 3 (Three) Times a Week if Needed.) 90 tablet 0   • KLOR-CON 10 MEQ CR tablet TAKE 1 TABLET BY MOUTH DAILY WITH FUROSEMIDE 90 tablet 1   • metFORMIN (GLUCOPHAGE) 1000 MG tablet Take 1 tablet by mouth 2 (Two) Times a Day With Meals. 180 tablet 0   • Mirabegron ER (Myrbetriq) 25 MG tablet sustained-release 24 hour 24 hr tablet Take 1 tablet by mouth Daily. 90 tablet 1   • montelukast (SINGULAIR) 10 MG tablet TAKE 1 TABLET BY MOUTH EVERYDAY AT BEDTIME 90 tablet 0   • primidone (MYSOLINE) 50 MG tablet TAKE 1 TABLET BY MOUTH THREE TIMES A  tablet 0   • Probiotic Product (PROBIOTIC-10 PO) Take  by mouth.     • tiZANidine (ZANAFLEX) 4 MG tablet Take 1 tablet by mouth At Night As Needed for Muscle Spasms. 30 tablet 1   • Trelegy Ellipta 100-62.5-25 MCG/INH inhaler Inhale 1 puff Daily.     • magnesium oxide (MAG-OX) 400 MG tablet Take 1 tablet by mouth Daily. 90 tablet 1   • metoprolol tartrate (LOPRESSOR) 25 MG tablet Take 1 tablet by mouth 2 (Two) Times a Day for 30 days. 180 tablet 1   • tiotropium bromide monohydrate (SPIRIVA RESPIMAT) 2.5 MCG/ACT aerosol solution inhaler Inhale 2 puffs Daily for 30 days. 1 g 0     No current facility-administered medications for this visit.     Assessment:       Diagnosis Plan   1. PAF (paroxysmal atrial fibrillation)        2. TARSHA (obstructive sleep apnea)             No orders of the defined types were placed in this  encounter.        Plan:       1.  72-year-old female with paroxysmal atrial fibrillation status post MARLON with cardioversion January 2021 while in [facility]. She had recurrent a fib with uti 01/2023. She is maintained on eliquis and auscultation in clinic today supports a regular rhyhtm and I suspect she is in normal sinus rhythm.   2.  Hypertension bp appears stable   3.  COPD on 02 and follows with Dr. Box  4.  Obstructive sleep apnea compliant with cpap reportedly   5.  Anxiety/depression on therapy   6. DM type II on therapy  7.  Recurrent UTI- following with urology, Dr. Quintero.    Follow up in 6 months.Call with questions or concerns.               It has been a pleasure to participate in this patient's care.      Thank you,  MARK Quiroga      **I used Dragon to dictate this note:**

## 2023-04-20 ENCOUNTER — OFFICE VISIT (OUTPATIENT)
Dept: OBSTETRICS AND GYNECOLOGY | Facility: CLINIC | Age: 73
End: 2023-04-20
Payer: MEDICARE

## 2023-04-20 VITALS
SYSTOLIC BLOOD PRESSURE: 138 MMHG | HEIGHT: 65 IN | DIASTOLIC BLOOD PRESSURE: 60 MMHG | WEIGHT: 224.4 LBS | BODY MASS INDEX: 37.39 KG/M2

## 2023-04-20 DIAGNOSIS — N89.8 VAGINAL DISCHARGE: Primary | ICD-10-CM

## 2023-04-20 DIAGNOSIS — R31.9 HEMATURIA, UNSPECIFIED TYPE: ICD-10-CM

## 2023-04-20 DIAGNOSIS — R82.90 ABNORMAL URINALYSIS: ICD-10-CM

## 2023-04-20 DIAGNOSIS — N39.46 URINARY INCONTINENCE, MIXED: ICD-10-CM

## 2023-04-20 LAB
BILIRUB BLD-MCNC: NEGATIVE MG/DL
CLARITY, POC: CLEAR
COLOR UR: YELLOW
GLUCOSE UR STRIP-MCNC: NEGATIVE MG/DL
KETONES UR QL: NEGATIVE
LEUKOCYTE EST, POC: ABNORMAL
NITRITE UR-MCNC: NEGATIVE MG/ML
PH UR: 5 [PH] (ref 5–8)
PROT UR STRIP-MCNC: ABNORMAL MG/DL
RBC # UR STRIP: ABNORMAL /UL
SP GR UR: 1.01 (ref 1–1.03)
UROBILINOGEN UR QL: NORMAL

## 2023-04-20 RX ORDER — CLOBETASOL PROPIONATE 0.5 MG/G
1 OINTMENT TOPICAL 2 TIMES DAILY
Qty: 60 G | Refills: 1 | Status: SHIPPED | OUTPATIENT
Start: 2023-04-20

## 2023-04-20 NOTE — PROGRESS NOTES
"Subjective     Chief Complaint   Patient presents with   • Vaginal Discharge     couple months        Kanwal Sauer is a 72 y.o.  whose LMP is No LMP recorded. Patient is postmenopausal.. She presents with c/o ongoing vaginal discomfort described as burning and irritation for several months. She denies any changes at home. She does struggle with urinary incontinence and is wearing pads during the day and at night time. She is .  This problem is new to me, the examiner. She denies pelvic pain. She is not on HRT and is not a candidate for estrogen d/t h/o breast cancer.       HPI    HPI    The following portions of the patient's history were reviewed and updated as appropriate:vital signs, allergies, current medications, past medical history, past social history, past surgical history and problem list      Review of Systems     Review of Systems   Constitutional: Negative.    Genitourinary: Positive for vaginal pain. Negative for pelvic pain and vaginal discharge.        Urinary incontinence  Vaginal/vulvar burning        Objective      /60   Ht 165.1 cm (65\")   Wt 102 kg (224 lb 6.4 oz)   Breastfeeding No   BMI 37.34 kg/m²     Physical Exam    Physical Exam  Vitals and nursing note reviewed.   Constitutional:       Appearance: Normal appearance.   Genitourinary:     Labia:         Right: Rash present. No tenderness, lesion or injury.         Left: Rash present. No tenderness, lesion or injury.       Vagina: No signs of injury and foreign body. Vaginal discharge present. No erythema, tenderness or bleeding.      Cervix: No cervical motion tenderness, discharge, friability, lesion, erythema or cervical bleeding.   Musculoskeletal:         General: Normal range of motion.   Skin:     General: Skin is warm and dry.   Neurological:      General: No focal deficit present.      Mental Status: She is alert and oriented to person, place, and time.   Psychiatric:         Mood and Affect: Mood normal.    " Returned call to patient informed the orthopedics referral is authorized. Will send in request for Pulmonary for her follow up on 8/24/21. She then questioned having the referral for her stress test. Informed both the NM and the stress test have been authorized. Patient acknowledged an understanding.        Behavior: Behavior normal.     Dr. Avila in to examine patient.     Lab Review   Labs: Urinalysis - with micro     Imaging   No data reviewed    Assessment  Diagnoses and all orders for this visit:    1. Vaginal discharge (Primary)  -     NuSwab BV & Candida - Swab, Vagina  -     Genital Mycoplasmas MICHAEL, Swab - Swab, Vagina    2. Abnormal urinalysis  -     POC Urinalysis Dipstick    3. Hematuria, unspecified type  -     Urine Culture - Urine, Urine, Random Void    4. Urinary incontinence, mixed    Other orders  -     clobetasol (TEMOVATE) 0.05 % ointment; Apply 1 application topically to the appropriate area as directed 2 (Two) Times a Day.  Dispense: 60 g; Refill: 1        Assessment/Plan:   1. Vaginal discharge/irritation- Check NuSwab. Plan to treat based on swab results.   2. Hematuria- Check urine culture.   3. Vulvar rash- Suspect irritation from atrophy and moisture. Not a candidate for HRT. Enc pt to sleeping with no pads/underwear on. Chux from office provided for bed protection. ERX Clobetasol. If no improvement in vulva, plan punch biopsy. Reeval in 2 weeks.       Belen Khan, APRN  5/9/2023

## 2023-04-22 LAB
BACTERIA UR CULT: NORMAL
BACTERIA UR CULT: NORMAL

## 2023-04-24 ENCOUNTER — TELEPHONE (OUTPATIENT)
Dept: NEUROLOGY | Facility: CLINIC | Age: 73
End: 2023-04-24
Payer: MEDICARE

## 2023-04-24 NOTE — TELEPHONE ENCOUNTER
Rx Refill Note  Requested Prescriptions     Pending Prescriptions Disp Refills   • citalopram (CeleXA) 20 MG tablet [Pharmacy Med Name: CITALOPRAM HBR 20 MG TABLET] 90 tablet 1     Sig: TAKE 1 TABLET BY MOUTH EVERY DAY      Last office visit with prescribing clinician: 2/17/2023   Last telemedicine visit with prescribing clinician: 10/6/2023   Next office visit with prescribing clinician: 10/6/2023                         Would you like a call back once the refill request has been completed: [] Yes [] No    If the office needs to give you a call back, can they leave a voicemail: [] Yes [] No    Katiana Toney, PCT  04/24/23, 09:14 EDT

## 2023-04-24 NOTE — TELEPHONE ENCOUNTER
4/24 Left vm regarding reschedule from Alisha Meier to different provider. MyChart (if applicable) & mail reminder sent.

## 2023-04-25 PROBLEM — M54.50 CHRONIC MIDLINE LOW BACK PAIN WITHOUT SCIATICA: Status: ACTIVE | Noted: 2023-04-25

## 2023-04-25 PROBLEM — G89.29 CHRONIC MIDLINE LOW BACK PAIN WITHOUT SCIATICA: Status: ACTIVE | Noted: 2023-04-25

## 2023-04-25 LAB
A VAGINAE DNA VAG QL NAA+PROBE: NORMAL SCORE
BVAB2 DNA VAG QL NAA+PROBE: NORMAL SCORE
C ALBICANS DNA VAG QL NAA+PROBE: NEGATIVE
C GLABRATA DNA VAG QL NAA+PROBE: NEGATIVE
M GENITALIUM DNA SPEC QL NAA+PROBE: NEGATIVE
M HOMINIS DNA SPEC QL NAA+PROBE: NEGATIVE
MEGA1 DNA VAG QL NAA+PROBE: NORMAL SCORE
UREAPLASMA DNA SPEC QL NAA+PROBE: NEGATIVE

## 2023-04-25 RX ORDER — CITALOPRAM 20 MG/1
TABLET ORAL
Qty: 90 TABLET | Refills: 1 | Status: SHIPPED | OUTPATIENT
Start: 2023-04-25

## 2023-04-25 NOTE — ASSESSMENT & PLAN NOTE
Pt was prescribed Prednisone to help treat her symptoms.   She was encouraged to RTC if her symptoms does not improve.

## 2023-04-26 ENCOUNTER — TELEPHONE (OUTPATIENT)
Dept: FAMILY MEDICINE CLINIC | Facility: CLINIC | Age: 73
End: 2023-04-26

## 2023-04-26 NOTE — TELEPHONE ENCOUNTER
Caller: NERY TYSONA    Relationship: Emergency Contact    Best call back number: [phone]    Requested Prescriptions:   Requested Prescriptions     Pending Prescriptions Disp Refills   • Fluticasone-Umeclidin-Vilant (Trelegy Ellipta) 100-62.5-25 MCG/ACT inhaler       Sig: Inhale 1 puff Daily.   • budesonide-formoterol (SYMBICORT) 160-4.5 MCG/ACT inhaler 1 each 0     Sig: Inhale 2 puffs 2 (Two) Times a Day.        Pharmacy where request should be sent: [facility]Y [id number] [place], [place] - [address] AT [facility] [phone] [facility] [phone]      Last office visit with prescribing clinician: 2/17/2023   Last telemedicine visit with prescribing clinician: 10/6/2023   Next office visit with prescribing clinician: 10/6/2023     Additional details provided by patient:     Does the patient have less than a 3 day supply:  [] Yes  [x] No    Would you like a call back once the refill request has been completed: [x] Yes [] No    If the office needs to give you a call back, can they leave a voicemail: [x] Yes [] No    Esthela Landry   04/26/23 14:30 EDT

## 2023-04-26 NOTE — TELEPHONE ENCOUNTER
Rx Refill Note  Requested Prescriptions     Pending Prescriptions Disp Refills   • Fluticasone-Umeclidin-Vilant (Trelegy Ellipta) 100-62.5-25 MCG/ACT inhaler       Sig: Inhale 1 puff Daily.   • budesonide-formoterol (SYMBICORT) 160-4.5 MCG/ACT inhaler 1 each 0     Sig: Inhale 2 puffs 2 (Two) Times a Day.      Last office visit with prescribing clinician: 2/17/2023   Last telemedicine visit with prescribing clinician: 10/6/2023   Next office visit with prescribing clinician: 10/6/2023       {TIP  Please add Last Relevant Lab Date if appropriate: 02/10/23                 Would you like a call back once the refill request has been completed: [] Yes [] No    If the office needs to give you a call back, can they leave a voicemail: [] Yes [] No    Hoa Espinoza MA  04/26/23, 16:40 EDT

## 2023-04-28 RX ORDER — BUDESONIDE AND FORMOTEROL FUMARATE DIHYDRATE 160; 4.5 UG/1; UG/1
2 AEROSOL RESPIRATORY (INHALATION)
Qty: 1 EACH | Refills: 0 | OUTPATIENT
Start: 2023-04-28

## 2023-04-28 NOTE — TELEPHONE ENCOUNTER
Call patient and tell her she can not use both inhaler   Let me know which inhaler she is using   I know she has seen pulmonology also

## 2023-04-28 NOTE — TELEPHONE ENCOUNTER
Called spoke with pts char. Pt has been using both per hospital. Said she is out of the trelegy but would like a refill on it. Informed to only use one inhaler

## 2023-05-10 ENCOUNTER — OFFICE VISIT (OUTPATIENT)
Dept: OBSTETRICS AND GYNECOLOGY | Facility: CLINIC | Age: 73
End: 2023-05-10
Payer: MEDICARE

## 2023-05-10 VITALS
WEIGHT: 229 LBS | HEIGHT: 65 IN | BODY MASS INDEX: 38.15 KG/M2 | SYSTOLIC BLOOD PRESSURE: 126 MMHG | DIASTOLIC BLOOD PRESSURE: 70 MMHG

## 2023-05-10 DIAGNOSIS — R21 VULVAR RASH: Primary | ICD-10-CM

## 2023-05-10 NOTE — PROGRESS NOTES
"Subjective     Chief Complaint   Patient presents with   • Follow-up       Kanwal Sauer is a 72 y.o.  whose LMP is No LMP recorded. Patient is postmenopausal.. She presents today for followup. She was seen approx 2 weeks ago with c/o vaginal and vulvar irritation. She made some modifications at home with her urinary incontinence pads and has been using clobetasol x 2 weeks. She feels much improved. She continues to have some burning with urination.     She is accompanied by her daughter today.     HPI    HPI    The following portions of the patient's history were reviewed and updated as appropriate:vital signs, allergies, current medications, past medical history, past social history, past surgical history and problem list      Review of Systems     Review of Systems   Constitutional: Negative.    Gastrointestinal: Negative.    Genitourinary: Positive for dysuria. Negative for vaginal bleeding and vaginal discharge.   Musculoskeletal: Negative.    Skin: Negative.    Psychiatric/Behavioral: Negative.        Objective      /70   Ht 165.1 cm (65\")   Wt 104 kg (229 lb)   Breastfeeding No   BMI 38.11 kg/m²     Physical Exam    Physical Exam  Vitals and nursing note reviewed.   Constitutional:       Appearance: Normal appearance.   Genitourinary:     Labia:         Right: Rash and tenderness present. No lesion or injury.         Left: Rash and tenderness present. No lesion or injury.       Vagina: No signs of injury and foreign body. No vaginal discharge, erythema, tenderness or bleeding.      Comments: Vulva improved but continues to have irritation   Musculoskeletal:         General: Normal range of motion.   Skin:     General: Skin is warm and dry.   Neurological:      General: No focal deficit present.      Mental Status: She is alert and oriented to person, place, and time.   Psychiatric:         Mood and Affect: Mood normal.         Behavior: Behavior normal.         Lab Review   Labs: NuSwab "     Imaging   No data reviewed    Assessment  There are no diagnoses linked to this encounter.     Assessment/Plan  1. Vulvar rash- Improved. Continue modifications made with urinary incontinence. Rec moisture barrier cream when needing an incontinence pads. Continues with clobetasol BID x 2 more week. Urine cx neg. NuSwab neg.     RTO PRN     Belen Khan, APRN  5/10/2023

## 2023-05-22 PROBLEM — R21 VULVAR RASH: Status: ACTIVE | Noted: 2023-05-22

## 2023-06-09 ENCOUNTER — TRANSCRIBE ORDERS (OUTPATIENT)
Dept: SLEEP MEDICINE | Facility: HOSPITAL | Age: 73
End: 2023-06-09
Payer: MEDICARE

## 2023-06-09 DIAGNOSIS — G47.33 OSA (OBSTRUCTIVE SLEEP APNEA): Primary | ICD-10-CM

## 2023-06-09 NOTE — PLAN OF CARE
Problem: Adult Inpatient Plan of Care  Goal: Plan of Care Review  Outcome: Ongoing, Progressing  Flowsheets (Taken 1/2/2023 1308)  Progress: improving  Plan of Care Reviewed With: patient  Outcome Evaluation: Vitals stable, no her baseline 2L. pt discharged to home with Rx, instructions and follow ups.      Yes

## 2023-06-16 RX ORDER — MONTELUKAST SODIUM 10 MG/1
TABLET ORAL
Qty: 90 TABLET | Refills: 0 | Status: SHIPPED | OUTPATIENT
Start: 2023-06-16

## 2023-06-16 RX ORDER — PRIMIDONE 50 MG/1
TABLET ORAL
Qty: 270 TABLET | Refills: 0 | Status: SHIPPED | OUTPATIENT
Start: 2023-06-16

## 2023-07-18 ENCOUNTER — OFFICE VISIT (OUTPATIENT)
Dept: FAMILY MEDICINE CLINIC | Facility: CLINIC | Age: 73
End: 2023-07-18
Payer: MEDICARE

## 2023-07-18 VITALS
BODY MASS INDEX: 37.82 KG/M2 | RESPIRATION RATE: 18 BRPM | DIASTOLIC BLOOD PRESSURE: 60 MMHG | HEIGHT: 65 IN | TEMPERATURE: 97.1 F | WEIGHT: 227 LBS | SYSTOLIC BLOOD PRESSURE: 140 MMHG | OXYGEN SATURATION: 96 % | HEART RATE: 66 BPM

## 2023-07-18 DIAGNOSIS — J44.1 COPD WITH EXACERBATION: Primary | ICD-10-CM

## 2023-07-18 DIAGNOSIS — R05.8 COUGH PRODUCTIVE OF PURULENT SPUTUM: ICD-10-CM

## 2023-07-18 PROCEDURE — 3077F SYST BP >= 140 MM HG: CPT | Performed by: NURSE PRACTITIONER

## 2023-07-18 PROCEDURE — 3078F DIAST BP <80 MM HG: CPT | Performed by: NURSE PRACTITIONER

## 2023-07-18 PROCEDURE — 87428 SARSCOV & INF VIR A&B AG IA: CPT | Performed by: NURSE PRACTITIONER

## 2023-07-18 PROCEDURE — 99214 OFFICE O/P EST MOD 30 MIN: CPT | Performed by: NURSE PRACTITIONER

## 2023-07-18 RX ORDER — FUROSEMIDE 40 MG/1
40 TABLET ORAL DAILY
Qty: 90 TABLET | Refills: 0 | Status: SHIPPED | OUTPATIENT
Start: 2023-07-18

## 2023-07-18 RX ORDER — AZITHROMYCIN 250 MG/1
TABLET, FILM COATED ORAL
Qty: 6 TABLET | Refills: 0 | Status: SHIPPED | OUTPATIENT
Start: 2023-07-18 | End: 2023-08-09

## 2023-07-18 RX ORDER — PREDNISONE 20 MG/1
40 TABLET ORAL DAILY
Qty: 14 TABLET | Refills: 0 | Status: SHIPPED | OUTPATIENT
Start: 2023-07-18 | End: 2023-07-25

## 2023-07-18 RX ORDER — IPRATROPIUM BROMIDE AND ALBUTEROL SULFATE 2.5; .5 MG/3ML; MG/3ML
3 SOLUTION RESPIRATORY (INHALATION) EVERY 4 HOURS PRN
Qty: 150 ML | Refills: 3 | Status: SHIPPED | OUTPATIENT
Start: 2023-07-18

## 2023-07-18 RX ORDER — POTASSIUM CHLORIDE 750 MG/1
10 TABLET, EXTENDED RELEASE ORAL DAILY
Qty: 90 TABLET | Refills: 0 | Status: SHIPPED | OUTPATIENT
Start: 2023-07-18

## 2023-07-18 NOTE — PATIENT INSTRUCTIONS
Discharge instructions plenty of fluids, Mucinex guaifenesin to thin secretions  Nebulizer to open up airways and help thin secretions as well, 4 times a day up to every 4 hours Zithromax and prednisone  Anything over-the-counter as needed for cough congestion such as Robitussin-DM  Any worsening emergency room if high fever chest pain increasing shortness of breath worse emergency room immediately  Recheck next week if not improving  Update me your condition Friday please before the weekend    Follow-up with Dr. Britton    Furosemide per Dr. Britton's instructions  Presently you are taking as needed with potassium

## 2023-07-18 NOTE — PROGRESS NOTES
"Chief Complaint  Cough (Pt states coughing up mucous )    Subjective        Kanwal Sauer presents to [facility]  History of Present Illness  Patient is here today with her daughter she complains of a productive cough yellow thick phlegm, started this weekend some increased shortness of breath, no fever no chest pain, uses oxygen at home CPAP not working critically just communicated with pulmonary though she has history of COPD presently stable.  Does not have a CPAP but has albuterol  No recent travel, does not think she has COVID but has not checked,      Cough    Objective   Vital Signs:  /60   Pulse 66   Temp 97.1 øF (36.2 øC) (Infrared)   Resp 18   Ht 165.1 cm (65\")   Wt 103 kg (227 lb)   SpO2 96% Comment: pt is on 2-3L of oxygen  BMI 37.77 kg/mý   Estimated body mass index is 37.77 kg/mý as calculated from the following:    Height as of this encounter: 165.1 cm (65\").    Weight as of this encounter: 103 kg (227 lb).          Physical Exam  Constitutional:       General: She is not in acute distress.     Appearance: Normal appearance. She is not ill-appearing, toxic-appearing or diaphoretic.      Comments: Pleasant without distress congested cough   Eyes:      Pupils: Pupils are equal, round, and reactive to light.   Cardiovascular:      Rate and Rhythm: Normal rate and regular rhythm.      Comments: No murmur noted without tachycardia  Pulmonary:      Comments: Respirations, less than 24 unlabored able to speak full sentences, congestion rhonchi  No inspiratory crackles,  Decreased breath sounds at the bases,  Skin:     General: Skin is warm and dry.      Capillary Refill: Capillary refill takes less than 2 seconds.   Neurological:      General: No focal deficit present.      Mental Status: She is alert. Mental status is at baseline.   Psychiatric:         Mood and Affect: Mood normal.         Behavior: Behavior normal.         Thought Content: Thought content " normal.         Judgment: Judgment normal.      Result Review :                Assessment and Plan   Diagnoses and all orders for this visit:    1. COPD with exacerbation (Primary)  -     POCT SARS-CoV-2 Antigen INDER + Flu    2. Cough productive of purulent sputum  -     XR Chest PA & Lateral  -     POCT SARS-CoV-2 Antigen INDER + Flu    Other orders  -     potassium chloride (KLOR-CON) 10 MEQ CR tablet; Take 1 tablet by mouth Daily.  Dispense: 90 tablet; Refill: 0  -     furosemide (LASIX) 40 MG tablet; Take 1 tablet by mouth Daily.  Dispense: 90 tablet; Refill: 0  -     predniSONE (DELTASONE) 20 MG tablet; Take 2 tablets by mouth Daily for 7 days.  Dispense: 14 tablet; Refill: 0  -     azithromycin (Zithromax Z-Samir) 250 MG tablet; Take 2 tablets the first day, then 1 tablet daily for 4 days.  Dispense: 6 tablet; Refill: 0  -     ipratropium-albuterol (DUO-NEB) 0.5-2.5 mg/3 ml nebulizer; Take 3 mL by nebulization Every 4 (Four) Hours As Needed for Wheezing.  Dispense: 150 mL; Refill: 3             Follow Up   No follow-ups on file.  Patient was given instructions and counseling regarding her condition or for health maintenance advice. Please see specific information pulled into the AVS if appropriate.     There are no Patient Instructions on file for this visit.

## 2023-08-09 ENCOUNTER — OFFICE VISIT (OUTPATIENT)
Dept: FAMILY MEDICINE CLINIC | Facility: CLINIC | Age: 73
End: 2023-08-09
Payer: MEDICARE

## 2023-08-09 VITALS
OXYGEN SATURATION: 98 % | RESPIRATION RATE: 18 BRPM | TEMPERATURE: 97.8 F | DIASTOLIC BLOOD PRESSURE: 82 MMHG | BODY MASS INDEX: 37.25 KG/M2 | HEIGHT: 65 IN | SYSTOLIC BLOOD PRESSURE: 128 MMHG | HEART RATE: 59 BPM | WEIGHT: 223.6 LBS

## 2023-08-09 DIAGNOSIS — M54.2 NECK PAIN: ICD-10-CM

## 2023-08-09 DIAGNOSIS — R30.0 DYSURIA: Primary | ICD-10-CM

## 2023-08-09 LAB
BILIRUB BLD-MCNC: NEGATIVE MG/DL
CLARITY, POC: CLEAR
COLOR UR: ABNORMAL
EXPIRATION DATE: ABNORMAL
GLUCOSE UR STRIP-MCNC: NEGATIVE MG/DL
KETONES UR QL: NEGATIVE
LEUKOCYTE EST, POC: ABNORMAL
Lab: ABNORMAL
NITRITE UR-MCNC: NEGATIVE MG/ML
PH UR: 8.5 [PH] (ref 5–8)
PROT UR STRIP-MCNC: ABNORMAL MG/DL
RBC # UR STRIP: ABNORMAL /UL
SP GR UR: 1.02 (ref 1–1.03)
UROBILINOGEN UR QL: ABNORMAL

## 2023-08-09 PROCEDURE — 3079F DIAST BP 80-89 MM HG: CPT | Performed by: NURSE PRACTITIONER

## 2023-08-09 PROCEDURE — 99213 OFFICE O/P EST LOW 20 MIN: CPT | Performed by: NURSE PRACTITIONER

## 2023-08-09 PROCEDURE — 81003 URINALYSIS AUTO W/O SCOPE: CPT | Performed by: NURSE PRACTITIONER

## 2023-08-09 PROCEDURE — 3074F SYST BP LT 130 MM HG: CPT | Performed by: NURSE PRACTITIONER

## 2023-08-09 RX ORDER — TIZANIDINE 4 MG/1
4 TABLET ORAL NIGHTLY PRN
Qty: 30 TABLET | Refills: 1 | Status: SHIPPED | OUTPATIENT
Start: 2023-08-09

## 2023-08-09 NOTE — PROGRESS NOTES
Chief Complaint  Pain (Back and neck x 1week, slept on neck wrong, go down to back, had three times before, since dec. In her records. Just came off steroids pack)    Subjective        Kanwal Sauer presents to [facility]  Pain    Kanwal Sauer is a 72-year-old female who presents today for back and neck pain for 1 week. She is accompanied by her daughter.    The patient reports she is not doing well. She states she is having muscle spasms in her back and neck pain. Her daughter reports the patient's neck pain starts in her neck and radiates into her back. She states the patient's neck pain was severe in 11/2022, and it lasted until 01/2023. She notes they tried to get her in for MRIs, but it occurred within 10 days. She states the patient had a bad fall, and within 10 days, it started. She reports she is wondering if this has something to do with her slips and falls again. Within the last month, the patient has taken a couple of pills, and they have not been anything really jerked. If she ever falls, she will take her immediately to the emergency room because she is on Eliquis. The patient's mattress shifted, and she went down trying to get out of bed. This happened 2 times. The other time she was in the living room, and she is not sure if she tripped. She was not there, and the  helped her get up. A lot of times she smacked her breathing machine. She did not hit her chest, and she had black and blue bruising. The patient smacked her head in the back. She states they got her a good bed that is adjustable, and she can move up and down. She does not have a good pillow. She is not having any issues waking up every morning, and her neck is not sore. She notes it just happened that her pillow did slide off, and it was not because she slept with 2 pillows together. The pain is right below her bra. She notes the pain starts in her neck, and then it will go down. She is  "loaded by Biofreeze. She does have scoliosis. She denies pain pushing at all. She reports in 12/2020, the patient took a really bad fall and broke her femur, fibula, and split the back of her head open. She states in 06/2020, the patient had a neck issue in [place], and they think she took too many of the muscle relaxers. She notes they were not sure because they knew that there was a significant amount missing and she was nonresponsive. She states they had to call 911 the next morning.    She reports she just finished a steroid dose pack. She had a really bad cough, and she has been coughing up yellow green stuff. Dr. Epley put her on a steroid pack, and an antibiotic. She had a chest x-ray with her COPD. She is right on the line of a 3, and she is almost stage 4 now. She has given her some of the muscle relaxers that she has had, but she is not sure if it is helping a whole lot. She gave her one a couple of hours ago. She has a muscle relaxer from the last time, and they had given her that, but they are not giving it to her accordingly because she does not want her to have them. She has a few lidocaine patches at home, but she can go ahead to get them on.    She reports her cough is gone.    She denies any urinary symptoms. She is going more frequently, and having some burning and discomfort. She just tried to clean her down there, and it looked a little red. She states the patient is supposed to be putting a barrier diaper cream. When she was cleaning her earlier, she noticed that it was looking a little red. The patient has had chronic UTIs.  Objective   Vital Signs:  /82 (BP Location: Right arm, Patient Position: Sitting, Cuff Size: Adult)   Pulse 59   Temp 97.8 øF (36.6 øC) (Temporal)   Resp 18   Ht 165.1 cm (65\")   Wt 101 kg (223 lb 9.6 oz)   SpO2 98%   BMI 37.21 kg/mý   Estimated body mass index is 37.21 kg/mý as calculated from the following:    Height as of this encounter: 165.1 cm (65\").    " Weight as of this encounter: 101 kg (223 lb 9.6 oz).          Physical Exam  Constitutional:       General: She is not in acute distress.     Appearance: She is well-developed. She is not diaphoretic.   Cardiovascular:      Rate and Rhythm: Normal rate and regular rhythm.      Heart sounds: Normal heart sounds. No murmur heard.    No friction rub. No gallop.   Pulmonary:      Effort: Pulmonary effort is normal. No respiratory distress.      Breath sounds: Normal breath sounds. No wheezing or rales.   Abdominal:      General: Bowel sounds are normal. There is no distension.      Palpations: Abdomen is soft.      Tenderness: There is no abdominal tenderness.   Musculoskeletal:      Cervical back: Neck supple.   Skin:     General: Skin is warm and dry.   Neurological:      Mental Status: She is alert and oriented to person, place, and time.      Result Review :                  Assessment and Plan   Diagnoses and all orders for this visit:    1. Dysuria (Primary)  -     POC Urinalysis Dipstick, Automated  -     Urine Culture - Urine, Urine, Clean Catch    2. Neck pain  -     tiZANidine (ZANAFLEX) 4 MG tablet; Take 1 tablet by mouth At Night As Needed for Muscle Spasms.  Dispense: 30 tablet; Refill: 1          1. Muscle spasms  - A prescription for lidocaine patches has been sent to the patient's pharmacy.  - A prescription for topical cream, gabapentin, and diclofenac has been sent to the patient's pharmacy.  - A refill for tizanidine has been sent to the patient's pharmacy.    2. Dysuria  - A urinalysis has been ordered.       Follow Up   No follow-ups on file.  Patient was given instructions and counseling regarding her condition or for health maintenance advice. Please see specific information pulled into the AVS if appropriate.       Transcribed from ambient dictation for MARK Buck by Laila Vallejo.  08/09/23   17:18 EDT    Patient or patient representative verbalized consent to the visit recording.  I  have personally performed the services described in this document as transcribed by the above individual, and it is both accurate and complete.

## 2023-08-11 LAB
BACTERIA UR CULT: NORMAL
BACTERIA UR CULT: NORMAL
EXPIRATION DATE: NORMAL
FLUAV AG UPPER RESP QL IA.RAPID: NOT DETECTED
FLUBV AG UPPER RESP QL IA.RAPID: NOT DETECTED
INTERNAL CONTROL: NORMAL
Lab: NORMAL
SARS-COV-2 AG UPPER RESP QL IA.RAPID: NOT DETECTED

## 2023-08-15 ENCOUNTER — OFFICE VISIT (OUTPATIENT)
Dept: FAMILY MEDICINE CLINIC | Facility: CLINIC | Age: 73
End: 2023-08-15
Payer: MEDICARE

## 2023-08-15 VITALS
RESPIRATION RATE: 18 BRPM | OXYGEN SATURATION: 95 % | TEMPERATURE: 97.3 F | SYSTOLIC BLOOD PRESSURE: 122 MMHG | BODY MASS INDEX: 37.12 KG/M2 | HEIGHT: 65 IN | DIASTOLIC BLOOD PRESSURE: 76 MMHG | WEIGHT: 222.8 LBS | HEART RATE: 53 BPM

## 2023-08-15 DIAGNOSIS — M54.2 NECK PAIN: Primary | ICD-10-CM

## 2023-08-15 DIAGNOSIS — M62.838 MUSCLE SPASMS OF NECK: ICD-10-CM

## 2023-08-15 RX ORDER — METHYLPREDNISOLONE ACETATE 40 MG/ML
40 INJECTION, SUSPENSION INTRA-ARTICULAR; INTRALESIONAL; INTRAMUSCULAR; SOFT TISSUE ONCE
Status: COMPLETED | OUTPATIENT
Start: 2023-08-15 | End: 2023-08-15

## 2023-08-15 RX ADMIN — METHYLPREDNISOLONE ACETATE 40 MG: 40 INJECTION, SUSPENSION INTRA-ARTICULAR; INTRALESIONAL; INTRAMUSCULAR; SOFT TISSUE at 12:44

## 2023-08-15 NOTE — PROGRESS NOTES
Chief Complaint  Back Pain (Creme helped) and Med Refill (Trellegy, Eliquis, )    Subjective        Kanwal Sauer presents to [facility]  History of Present Illness    Kanwal Sauer is a 72-year-old female who presents today for follow-up of back pain. She is accompanied by her daughter.    The patient reports she is still experiencing back spasms. She states the cream did help. Her daughter states they are limited on the muscle relaxers because of the reaction she had. She notes it made her go into atrial fibrillation, and she was nonresponsive the next day. The patient's daughter reports the patient's pain is worse now. She states when she puts the medicine on it, it feels better. She notes she just puts the medicine on her until she still feels the pain. The patient's daughter states she is unsure if she needs to go to a back doctor or a spine doctor because this happens multiple times a year. She notes they have never had an MRI to see. The patient's daughter states they have not done an MRI to see. The patient's daughter states when the pain stopped, they told her she did not need it. The patient's daughter states x-rays did not show anything. The patient's daughter states she has scoliosis. The patient's daughter states she had a CT of her neck in 2022. The patient's daughter states they did a body scan to check her bone density. The patient denies pain radiating into her arm. She denies numbness or tingling. The patient's daughter states she has been in physical therapy every week since 12/2022. She notes she does some of the exercises at home. The patient's daughter states she does not do all of them multiple times a day because she is in pain. She notes she takes advantage of it when she is not in pain. The patient's daughter states she does not have a UTI. The patient's daughter states in 11/2022 she fell. She notes she was taken to the emergency room. The patient's  "daughter states her back and neck started hurting her within 10 days. She notes she slept wrong on her neck. The patient's daughter states she had a fall in 02/2023. The patient's daughter states recently she has taken a few falls. The patient's daughter states her mattress was pushed, and she thought she was sitting on the mattress. The patient's daughter states she just came off of a steroid a couple of weeks ago. The patient's daughter states the last time she was given one, she felt like it helped because everything is inflamed. The patient's daughter states she was coming off of it, so they did not know if she could do another one that's closed. The patient's daughter states she has used a heating pad and ice at one time. The patient's daughter states she can not take the baclofen. She notes Tylenol knocks her out. The patient's daughter states she is giving the patient tizanidine twice a day. She notes she does not think it makes her sleepy.    Objective   Vital Signs:  /76 (BP Location: Right arm, Patient Position: Sitting, Cuff Size: Adult)   Pulse 53   Temp 97.3 øF (36.3 øC) (Temporal)   Resp 18   Ht 165.1 cm (65\")   Wt 101 kg (222 lb 12.8 oz)   SpO2 95%   BMI 37.08 kg/mý   Estimated body mass index is 37.08 kg/mý as calculated from the following:    Height as of this encounter: 165.1 cm (65\").    Weight as of this encounter: 101 kg (222 lb 12.8 oz).          Physical Exam  Constitutional:       General: She is not in acute distress.     Appearance: She is well-developed. She is not diaphoretic.   Cardiovascular:      Rate and Rhythm: Normal rate and regular rhythm.      Heart sounds: Normal heart sounds. No murmur heard.    No friction rub. No gallop.   Pulmonary:      Effort: Pulmonary effort is normal. No respiratory distress.      Breath sounds: Normal breath sounds. No wheezing or rales.   Abdominal:      General: Bowel sounds are normal. There is no distension.      Palpations: Abdomen is " soft.      Tenderness: There is no abdominal tenderness.   Musculoskeletal:      Cervical back: Neck supple.   Skin:     General: Skin is warm and dry.   Neurological:      Mental Status: She is alert and oriented to person, place, and time.      Result Review :                    Assessment and Plan   Diagnoses and all orders for this visit:    1. Neck pain (Primary)  -     Ambulatory Referral to Physical Therapy Evaluate and treat  -     methylPREDNISolone acetate (DEPO-medrol) injection 40 mg    2. Muscle spasms of neck  -     Ambulatory Referral to Physical Therapy Evaluate and treat  -     methylPREDNISolone acetate (DEPO-medrol) injection 40 mg      Continue rx alternative cream, referral to PT to further work out neck and spasms, and injection of medrol, follow up with PCP for worsening or continued symptoms       Follow Up   No follow-ups on file.  Patient was given instructions and counseling regarding her condition or for health maintenance advice. Please see specific information pulled into the AVS if appropriate.       Transcribed from ambient dictation for MARK Buck by Carolina Srivastava.  08/15/23   15:19 EDT    Patient or patient representative verbalized consent to the visit recording.  I have personally performed the services described in this document as transcribed by the above individual, and it is both accurate and complete.

## 2023-08-17 NOTE — TELEPHONE ENCOUNTER
Rx Refill Note  Requested Prescriptions     Pending Prescriptions Disp Refills    metoprolol tartrate (LOPRESSOR) 25 MG tablet [Pharmacy Med Name: METOPROLOL TARTRATE 25 MG TAB] 180 tablet 1     Sig: TAKE 1 TABLET BY MOUTH 2 TIMES A DAY FOR 30 DAYS.      Last office visit with prescribing clinician: 2/17/2023   Last telemedicine visit with prescribing clinician: Visit date not found   Next office visit with prescribing clinician: 10/9/2023                         Would you like a call back once the refill request has been completed: [] Yes [] No    If the office needs to give you a call back, can they leave a voicemail: [] Yes [] No    Black Valencia MA  08/17/23, 09:12 EDT

## 2023-09-14 RX ORDER — PRIMIDONE 50 MG/1
TABLET ORAL
Qty: 270 TABLET | Refills: 0 | Status: SHIPPED | OUTPATIENT
Start: 2023-09-14

## 2023-09-18 RX ORDER — FENOFIBRATE 160 MG/1
TABLET ORAL
Qty: 90 TABLET | Refills: 1 | Status: SHIPPED | OUTPATIENT
Start: 2023-09-18

## 2023-09-18 NOTE — TELEPHONE ENCOUNTER
Rx Refill Note  Requested Prescriptions     Pending Prescriptions Disp Refills    fenofibrate 160 MG tablet [Pharmacy Med Name: FENOFIBRATE 160 MG TABLET] 90 tablet 1     Sig: TAKE 1 TABLET BY MOUTH EVERY DAY      Last office visit with prescribing clinician: 2/17/2023   Last telemedicine visit with prescribing clinician: Visit date not found   Next office visit with prescribing clinician: 10/9/2023                         Would you like a call back once the refill request has been completed: [] Yes [] No    If the office needs to give you a call back, can they leave a voicemail: [] Yes [] No    Yuki Augustine MA  09/18/23, 08:56 EDT

## 2023-10-09 ENCOUNTER — TELEPHONE (OUTPATIENT)
Dept: FAMILY MEDICINE CLINIC | Facility: CLINIC | Age: 73
End: 2023-10-09

## 2023-10-09 ENCOUNTER — OFFICE VISIT (OUTPATIENT)
Dept: FAMILY MEDICINE CLINIC | Facility: CLINIC | Age: 73
End: 2023-10-09
Payer: MEDICARE

## 2023-10-09 VITALS
TEMPERATURE: 98.7 F | SYSTOLIC BLOOD PRESSURE: 138 MMHG | WEIGHT: 230.3 LBS | BODY MASS INDEX: 38.32 KG/M2 | HEART RATE: 51 BPM | OXYGEN SATURATION: 97 % | DIASTOLIC BLOOD PRESSURE: 70 MMHG

## 2023-10-09 DIAGNOSIS — Z00.00 MEDICARE ANNUAL WELLNESS VISIT, SUBSEQUENT: Primary | ICD-10-CM

## 2023-10-09 DIAGNOSIS — N30.01 ACUTE CYSTITIS WITH HEMATURIA: ICD-10-CM

## 2023-10-09 DIAGNOSIS — R53.83 OTHER FATIGUE: ICD-10-CM

## 2023-10-09 DIAGNOSIS — E11.51 TYPE 2 DIABETES MELLITUS WITH DIABETIC PERIPHERAL ANGIOPATHY WITHOUT GANGRENE, WITHOUT LONG-TERM CURRENT USE OF INSULIN: ICD-10-CM

## 2023-10-09 DIAGNOSIS — E78.2 MIXED HYPERLIPIDEMIA: ICD-10-CM

## 2023-10-09 DIAGNOSIS — I10 ESSENTIAL HYPERTENSION: ICD-10-CM

## 2023-10-09 LAB
BILIRUB BLD-MCNC: NEGATIVE MG/DL
CLARITY, POC: ABNORMAL
COLOR UR: ABNORMAL
EXPIRATION DATE: ABNORMAL
GLUCOSE UR STRIP-MCNC: ABNORMAL MG/DL
KETONES UR QL: NEGATIVE
LEUKOCYTE EST, POC: ABNORMAL
Lab: ABNORMAL
NITRITE UR-MCNC: POSITIVE MG/ML
PH UR: 6.5 [PH] (ref 5–8)
PROT UR STRIP-MCNC: ABNORMAL MG/DL
RBC # UR STRIP: ABNORMAL /UL
SP GR UR: 1.02 (ref 1–1.03)
UROBILINOGEN UR QL: ABNORMAL

## 2023-10-09 RX ORDER — NITROFURANTOIN 25; 75 MG/1; MG/1
100 CAPSULE ORAL 2 TIMES DAILY
Qty: 14 CAPSULE | Refills: 0 | Status: SHIPPED | OUTPATIENT
Start: 2023-10-09

## 2023-10-09 NOTE — PROGRESS NOTES
The ABCs of the Annual Wellness Visit  Subsequent Medicare Wellness Visit    Subjective    Kanwal Sauer is a 72 y.o. female who presents for a Subsequent Medicare Wellness Visit.    The following portions of the patient's history were reviewed and   updated as appropriate: allergies, current medications, past family history, past medical history, past social history, past surgical history, and problem list.    Compared to one year ago, the patient feels her physical   health is better.    Compared to one year ago, the patient feels her mental   health is better.    Recent Hospitalizations:  This patient has had a [facility] admission record on file within the last 365 days.    Current Medical Providers:  Patient Care Team:  Nisha Britton MD as PCP - General (Family Medicine)  Corey Britton MD as Consulting Physician (Sleep Medicine)  Belen Khan APRN as Nurse Practitioner (Obstetrics and Gynecology)  Nia Zhang APRN as Nurse Practitioner (Cardiology)  José Valentino MD as Consulting Physician (Urology)  Indio Box MD as Consulting Physician (Pulmonary Disease)    Outpatient Medications Prior to Visit   Medication Sig Dispense Refill    acetaminophen (TYLENOL) 325 MG tablet Take 2 tablets by mouth Every 4 (Four) Hours As Needed for Mild Pain . (Patient taking differently: Take 2 tablets by mouth As Needed for Mild Pain.)      albuterol sulfate  (90 Base) MCG/ACT inhaler INHALE 2 PUFFS EVERY 4 HOURS AS NEEDED FOR WHEEZING 18 g 1    Cholecalciferol (Vitamin D3) 25 MCG (1000 UT) capsule Take 1 capsule by mouth Daily.      citalopram (CeleXA) 20 MG tablet TAKE 1 TABLET BY MOUTH EVERY DAY 90 tablet 1    CRANBERRY PO Take 650 mg by mouth.      Eliquis 5 MG tablet tablet TAKE 1 TABLET BY MOUTH EVERY 12 HOURS 60 tablet 2    fenofibrate 160 MG tablet TAKE 1 TABLET BY MOUTH EVERY DAY 90 tablet 1    Fluticasone-Umeclidin-Vilant (Trelegy Ellipta) 100-62.5-25 MCG/ACT inhaler  Inhale 1 puff Daily. 60 each 1    furosemide (LASIX) 40 MG tablet Take 1 tablet by mouth Daily. 90 tablet 0    ipratropium-albuterol (DUO-NEB) 0.5-2.5 mg/3 ml nebulizer Take 3 mL by nebulization Every 4 (Four) Hours As Needed for Wheezing. 150 mL 3    metFORMIN (GLUCOPHAGE) 1000 MG tablet TAKE 1 TABLET BY MOUTH TWICE A DAY WITH MEALS 180 tablet 0    metoprolol tartrate (LOPRESSOR) 25 MG tablet TAKE 1 TABLET BY MOUTH 2 TIMES A DAY FOR 30 DAYS. 180 tablet 1    montelukast (SINGULAIR) 10 MG tablet TAKE 1 TABLET BY MOUTH AT BEDTIME 90 tablet 0    potassium chloride (KLOR-CON) 10 MEQ CR tablet Take 1 tablet by mouth Daily. 90 tablet 0    primidone (MYSOLINE) 50 MG tablet TAKE 1 TABLET BY MOUTH THREE TIMES A  tablet 0    Probiotic Product (PROBIOTIC-10 PO) Take  by mouth.      tiZANidine (ZANAFLEX) 4 MG tablet Take 1 tablet by mouth At Night As Needed for Muscle Spasms. 30 tablet 1    tiotropium bromide monohydrate (SPIRIVA RESPIMAT) 2.5 MCG/ACT aerosol solution inhaler Inhale 2 puffs Daily for 30 days. 1 g 0     No facility-administered medications prior to visit.       No opioid medication identified on active medication list. I have reviewed chart for other potential  high risk medication/s and harmful drug interactions in the elderly.        Aspirin is not on active medication list.  Aspirin use is contraindicated for this patient due to: current use of Eliquis.  .    Patient Active Problem List   Diagnosis    Closed fracture of left distal femur    Type 2 diabetes mellitus with circulatory disorder, without long-term current use of insulin    Essential hypertension    Tremors of nervous system    PAF (paroxysmal atrial fibrillation)    TARSHA (obstructive sleep apnea)    Chronic respiratory failure with hypoxia    History of breast cancer    History of seizure    Risk for falls    Neck pain    Acute UTI    Chronic midline low back pain without sciatica    Vulvar rash     Advance Care Planning   Advance Care  "Planning     Advance Directive is not on file.  ACP discussion was held with the patient during this visit. Patient has an advance directive (not in EMR), copy requested.     Objective    Vitals:    10/09/23 1010   BP: 138/70   Pulse: 51   Temp: 98.7 øF (37.1 øC)   TempSrc: Temporal   SpO2: 97%  Comment: 3L   Weight: 104 kg (230 lb 4.8 oz)     Estimated body mass index is 38.32 kg/mý as calculated from the following:    Height as of 8/15/23: 165.1 cm (65\").    Weight as of this encounter: 104 kg (230 lb 4.8 oz).           Does the patient have evidence of cognitive impairment? Yes          HEALTH RISK ASSESSMENT    Smoking Status:  Social History     Tobacco Use   Smoking Status Never   Smokeless Tobacco Never   Tobacco Comments    no caffine     Alcohol Consumption:  Social History     Substance and Sexual Activity   Alcohol Use Not Currently     Fall Risk Screen:    CAROLE Fall Risk Assessment was completed, and patient is at HIGH risk for falls. Assessment completed on:10/9/2023    Depression Screening:      10/9/2023    10:12 AM   PHQ-2/PHQ-9 Depression Screening   Little Interest or Pleasure in Doing Things 0-->not at all   Feeling Down, Depressed or Hopeless 0-->not at all   PHQ-9: Brief Depression Severity Measure Score 0       Health Habits and Functional and Cognitive Screening:      10/9/2023    10:06 AM   Functional & Cognitive Status   Do you have difficulty preparing food and eating? No   Do you have difficulty bathing yourself, getting dressed or grooming yourself? No   Do you have difficulty using the toilet? No   Do you have difficulty moving around from place to place? Yes   Do you have trouble with steps or getting out of a bed or a chair? Yes   Current Diet Other   Dental Exam Not up to date   Eye Exam Up to date   Exercise (times per week) 0 times per week   Current Exercises Include No Regular Exercise   Do you need help using the phone?  No   Are you deaf or do you have serious difficulty " hearing?  No   Do you need help to go to places out of walking distance? Yes   Do you need help shopping? Yes   Do you need help preparing meals?  No   Do you need help with housework?  No   Do you need help with laundry? No   Do you need help taking your medications? Yes   Do you need help managing money? Yes   Do you ever drive or ride in a car without wearing a seat belt? No   Have you felt unusual stress, anger or loneliness in the last month? No   Who do you live with? Community   If you need help, do you have trouble finding someone available to you? No   Have you been bothered in the last four weeks by sexual problems? No   Do you have difficulty concentrating, remembering or making decisions? Yes       Age-appropriate Screening Schedule:  Refer to the list below for future screening recommendations based on patient's age, sex and/or medical conditions. Orders for these recommended tests are listed in the plan section. The patient has been provided with a written plan.    Health Maintenance   Topic Date Due    Pneumococcal Vaccine 65+ (1 - PCV) Never done    TDAP/TD VACCINES (1 - Tdap) Never done    DIABETIC FOOT EXAM  Never done    DIABETIC EYE EXAM  Never done    DXA SCAN  03/16/2023    HEMOGLOBIN A1C  06/30/2023    COVID-19 Vaccine (4 - 2023-24 season) 09/01/2023    LIPID PANEL  10/04/2023    URINE MICROALBUMIN  10/04/2023    MAMMOGRAM  12/08/2023    BMI FOLLOWUP  08/15/2024    ANNUAL WELLNESS VISIT  10/09/2024    COLORECTAL CANCER SCREENING  02/11/2026    HEPATITIS C SCREENING  Completed    INFLUENZA VACCINE  Discontinued    ZOSTER VACCINE  Discontinued                  CMS Preventative Services Quick Reference  Risk Factors Identified During Encounter  Immunizations Discussed/Encouraged: Tdap, Influenza, Prevnar 20 (Pneumococcal 20-valent conjugate), and COVID19  The above risks/problems have been discussed with the patient.  Pertinent information has been shared with the patient in the After Visit  "Summary.  An After Visit Summary and PPPS were made available to the patient.    Follow Up:   Next Medicare Wellness visit to be scheduled in 1 year.       Additional E&M Note during same encounter follows:  Patient has multiple medical problems which are significant and separately identifiable that require additional work above and beyond the Medicare Wellness Visit.      Chief Complaint  Medicare Wellness-subsequent (Pt did not take morning medications on purpose.  Daughter states \"pt seems out of sorts.\"  ), Fatigue (Unable to sleep; sleeping habits have changed.), Urinary Tract Infection (Burning sensaton), and leg cramps (B/L. Ongoing for 1 week.)    Subjective        HPI  Kanwal Sauer is also being seen today fordiabetes type 2 , htn  and hyperlipidemia  Patient with history of diabetes type 2 not checking her sugar regularly denies any polyuria polydipsia has gained weight since she moved to assisted living.  Patient has long history of hyperlipidemia denies any body ache, nausea vomiting.  Denies any problem with medication.      Patient denies any headache, blurring of vision, lightheadedness or dizziness.  She is not checking her blood pressure at home.     She is also complaining of increased urinary frequency and burning denies any nausea vomiting daughter giving history of confusion  Objective   Vital Signs:  /70   Pulse 51   Temp 98.7 øF (37.1 øC) (Temporal)   Wt 104 kg (230 lb 4.8 oz)   SpO2 97% Comment: 3L  BMI 38.32 kg/mý     Physical Exam  Constitutional:       General: She is not in acute distress.     Appearance: Normal appearance. She is well-developed.   HENT:      Head: Normocephalic and atraumatic.      Right Ear: Tympanic membrane normal.      Left Ear: Tympanic membrane normal.      Mouth/Throat:      Mouth: Mucous membranes are moist.   Eyes:      General:         Right eye: No discharge.         Left eye: No discharge.      Extraocular Movements: Extraocular movements " intact.      Pupils: Pupils are equal, round, and reactive to light.   Cardiovascular:      Rate and Rhythm: Normal rate and regular rhythm.      Pulses: Normal pulses.      Heart sounds: Normal heart sounds.   Pulmonary:      Effort: Pulmonary effort is normal.      Breath sounds: Normal breath sounds. No wheezing or rales.   Abdominal:      General: Bowel sounds are normal.      Palpations: Abdomen is soft. There is no mass.      Tenderness: There is no abdominal tenderness.   Musculoskeletal:      Cervical back: Normal range of motion and neck supple.      Right lower leg: No edema.      Left lower leg: No edema.   Lymphadenopathy:      Cervical: No cervical adenopathy.   Neurological:      General: No focal deficit present.      Mental Status: She is alert and oriented to person, place, and time.   Psychiatric:         Mood and Affect: Mood normal.         Behavior: Behavior normal.                         Assessment and Plan   Diagnoses and all orders for this visit:    1. Medicare annual wellness visit, subsequent (Primary)    2. Type 2 diabetes mellitus with diabetic peripheral angiopathy without gangrene, without long-term current use of insulin  -     Comprehensive Metabolic Panel  -     Hemoglobin A1c  -     Microalbumin / Creatinine Urine Ratio - Urine, Clean Catch    3. Essential hypertension  -     Comprehensive Metabolic Panel    4. Mixed hyperlipidemia  -     Lipid Panel    5. Other fatigue  -     CBC & Differential  -     TSH+Free T4    6. Acute cystitis with hematuria  -     Urine Culture - Urine, Urine, Clean Catch  -     POC Urinalysis Dipstick, Automated    Other orders  -     nitrofurantoin, macrocrystal-monohydrate, (Macrobid) 100 MG capsule; Take 1 capsule by mouth 2 (Two) Times a Day.  Dispense: 14 capsule; Refill: 0      Kanwal Sauer is a 72-year-old female patient seen today for  Medicare annual wellness visit, preventive care discussed with patient and her daughter.  She is living in  assisted living facility.  Immunization discussed with patient she cannot take flu vaccine and she is allergic to Kanwal Recinos Prevnar 20 Tdap and COVID-19 vaccine recommended to patient I also gave her information on RSV vaccine    She is also seen here for follow-up on  Diabetes type 2 we will check hemoglobin A1c CMP and urine albumin today.  Follow-up after labs.  Low-carb diet recommended to patient    Hypertension blood pressure at goal continue same  Hyperlipidemia we will check lipids today continue same  Acute cystitis, urine dip done in the office positive for nitrite I will start her on Macrobid we will send urine for culture and sensitivity.  Follow-up after the labs and 2 weeks       Follow Up   No follow-ups on file.  Patient was given instructions and counseling regarding her condition or for health maintenance advice. Please see specific information pulled into the AVS if appropriate.

## 2023-10-10 LAB
ALBUMIN SERPL-MCNC: 4.7 G/DL (ref 3.5–5.2)
ALBUMIN/CREAT UR: 439 MG/G CREAT (ref 0–29)
ALBUMIN/GLOB SERPL: 3.4 G/DL
ALP SERPL-CCNC: 45 U/L (ref 39–117)
ALT SERPL-CCNC: 12 U/L (ref 1–33)
AST SERPL-CCNC: 15 U/L (ref 1–32)
BASOPHILS # BLD AUTO: 0.01 10*3/MM3 (ref 0–0.2)
BASOPHILS NFR BLD AUTO: 0.2 % (ref 0–1.5)
BILIRUB SERPL-MCNC: 0.4 MG/DL (ref 0–1.2)
BUN SERPL-MCNC: 24 MG/DL (ref 8–23)
BUN/CREAT SERPL: 23.1 (ref 7–25)
CALCIUM SERPL-MCNC: 10.1 MG/DL (ref 8.6–10.5)
CHLORIDE SERPL-SCNC: 101 MMOL/L (ref 98–107)
CHOLEST SERPL-MCNC: 190 MG/DL (ref 0–200)
CO2 SERPL-SCNC: 30.1 MMOL/L (ref 22–29)
CREAT SERPL-MCNC: 1.04 MG/DL (ref 0.57–1)
CREAT UR-MCNC: 79.2 MG/DL
EGFRCR SERPLBLD CKD-EPI 2021: 56.5 ML/MIN/1.73
EOSINOPHIL # BLD AUTO: 0.07 10*3/MM3 (ref 0–0.4)
EOSINOPHIL NFR BLD AUTO: 1.7 % (ref 0.3–6.2)
ERYTHROCYTE [DISTWIDTH] IN BLOOD BY AUTOMATED COUNT: 13.1 % (ref 12.3–15.4)
GLOBULIN SER CALC-MCNC: 1.4 GM/DL
GLUCOSE SERPL-MCNC: 193 MG/DL (ref 65–99)
HBA1C MFR BLD: 6.7 % (ref 4.8–5.6)
HCT VFR BLD AUTO: 35.4 % (ref 34–46.6)
HDLC SERPL-MCNC: 71 MG/DL (ref 40–60)
HGB BLD-MCNC: 12.1 G/DL (ref 12–15.9)
IMM GRANULOCYTES # BLD AUTO: 0.02 10*3/MM3 (ref 0–0.05)
IMM GRANULOCYTES NFR BLD AUTO: 0.5 % (ref 0–0.5)
LDLC SERPL CALC-MCNC: 104 MG/DL (ref 0–100)
LYMPHOCYTES # BLD AUTO: 1.25 10*3/MM3 (ref 0.7–3.1)
LYMPHOCYTES NFR BLD AUTO: 30.3 % (ref 19.6–45.3)
MCH RBC QN AUTO: 30.6 PG (ref 26.6–33)
MCHC RBC AUTO-ENTMCNC: 34.2 G/DL (ref 31.5–35.7)
MCV RBC AUTO: 89.6 FL (ref 79–97)
MICROALBUMIN UR-MCNC: 347.3 UG/ML
MONOCYTES # BLD AUTO: 0.31 10*3/MM3 (ref 0.1–0.9)
MONOCYTES NFR BLD AUTO: 7.5 % (ref 5–12)
NEUTROPHILS # BLD AUTO: 2.47 10*3/MM3 (ref 1.7–7)
NEUTROPHILS NFR BLD AUTO: 59.8 % (ref 42.7–76)
NRBC BLD AUTO-RTO: 0.2 /100 WBC (ref 0–0.2)
PLATELET # BLD AUTO: 142 10*3/MM3 (ref 140–450)
POTASSIUM SERPL-SCNC: 4.1 MMOL/L (ref 3.5–5.2)
PROT SERPL-MCNC: 6.1 G/DL (ref 6–8.5)
RBC # BLD AUTO: 3.95 10*6/MM3 (ref 3.77–5.28)
SODIUM SERPL-SCNC: 140 MMOL/L (ref 136–145)
T4 FREE SERPL-MCNC: 1.07 NG/DL (ref 0.93–1.7)
TRIGL SERPL-MCNC: 84 MG/DL (ref 0–150)
TSH SERPL DL<=0.005 MIU/L-ACNC: 1.9 UIU/ML (ref 0.27–4.2)
VLDLC SERPL CALC-MCNC: 15 MG/DL (ref 5–40)
WBC # BLD AUTO: 4.13 10*3/MM3 (ref 3.4–10.8)

## 2023-10-13 LAB
BACTERIA UR CULT: ABNORMAL
BACTERIA UR CULT: ABNORMAL
OTHER ANTIBIOTIC SUSC ISLT: ABNORMAL

## 2023-10-20 RX ORDER — MONTELUKAST SODIUM 10 MG/1
10 TABLET ORAL
Qty: 90 TABLET | Refills: 0 | Status: SHIPPED | OUTPATIENT
Start: 2023-10-20

## 2023-10-20 NOTE — TELEPHONE ENCOUNTER
Caller: SHELDON TYSON    Relationship to patient: Emergency Contact    Best call back number: [phone] (Home)     Patient is needing: SHELDON CALLED TO ADVISE THAT PATIENT HAS FINISHED UP HER MEDICATION FOR A UTI, BUT IS STILL HAVING SYMPTOMS OF DISCOMFORT.     PLEASE CONTACT SHELDON TO ADVISE.      THANKS

## 2023-10-21 DIAGNOSIS — E11.65 TYPE 2 DIABETES MELLITUS WITH HYPERGLYCEMIA, WITHOUT LONG-TERM CURRENT USE OF INSULIN: ICD-10-CM

## 2023-10-23 NOTE — TELEPHONE ENCOUNTER
Rx Refill Note  Requested Prescriptions     Pending Prescriptions Disp Refills    metFORMIN (GLUCOPHAGE) 1000 MG tablet [Pharmacy Med Name: METFORMIN HCL 1,000 MG TABLET] 180 tablet 0     Sig: TAKE 1 TABLET BY MOUTH TWICE A DAY WITH FOOD      Last office visit with prescribing clinician: 10/9/2023   Last telemedicine visit with prescribing clinician: Visit date not found   Next office visit with prescribing clinician: 11/6/2023                         Would you like a call back once the refill request has been completed: [] Yes [] No    If the office needs to give you a call back, can they leave a voicemail: [] Yes [] No    Yuki Augustine MA  10/23/23, 08:40 EDT

## 2023-10-24 ENCOUNTER — TELEPHONE (OUTPATIENT)
Dept: FAMILY MEDICINE CLINIC | Facility: CLINIC | Age: 73
End: 2023-10-24
Payer: MEDICARE

## 2023-10-24 NOTE — TELEPHONE ENCOUNTER
----- Message from Nisha Britton MD sent at 10/24/2023  3:16 AM EDT -----  If she is still having pain , will need to repeat her urine  She should jeremías appointment   ----- Message -----  From: Marisa Aldridge  Sent: 10/23/2023  10:29 AM EDT  To: Nisha Britton MD    SPW PT DAUGHTER SHELDON TYSON SHE FINISHED THE ABX  ON THE 17TH OR THE 18TH AND IS STILL HAVING SYMPTOMS OF PAIN

## 2023-10-24 NOTE — TELEPHONE ENCOUNTER
"Relay     \"If she is still having pain, she is needing to schedule an appointment for a repeat urine test with provider.\"          "

## 2023-10-27 ENCOUNTER — OFFICE VISIT (OUTPATIENT)
Dept: FAMILY MEDICINE CLINIC | Facility: CLINIC | Age: 73
End: 2023-10-27
Payer: MEDICARE

## 2023-10-27 VITALS
WEIGHT: 223.8 LBS | TEMPERATURE: 96.9 F | HEIGHT: 65 IN | HEART RATE: 60 BPM | BODY MASS INDEX: 37.29 KG/M2 | RESPIRATION RATE: 18 BRPM | SYSTOLIC BLOOD PRESSURE: 130 MMHG | DIASTOLIC BLOOD PRESSURE: 70 MMHG | OXYGEN SATURATION: 96 %

## 2023-10-27 DIAGNOSIS — M62.838 MUSCLE SPASMS OF NECK: Primary | ICD-10-CM

## 2023-10-27 DIAGNOSIS — Z87.440 HISTORY OF UTI: ICD-10-CM

## 2023-10-27 DIAGNOSIS — M54.2 NECK PAIN: ICD-10-CM

## 2023-10-27 DIAGNOSIS — R35.0 URINE FREQUENCY: ICD-10-CM

## 2023-10-27 LAB
BILIRUB BLD-MCNC: NEGATIVE MG/DL
CLARITY, POC: CLEAR
COLOR UR: YELLOW
EXPIRATION DATE: ABNORMAL
GLUCOSE UR STRIP-MCNC: NEGATIVE MG/DL
KETONES UR QL: NEGATIVE
LEUKOCYTE EST, POC: NEGATIVE
Lab: ABNORMAL
NITRITE UR-MCNC: NEGATIVE MG/ML
PH UR: 5 [PH] (ref 5–8)
PROT UR STRIP-MCNC: ABNORMAL MG/DL
RBC # UR STRIP: NEGATIVE /UL
SP GR UR: 1.01 (ref 1–1.03)
UROBILINOGEN UR QL: ABNORMAL

## 2023-10-27 PROCEDURE — 3044F HG A1C LEVEL LT 7.0%: CPT | Performed by: NURSE PRACTITIONER

## 2023-10-27 PROCEDURE — 81003 URINALYSIS AUTO W/O SCOPE: CPT | Performed by: NURSE PRACTITIONER

## 2023-10-27 PROCEDURE — 3075F SYST BP GE 130 - 139MM HG: CPT | Performed by: NURSE PRACTITIONER

## 2023-10-27 PROCEDURE — 3078F DIAST BP <80 MM HG: CPT | Performed by: NURSE PRACTITIONER

## 2023-10-27 PROCEDURE — 99213 OFFICE O/P EST LOW 20 MIN: CPT | Performed by: NURSE PRACTITIONER

## 2023-10-27 NOTE — PROGRESS NOTES
"Chief Complaint  Urinary Tract Infection (Had one, finished antibiotic for this, pressure, urinating a lot, fatigue, little confused.) and Neck Pain (Neck going down her back. )    Subjective        Kanwal Sauer presents to [facility]  History of Present Illness    Kanwal Sauer is a 72-year-old female who presents to the clinic today for urinary symptoms. She is accompanied by her daughter.    The patient reports she is experiencing clear urine and adds she is experiencing a lot of pressure in her pelvic area. If she does not urinate again, she will wet her pants. She thinks she is emptying her bladder when she urinates. Her daughter states that she has not been taking her water pill because the patient is already going to the bathroom so much. She adds her mother stops drinking and does not drink caffeine.    Her daughter reports that she is experiencing pain in her neck that radiates into her back. She is doing OT and PT and has tried muscle relaxers, steroid injections, and oral steroids in the past. She adds she does not think physical therapy is helping with her neck. She is using a heating pad on her neck.    Her daughter states that her mother will not eat and has lost 7 pounds. When she feels better, they will give her a steroid and she will feel better. However, she will eat a whole bunch and then she is in a constant ache.    [place].    Objective   Vital Signs:  /70 (BP Location: Right arm, Patient Position: Sitting, Cuff Size: Adult)   Pulse 60   Temp 96.9 °F (36.1 °C) (Temporal)   Resp 18   Ht 165.1 cm (65\")   Wt 102 kg (223 lb 12.8 oz)   SpO2 96%   BMI 37.24 kg/m²   Estimated body mass index is 37.24 kg/m² as calculated from the following:    Height as of this encounter: 165.1 cm (65\").    Weight as of this encounter: 102 kg (223 lb 12.8 oz).            Physical Exam  Vitals reviewed.   Constitutional:       Appearance: " Normal appearance.   Cardiovascular:      Rate and Rhythm: Normal rate and regular rhythm.      Heart sounds: No murmur heard.     No friction rub. No gallop.   Pulmonary:      Effort: Pulmonary effort is normal. No respiratory distress.      Breath sounds: Normal breath sounds. No wheezing, rhonchi or rales.   Abdominal:      General: Bowel sounds are normal.      Palpations: Abdomen is soft.      Tenderness: There is no abdominal tenderness.   Skin:     General: Skin is warm and dry.   Neurological:      Mental Status: She is alert and oriented to person, place, and time.        Result Review :                  Assessment and Plan   Diagnoses and all orders for this visit:    1. Muscle spasms of neck (Primary)  -     Ambulatory Referral to Physical Therapy Evaluate and treat    2. Neck pain  -     Ambulatory Referral to Physical Therapy Evaluate and treat    3. History of UTI    4. Urine frequency  -     POCT urinalysis dipstick, automated      General health maintenance  - The patient presents today for an acute visit for a urinary tract infection and neck pain.    1. Neck pain with chronic bilateral low back pain without sciatica  - The patient was provided an ambulatory referral to physical therapy    Urinary frequency: no evidence of UTI, will hold lasix and follow up prn       Follow Up   No follow-ups on file.  Patient was given instructions and counseling regarding her condition or for health maintenance advice. Please see specific information pulled into the AVS if appropriate.       Transcribed from ambient dictation for MARK Buck by Dale Garcia.  10/27/23   15:41 EDT    Patient or patient representative verbalized consent to the visit recording.  I have personally performed the services described in this document as transcribed by the above individual, and it is both accurate and complete.

## 2023-10-31 ENCOUNTER — OFFICE VISIT (OUTPATIENT)
Dept: CARDIOLOGY | Facility: CLINIC | Age: 73
End: 2023-10-31
Payer: MEDICARE

## 2023-10-31 VITALS
HEIGHT: 65 IN | HEART RATE: 56 BPM | WEIGHT: 226.2 LBS | OXYGEN SATURATION: 97 % | BODY MASS INDEX: 37.69 KG/M2 | SYSTOLIC BLOOD PRESSURE: 185 MMHG | DIASTOLIC BLOOD PRESSURE: 75 MMHG

## 2023-10-31 DIAGNOSIS — G47.33 OSA (OBSTRUCTIVE SLEEP APNEA): ICD-10-CM

## 2023-10-31 DIAGNOSIS — N39.0 RECURRENT UTI: ICD-10-CM

## 2023-10-31 DIAGNOSIS — I48.0 PAF (PAROXYSMAL ATRIAL FIBRILLATION): Primary | ICD-10-CM

## 2023-10-31 DIAGNOSIS — I10 ESSENTIAL HYPERTENSION: ICD-10-CM

## 2023-10-31 DIAGNOSIS — E11.51 TYPE 2 DIABETES MELLITUS WITH DIABETIC PERIPHERAL ANGIOPATHY WITHOUT GANGRENE, WITHOUT LONG-TERM CURRENT USE OF INSULIN: ICD-10-CM

## 2023-10-31 PROBLEM — J96.11 CHRONIC RESPIRATORY FAILURE WITH HYPOXIA: Status: RESOLVED | Noted: 2020-12-27 | Resolved: 2023-10-31

## 2023-10-31 PROCEDURE — 3078F DIAST BP <80 MM HG: CPT | Performed by: PHYSICIAN ASSISTANT

## 2023-10-31 PROCEDURE — 3077F SYST BP >= 140 MM HG: CPT | Performed by: PHYSICIAN ASSISTANT

## 2023-10-31 PROCEDURE — 1160F RVW MEDS BY RX/DR IN RCRD: CPT | Performed by: PHYSICIAN ASSISTANT

## 2023-10-31 PROCEDURE — 93000 ELECTROCARDIOGRAM COMPLETE: CPT | Performed by: PHYSICIAN ASSISTANT

## 2023-10-31 PROCEDURE — 99214 OFFICE O/P EST MOD 30 MIN: CPT | Performed by: PHYSICIAN ASSISTANT

## 2023-10-31 PROCEDURE — 1159F MED LIST DOCD IN RCRD: CPT | Performed by: PHYSICIAN ASSISTANT

## 2023-10-31 NOTE — LETTER
2023     Nisha Britton MD  [address]  [address]  [facility] [address]    Patient: Kanwal Sauer   YOB: 1950   Date of Visit: 10/31/2023       Dear Nisha Britton MD    Kanwal Sauer was in my office today. She did have high blood pressure today at the office and the family wanted to have her BP checked again at your office next week prior to starting a new medication. Below is a copy of my note.    If you have questions, please do not hesitate to call me. I look forward to following Kanwal along with you.         Sincerely,        Parth Kruse PA-C               CARDIOLOGY    Date of Office Visit: 10/31/2023  Patient Name: Kanwal Sauer  : 1950  Encounter Provider: Parth Kruse PA-C  Primary Cardiologist: Yasir Romano MD    CHIEF COMPLAINT / REASON FOR OFFICE VISIT     6 month follow up      HISTORY OF PRESENT ILLNESS     This is a 73 y.o. year old female who presents to [facility] CARDIOLOGY for a for a 6 month follow up.     She has a history of paroxysmal atrial fibrillation and had a prior cardioversion in 2020 in [place], [place].  In 2022, she was hospitalized for a UTI and was seen to have episodes of atrial fibrillation with RVR.  She was started on IV Lopressor and discharged home on metoprolol tartrate.  During that admission she did also require some doses of digoxin.  We have been following the patient since.    Followed up with her PCP on 10/9/2023 and was having increased urinary frequency.  She was diagnosed with acute cystitis and had a round of nitrofurantoin.  2 weeks later she had been still having a lot of pelvic pressure and urinary frequency.  She had been holding off on taking her diuretics intermittently due to fear of further increased urination.  She had stopped drinking and lost about 7 pounds since her last visit.  She is also been struggling with neck pain  "and is now following with ambulatory physical therapy. She follows up again with her PCP 2023.     Today the patient reports that she has noted over the past month that her blood pressures have been in the 160s when she has been taking them at therapy. At her doctors office last week it was 137/67 mmHg systolic. After sitting, her repeat blood pressure was 155/70. She will get blood pressure checked again tomorrow and with PCP Monday. She will likely need to go back on norvasc  5 mg daily, she had been on this in the past. I do think her neck pain is contributing to her elevated readings.     She denies any dizziness, lightheadedness, nausea, vomiting, palpitations or chest pressure/pain. She did have mild LE edema today and she will take a lasix when she gets home.     Currently resides at an assisted living facility called [facility].  She uses a walker for ambulation.    Home blood pressure had been in the 160s systolic.     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    Weight as of this encounter: 103 kg (226 lb 3.2 oz).         Physical Exam  Constitutional:       Appearance: Normal appearance.   HENT:      Head: Normocephalic and atraumatic.   Cardiovascular:      Rate and Rhythm: Normal rate and regular rhythm.      Pulses: Normal pulses.      Heart sounds: Normal heart sounds.   Pulmonary:      Effort: Pulmonary effort is normal.      Breath sounds: Normal breath sounds.   Musculoskeletal:      Right lower le+ " Pitting Edema present.      Left lower le+ Pitting Edema present.   Skin:     General: Skin is warm and dry.   Neurological:      General: No focal deficit present.      Mental Status: She is alert and oriented to person, place, and time.          Cardiac Testing/Results     Cardiac Testing:   - Echo 2022: EF 68% with mild LVH.  Grade 1 diastolic dysfunction.  Mild TR with RVSP less than 35 mmHg.  Left atrial cavity is borderline dilated.    Result Review :  The following data was reviewed by: Parth Kruse PA-C on 10/31/2023:    Lipid Panel          10/9/2023    11:09   Lipid Panel   Total Cholesterol 190    Triglycerides 84    HDL Cholesterol 71    VLDL Cholesterol 15    LDL Cholesterol  104       Lab Results   Component Value Date     10/09/2023     02/10/2023    K 4.1 10/09/2023    K 4.3 02/10/2023     10/09/2023     02/10/2023    CO2 30.1 (H) 10/09/2023    CO2 30.1 (H) 02/10/2023    BUN 24 (H) 10/09/2023    BUN 29 (H) 02/10/2023    CREATININE 1.04 (H) 10/09/2023    CREATININE 0.99 02/10/2023    EGFRIFNONA 70 2021    EGFRIFNONA 70 2021    EGFRIFAFRI 84 2021    EGFRIFAFRI 78 2021    GLUCOSE 193 (H) 10/09/2023    GLUCOSE 151 (H) 02/10/2023    CALCIUM 10.1 10/09/2023    CALCIUM 9.6 02/10/2023    ALBUMIN 4.7 10/09/2023    ALBUMIN 4.2 02/10/2023    AST 15 10/09/2023    AST 18 02/10/2023    ALT 12 10/09/2023    ALT 12 02/10/2023     Lab Results   Component Value Date    WBC 4.13 10/09/2023    WBC 4.30 02/10/2023    HGB 12.1 10/09/2023    HGB 12.2 02/10/2023    HCT 35.4 10/09/2023    HCT 36.0 02/10/2023    MCV 89.6 10/09/2023    MCV 87.0 02/10/2023     10/09/2023     (L) 02/10/2023     Lab Results   Component Value Date    PROBNP 2,084.0 (H) 2022     Lab Results   Component Value Date    CKTOTAL 36 2022    TROPONINT <0.010 2022     Lab Results   Component Value Date    TSH 1.900 10/09/2023    TSH 1.100 2022                  ECG 12 Lead    Date/Time: 10/31/2023 2:54 PM  Performed by: aPrth Haas PA-C    Authorized by: Parth Haas PA-C  Comparison: compared with previous ECG from 1/1/2023  Rhythm: sinus bradycardia  Rate: bradycardic  BPM: 56  Conduction: left anterior fascicular block  Q waves: aVL and aVR    ST Flattening: V1  QRS axis: left    Clinical impression: normal ECG  Comments: Qtc 422                ASSESSMENT & PLAN       Diagnoses and all orders for this visit:    1. PAF (paroxysmal atrial fibrillation) (Primary)  [id number] Score: 8  Continue Eliquis 5 mg twice daily.  Denies bleeding complications.  Most recent hemoglobin normal at 12.1.  Prior cardioversion 1/2020.  She has been maintaining sinus rhythm with metoprolol tartrate 25 mg twice daily. Avoid uptitiration due to baseline bradycardia.   Continue rate control therapy I did review her echocardiogram and she only has mildly enlarged left atrium  Thyroid function and electrolytes on recent lab work was normal.  2. Essential hypertension  Keep blood pressure log at home, continue Lopressor 25 mg twice a day  She will get blood pressure checked again on Monday and if elevated may have to resume Norvasc 5 mg as she had been on in the past.   Elevated today, will send note to PCP to re-evaluate.   3. TARSHA (obstructive sleep apnea)  Encouraged compliance with sleep apnea machine.   4. Type 2 diabetes mellitus with diabetic peripheral angiopathy without gangrene, without long-term current use of insulin  Most recent A1c was 6.7%   She currently takes metformin and follows closely with PCP for management  5. Recurrent UTI  Following with PCP every 2 weeks for management.  This has been ongoing and we did have 1 episode of severe sepsis that occurred with UTI last year resulting in atrial fibrillation with RVR  She has been having Lasix and potassium intermittently to help with lower extremity edema.  She has not been taking this  medication due to the fact that it causes further urinary frequency, did discuss that if she develops symptoms of orthopnea she will need to take this medication.  Most recent creatinine was 1.04.  She will take lasix when she gets home today.       Follow Up:  Return in about 6 months (around 4/30/2024) for Dr. Cates.  Patient was given instructions and counseling regarding her condition or for health maintenance advice. Please contact office if worsening symptoms or proceed to ER when appropriate.      Parth Kruse PA-C  10/31/23  15:02 EDT    MEDICATIONS         Discharge Medications            Accurate as of October 31, 2023  3:02 PM. If you have any questions, ask your nurse or doctor.                Changes to Medications        Instructions Start Date   acetaminophen 325 MG tablet  Commonly known as: TYLENOL  What changed: when to take this   650 mg, Oral, Every 4 Hours PRN             Continue These Medications        Instructions Start Date   albuterol sulfate  (90 Base) MCG/ACT inhaler  Commonly known as: PROVENTIL HFA;VENTOLIN HFA;PROAIR HFA   INHALE 2 PUFFS EVERY 4 HOURS AS NEEDED FOR WHEEZING      citalopram 20 MG tablet  Commonly known as: CeleXA   TAKE 1 TABLET BY MOUTH EVERY DAY      CRANBERRY PO   650 mg, Oral      Eliquis 5 MG tablet tablet  Generic drug: apixaban   TAKE 1 TABLET BY MOUTH EVERY 12 HOURS      fenofibrate 160 MG tablet   TAKE 1 TABLET BY MOUTH EVERY DAY      Fluticasone-Umeclidin-Vilant 100-62.5-25 MCG/ACT inhaler  Commonly known as: Trelegy Ellipta   1 puff, Inhalation, Daily      furosemide 40 MG tablet  Commonly known as: LASIX   40 mg, Oral, Daily      ipratropium-albuterol 0.5-2.5 mg/3 ml nebulizer  Commonly known as: DUO-NEB   3 mL, Nebulization, Every 4 Hours PRN      metFORMIN 1000 MG tablet  Commonly known as: GLUCOPHAGE   1,000 mg, Oral, 2 Times Daily With Meals      metoprolol tartrate 25 MG tablet  Commonly known as: LOPRESSOR   TAKE 1 TABLET BY  MOUTH 2 TIMES A DAY FOR 30 DAYS.      montelukast 10 MG tablet  Commonly known as: SINGULAIR   10 mg, Oral, Every Night at Bedtime      potassium chloride 10 MEQ CR tablet  Commonly known as: KLOR-CON   10 mEq, Oral, Daily      primidone 50 MG tablet  Commonly known as: MYSOLINE   TAKE 1 TABLET BY MOUTH THREE TIMES A DAY      PROBIOTIC-10 PO   Oral      tiotropium bromide monohydrate 2.5 MCG/ACT aerosol solution inhaler  Commonly known as: SPIRIVA RESPIMAT   2 puffs, Inhalation, Daily - RT      tiZANidine 4 MG tablet  Commonly known as: ZANAFLEX   4 mg, Oral, Nightly PRN      Vitamin D3 25 MCG (1000 UT) capsule   1,000 Units, Oral, Daily                   **Aliya Disclaimer: This note was dictated using an electronic transcription. The electronic translation of spoken language may permit erroneous, or at times, nonsensical words or phrases to be inadvertently transcribed. Although I have reviewed the note for such errors, some may still exist.

## 2023-10-31 NOTE — PROGRESS NOTES
CARDIOLOGY    Date of Office Visit: 10/31/2023  Patient Name: Kanwal Sauer  : 1950  Encounter Provider: Parth Kruse PA-C  Primary Cardiologist: Yasir Romano MD    CHIEF COMPLAINT / REASON FOR OFFICE VISIT     6 month follow up      HISTORY OF PRESENT ILLNESS     This is a 73 y.o. year old female who presents to [facility] CARDIOLOGY for a for a 6 month follow up.     She has a history of paroxysmal atrial fibrillation and had a prior cardioversion in 2020 in [place], [place].  In 2022, she was hospitalized for a UTI and was seen to have episodes of atrial fibrillation with RVR.  She was started on IV Lopressor and discharged home on metoprolol tartrate.  During that admission she did also require some doses of digoxin.  We have been following the patient since.    Followed up with her PCP on 10/9/2023 and was having increased urinary frequency.  She was diagnosed with acute cystitis and had a round of nitrofurantoin.  2 weeks later she had been still having a lot of pelvic pressure and urinary frequency.  She had been holding off on taking her diuretics intermittently due to fear of further increased urination.  She had stopped drinking and lost about 7 pounds since her last visit.  She is also been struggling with neck pain and is now following with ambulatory physical therapy. She follows up again with her PCP 2023.     Today the patient reports that she has noted over the past month that her blood pressures have been in the 160s when she has been taking them at therapy. At her doctors office last week it was 137/67 mmHg systolic. After sitting, her repeat blood pressure was 155/70. She will get blood pressure checked again tomorrow and with PCP Monday. She will likely need to go back on norvasc  5 mg daily, she had been on this in the past. I do think her neck pain is contributing to her elevated readings.     She denies any  "dizziness, lightheadedness, nausea, vomiting, palpitations or chest pressure/pain. She did have mild LE edema today and she will take a lasix when she gets home.     Currently resides at an assisted living facility called [facility].  She uses a walker for ambulation.    Home blood pressure had been in the 160s systolic.     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    Weight as of this encounter: 103 kg (226 lb 3.2 oz).         Physical Exam  Constitutional:       Appearance: Normal appearance.   HENT:      Head: Normocephalic and atraumatic.   Cardiovascular:      Rate and Rhythm: Normal rate and regular rhythm.      Pulses: Normal pulses.      Heart sounds: Normal heart sounds.   Pulmonary:      Effort: Pulmonary effort is normal.      Breath sounds: Normal breath sounds.   Musculoskeletal:      Right lower le+ Pitting Edema present.      Left lower le+ Pitting Edema present.   Skin:     General: Skin is warm and dry.   Neurological:      General: No focal deficit present.      Mental Status: She is alert and oriented to person, place, and time.          Cardiac Testing/Results     Cardiac Testing:   - Echo 2022: EF 68% with mild LVH.  Grade 1 diastolic dysfunction.  Mild TR with RVSP less than 35 mmHg.  Left atrial cavity is borderline dilated.    Result Review :  The following data was reviewed by: Parth Kruse PA-C on 10/31/2023:    Lipid Panel          10/9/2023    11:09   Lipid Panel   Total " Cholesterol 190    Triglycerides 84    HDL Cholesterol 71    VLDL Cholesterol 15    LDL Cholesterol  104       Lab Results   Component Value Date     10/09/2023     02/10/2023    K 4.1 10/09/2023    K 4.3 02/10/2023     10/09/2023     02/10/2023    CO2 30.1 (H) 10/09/2023    CO2 30.1 (H) 02/10/2023    BUN 24 (H) 10/09/2023    BUN 29 (H) 02/10/2023    CREATININE 1.04 (H) 10/09/2023    CREATININE 0.99 02/10/2023    EGFRIFNONA 70 12/09/2021    EGFRIFNONA 70 09/16/2021    EGFRIFAFRI 84 09/16/2021    EGFRIFAFRI 78 06/22/2021    GLUCOSE 193 (H) 10/09/2023    GLUCOSE 151 (H) 02/10/2023    CALCIUM 10.1 10/09/2023    CALCIUM 9.6 02/10/2023    ALBUMIN 4.7 10/09/2023    ALBUMIN 4.2 02/10/2023    AST 15 10/09/2023    AST 18 02/10/2023    ALT 12 10/09/2023    ALT 12 02/10/2023     Lab Results   Component Value Date    WBC 4.13 10/09/2023    WBC 4.30 02/10/2023    HGB 12.1 10/09/2023    HGB 12.2 02/10/2023    HCT 35.4 10/09/2023    HCT 36.0 02/10/2023    MCV 89.6 10/09/2023    MCV 87.0 02/10/2023     10/09/2023     (L) 02/10/2023     Lab Results   Component Value Date    PROBNP 2,084.0 (H) 12/29/2022     Lab Results   Component Value Date    CKTOTAL 36 07/05/2022    TROPONINT <0.010 12/29/2022     Lab Results   Component Value Date    TSH 1.900 10/09/2023    TSH 1.100 12/29/2022                 ECG 12 Lead    Date/Time: 10/31/2023 2:54 PM  Performed by: Parth Haas PA-C    Authorized by: Parth Haas PA-C  Comparison: compared with previous ECG from 1/1/2023  Rhythm: sinus bradycardia  Rate: bradycardic  BPM: 56  Conduction: left anterior fascicular block  Q waves: aVL and aVR    ST Flattening: V1  QRS axis: left    Clinical impression: normal ECG  Comments: Qtc 422                ASSESSMENT & PLAN       Diagnoses and all orders for this visit:    1. PAF (paroxysmal atrial fibrillation) (Primary)  [id number] Score: 8  Continue Eliquis 5 mg twice daily.  Denies bleeding  complications.  Most recent hemoglobin normal at 12.1.  Prior cardioversion 1/2020.  She has been maintaining sinus rhythm with metoprolol tartrate 25 mg twice daily. Avoid uptitiration due to baseline bradycardia.   Continue rate control therapy I did review her echocardiogram and she only has mildly enlarged left atrium  Thyroid function and electrolytes on recent lab work was normal.  2. Essential hypertension  Keep blood pressure log at home, continue Lopressor 25 mg twice a day  She will get blood pressure checked again on Monday and if elevated may have to resume Norvasc 5 mg as she had been on in the past.   Elevated today, will send note to PCP to re-evaluate.   3. TARSHA (obstructive sleep apnea)  Encouraged compliance with sleep apnea machine.   4. Type 2 diabetes mellitus with diabetic peripheral angiopathy without gangrene, without long-term current use of insulin  Most recent A1c was 6.7%   She currently takes metformin and follows closely with PCP for management  5. Recurrent UTI  Following with PCP every 2 weeks for management.  This has been ongoing and we did have 1 episode of severe sepsis that occurred with UTI last year resulting in atrial fibrillation with RVR  She has been having Lasix and potassium intermittently to help with lower extremity edema.  She has not been taking this medication due to the fact that it causes further urinary frequency, did discuss that if she develops symptoms of orthopnea she will need to take this medication.  Most recent creatinine was 1.04.  She will take lasix when she gets home today.       Follow Up:  Return in about 6 months (around 4/30/2024) for Dr. Romano-Routine.  Patient was given instructions and counseling regarding her condition or for health maintenance advice. Please contact office if worsening symptoms or proceed to ER when appropriate.      Parth Kruse PA-C  10/31/23  15:02 EDT    MEDICATIONS         Discharge Medications             Accurate as of October 31, 2023  3:02 PM. If you have any questions, ask your nurse or doctor.                Changes to Medications        Instructions Start Date   acetaminophen 325 MG tablet  Commonly known as: TYLENOL  What changed: when to take this   650 mg, Oral, Every 4 Hours PRN             Continue These Medications        Instructions Start Date   albuterol sulfate  (90 Base) MCG/ACT inhaler  Commonly known as: PROVENTIL HFA;VENTOLIN HFA;PROAIR HFA   INHALE 2 PUFFS EVERY 4 HOURS AS NEEDED FOR WHEEZING      citalopram 20 MG tablet  Commonly known as: CeleXA   TAKE 1 TABLET BY MOUTH EVERY DAY      CRANBERRY PO   650 mg, Oral      Eliquis 5 MG tablet tablet  Generic drug: apixaban   TAKE 1 TABLET BY MOUTH EVERY 12 HOURS      fenofibrate 160 MG tablet   TAKE 1 TABLET BY MOUTH EVERY DAY      Fluticasone-Umeclidin-Vilant 100-62.5-25 MCG/ACT inhaler  Commonly known as: Trelegy Ellipta   1 puff, Inhalation, Daily      furosemide 40 MG tablet  Commonly known as: LASIX   40 mg, Oral, Daily      ipratropium-albuterol 0.5-2.5 mg/3 ml nebulizer  Commonly known as: DUO-NEB   3 mL, Nebulization, Every 4 Hours PRN      metFORMIN 1000 MG tablet  Commonly known as: GLUCOPHAGE   1,000 mg, Oral, 2 Times Daily With Meals      metoprolol tartrate 25 MG tablet  Commonly known as: LOPRESSOR   TAKE 1 TABLET BY MOUTH 2 TIMES A DAY FOR 30 DAYS.      montelukast 10 MG tablet  Commonly known as: SINGULAIR   10 mg, Oral, Every Night at Bedtime      potassium chloride 10 MEQ CR tablet  Commonly known as: KLOR-CON   10 mEq, Oral, Daily      primidone 50 MG tablet  Commonly known as: MYSOLINE   TAKE 1 TABLET BY MOUTH THREE TIMES A DAY      PROBIOTIC-10 PO   Oral      tiotropium bromide monohydrate 2.5 MCG/ACT aerosol solution inhaler  Commonly known as: SPIRIVA RESPIMAT   2 puffs, Inhalation, Daily - RT      tiZANidine 4 MG tablet  Commonly known as: ZANAFLEX   4 mg, Oral, Nightly PRN      Vitamin D3 25 MCG (1000 UT) capsule    1,000 Units, Oral, Daily                   **Aliya Disclaimer: This note was dictated using an electronic transcription. The electronic translation of spoken language may permit erroneous, or at times, nonsensical words or phrases to be inadvertently transcribed. Although I have reviewed the note for such errors, some may still exist.

## 2023-11-06 ENCOUNTER — OFFICE VISIT (OUTPATIENT)
Dept: FAMILY MEDICINE CLINIC | Facility: CLINIC | Age: 73
End: 2023-11-06
Payer: MEDICARE

## 2023-11-06 VITALS
HEIGHT: 65 IN | HEART RATE: 58 BPM | TEMPERATURE: 98.4 F | WEIGHT: 229 LBS | SYSTOLIC BLOOD PRESSURE: 136 MMHG | BODY MASS INDEX: 38.15 KG/M2 | OXYGEN SATURATION: 97 % | DIASTOLIC BLOOD PRESSURE: 61 MMHG

## 2023-11-06 DIAGNOSIS — E78.2 MIXED HYPERLIPIDEMIA: ICD-10-CM

## 2023-11-06 DIAGNOSIS — E11.65 TYPE 2 DIABETES MELLITUS WITH HYPERGLYCEMIA, WITHOUT LONG-TERM CURRENT USE OF INSULIN: ICD-10-CM

## 2023-11-06 DIAGNOSIS — I10 ESSENTIAL HYPERTENSION: ICD-10-CM

## 2023-11-06 DIAGNOSIS — R53.83 OTHER FATIGUE: ICD-10-CM

## 2023-11-06 DIAGNOSIS — Z51.81 MEDICATION MONITORING ENCOUNTER: ICD-10-CM

## 2023-11-06 DIAGNOSIS — M62.838 MUSCLE SPASMS OF NECK: Primary | ICD-10-CM

## 2023-11-06 NOTE — PROGRESS NOTES
"Chief Complaint  Neck Pain (Occasionally.  Pain has decreased.)    Subjective        Kanwal Sauer presents to [facility] PRIMARY CARE  History of Present Illness follow-up on neck pain.  Her pain has improved.  Patient was seen here last week for neck pain and the provider.  Diagnosed with muscle spasm of neck and was referred to physical therapy has not restarted the physical therapy yet.  She is also here for follow-up on diabetes type 2, hypertension.  Her labs were done prior to this visit      Objective   Vital Signs:  /61   Pulse 58   Temp 98.4 °F (36.9 °C) (Temporal)   Ht 165.1 cm (65\")   Wt 104 kg (229 lb)   SpO2 97% Comment: 3L  BMI 38.11 kg/m²   Estimated body mass index is 38.11 kg/m² as calculated from the following:    Height as of this encounter: 165.1 cm (65\").    Weight as of this encounter: 104 kg (229 lb).               Physical Exam   Result Review :    Common Labs   Common labs          1/1/2023    10:43 2/10/2023    09:43 10/9/2023    11:09   Common Labs   Glucose  151  193    BUN  29  24    Creatinine  0.99  1.04    Sodium  141  140    Potassium 4.0  4.3  4.1    Chloride  101  101    Calcium  9.6  10.1    Total Protein   6.1    Albumin  4.2  4.7    Total Bilirubin  0.3  0.4    Alkaline Phosphatase  50  45    AST (SGOT)  18  15    ALT (SGPT)  12  12    WBC  4.30  4.13    Hemoglobin  12.2  12.1    Hematocrit  36.0  35.4    Platelets  122  142    Total Cholesterol   190    Triglycerides   84    HDL Cholesterol   71    LDL Cholesterol    104    Hemoglobin A1C   6.70    Microalbumin, Urine   347.3      TSH   TSH          12/29/2022    15:11 10/9/2023    11:09   TSH   TSH 1.100  1.900      FREE T4   Lab Results   Component Value Date    FREET4 1.07 10/09/2023                  Assessment and Plan   Diagnoses and all orders for this visit:    1. Muscle spasms of neck (Primary)    2. Type 2 diabetes mellitus with hyperglycemia, without long-term current use of " insulin  -     Comprehensive Metabolic Panel; Future  -     Hemoglobin A1c; Future    3. Essential hypertension  -     Comprehensive Metabolic Panel; Future    4. Mixed hyperlipidemia  -     Lipid Panel; Future    5. Medication monitoring encounter  -     CBC & Differential; Future  -     TSH+Free T4; Future    6. Other fatigue  -     TSH+Free T4; Future    Kanwal Sauer is a 73-year-old female patient seen today for  Neck muscle spasms improving has not started the physical therapy yet.  Diabetes type 2 labs reviewed hemoglobin A1c at goal continue same  Hypertension blood pressure goal continue same       Follow Up   There are no Patient Instructions on file for this visit.   No follow-ups on file.  Patient was given instructions and counseling regarding her condition or for health maintenance advice. Please see specific information pulled into the AVS if appropriate.

## 2023-11-13 ENCOUNTER — TELEPHONE (OUTPATIENT)
Dept: FAMILY MEDICINE CLINIC | Facility: CLINIC | Age: 73
End: 2023-11-13
Payer: MEDICARE

## 2023-11-13 NOTE — TELEPHONE ENCOUNTER
Caller: [facility]    Relationship: Other    Best call back number: [phone]    What was the call regarding:[facility] STATES THAT THEY GOT A REFERRAL FROM [facility] FOR [facility] HEALTH PHYSICAL THERAPY FROM 10/27. [facility] STATES THAT THESE ORDERS HAVE  AND IS REQUESTING THEM TO BE SENT AGAIN. PLEASE ADVISE ASAP. [facility] WOULD LIKE THIS TO BE PLACED TODAY SO THEY CAN START THE PATIENT ON THERAPY TOMORROW.

## 2023-11-16 ENCOUNTER — TELEPHONE (OUTPATIENT)
Dept: FAMILY MEDICINE CLINIC | Facility: CLINIC | Age: 73
End: 2023-11-16
Payer: MEDICARE

## 2023-11-16 NOTE — TELEPHONE ENCOUNTER
Rx Refill Note  Requested Prescriptions     Pending Prescriptions Disp Refills    Eliquis 5 MG tablet tablet [Pharmacy Med Name: ELIQUIS 5 MG TABLET] 60 tablet 2     Sig: TAKE 1 TABLET BY MOUTH EVERY 12 HOURS      Last office visit with prescribing clinician: 11/6/2023   Last telemedicine visit with prescribing clinician: Visit date not found   Next office visit with prescribing clinician: 5/6/2024                         Would you like a call back once the refill request has been completed: [] Yes [] No    If the office needs to give you a call back, can they leave a voicemail: [] Yes [] No    Black Valencia MA  11/16/23, 11:33 EST

## 2023-11-16 NOTE — TELEPHONE ENCOUNTER
Karolyn for home [facility] physical therapy calling stating she saw patient yesterday to begin care roberth s requesting a verbal to continue treatment. Please call back at [phone]

## 2023-11-17 RX ORDER — APIXABAN 5 MG/1
TABLET, FILM COATED ORAL
Qty: 60 TABLET | Refills: 2 | Status: SHIPPED | OUTPATIENT
Start: 2023-11-17

## 2023-11-29 ENCOUNTER — APPOINTMENT (OUTPATIENT)
Dept: GENERAL RADIOLOGY | Facility: HOSPITAL | Age: 73
End: 2023-11-29
Payer: MEDICARE

## 2023-11-29 ENCOUNTER — HOSPITAL ENCOUNTER (OUTPATIENT)
Facility: HOSPITAL | Age: 73
Setting detail: OBSERVATION
Discharge: HOME HEALTH | End: 2023-12-01
Attending: EMERGENCY MEDICINE | Admitting: STUDENT IN AN ORGANIZED HEALTH CARE EDUCATION/TRAINING PROGRAM
Payer: MEDICARE

## 2023-11-29 ENCOUNTER — APPOINTMENT (OUTPATIENT)
Dept: CARDIOLOGY | Facility: HOSPITAL | Age: 73
End: 2023-11-29
Payer: MEDICARE

## 2023-11-29 DIAGNOSIS — D68.9 COAGULOPATHY: ICD-10-CM

## 2023-11-29 DIAGNOSIS — J96.11 CHRONIC RESPIRATORY FAILURE WITH HYPOXIA: ICD-10-CM

## 2023-11-29 DIAGNOSIS — E83.42 HYPOMAGNESEMIA: ICD-10-CM

## 2023-11-29 DIAGNOSIS — I48.91 ATRIAL FIBRILLATION WITH RAPID VENTRICULAR RESPONSE: Primary | ICD-10-CM

## 2023-11-29 PROBLEM — R79.89 ELEVATED TROPONIN: Status: ACTIVE | Noted: 2023-11-29

## 2023-11-29 PROBLEM — J44.9 COPD (CHRONIC OBSTRUCTIVE PULMONARY DISEASE): Status: ACTIVE | Noted: 2023-11-29

## 2023-11-29 LAB
ALBUMIN SERPL-MCNC: 4.6 G/DL (ref 3.5–5.2)
ALBUMIN/GLOB SERPL: 2.2 G/DL
ALP SERPL-CCNC: 60 U/L (ref 39–117)
ALT SERPL W P-5'-P-CCNC: 13 U/L (ref 1–33)
ANION GAP SERPL CALCULATED.3IONS-SCNC: 12 MMOL/L (ref 5–15)
ANION GAP SERPL CALCULATED.3IONS-SCNC: 8.8 MMOL/L (ref 5–15)
AORTIC DIMENSIONLESS INDEX: 0.9 (DI)
AST SERPL-CCNC: 18 U/L (ref 1–32)
BACTERIA UR QL AUTO: ABNORMAL /HPF
BASOPHILS # BLD AUTO: 0.02 10*3/MM3 (ref 0–0.2)
BASOPHILS NFR BLD AUTO: 0.3 % (ref 0–1.5)
BH CV ECHO MEAS - AI P1/2T: 949.8 MSEC
BH CV ECHO MEAS - AO MAX PG: 5.7 MMHG
BH CV ECHO MEAS - AO MEAN PG: 3 MMHG
BH CV ECHO MEAS - AO ROOT DIAM: 3.5 CM
BH CV ECHO MEAS - AO V2 MAX: 119 CM/SEC
BH CV ECHO MEAS - AO V2 VTI: 21.7 CM
BH CV ECHO MEAS - AVA(I,D): 4.3 CM2
BH CV ECHO MEAS - EDV(CUBED): 148.9 ML
BH CV ECHO MEAS - EDV(MOD-SP2): 96 ML
BH CV ECHO MEAS - EDV(MOD-SP4): 95 ML
BH CV ECHO MEAS - EF(MOD-BP): 54.2 %
BH CV ECHO MEAS - EF(MOD-SP2): 55.2 %
BH CV ECHO MEAS - EF(MOD-SP4): 52.6 %
BH CV ECHO MEAS - ESV(CUBED): 59.3 ML
BH CV ECHO MEAS - ESV(MOD-SP2): 43 ML
BH CV ECHO MEAS - ESV(MOD-SP4): 45 ML
BH CV ECHO MEAS - FS: 26.4 %
BH CV ECHO MEAS - IVS/LVPW: 1 CM
BH CV ECHO MEAS - IVSD: 1.6 CM
BH CV ECHO MEAS - LAT PEAK E' VEL: 7 CM/SEC
BH CV ECHO MEAS - LV DIASTOLIC VOL/BSA (35-75): 44.5 CM2
BH CV ECHO MEAS - LV MASS(C)D: 387.7 GRAMS
BH CV ECHO MEAS - LV MAX PG: 3.6 MMHG
BH CV ECHO MEAS - LV MEAN PG: 2 MMHG
BH CV ECHO MEAS - LV SYSTOLIC VOL/BSA (12-30): 21.1 CM2
BH CV ECHO MEAS - LV V1 MAX: 94.9 CM/SEC
BH CV ECHO MEAS - LV V1 VTI: 20.6 CM
BH CV ECHO MEAS - LVIDD: 5.3 CM
BH CV ECHO MEAS - LVIDS: 3.9 CM
BH CV ECHO MEAS - LVOT AREA: 4.5 CM2
BH CV ECHO MEAS - LVOT DIAM: 2.4 CM
BH CV ECHO MEAS - LVPWD: 1.6 CM
BH CV ECHO MEAS - MED PEAK E' VEL: 6 CM/SEC
BH CV ECHO MEAS - MR MAX PG: 48.2 MMHG
BH CV ECHO MEAS - MR MAX VEL: 347 CM/SEC
BH CV ECHO MEAS - MV A DUR: 0.13 SEC
BH CV ECHO MEAS - MV A MAX VEL: 45.9 CM/SEC
BH CV ECHO MEAS - MV DEC SLOPE: 408 CM/SEC2
BH CV ECHO MEAS - MV DEC TIME: 248 SEC
BH CV ECHO MEAS - MV E MAX VEL: 64.3 CM/SEC
BH CV ECHO MEAS - MV E/A: 1.4
BH CV ECHO MEAS - MV MAX PG: 2.23 MMHG
BH CV ECHO MEAS - MV MEAN PG: 1 MMHG
BH CV ECHO MEAS - MV P1/2T: 59.8 MSEC
BH CV ECHO MEAS - MV V2 VTI: 23.3 CM
BH CV ECHO MEAS - MVA(P1/2T): 3.7 CM2
BH CV ECHO MEAS - MVA(VTI): 4 CM2
BH CV ECHO MEAS - PA ACC TIME: 0.07 SEC
BH CV ECHO MEAS - PA V2 MAX: 96 CM/SEC
BH CV ECHO MEAS - RAP SYSTOLE: 3 MMHG
BH CV ECHO MEAS - RV MAX PG: 2.28 MMHG
BH CV ECHO MEAS - RV V1 MAX: 75.5 CM/SEC
BH CV ECHO MEAS - RV V1 VTI: 15.7 CM
BH CV ECHO MEAS - RVSP: 21 MMHG
BH CV ECHO MEAS - SI(MOD-SP2): 24.8 ML/M2
BH CV ECHO MEAS - SI(MOD-SP4): 23.4 ML/M2
BH CV ECHO MEAS - SV(LVOT): 93.2 ML
BH CV ECHO MEAS - SV(MOD-SP2): 53 ML
BH CV ECHO MEAS - SV(MOD-SP4): 50 ML
BH CV ECHO MEAS - TAPSE (>1.6): 2.08 CM
BH CV ECHO MEAS - TR MAX PG: 18.1 MMHG
BH CV ECHO MEAS - TR MAX VEL: 213 CM/SEC
BH CV ECHO MEASUREMENTS AVERAGE E/E' RATIO: 9.89
BH CV XLRA - RV BASE: 3.7 CM
BH CV XLRA - TDI S': 10.8 CM/SEC
BILIRUB SERPL-MCNC: 0.4 MG/DL (ref 0–1.2)
BILIRUB UR QL STRIP: NEGATIVE
BUN SERPL-MCNC: 21 MG/DL (ref 8–23)
BUN SERPL-MCNC: 27 MG/DL (ref 8–23)
BUN/CREAT SERPL: 27.6 (ref 7–25)
BUN/CREAT SERPL: 27.6 (ref 7–25)
CALCIUM SPEC-SCNC: 10 MG/DL (ref 8.6–10.5)
CALCIUM SPEC-SCNC: 10.2 MG/DL (ref 8.6–10.5)
CHLORIDE SERPL-SCNC: 100 MMOL/L (ref 98–107)
CHLORIDE SERPL-SCNC: 102 MMOL/L (ref 98–107)
CLARITY UR: CLEAR
CO2 SERPL-SCNC: 27 MMOL/L (ref 22–29)
CO2 SERPL-SCNC: 29.2 MMOL/L (ref 22–29)
COLOR UR: YELLOW
CREAT SERPL-MCNC: 0.76 MG/DL (ref 0.57–1)
CREAT SERPL-MCNC: 0.98 MG/DL (ref 0.57–1)
DEPRECATED RDW RBC AUTO: 45.9 FL (ref 37–54)
DEPRECATED RDW RBC AUTO: 46.1 FL (ref 37–54)
EGFRCR SERPLBLD CKD-EPI 2021: 60.3 ML/MIN/1.73
EGFRCR SERPLBLD CKD-EPI 2021: 81.8 ML/MIN/1.73
EOSINOPHIL # BLD AUTO: 0.12 10*3/MM3 (ref 0–0.4)
EOSINOPHIL NFR BLD AUTO: 1.7 % (ref 0.3–6.2)
ERYTHROCYTE [DISTWIDTH] IN BLOOD BY AUTOMATED COUNT: 13.5 % (ref 12.3–15.4)
ERYTHROCYTE [DISTWIDTH] IN BLOOD BY AUTOMATED COUNT: 13.6 % (ref 12.3–15.4)
GEN 5 2HR TROPONIN T REFLEX: 25 NG/L
GLOBULIN UR ELPH-MCNC: 2.1 GM/DL
GLUCOSE BLDC GLUCOMTR-MCNC: 166 MG/DL (ref 70–130)
GLUCOSE BLDC GLUCOMTR-MCNC: 179 MG/DL (ref 70–130)
GLUCOSE BLDC GLUCOMTR-MCNC: 296 MG/DL (ref 70–130)
GLUCOSE SERPL-MCNC: 183 MG/DL (ref 65–99)
GLUCOSE SERPL-MCNC: 203 MG/DL (ref 65–99)
GLUCOSE UR STRIP-MCNC: ABNORMAL MG/DL
HCT VFR BLD AUTO: 37.9 % (ref 34–46.6)
HCT VFR BLD AUTO: 38.9 % (ref 34–46.6)
HGB BLD-MCNC: 12.7 G/DL (ref 12–15.9)
HGB BLD-MCNC: 12.9 G/DL (ref 12–15.9)
HGB UR QL STRIP.AUTO: ABNORMAL
HOLD SPECIMEN: NORMAL
HOLD SPECIMEN: NORMAL
HYALINE CASTS UR QL AUTO: ABNORMAL /LPF
IMM GRANULOCYTES # BLD AUTO: 0.04 10*3/MM3 (ref 0–0.05)
IMM GRANULOCYTES NFR BLD AUTO: 0.6 % (ref 0–0.5)
INR PPP: 1.15 (ref 0.9–1.1)
KETONES UR QL STRIP: NEGATIVE
LEFT ATRIUM VOLUME INDEX: 27.7 ML/M2
LEUKOCYTE ESTERASE UR QL STRIP.AUTO: NEGATIVE
LYMPHOCYTES # BLD AUTO: 1.83 10*3/MM3 (ref 0.7–3.1)
LYMPHOCYTES NFR BLD AUTO: 26 % (ref 19.6–45.3)
MAGNESIUM SERPL-MCNC: 1.4 MG/DL (ref 1.6–2.4)
MCH RBC QN AUTO: 30.7 PG (ref 26.6–33)
MCH RBC QN AUTO: 30.8 PG (ref 26.6–33)
MCHC RBC AUTO-ENTMCNC: 33.2 G/DL (ref 31.5–35.7)
MCHC RBC AUTO-ENTMCNC: 33.5 G/DL (ref 31.5–35.7)
MCV RBC AUTO: 92 FL (ref 79–97)
MCV RBC AUTO: 92.6 FL (ref 79–97)
MONOCYTES # BLD AUTO: 0.62 10*3/MM3 (ref 0.1–0.9)
MONOCYTES NFR BLD AUTO: 8.8 % (ref 5–12)
NEUTROPHILS NFR BLD AUTO: 4.41 10*3/MM3 (ref 1.7–7)
NEUTROPHILS NFR BLD AUTO: 62.6 % (ref 42.7–76)
NITRITE UR QL STRIP: NEGATIVE
NRBC BLD AUTO-RTO: 0 /100 WBC (ref 0–0.2)
NT-PROBNP SERPL-MCNC: 260 PG/ML (ref 0–1800)
PH UR STRIP.AUTO: 8 [PH] (ref 5–8)
PLATELET # BLD AUTO: 146 10*3/MM3 (ref 140–450)
PLATELET # BLD AUTO: 150 10*3/MM3 (ref 140–450)
PMV BLD AUTO: 10.9 FL (ref 6–12)
PMV BLD AUTO: 11 FL (ref 6–12)
POTASSIUM SERPL-SCNC: 3.9 MMOL/L (ref 3.5–5.2)
POTASSIUM SERPL-SCNC: 3.9 MMOL/L (ref 3.5–5.2)
PROT SERPL-MCNC: 6.7 G/DL (ref 6–8.5)
PROT UR QL STRIP: ABNORMAL
PROTHROMBIN TIME: 14.9 SECONDS (ref 11.7–14.2)
QT INTERVAL: 297 MS
QTC INTERVAL: 467 MS
RBC # BLD AUTO: 4.12 10*6/MM3 (ref 3.77–5.28)
RBC # BLD AUTO: 4.2 10*6/MM3 (ref 3.77–5.28)
RBC # UR STRIP: ABNORMAL /HPF
REF LAB TEST METHOD: ABNORMAL
SODIUM SERPL-SCNC: 139 MMOL/L (ref 136–145)
SODIUM SERPL-SCNC: 140 MMOL/L (ref 136–145)
SP GR UR STRIP: 1.01 (ref 1–1.03)
SQUAMOUS #/AREA URNS HPF: ABNORMAL /HPF
TROPONIN T DELTA: 4 NG/L
TROPONIN T SERPL HS-MCNC: 15 NG/L
TROPONIN T SERPL HS-MCNC: 21 NG/L
TROPONIN T SERPL HS-MCNC: 27 NG/L
TSH SERPL DL<=0.05 MIU/L-ACNC: 2.69 UIU/ML (ref 0.27–4.2)
UROBILINOGEN UR QL STRIP: ABNORMAL
WBC # UR STRIP: ABNORMAL /HPF
WBC NRBC COR # BLD AUTO: 6.69 10*3/MM3 (ref 3.4–10.8)
WBC NRBC COR # BLD AUTO: 7.04 10*3/MM3 (ref 3.4–10.8)
WHOLE BLOOD HOLD COAG: NORMAL
WHOLE BLOOD HOLD SPECIMEN: NORMAL

## 2023-11-29 PROCEDURE — 99284 EMERGENCY DEPT VISIT MOD MDM: CPT

## 2023-11-29 PROCEDURE — 85025 COMPLETE CBC W/AUTO DIFF WBC: CPT | Performed by: EMERGENCY MEDICINE

## 2023-11-29 PROCEDURE — 36415 COLL VENOUS BLD VENIPUNCTURE: CPT

## 2023-11-29 PROCEDURE — 85027 COMPLETE CBC AUTOMATED: CPT | Performed by: NURSE PRACTITIONER

## 2023-11-29 PROCEDURE — 93306 TTE W/DOPPLER COMPLETE: CPT | Performed by: INTERNAL MEDICINE

## 2023-11-29 PROCEDURE — 71045 X-RAY EXAM CHEST 1 VIEW: CPT

## 2023-11-29 PROCEDURE — 93005 ELECTROCARDIOGRAM TRACING: CPT | Performed by: NURSE PRACTITIONER

## 2023-11-29 PROCEDURE — 87186 SC STD MICRODIL/AGAR DIL: CPT | Performed by: EMERGENCY MEDICINE

## 2023-11-29 PROCEDURE — 93005 ELECTROCARDIOGRAM TRACING: CPT | Performed by: EMERGENCY MEDICINE

## 2023-11-29 PROCEDURE — 96375 TX/PRO/DX INJ NEW DRUG ADDON: CPT

## 2023-11-29 PROCEDURE — 83735 ASSAY OF MAGNESIUM: CPT | Performed by: EMERGENCY MEDICINE

## 2023-11-29 PROCEDURE — 83880 ASSAY OF NATRIURETIC PEPTIDE: CPT | Performed by: EMERGENCY MEDICINE

## 2023-11-29 PROCEDURE — G0378 HOSPITAL OBSERVATION PER HR: HCPCS

## 2023-11-29 PROCEDURE — 80053 COMPREHEN METABOLIC PANEL: CPT | Performed by: EMERGENCY MEDICINE

## 2023-11-29 PROCEDURE — 94664 DEMO&/EVAL PT USE INHALER: CPT

## 2023-11-29 PROCEDURE — 99214 OFFICE O/P EST MOD 30 MIN: CPT | Performed by: NURSE PRACTITIONER

## 2023-11-29 PROCEDURE — 84484 ASSAY OF TROPONIN QUANT: CPT | Performed by: NURSE PRACTITIONER

## 2023-11-29 PROCEDURE — 87086 URINE CULTURE/COLONY COUNT: CPT | Performed by: EMERGENCY MEDICINE

## 2023-11-29 PROCEDURE — 96366 THER/PROPH/DIAG IV INF ADDON: CPT

## 2023-11-29 PROCEDURE — 82948 REAGENT STRIP/BLOOD GLUCOSE: CPT

## 2023-11-29 PROCEDURE — 93306 TTE W/DOPPLER COMPLETE: CPT

## 2023-11-29 PROCEDURE — 25010000002 LEVOFLOXACIN PER 250 MG: Performed by: STUDENT IN AN ORGANIZED HEALTH CARE EDUCATION/TRAINING PROGRAM

## 2023-11-29 PROCEDURE — 85610 PROTHROMBIN TIME: CPT | Performed by: EMERGENCY MEDICINE

## 2023-11-29 PROCEDURE — 63710000001 INSULIN LISPRO (HUMAN) PER 5 UNITS: Performed by: NURSE PRACTITIONER

## 2023-11-29 PROCEDURE — 25010000002 AMIODARONE IN DEXTROSE 5% 150-4.21 MG/100ML-% SOLUTION: Performed by: NURSE PRACTITIONER

## 2023-11-29 PROCEDURE — 94640 AIRWAY INHALATION TREATMENT: CPT

## 2023-11-29 PROCEDURE — 81001 URINALYSIS AUTO W/SCOPE: CPT | Performed by: EMERGENCY MEDICINE

## 2023-11-29 PROCEDURE — 93010 ELECTROCARDIOGRAM REPORT: CPT | Performed by: INTERNAL MEDICINE

## 2023-11-29 PROCEDURE — 96376 TX/PRO/DX INJ SAME DRUG ADON: CPT

## 2023-11-29 PROCEDURE — 94799 UNLISTED PULMONARY SVC/PX: CPT

## 2023-11-29 PROCEDURE — 84443 ASSAY THYROID STIM HORMONE: CPT | Performed by: STUDENT IN AN ORGANIZED HEALTH CARE EDUCATION/TRAINING PROGRAM

## 2023-11-29 PROCEDURE — 84484 ASSAY OF TROPONIN QUANT: CPT | Performed by: EMERGENCY MEDICINE

## 2023-11-29 PROCEDURE — 25010000002 MAGNESIUM SULFATE 2 GM/50ML SOLUTION: Performed by: EMERGENCY MEDICINE

## 2023-11-29 PROCEDURE — 96365 THER/PROPH/DIAG IV INF INIT: CPT

## 2023-11-29 RX ORDER — ACETAMINOPHEN 160 MG/5ML
650 SOLUTION ORAL EVERY 4 HOURS PRN
Status: DISCONTINUED | OUTPATIENT
Start: 2023-11-29 | End: 2023-12-01 | Stop reason: HOSPADM

## 2023-11-29 RX ORDER — CITALOPRAM 20 MG/1
20 TABLET ORAL DAILY
Status: DISCONTINUED | OUTPATIENT
Start: 2023-11-29 | End: 2023-12-01 | Stop reason: HOSPADM

## 2023-11-29 RX ORDER — MAGNESIUM SULFATE HEPTAHYDRATE 40 MG/ML
INJECTION, SOLUTION INTRAVENOUS
Status: ACTIVE
Start: 2023-11-29 | End: 2023-11-29

## 2023-11-29 RX ORDER — NITROFURANTOIN 25; 75 MG/1; MG/1
100 CAPSULE ORAL EVERY 12 HOURS SCHEDULED
Status: DISCONTINUED | OUTPATIENT
Start: 2023-11-29 | End: 2023-12-01 | Stop reason: HOSPADM

## 2023-11-29 RX ORDER — SODIUM CHLORIDE 9 MG/ML
40 INJECTION, SOLUTION INTRAVENOUS AS NEEDED
Status: DISCONTINUED | OUTPATIENT
Start: 2023-11-29 | End: 2023-12-01 | Stop reason: HOSPADM

## 2023-11-29 RX ORDER — SODIUM CHLORIDE 0.9 % (FLUSH) 0.9 %
10 SYRINGE (ML) INJECTION AS NEEDED
Status: DISCONTINUED | OUTPATIENT
Start: 2023-11-29 | End: 2023-12-01 | Stop reason: HOSPADM

## 2023-11-29 RX ORDER — ACETAMINOPHEN 325 MG/1
650 TABLET ORAL EVERY 4 HOURS PRN
Status: DISCONTINUED | OUTPATIENT
Start: 2023-11-29 | End: 2023-12-01 | Stop reason: HOSPADM

## 2023-11-29 RX ORDER — CALCIUM CARBONATE 500 MG/1
2 TABLET, CHEWABLE ORAL 2 TIMES DAILY PRN
Status: DISCONTINUED | OUTPATIENT
Start: 2023-11-29 | End: 2023-12-01 | Stop reason: HOSPADM

## 2023-11-29 RX ORDER — AMIODARONE HYDROCHLORIDE 200 MG/1
200 TABLET ORAL EVERY 12 HOURS SCHEDULED
Status: DISCONTINUED | OUTPATIENT
Start: 2023-11-29 | End: 2023-11-30

## 2023-11-29 RX ORDER — BUDESONIDE AND FORMOTEROL FUMARATE DIHYDRATE 160; 4.5 UG/1; UG/1
2 AEROSOL RESPIRATORY (INHALATION)
Status: DISCONTINUED | OUTPATIENT
Start: 2023-11-29 | End: 2023-12-01 | Stop reason: HOSPADM

## 2023-11-29 RX ORDER — NICOTINE POLACRILEX 4 MG
15 LOZENGE BUCCAL
Status: DISCONTINUED | OUTPATIENT
Start: 2023-11-29 | End: 2023-12-01 | Stop reason: HOSPADM

## 2023-11-29 RX ORDER — POTASSIUM CHLORIDE 750 MG/1
10 TABLET, FILM COATED, EXTENDED RELEASE ORAL DAILY
Status: DISCONTINUED | OUTPATIENT
Start: 2023-11-29 | End: 2023-11-30

## 2023-11-29 RX ORDER — SODIUM CHLORIDE 0.9 % (FLUSH) 0.9 %
10 SYRINGE (ML) INJECTION EVERY 12 HOURS SCHEDULED
Status: DISCONTINUED | OUTPATIENT
Start: 2023-11-29 | End: 2023-12-01 | Stop reason: HOSPADM

## 2023-11-29 RX ORDER — IPRATROPIUM BROMIDE AND ALBUTEROL SULFATE 2.5; .5 MG/3ML; MG/3ML
3 SOLUTION RESPIRATORY (INHALATION) EVERY 4 HOURS PRN
Status: DISCONTINUED | OUTPATIENT
Start: 2023-11-29 | End: 2023-12-01 | Stop reason: HOSPADM

## 2023-11-29 RX ORDER — IBUPROFEN 600 MG/1
1 TABLET ORAL
Status: DISCONTINUED | OUTPATIENT
Start: 2023-11-29 | End: 2023-12-01 | Stop reason: HOSPADM

## 2023-11-29 RX ORDER — ONDANSETRON 4 MG/1
4 TABLET, FILM COATED ORAL EVERY 6 HOURS PRN
Status: DISCONTINUED | OUTPATIENT
Start: 2023-11-29 | End: 2023-12-01 | Stop reason: HOSPADM

## 2023-11-29 RX ORDER — TIZANIDINE 4 MG/1
4 TABLET ORAL NIGHTLY PRN
Status: DISCONTINUED | OUTPATIENT
Start: 2023-11-29 | End: 2023-12-01 | Stop reason: HOSPADM

## 2023-11-29 RX ORDER — INSULIN LISPRO 100 [IU]/ML
2-7 INJECTION, SOLUTION INTRAVENOUS; SUBCUTANEOUS
Status: DISCONTINUED | OUTPATIENT
Start: 2023-11-29 | End: 2023-12-01 | Stop reason: HOSPADM

## 2023-11-29 RX ORDER — LEVOFLOXACIN 5 MG/ML
750 INJECTION, SOLUTION INTRAVENOUS EVERY 24 HOURS
Status: DISCONTINUED | OUTPATIENT
Start: 2023-11-29 | End: 2023-11-29

## 2023-11-29 RX ORDER — DEXTROSE MONOHYDRATE 25 G/50ML
25 INJECTION, SOLUTION INTRAVENOUS
Status: DISCONTINUED | OUTPATIENT
Start: 2023-11-29 | End: 2023-12-01 | Stop reason: HOSPADM

## 2023-11-29 RX ORDER — FUROSEMIDE 40 MG/1
40 TABLET ORAL DAILY
Status: DISCONTINUED | OUTPATIENT
Start: 2023-11-29 | End: 2023-11-30

## 2023-11-29 RX ORDER — PRIMIDONE 50 MG/1
50 TABLET ORAL 3 TIMES DAILY
Status: DISCONTINUED | OUTPATIENT
Start: 2023-11-29 | End: 2023-12-01 | Stop reason: HOSPADM

## 2023-11-29 RX ORDER — MAGNESIUM SULFATE HEPTAHYDRATE 40 MG/ML
2 INJECTION, SOLUTION INTRAVENOUS ONCE
Status: COMPLETED | OUTPATIENT
Start: 2023-11-29 | End: 2023-11-29

## 2023-11-29 RX ORDER — ONDANSETRON 2 MG/ML
4 INJECTION INTRAMUSCULAR; INTRAVENOUS EVERY 6 HOURS PRN
Status: DISCONTINUED | OUTPATIENT
Start: 2023-11-29 | End: 2023-12-01 | Stop reason: HOSPADM

## 2023-11-29 RX ORDER — ACETAMINOPHEN 650 MG/1
650 SUPPOSITORY RECTAL EVERY 4 HOURS PRN
Status: DISCONTINUED | OUTPATIENT
Start: 2023-11-29 | End: 2023-12-01 | Stop reason: HOSPADM

## 2023-11-29 RX ADMIN — AMIODARONE HYDROCHLORIDE 150 MG: 1.5 INJECTION, SOLUTION INTRAVENOUS at 13:14

## 2023-11-29 RX ADMIN — Medication 10 ML: at 21:32

## 2023-11-29 RX ADMIN — BUDESONIDE AND FORMOTEROL FUMARATE DIHYDRATE 2 PUFF: 160; 4.5 AEROSOL RESPIRATORY (INHALATION) at 18:52

## 2023-11-29 RX ADMIN — METOPROLOL TARTRATE 25 MG: 25 TABLET, FILM COATED ORAL at 21:32

## 2023-11-29 RX ADMIN — MAGNESIUM SULFATE HEPTAHYDRATE 2 G: 40 INJECTION, SOLUTION INTRAVENOUS at 03:38

## 2023-11-29 RX ADMIN — POTASSIUM CHLORIDE 10 MEQ: 750 TABLET, EXTENDED RELEASE ORAL at 09:06

## 2023-11-29 RX ADMIN — METOPROLOL TARTRATE 2.5 MG: 1 INJECTION, SOLUTION INTRAVENOUS at 09:52

## 2023-11-29 RX ADMIN — AMIODARONE HYDROCHLORIDE 200 MG: 200 TABLET ORAL at 21:32

## 2023-11-29 RX ADMIN — APIXABAN 5 MG: 5 TABLET, FILM COATED ORAL at 09:06

## 2023-11-29 RX ADMIN — METOPROLOL TARTRATE 5 MG: 1 INJECTION, SOLUTION INTRAVENOUS at 03:07

## 2023-11-29 RX ADMIN — INSULIN LISPRO 4 UNITS: 100 INJECTION, SOLUTION INTRAVENOUS; SUBCUTANEOUS at 13:29

## 2023-11-29 RX ADMIN — Medication 10 ML: at 09:58

## 2023-11-29 RX ADMIN — METOPROLOL TARTRATE 25 MG: 25 TABLET, FILM COATED ORAL at 09:06

## 2023-11-29 RX ADMIN — APIXABAN 5 MG: 5 TABLET, FILM COATED ORAL at 21:32

## 2023-11-29 RX ADMIN — PRIMIDONE 50 MG: 50 TABLET ORAL at 18:40

## 2023-11-29 RX ADMIN — CITALOPRAM 20 MG: 20 TABLET, FILM COATED ORAL at 13:07

## 2023-11-29 RX ADMIN — NITROFURANTOIN MONOHYDRATE/MACROCRYSTALLINE 100 MG: 25; 75 CAPSULE ORAL at 18:40

## 2023-11-29 RX ADMIN — FUROSEMIDE 40 MG: 40 TABLET ORAL at 09:06

## 2023-11-29 RX ADMIN — PRIMIDONE 50 MG: 50 TABLET ORAL at 13:07

## 2023-11-29 NOTE — PLAN OF CARE
Goal Outcome Evaluation:patient came to obs overflow for afibrillation w rvr. Patient was in a fib uncontrolled rhythm upon arrival to unit. Patient was unable to receive iv levaquin because of an all over body rash from this antibiotic. Med stopped and md notified. Patient is now on po macrobid. Patient had bolus of amiodarone which ended at 1336. Heart pause at 1353 x 3.5 seconds. At 1400., patient rhythm converted to sinus rhythm as confirmed by follow up 12 lead EKG. Patient has benign tremor at baseline and is on primidone tid. Echo followed rhythm change.

## 2023-11-29 NOTE — ED NOTES
"Pt arrived to ED via pv, states she has had an irregular heart rate from  per home pulse ox.  Pt also nauseated and \"feels fluttering in chest. Pt has hx of A-fib and is on Eliquis. Pt wears 3L of 02 at home  "

## 2023-11-29 NOTE — SIGNIFICANT NOTE
11/29/23 1451   OTHER   Discipline occupational therapist   Rehab Time/Intention   Session Not Performed other (see comments)  (Waiting cards consult this AM- resting HR in 120-130's.)   Recommendation   OT - Next Appointment 11/30/23

## 2023-11-29 NOTE — ED PROVIDER NOTES
EMERGENCY DEPARTMENT ENCOUNTER    Room Number:  23/23  Date of encounter:  11/29/2023  PCP: Nisha Britton MD  Historian: Patient and daughter  Relevant information and history provided by sources other than the patient will be included below and in the ED Course.  Review of pertinent past medical records may also be included in record below and ED Course.    HPI:  Chief Complaint: Palpitations and shortness of breath  A complete HPI/ROS/PMH/PSH/SH/FH are unobtainable due to: Not applicable.  Patient is elderly little forgetful.  Patient is acting at her baseline.  Context: Kanwal Sauer is a 73 y.o. female who presents to the ED c/o symptoms of palpitations and a little bit of shortness of breath started this evening at about 10 PM.  Started when she was sleeping and woke her up she felt her heart was beating fast.  Denies any chest pain.  Her heart rate was as high as 170s to 180s.  She did have a little bit of shortness of breath but that is resolved.  She had a history of A-fib and she felt like she was in A-fib again.  She is normally in normal sinus rhythm.  Last time she had an episode like this she had an acute UTI.  She does feel as if she might have a UTI is a little bit of pressure when she does urinate.  Denies any chest pain, abdominal pain, headache.  She is chronically on 3 L of oxygen for severe chronic COPD.  She is also anticoagulated with Eliquis.  She has been compliant with all her medicines including her blood thinning medicine and heart medicine.  She does have obstructive sleep apnea as well.        Previous Episodes: Yes with atrial fibrillation in the past  Current Symptoms: See above    MEDICAL HISTORY REVIEWED  History of atrial fibrillation, type 2 diabetes, obstructive sleep apnea, hypertension, advanced COPD chronically on 3 L of oxygen.  I reviewed her medicine list      PAST MEDICAL HISTORY  Active Ambulatory Problems     Diagnosis Date Noted    Closed fracture of left  distal femur 12/23/2020    Type 2 diabetes mellitus with circulatory disorder, without long-term current use of insulin 12/24/2020    Essential hypertension 12/24/2020    Tremors of nervous system 12/26/2020    PAF (paroxysmal atrial fibrillation) 12/27/2020    TARSHA (obstructive sleep apnea) 12/27/2020    History of breast cancer 02/22/2021    History of seizure 05/25/2021    Risk for falls 05/25/2021    Neck pain 11/27/2022    Acute UTI 12/29/2022    Chronic midline low back pain without sciatica 04/25/2023    Vulvar rash 05/22/2023     Resolved Ambulatory Problems     Diagnosis Date Noted    CANDICE (acute kidney injury) 12/24/2020    Acute blood loss anemia 12/24/2020    Chronic respiratory failure with hypoxia 12/27/2020     Past Medical History:   Diagnosis Date    Allergic     Anxiety     Asthma     Atrial fibrillation     Cancer     COPD (chronic obstructive pulmonary disease) 09/07/2022    Deep vein thrombosis     Diabetes mellitus     Difficulty walking     Diverticulosis     Drug therapy     Environmental allergies     Fractures     Headache, tension-type     Hyperlipidemia     Hypertension     Low back pain     Neuropathy in diabetes     Obesity     Scoliosis     Seizures     Sleep apnea     Tremor     Urinary tract infection     Weakness          PAST SURGICAL HISTORY  Past Surgical History:   Procedure Laterality Date    BREAST BIOPSY      CATARACT EXTRACTION, BILATERAL      DENTAL PROCEDURE      Removed teeth    FEMUR OPEN REDUCTION INTERNAL FIXATION Left 12/24/2020    Procedure: DISTAL FEMUR OPEN REDUCTION INTERNAL FIXATION;  Surgeon: Marley Hernandez MD;  Location: [facility];  Service: Orthopedics;  Laterality: Left;    MASTECTOMY Left     REPLACEMENT TOTAL KNEE BILATERAL           FAMILY HISTORY  Family History   Problem Relation Age of Onset    Arthritis Mother     Lung cancer Mother     Brain cancer Mother     Hypertension Mother     Cancer Mother         Lung cancer    Hypertension Father      Arthritis Sister     Breast cancer Sister     Diabetes Sister     Hypertension Sister     Dementia Sister     Cancer Sister         Brest cancer    Aneurysm Sister     Cancer Daughter         Colon cancer    Developmental Disability Daughter     Learning disabilities Daughter     Miscarriages / Stillbirths Daughter         Miscarriage         SOCIAL HISTORY  Social History     Socioeconomic History    Marital status:    Tobacco Use    Smoking status: Never    Smokeless tobacco: Never    Tobacco comments:     no caffine   Vaping Use    Vaping Use: Never used   Substance and Sexual Activity    Alcohol use: Not Currently    Drug use: Never    Sexual activity: Not Currently     Partners: Male         ALLERGIES  Baclofen; Doxycycline; Eggs or egg-derived products; Iodine; Latex; Milk-related compounds; Msg [monosodium glutamate]; Penicillins; Metronidazole; Ezetimibe; Latex, natural rubber; Nylon; and Simvastatin        REVIEW OF SYSTEMS  Review of Systems     All systems reviewed and negative except for those discussed in HPI.       PHYSICAL EXAM    I have reviewed the triage vital signs and nursing notes.    ED Triage Vitals   Temp Heart Rate Resp BP SpO2   -- 11/29/23 0011 11/29/23 0011 11/29/23 0028 11/29/23 0011    77 18 150/95 97 %      Temp src Heart Rate Source Patient Position BP Location FiO2 (%)   -- -- 11/29/23 0029 11/29/23 0029 --     Lying Right arm        GENERAL: This is an elderly lady.  Vital signs on my initial evaluation she is tachycardic with atrial fibrillation appearance on the monitor.  Oxygen saturation is 99% on her 3 L of oxygen.  Blood pressure is unremarkable  HENT: nares patent  Head/neck/ face are symmetric without gross deformity, signs of trauma, or swelling  EYES: no scleral icterus, no conjunctival pallor.  NECK: Supple, no meningismus  CV: Patient has tachycardia with irregular regular rhythm.  RESPIRATORY: normal effort and no respiratory distress.  To auscultation  bilaterally  ABDOMEN: soft and nontender.  Obese  MUSCULOSKELETAL: no deformity.  Intact distal pulses that are equal strong and symmetric.  NEURO: alert and appropriate, moves all extremities, follows commands.  No focal motor or sensory changes.  SKIN: warm, dry    Vital signs and nursing notes reviewed.        LAB RESULTS  Recent Results (from the past 24 hour(s))   ECG 12 Lead ED Triage Standing Order; SOA    Collection Time: 11/29/23 12:18 AM   Result Value Ref Range    QT Interval 297 ms    QTC Interval 467 ms   Comprehensive Metabolic Panel    Collection Time: 11/29/23 12:38 AM    Specimen: Blood   Result Value Ref Range    Glucose 203 (H) 65 - 99 mg/dL    BUN 27 (H) 8 - 23 mg/dL    Creatinine 0.98 0.57 - 1.00 mg/dL    Sodium 139 136 - 145 mmol/L    Potassium 3.9 3.5 - 5.2 mmol/L    Chloride 100 98 - 107 mmol/L    CO2 27.0 22.0 - 29.0 mmol/L    Calcium 10.2 8.6 - 10.5 mg/dL    Total Protein 6.7 6.0 - 8.5 g/dL    Albumin 4.6 3.5 - 5.2 g/dL    ALT (SGPT) 13 1 - 33 U/L    AST (SGOT) 18 1 - 32 U/L    Alkaline Phosphatase 60 39 - 117 U/L    Total Bilirubin 0.4 0.0 - 1.2 mg/dL    Globulin 2.1 gm/dL    A/G Ratio 2.2 g/dL    BUN/Creatinine Ratio 27.6 (H) 7.0 - 25.0    Anion Gap 12.0 5.0 - 15.0 mmol/L    eGFR 60.3 >60.0 mL/min/1.73   BNP    Collection Time: 11/29/23 12:38 AM    Specimen: Blood   Result Value Ref Range    proBNP 260.0 0.0 - 1,800.0 pg/mL   Single High Sensitivity Troponin T    Collection Time: 11/29/23 12:38 AM    Specimen: Blood   Result Value Ref Range    HS Troponin T 15 (H) <14 ng/L   Green Top (Gel)    Collection Time: 11/29/23 12:38 AM   Result Value Ref Range    Extra Tube Hold for add-ons.    Lavender Top    Collection Time: 11/29/23 12:38 AM   Result Value Ref Range    Extra Tube hold for add-on    Gold Top - SST    Collection Time: 11/29/23 12:38 AM   Result Value Ref Range    Extra Tube Hold for add-ons.    Light Blue Top    Collection Time: 11/29/23 12:38 AM   Result Value Ref Range     Extra Tube Hold for add-ons.    CBC Auto Differential    Collection Time: 11/29/23 12:38 AM    Specimen: Blood   Result Value Ref Range    WBC 7.04 3.40 - 10.80 10*3/mm3    RBC 4.20 3.77 - 5.28 10*6/mm3    Hemoglobin 12.9 12.0 - 15.9 g/dL    Hematocrit 38.9 34.0 - 46.6 %    MCV 92.6 79.0 - 97.0 fL    MCH 30.7 26.6 - 33.0 pg    MCHC 33.2 31.5 - 35.7 g/dL    RDW 13.5 12.3 - 15.4 %    RDW-SD 46.1 37.0 - 54.0 fl    MPV 11.0 6.0 - 12.0 fL    Platelets 150 140 - 450 10*3/mm3    Neutrophil % 62.6 42.7 - 76.0 %    Lymphocyte % 26.0 19.6 - 45.3 %    Monocyte % 8.8 5.0 - 12.0 %    Eosinophil % 1.7 0.3 - 6.2 %    Basophil % 0.3 0.0 - 1.5 %    Immature Grans % 0.6 (H) 0.0 - 0.5 %    Neutrophils, Absolute 4.41 1.70 - 7.00 10*3/mm3    Lymphocytes, Absolute 1.83 0.70 - 3.10 10*3/mm3    Monocytes, Absolute 0.62 0.10 - 0.90 10*3/mm3    Eosinophils, Absolute 0.12 0.00 - 0.40 10*3/mm3    Basophils, Absolute 0.02 0.00 - 0.20 10*3/mm3    Immature Grans, Absolute 0.04 0.00 - 0.05 10*3/mm3    nRBC 0.0 0.0 - 0.2 /100 WBC   Protime-INR    Collection Time: 11/29/23 12:38 AM    Specimen: Blood   Result Value Ref Range    Protime 14.9 (H) 11.7 - 14.2 Seconds    INR 1.15 (H) 0.90 - 1.10   High Sensitivity Troponin T    Collection Time: 11/29/23  1:36 AM    Specimen: Blood   Result Value Ref Range    HS Troponin T 21 (H) <14 ng/L   Magnesium    Collection Time: 11/29/23  1:36 AM    Specimen: Blood   Result Value Ref Range    Magnesium 1.4 (L) 1.6 - 2.4 mg/dL       Ordered the above labs and independently reviewed the results.        RADIOLOGY  XR Chest 1 View    Result Date: 11/29/2023  SINGLE VIEW OF THE CHEST  HISTORY: Atrial fibrillation  COMPARISON: July 18, 2020.  FINDINGS: There is cardiomegaly. There is no vascular congestion. There is calcification of the aorta. Postsurgical changes are noted in the left chest wall. No pneumothorax, pleural effusion, or acute infiltrate is seen.      No acute findings.  This report was finalized on  11/29/2023 1:05 AM by Dr. Meg Salazar M.D on Workstation: [id number]       I ordered the above noted radiological studies. Reviewed by me and discussed with radiologist.  See dictation for official radiology interpretation.      PROCEDURES    Procedures      MEDICATIONS GIVEN IN ER    Medications   sodium chloride 0.9 % flush 10 mL (has no administration in time range)   sodium chloride 0.9 % flush 10 mL (has no administration in time range)   sodium chloride 0.9 % flush 10 mL (has no administration in time range)   sodium chloride 0.9 % flush 10 mL (has no administration in time range)   sodium chloride 0.9 % infusion 40 mL (has no administration in time range)   acetaminophen (TYLENOL) tablet 650 mg (has no administration in time range)     Or   acetaminophen (TYLENOL) 160 MG/5ML oral solution 650 mg (has no administration in time range)     Or   acetaminophen (TYLENOL) suppository 650 mg (has no administration in time range)   ondansetron (ZOFRAN) tablet 4 mg (has no administration in time range)     Or   ondansetron (ZOFRAN) injection 4 mg (has no administration in time range)   calcium carbonate (TUMS) chewable tablet 500 mg (200 mg elemental) (has no administration in time range)   dextrose (GLUTOSE) oral gel 15 g (has no administration in time range)   dextrose (D50W) (25 g/50 mL) IV injection 25 g (has no administration in time range)   glucagon (GLUCAGEN) injection 1 mg (has no administration in time range)   insulin lispro (HUMALOG/ADMELOG) injection 2-7 Units (has no administration in time range)   Potassium Replacement - Follow Nurse / BPA Driven Protocol (has no administration in time range)   Magnesium Standard Dose Replacement - Follow Nurse / BPA Driven Protocol (has no administration in time range)   Phosphorus Replacement - Follow Nurse / BPA Driven Protocol (has no administration in time range)   Calcium Replacement - Follow Nurse / BPA Driven Protocol (has no administration in time range)    Magnesium Standard Dose Replacement - Follow Nurse / BPA Driven Protocol (has no administration in time range)   magnesium sulfate 2 GM/50ML infusion  - ADS Override Pull (has no administration in time range)   metoprolol tartrate (LOPRESSOR) injection 5 mg (5 mg Intravenous Given 11/29/23 0307)   magnesium sulfate 2g/50 mL (PREMIX) infusion (2 g Intravenous New Bag 11/29/23 0338)         All labs have been independently reviewed by me.  All radiology studies have been reviewed by me and I discussed with radiologist dictating the report when indicated below.  All EKG's independently viewed and interpreted by me.  Discussion below represents my analysis of pertinent findings related to patient's condition, differential diagnosis, treatment plan and final disposition.        PROGRESS, DATA ANALYSIS, CONSULTS, AND MEDICAL DECISION MAKING    DDx includes CHF, acute coronary syndrome, pulmonary embolism, pneumothorax, pneumonia, asthma/COPD,aspiration,  pulmonary hypertension, metabolic acidosis, deconditioning, anemia, other hematologic etiologies such as CO poisoning, anxiety.   Informed patient and daughter of my concerns she is in A-fib with a rapid ventricular rate.  We will give her some Lopressor check lab work and x-rays.  All questions answered at this time.  She is anticoagulated with Eliquis and has been compliant with Eliquis.  I do not anticipate she has a pulmonary embolism.      ED Course as of 11/29/23 0436   Wed Nov 29, 2023   0304 Chest x-ray shows no acute findings.  I did the chest x-ray myself no pulmonary consolidation no pneumothorax.  I did review the radiologist report as well. [MM]   0304 HS Troponin T(!): 15 [MM]   0305 HS Troponin T(!): 21 [MM]   0400 Did discuss the case with Bekah who is the midlevel provider on for A.  Informed of the patient presenting symptoms results of test minus the treatment plan.  Dr. Eaton is the attending and agrees to admit the patient to the hospital  [MM]   0405 My own independent interpretation of the EKG that was done at 12:18 AM reveals a rate of 148 it was atrial fibrillation with rapid ventricular response there was some interventional conduction delay with appearance of a right bundle branch block there also is signs of LVH  There is diffuse nonspecific ST and T wave changes but I do not see any definitive acute injury pattern  I compared to the previous EKG that was done on January 1, 2023 when she was normal sinus rhythm. [MM]   0406 HS Troponin T(!): 21  Patient's delta troponin is 6.  She does not have any chest pain.  I think this is likely related to the atrial fibrillation.  Continue to monitor [MM]      ED Course User Index  [MM] Indio Harris MD       AS OF 04:36 EST VITALS:    BP - 121/100  HR - (!) 130  TEMP -    02 SATS - 98%    SOCIAL DETERMINANTS OF HEALTH THAT IMPACT OR LIMIT CARE (For example..Homelessness,safe discharge, inability to obtain care, follow up, or prescriptions):      DIAGNOSIS  Final diagnoses:   Atrial fibrillation with rapid ventricular response   Hypomagnesemia   Coagulopathy Eliquis induced   Chronic respiratory failure with hypoxia: Chronically on oxygen         DISPOSITION  I have reviewed the test results with my patient and explained the current treatment plan.  I answered all of the patient's questions.  The patient will be admitted to monitor bed at this time.  The patient is not hypotensive and is tolerating their current disease condition well enough for a monitored bed at this time.  The patient's current condition does not require intensive care treatment at this time.            DICTATED UTILIZING DRAGON DICTATION    Note Disclaimer: At [facility], we believe that sharing information builds trust and better relationships. You are receiving this note because you recently visited [facility]. It is possible you will see health information before a provider has talked with you about it. This kind of  information can be easy to misunderstand. To help you fully understand what it means for your health, we urge you to discuss this note with your provider.       Indio Harris MD  11/29/23 0434

## 2023-11-29 NOTE — CONSULTS
Patient Name: Kanwal Sauer  :1950  73 y.o.    Date of Admission: 2023  Date of Consultation:  23  Encounter Provider: MARK Soto  Place of Service: [facility] CARDIOLOGY  Referring Provider: No ref. provider found  Patient Care Team:  Nisha Britton MD as PCP - General (Family Medicine)  Corey Britton MD as Consulting Physician (Sleep Medicine)  Belen Khan APRN as Nurse Practitioner (Obstetrics and Gynecology)  Nia Zhang APRN as Nurse Practitioner (Cardiology)  José Valentino MD as Consulting Physician (Urology)  Indio Box MD as Consulting Physician (Pulmonary Disease)      Chief complaint: dysuria, palpitations    History of Present Illness: Ms. Sauer is a 73 year old woman followed by Dr. Romano. She has paroxysmal atrial fibrillation with previous cardioversions. She had AF while admitted for urinary tract infection. She was in sinus mihir at the time of discharge. She is anticoagulated with apixaban. She was last seen in the office 2023. She was doing well and in SR. Lasix causes increased frequency so she takes it as needed. She was relatively stable and no changes made. She had preserved LVEF on last echo 2022.     She came to the emergency room with palpitations and chest pounding starting around 10:30 pm. She checked her HR and noted it to be in the 180's at home. Came to the ER and noted to be in AF. Also reported dysuria and is being treated for urinary tract infection. She received a dose of IV lopressor overnight and again this morning. Home metoprolol has been restarted (25 mg BID).     She remains in AF . -130s. Less symptomatic. Does not feel her heart racing currently. Has some SOA with exertion. CXR was unremarkable.she denies PND. She never lies flat due to scoliosis. She denies CP, light headed or dizziness.     Echo   Calculated left  ventricular 3D EF = 68% Left ventricular systolic function is normal.  Left ventricular wall thickness is consistent with mild concentric hypertrophy.  Left ventricular diastolic function is consistent with (grade Ia w/high LAP) impaired relaxation.  Estimated right ventricular systolic pressure from tricuspid regurgitation is normal (<35 mmHg).           Past Medical History:   Diagnosis Date    Allergic     Anxiety     Asthma     Atrial fibrillation     Cancer     Breast    COPD (chronic obstructive pulmonary disease) 09/07/2022    Deep vein thrombosis     Diabetes mellitus     Difficulty walking     Diverticulosis     Drug therapy     Environmental allergies     Fractures     Headache, tension-type     Hyperlipidemia     Hypertension     Low back pain     Neuropathy in diabetes     Obesity     Scoliosis     Seizures     Sleep apnea     Tremor     Urinary tract infection     Weakness        Past Surgical History:   Procedure Laterality Date    BREAST BIOPSY      CATARACT EXTRACTION, BILATERAL      DENTAL PROCEDURE      Removed teeth    FEMUR OPEN REDUCTION INTERNAL FIXATION Left 12/24/2020    Procedure: DISTAL FEMUR OPEN REDUCTION INTERNAL FIXATION;  Surgeon: Marley Hernandez MD;  Location: [facility];  Service: Orthopedics;  Laterality: Left;    MASTECTOMY Left     REPLACEMENT TOTAL KNEE BILATERAL           Prior to Admission medications    Medication Sig Start Date End Date Taking? Authorizing Provider   citalopram (CeleXA) 20 MG tablet TAKE 1 TABLET BY MOUTH EVERY DAY 4/25/23  Yes Nisha Britton MD   furosemide (LASIX) 40 MG tablet Take 1 tablet by mouth Daily. 7/18/23  Yes Epley, James, APRN   metFORMIN (GLUCOPHAGE) 1000 MG tablet TAKE 1 TABLET BY MOUTH TWICE A DAY WITH FOOD 10/23/23  Yes Nisha Britton MD   montelukast (SINGULAIR) 10 MG tablet TAKE 1 TABLET BY MOUTH EVERYDAY AT BEDTIME 10/20/23  Yes Nisha Britton MD   potassium chloride (KLOR-CON) 10 MEQ CR tablet Take 1 tablet by  mouth Daily. 7/18/23  Yes Epley, James, APRN   primidone (MYSOLINE) 50 MG tablet TAKE 1 TABLET BY MOUTH THREE TIMES A DAY 9/14/23  Yes Nisha Britton MD   Probiotic Product (PROBIOTIC-10 PO) Take  by mouth.   Yes ProviderRene MD   tiZANidine (ZANAFLEX) 4 MG tablet Take 1 tablet by mouth At Night As Needed for Muscle Spasms. 8/9/23  Yes Lizzy Pollard APRN   acetaminophen (TYLENOL) 325 MG tablet Take 2 tablets by mouth Every 4 (Four) Hours As Needed for Mild Pain .  Patient taking differently: Take 2 tablets by mouth As Needed for Mild Pain. 12/30/20   Jorge Reyes MD   albuterol sulfate  (90 Base) MCG/ACT inhaler INHALE 2 PUFFS EVERY 4 HOURS AS NEEDED FOR WHEEZING 3/30/22   Nisha Britton MD   Cholecalciferol (Vitamin D3) 25 MCG (1000 UT) capsule Take 1 capsule by mouth Daily.    ProviderRene MD   CRANBERRY PO Take 650 mg by mouth.    ProviderRene MD   Eliquis 5 MG tablet tablet TAKE 1 TABLET BY MOUTH EVERY 12 HOURS 11/17/23   Nisha Britton MD   fenofibrate 160 MG tablet TAKE 1 TABLET BY MOUTH EVERY DAY 9/18/23   Nisha Britton MD   Fluticasone-Umeclidin-Vilant (Trelegy Ellipta) 100-62.5-25 MCG/ACT inhaler Inhale 1 puff Daily. 5/2/23   Nisha Britton MD   ipratropium-albuterol (DUO-NEB) 0.5-2.5 mg/3 ml nebulizer Take 3 mL by nebulization Every 4 (Four) Hours As Needed for Wheezing. 7/18/23   Epley, James, APRN   metoprolol tartrate (LOPRESSOR) 25 MG tablet TAKE 1 TABLET BY MOUTH 2 TIMES A DAY FOR 30 DAYS. 8/17/23   Nisha Britton MD   tiotropium bromide monohydrate (SPIRIVA RESPIMAT) 2.5 MCG/ACT aerosol solution inhaler Inhale 2 puffs Daily for 30 days. 1/3/23 4/20/23  Tj Echols MD       Allergies   Allergen Reactions    Baclofen Other (See Comments)     dysrhythmia    Doxycycline Rash    Eggs Or Egg-Derived Products Anaphylaxis    Iodine Anaphylaxis    Latex Anaphylaxis    Milk-Related Compounds Unknown - High Severity    Msg  [Monosodium Glutamate] Anaphylaxis    Penicillins Hives     Tolerated cefazolin during December 2020 admission    Metronidazole Unknown - High Severity    Ezetimibe Rash    Latex, Natural Rubber Rash    Nylon Rash    Simvastatin Rash       Social History     Socioeconomic History    Marital status:    Tobacco Use    Smoking status: Never    Smokeless tobacco: Never    Tobacco comments:     no caffine   Vaping Use    Vaping Use: Never used   Substance and Sexual Activity    Alcohol use: Not Currently    Drug use: Never    Sexual activity: Not Currently     Partners: Male       Family History   Problem Relation Age of Onset    Arthritis Mother     Lung cancer Mother     Brain cancer Mother     Hypertension Mother     Cancer Mother         Lung cancer    Hypertension Father     Arthritis Sister     Breast cancer Sister     Diabetes Sister     Hypertension Sister     Dementia Sister     Cancer Sister         Brest cancer    Aneurysm Sister     Cancer Daughter         Colon cancer    Developmental Disability Daughter     Learning disabilities Daughter     Miscarriages / Stillbirths Daughter         Miscarriage       REVIEW OF SYSTEMS:   All systems reviewed.  Pertinent positives identified in HPI.  All other systems are negative.      Objective:     Vitals:    11/29/23 0640 11/29/23 0643 11/29/23 0725 11/29/23 0953   BP:  (!) 136/110  (!) 134/107   BP Location:    Right arm   Patient Position:    Lying   Pulse:  (!) 121 120    Resp:    20   Temp: 97.4 °F (36.3 °C)      TempSrc: Tympanic   Oral   SpO2:  97% 96%    Weight:       Height:         Body mass index is 35.24 kg/m².    Physical Exam:  Constitutional: She is oriented to person, place, and time. She appears well-developed. She does not appear ill.   HENT:   Head: Normocephalic and atraumatic. Head is without contusion.   Right Ear: Hearing normal. No drainage.   Left Ear: Hearing normal. No drainage.   Nose: No nasal deformity. No epistaxis.   Eyes: Lids are  normal. Right eye exhibits no exudate. Left eye exhibits no exudate.  Neck: No JVD present.    Cardiovascular: tachy rate, irregular rhythm and normal heart sounds.    Pulses:       Posterior tibial pulses are 2+ on the right side, and 2+ on the left side.   Pulmonary/Chest: Effort normal and breath sounds normal.   Abdominal: Soft. Normal appearance and bowel sounds are normal. There is no tenderness.   Musculoskeletal: Normal range of motion.        Right shoulder: She exhibits no deformity.        Left shoulder: She exhibits no deformity.   Neurological: She is alert and oriented to person, place, and time. She has normal strength.   Skin: Skin is warm, dry and intact. No rash noted.   Psychiatric: She has a normal mood and affect. Her behavior is normal. Thought content normal.   Vitals reviewed      Lab Review:     Results from last 7 days   Lab Units 11/29/23  0641 11/29/23  0038   SODIUM mmol/L 140 139   POTASSIUM mmol/L 3.9 3.9   CHLORIDE mmol/L 102 100   CO2 mmol/L 29.2* 27.0   BUN mg/dL 21 27*   CREATININE mg/dL 0.76 0.98   CALCIUM mg/dL 10.0 10.2   BILIRUBIN mg/dL  --  0.4   ALK PHOS U/L  --  60   ALT (SGPT) U/L  --  13   AST (SGOT) U/L  --  18   GLUCOSE mg/dL 183* 203*     Results from last 7 days   Lab Units 11/29/23  0641 11/29/23  0344 11/29/23  0136   HSTROP T ng/L 27* 25* 21*     Results from last 7 days   Lab Units 11/29/23  0641   WBC 10*3/mm3 6.69   HEMOGLOBIN g/dL 12.7   HEMATOCRIT % 37.9   PLATELETS 10*3/mm3 146     Results from last 7 days   Lab Units 11/29/23  0038   INR  1.15*     Results from last 7 days   Lab Units 11/29/23  0136   MAGNESIUM mg/dL 1.4*                 Current Facility-Administered Medications:     acetaminophen (TYLENOL) tablet 650 mg, 650 mg, Oral, Q4H PRN **OR** acetaminophen (TYLENOL) 160 MG/5ML oral solution 650 mg, 650 mg, Oral, Q4H PRN **OR** acetaminophen (TYLENOL) suppository 650 mg, 650 mg, Rectal, Q4H PRN, Bekah Arreola APRN    apixaban (ELIQUIS) tablet 5  mg, 5 mg, Oral, Q12H, Hernando Wolff DO, 5 mg at 11/29/23 0906    budesonide-formoterol (SYMBICORT) 160-4.5 MCG/ACT inhaler 2 puff, 2 puff, Inhalation, BID - RT **AND** tiotropium (SPIRIVA RESPIMAT) 2.5 mcg/act aerosol solution inhaler, 2 puff, Inhalation, Daily - RT, Hernando Wolff DO    calcium carbonate (TUMS) chewable tablet 500 mg (200 mg elemental), 2 tablet, Oral, BID PRN, Bekah Arreola APRN    Calcium Replacement - Follow Nurse / BPA Driven Protocol, , Does not apply, PRN, Bekah Arreola APRN    citalopram (CeleXA) tablet 20 mg, 20 mg, Oral, Daily, Hernando Wolff DO    dextrose (D50W) (25 g/50 mL) IV injection 25 g, 25 g, Intravenous, Q15 Min PRN, Bekah Arreola APRN    dextrose (GLUTOSE) oral gel 15 g, 15 g, Oral, Q15 Min PRN, Bekah Arreola APRN    furosemide (LASIX) tablet 40 mg, 40 mg, Oral, Daily, Hernando Wolff DO, 40 mg at 11/29/23 0906    glucagon (GLUCAGEN) injection 1 mg, 1 mg, Intramuscular, Q15 Min PRN, Bekah Arreola APRN    insulin lispro (HUMALOG/ADMELOG) injection 2-7 Units, 2-7 Units, Subcutaneous, 4x Daily AC & at Bedtime, Bekah Arreola APRN    ipratropium-albuterol (DUO-NEB) nebulizer solution 3 mL, 3 mL, Nebulization, Q4H PRN, Hernando Wolff DO    levoFLOXacin (LEVAQUIN) 750 mg/150 mL D5W (premix) (LEVAQUIN) 750 mg, 750 mg, Intravenous, Q24H, Hernando Wolff DO, Stopped at 11/29/23 0959    Magnesium Standard Dose Replacement - Follow Nurse / BPA Driven Protocol, , Does not apply, PRN, Bekah Arreola APRN    metoprolol tartrate (LOPRESSOR) tablet 25 mg, 25 mg, Oral, Q12H, Hernando Wolff, DO, 25 mg at 11/29/23 0906    ondansetron (ZOFRAN) tablet 4 mg, 4 mg, Oral, Q6H PRN **OR** ondansetron (ZOFRAN) injection 4 mg, 4 mg, Intravenous, Q6H PRN, Bekah Arreola APRN    Phosphorus Replacement - Follow Nurse / BPA Driven Protocol, , Does not apply, PRN, Bekah Arreola APRN    potassium chloride (K-DUR,KLOR-CON) ER tablet 10 mEq, 10 mEq, Oral,  Daily, Hernando Wolff DO, 10 mEq at 11/29/23 0906    Potassium Replacement - Follow Nurse / BPA Driven Protocol, , Does not apply, PRN, Bekah Arreola APRN    primidone (MYSOLINE) tablet 50 mg, 50 mg, Oral, TID, Hernando Wolff DO    sodium chloride 0.9 % flush 10 mL, 10 mL, Intravenous, PRN, Indio Harris MD    [COMPLETED] Insert Peripheral IV, , , Once **AND** sodium chloride 0.9 % flush 10 mL, 10 mL, Intravenous, PRN, Indio Harris MD    sodium chloride 0.9 % flush 10 mL, 10 mL, Intravenous, Q12H, Bekah Arreola APRN, 10 mL at 11/29/23 0958    sodium chloride 0.9 % flush 10 mL, 10 mL, Intravenous, PRN, Bekah Arreola APRN    sodium chloride 0.9 % infusion 40 mL, 40 mL, Intravenous, PRN, Bekah Arreola APRN    tiZANidine (ZANAFLEX) tablet 4 mg, 4 mg, Oral, Nightly PRN, Hernando Wolff DO    Assessment and Plan:       Active Hospital Problems    Diagnosis  POA    **Atrial fibrillation with rapid ventricular response [I48.91]  Yes    Elevated troponin [R79.89]  Yes    Hypomagnesemia [E83.42]  Yes    COPD (chronic obstructive pulmonary disease) [J44.9]  Yes    TARSHA (obstructive sleep apnea) [G47.33]  Yes    Type 2 diabetes mellitus with circulatory disorder, without long-term current use of insulin [E11.59]  Yes    Essential hypertension [I10]  Yes      Resolved Hospital Problems   No resolved problems to display.     Paroxysmal atrial fibrillation with RVR  Urinary tract infection (recurrent)  COPD without acute exacerbation  TARSHA  - CPAP   Diabetes mellitus type II  Essential HTN    Traditionally her AF has occurred in the setting of acute illness - however she is less symptomatic with a HR in the 120's so potentially could be having asymptomatic AF at home.     She has received a few doses of IV lopressor and oral lopressor. She is bradycardic when in SR so would avoid aggressive rate control. I'm going to try some IV amiodarone to see if we can get her to convert. Will also plan for 2  week monitor at discharge to assess AF burden and HR to help guide further management of AF. Monitor QT closely (on levaquin and celexa).    TSH ok.     Check echo (although would be better when her HR is more controlled).     She reports compliance with apixaban.     Discussed with Dr. Villatoro.    Nasrin Rodriguez, APRN  11/29/23  11:23 EST    Converted with IV amiodarone bolus. Will start low dose amiodarone orally. She had a reaction to levaquin and this has been stopped.

## 2023-11-29 NOTE — SIGNIFICANT NOTE
11/29/23 1452   OTHER   Discipline physical therapist   Rehab Time/Intention   Session Not Performed other (see comments)  (Pt in afib, resting -130s this PM. Will follow up tomorrow for PT eval.)   Recommendation   PT - Next Appointment 11/30/23

## 2023-11-29 NOTE — ED NOTES
Nursing report ED to floor  Kanwal Sauer  73 y.o.  female    HPI :   Chief Complaint   Patient presents with    Shortness of Breath     HEATHER Sauer is a 73 y.o. female who presents to the ED c/o symptoms of palpitations and a little bit of shortness of breath started this evening at about 10 PM.  Started when she was sleeping and woke her up she felt her heart was beating fast.  Denies any chest pain.  Her heart rate was as high as 170s to 180s.  She did have a little bit of shortness of breath but that is resolved.  She had a history of A-fib and she felt like she was in A-fib again.  She is normally in normal sinus rhythm.  Last time she had an episode like this she had an acute UTI.  She does feel as if she might have a UTI is a little bit of pressure when she does urinate.  Denies any chest pain, abdominal pain, headache.  She is chronically on 3 L of oxygen for severe chronic COPD.  She is also anticoagulated with Eliquis.  She has been compliant with all her medicines including her blood thinning medicine and heart medicine.  She does have obstructive sleep apnea as well.     Admitting doctor:   Hernando Wolff DO    Admitting diagnosis:   The primary encounter diagnosis was Atrial fibrillation with rapid ventricular response. Diagnoses of Hypomagnesemia, Coagulopathy Eliquis induced, and Chronic respiratory failure with hypoxia: Chronically on oxygen were also pertinent to this visit.    Code status:   Current Code Status       Date Active Code Status Order ID Comments User Context       11/29/2023 0428 CPR (Attempt to Resuscitate) [id number]  Bekah Arreola APRN ED        Question Answer    Code Status (Patient has no pulse and is not breathing) CPR (Attempt to Resuscitate)    Medical Interventions (Patient has pulse or is breathing) Full Support                    Allergies:   Baclofen; Doxycycline; Eggs or egg-derived products; Iodine; Latex; Milk-related compounds; Msg [monosodium  glutamate]; Penicillins; Metronidazole; Ezetimibe; Latex, natural rubber; Nylon; and Simvastatin    Isolation:   No active isolations    Intake and Output    Intake/Output Summary (Last 24 hours) at 11/29/2023 0617  Last data filed at 11/29/2023 0603  Gross per 24 hour   Intake 50 ml   Output --   Net 50 ml       Weight:       11/29/23  0027   Weight: 102 kg (225 lb)       Most recent vitals:   Vitals:    11/29/23 0255 11/29/23 0259 11/29/23 0301 11/29/23 0302   BP:   121/100    BP Location:       Patient Position:       Pulse: (!) 142 (!) 134 (!) 134 (!) 130   Resp:       SpO2: 99% 99% 99% 98%   Weight:       Height:           Active LDAs/IV Access:   Lines, Drains & Airways       Active LDAs       Name Placement date Placement time Site Days    Peripheral IV 11/29/23 0038 Right Hand 11/29/23 0038  Hand  less than 1                    Labs (abnormal labs have a star):   Labs Reviewed   COMPREHENSIVE METABOLIC PANEL - Abnormal; Notable for the following components:       Result Value    Glucose 203 (*)     BUN 27 (*)     BUN/Creatinine Ratio 27.6 (*)     All other components within normal limits    Narrative:     GFR Normal >60  Chronic Kidney Disease <60  Kidney Failure <15    The GFR formula is only valid for adults with stable renal function between ages 18 and 70.   SINGLE HSTROPONIN T - Abnormal; Notable for the following components:    HS Troponin T 15 (*)     All other components within normal limits    Narrative:     High Sensitive Troponin T Reference Range:  <14.0 ng/L- Negative Female for AMI  <22.0 ng/L- Negative Male for AMI  >=14 - Abnormal Female indicating possible myocardial injury.  >=22 - Abnormal Male indicating possible myocardial injury.   Clinicians would have to utilize clinical acumen, EKG, Troponin, and serial changes to determine if it is an Acute Myocardial Infarction or myocardial injury due to an underlying chronic condition.        CBC WITH AUTO DIFFERENTIAL - Abnormal; Notable for  the following components:    Immature Grans % 0.6 (*)     All other components within normal limits   PROTIME-INR - Abnormal; Notable for the following components:    Protime 14.9 (*)     INR 1.15 (*)     All other components within normal limits   TROPONIN - Abnormal; Notable for the following components:    HS Troponin T 21 (*)     All other components within normal limits    Narrative:     High Sensitive Troponin T Reference Range:  <14.0 ng/L- Negative Female for AMI  <22.0 ng/L- Negative Male for AMI  >=14 - Abnormal Female indicating possible myocardial injury.  >=22 - Abnormal Male indicating possible myocardial injury.   Clinicians would have to utilize clinical acumen, EKG, Troponin, and serial changes to determine if it is an Acute Myocardial Infarction or myocardial injury due to an underlying chronic condition.        MAGNESIUM - Abnormal; Notable for the following components:    Magnesium 1.4 (*)     All other components within normal limits   HIGH SENSITIVITIY TROPONIN T 2HR - Abnormal; Notable for the following components:    HS Troponin T 25 (*)     Troponin T Delta 4 (*)     All other components within normal limits    Narrative:     High Sensitive Troponin T Reference Range:  <14.0 ng/L- Negative Female for AMI  <22.0 ng/L- Negative Male for AMI  >=14 - Abnormal Female indicating possible myocardial injury.  >=22 - Abnormal Male indicating possible myocardial injury.   Clinicians would have to utilize clinical acumen, EKG, Troponin, and serial changes to determine if it is an Acute Myocardial Infarction or myocardial injury due to an underlying chronic condition.        URINALYSIS W/ CULTURE IF INDICATED - Abnormal; Notable for the following components:    Glucose,  mg/dL (Trace) (*)     Blood, UA Trace (*)     Protein, UA >=300 mg/dL (3+) (*)     All other components within normal limits    Narrative:     In absence of clinical symptoms, the presence of pyuria, bacteria, and/or nitrites on  the urinalysis result does not correlate with infection.   URINALYSIS, MICROSCOPIC ONLY - Abnormal; Notable for the following components:    RBC, UA 3-5 (*)     WBC, UA 6-10 (*)     Bacteria, UA Trace (*)     All other components within normal limits   BNP (IN-HOUSE) - Normal    Narrative:     This assay is used as an aid in the diagnosis of individuals suspected of having heart failure. It can be used as an aid in the diagnosis of acute decompensated heart failure (ADHF) in patients presenting with signs and symptoms of ADHF to the emergency department (ED). In addition, NT-proBNP of <300 pg/mL indicates ADHF is not likely.    Age Range Result Interpretation  NT-proBNP Concentration (pg/mL:      <50             Positive            >450                   Gray                 300-450                    Negative             <300    50-75           Positive            >900                  Gray                300-900                  Negative            <300      >75             Positive            >1800                  Gray                300-1800                  Negative            <300   URINE CULTURE   RAINBOW DRAW    Narrative:     The following orders were created for panel order [place] Draw.  Procedure                               Abnormality         Status                     ---------                               -----------         ------                     Green Top (Gel)[[id number]]                                  Final result               Lavender Top[[id number]]                                     Final result               Gold Top - SST[[id number]]                                   Final result               Light Blue Top[[id number]]                                   Final result                 Please view results for these tests on the individual orders.   BASIC METABOLIC PANEL   CBC (NO DIFF)   TROPONIN   POCT GLUCOSE FINGERSTICK   POCT GLUCOSE FINGERSTICK   POCT GLUCOSE FINGERSTICK   POCT  GLUCOSE FINGERSTICK   CBC AND DIFFERENTIAL    Narrative:     The following orders were created for panel order CBC & Differential.  Procedure                               Abnormality         Status                     ---------                               -----------         ------                     CBC Auto Differential[[id number]]        Abnormal            Final result                 Please view results for these tests on the individual orders.   GREEN TOP   LAVENDER TOP   GOLD TOP - SST   LIGHT BLUE TOP       EKG:   ECG 12 Lead ED Triage Standing Order; SOA   Preliminary Result   HEART RATE= 148  bpm   RR Interval= 405  ms   NV Interval=   ms   P Horizontal Axis=   deg   P Front Axis=   deg   QRSD Interval= 107  ms   QT Interval= 297  ms   QTcB= 467  ms   QRS Axis= -55  deg   T Wave Axis= 103  deg   - ABNORMAL ECG -   Atrial fibrillation with rapid V-rate   Incomplete RBBB and LAFB   LVH with secondary repolarization abnormality   Anterior Q waves, possibly due to LVH   Electronically Signed By:    Date and Time of Study: 2023-11-29 00:18:19          Meds given in ED:   Medications   sodium chloride 0.9 % flush 10 mL (has no administration in time range)   sodium chloride 0.9 % flush 10 mL (has no administration in time range)   sodium chloride 0.9 % flush 10 mL (has no administration in time range)   sodium chloride 0.9 % flush 10 mL (has no administration in time range)   sodium chloride 0.9 % infusion 40 mL (has no administration in time range)   acetaminophen (TYLENOL) tablet 650 mg (has no administration in time range)     Or   acetaminophen (TYLENOL) 160 MG/5ML oral solution 650 mg (has no administration in time range)     Or   acetaminophen (TYLENOL) suppository 650 mg (has no administration in time range)   ondansetron (ZOFRAN) tablet 4 mg (has no administration in time range)     Or   ondansetron (ZOFRAN) injection 4 mg (has no administration in time range)   calcium carbonate (TUMS) chewable  tablet 500 mg (200 mg elemental) (has no administration in time range)   dextrose (GLUTOSE) oral gel 15 g (has no administration in time range)   dextrose (D50W) (25 g/50 mL) IV injection 25 g (has no administration in time range)   glucagon (GLUCAGEN) injection 1 mg (has no administration in time range)   insulin lispro (HUMALOG/ADMELOG) injection 2-7 Units (has no administration in time range)   Potassium Replacement - Follow Nurse / BPA Driven Protocol (has no administration in time range)   Phosphorus Replacement - Follow Nurse / BPA Driven Protocol (has no administration in time range)   Calcium Replacement - Follow Nurse / BPA Driven Protocol (has no administration in time range)   Magnesium Standard Dose Replacement - Follow Nurse / BPA Driven Protocol (has no administration in time range)   metoprolol tartrate (LOPRESSOR) injection 5 mg (5 mg Intravenous Given 11/29/23 0307)   magnesium sulfate 2g/50 mL (PREMIX) infusion (0 g Intravenous Stopped 11/29/23 0603)       Imaging results:  XR Chest 1 View    Result Date: 11/29/2023  No acute findings.  This report was finalized on 11/29/2023 1:05 AM by Dr. Meg Salazar M.D on Workstation: [id number]       Ambulatory status:   - Ax1 to bedside commode, to stand    Social issues:   Social History     Socioeconomic History    Marital status:    Tobacco Use    Smoking status: Never    Smokeless tobacco: Never    Tobacco comments:     no caffine   Vaping Use    Vaping Use: Never used   Substance and Sexual Activity    Alcohol use: Not Currently    Drug use: Never    Sexual activity: Not Currently     Partners: Male       NIH Stroke Scale:       Genevieve Simental RN  11/29/23 06:17 EST     Call Genevieve #[phone] with any questions

## 2023-11-29 NOTE — PROGRESS NOTES
[facility] Clinical Pharmacy Services: Levofloxacin Consult    Pt Name: Kanwal Sauer   : 1950    Indication: Urinary Tract Infection    Relevant clinical data and objective history reviewed:    Past Medical History:   Diagnosis Date    Allergic     Anxiety     Asthma     Atrial fibrillation     Cancer     Breast    COPD (chronic obstructive pulmonary disease) 2022    Deep vein thrombosis     Diabetes mellitus     Difficulty walking     Diverticulosis     Drug therapy     Environmental allergies     Fractures     Headache, tension-type     Hyperlipidemia     Hypertension     Low back pain     Neuropathy in diabetes     Obesity     Scoliosis     Seizures     Sleep apnea     Tremor     Urinary tract infection     Weakness      Creatinine   Date Value Ref Range Status   2023 0.76 0.57 - 1.00 mg/dL Final   2023 0.98 0.57 - 1.00 mg/dL Final   10/09/2023 1.04 (H) 0.57 - 1.00 mg/dL Final   02/10/2023 0.99 0.57 - 1.00 mg/dL Final   2022 1.20 0.60 - 1.30 mg/dL Final     Comment:     Serial Number: [id number]:  [id number]   2021 0.80 0.60 - 1.30 mg/dL Final     Comment:     Serial Number: [id number]:  [id number]     BUN   Date Value Ref Range Status   2023 21 8 - 23 mg/dL Final     Estimated Creatinine Clearance: 78.6 mL/min (by C-G formula based on SCr of 0.76 mg/dL).    Lab Results   Component Value Date    WBC 6.69 2023     Temp Readings from Last 3 Encounters:   23 97.4 °F (36.3 °C) (Tympanic)   23 98.4 °F (36.9 °C) (Temporal)   10/27/23 96.9 °F (36.1 °C) (Temporal)      Assessment/Plan  Estimated CrCl 78.6 mL/min at this time  Will start Levofloxacin 750 mg IV every 24 hours     Pharmacy will continue to follow daily while on Levofloxacin and adjust as needed. Thank you for this consult.    Cirilo Gomez, [facility]  Clinical Pharmacist

## 2023-11-29 NOTE — CASE MANAGEMENT/SOCIAL WORK
Discharge Planning Assessment  [facility]     Patient Name: Kanwal Sauer  MRN: [id number]  Today's Date: 11/29/2023    Admit Date: 11/29/2023    Plan: From [facility]   Discharge Needs Assessment       Row Name 11/29/23 1337       Living Environment    People in Home alone    Current Living Arrangements apartment;independent living facility  senior living apartment    Potentially Unsafe Housing Conditions none    Primary Care Provided by self    Provides Primary Care For no one    Family Caregiver if Needed child(kaye), adult    Quality of Family Relationships involved;helpful    Able to Return to Prior Arrangements yes       Resource/Environmental Concerns    Resource/Environmental Concerns none       Food Insecurity    Within the past 12 months, you worried that your food would run out before you got the money to buy more. Never true    Within the past 12 months, the food you bought just didn't last and you didn't have money to get more. Never true       Transition Planning    Patient/Family Anticipates Transition to home    Patient/Family Anticipated Services at Transition none    Transportation Anticipated family or friend will provide       Discharge Needs Assessment    Readmission Within the Last 30 Days no previous admission in last 30 days    Equipment Currently Used at Home rollator;cpap;oxygen;shower chair;grab bar    Concerns to be Addressed discharge planning    Equipment Needed After Discharge none    Discharge Facility/Level of Care Needs independent living facility    Provided Post Acute Provider List? Refused    Refused Provider List Comment declined                   Discharge Plan       Row Name 11/29/23 1339       Plan    Plan From [facility]    Patient/Family in Agreement with Plan yes    Plan Comments Spoke with pt and pt's daughter Monica michaels. Confirmed facesheet correct. Explained role of CCP. Pt reports she lives at [address] [place] [address]  Cesar Hendrickson at [facility]. She has her own apartment and is IADLs uses a rollator to ambulate and grab bars as needed. Pt has home O2 (3L) with [employer]. Address listed on facesheet is where she wants to continue getting her mail. Pt has never used HH or SNF in past however she reports she is current with [facility] for bathing services and additional personal care as needed. Pt reports if PT/HH is needed [facility] can arrange, declined referral. Pt's PCP is Dr. Britton and uses [facility] for prescriptions. Pt plans to return to independent living at d/c. CCP to follow.                  Continued Care and Services - Admitted Since 11/29/2023    Coordination has not been started for this encounter.          Demographic Summary    No documentation.                  Functional Status    No documentation.                  Psychosocial    No documentation.                  Abuse/Neglect    No documentation.                  Legal    No documentation.                  Substance Abuse    No documentation.                  Patient Forms    No documentation.                     NICOLE Haddad

## 2023-11-29 NOTE — H&P
"    Patient Name:  Kanwal Sauer  YOB: 1950  MRN:  [id number]  Admit Date:  11/29/2023  Patient Care Team:  Nisha Britton MD as PCP - General (Family Medicine)  Corey Britton MD as Consulting Physician (Sleep Medicine)  Belen Khan APRN as Nurse Practitioner (Obstetrics and Gynecology)  Nia Zhang APRN as Nurse Practitioner (Cardiology)  José Valentino MD as Consulting Physician (Urology)  Indio Box MD as Consulting Physician (Pulmonary Disease)      Subjective   History Present Illness     Chief Complaint   Patient presents with    Shortness of Breath     SOA       Ms. Sauer is a 73 y.o. with a history of atrial fibrillation on Eliquis, COPD with chronic respiratory failure (On 3L home O2), hypertension, type 2 diabetes mellitus, TARSHA, history of UTI that presents to [facility] complaining of \"rapid heartbeat\" which began around 2230 on 11/28 while patient was sleeping in bed.  She denies recollection of possible inciting factors including recent illness, changes in medication or missed medication doses.  Symptoms constant and progressive in nature since onset.  She does endorse prior episodes like this in the past.  She denies associated chest pain, dyspnea, dizziness, lightheadedness, fever, chills or syncope.  Aside from this, she also has complaints of dysuria and increased urinary urgency and frequency.  She is being admitted for further evaluation and management.    Personal History     Past Medical History:   Diagnosis Date    Allergic     Anxiety     Asthma     Atrial fibrillation     Cancer     Breast    COPD (chronic obstructive pulmonary disease) 09/07/2022    Deep vein thrombosis     Diabetes mellitus     Difficulty walking     Diverticulosis     Drug therapy     Environmental allergies     Fractures     Headache, tension-type     Hyperlipidemia     Hypertension     Low back pain     Neuropathy in diabetes     Obesity     Scoliosis "     Seizures     Sleep apnea     Tremor     Urinary tract infection     Weakness      Past Surgical History:   Procedure Laterality Date    BREAST BIOPSY      CATARACT EXTRACTION, BILATERAL      DENTAL PROCEDURE      Removed teeth    FEMUR OPEN REDUCTION INTERNAL FIXATION Left 12/24/2020    Procedure: DISTAL FEMUR OPEN REDUCTION INTERNAL FIXATION;  Surgeon: Marley Hernandez MD;  Location: [facility] OR;  Service: Orthopedics;  Laterality: Left;    MASTECTOMY Left     REPLACEMENT TOTAL KNEE BILATERAL       Family History   Problem Relation Age of Onset    Arthritis Mother     Lung cancer Mother     Brain cancer Mother     Hypertension Mother     Cancer Mother         Lung cancer    Hypertension Father     Arthritis Sister     Breast cancer Sister     Diabetes Sister     Hypertension Sister     Dementia Sister     Cancer Sister         Brest cancer    Aneurysm Sister     Cancer Daughter         Colon cancer    Developmental Disability Daughter     Learning disabilities Daughter     Miscarriages / Stillbirths Daughter         Miscarriage     Social History     Tobacco Use    Smoking status: Never    Smokeless tobacco: Never    Tobacco comments:     no caffine   Vaping Use    Vaping Use: Never used   Substance Use Topics    Alcohol use: Not Currently    Drug use: Never     No current facility-administered medications on file prior to encounter.     Current Outpatient Medications on File Prior to Encounter   Medication Sig Dispense Refill    acetaminophen (TYLENOL) 325 MG tablet Take 2 tablets by mouth Every 4 (Four) Hours As Needed for Mild Pain . (Patient taking differently: Take 2 tablets by mouth As Needed for Mild Pain.)      albuterol sulfate  (90 Base) MCG/ACT inhaler INHALE 2 PUFFS EVERY 4 HOURS AS NEEDED FOR WHEEZING 18 g 1    Cholecalciferol (Vitamin D3) 25 MCG (1000 UT) capsule Take 1 capsule by mouth Daily.      citalopram (CeleXA) 20 MG tablet TAKE 1 TABLET BY MOUTH EVERY DAY 90 tablet 1     CRANBERRY PO Take 650 mg by mouth.      Eliquis 5 MG tablet tablet TAKE 1 TABLET BY MOUTH EVERY 12 HOURS 60 tablet 2    fenofibrate 160 MG tablet TAKE 1 TABLET BY MOUTH EVERY DAY 90 tablet 1    Fluticasone-Umeclidin-Vilant (Trelegy Ellipta) 100-62.5-25 MCG/ACT inhaler Inhale 1 puff Daily. 60 each 1    furosemide (LASIX) 40 MG tablet Take 1 tablet by mouth Daily. 90 tablet 0    ipratropium-albuterol (DUO-NEB) 0.5-2.5 mg/3 ml nebulizer Take 3 mL by nebulization Every 4 (Four) Hours As Needed for Wheezing. 150 mL 3    metFORMIN (GLUCOPHAGE) 1000 MG tablet TAKE 1 TABLET BY MOUTH TWICE A DAY WITH FOOD 180 tablet 0    metoprolol tartrate (LOPRESSOR) 25 MG tablet TAKE 1 TABLET BY MOUTH 2 TIMES A DAY FOR 30 DAYS. 180 tablet 1    montelukast (SINGULAIR) 10 MG tablet TAKE 1 TABLET BY MOUTH EVERYDAY AT BEDTIME 90 tablet 0    potassium chloride (KLOR-CON) 10 MEQ CR tablet Take 1 tablet by mouth Daily. 90 tablet 0    primidone (MYSOLINE) 50 MG tablet TAKE 1 TABLET BY MOUTH THREE TIMES A  tablet 0    Probiotic Product (PROBIOTIC-10 PO) Take  by mouth.      tiotropium bromide monohydrate (SPIRIVA RESPIMAT) 2.5 MCG/ACT aerosol solution inhaler Inhale 2 puffs Daily for 30 days. 1 g 0    tiZANidine (ZANAFLEX) 4 MG tablet Take 1 tablet by mouth At Night As Needed for Muscle Spasms. 30 tablet 1     Allergies   Allergen Reactions    Baclofen Other (See Comments)     dysrhythmia    Doxycycline Rash    Eggs Or Egg-Derived Products Anaphylaxis    Iodine Anaphylaxis    Latex Anaphylaxis    Milk-Related Compounds Unknown - High Severity    Msg [Monosodium Glutamate] Anaphylaxis    Penicillins Hives     Tolerated cefazolin during December 2020 admission    Metronidazole Unknown - High Severity    Ezetimibe Rash    Latex, Natural Rubber Rash    Nylon Rash    Simvastatin Rash       Objective    Objective     Vital Signs  Temp:  [97.4 °F (36.3 °C)] 97.4 °F (36.3 °C)  Heart Rate:  [] 120  Resp:  [18] 18  BP: ()/()  136/110  SpO2:  [96 %-100 %] 96 %  on  Flow (L/min):  [3] 3;   Device (Oxygen Therapy): nasal cannula  Body mass index is 35.24 kg/m².    Physical Exam  Vitals and nursing note reviewed.   Constitutional:       General: She is not in acute distress.     Appearance: She is obese.   Eyes:      General: No scleral icterus.     Conjunctiva/sclera: Conjunctivae normal.   Cardiovascular:      Rate and Rhythm: Tachycardia present. Rhythm irregular.      Pulses: Normal pulses.      Heart sounds: Normal heart sounds.   Pulmonary:      Effort: Pulmonary effort is normal. No respiratory distress.      Breath sounds: Normal breath sounds. No wheezing.   Abdominal:      General: Bowel sounds are normal.      Palpations: Abdomen is soft.      Tenderness: There is no abdominal tenderness.   Musculoskeletal:      Right lower leg: No edema.      Left lower leg: No edema.   Skin:     General: Skin is warm and dry.      Coloration: Skin is pale.   Neurological:      General: No focal deficit present.      Mental Status: She is alert and oriented to person, place, and time.         Results Review:  I reviewed the patient's new clinical results.  I reviewed the patient's new imaging results and agree with the interpretation.  I reviewed the patient's other test results and agree with the interpretation  I personally viewed and interpreted the patient's EKG/Telemetry data  Discussed with ED provider.    Lab Results (last 24 hours)       Procedure Component Value Units Date/Time    CBC & Differential [[id number]]  (Abnormal) Collected: 11/29/23 0038    Specimen: Blood Updated: 11/29/23 0051    Narrative:      The following orders were created for panel order CBC & Differential.  Procedure                               Abnormality         Status                     ---------                               -----------         ------                     CBC Auto Differential[[id number]]        Abnormal            Final result                  Please view results for these tests on the individual orders.    Comprehensive Metabolic Panel [[id number]]  (Abnormal) Collected: 11/29/23 0038    Specimen: Blood Updated: 11/29/23 0122     Glucose 203 mg/dL      BUN 27 mg/dL      Creatinine 0.98 mg/dL      Sodium 139 mmol/L      Potassium 3.9 mmol/L      Comment: Slight hemolysis detected by analyzer. Result may be falsely elevated.        Chloride 100 mmol/L      CO2 27.0 mmol/L      Calcium 10.2 mg/dL      Total Protein 6.7 g/dL      Albumin 4.6 g/dL      ALT (SGPT) 13 U/L      AST (SGOT) 18 U/L      Alkaline Phosphatase 60 U/L      Total Bilirubin 0.4 mg/dL      Globulin 2.1 gm/dL      A/G Ratio 2.2 g/dL      BUN/Creatinine Ratio 27.6     Anion Gap 12.0 mmol/L      eGFR 60.3 mL/min/1.73     Narrative:      GFR Normal >60  Chronic Kidney Disease <60  Kidney Failure <15    The GFR formula is only valid for adults with stable renal function between ages 18 and 70.    BNP [[id number]]  (Normal) Collected: 11/29/23 0038    Specimen: Blood Updated: 11/29/23 0120     proBNP 260.0 pg/mL     Narrative:      This assay is used as an aid in the diagnosis of individuals suspected of having heart failure. It can be used as an aid in the diagnosis of acute decompensated heart failure (ADHF) in patients presenting with signs and symptoms of ADHF to the emergency department (ED). In addition, NT-proBNP of <300 pg/mL indicates ADHF is not likely.    Age Range Result Interpretation  NT-proBNP Concentration (pg/mL:      <50             Positive            >450                   Gray                 300-450                    Negative             <300    50-75           Positive            >900                  Gray                300-900                  Negative            <300      >75             Positive            >1800                  Gray                300-1800                  Negative            <300    Single High Sensitivity Troponin T [[id number]]  (Abnormal)  Collected: 11/29/23 0038    Specimen: Blood Updated: 11/29/23 0122     HS Troponin T 15 ng/L     Narrative:      High Sensitive Troponin T Reference Range:  <14.0 ng/L- Negative Female for AMI  <22.0 ng/L- Negative Male for AMI  >=14 - Abnormal Female indicating possible myocardial injury.  >=22 - Abnormal Male indicating possible myocardial injury.   Clinicians would have to utilize clinical acumen, EKG, Troponin, and serial changes to determine if it is an Acute Myocardial Infarction or myocardial injury due to an underlying chronic condition.         CBC Auto Differential [[id number]]  (Abnormal) Collected: 11/29/23 0038    Specimen: Blood Updated: 11/29/23 0051     WBC 7.04 10*3/mm3      RBC 4.20 10*6/mm3      Hemoglobin 12.9 g/dL      Hematocrit 38.9 %      MCV 92.6 fL      MCH 30.7 pg      MCHC 33.2 g/dL      RDW 13.5 %      RDW-SD 46.1 fl      MPV 11.0 fL      Platelets 150 10*3/mm3      Neutrophil % 62.6 %      Lymphocyte % 26.0 %      Monocyte % 8.8 %      Eosinophil % 1.7 %      Basophil % 0.3 %      Immature Grans % 0.6 %      Neutrophils, Absolute 4.41 10*3/mm3      Lymphocytes, Absolute 1.83 10*3/mm3      Monocytes, Absolute 0.62 10*3/mm3      Eosinophils, Absolute 0.12 10*3/mm3      Basophils, Absolute 0.02 10*3/mm3      Immature Grans, Absolute 0.04 10*3/mm3      nRBC 0.0 /100 WBC     Protime-INR [[id number]]  (Abnormal) Collected: 11/29/23 0038    Specimen: Blood Updated: 11/29/23 0431     Protime 14.9 Seconds      INR 1.15    High Sensitivity Troponin T [[id number]]  (Abnormal) Collected: 11/29/23 0136    Specimen: Blood Updated: 11/29/23 0246     HS Troponin T 21 ng/L     Narrative:      High Sensitive Troponin T Reference Range:  <14.0 ng/L- Negative Female for AMI  <22.0 ng/L- Negative Male for AMI  >=14 - Abnormal Female indicating possible myocardial injury.  >=22 - Abnormal Male indicating possible myocardial injury.   Clinicians would have to utilize clinical acumen, EKG, Troponin, and serial  changes to determine if it is an Acute Myocardial Infarction or myocardial injury due to an underlying chronic condition.         Magnesium [[id number]]  (Abnormal) Collected: 11/29/23 0136    Specimen: Blood Updated: 11/29/23 0304     Magnesium 1.4 mg/dL     Urinalysis With Culture If Indicated - Urine, Clean Catch [[id number]]  (Abnormal) Collected: 11/29/23 0311    Specimen: Urine, Clean Catch Updated: 11/29/23 0447     Color, UA Yellow     Appearance, UA Clear     pH, UA 8.0     Specific Gravity, UA 1.015     Glucose,  mg/dL (Trace)     Ketones, UA Negative     Bilirubin, UA Negative     Blood, UA Trace     Protein, UA >=300 mg/dL (3+)     Leuk Esterase, UA Negative     Nitrite, UA Negative     Urobilinogen, UA 0.2 E.U./dL    Narrative:      In absence of clinical symptoms, the presence of pyuria, bacteria, and/or nitrites on the urinalysis result does not correlate with infection.    Urinalysis, Microscopic Only - Urine, Clean Catch [[id number]]  (Abnormal) Collected: 11/29/23 0311    Specimen: Urine, Clean Catch Updated: 11/29/23 0459     RBC, UA 3-5 /HPF      WBC, UA 6-10 /HPF      Bacteria, UA Trace /HPF      Squamous Epithelial Cells, UA 0-2 /HPF      Hyaline Casts, UA None Seen /LPF      Methodology Automated Microscopy    Urine Culture - Urine, Urine, Clean Catch [[id number]] Collected: 11/29/23 0311    Specimen: Urine, Clean Catch Updated: 11/29/23 0459    High Sensitivity Troponin T 2Hr [[id number]]  (Abnormal) Collected: 11/29/23 0344    Specimen: Blood Updated: 11/29/23 0444     HS Troponin T 25 ng/L      Troponin T Delta 4 ng/L     Narrative:      High Sensitive Troponin T Reference Range:  <14.0 ng/L- Negative Female for AMI  <22.0 ng/L- Negative Male for AMI  >=14 - Abnormal Female indicating possible myocardial injury.  >=22 - Abnormal Male indicating possible myocardial injury.   Clinicians would have to utilize clinical acumen, EKG, Troponin, and serial changes to determine if it is an Acute  Myocardial Infarction or myocardial injury due to an underlying chronic condition.         Basic Metabolic Panel [[id number]]  (Abnormal) Collected: 11/29/23 0641    Specimen: Blood Updated: 11/29/23 0713     Glucose 183 mg/dL      BUN 21 mg/dL      Creatinine 0.76 mg/dL      Sodium 140 mmol/L      Potassium 3.9 mmol/L      Chloride 102 mmol/L      CO2 29.2 mmol/L      Calcium 10.0 mg/dL      BUN/Creatinine Ratio 27.6     Anion Gap 8.8 mmol/L      eGFR 81.8 mL/min/1.73     Narrative:      GFR Normal >60  Chronic Kidney Disease <60  Kidney Failure <15    The GFR formula is only valid for adults with stable renal function between ages 18 and 70.    CBC (No Diff) [[id number]]  (Normal) Collected: 11/29/23 0641    Specimen: Blood Updated: 11/29/23 0652     WBC 6.69 10*3/mm3      RBC 4.12 10*6/mm3      Hemoglobin 12.7 g/dL      Hematocrit 37.9 %      MCV 92.0 fL      MCH 30.8 pg      MCHC 33.5 g/dL      RDW 13.6 %      RDW-SD 45.9 fl      MPV 10.9 fL      Platelets 146 10*3/mm3     High Sensitivity Troponin T [[id number]]  (Abnormal) Collected: 11/29/23 0641    Specimen: Blood Updated: 11/29/23 0713     HS Troponin T 27 ng/L     Narrative:      High Sensitive Troponin T Reference Range:  <14.0 ng/L- Negative Female for AMI  <22.0 ng/L- Negative Male for AMI  >=14 - Abnormal Female indicating possible myocardial injury.  >=22 - Abnormal Male indicating possible myocardial injury.   Clinicians would have to utilize clinical acumen, EKG, Troponin, and serial changes to determine if it is an Acute Myocardial Infarction or myocardial injury due to an underlying chronic condition.                 Imaging Results (Last 24 Hours)       Procedure Component Value Units Date/Time    XR Chest 1 View [[id number]] Collected: 11/29/23 0104     Updated: 11/29/23 0108    Narrative:      SINGLE VIEW OF THE CHEST     HISTORY: Atrial fibrillation     COMPARISON: July 18, 2020.     FINDINGS:  There is cardiomegaly. There is no vascular  congestion. There is  calcification of the aorta. Postsurgical changes are noted in the left  chest wall. No pneumothorax, pleural effusion, or acute infiltrate is  seen.       Impression:      No acute findings.     This report was finalized on 11/29/2023 1:05 AM by Dr. Meg Salazar M.D on Workstation: [id number]               Results for orders placed during the hospital encounter of 09/22/22    Adult Transthoracic Echo Complete W/ Cont if Necessary Per Protocol    Interpretation Summary  · Calculated left ventricular 3D EF = 68% Left ventricular systolic function is normal.  · Left ventricular wall thickness is consistent with mild concentric hypertrophy.  · Left ventricular diastolic function is consistent with (grade Ia w/high LAP) impaired relaxation.  · Estimated right ventricular systolic pressure from tricuspid regurgitation is normal (<35 mmHg).      ECG 12 Lead ED Triage Standing Order; SOA   Preliminary Result   HEART RATE= 148  bpm   RR Interval= 405  ms   ME Interval=   ms   P Horizontal Axis=   deg   P Front Axis=   deg   QRSD Interval= 107  ms   QT Interval= 297  ms   QTcB= 467  ms   QRS Axis= -55  deg   T Wave Axis= 103  deg   - ABNORMAL ECG -   Atrial fibrillation with rapid V-rate   Incomplete RBBB and LAFB   LVH with secondary repolarization abnormality   Anterior Q waves, possibly due to LVH   Electronically Signed By:    Date and Time of Study: 2023-11-29 00:18:19           Assessment/Plan     Active Hospital Problems    Diagnosis  POA    **Atrial fibrillation with rapid ventricular response [I48.91]  Yes    Elevated troponin [R79.89]  Yes    Hypomagnesemia [E83.42]  Yes    TARSHA (obstructive sleep apnea) [G47.33]  Yes    Type 2 diabetes mellitus with circulatory disorder, without long-term current use of insulin [E11.59]  Yes    Essential hypertension [I10]  Yes      Resolved Hospital Problems   No resolved problems to display.     Ms. Sauer is a 73 y.o. with a history of atrial  "fibrillation on Eliquis, COPD with chronic respiratory failure (On 3L home O2), hypertension, type 2 diabetes mellitus, TARSHA, history of UTI that presents to [facility] complaining of \"rapid heartbeat\" which began around 2230 on 11/28 while patient was sleeping in bed.    Atrial fibrillation on long term anticoagulation  - [id number] score: 7.  - Vitals, telemetry reviewed and EKG reviewed, patient found to be in RVR upon arrival.  - Order x1 dose of IV Metoprolol. Resume home metoprolol and Eliquis now. Consult Cardiology.   - Order TSH.  - Continue telemetry monitoring.    Urinary tract infection  - Patient presenting with symptoms of dysuria, increased urinary urgency and frequency coupled with UA findings showing 6-10 WBC, trace bacteria.  - Start empiric antibiotic therapy with levofloxacin, due to patient's documented allergy with penicillins.  - [Addendum 1400]: Patient was receiving dose of levofloxacin, when I was notified by nursing that she was experiencing rash/hives, so medication was subsequently discontinued.  She has documented allergy to penicillins.  I reached out and spoke with pharmacy, her renal function is stable, so the plan now will be to DC levofloxacin and treat with Macrobid for UTI.  - Follow up urine culture.  - Acute urinary retention protocol.    Hypomagnesemia  - Magnesium low on most recent labs; Will replete via electrolyte protocol. Follow up repeat labs to guide ongoing management decisions. Replete PRN per protocol. Continue to monitor    Type 2 diabetes mellitus with hyperglycemia  - Most recent A1c: 6.70% (10/09/2023).  - Home medication regimen: Metformin  - Discontinue home medications. Start patient on treatment with SSI.  - Will monitor 24 hour insulin requirements and adjust as needed to achieve target inpatient range of 140-180.  - Diabetic diet    Hypertension  - Blood pressure appears stable and acceptable acutely at this time. No indications to warrant " acute changes/intervention at this time.  - Continue current medications as prescribed. Trend BP to guide ongoing management decisions.    COPD with chronic hypoxic respiratory failure (On 3L O2)  - Appears stable from this perspective at present.  No evidence to suggest acute exacerbation.  Continue current medications as prescribed and closely monitor.  - Supplemental oxygen as needed to maintain O2 sats 88 to 92%, pulse oximetry, telemetry monitoring.      I discussed the patient's findings and my recommendations with patient and nursing staff.    VTE Prophylaxis - Eliquis (home med).  Code Status - Full code.       Hernando Wolff [facility]  11/29/23  07:46 EST

## 2023-11-29 NOTE — NURSING NOTE
At 0953, patient came to obs overflow for heart arrhythmia, atrial fibrillation and an episode of rvr per er report. Patient had urine collected for urinalysis , because patient stated to md that last time   Patient had a 12 lead EKG prior ot amiodarone bolus and bolus was completed at 1336. At 1353, patient had a 3.5 second pause and heart rate began in the 40';s. At approximately, 1400, 12 lead EKG was performed again, and this showed patient rhythm had converted to sinus rhythm.

## 2023-11-30 LAB
ANION GAP SERPL CALCULATED.3IONS-SCNC: 8.4 MMOL/L (ref 5–15)
ANION GAP SERPL CALCULATED.3IONS-SCNC: 8.6 MMOL/L (ref 5–15)
BASOPHILS # BLD AUTO: 0.02 10*3/MM3 (ref 0–0.2)
BASOPHILS NFR BLD AUTO: 0.4 % (ref 0–1.5)
BUN SERPL-MCNC: 30 MG/DL (ref 8–23)
BUN SERPL-MCNC: 32 MG/DL (ref 8–23)
BUN/CREAT SERPL: 22.7 (ref 7–25)
BUN/CREAT SERPL: 28.3 (ref 7–25)
CALCIUM SPEC-SCNC: 10 MG/DL (ref 8.6–10.5)
CALCIUM SPEC-SCNC: 9.7 MG/DL (ref 8.6–10.5)
CHLORIDE SERPL-SCNC: 97 MMOL/L (ref 98–107)
CHLORIDE SERPL-SCNC: 99 MMOL/L (ref 98–107)
CO2 SERPL-SCNC: 33.4 MMOL/L (ref 22–29)
CO2 SERPL-SCNC: 33.6 MMOL/L (ref 22–29)
CREAT SERPL-MCNC: 1.13 MG/DL (ref 0.57–1)
CREAT SERPL-MCNC: 1.32 MG/DL (ref 0.57–1)
DEPRECATED RDW RBC AUTO: 43.6 FL (ref 37–54)
EGFRCR SERPLBLD CKD-EPI 2021: 42.2 ML/MIN/1.73
EGFRCR SERPLBLD CKD-EPI 2021: 50.8 ML/MIN/1.73
EOSINOPHIL # BLD AUTO: 0.1 10*3/MM3 (ref 0–0.4)
EOSINOPHIL NFR BLD AUTO: 2 % (ref 0.3–6.2)
ERYTHROCYTE [DISTWIDTH] IN BLOOD BY AUTOMATED COUNT: 13.2 % (ref 12.3–15.4)
GLUCOSE BLDC GLUCOMTR-MCNC: 185 MG/DL (ref 70–130)
GLUCOSE BLDC GLUCOMTR-MCNC: 202 MG/DL (ref 70–130)
GLUCOSE BLDC GLUCOMTR-MCNC: 202 MG/DL (ref 70–130)
GLUCOSE BLDC GLUCOMTR-MCNC: 339 MG/DL (ref 70–130)
GLUCOSE SERPL-MCNC: 178 MG/DL (ref 65–99)
GLUCOSE SERPL-MCNC: 193 MG/DL (ref 65–99)
HCT VFR BLD AUTO: 35.6 % (ref 34–46.6)
HGB BLD-MCNC: 11.9 G/DL (ref 12–15.9)
LYMPHOCYTES # BLD AUTO: 1.45 10*3/MM3 (ref 0.7–3.1)
LYMPHOCYTES NFR BLD AUTO: 28.9 % (ref 19.6–45.3)
MAGNESIUM SERPL-MCNC: 1.7 MG/DL (ref 1.6–2.4)
MCH RBC QN AUTO: 30.3 PG (ref 26.6–33)
MCHC RBC AUTO-ENTMCNC: 33.4 G/DL (ref 31.5–35.7)
MCV RBC AUTO: 90.6 FL (ref 79–97)
MONOCYTES # BLD AUTO: 0.44 10*3/MM3 (ref 0.1–0.9)
MONOCYTES NFR BLD AUTO: 8.8 % (ref 5–12)
NEUTROPHILS NFR BLD AUTO: 2.98 10*3/MM3 (ref 1.7–7)
NEUTROPHILS NFR BLD AUTO: 59.5 % (ref 42.7–76)
PHOSPHATE SERPL-MCNC: 3.7 MG/DL (ref 2.5–4.5)
PLATELET # BLD AUTO: 138 10*3/MM3 (ref 140–450)
PMV BLD AUTO: 10.8 FL (ref 6–12)
POTASSIUM SERPL-SCNC: 4.2 MMOL/L (ref 3.5–5.2)
POTASSIUM SERPL-SCNC: 4.4 MMOL/L (ref 3.5–5.2)
QT INTERVAL: 321 MS
QT INTERVAL: 422 MS
QT INTERVAL: 444 MS
QTC INTERVAL: 389 MS
QTC INTERVAL: 440 MS
QTC INTERVAL: 465 MS
RBC # BLD AUTO: 3.93 10*6/MM3 (ref 3.77–5.28)
SODIUM SERPL-SCNC: 139 MMOL/L (ref 136–145)
SODIUM SERPL-SCNC: 141 MMOL/L (ref 136–145)
WBC NRBC COR # BLD AUTO: 5.01 10*3/MM3 (ref 3.4–10.8)

## 2023-11-30 PROCEDURE — G0378 HOSPITAL OBSERVATION PER HR: HCPCS

## 2023-11-30 PROCEDURE — 97165 OT EVAL LOW COMPLEX 30 MIN: CPT

## 2023-11-30 PROCEDURE — 80048 BASIC METABOLIC PNL TOTAL CA: CPT | Performed by: STUDENT IN AN ORGANIZED HEALTH CARE EDUCATION/TRAINING PROGRAM

## 2023-11-30 PROCEDURE — 85025 COMPLETE CBC W/AUTO DIFF WBC: CPT | Performed by: STUDENT IN AN ORGANIZED HEALTH CARE EDUCATION/TRAINING PROGRAM

## 2023-11-30 PROCEDURE — 25810000003 LACTATED RINGERS PER 1000 ML: Performed by: STUDENT IN AN ORGANIZED HEALTH CARE EDUCATION/TRAINING PROGRAM

## 2023-11-30 PROCEDURE — 93005 ELECTROCARDIOGRAM TRACING: CPT | Performed by: NURSE PRACTITIONER

## 2023-11-30 PROCEDURE — 94799 UNLISTED PULMONARY SVC/PX: CPT

## 2023-11-30 PROCEDURE — 25810000003 LACTATED RINGERS SOLUTION: Performed by: STUDENT IN AN ORGANIZED HEALTH CARE EDUCATION/TRAINING PROGRAM

## 2023-11-30 PROCEDURE — 94664 DEMO&/EVAL PT USE INHALER: CPT

## 2023-11-30 PROCEDURE — 96361 HYDRATE IV INFUSION ADD-ON: CPT

## 2023-11-30 PROCEDURE — 97535 SELF CARE MNGMENT TRAINING: CPT

## 2023-11-30 PROCEDURE — 97162 PT EVAL MOD COMPLEX 30 MIN: CPT

## 2023-11-30 PROCEDURE — 99214 OFFICE O/P EST MOD 30 MIN: CPT | Performed by: INTERNAL MEDICINE

## 2023-11-30 PROCEDURE — 82948 REAGENT STRIP/BLOOD GLUCOSE: CPT

## 2023-11-30 PROCEDURE — 63710000001 INSULIN LISPRO (HUMAN) PER 5 UNITS: Performed by: NURSE PRACTITIONER

## 2023-11-30 PROCEDURE — 94761 N-INVAS EAR/PLS OXIMETRY MLT: CPT

## 2023-11-30 PROCEDURE — 93010 ELECTROCARDIOGRAM REPORT: CPT | Performed by: INTERNAL MEDICINE

## 2023-11-30 PROCEDURE — 83735 ASSAY OF MAGNESIUM: CPT | Performed by: STUDENT IN AN ORGANIZED HEALTH CARE EDUCATION/TRAINING PROGRAM

## 2023-11-30 PROCEDURE — 84100 ASSAY OF PHOSPHORUS: CPT | Performed by: STUDENT IN AN ORGANIZED HEALTH CARE EDUCATION/TRAINING PROGRAM

## 2023-11-30 RX ORDER — SODIUM CHLORIDE, SODIUM LACTATE, POTASSIUM CHLORIDE, CALCIUM CHLORIDE 600; 310; 30; 20 MG/100ML; MG/100ML; MG/100ML; MG/100ML
75 INJECTION, SOLUTION INTRAVENOUS CONTINUOUS
Status: DISCONTINUED | OUTPATIENT
Start: 2023-11-30 | End: 2023-11-30

## 2023-11-30 RX ORDER — LOSARTAN POTASSIUM 25 MG/1
25 TABLET ORAL
Status: DISCONTINUED | OUTPATIENT
Start: 2023-11-30 | End: 2023-11-30

## 2023-11-30 RX ORDER — SODIUM CHLORIDE, SODIUM LACTATE, POTASSIUM CHLORIDE, CALCIUM CHLORIDE 600; 310; 30; 20 MG/100ML; MG/100ML; MG/100ML; MG/100ML
75 INJECTION, SOLUTION INTRAVENOUS CONTINUOUS
Status: DISCONTINUED | OUTPATIENT
Start: 2023-11-30 | End: 2023-12-01

## 2023-11-30 RX ORDER — AMIODARONE HYDROCHLORIDE 200 MG/1
200 TABLET ORAL
Status: DISCONTINUED | OUTPATIENT
Start: 2023-12-01 | End: 2023-12-01 | Stop reason: HOSPADM

## 2023-11-30 RX ORDER — LOSARTAN POTASSIUM 25 MG/1
25 TABLET ORAL ONCE
Status: COMPLETED | OUTPATIENT
Start: 2023-11-30 | End: 2023-11-30

## 2023-11-30 RX ORDER — AMLODIPINE BESYLATE 5 MG/1
2.5 TABLET ORAL ONCE
Status: COMPLETED | OUTPATIENT
Start: 2023-11-30 | End: 2023-11-30

## 2023-11-30 RX ORDER — LOSARTAN POTASSIUM 25 MG/1
25 TABLET ORAL
Status: DISCONTINUED | OUTPATIENT
Start: 2023-12-01 | End: 2023-12-01

## 2023-11-30 RX ADMIN — METOPROLOL TARTRATE 25 MG: 25 TABLET, FILM COATED ORAL at 10:00

## 2023-11-30 RX ADMIN — LOSARTAN POTASSIUM 25 MG: 25 TABLET, FILM COATED ORAL at 17:35

## 2023-11-30 RX ADMIN — Medication 10 ML: at 20:08

## 2023-11-30 RX ADMIN — APIXABAN 5 MG: 5 TABLET, FILM COATED ORAL at 20:07

## 2023-11-30 RX ADMIN — METOPROLOL TARTRATE 25 MG: 25 TABLET, FILM COATED ORAL at 20:08

## 2023-11-30 RX ADMIN — SODIUM CHLORIDE, POTASSIUM CHLORIDE, SODIUM LACTATE AND CALCIUM CHLORIDE 75 ML/HR: 600; 310; 30; 20 INJECTION, SOLUTION INTRAVENOUS at 17:41

## 2023-11-30 RX ADMIN — INSULIN LISPRO 3 UNITS: 100 INJECTION, SOLUTION INTRAVENOUS; SUBCUTANEOUS at 17:35

## 2023-11-30 RX ADMIN — TIOTROPIUM BROMIDE INHALATION SPRAY 2 PUFF: 3.12 SPRAY, METERED RESPIRATORY (INHALATION) at 06:48

## 2023-11-30 RX ADMIN — INSULIN LISPRO 3 UNITS: 100 INJECTION, SOLUTION INTRAVENOUS; SUBCUTANEOUS at 12:36

## 2023-11-30 RX ADMIN — APIXABAN 5 MG: 5 TABLET, FILM COATED ORAL at 10:00

## 2023-11-30 RX ADMIN — PRIMIDONE 50 MG: 50 TABLET ORAL at 17:36

## 2023-11-30 RX ADMIN — BUDESONIDE AND FORMOTEROL FUMARATE DIHYDRATE 2 PUFF: 160; 4.5 AEROSOL RESPIRATORY (INHALATION) at 06:47

## 2023-11-30 RX ADMIN — LOSARTAN POTASSIUM 25 MG: 25 TABLET, FILM COATED ORAL at 10:00

## 2023-11-30 RX ADMIN — Medication 10 ML: at 10:01

## 2023-11-30 RX ADMIN — PRIMIDONE 50 MG: 50 TABLET ORAL at 10:00

## 2023-11-30 RX ADMIN — NITROFURANTOIN MONOHYDRATE/MACROCRYSTALLINE 100 MG: 25; 75 CAPSULE ORAL at 10:01

## 2023-11-30 RX ADMIN — PRIMIDONE 50 MG: 50 TABLET ORAL at 20:07

## 2023-11-30 RX ADMIN — CITALOPRAM 20 MG: 20 TABLET, FILM COATED ORAL at 10:01

## 2023-11-30 RX ADMIN — BUDESONIDE AND FORMOTEROL FUMARATE DIHYDRATE 2 PUFF: 160; 4.5 AEROSOL RESPIRATORY (INHALATION) at 19:36

## 2023-11-30 RX ADMIN — NITROFURANTOIN MONOHYDRATE/MACROCRYSTALLINE 100 MG: 25; 75 CAPSULE ORAL at 20:07

## 2023-11-30 RX ADMIN — AMLODIPINE BESYLATE 2.5 MG: 5 TABLET ORAL at 17:35

## 2023-11-30 RX ADMIN — INSULIN LISPRO 5 UNITS: 100 INJECTION, SOLUTION INTRAVENOUS; SUBCUTANEOUS at 20:07

## 2023-11-30 RX ADMIN — SODIUM CHLORIDE, POTASSIUM CHLORIDE, SODIUM LACTATE AND CALCIUM CHLORIDE 500 ML: 600; 310; 30; 20 INJECTION, SOLUTION INTRAVENOUS at 10:00

## 2023-11-30 NOTE — PLAN OF CARE
Goal Outcome Evaluation:  Plan of Care Reviewed With: patient, daughter        Progress: no change  Outcome Evaluation: Pt is a 74 y/o female presenting with afib, prior to admission pt was independent with ADL's and using a rollator for mobility. Upon PT arrival pt was on 3L O2. Pt completed bed mobility and sit to stand with SBA, with cues given for hand placement for sit to stand. She ambulated 200' with rollator and SBA showing no signs of unsteadiness or LOB. PT has no concerns with pt DC with HH or back to her independent living facility. Pt appears to be back at baseline with functional mobility.      Anticipated Discharge Disposition (PT): home with home health, other (see comments) (back to independent living facility)

## 2023-11-30 NOTE — PROGRESS NOTES
LOS: 0 days   Patient Care Team:  Nisha Britton MD as PCP - General (Family Medicine)  Corey Britton MD as Consulting Physician (Sleep Medicine)  Belen Khan APRN as Nurse Practitioner (Obstetrics and Gynecology)  Nia Zhang APRN as Nurse Practitioner (Cardiology)  José Valentino MD as Consulting Physician (Urology)  Indio Box MD as Consulting Physician (Pulmonary Disease)    Chief Complaint:     F/u paf    Interval History:     She is very fatigued this morning but has no dizziness, no nausea.  She had a rash yesterday since the start IV Levaquin.  She does not feel her heart racing or skipping.  She was noting about 3 days prior to her admission that she was having intermittent episodes of weakness sweatiness and nausea and her daughter wonders if she might have been having atrial fibrillation at that time.    Echo 11/29/23  Interpretation Summary         Left ventricular systolic function is normal. Left ventricular ejection fraction appears to be 56 - 60%.    Left ventricular wall thickness is consistent with mild to moderate concentric hypertrophy.    Normal right ventricular cavity size and systolic function noted.    The left atrial cavity is mild to moderately dilated.    The aortic valve leaflets are moderately calcified    Mild aortic valve regurgitation is present. No aortic valve stenosis is present    There is moderate mitral annular calcification    Mild mitral valve regurgitation is present    Calculated right ventricular systolic pressure from tricuspid regurgitation is 21 mmHg.    There is a small (<1cm) circumferential pericardial effusion. There is no evidence of cardiac tamponade.    Objective   Vital Signs  Temp:  [97.5 °F (36.4 °C)-98.3 °F (36.8 °C)] 97.6 °F (36.4 °C)  Heart Rate:  [] 54  Resp:  [18-20] 18  BP: (134-150)/() 150/75    Intake/Output Summary (Last 24 hours) at 11/30/2023 0649  Last data filed at 11/29/2023 2132  Gross per 24 hour    Intake --   Output 1950 ml   Net -1950 ml       Comfortable NAD  Neck supple, no JVD or thyromegaly appreciated  S1/S2 RRR, no m/r/g  Lungs CTA B, normal effort  Abdomen S/NT/ND (+) BS, no HSM appreciated  Extremities warm, no clubbing, cyanosis, or edema  No visible or palpable skin lesions  A/Ox4, mood and affect appropriate    Results Review:      Results from last 7 days   Lab Units 11/29/23  0641 11/29/23  0038   SODIUM mmol/L 140 139   POTASSIUM mmol/L 3.9 3.9   CHLORIDE mmol/L 102 100   CO2 mmol/L 29.2* 27.0   BUN mg/dL 21 27*   CREATININE mg/dL 0.76 0.98   GLUCOSE mg/dL 183* 203*   CALCIUM mg/dL 10.0 10.2     Results from last 7 days   Lab Units 11/29/23  0641 11/29/23  0344 11/29/23  0136   HSTROP T ng/L 27* 25* 21*     Results from last 7 days   Lab Units 11/29/23  0641 11/29/23  0038   WBC 10*3/mm3 6.69 7.04   HEMOGLOBIN g/dL 12.7 12.9   HEMATOCRIT % 37.9 38.9   PLATELETS 10*3/mm3 146 150     Results from last 7 days   Lab Units 11/29/23  0038   INR  1.15*         Results from last 7 days   Lab Units 11/29/23  0136   MAGNESIUM mg/dL 1.4*           I reviewed the patient's new clinical results.  I personally viewed and interpreted the patient's EKG/Telemetry data        Medication Review:   amiodarone, 200 mg, Oral, Q12H  apixaban, 5 mg, Oral, Q12H  budesonide-formoterol, 2 puff, Inhalation, BID - RT   And  tiotropium bromide monohydrate, 2 puff, Inhalation, Daily - RT  citalopram, 20 mg, Oral, Daily  furosemide, 40 mg, Oral, Daily  insulin lispro, 2-7 Units, Subcutaneous, 4x Daily AC & at Bedtime  metoprolol tartrate, 25 mg, Oral, Q12H  nitrofurantoin (macrocrystal-monohydrate), 100 mg, Oral, Q12H  potassium chloride, 10 mEq, Oral, Daily  primidone, 50 mg, Oral, TID  sodium chloride, 10 mL, Intravenous, Q12H             Assessment & Plan       Atrial fibrillation with rapid ventricular response    Type 2 diabetes mellitus with circulatory disorder, without long-term current use of insulin    Essential  hypertension    TARSHA (obstructive sleep apnea)    Elevated troponin    Hypomagnesemia    COPD (chronic obstructive pulmonary disease)      PAF with RVR - she converted with IV amio bolus.  Levaquin was stopped due to reaction.  She is now on low dose amio - decrease to 200mg daily.   UTI, recurrent  COPD without exacerbation.   TARSHA on CPAP.   DM  HTN - not well controlled.  Start losartan 25mg nightly.     Okay to go home later today with amiodarone 200 mg daily, losartan 25 mg nightly, Eliquis 5 mg twice daily, metoprolol 25 mg twice daily. Not sure that she need lasix at home.  Home with a 2-week Zio patch monitor.    Follow-up with Dr. Romano in 4 to 6 weeks.    Carolina Villatoro MD  11/30/23  06:49 EST

## 2023-11-30 NOTE — CASE MANAGEMENT/SOCIAL WORK
Continued Stay Note  [facility]     Patient Name: Kanwal Sauer  MRN: [id number]  Today's Date: 11/30/2023    Admit Date: 11/29/2023    Plan: From independent living at [facility]   Discharge Plan       Row Name 11/30/23 1104       Plan    Plan Comments Call from Belén/Ginger they are current with pt.                   Discharge Codes    No documentation.                 Expected Discharge Date and Time       Expected Discharge Date Expected Discharge Time    Nov 30, 2023  8:52 AM              NICOLE Haddad

## 2023-11-30 NOTE — DISCHARGE PLACEMENT REQUEST
"Kanwal Sauer (73 y.o. Female)       Date of Birth   1950    Social Security Number       Address   [address] [place] [address]    Home Phone   [phone]    MRN   [id number]       [employer]ist   Non-Rastafarian    Marital Status                               Admission Date   11/29/23    Admission Type   Emergency    Admitting Provider   Hernando Wolff DO    Attending Provider   Hernando Wolff DO    Department, Room/Bed   [facility] OBSERVATION, 101/1       Discharge Date       Discharge Disposition   Home-Health Care [facility]    Discharge Destination                                 Attending Provider: Hernando Wolff DO    Allergies: Baclofen, Doxycycline, Eggs Or Egg-derived Products, Iodine, Latex, Milk-related Compounds, Msg [Monosodium Glutamate], Penicillins, Metronidazole, Ezetimibe, Latex, Natural Rubber, Levaquin [Levofloxacin], Nylon, Simvastatin    Isolation: None   Infection: None   Code Status: CPR    Ht: 170.2 cm (67\")   Wt: 103 kg (227 lb)    Admission Cmt: None   Principal Problem: Atrial fibrillation with rapid ventricular response [I48.91]                   Active Insurance as of 11/29/2023       Primary Coverage       Payor Plan Insurance Group Employer/Plan Group    [facility] MEDICARE REPLACEMENT AARP MED ADV HMO [id number]       Payor Plan Address Payor Plan Phone Number Payor Plan Fax Number Effective Dates    [address]   1/1/2023 - None Entered    [facility] [address]         Subscriber Name Subscriber Birth Date Member ID       KANWAL SUAER 1950 [id number]                     Emergency Contacts        (Rel.) Home Phone Work Phone Mobile Phone    SHELDON TYSON (Daughter) [phone] -- [phone]    Chace Tyson (Relative) -- -- [phone]                "

## 2023-11-30 NOTE — PLAN OF CARE
Goal Outcome Evaluation:  Plan of Care Reviewed With: patient, daughter        Progress: no change  Outcome Evaluation: Pt is a 74 yo female admited with afib with RVR, pt has hx of A-fib and is on Eliquis. Pt seen for OT eval this date, she reports she lives at \[facility]\"", (I) with ADLs, use of rollator and on 3L at baseline with ability to manage tank on rollator. She presents near her baseline this date with ADL task completion, CGA for func mob and transfers, no safety or balance deficits noted, LBD completed with SBA/CGA from [facility] with SBA. She plans to return home to \[facility]\"" with  therapy. Acute OT to sign off and pt/family agreeable.      Anticipated Discharge Disposition (OT):  (\[facility]\"")

## 2023-11-30 NOTE — DISCHARGE PLACEMENT REQUEST
"Kanwal Sauer (73 y.o. Female)       Date of Birth   1950    Social Security Number       Address   [address] [place] [address]    Home Phone   [phone]    MRN   [id number]       Sabianist   Non-Baptism    Marital Status                               Admission Date   11/29/23    Admission Type   Emergency    Admitting Provider   Hernando Wolff DO    Attending Provider   Hernando Wolff DO    Department, Room/Bed   [facility] OBSERVATION, 101/1       Discharge Date       Discharge Disposition   Home-Health Care [facility]    Discharge Destination                                 Attending Provider: Hernando Wolff DO    Allergies: Baclofen, Doxycycline, Eggs Or Egg-derived Products, Iodine, Latex, Milk-related Compounds, Msg [Monosodium Glutamate], Penicillins, Metronidazole, Ezetimibe, Latex, Natural Rubber, Levaquin [Levofloxacin], Nylon, Simvastatin    Isolation: None   Infection: None   Code Status: CPR    Ht: 170.2 cm (67\")   Wt: 103 kg (227 lb)    Admission Cmt: None   Principal Problem: Atrial fibrillation with rapid ventricular response [I48.91]                   Active Insurance as of 11/29/2023       Primary Coverage       Payor Plan Insurance Group Employer/Plan Group    [facility] MEDICARE REPLACEMENT AARP MED ADV HMO [id number]       Payor Plan Address Payor Plan Phone Number Payor Plan Fax Number Effective Dates    [address]   1/1/2023 - None Entered    [facility] [address]         Subscriber Name Subscriber Birth Date Member ID       KANWAL SAUER 1950 [id number]                     Emergency Contacts        (Rel.) Home Phone Work Phone Mobile Phone    SHELDON TYSON (Daughter) [phone] -- [phone]    Chace Tyson (Relative) -- -- [phone]          "

## 2023-11-30 NOTE — PROGRESS NOTES
Name: Kanwal Sauer ADMIT: 2023   : 1950  PCP: Nisha Britton MD    MRN: [id number] LOS: 0 days   AGE/SEX: 73 y.o. female  ROOM: [facility]     Subjective   Subjective   Patient seen and examined this morning.  Hospital day 1.  At time of my examination, patient is awake, alert, resting in bed.  She states that she feels better today compared to prior, she has now converted back to sinus rhythm.  Cardiology is following.        Objective   Objective   Vital Signs  Temp:  [97.5 °F (36.4 °C)-98.3 °F (36.8 °C)] 98 °F (36.7 °C)  Heart Rate:  [53-64] 55  Resp:  [18-20] 18  BP: (134-150)/() 148/63  SpO2:  [96 %-100 %] 96 %  on  Flow (L/min):  [3] 3;   Device (Oxygen Therapy): nasal cannula  Body mass index is 35.55 kg/m².  Physical Exam  Vitals and nursing note reviewed.   Constitutional:       General: She is not in acute distress.     Appearance: She is overweight.   Cardiovascular:      Rate and Rhythm: Normal rate and regular rhythm.      Pulses: Normal pulses.      Heart sounds: Normal heart sounds.   Pulmonary:      Effort: Pulmonary effort is normal. No respiratory distress.      Breath sounds: Normal breath sounds. No wheezing.   Abdominal:      General: Bowel sounds are normal.      Palpations: Abdomen is soft.      Tenderness: There is no abdominal tenderness.   Skin:     General: Skin is warm and dry.   Neurological:      General: No focal deficit present.      Mental Status: She is alert and oriented to person, place, and time.       Results Review     I reviewed the patient's new clinical results.  Results from last 7 days   Lab Units 23  0811 23  0641 23  0038   WBC 10*3/mm3 5.01 6.69 7.04   HEMOGLOBIN g/dL 11.9* 12.7 12.9   PLATELETS 10*3/mm3 138* 146 150     Results from last 7 days   Lab Units 23  0811 23  0641 23  0038   SODIUM mmol/L 141 140 139   POTASSIUM mmol/L 4.4 3.9 3.9   CHLORIDE mmol/L 99 102 100   CO2 mmol/L 33.6* 29.2* 27.0   BUN  mg/dL 32* 21 27*   CREATININE mg/dL 1.13* 0.76 0.98   GLUCOSE mg/dL 178* 183* 203*   EGFR mL/min/1.73 50.8* 81.8 60.3     Results from last 7 days   Lab Units 11/29/23  0038   ALBUMIN g/dL 4.6   BILIRUBIN mg/dL 0.4   ALK PHOS U/L 60   AST (SGOT) U/L 18   ALT (SGPT) U/L 13     Results from last 7 days   Lab Units 11/30/23  0811 11/29/23  0641 11/29/23  0136 11/29/23  0038   CALCIUM mg/dL 10.0 10.0  --  10.2   ALBUMIN g/dL  --   --   --  4.6   MAGNESIUM mg/dL 1.7  --  1.4*  --    PHOSPHORUS mg/dL 3.7  --   --   --        Glucose   Date/Time Value Ref Range Status   11/30/2023 0752 185 (H) 70 - 130 mg/dL Final   11/29/2023 2314 166 (H) 70 - 130 mg/dL Final   11/29/2023 1833 179 (H) 70 - 130 mg/dL Final   11/29/2023 1134 296 (H) 70 - 130 mg/dL Final       XR Chest 1 View    Result Date: 11/29/2023  No acute findings.  This report was finalized on 11/29/2023 1:05 AM by Dr. Meg Salazar M.D on Workstation: [id number]       I have personally reviewed all medications:  Scheduled Medications  [START ON 12/1/2023] amiodarone, 200 mg, Oral, Q24H  apixaban, 5 mg, Oral, Q12H  budesonide-formoterol, 2 puff, Inhalation, BID - RT   And  tiotropium bromide monohydrate, 2 puff, Inhalation, Daily - RT  citalopram, 20 mg, Oral, Daily  furosemide, 40 mg, Oral, Daily  insulin lispro, 2-7 Units, Subcutaneous, 4x Daily AC & at Bedtime  lactated ringers, 500 mL, Intravenous, Once  losartan, 25 mg, Oral, Q24H  metoprolol tartrate, 25 mg, Oral, Q12H  nitrofurantoin (macrocrystal-monohydrate), 100 mg, Oral, Q12H  potassium chloride, 10 mEq, Oral, Daily  primidone, 50 mg, Oral, TID  sodium chloride, 10 mL, Intravenous, Q12H    Infusions   Diet  Diet: Cardiac Diets, Diabetic Diets; Healthy Heart (2-3 Na+); Consistent Carbohydrate; Texture: Regular Texture (IDDSI 7); Fluid Consistency: Thin (IDDSI 0)    I have personally reviewed:  [x]  Laboratory   [x]  Microbiology   [x]  Radiology   [x]  EKG/Telemetry  [x]  Cardiology/Vascular   []   "Pathology    []  Records       Assessment/Plan     Active Hospital Problems    Diagnosis  POA    **Atrial fibrillation with rapid ventricular response [I48.91]  Yes    Elevated troponin [R79.89]  Yes    Hypomagnesemia [E83.42]  Yes    COPD (chronic obstructive pulmonary disease) [J44.9]  Yes    TARSHA (obstructive sleep apnea) [G47.33]  Yes    Type 2 diabetes mellitus with circulatory disorder, without long-term current use of insulin [E11.59]  Yes    Essential hypertension [I10]  Yes      Resolved Hospital Problems   No resolved problems to display.     Ms. Sauer is a 73 y.o. with a history of atrial fibrillation on Eliquis, COPD with chronic respiratory failure (On 3L home O2), hypertension, type 2 diabetes mellitus, TARHSA, history of UTI that presents to [facility] complaining of \"rapid heartbeat\" which began around 2230 on 11/28 while patient was sleeping in bed, admitted with Atrial fibrillation with rapid ventricular response.     Atrial fibrillation on long term anticoagulation  - [id number] score: 7.  - Vitals, telemetry reviewed and EKG reviewed, patient found to be in RVR upon arrival. Cardiology consulted, she received x1 dose of IV Metoprolol and then was treated with Amiodarone, which led to conversion back to normal sinus rhythm.  - Most recent cardiology note reviewed, recommending the patient be discharged with amiodarone 200 mg daily, Eliquis 5 mg twice daily, metoprolol 25 mg twice daily and 2-week Zio patch monitor.  She will need to follow-up with Dr. Romano in 4 to 6 weeks.  - Continue telemetry monitoring.     Urinary tract infection  - Patient presenting with symptoms of dysuria, increased urinary urgency and frequency coupled with UA findings showing 6-10 WBC, trace bacteria.  - Started empiric antibiotic therapy with levofloxacin, due to patient's documented allergy with penicillins, however, she developed rash/hives with this, so discussed with pharmacy and started her " on Macrobid instead, which she remains on at present and is tolerating well.   - Follow up urine culture.  - Acute urinary retention protocol.    Acute kidney injury  - Creatinine increased to 1.13 from 0.76. Discussed with Dr. Villatoro, patient's prior baseline creatinine values actually appear to be ~1.0 with eGFR in the CKD stage 2 range. Going to give small amount of IVF and hold diuretic dose, caution with fluid status given underlying history of CHF. Going to ask Nephrology to see and follow along for assistance with management and to get patient set up for outpatient follow up for ongoing monitoring after discharge.   - Order repeat BMP after completion of fluids.    Type 2 diabetes mellitus with hyperglycemia  - Most recent A1c: 6.70% (10/09/2023).  - Home medication regimen: Metformin. Current medication regimen: SSI, will continue as prescribed.   - Will monitor 24 hour insulin requirements and adjust as needed to achieve target inpatient range of 140-180.  - Diabetic diet     Hypertension  - Blood pressure initially more elevated, she was started on Losartan by Cardiology and is tolerating well.  - Continue current medications as prescribed. Trend BP to guide ongoing management decisions.    Anemia and thrombocytopenia  - Hemoglobin and platelets slightly lower on most recent labs. No evidence of overt blood loss.  - Order repeat CBC in AM for reassessment. Continue to monitor, transfuse for hemoglobin <7.    COPD with chronic hypoxic respiratory failure (On 3L O2)  - Appears stable from this perspective at present.  No evidence to suggest acute exacerbation.  Continue current medications as prescribed and closely monitor.  - Supplemental oxygen as needed to maintain O2 sats 88 to 92%, pulse oximetry, telemetry monitoring.    Hypomagnesemia, resolved  - Magnesium low on prior labs, repleted via electrolyte protocol. Follow up repeat labs to guide ongoing management decisions. Replete PRN per protocol.  Continue to monitor      Patient will need HHPT at discharge.    Eliquis (home med) for DVT prophylaxis.  Full code.  Discussed with patient, family, nursing staff, and consulting provider.  Anticipate discharge home tomorrow.      Hernando Wolff DO  [facility]  11/30/23

## 2023-11-30 NOTE — THERAPY EVALUATION
Patient Name: Kanwal Sauer  : 1950    MRN: [id number]                              Today's Date: 2023       Admit Date: 2023    Visit Dx:     ICD-10-CM ICD-9-CM   1. Atrial fibrillation with rapid ventricular response  I48.91 427.31   2. Hypomagnesemia  E83.42 275.2   3. Coagulopathy Eliquis induced  D68.9 286.9   4. Chronic respiratory failure with hypoxia: Chronically on oxygen  J96.11 518.83     799.02     Patient Active Problem List   Diagnosis    Closed fracture of left distal femur    Type 2 diabetes mellitus with circulatory disorder, without long-term current use of insulin    Essential hypertension    Tremors of nervous system    PAF (paroxysmal atrial fibrillation)    TARSHA (obstructive sleep apnea)    History of breast cancer    History of seizure    Risk for falls    Neck pain    Acute UTI    Chronic midline low back pain without sciatica    Vulvar rash    Atrial fibrillation with rapid ventricular response    Elevated troponin    Hypomagnesemia    COPD (chronic obstructive pulmonary disease)     Past Medical History:   Diagnosis Date    Allergic     Anxiety     Asthma     Atrial fibrillation     Cancer     Breast    COPD (chronic obstructive pulmonary disease) 2022    Deep vein thrombosis     Diabetes mellitus     Difficulty walking     Diverticulosis     Drug therapy     Environmental allergies     Fractures     Headache, tension-type     Hyperlipidemia     Hypertension     Low back pain     Neuropathy in diabetes     Obesity     Scoliosis     Seizures     Sleep apnea     Tremor     Urinary tract infection     Weakness      Past Surgical History:   Procedure Laterality Date    BREAST BIOPSY      CATARACT EXTRACTION, BILATERAL      DENTAL PROCEDURE      Removed teeth    FEMUR OPEN REDUCTION INTERNAL FIXATION Left 2020    Procedure: DISTAL FEMUR OPEN REDUCTION INTERNAL FIXATION;  Surgeon: Marley Hernandez MD;  Location: [facility];  Service: Orthopedics;   Laterality: Left;    MASTECTOMY Left     REPLACEMENT TOTAL KNEE BILATERAL        General Information       Row Name 11/30/23 1129          OT Time and Intention    Document Type evaluation;discharge evaluation/summary  -     Mode of Treatment occupational therapy;individual therapy  -       Row Name 11/30/23 1129          General Information    Patient Profile Reviewed yes  -MW     Prior Level of Function independent:  ILF, walks to [place] with rollator  -     Existing Precautions/Restrictions fall;oxygen therapy device and L/min  3L at baseline  -     Barriers to Rehab medically complex  -       Row Name 11/30/23 1129          Living Environment    People in Home alone  ILF  -       Row Name 11/30/23 1129          Stairs Within Home, Primary    Stair Railings, Within Home, Primary none  -       Row Name 11/30/23 1129          Cognition    Orientation Status (Cognition) oriented x 3  -MW               User Key  (r) = Recorded By, (t) = Taken By, (c) = Cosigned By      Initials Name Provider Type     Vonnie Peralta, AMIE Occupational Therapist                     Mobility/ADL's       Row Name 11/30/23 1130          Bed Mobility    Bed Mobility supine-sit;sit-supine  -     Supine-Sit [place] (Bed Mobility) standby assist  -     Sit-Supine [place] (Bed Mobility) standby assist  -     Assistive Device (Bed Mobility) bed rails  -       Row Name 11/30/23 1130          Transfers    Transfers sit-stand transfer;stand-sit transfer;toilet transfer  -       Row Name 11/30/23 1130          Sit-Stand Transfer    Sit-Stand [place] (Transfers) contact guard  -     Assistive Device (Sit-Stand Transfers) walker, 4-wheeled  -       Row Name 11/30/23 1130          Stand-Sit Transfer    Stand-Sit [place] (Transfers) contact guard  -     Assistive Device (Stand-Sit Transfers) walker, 4-wheeled  -MW       Row Name 11/30/23 1130          Toilet Transfer    Type (Toilet Transfer)  sit-stand;stand-sit  -     Assistive Device (Toilet Transfer) walker, 4-wheeled;commode;grab bars/safety frame  -[facility] Name 11/30/23 1130          Functional Mobility    Functional Mobility- Ind. Level standby assist;contact guard assist  -     Functional Mobility- Comment pt demo func mob to BR and within room with CGA  -[facility] Name 11/30/23 1130          Activities of Daily Living    BADL Assessment/Intervention lower body dressing;toileting;grooming  -MW       Row Name 11/30/23 1130          Lower Body Dressing Assessment/Training    [place] Level (Lower Body Dressing) don;doff;pants/bottoms;contact guard assist  -     Position (Lower Body Dressing) supported standing;supported sitting  -[facility] Name 11/30/23 1130          Toileting Assessment/Training    [place] Level (Toileting) toileting skills;perform perineal hygiene;minimum assist (75% patient effort)  -     Position (Toileting) supported standing  -MW       Row Name 11/30/23 1130          Grooming Assessment/Training    [place] Level (Grooming) grooming skills;wash face, hands;standby assist  -     Position (Grooming) sink side;supported standing  -               User Key  (r) = Recorded By, (t) = Taken By, (c) = Cosigned By      Initials Name Provider Type    Vonnie Sultana OT Occupational Therapist                   Obj/Interventions       [facility] Name 11/30/23 1131          Sensory Assessment (Somatosensory)    Sensory Assessment (Somatosensory) UE sensation intact  -[facility] Name 11/30/23 1131          Vision Assessment/Intervention    Visual Impairment/Limitations WFL  -[facility] Name 11/30/23 1131          Range of Motion Comprehensive    General Range of Motion bilateral upper extremity ROM WNL  -[facility] Name 11/30/23 1131          Strength Comprehensive (MMT)    Comment, General Manual Muscle Testing (MMT) Assessment no focal deficits noted  -[facility] Name 11/30/23 1131          Motor  Skills    Motor Skills functional endurance  -MW     Functional Endurance fair, pt reports she feels close to her baseline  -MW       Row Name 11/30/23 1131          Balance    Balance Assessment sitting static balance;sitting dynamic balance;sit to stand dynamic balance;standing static balance;standing dynamic balance  -MW     Static Sitting Balance standby assist  -MW     Dynamic Sitting Balance standby assist  -MW     Position, Sitting Balance sitting edge of bed  -MW     Sit to Stand Dynamic Balance contact guard  -MW     Static Standing Balance contact guard  -MW     Dynamic Standing Balance contact guard  -MW     Position/Device Used, Standing Balance supported;walker, 4-wheeled  -MW     Balance Interventions sitting;standing;sit to stand;supported;static;dynamic;minimal challenge;occupation based/functional task  -MW               User Key  (r) = Recorded By, (t) = Taken By, (c) = Cosigned By      Initials Name Provider Type    Vonnie Sultana OT Occupational Therapist                   Goals/Plan       Row Name 11/30/23 1134          Transfer Goal 1 (OT)    Activity/Assistive Device (Transfer Goal 1, OT) toilet  -     [place] Level/Cues Needed (Transfer Goal 1, OT) standby assist;contact guard required  -MW     Time Frame (Transfer Goal 1, OT) short term goal (STG);1 day  -     Progress/Outcome (Transfer Goal 1, OT) goal met  -MW               User Key  (r) = Recorded By, (t) = Taken By, (c) = Cosigned By      Initials Name Provider Type    Vonnie Sultana OT Occupational Therapist                   Clinical Impression       Row Name 11/30/23 1132          Pain Assessment    Pretreatment Pain Rating 0/10 - no pain  -MW     Posttreatment Pain Rating 0/10 - no pain  -MW       Row Name 11/30/23 1132          Plan of Care Review    Plan of Care Reviewed With patient;daughter  -     Progress no change  -     Outcome Evaluation Pt is a 72 yo female admited with afib with RVR, pt has hx of  A-fib and is on Eliquis. Pt seen for OT eval this date, she reports she lives at [facility], ([place]) with ADLs, use of rollator and on 3L at baseline with ability to manage tank on rollator. She presents near her baseline this date with ADL task completion, CGA for func mob and transfers, no safety or balance deficits noted, LBD completed with SBA/CGA from [facility] with SBA. She plans to return home to [facility] with  therapy. Acute OT to sign off and pt/family agreeable.  -       Row Name 11/30/23 1132          Therapy Assessment/Plan (OT)    Criteria for Skilled Therapeutic Interventions Met (OT) no problems identified which require skilled intervention  -     Therapy Frequency (OT) evaluation only  -       Row Name 11/30/23 1132          Therapy Plan Review/Discharge Plan (OT)    Anticipated Discharge Disposition (OT) --  [facility]  -       Row Name 11/30/23 1132          Vital Signs    O2 Delivery Pre Treatment supplemental O2  -MW     O2 Delivery Intra Treatment supplemental O2  -MW     O2 Delivery Post Treatment supplemental O2  -       Row Name 11/30/23 1132          Positioning and Restraints    Pre-Treatment Position in bed  -MW     Post Treatment Position bed  -MW     In Bed call light within reach;fowlers;notified nsg;encouraged to call for assist;exit alarm on;side rails up x2;with nsg  -               User Key  (r) = Recorded By, (t) = Taken By, (c) = Cosigned By      Initials Name Provider Type    Vonnie Sultana, OT Occupational Therapist                   Outcome Measures       Row Name 11/30/23 1134          How much help from another is currently needed...    Putting on and taking off regular lower body clothing? 3  -MW     Bathing (including washing, rinsing, and drying) 4  -MW     Toileting (which includes using toilet bed pan or urinal) 3  -MW     Putting on and taking off regular upper body clothing 4  -MW     Taking care of personal grooming (such as brushing teeth) 4  -MW     Eating  meals 4  -MW     AM-PAC 6 Clicks Score (OT) 22  -MW       Row Name 11/30/23 1113 11/30/23 0830       How much help from another person do you currently need...    Turning from your back to your side while in flat bed without using bedrails? 3 (P)   -BM 3  -LL    Moving from lying on back to sitting on the side of a flat bed without bedrails? 3 (P)   -BM 3  -LL    Moving to and from a bed to a chair (including a wheelchair)? 4 (P)   -BM 4  -LL    Standing up from a chair using your arms (e.g., wheelchair, bedside chair)? 4 (P)   -BM 3  -LL    Climbing 3-5 steps with a railing? 3 (P)   -BM 2  -LL    To walk in hospital room? 3 (P)   -BM 3  -LL    AM-PAC 6 Clicks Score (PT) 20 (P)   -BM 18  -LL    Highest Level of Mobility Goal 6 --> Walk 10 steps or more (P)   -BM 6 --> Walk 10 steps or more  -LL      Row Name 11/30/23 1134          Functional Assessment    Outcome Measure Options AM-PAC 6 Clicks Daily Activity (OT)  -MW               User Key  (r) = Recorded By, (t) = Taken By, (c) = Cosigned By      Initials Name Provider Type    Vonnie Sultana OT Occupational Therapist    Ward Arreguin, RN Registered Nurse    [place], PT Student PT Student                    Occupational Therapy Education       Title: PT OT SLP Therapies (In Progress)       Topic: Occupational Therapy (In Progress)       Point: ADL training (Done)       Description:   Instruct learner(s) on proper safety adaptation and remediation techniques during self care or transfers.   Instruct in proper use of assistive devices.                  Learning Progress Summary             Patient Acceptance, E, VU by NATAN at 11/30/2023 1135    Comment: role of OT, d/c rec   Family Acceptance, E, VU by NATAN at 11/30/2023 1135    Comment: role of OT, d/c rec                         Point: Home exercise program (Not Started)       Description:   Instruct learner(s) on appropriate technique for monitoring, assisting and/or progressing therapeutic  exercises/activities.                  Learner Progress:  Not documented in this visit.              Point: Precautions (Done)       Description:   Instruct learner(s) on prescribed precautions during self-care and functional transfers.                  Learning Progress Summary             Patient Acceptance, E, VU by  at 11/30/2023 1135    Comment: role of OT, d/c rec   Family Acceptance, E, VU by  at 11/30/2023 1135    Comment: role of OT, d/c rec                         Point: Body mechanics (Done)       Description:   Instruct learner(s) on proper positioning and spine alignment during self-care, functional mobility activities and/or exercises.                  Learning Progress Summary             Patient Acceptance, E, VU by  at 11/30/2023 1135    Comment: role of OT, d/c rec   Family Acceptance, E, VU by  at 11/30/2023 1135    Comment: role of OT, d/c rec                                         User Key       Initials Effective Dates Name Provider Type Discipline     08/20/21 -  Vonnie Peralta OT Occupational Therapist OT                  OT Recommendation and Plan  Therapy Frequency (OT): evaluation only  Plan of Care Review  Plan of Care Reviewed With: patient, daughter  Progress: no change  Outcome Evaluation: Pt is a 72 yo female admited with afib with RVR, pt has hx of A-fib and is on Eliquis. Pt seen for OT eval this date, she reports she lives at [facility], ([place]) with ADLs, use of rollator and on 3L at baseline with ability to manage tank on rollator. She presents near her baseline this date with ADL task completion, CGA for func mob and transfers, no safety or balance deficits noted, LBD completed with SBA/CGA from [facility], [facility] with SBA. She plans to return home to [facility] with  therapy. Acute OT to sign off and pt/family agreeable.     Time Calculation:   Evaluation Complexity (OT)  Review Occupational Profile/Medical/Therapy History Complexity: brief/low complexity  Clinical Decision  Making Complexity (OT): problem focused assessment/low complexity  Overall Complexity of Evaluation (OT): low complexity     Time Calculation- OT       Row Name 11/30/23 1135             Time Calculation- OT    OT Start Time 0843  -MW      OT Stop Time 0911  -MW      OT Time Calculation (min) 28 min  -MW      Total Timed Code Minutes- OT 21 minute(s)  -MW      OT Received On 11/30/23  -MW         Timed Charges    92010 - OT Self Care/Mgmt Minutes 21  -MW         Untimed Charges    OT Eval/Re-eval Minutes 7  -MW         Total Minutes    Timed Charges Total Minutes 21  -MW      Untimed Charges Total Minutes 7  -MW       Total Minutes 28  -MW                User Key  (r) = Recorded By, (t) = Taken By, (c) = Cosigned By      Initials Name Provider Type     Vonnie Peralta OT Occupational Therapist                  Therapy Charges for Today       Code Description Service Date Service Provider Modifiers Qty    39831215009 HC OT SELF CARE/MGMT/TRAIN EA 15 MIN 11/30/2023 Vonnie Peralta OT GO 1    83059684373 HC OT EVAL LOW COMPLEXITY 2 11/30/2023 Vonnie Peralta OT GO 1                 Vonnie Peralta OT  11/30/2023

## 2023-11-30 NOTE — CASE MANAGEMENT/SOCIAL WORK
Continued Stay Note  [facility]     Patient Name: Kanwal Sauer  MRN: [id number]  Today's Date: 11/30/2023    Admit Date: 11/29/2023    Plan: Return to [facility] with [facility]-    Discharge Plan       Row Name 11/30/23 1446       Plan    Plan Return to [facility] with [facility]-     Patient/Family in Agreement with Plan yes    Plan Comments Spoke with patient and family at bedside who confirmed plan is return to [facility] [place] [facility] for  services and [facility] services. CCP to follow. Aki MOON RN      Row Name 11/30/23 1443       Plan    Plan --                   Discharge Codes    No documentation.                 Expected Discharge Date and Time       Expected Discharge Date Expected Discharge Time    Nov 30, 2023  8:52 AM              Aki Loomis RN

## 2023-11-30 NOTE — PLAN OF CARE
Goal Outcome Evaluation:  Plan of Care Reviewed With: patient        Progress: improving  Outcome Evaluation: VSS, HR remains in SB-SR, no complaints of CP or SOA, stand by assist, call light in reach, bed alarm on

## 2023-11-30 NOTE — THERAPY DISCHARGE NOTE
Patient Name: Kanwal Sauer  : 1950    MRN: [id number]                              Today's Date: 2023       Admit Date: 2023    Visit Dx:     ICD-10-CM ICD-9-CM   1. Atrial fibrillation with rapid ventricular response  I48.91 427.31   2. Hypomagnesemia  E83.42 275.2   3. Coagulopathy Eliquis induced  D68.9 286.9   4. Chronic respiratory failure with hypoxia: Chronically on oxygen  J96.11 518.83     799.02     Patient Active Problem List   Diagnosis    Closed fracture of left distal femur    Type 2 diabetes mellitus with circulatory disorder, without long-term current use of insulin    Essential hypertension    Tremors of nervous system    PAF (paroxysmal atrial fibrillation)    TARSHA (obstructive sleep apnea)    History of breast cancer    History of seizure    Risk for falls    Neck pain    Acute UTI    Chronic midline low back pain without sciatica    Vulvar rash    Atrial fibrillation with rapid ventricular response    Elevated troponin    Hypomagnesemia    COPD (chronic obstructive pulmonary disease)     Past Medical History:   Diagnosis Date    Allergic     Anxiety     Asthma     Atrial fibrillation     Cancer     Breast    COPD (chronic obstructive pulmonary disease) 2022    Deep vein thrombosis     Diabetes mellitus     Difficulty walking     Diverticulosis     Drug therapy     Environmental allergies     Fractures     Headache, tension-type     Hyperlipidemia     Hypertension     Low back pain     Neuropathy in diabetes     Obesity     Scoliosis     Seizures     Sleep apnea     Tremor     Urinary tract infection     Weakness      Past Surgical History:   Procedure Laterality Date    BREAST BIOPSY      CATARACT EXTRACTION, BILATERAL      DENTAL PROCEDURE      Removed teeth    FEMUR OPEN REDUCTION INTERNAL FIXATION Left 2020    Procedure: DISTAL FEMUR OPEN REDUCTION INTERNAL FIXATION;  Surgeon: Marley Hernandez MD;  Location: [facility];  Service: Orthopedics;   Laterality: Left;    MASTECTOMY Left     REPLACEMENT TOTAL KNEE BILATERAL        General Information       Row Name 11/30/23 1057          Physical Therapy Time and Intention    Document Type discharge evaluation/summary  -EE (r) BM (t) EE (c)     Mode of Treatment individual therapy;physical therapy  -EE (r) BM (t) EE (c)       Row Name 11/30/23 1057          General Information    Patient Profile Reviewed yes  -EE (r) BM (t) EE (c)     Prior Level of Function independent:;ADL's  -EE (r) BM (t) EE (c)     Existing Precautions/Restrictions fall  -EE (r) BM (t) EE (c)     Barriers to Rehab none identified  -EE (r) BM (t) EE (c)       Row Name 11/30/23 1057          Living Environment    People in Home alone  -EE (r) BM (t) EE (c)       Row Name 11/30/23 1057          Cognition    Orientation Status (Cognition) oriented x 4  -EE (r) BM (t) EE (c)       Row Name 11/30/23 1057          Safety Issues, Functional Mobility    Impairments Affecting Function (Mobility) strength;balance  -EE (r) BM (t) EE (c)     Comment, Safety Issues/Impairments (Mobility) non skid socks and gait belt donned  -EE (r) BM (t) EE (c)               User Key  (r) = Recorded By, (t) = Taken By, (c) = Cosigned By      Initials Name Provider Type    EE Ayala Atwood, PT Physical Therapist    BM Tello Mccoy, PT Student PT Student                   Mobility       Row Name 11/30/23 1059          Bed Mobility    Bed Mobility supine-sit;sit-supine  -EE (r) BM (t) EE (c)     Supine-Sit [place] (Bed Mobility) standby assist  -EE (r) BM (t) EE (c)     Sit-Supine [place] (Bed Mobility) standby assist  -EE (r) BM (t) EE (c)     Assistive Device (Bed Mobility) bed rails;head of bed elevated  -EE (r) BM (t) EE (c)       Row Name 11/30/23 1059          Sit-Stand Transfer    Sit-Stand [place] (Transfers) standby assist  -EE (r) BM (t) EE (c)     Assistive Device (Sit-Stand Transfers) walker, rolling platform  -EE (r) BM (t) EE (c)     Comment,  (Sit-Stand Transfer) cues for hand placement  -EE (r) BM (t) EE (c)       Row Name 11/30/23 1059          Gait/Stairs (Locomotion)    [place] Level (Gait) standby assist  -EE (r) BM (t) EE (c)     Assistive Device (Gait) walker, rolling platform  -EE (r) BM (t) EE (c)     Patient was able to Ambulate yes  -EE (r) BM (t) EE (c)     Distance in Feet (Gait) 200'  -EE (r) BM (t) EE (c)     Deviations/Abnormal Patterns (Gait) stride length decreased  -EE (r) BM (t) EE (c)     Comment, (Gait/Stairs) pt demonstrated steady gait with rollator while ambulating  -EE (r) BM (t) EE (c)               User Key  (r) = Recorded By, (t) = Taken By, (c) = Cosigned By      Initials Name Provider Type    EE Ayala Atwood, PT Physical Therapist    [place], PT Student PT Student                   Obj/Interventions       Row Name 11/30/23 1101          Range of Motion Comprehensive    General Range of Motion bilateral lower extremity ROM WFL  -EE (r) BM (t) EE (c)       Row Name 11/30/23 1101          Strength Comprehensive (MMT)    Comment, General Manual Muscle Testing (MMT) Assessment generalized weakness  -EE (r) BM (t) EE (c)       Row Name 11/30/23 1101          Balance    Balance Assessment sitting static balance  -EE (r) BM (t) EE (c)     Static Sitting Balance supervision  -EE (r) BM (t) EE (c)     Position, Sitting Balance unsupported;sitting edge of bed  -EE (r) BM (t) EE (c)               User Key  (r) = Recorded By, (t) = Taken By, (c) = Cosigned By      Initials Name Provider Type    EE Ayala Atwood, PT Physical Therapist    [place], PT Student PT Student                   Goals/Plan    No documentation.                  Clinical Impression       Row Name 11/30/23 1101          Pain    Pretreatment Pain Rating 0/10 - no pain  -EE (r) BM (t) EE (c)     Posttreatment Pain Rating 0/10 - no pain  -EE (r) BM (t) EE (c)       Row Name 11/30/23 1101          Plan of Care Review    Plan of Care Reviewed With  patient;daughter  -EE (r) BM (t) EE (c)     Progress no change  -EE (r) BM (t) EE (c)     Outcome Evaluation Pt is a 72 y/o female presenting with afib, prior to admission pt was independent with ADL's and using a rollator for mobility. Upon PT arrival pt was on 3L O2. Pt completed bed mobility and sit to stand with SBA, with cues given for hand placement for sit to stand. She ambulated 200' with rollator and SBA showing no signs of unsteadiness or LOB. PT has no concerns with pt DC with HH or back to her independent living facility. Pt appears to be back at baseline with functional mobility.  -EE (r) BM (t) EE (c)       Row Name 11/30/23 1101          Therapy Assessment/Plan (PT)    Criteria for Skilled Interventions Met (PT) no problems identified which require skilled intervention  -EE (r) BM (t) EE (c)     Therapy Frequency (PT) evaluation only  -EE (r) BM (t) EE (c)       Row Name 11/30/23 1101          Positioning and Restraints    Pre-Treatment Position in bed  -EE (r) BM (t) EE (c)     Post Treatment Position bed  -EE (r) BM (t) EE (c)     In Bed fowlers;call light within reach;encouraged to call for assist;exit alarm on;with family/caregiver  -EE (r) BM (t) EE (c)               User Key  (r) = Recorded By, (t) = Taken By, (c) = Cosigned By      Initials Name Provider Type    Ayala Curiel, PT Physical Therapist    [place], PT Student PT Student                   Outcome Measures       Row Name 11/30/23 1113 11/30/23 0830       How much help from another person do you currently need...    Turning from your back to your side while in flat bed without using bedrails? 3  -EE (r) BM (t) EE (c) 3  -LL    Moving from lying on back to sitting on the side of a flat bed without bedrails? 3  -EE (r) BM (t) EE (c) 3  -LL    Moving to and from a bed to a chair (including a wheelchair)? 4  -EE (r) BM (t) EE (c) 4  -LL    Standing up from a chair using your arms (e.g., wheelchair, bedside chair)? 4  -EE (r) BM  (t) EE (c) 3  -LL    Climbing 3-5 steps with a railing? 3  -EE (r) BM (t) EE (c) 2  -LL    To walk in hospital room? 3  -EE (r) BM (t) EE (c) 3  -LL    AM-PAC 6 Clicks Score (PT) 20  -EE (r) BM (t) 18  -LL    Highest Level of Mobility Goal 6 --> Walk 10 steps or more  -EE (r) BM (t) 6 --> Walk 10 steps or more  -LL      Row Name 11/30/23 1134          Functional Assessment    Outcome Measure Options AM-PAC 6 Clicks Daily Activity (OT)  -MW               User Key  (r) = Recorded By, (t) = Taken By, (c) = Cosigned By      Initials Name Provider Type    EE Ayala Atwood, PT Physical Therapist    Vonnie Sultana, OT Occupational Therapist    LL Ward Vaca, RN Registered Nurse    [place], PT Student PT Student                  Physical Therapy Education       Title: PT OT SLP Therapies (In Progress)       Topic: Physical Therapy (Done)       Point: Mobility training (Done)       Learning Progress Summary             Patient Acceptance, E,TB, VU,DU by  at 11/30/2023 1114   Family Acceptance, E,TB, VU,DU by  at 11/30/2023 1114                         Point: Home exercise program (Done)       Learning Progress Summary             Patient Acceptance, E,TB, VU,DU by  at 11/30/2023 1114   Family Acceptance, E,TB, VU,DU by  at 11/30/2023 1114                         Point: Body mechanics (Done)       Learning Progress Summary             Patient Acceptance, E,TB, VU,DU by  at 11/30/2023 1114   Family Acceptance, E,TB, VU,DU by  at 11/30/2023 1114                         Point: Precautions (Done)       Learning Progress Summary             Patient Acceptance, E,TB, VU,DU by  at 11/30/2023 1114   Family Acceptance, E,TB, VU,DU by  at 11/30/2023 1114                                         User Key       Initials Effective Dates Name Provider Type Discipline     10/12/23 -  Tello Mccoy, PT Student PT Student PT                  PT Recommendation and Plan     Plan of Care Reviewed With: patient,  daughter  Progress: no change  Outcome Evaluation: Pt is a 74 y/o female presenting with afib, prior to admission pt was independent with ADL's and using a rollator for mobility. Upon PT arrival pt was on 3L O2. Pt completed bed mobility and sit to stand with SBA, with cues given for hand placement for sit to stand. She ambulated 200' with rollator and SBA showing no signs of unsteadiness or LOB. PT has no concerns with pt DC with HH or back to her independent living facility. Pt appears to be back at baseline with functional mobility.     Time Calculation:         PT Charges       Row Name 11/30/23 1114             Time Calculation    Start Time 1035  -EE (r) BM (t) EE (c)      Stop Time 1052  -EE (r) BM (t) EE (c)      Time Calculation (min) 17 min  -EE (r) BM (t)      PT Received On 11/30/23  -EE (r) BM (t) EE (c)                User Key  (r) = Recorded By, (t) = Taken By, (c) = Cosigned By      Initials Name Provider Type    EE Ayala Atwood, PT Physical Therapist    [place], PT Student PT Student                  Therapy Charges for Today       Code Description Service Date Service Provider Modifiers Qty    87981697284 HC PT EVAL MOD COMPLEXITY 2 11/30/2023 Tello Mccoy, PT Student GP 1            PT G-Codes  Outcome Measure Options: AM-PAC 6 Clicks Daily Activity (OT)  AM-PAC 6 Clicks Score (PT): 20  AM-PAC 6 Clicks Score (OT): 22    PT Discharge Summary  Anticipated Discharge Disposition (PT): home with home health, other (see comments) (back to independent living facility)    Tello Mccoy, DAVIN Student  11/30/2023

## 2023-12-01 ENCOUNTER — READMISSION MANAGEMENT (OUTPATIENT)
Dept: CALL CENTER | Facility: HOSPITAL | Age: 73
End: 2023-12-01
Payer: MEDICARE

## 2023-12-01 ENCOUNTER — APPOINTMENT (OUTPATIENT)
Dept: CARDIOLOGY | Facility: HOSPITAL | Age: 73
End: 2023-12-01
Payer: MEDICARE

## 2023-12-01 VITALS
SYSTOLIC BLOOD PRESSURE: 183 MMHG | HEART RATE: 47 BPM | WEIGHT: 226.6 LBS | HEIGHT: 67 IN | TEMPERATURE: 98.5 F | RESPIRATION RATE: 18 BRPM | BODY MASS INDEX: 35.56 KG/M2 | DIASTOLIC BLOOD PRESSURE: 95 MMHG | OXYGEN SATURATION: 96 %

## 2023-12-01 LAB
ANION GAP SERPL CALCULATED.3IONS-SCNC: 7.8 MMOL/L (ref 5–15)
BACTERIA SPEC AEROBE CULT: ABNORMAL
BASOPHILS # BLD AUTO: 0.01 10*3/MM3 (ref 0–0.2)
BASOPHILS NFR BLD AUTO: 0.2 % (ref 0–1.5)
BUN SERPL-MCNC: 29 MG/DL (ref 8–23)
BUN/CREAT SERPL: 32.2 (ref 7–25)
CALCIUM SPEC-SCNC: 9.5 MG/DL (ref 8.6–10.5)
CHLORIDE SERPL-SCNC: 102 MMOL/L (ref 98–107)
CO2 SERPL-SCNC: 30.2 MMOL/L (ref 22–29)
CREAT SERPL-MCNC: 0.9 MG/DL (ref 0.57–1)
DEPRECATED RDW RBC AUTO: 43.6 FL (ref 37–54)
EGFRCR SERPLBLD CKD-EPI 2021: 66.8 ML/MIN/1.73
EOSINOPHIL # BLD AUTO: 0.16 10*3/MM3 (ref 0–0.4)
EOSINOPHIL NFR BLD AUTO: 3.5 % (ref 0.3–6.2)
ERYTHROCYTE [DISTWIDTH] IN BLOOD BY AUTOMATED COUNT: 13.1 % (ref 12.3–15.4)
GLUCOSE BLDC GLUCOMTR-MCNC: 162 MG/DL (ref 70–130)
GLUCOSE BLDC GLUCOMTR-MCNC: 183 MG/DL (ref 70–130)
GLUCOSE SERPL-MCNC: 173 MG/DL (ref 65–99)
HCT VFR BLD AUTO: 34.5 % (ref 34–46.6)
HGB BLD-MCNC: 11.6 G/DL (ref 12–15.9)
LYMPHOCYTES # BLD AUTO: 1.23 10*3/MM3 (ref 0.7–3.1)
LYMPHOCYTES NFR BLD AUTO: 26.9 % (ref 19.6–45.3)
MAGNESIUM SERPL-MCNC: 1.6 MG/DL (ref 1.6–2.4)
MCH RBC QN AUTO: 30.5 PG (ref 26.6–33)
MCHC RBC AUTO-ENTMCNC: 33.6 G/DL (ref 31.5–35.7)
MCV RBC AUTO: 90.8 FL (ref 79–97)
MONOCYTES # BLD AUTO: 0.41 10*3/MM3 (ref 0.1–0.9)
MONOCYTES NFR BLD AUTO: 9 % (ref 5–12)
NEUTROPHILS NFR BLD AUTO: 2.74 10*3/MM3 (ref 1.7–7)
NEUTROPHILS NFR BLD AUTO: 60 % (ref 42.7–76)
PHOSPHATE SERPL-MCNC: 3 MG/DL (ref 2.5–4.5)
PLATELET # BLD AUTO: 129 10*3/MM3 (ref 140–450)
PMV BLD AUTO: 11.1 FL (ref 6–12)
POTASSIUM SERPL-SCNC: 4.2 MMOL/L (ref 3.5–5.2)
QT INTERVAL: 452 MS
QTC INTERVAL: 413 MS
RBC # BLD AUTO: 3.8 10*6/MM3 (ref 3.77–5.28)
SODIUM SERPL-SCNC: 140 MMOL/L (ref 136–145)
WBC NRBC COR # BLD AUTO: 4.57 10*3/MM3 (ref 3.4–10.8)

## 2023-12-01 PROCEDURE — 85025 COMPLETE CBC W/AUTO DIFF WBC: CPT | Performed by: STUDENT IN AN ORGANIZED HEALTH CARE EDUCATION/TRAINING PROGRAM

## 2023-12-01 PROCEDURE — 93005 ELECTROCARDIOGRAM TRACING: CPT | Performed by: NURSE PRACTITIONER

## 2023-12-01 PROCEDURE — 84100 ASSAY OF PHOSPHORUS: CPT | Performed by: STUDENT IN AN ORGANIZED HEALTH CARE EDUCATION/TRAINING PROGRAM

## 2023-12-01 PROCEDURE — G0378 HOSPITAL OBSERVATION PER HR: HCPCS

## 2023-12-01 PROCEDURE — 63710000001 INSULIN LISPRO (HUMAN) PER 5 UNITS: Performed by: NURSE PRACTITIONER

## 2023-12-01 PROCEDURE — 99232 SBSQ HOSP IP/OBS MODERATE 35: CPT | Performed by: INTERNAL MEDICINE

## 2023-12-01 PROCEDURE — 93246 EXT ECG>7D<15D RECORDING: CPT

## 2023-12-01 PROCEDURE — 82948 REAGENT STRIP/BLOOD GLUCOSE: CPT

## 2023-12-01 PROCEDURE — 80048 BASIC METABOLIC PNL TOTAL CA: CPT | Performed by: STUDENT IN AN ORGANIZED HEALTH CARE EDUCATION/TRAINING PROGRAM

## 2023-12-01 PROCEDURE — 93010 ELECTROCARDIOGRAM REPORT: CPT | Performed by: INTERNAL MEDICINE

## 2023-12-01 PROCEDURE — 83735 ASSAY OF MAGNESIUM: CPT | Performed by: STUDENT IN AN ORGANIZED HEALTH CARE EDUCATION/TRAINING PROGRAM

## 2023-12-01 RX ORDER — LOSARTAN POTASSIUM 50 MG/1
50 TABLET ORAL
Status: DISCONTINUED | OUTPATIENT
Start: 2023-12-01 | End: 2023-12-01 | Stop reason: HOSPADM

## 2023-12-01 RX ORDER — NITROFURANTOIN 25; 75 MG/1; MG/1
100 CAPSULE ORAL EVERY 12 HOURS SCHEDULED
Qty: 6 CAPSULE | Refills: 0 | Status: SHIPPED | OUTPATIENT
Start: 2023-12-01 | End: 2023-12-04

## 2023-12-01 RX ORDER — FUROSEMIDE 40 MG/1
40 TABLET ORAL DAILY PRN
Qty: 90 TABLET | Refills: 0 | Status: SHIPPED | OUTPATIENT
Start: 2023-12-01

## 2023-12-01 RX ORDER — LOSARTAN POTASSIUM 50 MG/1
50 TABLET ORAL
Qty: 30 TABLET | Refills: 0 | Status: SHIPPED | OUTPATIENT
Start: 2023-12-02

## 2023-12-01 RX ORDER — AMIODARONE HYDROCHLORIDE 200 MG/1
200 TABLET ORAL
Qty: 30 TABLET | Refills: 0 | Status: SHIPPED | OUTPATIENT
Start: 2023-12-02

## 2023-12-01 RX ADMIN — PRIMIDONE 50 MG: 50 TABLET ORAL at 09:42

## 2023-12-01 RX ADMIN — ACETAMINOPHEN 650 MG: 325 TABLET, FILM COATED ORAL at 11:37

## 2023-12-01 RX ADMIN — LOSARTAN POTASSIUM 50 MG: 50 TABLET, FILM COATED ORAL at 09:41

## 2023-12-01 RX ADMIN — Medication 10 ML: at 09:43

## 2023-12-01 RX ADMIN — CITALOPRAM 20 MG: 20 TABLET, FILM COATED ORAL at 09:42

## 2023-12-01 RX ADMIN — METOPROLOL TARTRATE 25 MG: 25 TABLET, FILM COATED ORAL at 09:41

## 2023-12-01 RX ADMIN — NITROFURANTOIN MONOHYDRATE/MACROCRYSTALLINE 100 MG: 25; 75 CAPSULE ORAL at 09:42

## 2023-12-01 RX ADMIN — INSULIN LISPRO 2 UNITS: 100 INJECTION, SOLUTION INTRAVENOUS; SUBCUTANEOUS at 09:42

## 2023-12-01 RX ADMIN — AMIODARONE HYDROCHLORIDE 200 MG: 200 TABLET ORAL at 09:41

## 2023-12-01 RX ADMIN — APIXABAN 5 MG: 5 TABLET, FILM COATED ORAL at 09:41

## 2023-12-01 NOTE — DISCHARGE SUMMARY
Patient Name: Kanwal Sauer  : 1950  MRN: [id number]    Date of Admission: 2023  Date of Discharge:  2023  Primary Care Physician: Nisha Britton MD      Chief Complaint:   Shortness of Breath (SOA)      Discharge Diagnoses     Active Hospital Problems    Diagnosis  POA    **Atrial fibrillation with rapid ventricular response [I48.91]  Yes    Elevated troponin [R79.89]  Yes    Hypomagnesemia [E83.42]  Yes    COPD (chronic obstructive pulmonary disease) [J44.9]  Yes    TARSHA (obstructive sleep apnea) [G47.33]  Yes    Type 2 diabetes mellitus with circulatory disorder, without long-term current use of insulin [E11.59]  Yes    Essential hypertension [I10]  Yes      Resolved Hospital Problems   No resolved problems to display.        Hospital Course       This is a 73-year-old male with a history of atrial fibrillation on Eliquis, COPD on 2 L of oxygen at home at baseline, hypertension, type 2 diabetes who presented to hospital with a rapid heart rate, admitted for atrial fibrillation with rapid ventricular rate.  Cardiology was consulted.  She required an IV amiodarone bolus after which she converted to sinus rhythm.  She will remain on amiodarone 200 mg daily until further evaluation by cardiology  Her blood pressure was also elevated throughout this admission and so losartan 50 mg was added to her blood pressure medication regimen.  She was noted to have a UTI and will complete a short course of antibiotics with Macrobid.  Finally, she will remain off Lasix per cardiology recommendations however, has been instructed by nephrology to take a dose of Lasix as needed for 3 pound weight gain or increased edema of her legs or increasing oxygen requirements.  She will also have a Zio patch placed upon discharge and will need follow-up with cardiology.  She does not need to follow-up with nephrology unless new issues arise.      Physical Exam:  Temp:  [98 °F (36.7 °C)-98.6 °F (37 °C)] 98.5 °F  (36.9 °C)  Heart Rate:  [47-64] 47  Resp:  [18-20] 18  BP: (149-183)/(61-95) 183/95  Body mass index is 35.49 kg/m².  Physical Exam  Constitutional:       General: She is not in acute distress.  HENT:      Head: Normocephalic and atraumatic.   Eyes:      Extraocular Movements: Extraocular movements intact.      Pupils: Pupils are equal, round, and reactive to light.   Cardiovascular:      Rate and Rhythm: Normal rate and regular rhythm.   Pulmonary:      Effort: Pulmonary effort is normal. No respiratory distress.   Abdominal:      General: There is no distension.      Palpations: Abdomen is soft.      Tenderness: There is no abdominal tenderness.   Musculoskeletal:      Cervical back: Normal range of motion. No rigidity.   Neurological:      General: No focal deficit present.      Mental Status: She is alert and oriented to person, place, and time.         Consultants     Consult Orders (all) (From admission, onward)       Start     Ordered    11/30/23 1904  Inpatient Nephrology Consult  Once        Comments: Spoke to dr mendes who will see patient in AM, do not have to call consult   Specialty:  Nephrology  Provider:  Black Mendes MD    11/30/23 1903    11/29/23 0429  Inpatient Cardiology Consult  Once        Specialty:  Cardiology  Provider:  Yasir Romano MD    11/29/23 0428 11/29/23 0310  [facility] (on-call MD unless specified) Details  Once        Specialty:  Hospitalist  Provider:  (Not yet assigned)    11/29/23 0309                  Procedures     Imaging Results (All)       Procedure Component Value Units Date/Time    XR Chest 1 View [[id number]] Collected: 11/29/23 0104     Updated: 11/29/23 0108    Narrative:      SINGLE VIEW OF THE CHEST     HISTORY: Atrial fibrillation     COMPARISON: July 18, 2020.     FINDINGS:  There is cardiomegaly. There is no vascular congestion. There is  calcification of the aorta. Postsurgical changes are noted in the left  chest wall. No pneumothorax, pleural  "effusion, or acute infiltrate is  seen.       Impression:      No acute findings.     This report was finalized on 11/29/2023 1:05 AM by Dr. Meg Salazar M.D on Workstation: [id number]               Pertinent Labs     Results from last 7 days   Lab Units 12/01/23  0444 11/30/23  0811 11/29/23  0641 11/29/23  0038   WBC 10*3/mm3 4.57 5.01 6.69 7.04   HEMOGLOBIN g/dL 11.6* 11.9* 12.7 12.9   PLATELETS 10*3/mm3 129* 138* 146 150     Results from last 7 days   Lab Units 12/01/23 0444 11/30/23  1530 11/30/23  0811 11/29/23 0641   SODIUM mmol/L 140 139 141 140   POTASSIUM mmol/L 4.2 4.2 4.4 3.9   CHLORIDE mmol/L 102 97* 99 102   CO2 mmol/L 30.2* 33.4* 33.6* 29.2*   BUN mg/dL 29* 30* 32* 21   CREATININE mg/dL 0.90 1.32* 1.13* 0.76   GLUCOSE mg/dL 173* 193* 178* 183*   Estimated Creatinine Clearance: 66.7 mL/min (by C-G formula based on SCr of 0.9 mg/dL).  Results from last 7 days   Lab Units 11/29/23  0038   ALBUMIN g/dL 4.6   BILIRUBIN mg/dL 0.4   ALK PHOS U/L 60   AST (SGOT) U/L 18   ALT (SGPT) U/L 13     Results from last 7 days   Lab Units 12/01/23 0444 11/30/23  1530 11/30/23  0811 11/29/23  0641 11/29/23  0136 11/29/23  0038   CALCIUM mg/dL 9.5 9.7 10.0 10.0  --  10.2   ALBUMIN g/dL  --   --   --   --   --  4.6   MAGNESIUM mg/dL 1.6  --  1.7  --  1.4*  --    PHOSPHORUS mg/dL 3.0  --  3.7  --   --   --        Results from last 7 days   Lab Units 11/29/23  0641 11/29/23  0344 11/29/23 0136 11/29/23  0038   HSTROP T ng/L 27* 25* 21* 15*   PROBNP pg/mL  --   --   --  260.0           Invalid input(s): \"LDLCALC\"  Results from last 7 days   Lab Units 11/29/23  0311   URINECX  >100,000 CFU/mL Escherichia coli*       Test Results Pending at Discharge       Discharge Details        Discharge Medications        New Medications        Instructions Start Date   amiodarone 200 MG tablet  Commonly known as: PACERONE   200 mg, Oral, Every 24 Hours Scheduled   Start Date: December 2, 2023     losartan 50 MG " tablet  Commonly known as: COZAAR   50 mg, Oral, Every 24 Hours Scheduled   Start Date: December 2, 2023     nitrofurantoin (macrocrystal-monohydrate) 100 MG capsule  Commonly known as: MACROBID   100 mg, Oral, Every 12 Hours Scheduled             Changes to Medications        Instructions Start Date   acetaminophen 325 MG tablet  Commonly known as: TYLENOL  What changed: when to take this   650 mg, Oral, Every 4 Hours PRN      furosemide 40 MG tablet  Commonly known as: LASIX  What changed:   when to take this  reasons to take this   40 mg, Oral, Daily PRN      metoprolol tartrate 25 MG tablet  Commonly known as: LOPRESSOR  What changed: See the new instructions.   25 mg, Oral, 2 Times Daily             Continue These Medications        Instructions Start Date   albuterol sulfate  (90 Base) MCG/ACT inhaler  Commonly known as: PROVENTIL HFA;VENTOLIN HFA;PROAIR HFA   INHALE 2 PUFFS EVERY 4 HOURS AS NEEDED FOR WHEEZING      citalopram 20 MG tablet  Commonly known as: CeleXA   TAKE 1 TABLET BY MOUTH EVERY DAY      CRANBERRY PO   650 mg, Oral      Eliquis 5 MG tablet tablet  Generic drug: apixaban   TAKE 1 TABLET BY MOUTH EVERY 12 HOURS      fenofibrate 160 MG tablet   TAKE 1 TABLET BY MOUTH EVERY DAY      Fluticasone-Umeclidin-Vilant 100-62.5-25 MCG/ACT inhaler  Commonly known as: Trelegy Ellipta   1 puff, Inhalation, Daily      ipratropium-albuterol 0.5-2.5 mg/3 ml nebulizer  Commonly known as: DUO-NEB   3 mL, Nebulization, Every 4 Hours PRN      metFORMIN 1000 MG tablet  Commonly known as: GLUCOPHAGE   1,000 mg, Oral, 2 Times Daily With Meals      montelukast 10 MG tablet  Commonly known as: SINGULAIR   10 mg, Oral, Every Night at Bedtime      potassium chloride 10 MEQ CR tablet  Commonly known as: KLOR-CON   10 mEq, Oral, Daily      primidone 50 MG tablet  Commonly known as: MYSOLINE   TAKE 1 TABLET BY MOUTH THREE TIMES A DAY      PROBIOTIC-10 PO   Oral      tiotropium bromide monohydrate 2.5 MCG/ACT aerosol  solution inhaler  Commonly known as: SPIRIVA RESPIMAT   2 puffs, Inhalation, Daily - RT      tiZANidine 4 MG tablet  Commonly known as: ZANAFLEX   4 mg, Oral, Nightly PRN      Vitamin D3 25 MCG (1000 UT) capsule   1,000 Units, Oral, Daily               Allergies   Allergen Reactions    Baclofen Other (See Comments)     dysrhythmia    Doxycycline Rash    Eggs Or Egg-Derived Products Anaphylaxis    Iodine Anaphylaxis    Latex Anaphylaxis    Milk-Related Compounds Unknown - High Severity    Msg [Monosodium Glutamate] Anaphylaxis    Penicillins Hives     Tolerated cefazolin during December 2020 admission    Metronidazole Unknown - High Severity    Ezetimibe Rash    Latex, Natural Rubber Rash    Levaquin [Levofloxacin] Rash    Nylon Rash    Simvastatin Rash         Discharge Disposition:  Home-Health Care Svc    Discharge Diet:  Diet Order   Procedures    Diet: Cardiac Diets, Diabetic Diets; Healthy Heart (2-3 Na+); Consistent Carbohydrate; Texture: Regular Texture (IDDSI 7); Fluid Consistency: Thin (IDDSI 0)       Discharge Activity:       CODE STATUS:    Code Status and Medical Interventions:   Ordered at: 11/29/23 0428     Code Status (Patient has no pulse and is not breathing):    CPR (Attempt to Resuscitate)     Medical Interventions (Patient has pulse or is breathing):    Full Support       Future Appointments   Date Time Provider Department Center   1/5/2024 10:30 AM Parth Haas PA-C [facility]   5/6/2024  2:45 PM Nisha Britton MD [facility]   11/5/2024 12:45 PM Yasir Romano MD [facility]      Contact information for follow-up providers       Nisha Britton MD .    Specialty: Family Medicine  Contact information:  [address]  [address] [address] [phone]                       Contact information for after-discharge care       Home Medical Care       [facility] .    Service: Home Health Services  Contact information:  [address]  [facility] [address]  [phone]                                   Time Spent on Discharge:  Greater than 30 minutes      Cameron Tolbert MD  [facility]  12/01/23  11:43 EST

## 2023-12-01 NOTE — PROGRESS NOTES
LOS: 0 days   Patient Care Team:  Nisha Britton MD as PCP - General (Family Medicine)  Corey Britton MD as Consulting Physician (Sleep Medicine)  Belen Khan APRN as Nurse Practitioner (Obstetrics and Gynecology)  Nia Zhang APRN as Nurse Practitioner (Cardiology)  José Valentino MD as Consulting Physician (Urology)  Indio Box MD as Consulting Physician (Pulmonary Disease)    Chief Complaint:     F/u paf, htn    Interval History:     Her breathing is better.  She walked around the nurses station without difficulty and her heart rate serge to the mid 70s, when she is at rest is in the 50s.  She did not feel her heart racing or skipping.  She was not dizzy or nauseated.  She had no chest pain or pressure.    Objective   Vital Signs  Temp:  [98 °F (36.7 °C)-98.6 °F (37 °C)] 98.6 °F (37 °C)  Heart Rate:  [52-64] 58  Resp:  [18-20] 18  BP: (145-158)/(56-68) 158/68    Intake/Output Summary (Last 24 hours) at 12/1/2023 0808  Last data filed at 12/1/2023 0452  Gross per 24 hour   Intake 250 ml   Output 1139 ml   Net -889 ml       Comfortable NAD  Neck supple, no JVD or thyromegaly appreciated  S1/S2 RRR, no m/r/g  Lungs CTA B, normal effort  Abdomen S/NT/ND (+) BS, no HSM appreciated  Extremities warm, no clubbing, cyanosis, or edema  No visible or palpable skin lesions  A/Ox4, mood and affect appropriate    Results Review:      Results from last 7 days   Lab Units 12/01/23  0444 11/30/23  1530 11/30/23  0811   SODIUM mmol/L 140 139 141   POTASSIUM mmol/L 4.2 4.2 4.4   CHLORIDE mmol/L 102 97* 99   CO2 mmol/L 30.2* 33.4* 33.6*   BUN mg/dL 29* 30* 32*   CREATININE mg/dL 0.90 1.32* 1.13*   GLUCOSE mg/dL 173* 193* 178*   CALCIUM mg/dL 9.5 9.7 10.0     Results from last 7 days   Lab Units 11/29/23  0641 11/29/23  0344 11/29/23  0136   HSTROP T ng/L 27* 25* 21*     Results from last 7 days   Lab Units 12/01/23  0444 11/30/23  0811 11/29/23  0641   WBC 10*3/mm3 4.57 5.01 6.69   HEMOGLOBIN g/dL  11.6* 11.9* 12.7   HEMATOCRIT % 34.5 35.6 37.9   PLATELETS 10*3/mm3 129* 138* 146     Results from last 7 days   Lab Units 11/29/23  0038   INR  1.15*         Results from last 7 days   Lab Units 12/01/23  0444   MAGNESIUM mg/dL 1.6           I reviewed the patient's new clinical results.  I personally viewed and interpreted the patient's EKG/Telemetry data        Medication Review:   amiodarone, 200 mg, Oral, Q24H  apixaban, 5 mg, Oral, Q12H  budesonide-formoterol, 2 puff, Inhalation, BID - RT   And  tiotropium bromide monohydrate, 2 puff, Inhalation, Daily - RT  citalopram, 20 mg, Oral, Daily  insulin lispro, 2-7 Units, Subcutaneous, 4x Daily AC & at Bedtime  losartan, 25 mg, Oral, Q24H  metoprolol tartrate, 25 mg, Oral, Q12H  nitrofurantoin (macrocrystal-monohydrate), 100 mg, Oral, Q12H  primidone, 50 mg, Oral, TID  sodium chloride, 10 mL, Intravenous, Q12H             Assessment & Plan       Atrial fibrillation with rapid ventricular response    Type 2 diabetes mellitus with circulatory disorder, without long-term current use of insulin    Essential hypertension    TARSHA (obstructive sleep apnea)    Elevated troponin    Hypomagnesemia    COPD (chronic obstructive pulmonary disease)    PAF with RVR - she converted with IV amio bolus.  Levaquin was stopped due to reaction.  She is now on low dose amio - decrease to 200mg daily.   UTI, recurrent  COPD without exacerbation.   TARSHA on CPAP.   DM  HTN - not well controlled.  Increase losartan to 50 mg daily due to uncontrolled blood pressure.    Remain off of Lasix, renal impairment is better.  Blood pressure still remains high.  EKG this morning shows sinus bradycardia, left anterior fascicular block with LVH, unchanged ECG.      Okay to go home with amiodarone 20 mg daily, Toprol 25 mg daily and losartan 50 mg daily.  Remain off Lasix.  Follow-up with Dr. Romano in the office in a month.  Overall she looks good.    Carolina Villatoro MD  12/01/23  08:08  EST

## 2023-12-01 NOTE — CONSULTS
[facility]                                                                                             Nephrology Initial Consult Note    Patient Identification:  Name: Kanwal Sauer MRN: [id number]  Age: 73 y.o. : 1950  Sex: female  Date:2023    Requesting Physician: As per consult order.  Reason for Consultation: CANDICE  Information from:patient/ family/ chart      History of Present Illness: This is a 73 y.o. year old female with no prior history of chronic kidney disease she did have CANDICE in 2020 but her baseline creatinine looks to be around 0.8-1.0 and she does not currently follow with nephrology.  Medical history otherwise significant for A-fib on Eliquis COPD on home oxygen, 3 L.  She also has hypertension diabetes and sleep apnea.  Presented to the ER with rapid heart rate while sleeping, no chest pain, no dyspnea.  She has some trace edema on exam which is about her norm.  She normally takes Lasix every other day at home.  She is currently being followed by cardiology.  She has been hypertensive and losartan was started, 25 mg.  Creatinine bumped yesterday her Lasix was held she was given a 500 cc bolus and creatinine is back down to baseline today.  She feels well denies complaints, has converted to sinus rhythm with IV amiodarone and her oxygen is at her home rate.      The following medical history and medications personally reviewed by me:    Problem List:     Atrial fibrillation with rapid ventricular response    Type 2 diabetes mellitus with circulatory disorder, without long-term current use of insulin    Essential hypertension    TARSHA (obstructive sleep apnea)    Elevated troponin    Hypomagnesemia    COPD (chronic obstructive pulmonary disease)      Past Medical History:  Past Medical History:   Diagnosis Date    Allergic     Anxiety     Asthma     Atrial fibrillation      Cancer     Breast    COPD (chronic obstructive pulmonary disease) 09/07/2022    Deep vein thrombosis     Diabetes mellitus     Difficulty walking     Diverticulosis     Drug therapy     Environmental allergies     Fractures     Headache, tension-type     Hyperlipidemia     Hypertension     Low back pain     Neuropathy in diabetes     Obesity     Scoliosis     Seizures     Sleep apnea     Tremor     Urinary tract infection     Weakness        Past Surgical History:  Past Surgical History:   Procedure Laterality Date    BREAST BIOPSY      CATARACT EXTRACTION, BILATERAL      DENTAL PROCEDURE      Removed teeth    FEMUR OPEN REDUCTION INTERNAL FIXATION Left 12/24/2020    Procedure: DISTAL FEMUR OPEN REDUCTION INTERNAL FIXATION;  Surgeon: Marley Hernandez MD;  Location: [facility];  Service: Orthopedics;  Laterality: Left;    MASTECTOMY Left     REPLACEMENT TOTAL KNEE BILATERAL          Home Meds:   Medications Prior to Admission   Medication Sig Dispense Refill Last Dose    citalopram (CeleXA) 20 MG tablet TAKE 1 TABLET BY MOUTH EVERY DAY 90 tablet 1 11/28/2023    furosemide (LASIX) 40 MG tablet Take 1 tablet by mouth Daily. 90 tablet 0 11/28/2023    metFORMIN (GLUCOPHAGE) 1000 MG tablet TAKE 1 TABLET BY MOUTH TWICE A DAY WITH FOOD 180 tablet 0 11/28/2023    montelukast (SINGULAIR) 10 MG tablet TAKE 1 TABLET BY MOUTH EVERYDAY AT BEDTIME 90 tablet 0 11/28/2023    potassium chloride (KLOR-CON) 10 MEQ CR tablet Take 1 tablet by mouth Daily. 90 tablet 0 11/28/2023    primidone (MYSOLINE) 50 MG tablet TAKE 1 TABLET BY MOUTH THREE TIMES A  tablet 0 11/28/2023    Probiotic Product (PROBIOTIC-10 PO) Take  by mouth.   11/28/2023    tiZANidine (ZANAFLEX) 4 MG tablet Take 1 tablet by mouth At Night As Needed for Muscle Spasms. 30 tablet 1 11/28/2023    acetaminophen (TYLENOL) 325 MG tablet Take 2 tablets by mouth Every 4 (Four) Hours As Needed for Mild Pain . (Patient taking differently: Take 2 tablets by  mouth As Needed for Mild Pain.)       albuterol sulfate  (90 Base) MCG/ACT inhaler INHALE 2 PUFFS EVERY 4 HOURS AS NEEDED FOR WHEEZING 18 g 1     Cholecalciferol (Vitamin D3) 25 MCG (1000 UT) capsule Take 1 capsule by mouth Daily.       CRANBERRY PO Take 650 mg by mouth.       Eliquis 5 MG tablet tablet TAKE 1 TABLET BY MOUTH EVERY 12 HOURS 60 tablet 2     fenofibrate 160 MG tablet TAKE 1 TABLET BY MOUTH EVERY DAY 90 tablet 1     Fluticasone-Umeclidin-Vilant (Trelegy Ellipta) 100-62.5-25 MCG/ACT inhaler Inhale 1 puff Daily. 60 each 1     ipratropium-albuterol (DUO-NEB) 0.5-2.5 mg/3 ml nebulizer Take 3 mL by nebulization Every 4 (Four) Hours As Needed for Wheezing. 150 mL 3     metoprolol tartrate (LOPRESSOR) 25 MG tablet TAKE 1 TABLET BY MOUTH 2 TIMES A DAY FOR 30 DAYS. 180 tablet 1     tiotropium bromide monohydrate (SPIRIVA RESPIMAT) 2.5 MCG/ACT aerosol solution inhaler Inhale 2 puffs Daily for 30 days. 1 g 0        Current Meds:   Current Facility-Administered Medications   Medication Dose Route Frequency Provider Last Rate Last Admin    acetaminophen (TYLENOL) tablet 650 mg  650 mg Oral Q4H PRN Bekah Arreola APRN        Or    acetaminophen (TYLENOL) 160 MG/5ML oral solution 650 mg  650 mg Oral Q4H PRN Bekah Arreola APRN        Or    acetaminophen (TYLENOL) suppository 650 mg  650 mg Rectal Q4H PRN Bekah Arreola APRN        amiodarone (PACERONE) tablet 200 mg  200 mg Oral Q24H Carolina Villatoro MD   200 mg at 12/01/23 0941    apixaban (ELIQUIS) tablet 5 mg  5 mg Oral Q12H Hernando Wolff DO   5 mg at 12/01/23 0941    budesonide-formoterol (SYMBICORT) 160-4.5 MCG/ACT inhaler 2 puff  2 puff Inhalation BID - RT Hernando Wolff DO   2 puff at 11/30/23 1936    And    tiotropium (SPIRIVA RESPIMAT) 2.5 mcg/act aerosol solution inhaler  2 puff Inhalation Daily - RT Hernando Wolff, DO   2 puff at 11/30/23 0648    calcium carbonate (TUMS) chewable tablet 500 mg (200 mg elemental)  2 tablet Oral  BID PRN Bekah Arreola APRN        Calcium Replacement - Follow Nurse / BPA Driven Protocol   Does not apply PRN Bekah Arreola APRN        citalopram (CeleXA) tablet 20 mg  20 mg Oral Daily Hernando Wolff, DO   20 mg at 12/01/23 0942    dextrose (D50W) (25 g/50 mL) IV injection 25 g  25 g Intravenous Q15 Min PRN Bekah Arreola APRN        dextrose (GLUTOSE) oral gel 15 g  15 g Oral Q15 Min PRN Bekah Arreola APRN        glucagon (GLUCAGEN) injection 1 mg  1 mg Intramuscular Q15 Min PRN Bekah Arreola APRN        insulin lispro (HUMALOG/ADMELOG) injection 2-7 Units  2-7 Units Subcutaneous 4x Daily AC & at Bedtime Bekah Arreola APRN   2 Units at 12/01/23 0942    ipratropium-albuterol (DUO-NEB) nebulizer solution 3 mL  3 mL Nebulization Q4H PRN Hernando Wolff DO        losartan (COZAAR) tablet 50 mg  50 mg Oral Q24H Carolina Villatoro MD   50 mg at 12/01/23 0941    Magnesium Standard Dose Replacement - Follow Nurse / BPA Driven Protocol   Does not apply PRN Bekah Arreola APRN        metoprolol tartrate (LOPRESSOR) tablet 25 mg  25 mg Oral Q12H Hernando Wolff, DO   25 mg at 12/01/23 0941    nitrofurantoin (macrocrystal-monohydrate) (MACROBID) capsule 100 mg  100 mg Oral Q12H Pedro Wolffmy, DO   100 mg at 12/01/23 0942    ondansetron (ZOFRAN) tablet 4 mg  4 mg Oral Q6H PRN Bekah Arreola APRN        Or    ondansetron (ZOFRAN) injection 4 mg  4 mg Intravenous Q6H PRN Bekah Arreola APRN        Phosphorus Replacement - Follow Nurse / BPA Driven Protocol   Does not apply PRN Bekah Arreola APRN        Potassium Replacement - Follow Nurse / BPA Driven Protocol   Does not apply PRN Bekah Arreola APRN        primidone (MYSOLINE) tablet 50 mg  50 mg Oral TID Hernando Wolff DO   50 mg at 12/01/23 0942    sodium chloride 0.9 % flush 10 mL  10 mL Intravenous PRN Ind[place] Harris MD        sodium chloride 0.9 % flush 10 mL  10 mL Intravenous PRN Steven  MD [place]        sodium chloride 0.9 % flush 10 mL  10 mL Intravenous Q12H Bekah Arreola APRN   10 mL at 12/01/23 0943    sodium chloride 0.9 % flush 10 mL  10 mL Intravenous PRN Bekah Arreola APRN        sodium chloride 0.9 % infusion 40 mL  40 mL Intravenous PRN Bekah Arreola APRN        tiZANidine (ZANAFLEX) tablet 4 mg  4 mg Oral Nightly PRN Hernando Wolff DO           Allergies:  Allergies   Allergen Reactions    Baclofen Other (See Comments)     dysrhythmia    Doxycycline Rash    Eggs Or Egg-Derived Products Anaphylaxis    Iodine Anaphylaxis    Latex Anaphylaxis    Milk-Related Compounds Unknown - High Severity    Msg [Monosodium Glutamate] Anaphylaxis    Penicillins Hives     Tolerated cefazolin during December 2020 admission    Metronidazole Unknown - High Severity    Ezetimibe Rash    Latex, Natural Rubber Rash    Levaquin [Levofloxacin] Rash    Nylon Rash    Simvastatin Rash       Social History:   Social History     Socioeconomic History    Marital status:    Tobacco Use    Smoking status: Never    Smokeless tobacco: Never    Tobacco comments:     no caffine   Vaping Use    Vaping Use: Never used   Substance and Sexual Activity    Alcohol use: Not Currently    Drug use: Never    Sexual activity: Not Currently     Partners: Male        Family History:  Family History   Problem Relation Age of Onset    Arthritis Mother     Lung cancer Mother     Brain cancer Mother     Hypertension Mother     Cancer Mother         Lung cancer    Hypertension Father     Arthritis Sister     Breast cancer Sister     Diabetes Sister     Hypertension Sister     Dementia Sister     Cancer Sister         Brest cancer    Aneurysm Sister     Cancer Daughter         Colon cancer    Developmental Disability Daughter     Learning disabilities Daughter     Miscarriages / Stillbirths Daughter         Miscarriage        Review of Systems: as per HPI, in addition:    General:      + Weakness / fatigue,          "              No fevers / chills                       no weight loss  HEENT:       no dysphagia / odynophagia  Neck:           normal range of motion, no swelling  Respiratory: no cough / congestion                      No shortness of air                       No wheezing  CV:              No chest pain                       No palpitations  Abdomen/GI: no nausea / vomiting                      No diarrhea / constipation                      No abdominal pain  :             no dysuria / urinary frequency                       No urgency, normal output  Endocrine:   no polyuria / polydipsia,                      No heat or cold intolerance  Skin:           no rashes or skin breakdown   Vascular:   No edema                     No claudication  Psych:        no depression/ anxiety  Neuro:        no focal weakness, no seizures  Musculoskeletal: no joint pain or deformities      Physical Exam:  Vitals:   Temp (24hrs), Av.3 °F (36.8 °C), Min:98 °F (36.7 °C), Max:98.6 °F (37 °C)    /71 (BP Location: Right arm, Patient Position: Lying)   Pulse 52   Temp 98.4 °F (36.9 °C) (Oral)   Resp 18   Ht 170.2 cm (67\")   Wt 103 kg (226 lb 9.6 oz)   SpO2 96%   BMI 35.49 kg/m²   Intake/Output:     Intake/Output Summary (Last 24 hours) at 2023 1106  Last data filed at 2023 0452  Gross per 24 hour   Intake 250 ml   Output 1139 ml   Net -889 ml        Wt Readings from Last 1 Encounters:   23 1945 103 kg (226 lb 9.6 oz)   23 1609 103 kg (227 lb)   23 0953 103 kg (227 lb 3.2 oz)   23 0744 103 kg (227 lb 3.2 oz)   23 0426 103 kg (227 lb 3.2 oz)   23 0027 102 kg (225 lb)       Exam:    General Appearance:  Awake, alert, oriented x3, no acute distress  Well-appearing   Head and Face:  Normocephalic, atraumatic, mucus membranes moist, oropharynx clear   Eyes:  No icterus, pupils equal round and reactive to light, extraocular movements intact    ENMT: Moist mucosa, tongue symmetric "    Neck: Supple  no jugular venous distention  no thyromegaly   Pulmonary:  Respiratory effort: Normal  Auscultation of lungs: Clear bilaterally  No wheezes  No rhonchi  Good air movement, good expansion   Chest wall:  No tenderness or deformity   Cardiovascular:  Auscultation of the heart: Normal rhythm, no murmurs  Trace to 1+ edema of bilateral lower extremities   Abdomen:  Abdomen: soft, non-tender, normal bowel sounds all four quadrants, no masses   Liver and spleen: no hepatosplenomegaly   Musculoskeletal: Digits and nails: normal  Normal range of motion  No joint swelling or gross deformities    Skin: Skin inspection: color normal, no visible rashes or lesions  Skin palpation: texture, turgor normal, no palpable lesions   Lymphatic:  no cervical lymphadenopathy    Psychiatric: Judgement and insight: normal  Orientation to person place and time: normal  Mood and affect: normal       DATA:  Radiology and Labs:  The following labs independently reviewed by me, additional AM labs ordered  Old records independently reviewed showing echo showed normal EF, did have some hypertensive changes so likely with some diastolic dysfunction  The following radiologic studies independently viewed by me, findings echo as detailed above, EF 56 to 60%, mild to moderate LVH normal RV function mild to moderate valvular heart disease  Interval notes, chart personally reviewed by me.  I have reviewed and summarized old records as detailed above  Plan of care discussed with patient and her daughter at bedside  New problems include CANDICE that is now improved      Risk/ complexity of medical care/ medical decision making: Moderate risk, new CANDICE  Chronic illness with severe exacerbation or progression      Labs:   Recent Results (from the past 24 hour(s))   POC Glucose Once    Collection Time: 11/30/23 11:21 AM    Specimen: Blood   Result Value Ref Range    Glucose 202 (H) 70 - 130 mg/dL   Basic Metabolic Panel    Collection Time: 11/30/23   3:30 PM    Specimen: Arm, Right; Blood   Result Value Ref Range    Glucose 193 (H) 65 - 99 mg/dL    BUN 30 (H) 8 - 23 mg/dL    Creatinine 1.32 (H) 0.57 - 1.00 mg/dL    Sodium 139 136 - 145 mmol/L    Potassium 4.2 3.5 - 5.2 mmol/L    Chloride 97 (L) 98 - 107 mmol/L    CO2 33.4 (H) 22.0 - 29.0 mmol/L    Calcium 9.7 8.6 - 10.5 mg/dL    BUN/Creatinine Ratio 22.7 7.0 - 25.0    Anion Gap 8.6 5.0 - 15.0 mmol/L    eGFR 42.2 (L) >60.0 mL/min/1.73   POC Glucose Once    Collection Time: 11/30/23  4:24 PM    Specimen: Blood   Result Value Ref Range    Glucose 202 (H) 70 - 130 mg/dL   POC Glucose Once    Collection Time: 11/30/23  7:43 PM    Specimen: Blood   Result Value Ref Range    Glucose 339 (H) 70 - 130 mg/dL   Basic Metabolic Panel    Collection Time: 12/01/23  4:44 AM    Specimen: Arm, Right; Blood   Result Value Ref Range    Glucose 173 (H) 65 - 99 mg/dL    BUN 29 (H) 8 - 23 mg/dL    Creatinine 0.90 0.57 - 1.00 mg/dL    Sodium 140 136 - 145 mmol/L    Potassium 4.2 3.5 - 5.2 mmol/L    Chloride 102 98 - 107 mmol/L    CO2 30.2 (H) 22.0 - 29.0 mmol/L    Calcium 9.5 8.6 - 10.5 mg/dL    BUN/Creatinine Ratio 32.2 (H) 7.0 - 25.0    Anion Gap 7.8 5.0 - 15.0 mmol/L    eGFR 66.8 >60.0 mL/min/1.73   Phosphorus    Collection Time: 12/01/23  4:44 AM    Specimen: Arm, Right; Blood   Result Value Ref Range    Phosphorus 3.0 2.5 - 4.5 mg/dL   Magnesium    Collection Time: 12/01/23  4:44 AM    Specimen: Arm, Right; Blood   Result Value Ref Range    Magnesium 1.6 1.6 - 2.4 mg/dL   CBC Auto Differential    Collection Time: 12/01/23  4:44 AM    Specimen: Arm, Right; Blood   Result Value Ref Range    WBC 4.57 3.40 - 10.80 10*3/mm3    RBC 3.80 3.77 - 5.28 10*6/mm3    Hemoglobin 11.6 (L) 12.0 - 15.9 g/dL    Hematocrit 34.5 34.0 - 46.6 %    MCV 90.8 79.0 - 97.0 fL    MCH 30.5 26.6 - 33.0 pg    MCHC 33.6 31.5 - 35.7 g/dL    RDW 13.1 12.3 - 15.4 %    RDW-SD 43.6 37.0 - 54.0 fl    MPV 11.1 6.0 - 12.0 fL    Platelets 129 (L) 140 - 450 10*3/mm3     Neutrophil % 60.0 42.7 - 76.0 %    Lymphocyte % 26.9 19.6 - 45.3 %    Monocyte % 9.0 5.0 - 12.0 %    Eosinophil % 3.5 0.3 - 6.2 %    Basophil % 0.2 0.0 - 1.5 %    Neutrophils, Absolute 2.74 1.70 - 7.00 10*3/mm3    Lymphocytes, Absolute 1.23 0.70 - 3.10 10*3/mm3    Monocytes, Absolute 0.41 0.10 - 0.90 10*3/mm3    Eosinophils, Absolute 0.16 0.00 - 0.40 10*3/mm3    Basophils, Absolute 0.01 0.00 - 0.20 10*3/mm3   ECG 12 Lead Drug Monitoring; Amiodarone    Collection Time: 12/01/23  8:05 AM   Result Value Ref Range    QT Interval 452 ms    QTC Interval 413 ms   POC Glucose Once    Collection Time: 12/01/23  8:23 AM    Specimen: Blood   Result Value Ref Range    Glucose 162 (H) 70 - 130 mg/dL       Radiology:  Imaging Results (Last 24 Hours)       ** No results found for the last 24 hours. **                 ASSESSMENT:   CANDICE, likely related to diuretics and new start of ARB. Better today after IV fluids  Chronic diastolic CHF with LVH    Atrial fibrillation with rapid ventricular response    Type 2 diabetes mellitus with circulatory disorder, without long-term current use of insulin    Essential hypertension    TARSHA (obstructive sleep apnea)    Elevated troponin    Hypomagnesemia    COPD (chronic obstructive pulmonary disease)        DISCUSSION/PLAN:   Agree that an ARB would be beneficial given her LVH on echo and chronic hypertension.  Agree with increasing dose to 50 mg daily  Okay to hold Lasix for a few days  Advised patient to weigh herself at home, take extra Lasix as needed for 3 pound weight gain or increased edema of her legs or increasing oxygen requirements  Patient to follow-up as an outpatient with cardiology  Creatinine 0.9 today which is near baseline    I appreciate the consult request.  Please send me a secure chat message with any nonurgent questions regarding patient care.  For any urgent patient care issues please call my office number below.    She is stable for discharge home today from a renal  standpoint and does not need follow-up in our office unless new issues      Black Pena MD  [facility]  Office phone number: [phone]  Answering service phone number: [phone]      12/1/2023        Dictation via Dragon dictation software

## 2023-12-01 NOTE — PLAN OF CARE
Goal Outcome Evaluation:              Outcome Evaluation: Pt discharged to home.  Pt agreeable to discharge plans and verbalized understaning of all discharge instructions.  Pt left with discharge instructions and holter monitor.  Pt taken to car via wheelchair and had family with her at time of discharge.

## 2023-12-01 NOTE — PLAN OF CARE
Goal Outcome Evaluation:  Plan of Care Reviewed With: patient, daughter        Progress: improving  Outcome Evaluation: VSS, HR 50's, no complaints of pain or SOA, recheck labs in am, possible DC, holter monitor at DC, call light in reach

## 2023-12-01 NOTE — OUTREACH NOTE
Prep Survey      Flowsheet Row Responses   [facility] facility patient discharged from? [place]   Is LACE score < 7 ? No   Eligibility [facility]   Date of Admission 11/28/23   Date of Discharge 12/01/23   Discharge Disposition Home-Health Care Svc   Discharge diagnosis A-fib RVR   Does the patient have one of the following disease processes/diagnoses(primary or secondary)? Other   Does the patient have Home health ordered? Yes   What is the Home health agency?  [facility]   Is there a DME ordered? No   General alerts for this patient [facility]   Prep survey completed? Yes            Marie MOON - Registered Nurse

## 2023-12-03 ENCOUNTER — TRANSITIONAL CARE MANAGEMENT TELEPHONE ENCOUNTER (OUTPATIENT)
Dept: CALL CENTER | Facility: HOSPITAL | Age: 73
End: 2023-12-03
Payer: MEDICARE

## 2023-12-03 NOTE — OUTREACH NOTE
Call Center TCM Note      Flowsheet Row Responses   [facility] patient discharged from? [place]   Does the patient have one of the following disease processes/diagnoses(primary or secondary)? Other   TCM attempt successful? No   Unsuccessful attempts Attempt 1            Idania Campos RN    12/3/2023, 11:55 EST

## 2023-12-04 ENCOUNTER — TRANSITIONAL CARE MANAGEMENT TELEPHONE ENCOUNTER (OUTPATIENT)
Dept: CALL CENTER | Facility: HOSPITAL | Age: 73
End: 2023-12-04
Payer: MEDICARE

## 2023-12-04 NOTE — OUTREACH NOTE
Call Center TCM Note      Flowsheet Row Responses   [facility] patient discharged from? [place]   Does the patient have one of the following disease processes/diagnoses(primary or secondary)? Other   TCM attempt successful? No   Unsuccessful attempts Attempt 2            Ingris Pool LPN    12/4/2023, 14:40 EST

## 2023-12-05 ENCOUNTER — TRANSITIONAL CARE MANAGEMENT TELEPHONE ENCOUNTER (OUTPATIENT)
Dept: CALL CENTER | Facility: HOSPITAL | Age: 73
End: 2023-12-05
Payer: MEDICARE

## 2023-12-05 NOTE — OUTREACH NOTE
Call Center TCM Note      Flowsheet Row Responses   [facility] patient discharged from? [place]   Does the patient have one of the following disease processes/diagnoses(primary or secondary)? Other   TCM attempt successful? Yes   Call start time 1332   Call end time 1340   General alerts for this patient [facility]   Discharge diagnosis A-fib RVR   Person spoke with today (if not patient) and relationship pt   Meds reviewed with patient/caregiver? Yes   Is the patient having any side effects they believe may be caused by any medication additions or changes? No   Does the patient have all medications ordered at discharge? Yes   Is the patient taking all medications as directed (includes completed medication regime)? Yes   Does the patient have an appointment with their PCP within 7-14 days of discharge? No   Nursing Interventions Patient declined scheduling/rescheduling appointment at this time   What is the Home health agency?  [facility]   Has home health visited the patient within 72 hours of discharge? Yes   Psychosocial issues? No   Did the patient receive a copy of their discharge instructions? Yes   Nursing interventions Reviewed instructions with patient   What is the patient's perception of their health status since discharge? Improving   Is the patient/caregiver able to teach back signs and symptoms related to disease process for when to call PCP? Yes   Is the patient/caregiver able to teach back signs and symptoms related to disease process for when to call 911? Yes   Is the patient/caregiver able to teach back the hierarchy of who to call/visit for symptoms/problems? PCP, Specialist, Home health nurse, Urgent Care, ED, 911 Yes   If the patient is a current smoker, are they able to teach back resources for cessation? Not a smoker   TCM call completed? Yes   Wrap up additional comments Pt states she is doing better, and denies any irregular heartbeats, SOB, chest pain. Reviewed  AVS/meds with pt. Pt has cardiology fu appt 1/5/24, and message was routed to PCP office as patient wants her dtr to make appt as she is the one that takes pt.   Call end time 1340   Would this patient benefit from a Referral to [facility] Social Work? No   Is the patient interested in additional calls from an ambulatory ? No            Concepcion Demarco RN    12/5/2023, 13:41 EST

## 2023-12-06 ENCOUNTER — TELEPHONE (OUTPATIENT)
Dept: CARDIOLOGY | Facility: CLINIC | Age: 73
End: 2023-12-06

## 2023-12-06 NOTE — TELEPHONE ENCOUNTER
Name: Kanwal Sauer    Relationship: Self    Best Callback Number: [phone]    Incoming call to the Hub, requesting to  Reschedule their HFU appointment on 01.05.24.     Per Hub workflow, further review is needed before the task can be completed.    Result of Call: Please reach out to the patient to reschedule

## 2023-12-08 ENCOUNTER — OFFICE VISIT (OUTPATIENT)
Dept: FAMILY MEDICINE CLINIC | Facility: CLINIC | Age: 73
End: 2023-12-08
Payer: MEDICARE

## 2023-12-08 VITALS
HEIGHT: 67 IN | DIASTOLIC BLOOD PRESSURE: 72 MMHG | TEMPERATURE: 97.1 F | BODY MASS INDEX: 36.73 KG/M2 | WEIGHT: 234 LBS | RESPIRATION RATE: 18 BRPM | HEART RATE: 57 BPM | SYSTOLIC BLOOD PRESSURE: 138 MMHG | OXYGEN SATURATION: 99 %

## 2023-12-08 DIAGNOSIS — I10 ESSENTIAL HYPERTENSION: ICD-10-CM

## 2023-12-08 DIAGNOSIS — J44.9 CHRONIC OBSTRUCTIVE PULMONARY DISEASE, UNSPECIFIED COPD TYPE: ICD-10-CM

## 2023-12-08 DIAGNOSIS — N39.0 ACUTE UTI: ICD-10-CM

## 2023-12-08 DIAGNOSIS — I48.91 ATRIAL FIBRILLATION WITH RAPID VENTRICULAR RESPONSE: Primary | ICD-10-CM

## 2023-12-08 NOTE — PROGRESS NOTES
"Chief Complaint  Chief Complaint   Patient presents with   • Hospital Follow Up Visit     Pt here for f/u of A fib      ***DAXCC    Transitional Care Progress Note        Subjective    History of Present Illness   {CC  Problem List  Visit Diagnosis   Encounters  Notes  Medications  Labs  Result Review Imaging  Media :23}     Kanwal Sauer is a 73 y.o. female who presents for a transitional care management visit.    Within 48 business hours after discharge our office contacted her via telephone to coordinate her care and needs.      I reviewed and discussed the details of that call along with the discharge summary, hospital problems, inpatient lab results, inpatient diagnostic studies, and consultation reports with Kanwal.     Current outpatient and discharge medications have been reconciled for the patient.  Reviewed by: Indio Cotton Sr., MD          12/1/2023     6:35 PM   Date of TCM Phone Call   [facility]   Date of Admission 11/28/2023   Date of Discharge 12/1/2023   Discharge Disposition Home-Health Care [facility]     Risk for Readmission (LACE) Score: 8 (12/1/2023  6:00 AM)      Kanwal Sauer presents to [facility] for   History of Present Illness   ***DAXHPI    Objective   Vital Signs:   Visit Vitals  /72   Pulse 57   Temp 97.1 °F (36.2 °C)   Resp 18   Ht 170.2 cm (67\")   Wt 106 kg (234 lb)   SpO2 99%   BMI 36.65 kg/m²             Physical Exam   ***DAXPE  {DIABETIC FOOT EXAM FOR  (Optional):64812::\"Diabetic Foot Exam Performed\":0}    Result Review :  { Labs  Result Review  Imaging  Med Tab  Media :23}   {The following data was reviewed by (Optional):40186}  {Ambulatory Labs (Optional):57836}  {Data reviewed (Optional):61044:::1}   ***DAXRESULTS           Assessment and Plan   {CC Problem List  Visit Diagnosis  ROS  Review (Popup)  Health Maintenance  Quality  BestPractice  Medications  SmartSets  SnapShot Encounters  Media :23} "   There are no diagnoses linked to this encounter.   ***DAXAP  {Time Spent (Optional):26901}    Follow Up {Instructions Charge Capture  Follow-up Communications :23}  No follow-ups on file.  Patient was given instructions and counseling regarding her condition or for health maintenance advice. Please see specific information pulled into the AVS if appropriate.

## 2023-12-09 NOTE — PROGRESS NOTES
Chief Complaint  Chief Complaint   Patient presents with    Hospital Follow Up Visit     Pt here for f/u of A [facility]  Hospital follow-up    Transitional Care Progress Note        Subjective    History of Present Illness        Kanwal Sauer is a 73 y.o. female who presents for a transitional care management visit.    Within 48 business hours after discharge our office contacted her via telephone to coordinate her care and needs.      I reviewed and discussed the details of that call along with the discharge summary, hospital problems, inpatient lab results, inpatient diagnostic studies, and consultation reports with Kanwal.     Current outpatient and discharge medications have been reconciled for the patient.  Reviewed by: Indio Cotton Sr., MD          12/1/2023     6:35 PM   Date of TCM Phone Call   [facility]   Date of Admission 11/28/2023   Date of Discharge 12/1/2023   Discharge Disposition Home-Health Care [facility]     Risk for Readmission (LACE) No data recorded      Kanwal Sauer presents to [facility] for     History of Present Illness  Hospital follow-up  The patient woke up at 10:30 AM with a rapid heartbeat on 11/29/2023. Heart rate was 181 bpm when it was checked. She called her daughter who took her to the emergency room. Laboratory tests were done. Her white blood count was elevated. She was believed to have either a UTI or the start of a UTI, which could have accelerated her heart rate. Her blood pressure and blood glucose have been slightly elevated. They wanted to observe her and decided to admit her. Her daughter signed the papers for her to be admitted. She had to leave to take care of her sister for 2 hours. When her daughter came back, she had been placed in observation until Friday, 12/01/2023. Her daughter was unable to find out why the patient was transitioned to observation. They changed her medications. She was prescribed amiodarone, and she  "automatically went back into sinus rhythm. They monitored her, and she still had high blood pressure. She was then given losartan. She had a reaction to the first antibiotic and placed her on another one. She has finished with that. She was sent home on amiodarone for this month and a heart monitor. She has a follow-up appointment in the second week of 01/2024. They do not want to keep her on the amiodarone because it has a significant amount of side effects. Her daughter states that the patient is out of her metoprolol, which stabilizes her heart and rhythm. She used to be on losartan a long time ago. She has been feeling improved now. She does not need any refills today. Her medications have already been picked up. She seems to be improving. She still becomes tired.    Chronic obstructive pulmonary disease  The patient is on oxygen and has COPD. She needs a refill on her Trelegy. She has not been noticed to have any reaction on the Trelegy.           Objective   Vital Signs:   Visit Vitals  /72   Pulse 57   Temp 97.1 °F (36.2 °C)   Resp 18   Ht 170.2 cm (67\")   Wt 106 kg (234 lb)   SpO2 99%   BMI 36.65 kg/m²             Physical Exam  Vitals reviewed.   Constitutional:       Appearance: She is well-developed.   HENT:      Head: Normocephalic.      Right Ear: External ear normal.      Left Ear: External ear normal.      Nose: Nose normal.   Eyes:      Conjunctiva/sclera: Conjunctivae normal.   Cardiovascular:      Rate and Rhythm: Normal rate and regular rhythm.   Pulmonary:      Effort: Pulmonary effort is normal.      Breath sounds: Normal breath sounds.   Musculoskeletal:         General: Normal range of motion.      Cervical back: Normal range of motion and neck supple.   Skin:     General: Skin is warm and dry.      Capillary Refill: Capillary refill takes less than 2 seconds.   Neurological:      Mental Status: She is alert and oriented to person, place, and time.              Result Review :         "              Assessment and Plan      Diagnoses and all orders for this visit:    1. Atrial fibrillation with rapid ventricular response (Primary)    2. Essential hypertension  Assessment & Plan:  Hypertension is unchanged.  Continue current treatment regimen.  Dietary sodium restriction.  Weight loss.  Regular aerobic exercise.  Continue current medications.  Blood pressure will be reassessed at the next regular appointment.      3. Acute UTI  Assessment & Plan:  Prescriptions as below.  Common drug reactions discussed - watch for itching, rash, diarrhea and upset stomach.  Stop pyridium after 2 days to see if symptoms have improved.  Tylenol and/or ibuprofen for pain/fever.  Increase fluids.  Culture sent - will follow-up with patient by phone once received.  Call or return to the office if no improvement in 3-4 days or if symptoms progress despite medication.  Hospital records reviewed.      4. Chronic obstructive pulmonary disease, unspecified COPD type  Assessment & Plan:  COPD is improving with treatment.  Patient was given prescription for trilogy to treat her symptoms.    Orders:  -     Fluticasone-Umeclidin-Vilant (Trelegy Ellipta) 100-62.5-25 MCG/ACT inhaler; Inhale 1 puff Daily.  Dispense: 60 each; Refill: 1               Follow Up   No follow-ups on file.  Patient was given instructions and counseling regarding her condition or for health maintenance advice. Please see specific information pulled into the AVS if appropriate.       Transcribed from ambient dictation for Indio Cotton Sr, MD by Violette Sigala.  12/09/23   15:19 EST    Patient or patient representative verbalized consent to the visit recording.  I have personally performed the services described in this document as transcribed by the above individual, and it is both accurate and complete.

## 2023-12-12 ENCOUNTER — READMISSION MANAGEMENT (OUTPATIENT)
Dept: CALL CENTER | Facility: HOSPITAL | Age: 73
End: 2023-12-12
Payer: MEDICARE

## 2023-12-12 NOTE — OUTREACH NOTE
Medical Week 2 Survey      Flowsheet Row Responses   [facility] patient discharged from? [place]   Does the patient have one of the following disease processes/diagnoses(primary or secondary)? Other   Week 2 attempt successful? No   Unsuccessful attempts Attempt 1  [attempted pt and dtr]            CHRIS ARIAS - Registered Nurse

## 2023-12-14 ENCOUNTER — READMISSION MANAGEMENT (OUTPATIENT)
Dept: CALL CENTER | Facility: HOSPITAL | Age: 73
End: 2023-12-14
Payer: MEDICARE

## 2023-12-14 NOTE — OUTREACH NOTE
Medical Week 2 Survey      Flowsheet Row Responses   [facility] [place] patient discharged from? [place]   Does the patient have one of the following disease processes/diagnoses(primary or secondary)? Other   Week 2 attempt successful? Yes   Call start time 1533   Call end time 1542   Meds reviewed with patient/caregiver? Yes   Is the patient having any side effects they believe may be caused by any medication additions or changes? No   Does the patient have all medications ordered at discharge? Yes   Is the patient taking all medications as directed (includes completed medication regime)? Yes   Does the patient have a primary care provider?  Yes   Does the patient have an appointment with their PCP within 7 days of discharge? Yes   Has the patient kept scheduled appointments due by today? Yes   Has home health visited the patient within 72 hours of discharge? Yes   DME comments States will complete ZIO patch tomorrow.   Psychosocial issues? No   Did the patient receive a copy of their discharge instructions? Yes   Nursing interventions Reviewed instructions with patient   What is the patient's perception of their health status since discharge? Improving   Is the patient/caregiver able to teach back signs and symptoms related to disease process for when to call PCP? Yes   Is the patient/caregiver able to teach back signs and symptoms related to disease process for when to call 911? Yes   Is the patient/caregiver able to teach back the hierarchy of who to call/visit for symptoms/problems? PCP, Specialist, Home health nurse, Urgent Care, ED, 911 Yes   If the patient is a current smoker, are they able to teach back resources for cessation? Not a smoker   Additional teach back comments Encouraged to weigh daily, and take Lasix for 3# weight gain per AVS.   Week 2 Call Completed? Yes   Is the patient interested in additional calls from an ambulatory ? No   Would this patient benefit from a Referral to Amb  Social Work? No   Wrap up additional comments Patient states is improving. Denies any concerns/needs today. Encouraged to weigh daily and take Lasix for 3# weight gain per AVS instructions. Patient voiced understanding. States will complete ZIO patch tomorrow.   Call end time 3912            Amarilis SEGURA - Registered Nurse

## 2023-12-15 RX ORDER — PRIMIDONE 50 MG/1
TABLET ORAL
Qty: 270 TABLET | Refills: 0 | Status: SHIPPED | OUTPATIENT
Start: 2023-12-15

## 2023-12-21 ENCOUNTER — READMISSION MANAGEMENT (OUTPATIENT)
Dept: CALL CENTER | Facility: HOSPITAL | Age: 73
End: 2023-12-21
Payer: MEDICARE

## 2023-12-21 NOTE — OUTREACH NOTE
Medical Week 3 Survey      Flowsheet Row Responses   [facility] patient discharged from? [place]   Does the patient have one of the following disease processes/diagnoses(primary or secondary)? Other   Week 3 attempt successful? No   Call start time 1546   Unsuccessful attempts Attempt 1  [daughter requested call back tomorrow, 12/22/23]   Discharge diagnosis A-fib RVR            Michelle MOON - Registered Nurse

## 2023-12-26 NOTE — ASSESSMENT & PLAN NOTE
COPD is improving with treatment.  Patient was given prescription for trilogy to treat her symptoms.

## 2023-12-26 NOTE — ASSESSMENT & PLAN NOTE
Prescriptions as below.  Common drug reactions discussed - watch for itching, rash, diarrhea and upset stomach.  Stop pyridium after 2 days to see if symptoms have improved.  Tylenol and/or ibuprofen for pain/fever.  Increase fluids.  Culture sent - will follow-up with patient by phone once received.  Call or return to the office if no improvement in 3-4 days or if symptoms progress despite medication.  Hospital records reviewed.

## 2023-12-27 ENCOUNTER — READMISSION MANAGEMENT (OUTPATIENT)
Dept: CALL CENTER | Facility: HOSPITAL | Age: 73
End: 2023-12-27
Payer: MEDICARE

## 2023-12-27 NOTE — OUTREACH NOTE
Medical Week 3 Survey      Flowsheet Row Responses   [facility] [place] patient discharged from? [place]   Does the patient have one of the following disease processes/diagnoses(primary or secondary)? Other   Week 3 attempt successful? Yes   Call start time 1606   Call end time 1607   Discharge diagnosis A-fib RVR   Meds reviewed with patient/caregiver? Yes   Is the patient having any side effects they believe may be caused by any medication additions or changes? No   Does the patient have all medications ordered at discharge? Yes   Is the patient taking all medications as directed (includes completed medication regime)? Yes   Does the patient have a primary care provider?  Yes   Does the patient have an appointment with their PCP within 7 days of discharge? Yes   Has the patient kept scheduled appointments due by today? Yes   Psychosocial issues? No   What is the patient's perception of their health status since discharge? Improving   Week 3 Call Completed? Yes   Graduated Yes   Wrap up additional comments Pt stated daughter was at pharmacy trying to get losartan. Said they had been having sme trouble. Advised t contact PCP billiee if daughter was not able to get med.   Call end time 1607            Nevin MOON - Registered Nurse

## 2023-12-27 NOTE — TELEPHONE ENCOUNTER
Caller: SHELDON TYSON    Relationship: Emergency Contact    Best call back number: [phone]     Requested Prescriptions:   Requested Prescriptions     Pending Prescriptions Disp Refills    losartan (COZAAR) 50 MG tablet 30 tablet 0     Sig: Take 1 tablet by mouth Daily.    amiodarone (PACERONE) 200 MG tablet 30 tablet 0     Sig: Take 1 tablet by mouth Daily.        Pharmacy where request should be sent: [facility][id number] - [facility], [place] - [address] AT [facility] [phone] [facility] [phone] FX     Last office visit with prescribing clinician: 11/6/2023   Last telemedicine visit with prescribing clinician: Visit date not found   Next office visit with prescribing clinician: Visit date not found     Additional details provided by patient:     Does the patient have less than a 3 day supply:  [x] Yes  [] No    Would you like a call back once the refill request has been completed: [x] Yes [] No    If the office needs to give you a call back, can they leave a voicemail: [] Yes [] No    Esthela Lynch Rep   12/27/23 15:33 EST

## 2023-12-29 RX ORDER — LOSARTAN POTASSIUM 50 MG/1
50 TABLET ORAL
Qty: 30 TABLET | Refills: 0 | Status: SHIPPED | OUTPATIENT
Start: 2023-12-29

## 2023-12-29 RX ORDER — AMIODARONE HYDROCHLORIDE 200 MG/1
200 TABLET ORAL
Qty: 30 TABLET | Refills: 0 | Status: SHIPPED | OUTPATIENT
Start: 2023-12-29

## 2024-01-11 NOTE — PROGRESS NOTES
CARDIOLOGY    Date of Office Visit: 2024  Patient Name: Kanwal Sauer  : 1950  Encounter Provider: Parth Kruse PA-C  Primary Cardiologist: Yasir Romano MD    CHIEF COMPLAINT / REASON FOR OFFICE VISIT     Hospital follow-up    HISTORY OF PRESENT ILLNESS     This is a 73 y.o. year old female who presents to [facility] CARDIOLOGY for a  hospital follow-up.    She has a history of paroxysmal atrial fibrillation and had a prior cardioversion in 2020 in [place], [place].  In 2022, she was hospitalized for a UTI and was seen to have episodes of atrial fibrillation with RVR.  She was started on IV Lopressor and discharged home on metoprolol tartrate.  During that admission, she did also require some doses of digoxin.  We have been following the patient since.     In 2023, she was having recurrent acute cystitis and had been holding off on taking her diuretics intermittently due to urinary frequency.  I had seen her in 2023 and she was having uncontrolled blood pressures.  She started watching blood pressures at home.    She went to the emergency room on 2023 with palpitations and elevated heart rate.  She was seen to be in atrial fibrillation with RVR and required IV amiodarone bolus to convert to sinus rhythm.  She was discharged home on amiodarone 200 mg daily.  She was started on losartan 50 mg daily for elevated blood pressures and she had a comorbid UTI and was treated with Macrobid.  She was recommended to hold her Lasix on discharge due to concerns of dehydration.    Today the patient reports  she has been doing ok the past few days but starting around 1 week ago she stated to get neck cramping/spasms that have now traveled down to her back. She has had this happened 3-4 times per year since . They have a muscle relaxer at home and will follow up with PCP.     She is not having any palpitations, dizziness or  "near syncope. She is using a walker to ambulate. She has mild leg edema today, I recommended starting to take her lasix at 20 mg instead of 40 mg due to frequent urination, she is only needing this less than 2-3 times per week.     Currently resides at an assisted living facility called [facility].  She uses a walker for ambulation. She is getting home health with PT and OT.     PMHx:  Paroxysmal atrial fibrillation, obstructive sleep apnea, anxiety/depression, diabetes mellitus type 2, hypertension, hyperlipidemia, COPD on 3 L, pancreatic cyst followed by PCP, breast cancer,        PHYSICAL EXAMINATION     Vital Signs:  /82 (BP Location: Left arm, Patient Position: Sitting, Cuff Size: Adult)   Ht 170.2 cm (67\")   Wt 102 kg (225 lb 3.2 oz)   SpO2 95%   BMI 35.27 kg/m²   Estimated body mass index is 35.27 kg/m² as calculated from the following:    Height as of this encounter: 170.2 cm (67\").    Weight as of this encounter: 102 kg (225 lb 3.2 oz).               Physical Exam  Constitutional:       Appearance: Normal appearance.   HENT:      Head: Normocephalic and atraumatic.   Cardiovascular:      Rate and Rhythm: Normal rate and regular rhythm.      Pulses: Normal pulses.      Heart sounds: Normal heart sounds.   Pulmonary:      Effort: Pulmonary effort is normal.      Breath sounds: Normal breath sounds.   Musculoskeletal:      Right lower leg: No edema.      Left lower leg: No edema.   Skin:     General: Skin is warm and dry.   Neurological:      General: No focal deficit present.      Mental Status: She is alert and oriented to person, place, and time.          Cardiac Testing/Results     Cardiac Testing:   - Echo 9/23/2022: EF 68% with mild LVH.  Grade 1 diastolic dysfunction.  Mild TR with RVSP less than 35 mmHg.  Left atrial cavity is borderline dilated.     - Heart monitor 12/27/2023- average heart of 55 bpm, range of , 5 episodes of non sustained SVT. No episode of atrial fibrillation "     Result Review :  The following data was reviewed by: Parth Kruse PA-C on 01/12/2024:    Lipid Panel          10/9/2023    11:09   Lipid Panel   Total Cholesterol 190    Triglycerides 84    HDL Cholesterol 71    VLDL Cholesterol 15    LDL Cholesterol  104       Lab Results   Component Value Date     12/01/2023     11/30/2023    K 4.2 12/01/2023    K 4.2 11/30/2023     12/01/2023    CL 97 (L) 11/30/2023    CO2 30.2 (H) 12/01/2023    CO2 33.4 (H) 11/30/2023    BUN 29 (H) 12/01/2023    BUN 30 (H) 11/30/2023    CREATININE 0.90 12/01/2023    CREATININE 1.32 (H) 11/30/2023    EGFRIFNONA 70 12/09/2021    EGFRIFNONA 70 09/16/2021    EGFRIFAFRI 84 09/16/2021    EGFRIFAFRI 78 06/22/2021    GLUCOSE 173 (H) 12/01/2023    GLUCOSE 193 (H) 11/30/2023    CALCIUM 9.5 12/01/2023    CALCIUM 9.7 11/30/2023    ALBUMIN 4.6 11/29/2023    ALBUMIN 4.7 10/09/2023    AST 18 11/29/2023    AST 15 10/09/2023    ALT 13 11/29/2023    ALT 12 10/09/2023     Lab Results   Component Value Date    WBC 4.57 12/01/2023    WBC 5.01 11/30/2023    HGB 11.6 (L) 12/01/2023    HGB 11.9 (L) 11/30/2023    HCT 34.5 12/01/2023    HCT 35.6 11/30/2023    MCV 90.8 12/01/2023    MCV 90.6 11/30/2023     (L) 12/01/2023     (L) 11/30/2023     Lab Results   Component Value Date    PROBNP 260.0 11/29/2023    PROBNP 2,084.0 (H) 12/29/2022     Lab Results   Component Value Date    CKTOTAL 36 07/05/2022    TROPONINT 27 (H) 11/29/2023     Lab Results   Component Value Date    TSH 2.690 11/29/2023    TSH 1.900 10/09/2023                 ECG 12 Lead    Date/Time: 1/12/2024 10:25 AM  Performed by: Parth Kruse PA-C    Authorized by: Parth Kruse PA-C  Comparison: compared with previous ECG from 11/30/2023  Rate: bradycardic  BPM: 53  QRS axis: left    Clinical impression: abnormal EKG  Comments: Qtc 437,                 ASSESSMENT & PLAN       Diagnoses and all orders for this visit:    1. PAF (paroxysmal atrial  fibrillation) (Primary)  Initially diagnosed January 2020 with prior cardioversion.  Recently had an episode at the end of November where she converted to sinus rhythm with IV amiodarone  Amiodarone 200 mg daily at discharge  Her [id number] score is 8, continue Eliquis 5 mg twice daily.  She denies any bleeding, hemoglobin in December was 11.6   Continue complications metoprolol 25 mg twice daily  Echo showed mildly enlarged left atrium  Recent thyroid function electrolytes were normal  D/c Amiodarone, repeat holter in 10 weeks then follow up in office, EKG with sinus bradycardia, holter montior showed blunted HR response, may have to decrease BB  2. Essential hypertension  She had been having elevated blood pressures and was eventually started on losartan 50 mg daily.  She is also taking metoprolol and Lasix 40 mg daily, did have grade 1 diastolic dysfunction on echo  Currently, she is only taking Lasix as needed for lower extremity edema with potassium  3. TARSHA (obstructive sleep apnea)  Reports compliance with using CPAP  On 3 L NC OD for COPD, She follows with pulmonology.  4. Type 2 diabetes mellitus with diabetic peripheral angiopathy without gangrene, without long-term current use of insulin  She is on metformin and most recent A1c was 6.7%  5. Acute UTI  Has been having recurrent UTI and is frequently following up with PCP.  He was hospitalized in November with atrial fibrillation with RVR in the setting of an acute UTI  She had previously been taking Lasix and potassium intermittently to help with lower extremity edema but due to increased urinary frequency she is cut back on using this medicine  Decrease lasix to 20 mg with potassium as needed   Possible new symptoms today, they will check UA when she gets home      Follow Up:  Return in about 3 months (around 4/12/2024) for .  Patient was given instructions and counseling regarding her condition or for health maintenance advice. Please contact office  if worsening symptoms or proceed to ER when appropriate.      Parth Kruse PA-C  01/12/24  10:14 EST    MEDICATIONS         Discharge Medications            Accurate as of January 12, 2024 10:14 AM. If you have any questions, ask your nurse or doctor.                Continue These Medications        Instructions Start Date   acetaminophen 325 MG tablet  Commonly known as: TYLENOL   650 mg, Oral, Every 4 Hours PRN      albuterol sulfate  (90 Base) MCG/ACT inhaler  Commonly known as: PROVENTIL HFA;VENTOLIN HFA;PROAIR HFA   INHALE 2 PUFFS EVERY 4 HOURS AS NEEDED FOR WHEEZING      amiodarone 200 MG tablet  Commonly known as: PACERONE   200 mg, Oral, Every 24 Hours Scheduled      citalopram 20 MG tablet  Commonly known as: CeleXA   TAKE 1 TABLET BY MOUTH EVERY DAY      CRANBERRY PO   650 mg, Oral      Eliquis 5 MG tablet tablet  Generic drug: apixaban   TAKE 1 TABLET BY MOUTH EVERY 12 HOURS      fenofibrate 160 MG tablet   TAKE 1 TABLET BY MOUTH EVERY DAY      Fluticasone-Umeclidin-Vilant 100-62.5-25 MCG/ACT inhaler  Commonly known as: Trelegy Ellipta   1 puff, Inhalation, Daily      furosemide 40 MG tablet  Commonly known as: LASIX   40 mg, Oral, Daily PRN      ipratropium-albuterol 0.5-2.5 mg/3 ml nebulizer  Commonly known as: DUO-NEB   3 mL, Nebulization, Every 4 Hours PRN      losartan 50 MG tablet  Commonly known as: COZAAR   50 mg, Oral, Every 24 Hours Scheduled      metFORMIN 1000 MG tablet  Commonly known as: GLUCOPHAGE   1,000 mg, Oral, 2 Times Daily With Meals      metoprolol tartrate 25 MG tablet  Commonly known as: LOPRESSOR   25 mg, Oral, 2 Times Daily      montelukast 10 MG tablet  Commonly known as: SINGULAIR   10 mg, Oral, Every Night at Bedtime      potassium chloride 10 MEQ CR tablet  Commonly known as: KLOR-CON   10 mEq, Oral, Daily      primidone 50 MG tablet  Commonly known as: MYSOLINE   TAKE 1 TABLET BY MOUTH THREE TIMES A DAY      PROBIOTIC-10 PO   Oral      tiotropium bromide  monohydrate 2.5 MCG/ACT aerosol solution inhaler  Commonly known as: SPIRIVA RESPIMAT   2 puffs, Inhalation, Daily - RT      tiZANidine 4 MG tablet  Commonly known as: ZANAFLEX   4 mg, Oral, Nightly PRN      Vitamin D3 25 MCG (1000 UT) capsule   1,000 Units, Oral, Daily                   **Dragon Disclaimer: This note was dictated using an electronic transcription. The electronic translation of spoken language may permit erroneous, or at times, nonsensical words or phrases to be inadvertently transcribed. Although I have reviewed the note for such errors, some may still exist.

## 2024-01-12 ENCOUNTER — OFFICE VISIT (OUTPATIENT)
Dept: CARDIOLOGY | Facility: CLINIC | Age: 74
End: 2024-01-12
Payer: MEDICARE

## 2024-01-12 VITALS
BODY MASS INDEX: 35.35 KG/M2 | WEIGHT: 225.2 LBS | HEIGHT: 67 IN | OXYGEN SATURATION: 95 % | SYSTOLIC BLOOD PRESSURE: 140 MMHG | DIASTOLIC BLOOD PRESSURE: 82 MMHG

## 2024-01-12 DIAGNOSIS — G47.33 OSA (OBSTRUCTIVE SLEEP APNEA): ICD-10-CM

## 2024-01-12 DIAGNOSIS — E11.51 TYPE 2 DIABETES MELLITUS WITH DIABETIC PERIPHERAL ANGIOPATHY WITHOUT GANGRENE, WITHOUT LONG-TERM CURRENT USE OF INSULIN: ICD-10-CM

## 2024-01-12 DIAGNOSIS — I48.0 PAF (PAROXYSMAL ATRIAL FIBRILLATION): Primary | ICD-10-CM

## 2024-01-12 DIAGNOSIS — R00.1 BRADYCARDIA, SINUS: ICD-10-CM

## 2024-01-12 DIAGNOSIS — N39.0 ACUTE UTI: ICD-10-CM

## 2024-01-12 DIAGNOSIS — I10 ESSENTIAL HYPERTENSION: ICD-10-CM

## 2024-01-12 PROBLEM — I48.91 ATRIAL FIBRILLATION WITH RAPID VENTRICULAR RESPONSE: Status: RESOLVED | Noted: 2023-11-29 | Resolved: 2024-01-12

## 2024-01-12 RX ORDER — FUROSEMIDE 40 MG/1
20 TABLET ORAL DAILY PRN
Start: 2024-01-12

## 2024-01-13 ENCOUNTER — HOSPITAL ENCOUNTER (EMERGENCY)
Facility: HOSPITAL | Age: 74
Discharge: HOME | End: 2024-01-13
Attending: EMERGENCY MEDICINE
Payer: MEDICARE

## 2024-01-13 VITALS
RESPIRATION RATE: 20 BRPM | HEIGHT: 67 IN | TEMPERATURE: 97.1 F | SYSTOLIC BLOOD PRESSURE: 193 MMHG | WEIGHT: 225 LBS | HEART RATE: 50 BPM | OXYGEN SATURATION: 98 % | DIASTOLIC BLOOD PRESSURE: 103 MMHG | BODY MASS INDEX: 35.31 KG/M2

## 2024-01-13 DIAGNOSIS — M62.830 MUSCLE SPASM OF BACK: ICD-10-CM

## 2024-01-13 DIAGNOSIS — N39.0 UTI (URINARY TRACT INFECTION) WITH PYURIA: Primary | ICD-10-CM

## 2024-01-13 DIAGNOSIS — E83.42 HYPOMAGNESEMIA: ICD-10-CM

## 2024-01-13 LAB
ALBUMIN SERPL-MCNC: 4.5 G/DL (ref 3.5–5.2)
ALBUMIN/GLOB SERPL: 2.3 G/DL
ALP SERPL-CCNC: 46 U/L (ref 39–117)
ALT SERPL W P-5'-P-CCNC: 11 U/L (ref 1–33)
ANION GAP SERPL CALCULATED.3IONS-SCNC: 8 MMOL/L (ref 5–15)
AST SERPL-CCNC: 12 U/L (ref 1–32)
BACTERIA UR QL AUTO: ABNORMAL /HPF
BASOPHILS # BLD AUTO: 0.03 10*3/MM3 (ref 0–0.2)
BASOPHILS NFR BLD AUTO: 0.6 % (ref 0–1.5)
BILIRUB SERPL-MCNC: 0.3 MG/DL (ref 0–1.2)
BILIRUB UR QL STRIP: NEGATIVE
BUN SERPL-MCNC: 22 MG/DL (ref 8–23)
BUN/CREAT SERPL: 23.7 (ref 7–25)
CALCIUM SPEC-SCNC: 10.3 MG/DL (ref 8.6–10.5)
CHLORIDE SERPL-SCNC: 102 MMOL/L (ref 98–107)
CK SERPL-CCNC: 30 U/L (ref 20–180)
CLARITY UR: ABNORMAL
CO2 SERPL-SCNC: 30 MMOL/L (ref 22–29)
COLOR UR: YELLOW
CREAT SERPL-MCNC: 0.93 MG/DL (ref 0.57–1)
DEPRECATED RDW RBC AUTO: 45.9 FL (ref 37–54)
EGFRCR SERPLBLD CKD-EPI 2021: 64.2 ML/MIN/1.73
EOSINOPHIL # BLD AUTO: 0.1 10*3/MM3 (ref 0–0.4)
EOSINOPHIL NFR BLD AUTO: 2.1 % (ref 0.3–6.2)
ERYTHROCYTE [DISTWIDTH] IN BLOOD BY AUTOMATED COUNT: 13.8 % (ref 12.3–15.4)
GLOBULIN UR ELPH-MCNC: 2 GM/DL
GLUCOSE SERPL-MCNC: 170 MG/DL (ref 65–99)
GLUCOSE UR STRIP-MCNC: ABNORMAL MG/DL
HCT VFR BLD AUTO: 34.6 % (ref 34–46.6)
HGB BLD-MCNC: 11.8 G/DL (ref 12–15.9)
HGB UR QL STRIP.AUTO: ABNORMAL
HOLD SPECIMEN: NORMAL
HOLD SPECIMEN: NORMAL
HYALINE CASTS UR QL AUTO: ABNORMAL /LPF
IMM GRANULOCYTES # BLD AUTO: 0.05 10*3/MM3 (ref 0–0.05)
IMM GRANULOCYTES NFR BLD AUTO: 1 % (ref 0–0.5)
KETONES UR QL STRIP: NEGATIVE
LEUKOCYTE ESTERASE UR QL STRIP.AUTO: ABNORMAL
LIPASE SERPL-CCNC: 25 U/L (ref 13–60)
LYMPHOCYTES # BLD AUTO: 1.12 10*3/MM3 (ref 0.7–3.1)
LYMPHOCYTES NFR BLD AUTO: 23.3 % (ref 19.6–45.3)
MAGNESIUM SERPL-MCNC: 1.2 MG/DL (ref 1.6–2.4)
MCH RBC QN AUTO: 30.8 PG (ref 26.6–33)
MCHC RBC AUTO-ENTMCNC: 34.1 G/DL (ref 31.5–35.7)
MCV RBC AUTO: 90.3 FL (ref 79–97)
MONOCYTES # BLD AUTO: 0.35 10*3/MM3 (ref 0.1–0.9)
MONOCYTES NFR BLD AUTO: 7.3 % (ref 5–12)
NEUTROPHILS NFR BLD AUTO: 3.15 10*3/MM3 (ref 1.7–7)
NEUTROPHILS NFR BLD AUTO: 65.7 % (ref 42.7–76)
NITRITE UR QL STRIP: POSITIVE
NRBC BLD AUTO-RTO: 0 /100 WBC (ref 0–0.2)
PH UR STRIP.AUTO: 7.5 [PH] (ref 5–8)
PLATELET # BLD AUTO: 157 10*3/MM3 (ref 140–450)
PMV BLD AUTO: 10.3 FL (ref 6–12)
POTASSIUM SERPL-SCNC: 4.3 MMOL/L (ref 3.5–5.2)
PROT SERPL-MCNC: 6.5 G/DL (ref 6–8.5)
PROT UR QL STRIP: ABNORMAL
RBC # BLD AUTO: 3.83 10*6/MM3 (ref 3.77–5.28)
RBC # UR STRIP: ABNORMAL /HPF
REF LAB TEST METHOD: ABNORMAL
SODIUM SERPL-SCNC: 140 MMOL/L (ref 136–145)
SP GR UR STRIP: 1.02 (ref 1–1.03)
SQUAMOUS #/AREA URNS HPF: ABNORMAL /HPF
UROBILINOGEN UR QL STRIP: ABNORMAL
WBC # UR STRIP: ABNORMAL /HPF
WBC NRBC COR # BLD AUTO: 4.8 10*3/MM3 (ref 3.4–10.8)
WHOLE BLOOD HOLD COAG: NORMAL
WHOLE BLOOD HOLD SPECIMEN: NORMAL

## 2024-01-13 PROCEDURE — 25010000002 MAGNESIUM SULFATE 2 GM/50ML SOLUTION: Performed by: EMERGENCY MEDICINE

## 2024-01-13 PROCEDURE — 96367 TX/PROPH/DG ADDL SEQ IV INF: CPT

## 2024-01-13 PROCEDURE — 87086 URINE CULTURE/COLONY COUNT: CPT | Performed by: EMERGENCY MEDICINE

## 2024-01-13 PROCEDURE — 80053 COMPREHEN METABOLIC PANEL: CPT | Performed by: EMERGENCY MEDICINE

## 2024-01-13 PROCEDURE — 99283 EMERGENCY DEPT VISIT LOW MDM: CPT

## 2024-01-13 PROCEDURE — 83690 ASSAY OF LIPASE: CPT | Performed by: EMERGENCY MEDICINE

## 2024-01-13 PROCEDURE — 83735 ASSAY OF MAGNESIUM: CPT | Performed by: EMERGENCY MEDICINE

## 2024-01-13 PROCEDURE — 81001 URINALYSIS AUTO W/SCOPE: CPT | Performed by: EMERGENCY MEDICINE

## 2024-01-13 PROCEDURE — 25010000002 CEFTRIAXONE PER 250 MG: Performed by: EMERGENCY MEDICINE

## 2024-01-13 PROCEDURE — 25810000003 SODIUM CHLORIDE 0.9 % SOLUTION: Performed by: EMERGENCY MEDICINE

## 2024-01-13 PROCEDURE — 85025 COMPLETE CBC W/AUTO DIFF WBC: CPT | Performed by: EMERGENCY MEDICINE

## 2024-01-13 PROCEDURE — 87186 SC STD MICRODIL/AGAR DIL: CPT | Performed by: EMERGENCY MEDICINE

## 2024-01-13 PROCEDURE — 96365 THER/PROPH/DIAG IV INF INIT: CPT

## 2024-01-13 PROCEDURE — 82550 ASSAY OF CK (CPK): CPT | Performed by: EMERGENCY MEDICINE

## 2024-01-13 PROCEDURE — 87077 CULTURE AEROBIC IDENTIFY: CPT | Performed by: EMERGENCY MEDICINE

## 2024-01-13 RX ORDER — LIDOCAINE 50 MG/G
1 PATCH TOPICAL EVERY 24 HOURS
Qty: 15 EACH | Refills: 0 | Status: SHIPPED | OUTPATIENT
Start: 2024-01-13

## 2024-01-13 RX ORDER — HYDROCODONE BITARTRATE AND ACETAMINOPHEN 5; 325 MG/1; MG/1
1 TABLET ORAL ONCE
Status: COMPLETED | OUTPATIENT
Start: 2024-01-13 | End: 2024-01-13

## 2024-01-13 RX ORDER — MAGNESIUM OXIDE 400 MG/1
400 TABLET ORAL DAILY
Qty: 10 TABLET | Refills: 0 | Status: SHIPPED | OUTPATIENT
Start: 2024-01-13

## 2024-01-13 RX ORDER — DIAZEPAM 5 MG/1
5 TABLET ORAL ONCE
Status: COMPLETED | OUTPATIENT
Start: 2024-01-13 | End: 2024-01-13

## 2024-01-13 RX ORDER — SODIUM CHLORIDE 0.9 % (FLUSH) 0.9 %
10 SYRINGE (ML) INJECTION AS NEEDED
Status: DISCONTINUED | OUTPATIENT
Start: 2024-01-13 | End: 2024-01-13 | Stop reason: HOSPADM

## 2024-01-13 RX ORDER — MAGNESIUM SULFATE HEPTAHYDRATE 40 MG/ML
2 INJECTION, SOLUTION INTRAVENOUS ONCE
Status: COMPLETED | OUTPATIENT
Start: 2024-01-13 | End: 2024-01-13

## 2024-01-13 RX ORDER — CEPHALEXIN 500 MG/1
1000 CAPSULE ORAL 2 TIMES DAILY
Qty: 28 CAPSULE | Refills: 0 | Status: SHIPPED | OUTPATIENT
Start: 2024-01-13

## 2024-01-13 RX ORDER — TRAMADOL HYDROCHLORIDE 50 MG/1
50 TABLET ORAL EVERY 8 HOURS PRN
Qty: 12 TABLET | Refills: 0 | Status: SHIPPED | OUTPATIENT
Start: 2024-01-13

## 2024-01-13 RX ADMIN — Medication 10 ML: at 12:49

## 2024-01-13 RX ADMIN — MAGNESIUM SULFATE HEPTAHYDRATE 2 G: 40 INJECTION, SOLUTION INTRAVENOUS at 14:37

## 2024-01-13 RX ADMIN — DIAZEPAM 5 MG: 5 TABLET ORAL at 12:45

## 2024-01-13 RX ADMIN — SODIUM CHLORIDE 500 ML: 9 INJECTION, SOLUTION INTRAVENOUS at 12:47

## 2024-01-13 RX ADMIN — CEFTRIAXONE 2000 MG: 2 INJECTION, POWDER, FOR SOLUTION INTRAMUSCULAR; INTRAVENOUS at 13:51

## 2024-01-13 RX ADMIN — HYDROCODONE BITARTRATE AND ACETAMINOPHEN 1 TABLET: 5; 325 TABLET ORAL at 14:39

## 2024-01-13 NOTE — DISCHARGE INSTRUCTIONS
Rest at home avoiding any particularly strenuous activity today and tomorrow.     Eat small, frequent meals and drink plenty of fluids. Take any medication prescribed as instructed. If given antibiotics, then finish the full course of them.    Monitor for any signs of recurrent symptoms or worsening and see your primary doctor to discuss your ER visit while returning to the ER if any concerns as we discussed.

## 2024-01-13 NOTE — ED NOTES
Patient to ER via car from home for muscle spasms in neck and back x 2 weeks    Patient reports that she has been to PT but is getting worse

## 2024-01-13 NOTE — ED NOTES
Pt to ED with daughter, lives at home, having R neck spasms, goes down her whole back, moves into low back and into LLE. Spasms worse today, pt supposed to be on tizanidine but has not taken since Wednesday, pt denies falls or new injury, has had these issues since prior to July of 2020 per daughter. Supposed to see pcp next week. Has never seen neurosx.     Pt also now c/o burning with urination, states hx of UTIs. Denies n/v.     Pt also c/o wax buildup to R ear, was given Rx for wax drops and scheduled to have flushout at pcp office next week.

## 2024-01-13 NOTE — ED PROVIDER NOTES
" EMERGENCY DEPARTMENT ENCOUNTER    Room Number:  18/18  PCP: Nisha Britton MD  Independent Historians: Patient and daughter    HPI:  Chief Complaint: had concerns including Back Pain and Neck Pain.      A complete HPI/ROS/PMH/PSH/SH/FH are unobtainable due to: Dementia    Chronic or social conditions impacting patient care (Social Determinants of Health): None      Context: Kanwal Sauer is a 73 y.o. female with a medical history of neck and back spasms, frequent UTIs, hypertension, and hearing loss who presents to the ED c/o acute episode of neck and back spasms for the past 2 weeks gradually getting worse.  Today patient woke up with such severe spasms and pain in her right neck right back, left lower back, and left hip that she could not even get dressed on her own.  Patient called her daughter who brought her to the ER for further care as she cannot see her primary doctor till next week.  Patient has a long history of at least 40+ years of these episodes of \"spasms\".  Patient is in continual physical therapy with episodes of spasms that hit her and last about 2 weeks and then resolved.  This episode she says is \"the worst its ever been\".  Patient denies any recent falls or any other initiating factor.  Patient complains of her right ear being \"clogged\".  She also complains of some dysuria that started yesterday and believes she may have a urinary tract infection.        Review of prior external notes (non-ED) -and- Review of prior external test results outside of this encounter: Patient does have a fairly recent admission at the end of November for A-fib with RVR.  Patient was started on amiodarone at that time.  Patient was also started on losartan for better blood pressure control and did have a concurrent urinary tract infection treated with Macrobid while in the hospital.  Patient's Lasix was held at that time.  During her last primary care visit on January 12 patient did describe her neck and back " spasms and cramping that have gotten worse since discharge.  Patient had reported at that time that she would have these episodes 3-4 times a year since 2020 and that a muscle relaxer at home and over-the-counter Tylenol will usually help.  Her PCP recommended resuming Lasix 20 mg 2-3 times a week instead of 40 mg as she had been on prior to her hospitalization    Prescription drug monitoring program review:     MARYLOU query complete and reviewed. Treatment plan to include limited course of prescribed controlled substance. Risks including addiction, benefits, and alternatives presented to patient.    PAST MEDICAL HISTORY  Active Ambulatory Problems     Diagnosis Date Noted    Closed fracture of left distal femur 12/23/2020    Type 2 diabetes mellitus with circulatory disorder, without long-term current use of insulin 12/24/2020    Essential hypertension 12/24/2020    Tremors of nervous system 12/26/2020    PAF (paroxysmal atrial fibrillation) 12/27/2020    TARSHA (obstructive sleep apnea) 12/27/2020    History of breast cancer 02/22/2021    History of seizure 05/25/2021    Risk for falls 05/25/2021    Neck pain 11/27/2022    Acute UTI 12/29/2022    Chronic midline low back pain without sciatica 04/25/2023    Vulvar rash 05/22/2023    Elevated troponin 11/29/2023    Hypomagnesemia 11/29/2023    COPD (chronic obstructive pulmonary disease) 11/29/2023     Resolved Ambulatory Problems     Diagnosis Date Noted    CANDICE (acute kidney injury) 12/24/2020    Acute blood loss anemia 12/24/2020    Chronic respiratory failure with hypoxia 12/27/2020    Atrial fibrillation with rapid ventricular response 11/29/2023     Past Medical History:   Diagnosis Date    Allergic     Anxiety     Asthma     Atrial fibrillation     Cancer     Deep vein thrombosis     Diabetes mellitus     Difficulty walking     Diverticulosis     Drug therapy     Environmental allergies     Fractures     Headache, tension-type     Hyperlipidemia     Hypertension      Low back pain     Neuropathy in diabetes     Obesity     Scoliosis     Seizures     Sleep apnea     Tremor     Urinary tract infection     Weakness          PAST SURGICAL HISTORY  Past Surgical History:   Procedure Laterality Date    BREAST BIOPSY      CATARACT EXTRACTION, BILATERAL      DENTAL PROCEDURE      Removed teeth    FEMUR OPEN REDUCTION INTERNAL FIXATION Left 12/24/2020    Procedure: DISTAL FEMUR OPEN REDUCTION INTERNAL FIXATION;  Surgeon: Marley Hernandez MD;  Location: [facility];  Service: Orthopedics;  Laterality: Left;    MASTECTOMY Left     REPLACEMENT TOTAL KNEE BILATERAL           FAMILY HISTORY  Family History   Problem Relation Age of Onset    Arthritis Mother     Lung cancer Mother     Brain cancer Mother     Hypertension Mother     Cancer Mother         Lung cancer    Hypertension Father     Arthritis Sister     Breast cancer Sister     Diabetes Sister     Hypertension Sister     Dementia Sister     Cancer Sister         Brest cancer    Aneurysm Sister     Cancer Daughter         Colon cancer    Developmental Disability Daughter     Learning disabilities Daughter     Miscarriages / Stillbirths Daughter         Miscarriage         SOCIAL HISTORY  Social History     Socioeconomic History    Marital status:    Tobacco Use    Smoking status: Never    Smokeless tobacco: Never    Tobacco comments:     no caffine   Vaping Use    Vaping Use: Never used   Substance and Sexual Activity    Alcohol use: Not Currently    Drug use: Never    Sexual activity: Not Currently     Partners: Male         ALLERGIES  Baclofen; Doxycycline; Eggs or egg-derived products; Iodine; Latex; Milk-related compounds; Msg [monosodium glutamate]; Penicillins; Metronidazole; Ezetimibe; Latex, natural rubber; Levaquin [levofloxacin]; Nylon; and Simvastatin        REVIEW OF SYSTEMS  Review of Systems  Included in HPI  All systems reviewed and negative except for those discussed in HPI.      PHYSICAL EXAM    I  "have reviewed the triage vital signs and nursing notes.    ED Triage Vitals   Temp Heart Rate Resp BP SpO2   01/13/24 1144 01/13/24 1144 01/13/24 1144 01/13/24 1157 01/13/24 1144   97.1 °F (36.2 °C) 62 18 180/65 95 %      Temp src Heart Rate Source Patient Position BP Location FiO2 (%)   -- -- -- -- --              Physical Exam  GENERAL: Cooperative and conversant obese  female, episodes of grimacing and apparent \"spasms\" causing patient to appear very uncomfortable in bed, alert, between episodes of spasm patient appears comfortable  SKIN: Warm, dry  HENT: Normocephalic, atraumatic  EYES: no scleral icterus, EOMI, no conjunctivitis  CV: regular rhythm, regular rate  RESPIRATORY: normal effort, lungs clear, no rhonchi, no wheezing  ABDOMEN: soft, nontender, nondistended, obese, no rebound, no guarding  MUSCULOSKELETAL: no deformity, no lower extremity edema, no calf tenderness palpation  Back: No spinous tenderness palpation and no bony step-offs, right upper back pain over trapezius muscle area tender to palpation and no palpable muscle spasm, left lower lumbar area soft tissue tenderness to palpation as well with no palpable muscle spasm  NEURO: alert, moves all extremities, follows commands                                                               LAB RESULTS  Patient with a white blood cell count of 4.8, hemoglobin 11.8, and platelets 157.  Patient's CMP significant for sodium 140, potassium 4.3, chloride 102, bicarb 30, BUN 22, creatinine 0.93, glucose 178, calcium 10.3, and LFTs are normal.  Patient has a lipase of 25 and a CK of 30.  Patient's magnesium was low at 1.2.  Patient's urinalysis is significant for moderate leukocyte esterase, positive nitrites, 11-20 red blood cells, too numerous to count white blood cells, and 4+ bacteria.      RADIOLOGY  No Radiology Exams Resulted Within Past 24 Hours      MEDICATIONS GIVEN IN ER  Patient initially given 5 mg of oral Valium with some " relief    Pain returned so patient was given hydrocodone 5/325 x 1 tablet.  Patient was also administered IV magnesium sulfate 2 g IV.    For urinary tract infection patient was given 2 g of Rocephin IV.      ORDERS PLACED DURING THIS VISIT:  Orders Placed This Encounter   Procedures    Urine Culture - Urine, Urine, Clean Catch    [place] Draw    Urinalysis With Microscopic If Indicated (No Culture) - Urine, Clean Catch    Comprehensive Metabolic Panel    Lipase    CK    Magnesium    CBC Auto Differential    Urinalysis, Microscopic Only - Urine, Clean Catch    Green Top (Gel)    Lavender Top    Gold Top - SST    Light Blue Top    CBC & Differential         OUTPATIENT MEDICATION MANAGEMENT:  No current Epic-ordered facility-administered medications on file.     Current Outpatient Medications Ordered in Epic   Medication Sig Dispense Refill    acetaminophen (TYLENOL) 325 MG tablet Take 2 tablets by mouth Every 4 (Four) Hours As Needed for Mild Pain .      albuterol sulfate  (90 Base) MCG/ACT inhaler INHALE 2 PUFFS EVERY 4 HOURS AS NEEDED FOR WHEEZING 18 g 1    cephalexin (KEFLEX) 500 MG capsule Take 2 capsules by mouth 2 (Two) Times a Day. 28 capsule 0    Cholecalciferol (Vitamin D3) 25 MCG (1000 UT) capsule Take 1 capsule by mouth Daily.      citalopram (CeleXA) 20 MG tablet TAKE 1 TABLET BY MOUTH EVERY DAY 90 tablet 1    CRANBERRY PO Take 650 mg by mouth.      Eliquis 5 MG tablet tablet TAKE 1 TABLET BY MOUTH EVERY 12 HOURS 60 tablet 2    fenofibrate 160 MG tablet TAKE 1 TABLET BY MOUTH EVERY DAY 90 tablet 1    Fluticasone-Umeclidin-Vilant (Trelegy Ellipta) 100-62.5-25 MCG/ACT inhaler Inhale 1 puff Daily. 60 each 1    furosemide (LASIX) 40 MG tablet Take 0.5 tablets by mouth Daily As Needed (Take a dose of Lasix if you notice a 3 pound weight gain, increasing leg swelling, or an increase in your oxygen requirement.).      ipratropium-albuterol (DUO-NEB) 0.5-2.5 mg/3 ml nebulizer Take 3 mL by nebulization  Every 4 (Four) Hours As Needed for Wheezing. 150 mL 3    lidocaine (LIDODERM) 5 % Place 1 patch on the skin as directed by provider Daily. Remove & Discard patch within 12 hours or as directed by MD 15 each 0    losartan (COZAAR) 50 MG tablet Take 1 tablet by mouth Daily. 30 tablet 0    magnesium oxide (MAG-OX) 400 MG tablet Take 1 tablet by mouth Daily. 10 tablet 0    metFORMIN (GLUCOPHAGE) 1000 MG tablet TAKE 1 TABLET BY MOUTH TWICE A DAY WITH FOOD 180 tablet 0    metoprolol tartrate (LOPRESSOR) 25 MG tablet Take 1 tablet by mouth 2 (Two) Times a Day. 60 tablet 0    montelukast (SINGULAIR) 10 MG tablet TAKE 1 TABLET BY MOUTH EVERYDAY AT BEDTIME 90 tablet 0    potassium chloride (KLOR-CON) 10 MEQ CR tablet Take 1 tablet by mouth Daily. 90 tablet 0    primidone (MYSOLINE) 50 MG tablet TAKE 1 TABLET BY MOUTH THREE TIMES A  tablet 0    Probiotic Product (PROBIOTIC-10 PO) Take  by mouth.      tiotropium bromide monohydrate (SPIRIVA RESPIMAT) 2.5 MCG/ACT aerosol solution inhaler Inhale 2 puffs Daily for 30 days. 1 g 0    tiZANidine (ZANAFLEX) 4 MG tablet Take 1 tablet by mouth At Night As Needed for Muscle Spasms. 30 tablet 1    traMADol (ULTRAM) 50 MG tablet Take 1 tablet by mouth Every 8 (Eight) Hours As Needed for Moderate Pain or Severe Pain. 12 tablet 0         PROCEDURES  Procedures            PROGRESS, DATA ANALYSIS, CONSULTS, AND MEDICAL DECISION MAKING  All labs have been independently interpreted by me.  All radiology studies have been reviewed by me. All EKG's have been independently viewed and interpreted by me.  Discussion below represents my analysis of pertinent findings related to patient's condition, differential diagnosis, treatment plan and final disposition.    Differential diagnosis includes but is not limited to  This patient presents with back pain most consistent with musculoskeletal strain or exacerbation of prior pain episodes.  The differential diagnoses include lumbago versus  "musculoskeletal spasm/strain versus sciatica.  No back pain red flags on history or physical (no abdominal pain, no loss of bowel/bladder control, no overflow urinary incontinence, no saddle anesthesia, no blood in stools, no chest pain, no neuro deficits).  This patient´s presentation is not consistent with malignancy (lack of cancer history, no night sweats, no fevers, no unexplained weight loss), fracture (no trauma, no spinous point tenderness, no bony step offs), cauda equina (no bowel or urinary incontinence nor retention, no saddle anesthesia, no distal weakness), AAA (physical exam with no abdominal pain, no pulsatile abdominal mass, no pulse deficits distally), bowel perforation or ischemic bowel (no blood in stools, no abdominal pain nor gi symptoms), pulmonary embolism (no chest pain, no shortness of breath), renal colic or pyelonephritis (no fever, no cva tenderness, no urinary symptoms).  Given these considerations,  no indication for imaging at this time.  Will plan to evaluate blood work for anemia, electrolyte disturbances, signs of infection, and urinalysis for UTI given patient's symptomatology including the spasm component to her pain.  Plan pain control and reassess as well as appropriate outpatient followup discussed..    Clinical Scores:                  ED Course as of 01/15/24 0802   Sat Jan 13, 2024   1430 Patient with recurrent spasm and pain.  Plan narcotic pain medication while administering magnesium and antibiotic.  I discussed all results thus far with patient and daughter. [AR]   1713 I discussed all results with patient and answered all questions to the best of my ability. The patient was encouraged to follow up appropriately.      He also had questions about patient's right ear which has been \"clogged\" for a while.  Patient has a decreased hearing out of the right ear.  She was seen by ENT and given \"softening drops\" but ear was not irrigated.  Patient is curious whether or not she " still has a significant amount of wax in the ear.  Patient has follow-up already scheduled regarding this but I offered to look.  On otoscopic examination patient does have cerumen impaction on the right.    Routine discharge counseling was given, and the patient was instructed to promptly call or followup with their primary physician or even return to the ER if any worsening, changing or persistent symptoms.         [AR]      ED Course User Index  [AR] Zoë Fernandez MD             AS OF 08:02 EST VITALS:    BP - (!) 193/103  HR - 50  TEMP - 97.1 °F (36.2 °C)  O2 SATS - 98%      COMPLEXITY OF CARE  Admission was considered but after careful review of the patient's presentation, physical examination, diagnostic results, and response to treatment the patient may be safely discharged with outpatient follow-up.    DIAGNOSIS  Final diagnoses:   UTI (urinary tract infection) with pyuria   Hypomagnesemia   Muscle spasm of back         DISPOSITION  ED Disposition       ED Disposition   Discharge    Condition   Stable    Comment   --                Please note that portions of this document were completed with a voice recognition program.    Note Disclaimer: At [facility], we believe that sharing information builds trust and better relationships. You are receiving this note because you recently visited [facility]. It is possible you will see health information before a provider has talked with you about it. This kind of information can be easy to misunderstand. To help you fully understand what it means for your health, we urge you to discuss this note with your provider.         Zoë Fernandez MD  01/15/24 0802

## 2024-01-15 ENCOUNTER — TELEPHONE (OUTPATIENT)
Dept: FAMILY MEDICINE CLINIC | Facility: CLINIC | Age: 74
End: 2024-01-15

## 2024-01-15 LAB — BACTERIA SPEC AEROBE CULT: ABNORMAL

## 2024-01-19 ENCOUNTER — OFFICE VISIT (OUTPATIENT)
Dept: FAMILY MEDICINE CLINIC | Facility: CLINIC | Age: 74
End: 2024-01-19
Payer: MEDICARE

## 2024-01-19 VITALS
DIASTOLIC BLOOD PRESSURE: 60 MMHG | RESPIRATION RATE: 16 BRPM | HEIGHT: 67 IN | OXYGEN SATURATION: 95 % | BODY MASS INDEX: 35.14 KG/M2 | SYSTOLIC BLOOD PRESSURE: 140 MMHG | HEART RATE: 54 BPM | TEMPERATURE: 97.7 F | WEIGHT: 223.9 LBS

## 2024-01-19 DIAGNOSIS — M62.838 MUSCLE SPASMS OF NECK: Primary | ICD-10-CM

## 2024-01-19 DIAGNOSIS — H61.21 IMPACTED CERUMEN OF RIGHT EAR: ICD-10-CM

## 2024-01-19 RX ORDER — LATANOPROST 50 UG/ML
1 SOLUTION/ DROPS OPHTHALMIC
COMMUNITY
Start: 2023-12-21

## 2024-01-19 NOTE — PROGRESS NOTES
"Chief Complaint  Hospital Follow Up Visit and ear cleaning    Subjective        Kanwal Sauer presents to [facility] PRIMARY [facility]  History of Present Illness here for ER follow-up secondary to acute episode neck and  back muscle spasm.  Patient with history of recurrent episode of neck and back muscle spasm in the past.  Usually she gets 2-3 episodes of acute muscle spasm.  This time her  spasm will worse than before.  She also had a UTI she was prescribed antibiotic in the the ER for UTI for muscle spasm she was also prescribed magnesium oxide and tramadol .  As per her daughter her magnesium oxide has made a difference in her spasm.  She is also complaining of right ear fullness and decreased hearing from right ear.    Objective   Vital Signs:  /60   Pulse 54   Temp 97.7 °F (36.5 °C) (Infrared)   Resp 16   Ht 170.2 cm (67\")   Wt 102 kg (223 lb 14.4 oz)   SpO2 95% Comment: pt is on 3L of oxygen  BMI 35.07 kg/m²   Estimated body mass index is 35.07 kg/m² as calculated from the following:    Height as of this encounter: 170.2 cm (67\").    Weight as of this encounter: 102 kg (223 lb 14.4 oz).               Physical Exam   Result Review :                     Assessment and Plan     Diagnoses and all orders for this visit:    1. Muscle spasms of neck (Primary)    2. Impacted cerumen of right ear    Kanwal Sauer is a 73-year-old female patient with history of diabetes type 2 recurrent UTI hypertension and A-fib came here for follow-up from ER visit secondary to acute muscle spasm neck and back.  I reviewed the ER note on I advised the daughter to continue magnesium oxide and she can take tramadol as needed for pain.  Impacted cerumen of right ear ear.  Ear irrigation done in the office         Follow Up     No follow-ups on file.  Patient was given instructions and counseling regarding her condition or for health maintenance advice. Please see specific information pulled into the " AVS if appropriate.

## 2024-01-21 DIAGNOSIS — E11.65 TYPE 2 DIABETES MELLITUS WITH HYPERGLYCEMIA, WITHOUT LONG-TERM CURRENT USE OF INSULIN: ICD-10-CM

## 2024-01-22 ENCOUNTER — TELEPHONE (OUTPATIENT)
Dept: FAMILY MEDICINE CLINIC | Facility: CLINIC | Age: 74
End: 2024-01-22
Payer: MEDICARE

## 2024-01-22 RX ORDER — MONTELUKAST SODIUM 10 MG/1
10 TABLET ORAL
Qty: 90 TABLET | Refills: 0 | Status: SHIPPED | OUTPATIENT
Start: 2024-01-22

## 2024-01-23 RX ORDER — POTASSIUM CHLORIDE 750 MG/1
10 TABLET, EXTENDED RELEASE ORAL DAILY
Qty: 90 TABLET | Refills: 0 | Status: SHIPPED | OUTPATIENT
Start: 2024-01-23

## 2024-01-23 RX ORDER — POTASSIUM CHLORIDE 750 MG/1
10 TABLET, EXTENDED RELEASE ORAL DAILY
Qty: 90 TABLET | Refills: 0 | OUTPATIENT
Start: 2024-01-23

## 2024-02-01 RX ORDER — LOSARTAN POTASSIUM 50 MG/1
50 TABLET ORAL DAILY
Qty: 30 TABLET | Refills: 0 | Status: SHIPPED | OUTPATIENT
Start: 2024-02-01

## 2024-02-01 RX ORDER — CITALOPRAM 20 MG/1
TABLET ORAL
Qty: 90 TABLET | Refills: 1 | Status: SHIPPED | OUTPATIENT
Start: 2024-02-01

## 2024-02-01 RX ORDER — PRIMIDONE 50 MG/1
TABLET ORAL
Qty: 270 TABLET | Refills: 0 | Status: SHIPPED | OUTPATIENT
Start: 2024-02-01

## 2024-02-09 ENCOUNTER — OFFICE VISIT (OUTPATIENT)
Dept: FAMILY MEDICINE CLINIC | Facility: CLINIC | Age: 74
End: 2024-02-09
Payer: MEDICARE

## 2024-02-09 VITALS
OXYGEN SATURATION: 93 % | HEIGHT: 67 IN | SYSTOLIC BLOOD PRESSURE: 144 MMHG | RESPIRATION RATE: 20 BRPM | TEMPERATURE: 98 F | WEIGHT: 220.5 LBS | DIASTOLIC BLOOD PRESSURE: 59 MMHG | BODY MASS INDEX: 34.61 KG/M2 | HEART RATE: 56 BPM

## 2024-02-09 DIAGNOSIS — M54.16 LUMBAR RADICULOPATHY: ICD-10-CM

## 2024-02-09 DIAGNOSIS — M54.12 CERVICAL RADICULOPATHY: Primary | ICD-10-CM

## 2024-02-09 PROCEDURE — 3078F DIAST BP <80 MM HG: CPT | Performed by: NURSE PRACTITIONER

## 2024-02-09 PROCEDURE — 99214 OFFICE O/P EST MOD 30 MIN: CPT | Performed by: NURSE PRACTITIONER

## 2024-02-09 PROCEDURE — 3077F SYST BP >= 140 MM HG: CPT | Performed by: NURSE PRACTITIONER

## 2024-02-09 RX ORDER — METHYLPREDNISOLONE 4 MG/1
TABLET ORAL
Qty: 21 TABLET | Refills: 0 | Status: SHIPPED | OUTPATIENT
Start: 2024-02-09

## 2024-02-09 RX ORDER — LANOLIN ALCOHOL/MO/W.PET/CERES
CREAM (GRAM) TOPICAL
COMMUNITY
Start: 2024-01-13

## 2024-02-16 ENCOUNTER — TELEPHONE (OUTPATIENT)
Dept: PAIN MEDICINE | Facility: CLINIC | Age: 74
End: 2024-02-16
Payer: MEDICARE

## 2024-02-19 ENCOUNTER — OFFICE VISIT (OUTPATIENT)
Dept: PAIN MEDICINE | Facility: CLINIC | Age: 74
End: 2024-02-19
Payer: MEDICARE

## 2024-02-19 VITALS
WEIGHT: 224.2 LBS | DIASTOLIC BLOOD PRESSURE: 74 MMHG | BODY MASS INDEX: 35.19 KG/M2 | HEART RATE: 57 BPM | HEIGHT: 67 IN | RESPIRATION RATE: 20 BRPM | OXYGEN SATURATION: 93 % | SYSTOLIC BLOOD PRESSURE: 133 MMHG | TEMPERATURE: 96.9 F

## 2024-02-19 DIAGNOSIS — M54.2 NECK PAIN: ICD-10-CM

## 2024-02-19 DIAGNOSIS — M54.16 LUMBAR RADICULOPATHY: ICD-10-CM

## 2024-02-19 DIAGNOSIS — G89.29 CHRONIC BILATERAL LOW BACK PAIN, UNSPECIFIED WHETHER SCIATICA PRESENT: ICD-10-CM

## 2024-02-19 DIAGNOSIS — M51.36 DDD (DEGENERATIVE DISC DISEASE), LUMBAR: Primary | ICD-10-CM

## 2024-02-19 DIAGNOSIS — M47.812 ARTHROPATHY OF CERVICAL FACET JOINT: ICD-10-CM

## 2024-02-19 DIAGNOSIS — M48.02 CERVICAL SPINAL STENOSIS: ICD-10-CM

## 2024-02-19 DIAGNOSIS — M54.50 CHRONIC BILATERAL LOW BACK PAIN, UNSPECIFIED WHETHER SCIATICA PRESENT: ICD-10-CM

## 2024-02-19 PROCEDURE — 3078F DIAST BP <80 MM HG: CPT

## 2024-02-19 PROCEDURE — 3075F SYST BP GE 130 - 139MM HG: CPT

## 2024-02-19 PROCEDURE — 99214 OFFICE O/P EST MOD 30 MIN: CPT

## 2024-02-19 PROCEDURE — 1160F RVW MEDS BY RX/DR IN RCRD: CPT

## 2024-02-19 PROCEDURE — 1125F AMNT PAIN NOTED PAIN PRSNT: CPT

## 2024-02-19 PROCEDURE — 1159F MED LIST DOCD IN RCRD: CPT

## 2024-02-19 NOTE — PROGRESS NOTES
CHIEF COMPLAINT  Back and neck pain    Subjective   Kanwal Sauer is a 73 y.o. female.   She presents to the office for evaluation of back and neck pain. She was referred here by James Epley, APRN.     Patient reports that back and neck pain have been present for many years. No inciting trauma or event. History of fall over 1 year ago. Today pain is 3/10VAS in severity. Pain is located on the right side of her neck and radiates into right scapula. Denies radicular pain in upper extremities. Low back is axial in nature and radiates into left lateral/anterior thigh stopping at the knee. Describes this pain as an intermittent ache. Pain is worsened by prolonged sitting or standing, bending/twisting, walking long distances, and weather changes. Pain improves with rest/reposition, heat, and medications. She has completed home health PT in the past with mixed result. She continues with HEP as tolerated. She is currently going to OT 1-2 times per week.     Current medication regimens OTC Tylenol PRN and Lidocaine patches. She was prescribed Tramadol 50mg by Dr. Zoë Hagen when she presented to the ED on 1/13/24 with complaints of back and medication. Daughter is present and states that she takes this medication very sparingly. Notes improvement in pain with use of this medication and compound pain cream. She     Past pain medications: Gabapentin - not effective      Current pain medications: Tramadol, Tylenol,      Past therapies:  Physical Therapy: Yes  Chiropractor: Yes  Massage Therapy: No  TENS: Yes  Neck or back surgery: No  Past pain management: No    Neck Pain   This is a chronic problem. The current episode started more than 1 year ago. The problem occurs intermittently. The problem has been waxing and waning. The pain is associated with an unknown factor. The pain is present in the right side. The quality of the pain is described as aching (intermittent sharp pains). The pain is at a severity of 3/10. The  symptoms are aggravated by position, bending and twisting. Associated symptoms include numbness (left leg) and weakness. Pertinent negatives include no chest pain, fever or headaches. She has tried heat and muscle relaxants for the symptoms.   Back Pain  This is a chronic problem. The current episode started more than 1 year ago. The problem occurs intermittently. The pain is present in the lumbar spine. The quality of the pain is described as aching and burning. Radiates to: left lateral/anterior thigh stopping above the knee. The pain is at a severity of 3/10. The pain is mild. The symptoms are aggravated by position, standing, sitting, bending and twisting (walking long distances). Associated symptoms include abdominal pain (bilateral lower quadrant), numbness (left leg) and weakness. Pertinent negatives include no chest pain, dysuria, fever or headaches. She has tried muscle relaxant, heat and chiropractic manipulation (Tylenol) for the symptoms.      PEG Assessment   What number best describes your pain on average in the past week?5  What number best describes how, during the past week, pain has interfered with your enjoyment of life?9  What number best describes how, during the past week, pain has interfered with your general activity?  9      Current Outpatient Medications:     acetaminophen (TYLENOL) 325 MG tablet, Take 2 tablets by mouth Every 4 (Four) Hours As Needed for Mild Pain ., Disp:  , Rfl:     albuterol sulfate  (90 Base) MCG/ACT inhaler, INHALE 2 PUFFS EVERY 4 HOURS AS NEEDED FOR WHEEZING, Disp: 18 g, Rfl: 1    Cholecalciferol (Vitamin D3) 25 MCG (1000 UT) capsule, Take 1 capsule by mouth Daily., Disp: , Rfl:     citalopram (CeleXA) 20 MG tablet, TAKE 1 TABLET BY MOUTH EVERY DAY, Disp: 90 tablet, Rfl: 1    CRANBERRY PO, Take 650 mg by mouth., Disp: , Rfl:     Eliquis 5 MG tablet tablet, TAKE 1 TABLET BY MOUTH EVERY 12 HOURS, Disp: 60 tablet, Rfl: 2    fenofibrate 160 MG tablet, TAKE 1  TABLET BY MOUTH EVERY DAY, Disp: 90 tablet, Rfl: 1    Fluticasone-Umeclidin-Vilant (Trelegy Ellipta) 100-62.5-25 MCG/ACT inhaler, Inhale 1 puff Daily., Disp: 60 each, Rfl: 1    furosemide (LASIX) 40 MG tablet, Take 0.5 tablets by mouth Daily As Needed (Take a dose of Lasix if you notice a 3 pound weight gain, increasing leg swelling, or an increase in your oxygen requirement.)., Disp: , Rfl:     ipratropium-albuterol (DUO-NEB) 0.5-2.5 mg/3 ml nebulizer, Take 3 mL by nebulization Every 4 (Four) Hours As Needed for Wheezing., Disp: 150 mL, Rfl: 3    latanoprost (XALATAN) 0.005 % ophthalmic solution, Administer 1 drop to both eyes every night at bedtime., Disp: , Rfl:     lidocaine (LIDODERM) 5 %, Place 1 patch on the skin as directed by provider Daily. Remove & Discard patch within 12 hours or as directed by MD, Disp: 15 each, Rfl: 0    losartan (COZAAR) 50 MG tablet, TAKE 1 TABLET BY MOUTH EVERY DAY, Disp: 30 tablet, Rfl: 0    magnesium oxide (MAG-OX) 400 MG tablet, Take 1 tablet by mouth Daily., Disp: 10 tablet, Rfl: 0    metFORMIN (GLUCOPHAGE) 1000 MG tablet, TAKE 1 TABLET BY MOUTH TWICE A DAY WITH FOOD, Disp: 180 tablet, Rfl: 0    metoprolol tartrate (LOPRESSOR) 25 MG tablet, TAKE 1 TABLET BY MOUTH 2 TIMES A DAY, Disp: 180 tablet, Rfl: 1    montelukast (SINGULAIR) 10 MG tablet, TAKE 1 TABLET BY MOUTH EVERYDAY AT BEDTIME, Disp: 90 tablet, Rfl: 0    potassium chloride (KLOR-CON) 10 MEQ CR tablet, Take 1 tablet by mouth Daily., Disp: 90 tablet, Rfl: 0    primidone (MYSOLINE) 50 MG tablet, TAKE 1 TABLET BY MOUTH THREE TIMES A DAY, Disp: 270 tablet, Rfl: 0    Probiotic Product (PROBIOTIC-10 PO), Take  by mouth., Disp: , Rfl:     tiZANidine (ZANAFLEX) 4 MG tablet, Take 1 tablet by mouth At Night As Needed for Muscle Spasms., Disp: 30 tablet, Rfl: 1    traMADol (ULTRAM) 50 MG tablet, Take 1 tablet by mouth Every 8 (Eight) Hours As Needed for Moderate Pain or Severe Pain., Disp: 12 tablet, Rfl: 0    cephalexin (KEFLEX)  500 MG capsule, Take 2 capsules by mouth 2 (Two) Times a Day. (Patient not taking: Reported on 2/19/2024), Disp: 28 capsule, Rfl: 0    Magnesium Oxide -Mg Supplement 400 (240 Mg) MG tablet, TAKE 1 TABLET BY MOUTH EVERY DAY (NOT COVERED) (Patient not taking: Reported on 2/19/2024), Disp: , Rfl:     methylPREDNISolone (MEDROL) 4 MG dose pack, Take as directed on package instructions. (Patient not taking: Reported on 2/19/2024), Disp: 21 tablet, Rfl: 0    tiotropium bromide monohydrate (SPIRIVA RESPIMAT) 2.5 MCG/ACT aerosol solution inhaler, Inhale 2 puffs Daily for 30 days., Disp: 1 g, Rfl: 0    The following portions of the patient's history were reviewed and updated as appropriate: allergies, current medications, past family history, past medical history, past social history, past surgical history, and problem list.    REVIEW OF PERTINENT MEDICAL DATA  Reviewed office note from James Epley, APRN from 2/9/2024.  Patient presents with complaints of neck and back pain.  Neck pain radiates into her hand and fingers.  Low back pain intermittently radiates into 1 leg.  Plan was to update lumbar and cervical MRI. Recommended patient continue with home exercise program and if no relief from conservative treatments may trial a low-dose gabapentin.    EMERGENCY NONCONTRAST HEAD CT, FACIAL CT AND CERVICAL SPINE CT ON  02/10/2023     CLINICAL HISTORY: Patient had a mechanical fall, hit right forehead, has  scalp hematoma over the right forehead and right periorbital region, has  had face and neck pain. Patient is on blood thinner - Eliquis.     HEAD CT TECHNIQUE: Spiral CT images were obtained from the base of the  skull to the vertex without intravenous contrast. The images were  reformatted and submitted in 3 mm thick axial, sagittal and coronal CT  sections with brain algorithm and 2 mm thick axial CT sections with  high-resolution bone algorithm.     COMPARISON: This is correlated to prior noncontrast head CT from  [facility] on 07/05/2022.     FINDINGS: There is minimal low-density in the periventricular white  matter consistent with minimal small vessel disease. The remainder of  the brain parenchyma is normal in attenuation. The ventricles are normal  in size. There is no focal mass effect. There is no midline shift. No  extra-axial fluid collections are identified. There is no evidence of  acute intracranial hemorrhage. There is a moderate size scalp hematoma  over the anterior-inferior right frontal bone extending to the right  periorbital region from today's head trauma. No underlying acute skull  fracture is identified. The calvarium and skull base are normal in  appearance. The paranasal sinuses, mastoid air cells and middle ear  cavities are clear.     IMPRESSION:  1. No acute intracranial abnormality is seen.   2. There is minimal small vessel disease in the periventricular white  matter and some calcified plaques in the cavernous and supracavernous  segments of the internal carotid arteries. There is a moderate size  scalp hematoma extending over the anterior-inferior right frontal bone  into the right periorbital region from today's head trauma. The  remainder of the head CT is within normal limits with no acute skull  fracture or intracranial hemorrhage identified.     FACIAL CT TECHNIQUE: Spiral CT images were obtained through the facial  bones in the axial imaging plane. The images were reformatted and are  submitted in 2 mm thick axial, sagittal and coronal CT sections with  brain algorithm and 1 mm thick axial, sagittal and coronal CT sections  with high-resolution bone algorithm.     FINDINGS: The paranasal sinuses are clear. There is a moderate size  scalp hematoma over the anterior-inferior right frontal bone extending  into the right periorbital region from today's head and facial trauma.  There are bilateral intraocular lens implants in the globes from  previous cataract surgery. Otherwise  the globes are normal in  appearance. The extraocular muscles and optic nerves have a normal  noncontrast CT appearance. Intra and extracoronal fat of the orbits is  clean. No orbital or facial fracture is identified. There are mild  osteoarthritic changes in the temporomandibular joints bilaterally with  mild marginal spurring off the mandibular heads bilaterally.     IMPRESSION:  1. There are bilateral intraocular lens implants in place. There are  some osteoarthritic changes in the temporomandibular joints bilaterally  with mild marginal spurring off the mandibular heads bilaterally. The  patient is edentulous.  2. There is a moderate size scalp hematoma over the anterior inferior  right frontal bone extending into the right periorbital region from  today's head and facial trauma. The remainder of the facial CT is within  normal limits with no acute facial fracture identified.     CERVICAL SPINE CT TECHNIQUE: Spiral CT images were obtained from the  base of the skull down to the T2-3 thoracic level. The images were  reformatted and are submitted in 2 mm thick axial and sagittal CT  sections with soft tissue algorithm and 1 mm thick axial, sagittal and  coronal CT sections with high-resolution bone algorithm.     COMPARISON: This is correlated to a prior cervical spine CT from [facility] on 02/03/2021 and 12/23/2020.     FINDINGS: The atlantooccipital articulation is within normal limits     At C1-2 there is evidence of failure of complete fusion posterior  midline of the ring of C1 which is a normal anatomic variation and  unchanged. There are mild arthritic changes at the atlantodental  interval with mild marginal spurring off the anterior ring of C1 and the  odontoid. Otherwise the C1-2 level is normal in appearance.      At C2-3 there is minimal right and there is moderate left facet  overgrowth. The posterior disc margin is normal. There is no canal or  right foraminal narrowing. Facet spurs  mildly narrow the left neural  foramen.      At C3-4 there is mild right and there is moderate to severe left facet  overgrowth. There is a 2-3 mm degenerative anterolisthesis of C3 on C4  and mild posterior spurring. There is mild canal narrowing. There is  some mild uncovertebral joint spurring and there is mild-to-moderate  left and no right foraminal narrowing.     At C4-5 there is mild right and there is moderate to severe left facet  overgrowth and there is a 2-3 mm rotary anterolisthesis of the left side  of C4 on C5. There is minimal posterior disc bulge. There is no canal or  foraminal narrowing.     At C5-6 there is moderate right facet overgrowth and partial bony fusion  across the facet joints and moderate-to-severe disc space narrowing.  There is minimal posterior spurring. There is minimal if any canal  narrowing. There is some uncovertebral joint hypertrophy and there is  mild right and no left foraminal narrowing.     At C6-7 there is disc space narrowing. There is degenerative endplate  change and mild diffuse posterior disc osteophyte complex but only  minimal if any canal narrowing. The facets are normal. There is some  mild bilateral uncovertebral joint hypertrophy and there is mild right  and no left foraminal narrowing.     At C7-T1 there is mild left and moderate right facet overgrowth, mild  disc space narrowing and minimal endplate spurring but there is no canal  or left foraminal narrowing. There is mild right bony foraminal  narrowing.     No acute fracture is seen in the cervical spine.     There is an ovoid sclerotic focus in the anterior superior aspect of the  left lateral mass of C1 that measures 8 mm in size, unchanged dating  back to cervical spine CT 12/23/2020 and it is felt to be a benign bone  island.     IMPRESSION:  1. No acute fracture is seen in the cervical spine. There is cervical  spondylosis as described above. There is an 8 mm bone island at the  anterior superior  "aspect of the left lateral mass of C1 unchanged since  12/23/2020.     Radiation dose reduction techniques were utilized, including automated  exposure control and exposure modulation based on body size.     This report was finalized on 2/10/2023 2:37 PM by Dr. Marco Alvarez M.D.        Review of Systems   Constitutional:  Negative for activity change, fatigue and fever.   HENT:  Positive for congestion.    Respiratory:  Positive for shortness of breath. Negative for cough and chest tightness.    Cardiovascular:  Negative for chest pain.   Gastrointestinal:  Positive for abdominal pain (bilateral lower quadrant). Negative for constipation and diarrhea.   Genitourinary:  Negative for difficulty urinating and dysuria.   Musculoskeletal:  Positive for back pain and neck pain.   Neurological:  Positive for weakness and numbness (left leg). Negative for dizziness, light-headedness and headaches.   Psychiatric/Behavioral:  Positive for sleep disturbance. Negative for agitation and suicidal ideas. The patient is nervous/anxious.      I have reviewed and confirmed the accuracy of the ROS as documented by the MA/LPN/RN MARK Velasquez    Vitals:    02/19/24 1418   BP: 133/74   BP Location: Right arm   Patient Position: Sitting   Cuff Size: Adult   Pulse: 57   Resp: 20   Temp: 96.9 °F (36.1 °C)   TempSrc: Temporal   SpO2: 93%  Comment: 3LITERS   Weight: 102 kg (224 lb 3.2 oz)   Height: 170.2 cm (67.01\")   PainSc:   3     Objective     Physical Exam  Constitutional:       Appearance: Normal appearance.   HENT:      Head: Normocephalic.   Cardiovascular:      Rate and Rhythm: Normal rate and regular rhythm.   Pulmonary:      Effort: Pulmonary effort is normal.      Breath sounds: Normal breath sounds.   Musculoskeletal:         General: Normal range of motion.      Cervical back: Normal range of motion. Tenderness present. Pain with movement present. Decreased range of motion.      Lumbar back: Tenderness and bony " tenderness present. Decreased range of motion. Negative right straight leg raise test and negative left straight leg raise test.      Comments: + lumbar facet loading/tenderness   Skin:     General: Skin is warm and dry.      Capillary Refill: Capillary refill takes less than 2 seconds.   Neurological:      General: No focal deficit present.      Mental Status: She is alert and oriented to person, place, and time.      Gait: Gait abnormal (slowed, ambulates with walker).   Psychiatric:         Mood and Affect: Mood normal.         Behavior: Behavior normal.         Thought Content: Thought content normal.         Cognition and Memory: Cognition normal.       Assessment & Plan   Diagnoses and all orders for this visit:    1. DDD (degenerative disc disease), lumbar (Primary)    2. Chronic bilateral low back pain, unspecified whether sciatica present    3. Lumbar radiculopathy    4. Arthropathy of cervical facet joint    5. Neck pain    6. Cervical spinal stenosis    --- Kanwal Sauer reports a pain score of 3.  Given her pain assessment as noted, treatment options were discussed and the following options were decided upon as a follow-up plan to address the patient's pain:  will await results of pending cervical and lumbar MRI before moving forward with interventional procedures .    --- Long discussion about both MBB's/RFA's for treatment of neck and back pain and epidural injections.  Cervical and lumbar MRIs are currently pending and are scheduled to be completed on 3/9/2024.  Patient and her daughter were given handouts on these procedures.  Patient states her pain is currently stable at this time.  Will follow-up once imaging is completed and to move forward with interventional procedures if if needed.  Encouraged patient to continue with home exercise program and Occupational Therapy at this time.  --- Need to obtain clearance to hold Eliquis from Dr. Britton is moving forward with interventional  procedures.  --- Follow-up after completion of MRI     Pain / Disability Scale  The scale used for measurement of pain and/or disability for this patient was the [place] back pain disability scale.  The score was 81 on 02/19/2024    MARYLOU REPORT  As the clinician, I personally reviewed the MARYLOU from 2/19/24 while the patient was in the office today.    Dictated utilizing Dragon dictation.

## 2024-02-22 ENCOUNTER — TELEPHONE (OUTPATIENT)
Dept: FAMILY MEDICINE CLINIC | Facility: CLINIC | Age: 74
End: 2024-02-22
Payer: MEDICARE

## 2024-02-22 NOTE — TELEPHONE ENCOUNTER
Tanisha from [facility] called requesting verbal orders for PT & OT-neck pain.     [phone]

## 2024-02-26 ENCOUNTER — TELEPHONE (OUTPATIENT)
Dept: FAMILY MEDICINE CLINIC | Facility: CLINIC | Age: 74
End: 2024-02-26
Payer: MEDICARE

## 2024-02-26 NOTE — TELEPHONE ENCOUNTER
"Relay     \"Ok for Verbal Orders per provider for pt to receive PT & OT  due to neck pain.\"          "

## 2024-02-26 NOTE — TELEPHONE ENCOUNTER
Called and LVM for Tanisha ([facility]) to call back in regards to verbal orders for PT & OT for neck pain.

## 2024-02-27 RX ORDER — APIXABAN 5 MG/1
TABLET, FILM COATED ORAL
Qty: 60 TABLET | Refills: 2 | Status: SHIPPED | OUTPATIENT
Start: 2024-02-27

## 2024-03-07 DIAGNOSIS — J44.9 CHRONIC OBSTRUCTIVE PULMONARY DISEASE, UNSPECIFIED COPD TYPE: ICD-10-CM

## 2024-03-07 DIAGNOSIS — E11.65 TYPE 2 DIABETES MELLITUS WITH HYPERGLYCEMIA, WITHOUT LONG-TERM CURRENT USE OF INSULIN: ICD-10-CM

## 2024-03-07 RX ORDER — PRIMIDONE 50 MG/1
TABLET ORAL
Qty: 270 TABLET | Refills: 0 | Status: SHIPPED | OUTPATIENT
Start: 2024-03-07

## 2024-03-07 RX ORDER — MONTELUKAST SODIUM 10 MG/1
10 TABLET ORAL
Qty: 90 TABLET | Refills: 0 | Status: SHIPPED | OUTPATIENT
Start: 2024-03-07

## 2024-03-07 RX ORDER — ALBUTEROL SULFATE 90 UG/1
2 AEROSOL, METERED RESPIRATORY (INHALATION) EVERY 4 HOURS PRN
Qty: 18 G | Refills: 1 | Status: SHIPPED | OUTPATIENT
Start: 2024-03-07

## 2024-03-07 RX ORDER — FLUTICASONE FUROATE, UMECLIDINIUM BROMIDE AND VILANTEROL TRIFENATATE 100; 62.5; 25 UG/1; UG/1; UG/1
1 POWDER RESPIRATORY (INHALATION) DAILY
Qty: 60 EACH | Refills: 1 | Status: SHIPPED | OUTPATIENT
Start: 2024-03-07

## 2024-03-07 RX ORDER — LOSARTAN POTASSIUM 50 MG/1
50 TABLET ORAL DAILY
Qty: 30 TABLET | Refills: 0 | Status: SHIPPED | OUTPATIENT
Start: 2024-03-07

## 2024-03-07 RX ORDER — FENOFIBRATE 160 MG/1
TABLET ORAL
Qty: 90 TABLET | Refills: 1 | Status: SHIPPED | OUTPATIENT
Start: 2024-03-07

## 2024-03-07 NOTE — TELEPHONE ENCOUNTER
Rx Refill Note  Requested Prescriptions     Pending Prescriptions Disp Refills    Trelegy Ellipta 100-62.5-25 MCG/ACT inhaler [Pharmacy Med Name: TRELEGY ELLIPTA 100-62.5-25] 60 each 1     Sig: INHALE 1 PUFF DAILY      Last office visit with prescribing clinician: 12/8/2023   Last telemedicine visit with prescribing clinician: Visit date not found   Next office visit with prescribing clinician: Visit date not found                         Would you like a call back once the refill request has been completed: [] Yes [] No    If the office needs to give you a call back, can they leave a voicemail: [] Yes [] No    Black Valencia MA  03/07/24, 09:26 EST

## 2024-03-09 ENCOUNTER — HOSPITAL ENCOUNTER (OUTPATIENT)
Dept: MRI IMAGING | Facility: HOSPITAL | Age: 74
Discharge: HOME | End: 2024-03-09
Payer: MEDICARE

## 2024-03-13 ENCOUNTER — TELEPHONE (OUTPATIENT)
Dept: FAMILY MEDICINE CLINIC | Facility: CLINIC | Age: 74
End: 2024-03-13
Payer: MEDICARE

## 2024-03-13 DIAGNOSIS — E78.2 MIXED HYPERLIPIDEMIA: ICD-10-CM

## 2024-03-13 DIAGNOSIS — I10 ESSENTIAL HYPERTENSION: ICD-10-CM

## 2024-03-13 DIAGNOSIS — R53.83 OTHER FATIGUE: ICD-10-CM

## 2024-03-13 DIAGNOSIS — E11.65 TYPE 2 DIABETES MELLITUS WITH HYPERGLYCEMIA, WITHOUT LONG-TERM CURRENT USE OF INSULIN: Primary | ICD-10-CM

## 2024-03-13 NOTE — TELEPHONE ENCOUNTER
Caller: SHELDON TYSON     Relationship: DAUGHTER      Best call back number:     [phone]           What is your medical concern?     PATIENT WAS SCHEDULED FOR A MRI ON 3/9/2024.  SHE WAS NOT ABLE TO COMPLETE THE MRI SHE COULD NOT LAY FOR A LONG PERIOD OF TIME.    THEY SUGGESTED TO HER THAT SHE GET SOME MEDICATION FROM YOU TO HELP HER RELAX IF SHE WAS GOING TO TRY TO DO IT AGAIN.    THE PATIENT WANTS TO KNOW WHAT HER OPTIONS ARE AT THIS POINT.  SHE CAN NOT LAY FOR LONG PERIODS OF TIME DUE TO HER BACK PAIN.

## 2024-03-13 NOTE — TELEPHONE ENCOUNTER
Pt was had MRI done but could not lay there long enough to complete MRI. They suggested for pt to have PCP write prescription to help pt relax to complete MRI

## 2024-03-13 NOTE — TELEPHONE ENCOUNTER
Inquire if lab orders are needed prior to appt 10/21/24-wellness. I will call pt once orders are submitted.

## 2024-03-14 ENCOUNTER — TELEPHONE (OUTPATIENT)
Dept: FAMILY MEDICINE CLINIC | Facility: CLINIC | Age: 74
End: 2024-03-14
Payer: MEDICARE

## 2024-03-14 NOTE — TELEPHONE ENCOUNTER
Pt's daughter called informing us that her mother went to the Urology on Monday due to feeliing like she has a UTI; a culture has been sent out and daughter called today (03/14/24) wanting to know results and have not heard anything yet.  Daughter wanted to inform PCP as to what possible next steps, if any, especially since it's the weekend.

## 2024-03-19 DIAGNOSIS — F41.9 ANXIETY: Primary | ICD-10-CM

## 2024-03-19 RX ORDER — LORAZEPAM 0.5 MG/1
TABLET ORAL
Qty: 2 TABLET | Refills: 0 | Status: SHIPPED | OUTPATIENT
Start: 2024-03-19

## 2024-03-21 ENCOUNTER — TELEPHONE (OUTPATIENT)
Dept: FAMILY MEDICINE CLINIC | Facility: CLINIC | Age: 74
End: 2024-03-21

## 2024-03-21 NOTE — TELEPHONE ENCOUNTER
Caller: YESENIA MEDELTENELDA    Best call back number: [phone]     What orders are you requesting (i.e. lab or imaging): VERBAL CONFIRMATION THE PATIENT CAN TAKE potassium chloride (KLOR-CON) 10 MEQ CR tablet AND SULFA BACTRIM TOGETHER.    Additional notes: NOTIFIED OF A LEVEL 2 INTERACTION ALERT

## 2024-04-12 ENCOUNTER — OFFICE VISIT (OUTPATIENT)
Dept: CARDIOLOGY | Facility: CLINIC | Age: 74
End: 2024-04-12
Payer: MEDICARE

## 2024-04-12 VITALS
HEIGHT: 66 IN | BODY MASS INDEX: 36 KG/M2 | OXYGEN SATURATION: 98 % | DIASTOLIC BLOOD PRESSURE: 70 MMHG | HEART RATE: 55 BPM | SYSTOLIC BLOOD PRESSURE: 160 MMHG | WEIGHT: 224 LBS

## 2024-04-12 DIAGNOSIS — I48.0 PAF (PAROXYSMAL ATRIAL FIBRILLATION): Primary | ICD-10-CM

## 2024-04-12 DIAGNOSIS — I10 ESSENTIAL HYPERTENSION: ICD-10-CM

## 2024-04-12 PROCEDURE — 99214 OFFICE O/P EST MOD 30 MIN: CPT | Performed by: INTERNAL MEDICINE

## 2024-04-12 PROCEDURE — 3077F SYST BP >= 140 MM HG: CPT | Performed by: INTERNAL MEDICINE

## 2024-04-12 PROCEDURE — 3078F DIAST BP <80 MM HG: CPT | Performed by: INTERNAL MEDICINE

## 2024-04-12 RX ORDER — HYDROCHLOROTHIAZIDE 12.5 MG/1
12.5 CAPSULE, GELATIN COATED ORAL DAILY
Qty: 90 CAPSULE | Refills: 3 | Status: SHIPPED | OUTPATIENT
Start: 2024-04-12

## 2024-04-12 NOTE — PROGRESS NOTES
PATIENTINFORMATION    Date of Office Visit: 2024  Encounter Provider: Yasir Romano MD  Place of Service: [facility] CARDIOLOGY  Patient Name: Kanwal Sauer  : 1950    Subjective:     Encounter Date:2024      Patient ID: Kanwal Sauer is a 73 y.o. female.    No chief complaint on file.    HPI  Ms. Sauer is a pleasant 73 years old lady who came to cardiology clinic for follow-up visit.  She lives in a [facility] home where she gets physical therapy, OT, exercise classes with improved mobility.  She has chronic shortness of breath that has not changed much.  She denies any rest or exertional chest discomfort, presyncope or syncope or significant new lower extremity swelling.  She uses Lasix once or twice a week.  She does not check blood pressure regularly.  Otherwise compliant with all current medications without any known significant side effects.  Receiving treatment for UTI.      ROS  All systems reviewed and negative except as noted in HPI.    Past Medical History:   Diagnosis Date    Allergic     Anxiety     Asthma     Atrial fibrillation     Cancer     Breast    COPD (chronic obstructive pulmonary disease) 2022    Deep vein thrombosis     Diabetes mellitus     Difficulty walking     Diverticulosis     Drug therapy     Environmental allergies     Fractures     Headache, tension-type     Hyperlipidemia     Hypertension     Low back pain     Neuropathy in diabetes     Obesity     Scoliosis     Seizures     Sleep apnea     Tremor     Urinary tract infection     Weakness        Past Surgical History:   Procedure Laterality Date    BREAST BIOPSY      CATARACT EXTRACTION, BILATERAL      DENTAL PROCEDURE      Removed teeth    FEMUR OPEN REDUCTION INTERNAL FIXATION Left 2020    Procedure: DISTAL FEMUR OPEN REDUCTION INTERNAL FIXATION;  Surgeon: Marley Hernandez MD;  Location: [facility];  Service: Orthopedics;  Laterality: Left;     "MASTECTOMY Left     REPLACEMENT TOTAL KNEE BILATERAL         Social History     Socioeconomic History    Marital status:    Tobacco Use    Smoking status: Never    Smokeless tobacco: Never    Tobacco comments:     no caffine   Vaping Use    Vaping status: Never Used   Substance and Sexual Activity    Alcohol use: Not Currently    Drug use: Never    Sexual activity: Not Currently     Partners: Male       Family History   Problem Relation Age of Onset    Arthritis Mother     Lung cancer Mother     Brain cancer Mother     Hypertension Mother     Cancer Mother         Lung cancer    Hypertension Father     Arthritis Sister     Breast cancer Sister     Diabetes Sister     Hypertension Sister     Dementia Sister     Cancer Sister         Brest cancer    Aneurysm Sister     Cancer Daughter         Colon cancer    Developmental Disability Daughter     Learning disabilities Daughter     Miscarriages / Stillbirths Daughter         Miscarriage         Procedures       Objective:     /70   Pulse 55   Ht 167.6 cm (66\")   Wt 102 kg (224 lb)   SpO2 98%   BMI 36.15 kg/m²  Body mass index is 36.15 kg/m².     Constitutional:       General: Not in acute distress.     Appearance: Well-developed. Not diaphoretic.   Eyes:      Pupils: Pupils are equal, round, and reactive to light.   HENT:      Head: Normocephalic and atraumatic.   Neck:      Thyroid: No thyromegaly.   Pulmonary:      Effort: Pulmonary effort is normal. No respiratory distress.      Breath sounds: Normal breath sounds. No wheezing. No rales.   Chest:      Chest wall: Not tender to palpatation.   Cardiovascular:      Normal rate. Regular rhythm.      No gallop.    Pulses:     Intact distal pulses.   Edema:     Peripheral edema absent.   Abdominal:      General: Bowel sounds are normal. There is no distension.      Palpations: Abdomen is soft.      Tenderness: There is no guarding.   Musculoskeletal: Normal range of motion.         General: No deformity. "      Cervical back: Normal range of motion and neck supple. Skin:     General: Skin is warm and dry.      Findings: No rash.   Neurological:      Mental Status: Alert and oriented to person, place, and time.      Cranial Nerves: No cranial nerve deficit.      Deep Tendon Reflexes: Reflexes are normal and symmetric.   Psychiatric:         Judgment: Judgment normal.         Review Of Data: I have reviewed pertinent recent labs, images and documents and pertinent findings included in HPI or assessment below.    Lipid Panel          10/9/2023    11:09   Lipid Panel   Total Cholesterol 190    Triglycerides 84    HDL Cholesterol 71    VLDL Cholesterol 15    LDL Cholesterol  104          Assessment/Plan:      Paroxysmal atrial fibrillation. Echocardiogram on 1/17/2020 reported: Left ventricular ejection fraction 51-55%, mild AI/MR, moderate TR, RVSP 48 mmHg. Tolerating metoprolol and Eliquis well without any side effects.  Obesity with TARSHA compliant with CPAP  Essential hypertension hypertension on losartan and metoprol  Hyperlipidemia on fenofibrate-most recent lipid panel at goal. History of of intolerance to different statins in the past  Benign tremor on primidone-follows up with neurology  History of mechanical fall with a scalp laceration/left femoral and tibial fracture in December 2020-currently ambulates using a walker  Hx of left breast cancer s/p left mastectomy and chemotherapy in the past-in remission  Chronic hypoxemic respiratory failure from COPD on supplemental oxygen through the encounter.  Patient denies prior history of tobacco use.       Overall she is doing fairly well and in sinus rhythm.  Blood pressure above goal-I have added hydrochlorothiazide 12.5 mg p.o. daily and they will call back with blood pressure logs in 2 weeks.  She will follow-up in 6 months or sooner with any concerning symptoms.    Diagnosis and plan of care discussed with patient and verbalized understanding.            Your  medication list            Accurate as of April 12, 2024  4:24 PM. If you have any questions, ask your nurse or doctor.                START taking these medications        Instructions Last Dose Given Next Dose Due   hydroCHLOROthiazide 12.5 MG capsule  Commonly known as: MICROZIDE  Started by: Yasir Romano MD      Take 1 capsule by mouth Daily.              CONTINUE taking these medications        Instructions Last Dose Given Next Dose Due   acetaminophen 325 MG tablet  Commonly known as: TYLENOL      Take 2 tablets by mouth Every 4 (Four) Hours As Needed for Mild Pain .       albuterol sulfate  (90 Base) MCG/ACT inhaler  Commonly known as: PROVENTIL HFA;VENTOLIN HFA;PROAIR HFA      TAKE 2 PUFFS BY MOUTH EVERY 4 HOURS AS NEEDED FOR WHEEZE       cephalexin 500 MG capsule  Commonly known as: KEFLEX      Take 2 capsules by mouth 2 (Two) Times a Day.       citalopram 20 MG tablet  Commonly known as: CeleXA      TAKE 1 TABLET BY MOUTH EVERY DAY       CRANBERRY PO      Take 650 mg by mouth.       Eliquis 5 MG tablet tablet  Generic drug: apixaban      TAKE 1 TABLET BY MOUTH EVERY 12 HOURS       fenofibrate 160 MG tablet      TAKE 1 TABLET BY MOUTH EVERY DAY       furosemide 40 MG tablet  Commonly known as: LASIX      Take 0.5 tablets by mouth Daily As Needed (Take a dose of Lasix if you notice a 3 pound weight gain, increasing leg swelling, or an increase in your oxygen requirement.).       ipratropium-albuterol 0.5-2.5 mg/3 ml nebulizer  Commonly known as: DUO-NEB      Take 3 mL by nebulization Every 4 (Four) Hours As Needed for Wheezing.       latanoprost 0.005 % ophthalmic solution  Commonly known as: XALATAN      Administer 1 drop to both eyes every night at bedtime.       lidocaine 5 %  Commonly known as: LIDODERM      Place 1 patch on the skin as directed by provider Daily. Remove & Discard patch within 12 hours or as directed by MD       LORazepam 0.5 MG tablet  Commonly known as: Ativan      Take  0.5 mg 1/2 hr before the procedure , can repeat  again       losartan 50 MG tablet  Commonly known as: COZAAR      TAKE 1 TABLET BY MOUTH EVERY DAY       Magnesium Oxide -Mg Supplement 400 (240 Mg) MG tablet           magnesium oxide 400 MG tablet  Commonly known as: MAG-OX      Take 1 tablet by mouth Daily.       metFORMIN 1000 MG tablet  Commonly known as: GLUCOPHAGE      TAKE 1 TABLET BY MOUTH TWICE A DAY WITH FOOD       methylPREDNISolone 4 MG dose pack  Commonly known as: MEDROL      Take as directed on package instructions.       metoprolol tartrate 25 MG tablet  Commonly known as: LOPRESSOR      TAKE 1 TABLET BY MOUTH 2 TIMES A DAY       montelukast 10 MG tablet  Commonly known as: SINGULAIR      TAKE 1 TABLET BY MOUTH EVERYDAY AT BEDTIME       potassium chloride 10 MEQ CR tablet  Commonly known as: Klor-Con M10      Take 1 tablet by mouth Daily.       primidone 50 MG tablet  Commonly known as: MYSOLINE      TAKE 1 TABLET BY MOUTH THREE TIMES A DAY       PROBIOTIC-10 PO      Take  by mouth.       sulfamethoxazole-trimethoprim 800-160 MG per tablet  Commonly known as: BACTRIM DS,SEPTRA DS      Take 1 tablet by mouth 2 (Two) Times a Day for 7 days.       tiotropium bromide monohydrate 2.5 MCG/ACT aerosol solution inhaler  Commonly known as: SPIRIVA RESPIMAT      Inhale 2 puffs Daily for 30 days.       tiZANidine 4 MG tablet  Commonly known as: ZANAFLEX      Take 1 tablet by mouth At Night As Needed for Muscle Spasms.       traMADol 50 MG tablet  Commonly known as: ULTRAM      Take 1 tablet by mouth Every 8 (Eight) Hours As Needed for Moderate Pain or Severe Pain.       Trelegy Ellipta 100-62.5-25 MCG/ACT inhaler  Generic drug: Fluticasone-Umeclidin-Vilant      INHALE 1 PUFF DAILY       Vitamin D3 25 MCG (1000 UT) capsule  Generic drug: Cholecalciferol      Take 1 capsule by mouth Daily.                 Where to Get Your Medications        These medications were sent to [facility]/pharmacy #[id number] - [place], [place] -  [address] AT [facility] - [phone]  - [phone] FX  [address] [facility] [address]      Phone: [phone]   hydroCHLOROthiazide 12.5 MG capsule             Yasir Romano MD  04/12/24  16:24 EDT

## 2024-04-26 RX ORDER — MONTELUKAST SODIUM 10 MG/1
10 TABLET ORAL
Qty: 90 TABLET | Refills: 0 | Status: SHIPPED | OUTPATIENT
Start: 2024-04-26

## 2024-05-03 ENCOUNTER — EXTERNAL PBMM DATA (OUTPATIENT)
Dept: PHARMACY | Facility: OTHER | Age: 74
End: 2024-05-03
Payer: MEDICARE

## 2024-05-03 RX ORDER — ALBUTEROL SULFATE 90 UG/1
2 AEROSOL, METERED RESPIRATORY (INHALATION) EVERY 4 HOURS PRN
Qty: 18 G | Refills: 1 | Status: SHIPPED | OUTPATIENT
Start: 2024-05-03

## 2024-05-14 RX ORDER — LOSARTAN POTASSIUM 50 MG/1
50 TABLET ORAL DAILY
Qty: 30 TABLET | Refills: 0 | Status: SHIPPED | OUTPATIENT
Start: 2024-05-14

## 2024-05-14 NOTE — TELEPHONE ENCOUNTER
Caller: SHELDON TYSON    Relationship: Emergency Contact    Best call back number: [phone]    Requested Prescriptions:   Requested Prescriptions     Pending Prescriptions Disp Refills    losartan (COZAAR) 50 MG tablet 30 tablet 0     Sig: Take 1 tablet by mouth Daily.    apixaban (Eliquis) 5 MG tablet tablet 60 tablet 2     Sig: Take 1 tablet by mouth Every 12 (Twelve) Hours.        Pharmacy where request should be sent: [facility]/PHARMACY #[id number] [facility] [place] - [address] AT [facility] [phone]  - [phone] FX     Last office visit with prescribing clinician: 1/19/2024   Last telemedicine visit with prescribing clinician: Visit date not found   Next office visit with prescribing clinician: 10/21/2024       Does the patient have less than a 3 day supply:  [x] Yes  [] No    Would you like a call back once the refill request has been completed: [x] Yes [] No    If the office needs to give you a call back, can they leave a voicemail: [] Yes [x] No    Esthela Laurent Rep   05/14/24 12:33 EDT

## 2024-05-22 ENCOUNTER — POP HEALTH PHARMACY (OUTPATIENT)
Dept: PHARMACY | Facility: OTHER | Age: 74
End: 2024-05-22
Payer: MEDICARE

## 2024-05-30 ENCOUNTER — EXTERNAL PBMM DATA (OUTPATIENT)
Dept: PHARMACY | Facility: OTHER | Age: 74
End: 2024-05-30
Payer: MEDICARE

## 2024-06-06 ENCOUNTER — POP HEALTH PHARMACY (OUTPATIENT)
Dept: PHARMACY | Facility: OTHER | Age: 74
End: 2024-06-06
Payer: MEDICARE

## 2024-06-06 RX ORDER — LOSARTAN POTASSIUM 50 MG/1
50 TABLET ORAL DAILY
Qty: 90 TABLET | Refills: 1 | Status: SHIPPED | OUTPATIENT
Start: 2024-06-06

## 2024-06-21 DIAGNOSIS — J44.9 CHRONIC OBSTRUCTIVE PULMONARY DISEASE, UNSPECIFIED COPD TYPE: ICD-10-CM

## 2024-06-24 RX ORDER — FLUTICASONE FUROATE, UMECLIDINIUM BROMIDE AND VILANTEROL TRIFENATATE 100; 62.5; 25 UG/1; UG/1; UG/1
1 POWDER RESPIRATORY (INHALATION) DAILY
Qty: 60 EACH | Refills: 1 | Status: SHIPPED | OUTPATIENT
Start: 2024-06-24

## 2024-06-24 NOTE — TELEPHONE ENCOUNTER
Rx Refill Note  Requested Prescriptions     Pending Prescriptions Disp Refills    Trelegy Ellipta 100-62.5-25 MCG/ACT inhaler [Pharmacy Med Name: TRELEGY ELLIPTA 100-62.5-25] 60 each 1     Sig: INHALE 1 PUFF DAILY      Last office visit with prescribing clinician: 1/19/2024   Last telemedicine visit with prescribing clinician: Visit date not found   Next office visit with prescribing clinician: 10/21/2024                         Would you like a call back once the refill request has been completed: [] Yes [] No    If the office needs to give you a call back, can they leave a voicemail: [] Yes [] No    Esthela Blancas Rep  06/24/24, 10:33 EDT

## 2024-06-25 ENCOUNTER — POP HEALTH PHARMACY (OUTPATIENT)
Dept: PHARMACY | Facility: OTHER | Age: 74
End: 2024-06-25
Payer: MEDICARE

## 2024-07-24 RX ORDER — CITALOPRAM 20 MG/1
TABLET ORAL
Qty: 90 TABLET | Refills: 1 | Status: SHIPPED | OUTPATIENT
Start: 2024-07-24

## 2024-07-24 NOTE — TELEPHONE ENCOUNTER
Rx Refill Note  Requested Prescriptions     Pending Prescriptions Disp Refills    citalopram (CeleXA) 20 MG tablet [Pharmacy Med Name: CITALOPRAM HBR 20 MG TABLET] 90 tablet 1     Sig: TAKE 1 TABLET BY MOUTH EVERY DAY    metoprolol tartrate (LOPRESSOR) 25 MG tablet [Pharmacy Med Name: METOPROLOL TARTRATE 25 MG TAB] 180 tablet 1     Sig: TAKE 1 TABLET BY MOUTH TWICE A DAY      Last office visit with prescribing clinician: 1/19/2024   Last telemedicine visit with prescribing clinician: Visit date not found   Next office visit with prescribing clinician: 7/26/2024                         Would you like a call back once the refill request has been completed: [] Yes [] No    If the office needs to give you a call back, can they leave a voicemail: [] Yes [] No    Esthela Blancas Rep  07/24/24, 09:48 EDT

## 2024-07-25 DIAGNOSIS — E11.65 TYPE 2 DIABETES MELLITUS WITH HYPERGLYCEMIA, WITHOUT LONG-TERM CURRENT USE OF INSULIN: ICD-10-CM

## 2024-07-26 ENCOUNTER — OFFICE VISIT (OUTPATIENT)
Dept: FAMILY MEDICINE CLINIC | Facility: CLINIC | Age: 74
End: 2024-07-26
Payer: MEDICARE

## 2024-07-26 VITALS
DIASTOLIC BLOOD PRESSURE: 69 MMHG | WEIGHT: 219.6 LBS | TEMPERATURE: 99.2 F | BODY MASS INDEX: 35.29 KG/M2 | OXYGEN SATURATION: 95 % | HEIGHT: 66 IN | SYSTOLIC BLOOD PRESSURE: 166 MMHG | RESPIRATION RATE: 14 BRPM | HEART RATE: 56 BPM

## 2024-07-26 DIAGNOSIS — R30.0 DYSURIA: ICD-10-CM

## 2024-07-26 DIAGNOSIS — E78.2 MIXED HYPERLIPIDEMIA: ICD-10-CM

## 2024-07-26 DIAGNOSIS — E11.65 TYPE 2 DIABETES MELLITUS WITH HYPERGLYCEMIA, WITHOUT LONG-TERM CURRENT USE OF INSULIN: Primary | ICD-10-CM

## 2024-07-26 DIAGNOSIS — R53.83 OTHER FATIGUE: ICD-10-CM

## 2024-07-26 DIAGNOSIS — R31.29 OTHER MICROSCOPIC HEMATURIA: ICD-10-CM

## 2024-07-26 LAB
BILIRUB BLD-MCNC: NEGATIVE MG/DL
CLARITY, POC: ABNORMAL
COLOR UR: ABNORMAL
EXPIRATION DATE: ABNORMAL
GLUCOSE UR STRIP-MCNC: NEGATIVE MG/DL
KETONES UR QL: NEGATIVE
LEUKOCYTE EST, POC: ABNORMAL
Lab: ABNORMAL
NITRITE UR-MCNC: NEGATIVE MG/ML
PH UR: 7.5 [PH] (ref 5–8)
PROT UR STRIP-MCNC: ABNORMAL MG/DL
RBC # UR STRIP: ABNORMAL /UL
SP GR UR: 1.02 (ref 1–1.03)
UROBILINOGEN UR QL: ABNORMAL

## 2024-07-26 PROCEDURE — 1125F AMNT PAIN NOTED PAIN PRSNT: CPT | Performed by: FAMILY MEDICINE

## 2024-07-26 PROCEDURE — 3077F SYST BP >= 140 MM HG: CPT | Performed by: FAMILY MEDICINE

## 2024-07-26 PROCEDURE — 3078F DIAST BP <80 MM HG: CPT | Performed by: FAMILY MEDICINE

## 2024-07-26 PROCEDURE — 99214 OFFICE O/P EST MOD 30 MIN: CPT | Performed by: FAMILY MEDICINE

## 2024-07-26 PROCEDURE — 81003 URINALYSIS AUTO W/O SCOPE: CPT | Performed by: FAMILY MEDICINE

## 2024-07-26 RX ORDER — NITROFURANTOIN 25; 75 MG/1; MG/1
100 CAPSULE ORAL 2 TIMES DAILY
Qty: 14 CAPSULE | Refills: 0 | Status: SHIPPED | OUTPATIENT
Start: 2024-07-26

## 2024-07-26 RX ORDER — OLMESARTAN MEDOXOMIL 20 MG/1
20 TABLET ORAL DAILY
Qty: 90 TABLET | Refills: 0 | Status: SHIPPED | OUTPATIENT
Start: 2024-07-26

## 2024-07-26 RX ORDER — KETOCONAZOLE 20 MG/ML
SHAMPOO TOPICAL
COMMUNITY
Start: 2024-07-25

## 2024-07-26 NOTE — PROGRESS NOTES
"Chief Complaint  bp concerns, Hypertension, Hyperlipidemia, Diabetes, and Difficulty Urinating    Subjective        Kanwal Sauer presents to [facility]  Hypertension    Hyperlipidemia    Diabetes    Difficulty Urinating      for follow-up on hypertension hyperlipidemia diabetes type 2.  Patient lives in independent assisted living has a day check with her insurance provider labs were done at that time.  She is here to discuss the labs.  Also daughter giving history of elevated blood pressure at home.  She saw her cardiologist few months ago and they started her on 12.5 mg hydrochlorothiazide with elevated blood pressure.  Her blood pressure is ranging from 160/60 to 140/57.  Denies any headache or blurring of vision  History of diabetes type 2 not checking sugars regularly at home.  Denies any episodes of low sugar.  He is also complaining of burning and difficulty in urination.  History of frequent UTI.  She is also has a history of incontinence having a diaper rash    Objective   Vital Signs:  /69   Pulse 56   Temp 99.2 °F (37.3 °C) (Oral)   Resp 14   Ht 167.6 cm (66\")   Wt 99.6 kg (219 lb 9.6 oz)   SpO2 95% Comment: pt is on 3L of oxygen  BMI 35.44 kg/m²   Estimated body mass index is 35.44 kg/m² as calculated from the following:    Height as of this encounter: 167.6 cm (66\").    Weight as of this encounter: 99.6 kg (219 lb 9.6 oz).               Physical Exam  Constitutional:       General: She is not in acute distress.     Appearance: Normal appearance. She is well-developed.   HENT:      Head: Normocephalic and atraumatic.      Right Ear: Tympanic membrane normal.      Left Ear: Tympanic membrane normal.      Mouth/Throat:      Mouth: Mucous membranes are moist.   Eyes:      General:         Right eye: No discharge.         Left eye: No discharge.      Extraocular Movements: Extraocular movements intact.      Pupils: Pupils are equal, round, and reactive to " light.   Cardiovascular:      Rate and Rhythm: Normal rate and regular rhythm.      Pulses: Normal pulses.      Heart sounds: Normal heart sounds.   Pulmonary:      Effort: Pulmonary effort is normal.      Breath sounds: Normal breath sounds. No wheezing or rales.   Abdominal:      General: Bowel sounds are normal.      Palpations: Abdomen is soft. There is no mass.      Tenderness: There is no abdominal tenderness.   Musculoskeletal:      Cervical back: Normal range of motion and neck supple.      Right lower leg: No edema.      Left lower leg: No edema.   Lymphadenopathy:      Cervical: No cervical adenopathy.   Neurological:      General: No focal deficit present.      Mental Status: She is alert and oriented to person, place, and time.        Result Review :                     Assessment and Plan     Diagnoses and all orders for this visit:    1. Type 2 diabetes mellitus with hyperglycemia, without long-term current use of insulin (Primary)  -     Comprehensive Metabolic Panel  -     Hemoglobin A1c  -     Microalbumin / Creatinine Urine Ratio - Urine, Clean Catch    2. Mixed hyperlipidemia  -     Lipid Panel    3. Other fatigue  -     CBC & Differential  -     TSH Rfx On Abnormal To Free T4    4. Dysuria  -     POC Urinalysis Dipstick, Automated  -     Urine Culture - Urine, Urine, Clean Catch  -     nitrofurantoin, macrocrystal-monohydrate, (Macrobid) 100 MG capsule; Take 1 capsule by mouth 2 (Two) Times a Day.  Dispense: 14 capsule; Refill: 0    5. Other microscopic hematuria  -     nitrofurantoin, macrocrystal-monohydrate, (Macrobid) 100 MG capsule; Take 1 capsule by mouth 2 (Two) Times a Day.  Dispense: 14 capsule; Refill: 0    Other orders  -     olmesartan (Benicar) 20 MG tablet; Take 1 tablet by mouth Daily.  Dispense: 90 tablet; Refill: 0    Kanwal Sauer is a 73-year-old female patient seen today for  Diabetes type 2, we will check CMP lipids and urine microalbumin.  Metformin for now will send a  [employer] message with test results.    Hypertension, blood pressure elevated in the office I also reviewed the blood pressure log we will change losartan to olmesartan I will not change hydrochlorothiazide to 25 mg a day as she has history of urinary incontinence and increased urinary frequency.  Hyperlipidemia will check lipids today continue same  Dysuria, complaining of burning and pain during urination, daughter also complaining of diaper rash.  Telpur is positive for blood and protein will send for urine culture start Macrobid.  Follow-up as needed, or 4-months  Blood pressure           Follow Up     No follow-ups on file.  Patient was given instructions and counseling regarding her condition or for health maintenance advice. Please see specific information pulled into the AVS if appropriate.

## 2024-07-27 LAB
ALBUMIN SERPL-MCNC: 4.3 G/DL (ref 3.8–4.8)
ALBUMIN/CREAT UR: 982 MG/G CREAT (ref 0–29)
ALP SERPL-CCNC: 46 IU/L (ref 44–121)
ALT SERPL-CCNC: 11 IU/L (ref 0–32)
AST SERPL-CCNC: 15 IU/L (ref 0–40)
BASOPHILS # BLD AUTO: 0 X10E3/UL (ref 0–0.2)
BASOPHILS NFR BLD AUTO: 0 %
BILIRUB SERPL-MCNC: 0.4 MG/DL (ref 0–1.2)
BUN SERPL-MCNC: 29 MG/DL (ref 8–27)
BUN/CREAT SERPL: 30 (ref 12–28)
CALCIUM SERPL-MCNC: 10.2 MG/DL (ref 8.7–10.3)
CHLORIDE SERPL-SCNC: 100 MMOL/L (ref 96–106)
CHOLEST SERPL-MCNC: 172 MG/DL (ref 100–199)
CO2 SERPL-SCNC: 30 MMOL/L (ref 20–29)
CREAT SERPL-MCNC: 0.96 MG/DL (ref 0.57–1)
CREAT UR-MCNC: 68.2 MG/DL
EGFRCR SERPLBLD CKD-EPI 2021: 62 ML/MIN/1.73
EOSINOPHIL # BLD AUTO: 0.2 X10E3/UL (ref 0–0.4)
EOSINOPHIL NFR BLD AUTO: 5 %
ERYTHROCYTE [DISTWIDTH] IN BLOOD BY AUTOMATED COUNT: 13 % (ref 11.7–15.4)
GLOBULIN SER CALC-MCNC: 2.1 G/DL (ref 1.5–4.5)
GLUCOSE SERPL-MCNC: 159 MG/DL (ref 70–99)
HBA1C MFR BLD: 7.2 % (ref 4.8–5.6)
HCT VFR BLD AUTO: 36.7 % (ref 34–46.6)
HDLC SERPL-MCNC: 68 MG/DL
HGB BLD-MCNC: 12.2 G/DL (ref 11.1–15.9)
IMM GRANULOCYTES # BLD AUTO: 0 X10E3/UL (ref 0–0.1)
IMM GRANULOCYTES NFR BLD AUTO: 0 %
LDLC SERPL CALC-MCNC: 91 MG/DL (ref 0–99)
LYMPHOCYTES # BLD AUTO: 1.1 X10E3/UL (ref 0.7–3.1)
LYMPHOCYTES NFR BLD AUTO: 21 %
MCH RBC QN AUTO: 30.7 PG (ref 26.6–33)
MCHC RBC AUTO-ENTMCNC: 33.2 G/DL (ref 31.5–35.7)
MCV RBC AUTO: 92 FL (ref 79–97)
MICROALBUMIN UR-MCNC: 669.7 UG/ML
MONOCYTES # BLD AUTO: 0.5 X10E3/UL (ref 0.1–0.9)
MONOCYTES NFR BLD AUTO: 10 %
NEUTROPHILS # BLD AUTO: 3.4 X10E3/UL (ref 1.4–7)
NEUTROPHILS NFR BLD AUTO: 64 %
PLATELET # BLD AUTO: 145 X10E3/UL (ref 150–450)
POTASSIUM SERPL-SCNC: 4.9 MMOL/L (ref 3.5–5.2)
PROT SERPL-MCNC: 6.4 G/DL (ref 6–8.5)
RBC # BLD AUTO: 3.98 X10E6/UL (ref 3.77–5.28)
SODIUM SERPL-SCNC: 142 MMOL/L (ref 134–144)
TRIGL SERPL-MCNC: 70 MG/DL (ref 0–149)
TSH SERPL DL<=0.005 MIU/L-ACNC: 1.55 UIU/ML (ref 0.45–4.5)
VLDLC SERPL CALC-MCNC: 13 MG/DL (ref 5–40)
WBC # BLD AUTO: 5.3 X10E3/UL (ref 3.4–10.8)

## 2024-07-29 ENCOUNTER — TELEPHONE (OUTPATIENT)
Dept: FAMILY MEDICINE CLINIC | Facility: CLINIC | Age: 74
End: 2024-07-29
Payer: MEDICARE

## 2024-07-29 NOTE — TELEPHONE ENCOUNTER
[facility] health calling in regards to mutual pt. They are wanting to get orders for pt to start Nursing, PT and OT. Dr. Isaac nurse was unavailable at the moment. Tanisha can be reached at [phone]

## 2024-07-30 LAB
BACTERIA UR CULT: ABNORMAL
BACTERIA UR CULT: ABNORMAL
OTHER ANTIBIOTIC SUSC ISLT: ABNORMAL

## 2024-07-31 ENCOUNTER — TELEPHONE (OUTPATIENT)
Dept: FAMILY MEDICINE CLINIC | Facility: CLINIC | Age: 74
End: 2024-07-31

## 2024-07-31 NOTE — TELEPHONE ENCOUNTER
Caller: [facility]    Relationship: [facility]    Best call back number: [phone]    What orders are you requesting (i.e. lab or imaging): SKILLED NURSING ONCE A WEEK FOR 7 WEEKS

## 2024-08-01 ENCOUNTER — TELEPHONE (OUTPATIENT)
Dept: FAMILY MEDICINE CLINIC | Facility: CLINIC | Age: 74
End: 2024-08-01
Payer: MEDICARE

## 2024-08-01 NOTE — TELEPHONE ENCOUNTER
Karolyn from [facility] called she completed a PT evaluation on pt and wants to continue 1x wk for 4 wks requesting verbal order.     # [phone]

## 2024-08-05 NOTE — TELEPHONE ENCOUNTER
Caller: SHELDON TYSON    Relationship: Emergency Contact    Best call back number: [phone]    Requested Prescriptions:   Requested Prescriptions     Pending Prescriptions Disp Refills    apixaban (Eliquis) 5 MG tablet tablet 60 tablet 2     Sig: Take 1 tablet by mouth Every 12 (Twelve) Hours.        Pharmacy where request should be sent: [facility]/PHARMACY #[id number] - [facility], [place] - [address] AT [facility] - [phone]  - [phone] FX     Last office visit with prescribing clinician: 7/26/2024   Last telemedicine visit with prescribing clinician: Visit date not found   Next office visit with prescribing clinician: 10/21/2024     Additional details provided by patient:     Does the patient have less than a 3 day supply:  [x] Yes  [] No        Esthela Foster Rep   08/05/24 15:16 EDT

## 2024-08-13 ENCOUNTER — TELEPHONE (OUTPATIENT)
Dept: FAMILY MEDICINE CLINIC | Facility: CLINIC | Age: 74
End: 2024-08-13

## 2024-08-13 NOTE — TELEPHONE ENCOUNTER
Digna from [facility] requesting verbal orders for OT 1x a week 7 weeks   call back: [phone]

## 2024-08-29 ENCOUNTER — TELEPHONE (OUTPATIENT)
Dept: FAMILY MEDICINE CLINIC | Facility: CLINIC | Age: 74
End: 2024-08-29
Payer: MEDICARE

## 2024-08-29 RX ORDER — OLMESARTAN MEDOXOMIL 20 MG/1
20 TABLET ORAL DAILY
Qty: 90 TABLET | Refills: 0 | OUTPATIENT
Start: 2024-08-29

## 2024-08-29 NOTE — TELEPHONE ENCOUNTER
Caller: CURT    Relationship: Home Health    Best call back number:     [phone]           Who are you requesting to speak with (clinical staff, provider,  specific staff member): MEDICAL ASSISTANT      What was the call regarding:     PATIENT STATES THAT IT STILL BURNS WHEN SHE URINATES AND WANTING TO GET HER URINE CHECKED AGAIN TO SEE IF HER UTI HAS CLEAR UP.  HOME HEALTH CAN COLLECT THE URINE AS LONG AS DR FELICIANO APPROVES THE REQUEST.      PLEASE ADVISE

## 2024-09-05 ENCOUNTER — OFFICE VISIT (OUTPATIENT)
Dept: FAMILY MEDICINE CLINIC | Facility: CLINIC | Age: 74
End: 2024-09-05
Payer: MEDICARE

## 2024-09-05 VITALS
HEART RATE: 62 BPM | TEMPERATURE: 97 F | RESPIRATION RATE: 14 BRPM | BODY MASS INDEX: 34.62 KG/M2 | DIASTOLIC BLOOD PRESSURE: 60 MMHG | HEIGHT: 66 IN | OXYGEN SATURATION: 97 % | WEIGHT: 215.4 LBS | SYSTOLIC BLOOD PRESSURE: 120 MMHG

## 2024-09-05 DIAGNOSIS — B37.9 CANDIDIASIS: ICD-10-CM

## 2024-09-05 DIAGNOSIS — S69.91XD HAND INJURY, RIGHT, SUBSEQUENT ENCOUNTER: Primary | ICD-10-CM

## 2024-09-05 DIAGNOSIS — S69.91XD INJURY OF RIGHT WRIST, SUBSEQUENT ENCOUNTER: ICD-10-CM

## 2024-09-05 RX ORDER — CLOTRIMAZOLE AND BETAMETHASONE DIPROPIONATE 10; .64 MG/G; MG/G
1 CREAM TOPICAL 2 TIMES DAILY
Qty: 45 G | Refills: 2 | Status: SHIPPED | OUTPATIENT
Start: 2024-09-05

## 2024-09-13 ENCOUNTER — TELEPHONE (OUTPATIENT)
Dept: FAMILY MEDICINE CLINIC | Facility: CLINIC | Age: 74
End: 2024-09-13
Payer: MEDICARE

## 2024-09-13 NOTE — TELEPHONE ENCOUNTER
Caller: HERMAN    Relationship: [facility]      Best call back number: [phone]     What orders are you requesting (i.e. lab or imaging): VERBAL ORDERS    Additional notes:     ORDERS FOR A CANCELATION OF A URINE CULTURE     AND DISCHARGE OF SERVICE FOR NEXT WEEK

## 2024-09-17 ENCOUNTER — OFFICE VISIT (OUTPATIENT)
Dept: FAMILY MEDICINE CLINIC | Facility: CLINIC | Age: 74
End: 2024-09-17
Payer: MEDICARE

## 2024-09-17 VITALS
SYSTOLIC BLOOD PRESSURE: 130 MMHG | WEIGHT: 218.7 LBS | OXYGEN SATURATION: 98 % | BODY MASS INDEX: 35.15 KG/M2 | RESPIRATION RATE: 16 BRPM | TEMPERATURE: 98.4 F | HEIGHT: 66 IN | HEART RATE: 58 BPM | DIASTOLIC BLOOD PRESSURE: 60 MMHG

## 2024-09-17 DIAGNOSIS — L02.224 BOIL OF INGUINAL REGION: Primary | ICD-10-CM

## 2024-09-17 DIAGNOSIS — L30.4 INTERTRIGO: ICD-10-CM

## 2024-09-17 PROCEDURE — 1125F AMNT PAIN NOTED PAIN PRSNT: CPT | Performed by: FAMILY MEDICINE

## 2024-09-17 PROCEDURE — 3078F DIAST BP <80 MM HG: CPT | Performed by: FAMILY MEDICINE

## 2024-09-17 PROCEDURE — 3075F SYST BP GE 130 - 139MM HG: CPT | Performed by: FAMILY MEDICINE

## 2024-09-17 PROCEDURE — 99213 OFFICE O/P EST LOW 20 MIN: CPT | Performed by: FAMILY MEDICINE

## 2024-09-17 PROCEDURE — 3051F HG A1C>EQUAL 7.0%<8.0%: CPT | Performed by: FAMILY MEDICINE

## 2024-09-17 RX ORDER — NYSTATIN 100000 [USP'U]/G
POWDER TOPICAL 2 TIMES DAILY
Qty: 60 G | Refills: 1 | Status: SHIPPED | OUTPATIENT
Start: 2024-09-17

## 2024-09-26 ENCOUNTER — TELEPHONE (OUTPATIENT)
Dept: FAMILY MEDICINE CLINIC | Facility: CLINIC | Age: 74
End: 2024-09-26

## 2024-09-26 NOTE — TELEPHONE ENCOUNTER
Caller: GENARO SEQUEIRA    Relationship to patient: Other    Best call back number: [phone]     Patient is needing: INSURANCE IS NOT APPROVING ANY MORE VISITS SO PATIENT IS BEING DISCHARGED FROM [facility] AND [facility]

## 2024-09-29 ENCOUNTER — HOSPITAL ENCOUNTER (EMERGENCY)
Facility: HOSPITAL | Age: 74
Discharge: HOME | End: 2024-09-29
Attending: EMERGENCY MEDICINE | Admitting: EMERGENCY MEDICINE
Payer: MEDICARE

## 2024-09-29 VITALS
SYSTOLIC BLOOD PRESSURE: 178 MMHG | WEIGHT: 219 LBS | HEIGHT: 66 IN | BODY MASS INDEX: 35.2 KG/M2 | RESPIRATION RATE: 18 BRPM | HEART RATE: 51 BPM | OXYGEN SATURATION: 99 % | DIASTOLIC BLOOD PRESSURE: 61 MMHG | TEMPERATURE: 97.7 F

## 2024-09-29 DIAGNOSIS — M54.6 ACUTE BILATERAL THORACIC BACK PAIN: Primary | ICD-10-CM

## 2024-09-29 DIAGNOSIS — I10 UNCONTROLLED HYPERTENSION: ICD-10-CM

## 2024-09-29 DIAGNOSIS — E83.42 HYPOMAGNESEMIA: ICD-10-CM

## 2024-09-29 DIAGNOSIS — N39.0 URINARY TRACT INFECTION WITHOUT HEMATURIA, SITE UNSPECIFIED: ICD-10-CM

## 2024-09-29 LAB
ALBUMIN SERPL-MCNC: 4 G/DL (ref 3.5–5.2)
ALBUMIN/GLOB SERPL: 1.7 G/DL
ALP SERPL-CCNC: 39 U/L (ref 39–117)
ALT SERPL W P-5'-P-CCNC: 12 U/L (ref 1–33)
ANION GAP SERPL CALCULATED.3IONS-SCNC: 8.7 MMOL/L (ref 5–15)
AST SERPL-CCNC: 17 U/L (ref 1–32)
BACTERIA UR QL AUTO: ABNORMAL /HPF
BASOPHILS # BLD AUTO: 0.01 10*3/MM3 (ref 0–0.2)
BASOPHILS NFR BLD AUTO: 0.2 % (ref 0–1.5)
BILIRUB SERPL-MCNC: 0.4 MG/DL (ref 0–1.2)
BILIRUB UR QL STRIP: NEGATIVE
BUN SERPL-MCNC: 28 MG/DL (ref 8–23)
BUN/CREAT SERPL: 35 (ref 7–25)
CALCIUM SPEC-SCNC: 10.1 MG/DL (ref 8.6–10.5)
CHLORIDE SERPL-SCNC: 100 MMOL/L (ref 98–107)
CK SERPL-CCNC: 26 U/L (ref 20–180)
CLARITY UR: CLEAR
CO2 SERPL-SCNC: 29.3 MMOL/L (ref 22–29)
COLOR UR: YELLOW
CREAT SERPL-MCNC: 0.8 MG/DL (ref 0.57–1)
DEPRECATED RDW RBC AUTO: 42.4 FL (ref 37–54)
EGFRCR SERPLBLD CKD-EPI 2021: 76.9 ML/MIN/1.73
EOSINOPHIL # BLD AUTO: 0.14 10*3/MM3 (ref 0–0.4)
EOSINOPHIL NFR BLD AUTO: 2.5 % (ref 0.3–6.2)
ERYTHROCYTE [DISTWIDTH] IN BLOOD BY AUTOMATED COUNT: 13 % (ref 12.3–15.4)
GLOBULIN UR ELPH-MCNC: 2.3 GM/DL
GLUCOSE SERPL-MCNC: 141 MG/DL (ref 65–99)
GLUCOSE UR STRIP-MCNC: NEGATIVE MG/DL
HCT VFR BLD AUTO: 34.5 % (ref 34–46.6)
HGB BLD-MCNC: 11.5 G/DL (ref 12–15.9)
HGB UR QL STRIP.AUTO: NEGATIVE
HYALINE CASTS UR QL AUTO: ABNORMAL /LPF
IMM GRANULOCYTES # BLD AUTO: 0.03 10*3/MM3 (ref 0–0.05)
IMM GRANULOCYTES NFR BLD AUTO: 0.5 % (ref 0–0.5)
KETONES UR QL STRIP: NEGATIVE
LEUKOCYTE ESTERASE UR QL STRIP.AUTO: ABNORMAL
LYMPHOCYTES # BLD AUTO: 1.18 10*3/MM3 (ref 0.7–3.1)
LYMPHOCYTES NFR BLD AUTO: 20.7 % (ref 19.6–45.3)
MAGNESIUM SERPL-MCNC: 1.3 MG/DL (ref 1.6–2.4)
MCH RBC QN AUTO: 30.2 PG (ref 26.6–33)
MCHC RBC AUTO-ENTMCNC: 33.3 G/DL (ref 31.5–35.7)
MCV RBC AUTO: 90.6 FL (ref 79–97)
MONOCYTES # BLD AUTO: 0.45 10*3/MM3 (ref 0.1–0.9)
MONOCYTES NFR BLD AUTO: 7.9 % (ref 5–12)
NEUTROPHILS NFR BLD AUTO: 3.9 10*3/MM3 (ref 1.7–7)
NEUTROPHILS NFR BLD AUTO: 68.2 % (ref 42.7–76)
NITRITE UR QL STRIP: NEGATIVE
PH UR STRIP.AUTO: 7.5 [PH] (ref 5–8)
PLATELET # BLD AUTO: 134 10*3/MM3 (ref 140–450)
PMV BLD AUTO: 10.5 FL (ref 6–12)
POTASSIUM SERPL-SCNC: 3.9 MMOL/L (ref 3.5–5.2)
PROT SERPL-MCNC: 6.3 G/DL (ref 6–8.5)
PROT UR QL STRIP: ABNORMAL
RBC # BLD AUTO: 3.81 10*6/MM3 (ref 3.77–5.28)
RBC # UR STRIP: ABNORMAL /HPF
REF LAB TEST METHOD: ABNORMAL
SODIUM SERPL-SCNC: 138 MMOL/L (ref 136–145)
SP GR UR STRIP: 1.02 (ref 1–1.03)
SQUAMOUS #/AREA URNS HPF: ABNORMAL /HPF
UROBILINOGEN UR QL STRIP: ABNORMAL
WBC # UR STRIP: ABNORMAL /HPF
WBC NRBC COR # BLD AUTO: 5.71 10*3/MM3 (ref 3.4–10.8)

## 2024-09-29 PROCEDURE — 82550 ASSAY OF CK (CPK): CPT | Performed by: EMERGENCY MEDICINE

## 2024-09-29 PROCEDURE — 83735 ASSAY OF MAGNESIUM: CPT | Performed by: EMERGENCY MEDICINE

## 2024-09-29 PROCEDURE — 85025 COMPLETE CBC W/AUTO DIFF WBC: CPT | Performed by: EMERGENCY MEDICINE

## 2024-09-29 PROCEDURE — 36415 COLL VENOUS BLD VENIPUNCTURE: CPT

## 2024-09-29 PROCEDURE — 25010000002 MAGNESIUM SULFATE 2 GM/50ML SOLUTION: Performed by: EMERGENCY MEDICINE

## 2024-09-29 PROCEDURE — 80053 COMPREHEN METABOLIC PANEL: CPT | Performed by: EMERGENCY MEDICINE

## 2024-09-29 PROCEDURE — 96365 THER/PROPH/DIAG IV INF INIT: CPT

## 2024-09-29 PROCEDURE — 99283 EMERGENCY DEPT VISIT LOW MDM: CPT

## 2024-09-29 PROCEDURE — 81001 URINALYSIS AUTO W/SCOPE: CPT | Performed by: EMERGENCY MEDICINE

## 2024-09-29 RX ORDER — DIAZEPAM 5 MG
5 TABLET ORAL ONCE
Status: COMPLETED | OUTPATIENT
Start: 2024-09-29 | End: 2024-09-29

## 2024-09-29 RX ORDER — HYDROCODONE BITARTRATE AND ACETAMINOPHEN 5; 325 MG/1; MG/1
1 TABLET ORAL EVERY 6 HOURS PRN
Status: DISCONTINUED | OUTPATIENT
Start: 2024-09-29 | End: 2024-09-29 | Stop reason: HOSPADM

## 2024-09-29 RX ORDER — MAGNESIUM SULFATE HEPTAHYDRATE 40 MG/ML
2 INJECTION, SOLUTION INTRAVENOUS ONCE
Status: COMPLETED | OUTPATIENT
Start: 2024-09-29 | End: 2024-09-29

## 2024-09-29 RX ORDER — CEPHALEXIN 500 MG/1
500 CAPSULE ORAL 2 TIMES DAILY
Qty: 8 CAPSULE | Refills: 0 | Status: SHIPPED | OUTPATIENT
Start: 2024-09-29 | End: 2024-10-03

## 2024-09-29 RX ADMIN — MAGNESIUM SULFATE HEPTAHYDRATE 2 G: 40 INJECTION, SOLUTION INTRAVENOUS at 13:25

## 2024-09-29 RX ADMIN — DIAZEPAM 5 MG: 5 TABLET ORAL at 12:20

## 2024-09-29 NOTE — ED PROVIDER NOTES
EMERGENCY DEPARTMENT ENCOUNTER  Room Number:  29/29  PCP: Nisha Britton MD  Independent Historians: Patient and Family      HPI:  Chief Complaint: had concerns including Back Pain and Spasms.     A complete HPI/ROS/PMH/PSH/SH/FH are unobtainable due to: None    Chronic or social conditions impacting patient care (Social Determinants of Health): None      Context: Kanwal Sauer is a 73 y.o. female with a medical history of breast cancer, anxiety, DVT, diabetes, scoliosis who presents to the ED c/o acute back spasms.  The patient reports that for the last week she has had spasms in her right neck and mid back.  She states that she has had similar episodes intermittently for many years.  Daughter reports that she has not had an episode since January 1.  She was doing physical therapy and occupational therapy but recently stopped due to insurance issues.  She denies any weakness or numbness.  She reports that despite having recurrent episodes like this for years there is no identifiable cause.  Family reports that she does have pain medicine at home but the family regulates this and family has been out of town.      Review of prior external notes (non-ED) -and- Review of prior external test results outside of this encounter:  Laboratory evaluation 7/26/2024 shows normal CBC except for low platelets of 145    Prescription drug monitoring program review: [facility] reviewed by Herminio Conde MD       PAST MEDICAL HISTORY  Active Ambulatory Problems     Diagnosis Date Noted    Closed fracture of left distal femur 12/23/2020    Type 2 diabetes mellitus with circulatory disorder, without long-term current use of insulin 12/24/2020    Essential hypertension 12/24/2020    Tremors of nervous system 12/26/2020    PAF (paroxysmal atrial fibrillation) 12/27/2020    TARSHA (obstructive sleep apnea) 12/27/2020    History of breast cancer 02/22/2021    History of seizure 05/25/2021    Risk for falls 05/25/2021    Neck pain  11/27/2022    Acute UTI 12/29/2022    Chronic midline low back pain without sciatica 04/25/2023    Vulvar rash 05/22/2023    Elevated troponin 11/29/2023    Hypomagnesemia 11/29/2023    COPD (chronic obstructive pulmonary disease) 11/29/2023     Resolved Ambulatory Problems     Diagnosis Date Noted    CANDICE (acute kidney injury) 12/24/2020    Acute blood loss anemia 12/24/2020    Chronic respiratory failure with hypoxia 12/27/2020    Atrial fibrillation with rapid ventricular response 11/29/2023     Past Medical History:   Diagnosis Date    Allergic     Anxiety     Asthma     Atrial fibrillation     Cancer     Deep vein thrombosis     Diabetes mellitus     Difficulty walking     Diverticulosis     Drug therapy     Environmental allergies     Fractures     Headache, tension-type     Hyperlipidemia     Hypertension     Low back pain     Neuropathy in diabetes     Obesity     Scoliosis     Seizures     Sleep apnea     Tremor     Urinary tract infection     Weakness          PAST SURGICAL HISTORY  Past Surgical History:   Procedure Laterality Date    BREAST BIOPSY      CATARACT EXTRACTION, BILATERAL      DENTAL PROCEDURE      Removed teeth    FEMUR OPEN REDUCTION INTERNAL FIXATION Left 12/24/2020    Procedure: DISTAL FEMUR OPEN REDUCTION INTERNAL FIXATION;  Surgeon: Marley Hernandez MD;  Location: [facility];  Service: Orthopedics;  Laterality: Left;    MASTECTOMY Left     REPLACEMENT TOTAL KNEE BILATERAL           FAMILY HISTORY  Family History   Problem Relation Age of Onset    Arthritis Mother     Lung cancer Mother     Brain cancer Mother     Hypertension Mother     Cancer Mother         Lung cancer    Hypertension Father     Arthritis Sister     Breast cancer Sister     Diabetes Sister     Hypertension Sister     Dementia Sister     Cancer Sister         Brest cancer    Aneurysm Sister     Cancer Daughter         Colon cancer    Developmental Disability Daughter     Learning disabilities Daughter      Miscarriages / Stillbirths Daughter         Miscarriage         SOCIAL HISTORY  Social History     Socioeconomic History    Marital status:    Tobacco Use    Smoking status: Never     Passive exposure: Never    Smokeless tobacco: Never    Tobacco comments:     no caffine   Vaping Use    Vaping status: Never Used   Substance and Sexual Activity    Alcohol use: Not Currently    Drug use: Never    Sexual activity: Not Currently     Partners: Male         ALLERGIES  Baclofen; Doxycycline; Egg-derived products; Iodine; Latex; Milk-related compounds; Msg [monosodium glutamate]; Penicillins; Metronidazole; Ezetimibe; Latex, natural rubber; Levaquin [levofloxacin]; Nylon; and Simvastatin      REVIEW OF SYSTEMS  Review of Systems  Included in HPI  All systems reviewed and negative except for those discussed in HPI.      PHYSICAL EXAM    I have reviewed the triage vital signs and nursing notes.    ED Triage Vitals   Temp Heart Rate Resp BP SpO2   09/29/24 1139 09/29/24 1139 09/29/24 1139 09/29/24 1201 09/29/24 1139   97.7 °F (36.5 °C) 56 18 (!) 190/83 92 %      Temp src Heart Rate Source Patient Position BP Location FiO2 (%)   -- -- -- -- --              Physical Exam  GENERAL: Awake, alert, appears uncomfortable  SKIN: Warm, dry  HENT: Normocephalic, atraumatic  EYES: no scleral icterus  CV: regular rhythm, regular rate  RESPIRATORY: normal effort, lungs clear  ABDOMEN: soft, nontender, nondistended  MUSCULOSKELETAL: no deformity.  Bilateral paraspinal tenderness in the mid and low thoracic region.  No lumbar tenderness.  She has tenderness in the right paraspinal cervical region  NEURO: alert, moves all extremities, follows commands            LAB RESULTS  Recent Results (from the past 24 hour(s))   Comprehensive Metabolic Panel    Collection Time: 09/29/24 12:23 PM    Specimen: Hand, Right; Blood   Result Value Ref Range    Glucose 141 (H) 65 - 99 mg/dL    BUN 28 (H) 8 - 23 mg/dL    Creatinine 0.80 0.57 - 1.00 mg/dL     Sodium 138 136 - 145 mmol/L    Potassium 3.9 3.5 - 5.2 mmol/L    Chloride 100 98 - 107 mmol/L    CO2 29.3 (H) 22.0 - 29.0 mmol/L    Calcium 10.1 8.6 - 10.5 mg/dL    Total Protein 6.3 6.0 - 8.5 g/dL    Albumin 4.0 3.5 - 5.2 g/dL    ALT (SGPT) 12 1 - 33 U/L    AST (SGOT) 17 1 - 32 U/L    Alkaline Phosphatase 39 39 - 117 U/L    Total Bilirubin 0.4 0.0 - 1.2 mg/dL    Globulin 2.3 gm/dL    A/G Ratio 1.7 g/dL    BUN/Creatinine Ratio 35.0 (H) 7.0 - 25.0    Anion Gap 8.7 5.0 - 15.0 mmol/L    eGFR 76.9 >60.0 mL/min/1.73   CK    Collection Time: 09/29/24 12:23 PM    Specimen: Hand, Right; Blood   Result Value Ref Range    Creatine Kinase 26 20 - 180 U/L   CBC Auto Differential    Collection Time: 09/29/24 12:23 PM    Specimen: Hand, Right; Blood   Result Value Ref Range    WBC 5.71 3.40 - 10.80 10*3/mm3    RBC 3.81 3.77 - 5.28 10*6/mm3    Hemoglobin 11.5 (L) 12.0 - 15.9 g/dL    Hematocrit 34.5 34.0 - 46.6 %    MCV 90.6 79.0 - 97.0 fL    MCH 30.2 26.6 - 33.0 pg    MCHC 33.3 31.5 - 35.7 g/dL    RDW 13.0 12.3 - 15.4 %    RDW-SD 42.4 37.0 - 54.0 fl    MPV 10.5 6.0 - 12.0 fL    Platelets 134 (L) 140 - 450 10*3/mm3    Neutrophil % 68.2 42.7 - 76.0 %    Lymphocyte % 20.7 19.6 - 45.3 %    Monocyte % 7.9 5.0 - 12.0 %    Eosinophil % 2.5 0.3 - 6.2 %    Basophil % 0.2 0.0 - 1.5 %    Immature Grans % 0.5 0.0 - 0.5 %    Neutrophils, Absolute 3.90 1.70 - 7.00 10*3/mm3    Lymphocytes, Absolute 1.18 0.70 - 3.10 10*3/mm3    Monocytes, Absolute 0.45 0.10 - 0.90 10*3/mm3    Eosinophils, Absolute 0.14 0.00 - 0.40 10*3/mm3    Basophils, Absolute 0.01 0.00 - 0.20 10*3/mm3    Immature Grans, Absolute 0.03 0.00 - 0.05 10*3/mm3   Magnesium    Collection Time: 09/29/24 12:23 PM    Specimen: Hand, Right; Blood   Result Value Ref Range    Magnesium 1.3 (L) 1.6 - 2.4 mg/dL   Urinalysis With Microscopic If Indicated (No Culture) - Urine, Clean Catch    Collection Time: 09/29/24 12:24 PM    Specimen: Urine, Clean Catch   Result Value Ref Range    Color,  UA Yellow Yellow, Straw    Appearance, UA Clear Clear    pH, UA 7.5 5.0 - 8.0    Specific Gravity, UA 1.019 1.005 - 1.030    Glucose, UA Negative Negative    Ketones, UA Negative Negative    Bilirubin, UA Negative Negative    Blood, UA Negative Negative    Protein,  mg/dL (2+) (A) Negative    Leuk Esterase, UA Trace (A) Negative    Nitrite, UA Negative Negative    Urobilinogen, UA 1.0 E.U./dL 0.2 - 1.0 E.U./dL   Urinalysis, Microscopic Only - Urine, Clean Catch    Collection Time: 09/29/24 12:24 PM    Specimen: Urine, Clean Catch   Result Value Ref Range    RBC, UA 0-2 None Seen, 0-2 /HPF    WBC, UA 21-50 (A) None Seen, 0-2 /HPF    Bacteria, UA Trace (A) None Seen /HPF    Squamous Epithelial Cells, UA 7-12 (A) None Seen, 0-2 /HPF    Hyaline Casts, UA None Seen None Seen /LPF    Methodology Automated Microscopy          RADIOLOGY  No Radiology Exams Resulted Within Past 24 Hours      MEDICATIONS GIVEN IN ER  Medications   HYDROcodone-acetaminophen (NORCO) 5-325 MG per tablet 1 tablet (has no administration in time range)   diazePAM (VALIUM) tablet 5 mg (5 mg Oral Given 9/29/24 1220)   magnesium sulfate 2g/50 mL (PREMIX) infusion (2 g Intravenous New Bag 9/29/24 1325)         ORDERS PLACED DURING THIS VISIT:  Orders Placed This Encounter   Procedures    Comprehensive Metabolic Panel    CK    CBC Auto Differential    Magnesium    Urinalysis With Microscopic If Indicated (No Culture) - Urine, Clean Catch    Urinalysis, Microscopic Only - Urine, Clean Catch    Continuous Pulse Oximetry    CBC & Differential         OUTPATIENT MEDICATION MANAGEMENT:  Current Facility-Administered Medications Ordered in Epic   Medication Dose Route Frequency Provider Last Rate Last Admin    HYDROcodone-acetaminophen (NORCO) 5-325 MG per tablet 1 tablet  1 tablet Oral Q6H PRN Herminio Conde MD         Current Outpatient Medications Ordered in Epic   Medication Sig Dispense Refill    acetaminophen (TYLENOL) 325 MG tablet Take 2  tablets by mouth Every 4 (Four) Hours As Needed for Mild Pain .      albuterol sulfate  (90 Base) MCG/ACT inhaler INHALE 2 PUFFS BY MOUTH EVERY 4 HOURS AS NEEDED FOR WHEEZE 18 g 1    apixaban (Eliquis) 5 MG tablet tablet Take 1 tablet by mouth Every 12 (Twelve) Hours. 60 tablet 2    cephalexin (KEFLEX) 500 MG capsule Take 1 capsule by mouth 2 (Two) Times a Day for 4 days. 8 capsule 0    Cholecalciferol (Vitamin D3) 25 MCG (1000 UT) capsule Take 1 capsule by mouth Daily.      citalopram (CeleXA) 20 MG tablet TAKE 1 TABLET BY MOUTH EVERY DAY 90 tablet 1    clotrimazole-betamethasone (LOTRISONE) 1-0.05 % cream Apply 1 Application topically to the appropriate area as directed 2 (Two) Times a Day. Use sparingly keep dry avoid chronic use 45 g 2    CRANBERRY PO Take 650 mg by mouth.      fenofibrate 160 MG tablet TAKE 1 TABLET BY MOUTH EVERY DAY 90 tablet 1    furosemide (LASIX) 40 MG tablet Take 0.5 tablets by mouth Daily As Needed (Take a dose of Lasix if you notice a 3 pound weight gain, increasing leg swelling, or an increase in your oxygen requirement.).      hydroCHLOROthiazide (MICROZIDE) 12.5 MG capsule Take 1 capsule by mouth Daily. 90 capsule 3    hydrocortisone 2.5 % ointment APPLY TO FACE TWICE A DAY AS NEEDED      ipratropium-albuterol (DUO-NEB) 0.5-2.5 mg/3 ml nebulizer Take 3 mL by nebulization Every 4 (Four) Hours As Needed for Wheezing. 150 mL 3    ketoconazole (NIZORAL) 2 % shampoo       latanoprost (XALATAN) 0.005 % ophthalmic solution Administer 1 drop to both eyes every night at bedtime.      lidocaine (LIDODERM) 5 % Place 1 patch on the skin as directed by provider Daily. Remove & Discard patch within 12 hours or as directed by MD 15 each 0    LORazepam (Ativan) 0.5 MG tablet Take 0.5 mg 1/2 hr before the procedure , can repeat  again 2 tablet 0    Magnesium Oxide -Mg Supplement 400 (240 Mg) MG tablet       metFORMIN (GLUCOPHAGE) 1000 MG tablet TAKE 1 TABLET BY MOUTH TWICE A DAY WITH FOOD 180  tablet 0    metoprolol tartrate (LOPRESSOR) 25 MG tablet TAKE 1 TABLET BY MOUTH TWICE A  tablet 1    montelukast (SINGULAIR) 10 MG tablet TAKE 1 TABLET BY MOUTH EVERYDAY AT BEDTIME 90 tablet 0    mupirocin (BACTROBAN) 2 % nasal ointment Administer 1 Application into the nostril(s) as directed by provider 2 (Two) Times a Day. 22 g 1    nystatin (MYCOSTATIN) 916373 UNIT/GM powder Apply  topically to the appropriate area as directed 2 (Two) Times a Day. 60 g 1    olmesartan (Benicar) 20 MG tablet Take 1 tablet by mouth Daily. 90 tablet 0    potassium chloride (KLOR-CON) 10 MEQ CR tablet Take 1 tablet by mouth Daily. 90 tablet 0    primidone (MYSOLINE) 50 MG tablet TAKE 1 TABLET BY MOUTH THREE TIMES A  tablet 0    Probiotic Product (PROBIOTIC-10 PO) Take  by mouth. (Patient not taking: Reported on 9/17/2024)      tiZANidine (ZANAFLEX) 4 MG tablet Take 1 tablet by mouth At Night As Needed for Muscle Spasms. 30 tablet 1    Trelegy Ellipta 100-62.5-25 MCG/ACT inhaler INHALE 1 PUFF DAILY 60 each 1         PROCEDURES  Procedures            PROGRESS, DATA ANALYSIS, CONSULTS, AND MEDICAL DECISION MAKING  All labs have been independently interpreted by me.  All radiology studies have been reviewed by me. All EKG's have been independently viewed and interpreted by me.  Discussion below represents my analysis of pertinent findings related to patient's condition, differential diagnosis, treatment plan and final disposition.    Differential diagnosis includes but is not limited to hypokalemia, hypomagnesemia, scoliosis, chronic back pain, rhabdomyolysis acute aortic syndrome.    Clinical Scores:                   ED Course as of 09/29/24 1345   Sun Sep 29, 2024   1237 The patient has had recurrent similar presentation over the years.  I reviewed her note from January of this year where she presented very similarly.  She was found to have hypomagnesemia and a UTI.  At this time I do not think that imaging of her spine  will help identify the cause as this is in multiple regions of the spine. [TR]   1300 WBC, UA(!): 21-50 [TR]   1301 Bacteria, UA(!): Trace [TR]   1304 Magnesium(!): 1.3 [TR]   1340 I reviewed the workup and findings with the patient and family at the bedside.  Answered all question.  She is receiving IV magnesium and feeling better.  Daughter reports that she noticed that the patient was out of her magnesium over this past week.  She has ordered a new supply.  She does have 21-50 white blood cells in her urine with trace bacteria.  Is unclear if this is a UTI but given her history we will treat her with antibiotics. [TR]      ED Course User Index  [TR] Herminio Conde MD               AS OF 13:45 EDT VITALS:    BP - (!) 190/83  HR - 54  TEMP - 97.7 °F (36.5 °C)  O2 SATS - 98%    COMPLEXITY OF CARE  Admission was considered but after careful review of the patient's presentation, physical examination, diagnostic results, and response to treatment the patient may be safely discharged with outpatient follow-up.      DIAGNOSIS  Final diagnoses:   Acute bilateral thoracic back pain   Hypomagnesemia   Uncontrolled hypertension   Urinary tract infection without hematuria, site unspecified         DISPOSITION  ED Disposition       ED Disposition   Discharge    Condition   Stable    Comment   --                Please note that portions of this document were completed with a voice recognition program.    Note Disclaimer: At [facility], we believe that sharing information builds trust and better relationships. You are receiving this note because you recently visited [facility]. It is possible you will see health information before a provider has talked with you about it. This kind of information can be easy to misunderstand. To help you fully understand what it means for your health, we urge you to discuss this note with your provider.         Herminio Conde MD  09/29/24 1134

## 2024-09-29 NOTE — DISCHARGE INSTRUCTIONS
Follow-up with your primary care doctor for further evaluation.  Return immediately for any sudden weakness or numbness, falls or injury.

## 2024-09-29 NOTE — ED TRIAGE NOTES
Pt to ED from home via pv. Pt and family report back pain and muscle spasms x 1 week. Pt reports spasms began in her neck and have progressed down her back and has worsened today.

## 2024-10-01 ENCOUNTER — OFFICE VISIT (OUTPATIENT)
Dept: FAMILY MEDICINE CLINIC | Facility: CLINIC | Age: 74
End: 2024-10-01
Payer: MEDICARE

## 2024-10-01 VITALS
SYSTOLIC BLOOD PRESSURE: 130 MMHG | HEART RATE: 60 BPM | HEIGHT: 66 IN | OXYGEN SATURATION: 97 % | BODY MASS INDEX: 34.57 KG/M2 | DIASTOLIC BLOOD PRESSURE: 76 MMHG | WEIGHT: 215.1 LBS

## 2024-10-01 DIAGNOSIS — N39.0 ACUTE UTI: ICD-10-CM

## 2024-10-01 DIAGNOSIS — M54.50 CHRONIC MIDLINE LOW BACK PAIN WITHOUT SCIATICA: ICD-10-CM

## 2024-10-01 DIAGNOSIS — M54.2 NECK PAIN: ICD-10-CM

## 2024-10-01 DIAGNOSIS — G89.29 CHRONIC MIDLINE LOW BACK PAIN WITHOUT SCIATICA: ICD-10-CM

## 2024-10-01 DIAGNOSIS — M54.6 ACUTE BILATERAL THORACIC BACK PAIN: Primary | ICD-10-CM

## 2024-10-01 PROCEDURE — 3051F HG A1C>EQUAL 7.0%<8.0%: CPT | Performed by: FAMILY MEDICINE

## 2024-10-01 PROCEDURE — 3075F SYST BP GE 130 - 139MM HG: CPT | Performed by: FAMILY MEDICINE

## 2024-10-01 PROCEDURE — 1125F AMNT PAIN NOTED PAIN PRSNT: CPT | Performed by: FAMILY MEDICINE

## 2024-10-01 PROCEDURE — 3078F DIAST BP <80 MM HG: CPT | Performed by: FAMILY MEDICINE

## 2024-10-01 PROCEDURE — 99214 OFFICE O/P EST MOD 30 MIN: CPT | Performed by: FAMILY MEDICINE

## 2024-10-01 NOTE — PROGRESS NOTES
"Chief Complaint  Follow-up (Hospital f/u back, r ear hurtingw/ consistent dull pain.)    Subjective        Kanwal Sauer presents to [facility]  History of Present Illness    History of Present Illness  The patient presents for evaluation of back pain. She is accompanied by an adult female.    She reports severe pain, rating it as a 20 on a scale of 10. The pain typically originates in the neck and radiates down the back, occasionally extending to the left leg. She has been taking tizanidine 4 mg twice daily for two days without noticeable improvement. Tramadol 50 mg has been somewhat effective, allowing her to sleep when she finds a comfortable position, although she often needs to change positions due to bathroom needs.    She recently visited the emergency room  for same and  her  magnesium was low, for  , which were addressed with an IV infusion. Despite this treatment, her pain persists. An antibiotic was prescribed due to  uti   She was scheduled for out patient mri prior to er visit  .  An MRI of the cervical spine was not performed as she was unable to lie flat for the required 45 minutes. Physical therapy provided some relief but was discontinued due to insurance issues. She has been receiving massages at home since January 2024. She has previously consulted with Dr. Mario James from [facility].     Objective   Vital Signs:  /76 (BP Location: Right arm, Patient Position: Sitting, Cuff Size: Large Adult)   Pulse 60   Ht 167.6 cm (65.98\")   Wt 97.6 kg (215 lb 1.6 oz)   SpO2 97%   BMI 34.73 kg/m²   Estimated body mass index is 34.73 kg/m² as calculated from the following:    Height as of this encounter: 167.6 cm (65.98\").    Weight as of this encounter: 97.6 kg (215 lb 1.6 oz).               Physical Exam  Constitutional:       General: She is not in acute distress.     Appearance: Normal appearance. She is well-developed.   HENT:      Head: Normocephalic " and atraumatic.      Right Ear: Tympanic membrane normal.      Left Ear: Tympanic membrane normal.      Mouth/Throat:      Mouth: Mucous membranes are moist.   Eyes:      General:         Right eye: No discharge.         Left eye: No discharge.      Extraocular Movements: Extraocular movements intact.      Pupils: Pupils are equal, round, and reactive to light.   Cardiovascular:      Rate and Rhythm: Normal rate and regular rhythm.      Pulses: Normal pulses.      Heart sounds: Normal heart sounds.   Pulmonary:      Effort: Pulmonary effort is normal.      Breath sounds: Normal breath sounds. No wheezing or rales.   Abdominal:      General: Bowel sounds are normal.      Palpations: Abdomen is soft. There is no mass.      Tenderness: There is no abdominal tenderness.   Musculoskeletal:      Cervical back: Normal range of motion and neck supple.      Right lower leg: No edema.      Left lower leg: No edema.   Lymphadenopathy:      Cervical: No cervical adenopathy.   Neurological:      General: No focal deficit present.      Mental Status: She is alert and oriented to person, place, and time.        Result Review :                   Assessment and Plan   Diagnoses and all orders for this visit:    1. Acute bilateral thoracic back pain (Primary)    2. Neck pain  -     tiZANidine (ZANAFLEX) 4 MG tablet; Take 1 tablet by mouth At Night As Needed for Muscle Spasms.  Dispense: 30 tablet; Refill: 1    3. Chronic midline low back pain without sciatica    4. Acute UTI    On antibiotic continue same    Assessment & Plan  Acute thoracic back pain and neck pain   ER note reviewed, she has similar pain in the past, no cause was identified had extensive workup at that time.  As per her daughter she has noticed improvement in and pain while she was on physical therapy.  Her physical therapy got denied secondary to insurance instructions.  The patient's back pain is likely due to degenerative disc disease.  Also having muscle spasm.   A. An MRI of the thoracic spine with contrast has been ordered. She is advised to take anxiety medication 15 to 20 minutes prior to the procedure. A prescription for Flexeril has been provided, and she is instructed not to combine it with other medications. If necessary, a steroid injection may be considered. She is also advised to take tramadol at night.  The patient has been experiencing muscle spasms. She has tizanidine HCL 4 mg at home, which she has been taking. A prescription for Flexeril has been provided as an alternative. She is instructed not to combine muscle relaxants. If the spasms persist, further evaluation will be needed.      Follow-up as needed or go to emergency room for any worsening              Follow Up   There are no Patient Instructions on file for this visit.   No follow-ups on file.  Patient was given instructions and counseling regarding her condition or for health maintenance advice. Please see specific information pulled into the AVS if appropriate.     Patient or patient representative verbalized consent for the use of Ambient Listening during the visit with  Nisha Britton MD for chart documentation. 10/14/2024  12:55 EDT

## 2024-10-02 ENCOUNTER — TELEPHONE (OUTPATIENT)
Dept: FAMILY MEDICINE CLINIC | Facility: CLINIC | Age: 74
End: 2024-10-02
Payer: MEDICARE

## 2024-10-02 ENCOUNTER — HOSPITAL ENCOUNTER (EMERGENCY)
Facility: HOSPITAL | Age: 74
Discharge: HOME | End: 2024-10-03
Attending: EMERGENCY MEDICINE
Payer: MEDICARE

## 2024-10-02 VITALS
HEIGHT: 66 IN | OXYGEN SATURATION: 100 % | DIASTOLIC BLOOD PRESSURE: 67 MMHG | WEIGHT: 215 LBS | RESPIRATION RATE: 18 BRPM | SYSTOLIC BLOOD PRESSURE: 185 MMHG | BODY MASS INDEX: 34.55 KG/M2 | TEMPERATURE: 98.4 F | HEART RATE: 49 BPM

## 2024-10-02 DIAGNOSIS — M62.830 BACK SPASM: Primary | ICD-10-CM

## 2024-10-02 DIAGNOSIS — G89.29 CHRONIC MIDLINE LOW BACK PAIN WITHOUT SCIATICA: ICD-10-CM

## 2024-10-02 DIAGNOSIS — G47.33 OSA (OBSTRUCTIVE SLEEP APNEA): ICD-10-CM

## 2024-10-02 DIAGNOSIS — M54.50 CHRONIC MIDLINE LOW BACK PAIN WITHOUT SCIATICA: ICD-10-CM

## 2024-10-02 DIAGNOSIS — M54.6 ACUTE BILATERAL THORACIC BACK PAIN: Primary | ICD-10-CM

## 2024-10-02 PROCEDURE — 99283 EMERGENCY DEPT VISIT LOW MDM: CPT

## 2024-10-02 PROCEDURE — 63710000001 PREDNISONE PER 1 MG: Performed by: EMERGENCY MEDICINE

## 2024-10-02 RX ORDER — DIAZEPAM 5 MG
5 TABLET ORAL ONCE
Status: COMPLETED | OUTPATIENT
Start: 2024-10-02 | End: 2024-10-02

## 2024-10-02 RX ORDER — ACETAMINOPHEN 500 MG
1000 TABLET ORAL ONCE
Status: COMPLETED | OUTPATIENT
Start: 2024-10-02 | End: 2024-10-02

## 2024-10-02 RX ORDER — PREDNISONE 20 MG/1
60 TABLET ORAL ONCE
Status: COMPLETED | OUTPATIENT
Start: 2024-10-02 | End: 2024-10-02

## 2024-10-02 RX ORDER — MONTELUKAST SODIUM 10 MG/1
10 TABLET ORAL
Qty: 90 TABLET | Refills: 1 | Status: SHIPPED | OUTPATIENT
Start: 2024-10-02

## 2024-10-02 RX ORDER — METHYLPREDNISOLONE 4 MG
TABLET, DOSE PACK ORAL
Qty: 21 EACH | Refills: 0 | Status: SHIPPED | OUTPATIENT
Start: 2024-10-02 | End: 2024-10-14 | Stop reason: HOSPADM

## 2024-10-02 RX ORDER — TRAMADOL HYDROCHLORIDE 50 MG/1
50 TABLET ORAL EVERY 6 HOURS PRN
COMMUNITY
End: 2024-10-03 | Stop reason: SDUPTHER

## 2024-10-02 RX ADMIN — ACETAMINOPHEN 1000 MG: 500 TABLET ORAL at 22:12

## 2024-10-02 RX ADMIN — PREDNISONE 60 MG: 20 TABLET ORAL at 22:12

## 2024-10-02 RX ADMIN — DIAZEPAM 5 MG: 5 TABLET ORAL at 22:12

## 2024-10-02 NOTE — TELEPHONE ENCOUNTER
Caller: SHELDON TYSON    Relationship: Emergency Contact    Best call back number: [phone]     Any additional details: CERVICAL THORACIC AND LUMBAR MRI NEEDED.  PLEASE CALL TO FOLLOW UP IF NEEDED

## 2024-10-03 RX ORDER — TRAMADOL HYDROCHLORIDE 50 MG/1
50 TABLET ORAL EVERY 8 HOURS PRN
Qty: 30 TABLET | Refills: 0 | Status: SHIPPED | OUTPATIENT
Start: 2024-10-03

## 2024-10-03 NOTE — TELEPHONE ENCOUNTER
Caller: SHELDON TYSON(poa)    Relationship: Emergency Contact    Best call back number: [phone]    Requested Prescriptions:   Requested Prescriptions     Pending Prescriptions Disp Refills    traMADol (ULTRAM) 50 MG tablet       Sig: Take 1 tablet by mouth Every 6 (Six) Hours As Needed for Moderate Pain.        Pharmacy where request should be sent: [id number] - [facility], [place] - [address] AT [facility] - [phone]  - [phone] FX     Last office visit with prescribing clinician: 10/1/2024   Last telemedicine visit with prescribing clinician: Visit date not found   Next office visit with prescribing clinician: 10/21/2024     Additional details provided by patient: DAUGHTER STATES THAT THIS WAS SUPPOSED TO HAVE BEEN SENT IN ON TUESDAY WHEN PATIENT WAS IN THE OFFICE     Does the patient have less than a 3 day supply:  [] Yes  [x] No        Esthela Foster Rep   10/03/24 08:45 EDT

## 2024-10-03 NOTE — ED PROVIDER NOTES
EMERGENCY DEPARTMENT ENCOUNTER  Room Number:  12/12  PCP: Nisha Britton MD  Independent Historians: Patient, daughter at bedside      HPI:  Chief Complaint: had concerns including Back Pain.     A complete HPI/ROS/PMH/PSH/SH/FH are unobtainable due to:   Chronic or social conditions impacting patient care (Social Determinants of Health):       Context: Kanwal Sauer is a 73 y.o. female with a medical history of diabetes, hypertension, atrial fibrillation who presents to the ED c/o acute back spasms.  Patient has had back spasms ongoing over several weeks.  She has had a history of similar in the past and these have come and gone throughout the years without clear cause.  Pain starts up in the neck but goes down throughout much of the back and radiates from right to left.  Pain is described as a spasm is worsened with certain movements.  Patient takes occasional Tylenol.  Today she took a half a tramadol earlier this morning and a half a tramadol tonight.  She also took 2 mg of tizanidine.  Despite this she has had episodes of pain worsened by certain movements.  Pain is currently moderate.  Denies recent chest pain or trouble breathing.  No recent fever.  The other night she was found to have low magnesium levels as she had been out of her oral magnesium.  They have restarted her magnesium.  She was also found to have a urinary tract infection and was started on some oral antibiotics.      Review of prior external notes (non-ED) -and- Review of prior external test results outside of this encounter:   I reviewed prior medical records including most recent office visit with Dr. Charmaine Britton.  Patient seen several weeks ago for boil of the inguinal region which was treated with mupirocin and nystatin.      Prescription drug monitoring program review:         PAST MEDICAL HISTORY  Active Ambulatory Problems     Diagnosis Date Noted    Closed fracture of left distal femur 12/23/2020    Type 2 diabetes mellitus  with circulatory disorder, without long-term current use of insulin 12/24/2020    Essential hypertension 12/24/2020    Tremors of nervous system 12/26/2020    PAF (paroxysmal atrial fibrillation) 12/27/2020    TARSHA (obstructive sleep apnea) 12/27/2020    History of breast cancer 02/22/2021    History of seizure 05/25/2021    Risk for falls 05/25/2021    Neck pain 11/27/2022    Acute UTI 12/29/2022    Chronic midline low back pain without sciatica 04/25/2023    Vulvar rash 05/22/2023    Elevated troponin 11/29/2023    Hypomagnesemia 11/29/2023    COPD (chronic obstructive pulmonary disease) 11/29/2023     Resolved Ambulatory Problems     Diagnosis Date Noted    CANDICE (acute kidney injury) 12/24/2020    Acute blood loss anemia 12/24/2020    Chronic respiratory failure with hypoxia 12/27/2020    Atrial fibrillation with rapid ventricular response 11/29/2023     Past Medical History:   Diagnosis Date    Allergic     Anxiety     Asthma     Atrial fibrillation     Cancer     Deep vein thrombosis     Diabetes mellitus     Difficulty walking     Diverticulosis     Drug therapy     Environmental allergies     Fractures     Headache, tension-type     Hyperlipidemia     Hypertension     Low back pain     Neuropathy in diabetes     Obesity     Scoliosis     Seizures     Sleep apnea     Tremor     Urinary tract infection     Weakness          PAST SURGICAL HISTORY  Past Surgical History:   Procedure Laterality Date    BREAST BIOPSY      CATARACT EXTRACTION, BILATERAL      DENTAL PROCEDURE      Removed teeth    FEMUR OPEN REDUCTION INTERNAL FIXATION Left 12/24/2020    Procedure: DISTAL FEMUR OPEN REDUCTION INTERNAL FIXATION;  Surgeon: Marley Hernandez MD;  Location: [facility];  Service: Orthopedics;  Laterality: Left;    MASTECTOMY Left     REPLACEMENT TOTAL KNEE BILATERAL           FAMILY HISTORY  Family History   Problem Relation Age of Onset    Arthritis Mother     Lung cancer Mother     Brain cancer Mother      Hypertension Mother     Cancer Mother         Lung cancer    Hypertension Father     Arthritis Sister     Breast cancer Sister     Diabetes Sister     Hypertension Sister     Dementia Sister     Cancer Sister         Brest cancer    Aneurysm Sister     Cancer Daughter         Colon cancer    Developmental Disability Daughter     Learning disabilities Daughter     Miscarriages / Stillbirths Daughter         Miscarriage         SOCIAL HISTORY  Social History     Socioeconomic History    Marital status:    Tobacco Use    Smoking status: Never     Passive exposure: Never    Smokeless tobacco: Never    Tobacco comments:     no caffine   Vaping Use    Vaping status: Never Used   Substance and Sexual Activity    Alcohol use: Not Currently    Drug use: Never    Sexual activity: Not Currently     Partners: Male         ALLERGIES  Baclofen; Doxycycline; Egg-derived products; Iodine; Latex; Milk-related compounds; Msg [monosodium glutamate]; Penicillins; Metronidazole; Ezetimibe; Latex, natural rubber; Levaquin [levofloxacin]; Nylon; and Simvastatin      REVIEW OF SYSTEMS  Review of Systems   Constitutional:  Negative for fever.   Respiratory:  Negative for shortness of breath.    Cardiovascular:  Negative for chest pain.   Musculoskeletal:  Positive for back pain.   All other systems reviewed and are negative.    Included in HPI  All systems reviewed and negative except for those discussed in HPI.      PHYSICAL EXAM    I have reviewed the triage vital signs and nursing notes.    ED Triage Vitals   Temp Heart Rate Resp BP SpO2   10/02/24 2111 10/02/24 2111 10/02/24 2111 10/02/24 2115 10/02/24 2111   98.4 °F (36.9 °C) 52 18 (!) 181/62 94 %      Temp src Heart Rate Source Patient Position BP Location FiO2 (%)   10/02/24 2111 10/02/24 2111 10/02/24 2115 10/02/24 2115 --   Tympanic Monitor Sitting Right arm        Physical Exam  GENERAL: Alert well-appearing female no obvious distress.  Triage vitals reviewed notable for  blood pressure 181/62.  Pulse 52.  Temperatures benign O2 sats normal.  SKIN: Warm, dry  HENT: Normocephalic, atraumatic  EYES: no scleral icterus  CV: Bradycardic with pulse around 50, regular  RESPIRATORY: normal effort, lungs clear  ABDOMEN: soft, nontender, nondistended  MUSCULOSKELETAL: Spine-mild diffuse paracervical and parathoracic tenderness there is no obvious single bony point tenderness  Lower extremities-atraumatic, good range of motion  NEURO: alert, moves all extremities, follows commands      LAB RESULTS  No results found for this or any previous visit (from the past 24 hour(s)).      RADIOLOGY  No Radiology Exams Resulted Within Past 24 Hours      MEDICATIONS GIVEN IN ER  Medications   predniSONE (DELTASONE) tablet 60 mg (60 mg Oral Given 10/2/24 2212)   acetaminophen (TYLENOL) tablet 1,000 mg (1,000 mg Oral Given 10/2/24 2212)   diazePAM (VALIUM) tablet 5 mg (5 mg Oral Given 10/2/24 2212)         ORDERS PLACED DURING THIS VISIT:  Orders Placed This Encounter   Procedures    Ambulate patient         OUTPATIENT MEDICATION MANAGEMENT:  No current Epic-ordered facility-administered medications on file.     Current Outpatient Medications Ordered in Epic   Medication Sig Dispense Refill    tiZANidine (ZANAFLEX) 4 MG tablet Take 1 tablet by mouth At Night As Needed for Muscle Spasms. 30 tablet 1    traMADol (ULTRAM) 50 MG tablet Take 1 tablet by mouth Every 6 (Six) Hours As Needed for Moderate Pain.      acetaminophen (TYLENOL) 325 MG tablet Take 2 tablets by mouth Every 4 (Four) Hours As Needed for Mild Pain .      albuterol sulfate  (90 Base) MCG/ACT inhaler INHALE 2 PUFFS BY MOUTH EVERY 4 HOURS AS NEEDED FOR WHEEZE 18 g 1    apixaban (Eliquis) 5 MG tablet tablet Take 1 tablet by mouth Every 12 (Twelve) Hours. 60 tablet 2    cephalexin (KEFLEX) 500 MG capsule Take 1 capsule by mouth 2 (Two) Times a Day for 4 days. 8 capsule 0    Cholecalciferol (Vitamin D3) 25 MCG (1000 UT) capsule Take 1 capsule  by mouth Daily.      citalopram (CeleXA) 20 MG tablet TAKE 1 TABLET BY MOUTH EVERY DAY 90 tablet 1    clotrimazole-betamethasone (LOTRISONE) 1-0.05 % cream Apply 1 Application topically to the appropriate area as directed 2 (Two) Times a Day. Use sparingly keep dry avoid chronic use 45 g 2    CRANBERRY PO Take 650 mg by mouth.      fenofibrate 160 MG tablet TAKE 1 TABLET BY MOUTH EVERY DAY 90 tablet 1    furosemide (LASIX) 40 MG tablet Take 0.5 tablets by mouth Daily As Needed (Take a dose of Lasix if you notice a 3 pound weight gain, increasing leg swelling, or an increase in your oxygen requirement.).      hydroCHLOROthiazide (MICROZIDE) 12.5 MG capsule Take 1 capsule by mouth Daily. 90 capsule 3    hydrocortisone 2.5 % ointment APPLY TO FACE TWICE A DAY AS NEEDED      ipratropium-albuterol (DUO-NEB) 0.5-2.5 mg/3 ml nebulizer Take 3 mL by nebulization Every 4 (Four) Hours As Needed for Wheezing. 150 mL 3    ketoconazole (NIZORAL) 2 % shampoo       latanoprost (XALATAN) 0.005 % ophthalmic solution Administer 1 drop to both eyes every night at bedtime.      lidocaine (LIDODERM) 5 % Place 1 patch on the skin as directed by provider Daily. Remove & Discard patch within 12 hours or as directed by MD 15 each 0    LORazepam (Ativan) 0.5 MG tablet Take 0.5 mg 1/2 hr before the procedure , can repeat  again 2 tablet 0    Magnesium Oxide -Mg Supplement 400 (240 Mg) MG tablet       metFORMIN (GLUCOPHAGE) 1000 MG tablet TAKE 1 TABLET BY MOUTH TWICE A DAY WITH FOOD 180 tablet 0    methylPREDNISolone (MEDROL) 4 MG dose pack Take as directed on package instructions. 21 each 0    metoprolol tartrate (LOPRESSOR) 25 MG tablet TAKE 1 TABLET BY MOUTH TWICE A  tablet 1    montelukast (SINGULAIR) 10 MG tablet TAKE 1 TABLET BY MOUTH EVERYDAY AT BEDTIME 90 tablet 1    mupirocin (BACTROBAN) 2 % nasal ointment Administer 1 Application into the nostril(s) as directed by provider 2 (Two) Times a Day. 22 g 1    nystatin (MYCOSTATIN)  "257386 UNIT/GM powder Apply  topically to the appropriate area as directed 2 (Two) Times a Day. 60 g 1    olmesartan (Benicar) 20 MG tablet Take 1 tablet by mouth Daily. 90 tablet 0    potassium chloride (KLOR-CON) 10 MEQ CR tablet Take 1 tablet by mouth Daily. 90 tablet 0    primidone (MYSOLINE) 50 MG tablet TAKE 1 TABLET BY MOUTH THREE TIMES A  tablet 0    Probiotic Product (PROBIOTIC-10 PO) Take  by mouth. (Patient not taking: Reported on 9/17/2024)      Trelegy Ellipta 100-62.5-25 MCG/ACT inhaler INHALE 1 PUFF DAILY 60 each 1         PROCEDURES  Procedures            PROGRESS, DATA ANALYSIS, CONSULTS, AND MEDICAL DECISION MAKING  All labs have been independently interpreted by me.  All radiology studies have been reviewed by me. All EKG's have been independently viewed and interpreted by me.  Discussion below represents my analysis of pertinent findings related to patient's condition, differential diagnosis, treatment plan and final disposition.    Differential diagnosis includes but is not limited to muscle spasm, lumbar DJD.      ED Course as of 10/02/24 2314   Wed Oct 02, 2024   2211 I do not see any \"red flags\" to suggest need for emergent imaging of the spine.    Blood work was taken the other day and we discussed whether or not to repeat labs.  Patient daughter comfortable holding off on labs as they have restarted her magnesium and they were otherwise unremarkable the other day.    Will go ahead and try to treat her pain with some Valium which worked well the other night.  Daughter states that pain generally improves after a course of steroids which we will go ahead and start some oral prednisone.  Will also give oral Tylenol.  Will reassess after pain medications to make sure she is doing better. [DB]   0344 I did discuss patient's CT lumbar spine and there is an area at L1 with approximate 30% loss of height.    We did discuss possible admission for neurosurgery evaluation and possible " kyphoplasty.  Patient is adamant about not staying and would rather go home and follow-up as outpatient.  Will try to make sure she can ambulate with a walker as per usual prior to discharge. [DB]   1391 Symptoms markedly improved after prednisone, Tylenol and Valium.    In prior episode she has always improved after usage of Medrol Dosepak and we will go ahead and prescribe same at her local pharmacy. [DB]      ED Course User Index  [DB] Dipesh Quick MD             AS OF 23:14 EDT VITALS:    BP - (!) 185/67  HR - (!) 49  TEMP - 98.4 °F (36.9 °C) (Tympanic)  O2 SATS - 100%    COMPLEXITY OF CARE  73-year-old female with history of recurrent back spasms presents with worsening of same.  Seen several nights ago for this and was given IV magnesium and Keflex for UTI.    I did discuss treatment and evaluation with patient and daughter at bedside.  They are comfortable holding off on imaging studies and blood work as I do not think it would significantly contribute to her care at this time.    I did treat patient with oral Valium, Tylenol and prednisone.  Daughter states that steroids have often help the symptoms significantly when she has had the spells in the past.  Daughter states that they are trying to obtain MRI imaging of the back to see when she gets the spells and I think that this is not unreasonable but I do not see indication for emergent neuroimaging at this time.      DIAGNOSIS  Final diagnoses:   Back spasm   Chronic midline low back pain without sciatica   TARSHA (obstructive sleep apnea)         DISPOSITION  ED Disposition       ED Disposition   Discharge    Condition   Stable    Comment   --                Please note that portions of this document were completed with a voice recognition program.    Note Disclaimer: At [facility], we believe that sharing information builds trust and better relationships. You are receiving this note because you recently visited [facility]. It is possible you will  see health information before a provider has talked with you about it. This kind of information can be easy to misunderstand. To help you fully understand what it means for your health, we urge you to discuss this note with your provider.         Dipesh Quick MD  10/02/24 2045

## 2024-10-03 NOTE — DISCHARGE INSTRUCTIONS
Please take Medrol Dosepak as directed to help with muscle spasms of the back.  Do not hesitate to return for worsening symptoms or as needed.

## 2024-10-07 ENCOUNTER — HOSPITAL ENCOUNTER (INPATIENT)
Facility: HOSPITAL | Age: 74
LOS: 6 days | Discharge: NURSING, REHAB OR LONG-TERM CARE | End: 2024-10-14
Attending: EMERGENCY MEDICINE | Admitting: INTERNAL MEDICINE
Payer: MEDICARE

## 2024-10-07 ENCOUNTER — APPOINTMENT (OUTPATIENT)
Dept: CT IMAGING | Facility: HOSPITAL | Age: 74
End: 2024-10-07
Payer: MEDICARE

## 2024-10-07 DIAGNOSIS — M51.360 DEGENERATION OF INTERVERTEBRAL DISC OF LUMBAR REGION WITH DISCOGENIC BACK PAIN: ICD-10-CM

## 2024-10-07 DIAGNOSIS — M54.59 INTRACTABLE LOW BACK PAIN: Primary | ICD-10-CM

## 2024-10-07 LAB
ALBUMIN SERPL-MCNC: 4.7 G/DL (ref 3.5–5.2)
ALBUMIN/GLOB SERPL: 1.8 G/DL
ALP SERPL-CCNC: 41 U/L (ref 39–117)
ALT SERPL W P-5'-P-CCNC: 11 U/L (ref 1–33)
ANION GAP SERPL CALCULATED.3IONS-SCNC: 9.5 MMOL/L (ref 5–15)
AST SERPL-CCNC: 15 U/L (ref 1–32)
BASOPHILS # BLD AUTO: 0.01 10*3/MM3 (ref 0–0.2)
BASOPHILS NFR BLD AUTO: 0.2 % (ref 0–1.5)
BILIRUB SERPL-MCNC: 0.4 MG/DL (ref 0–1.2)
BUN SERPL-MCNC: 35 MG/DL (ref 8–23)
BUN/CREAT SERPL: 37.2 (ref 7–25)
CALCIUM SPEC-SCNC: 11 MG/DL (ref 8.6–10.5)
CHLORIDE SERPL-SCNC: 95 MMOL/L (ref 98–107)
CO2 SERPL-SCNC: 30.5 MMOL/L (ref 22–29)
CREAT SERPL-MCNC: 0.94 MG/DL (ref 0.57–1)
DEPRECATED RDW RBC AUTO: 42.8 FL (ref 37–54)
EGFRCR SERPLBLD CKD-EPI 2021: 63.4 ML/MIN/1.73
EOSINOPHIL # BLD AUTO: 0.06 10*3/MM3 (ref 0–0.4)
EOSINOPHIL NFR BLD AUTO: 1 % (ref 0.3–6.2)
ERYTHROCYTE [DISTWIDTH] IN BLOOD BY AUTOMATED COUNT: 12.9 % (ref 12.3–15.4)
GLOBULIN UR ELPH-MCNC: 2.6 GM/DL
GLUCOSE SERPL-MCNC: 157 MG/DL (ref 65–99)
HCT VFR BLD AUTO: 40 % (ref 34–46.6)
HGB BLD-MCNC: 13.6 G/DL (ref 12–15.9)
IMM GRANULOCYTES # BLD AUTO: 0.04 10*3/MM3 (ref 0–0.05)
IMM GRANULOCYTES NFR BLD AUTO: 0.7 % (ref 0–0.5)
LYMPHOCYTES # BLD AUTO: 1.82 10*3/MM3 (ref 0.7–3.1)
LYMPHOCYTES NFR BLD AUTO: 30 % (ref 19.6–45.3)
MCH RBC QN AUTO: 30.8 PG (ref 26.6–33)
MCHC RBC AUTO-ENTMCNC: 34 G/DL (ref 31.5–35.7)
MCV RBC AUTO: 90.5 FL (ref 79–97)
MONOCYTES # BLD AUTO: 0.46 10*3/MM3 (ref 0.1–0.9)
MONOCYTES NFR BLD AUTO: 7.6 % (ref 5–12)
NEUTROPHILS NFR BLD AUTO: 3.68 10*3/MM3 (ref 1.7–7)
NEUTROPHILS NFR BLD AUTO: 60.5 % (ref 42.7–76)
NRBC BLD AUTO-RTO: 0 /100 WBC (ref 0–0.2)
PLATELET # BLD AUTO: 178 10*3/MM3 (ref 140–450)
PMV BLD AUTO: 11 FL (ref 6–12)
POTASSIUM SERPL-SCNC: 4 MMOL/L (ref 3.5–5.2)
PROT SERPL-MCNC: 7.3 G/DL (ref 6–8.5)
RBC # BLD AUTO: 4.42 10*6/MM3 (ref 3.77–5.28)
SODIUM SERPL-SCNC: 135 MMOL/L (ref 136–145)
WBC NRBC COR # BLD AUTO: 6.07 10*3/MM3 (ref 3.4–10.8)

## 2024-10-07 PROCEDURE — 72131 CT LUMBAR SPINE W/O DYE: CPT

## 2024-10-07 PROCEDURE — 25010000002 ONDANSETRON PER 1 MG: Performed by: EMERGENCY MEDICINE

## 2024-10-07 PROCEDURE — 80053 COMPREHEN METABOLIC PANEL: CPT | Performed by: EMERGENCY MEDICINE

## 2024-10-07 PROCEDURE — 25010000002 HYDROMORPHONE PER 4 MG: Performed by: EMERGENCY MEDICINE

## 2024-10-07 PROCEDURE — 85025 COMPLETE CBC W/AUTO DIFF WBC: CPT | Performed by: EMERGENCY MEDICINE

## 2024-10-07 PROCEDURE — 99285 EMERGENCY DEPT VISIT HI MDM: CPT

## 2024-10-07 PROCEDURE — 25010000002 DIAZEPAM PER 5 MG: Performed by: EMERGENCY MEDICINE

## 2024-10-07 PROCEDURE — 25010000002 KETOROLAC TROMETHAMINE PER 15 MG: Performed by: EMERGENCY MEDICINE

## 2024-10-07 RX ORDER — DIAZEPAM 10 MG/2ML
5 INJECTION, SOLUTION INTRAMUSCULAR; INTRAVENOUS ONCE
Status: COMPLETED | OUTPATIENT
Start: 2024-10-07 | End: 2024-10-07

## 2024-10-07 RX ORDER — KETOROLAC TROMETHAMINE 15 MG/ML
15 INJECTION, SOLUTION INTRAMUSCULAR; INTRAVENOUS ONCE
Status: COMPLETED | OUTPATIENT
Start: 2024-10-07 | End: 2024-10-07

## 2024-10-07 RX ORDER — ONDANSETRON 2 MG/ML
4 INJECTION INTRAMUSCULAR; INTRAVENOUS ONCE
Status: COMPLETED | OUTPATIENT
Start: 2024-10-07 | End: 2024-10-07

## 2024-10-07 RX ORDER — HYDROMORPHONE HYDROCHLORIDE 1 MG/ML
0.25 INJECTION, SOLUTION INTRAMUSCULAR; INTRAVENOUS; SUBCUTANEOUS ONCE
Status: COMPLETED | OUTPATIENT
Start: 2024-10-07 | End: 2024-10-07

## 2024-10-07 RX ORDER — SODIUM CHLORIDE 0.9 % (FLUSH) 0.9 %
10 SYRINGE (ML) INJECTION AS NEEDED
Status: DISCONTINUED | OUTPATIENT
Start: 2024-10-07 | End: 2024-10-14 | Stop reason: HOSPADM

## 2024-10-07 RX ADMIN — ONDANSETRON 4 MG: 2 INJECTION, SOLUTION INTRAMUSCULAR; INTRAVENOUS at 21:40

## 2024-10-07 RX ADMIN — KETOROLAC TROMETHAMINE 15 MG: 15 INJECTION, SOLUTION INTRAMUSCULAR; INTRAVENOUS at 20:06

## 2024-10-07 RX ADMIN — DIAZEPAM 5 MG: 5 INJECTION, SOLUTION INTRAMUSCULAR; INTRAVENOUS at 20:07

## 2024-10-07 RX ADMIN — HYDROMORPHONE HYDROCHLORIDE 0.25 MG: 1 INJECTION, SOLUTION INTRAMUSCULAR; INTRAVENOUS; SUBCUTANEOUS at 21:48

## 2024-10-07 NOTE — ED NOTES
"Intermittent pains - she calls then \"spasms\" - in her back x 3 weeks.  This is the 3rd time she has been seen for same.  She has been on steroids and tizanidine - there has been no improvement.  Pt is on 3L o2 at baseline  "

## 2024-10-07 NOTE — ED PROVIDER NOTES
EMERGENCY DEPARTMENT ENCOUNTER    Room Number:  20/20  PCP: Nisha Britton MD  Historian: Patient/family      HPI:  Chief Complaint: Low back pain  A complete HPI/ROS/PMH/PSH/SH/FH are unobtainable due to: None  Context: Kanwal Sauer is a 73 y.o. female who presents to the ED c/o fairly sudden onset of low back pain that has been present and significantly worsening now over the past couple of weeks.  She denies any known injury or causative factor for the pain.  She has been seen here in the ER now 3 times for this discomfort and has not improved with steroids, muscle relaxers, as well as pain medication.  She states that the pain is dull in nature and all across the low back, moderate to severe in intensity, and nonradiating.  She states that the pain is much worse without any type of movement.  She denies chest pain, shortness of breath, nausea/vomiting, fever/chills, leg weakness, urinary retention, fecal incontinence, or groin numbness.            PAST MEDICAL HISTORY  Active Ambulatory Problems     Diagnosis Date Noted    Closed fracture of left distal femur 12/23/2020    Type 2 diabetes mellitus with circulatory disorder, without long-term current use of insulin 12/24/2020    Essential hypertension 12/24/2020    Tremors of nervous system 12/26/2020    PAF (paroxysmal atrial fibrillation) 12/27/2020    TARSHA (obstructive sleep apnea) 12/27/2020    History of breast cancer 02/22/2021    History of seizure 05/25/2021    Risk for falls 05/25/2021    Neck pain 11/27/2022    Acute UTI 12/29/2022    Chronic midline low back pain without sciatica 04/25/2023    Vulvar rash 05/22/2023    Elevated troponin 11/29/2023    Hypomagnesemia 11/29/2023    COPD (chronic obstructive pulmonary disease) 11/29/2023     Resolved Ambulatory Problems     Diagnosis Date Noted    CANDICE (acute kidney injury) 12/24/2020    Acute blood loss anemia 12/24/2020    Chronic respiratory failure with hypoxia 12/27/2020    Atrial fibrillation  with rapid ventricular response 11/29/2023     Past Medical History:   Diagnosis Date    Allergic     Anxiety     Asthma     Atrial fibrillation     Cancer     Deep vein thrombosis     Diabetes mellitus     Difficulty walking     Diverticulosis     Drug therapy     Environmental allergies     Fractures     Headache, tension-type     Hyperlipidemia     Hypertension     Low back pain     Neuropathy in diabetes     Obesity     Scoliosis     Seizures     Sleep apnea     Tremor     Urinary tract infection     Weakness          PAST SURGICAL HISTORY  Past Surgical History:   Procedure Laterality Date    BREAST BIOPSY      CATARACT EXTRACTION, BILATERAL      DENTAL PROCEDURE      Removed teeth    FEMUR OPEN REDUCTION INTERNAL FIXATION Left 12/24/2020    Procedure: DISTAL FEMUR OPEN REDUCTION INTERNAL FIXATION;  Surgeon: Marley Hernandez MD;  Location: [facility] OR;  Service: Orthopedics;  Laterality: Left;    MASTECTOMY Left     REPLACEMENT TOTAL KNEE BILATERAL           FAMILY HISTORY  Family History   Problem Relation Age of Onset    Arthritis Mother     Lung cancer Mother     Brain cancer Mother     Hypertension Mother     Cancer Mother         Lung cancer    Hypertension Father     Arthritis Sister     Breast cancer Sister     Diabetes Sister     Hypertension Sister     Dementia Sister     Cancer Sister         Brest cancer    Aneurysm Sister     Cancer Daughter         Colon cancer    Developmental Disability Daughter     Learning disabilities Daughter     Miscarriages / Stillbirths Daughter         Miscarriage         SOCIAL HISTORY  Social History     Socioeconomic History    Marital status:    Tobacco Use    Smoking status: Never     Passive exposure: Never    Smokeless tobacco: Never    Tobacco comments:     no caffine   Vaping Use    Vaping status: Never Used   Substance and Sexual Activity    Alcohol use: Not Currently    Drug use: Never    Sexual activity: Not Currently     Partners: Male          ALLERGIES  Baclofen; Doxycycline; Egg-derived products; Iodine; Latex; Milk-related compounds; Msg [monosodium glutamate]; Penicillins; Metronidazole; Ezetimibe; Latex, natural rubber; Levaquin [levofloxacin]; Nylon; and Simvastatin        REVIEW OF SYSTEMS  Review of Systems   Constitutional:  Negative for fever.   HENT:  Negative for sore throat.    Eyes: Negative.    Respiratory:  Negative for cough and shortness of breath.    Cardiovascular:  Negative for chest pain.   Gastrointestinal:  Negative for abdominal pain, diarrhea and vomiting.   Genitourinary:  Negative for dysuria.   Musculoskeletal:  Positive for back pain. Negative for neck pain.   Skin:  Negative for rash.   Allergic/Immunologic: Negative.    Neurological:  Negative for weakness, numbness and headaches.   Hematological: Negative.    Psychiatric/Behavioral: Negative.     All other systems reviewed and are negative.         PHYSICAL EXAM  ED Triage Vitals   Temp Heart Rate Resp BP SpO2   10/07/24 1743 10/07/24 1743 10/07/24 1743 10/07/24 1744 10/07/24 1743   97.5 °F (36.4 °C) 60 16 170/72 97 %      Temp src Heart Rate Source Patient Position BP Location FiO2 (%)   10/07/24 1743 10/07/24 1743 10/07/24 1744 10/07/24 1744 --   Tympanic Monitor Sitting Right arm        Physical Exam  Constitutional:       General: She is not in acute distress.     Appearance: Normal appearance. She is not ill-appearing or toxic-appearing.   HENT:      Head: Normocephalic and atraumatic.   Eyes:      Extraocular Movements: Extraocular movements intact.      Pupils: Pupils are equal, round, and reactive to light.   Cardiovascular:      Rate and Rhythm: Normal rate and regular rhythm.      Heart sounds: No murmur heard.     No friction rub. No gallop.   Pulmonary:      Effort: Pulmonary effort is normal.      Breath sounds: Normal breath sounds.   Abdominal:      General: Abdomen is flat. There is no distension.      Palpations: Abdomen is soft.      Tenderness:  There is no abdominal tenderness.   Musculoskeletal:         General: No swelling or tenderness. Normal range of motion.      Cervical back: Normal range of motion and neck supple.   Skin:     General: Skin is warm and dry.   Neurological:      General: No focal deficit present.      Mental Status: She is alert and oriented to person, place, and time.      Sensory: No sensory deficit.      Motor: No weakness.   Psychiatric:         Mood and Affect: Mood normal.         Behavior: Behavior normal.           Vital signs and nursing notes reviewed.          LAB RESULTS  Recent Results (from the past 24 hour(s))   Comprehensive Metabolic Panel    Collection Time: 10/07/24  7:53 PM    Specimen: Blood   Result Value Ref Range    Glucose 157 (H) 65 - 99 mg/dL    BUN 35 (H) 8 - 23 mg/dL    Creatinine 0.94 0.57 - 1.00 mg/dL    Sodium 135 (L) 136 - 145 mmol/L    Potassium 4.0 3.5 - 5.2 mmol/L    Chloride 95 (L) 98 - 107 mmol/L    CO2 30.5 (H) 22.0 - 29.0 mmol/L    Calcium 11.0 (H) 8.6 - 10.5 mg/dL    Total Protein 7.3 6.0 - 8.5 g/dL    Albumin 4.7 3.5 - 5.2 g/dL    ALT (SGPT) 11 1 - 33 U/L    AST (SGOT) 15 1 - 32 U/L    Alkaline Phosphatase 41 39 - 117 U/L    Total Bilirubin 0.4 0.0 - 1.2 mg/dL    Globulin 2.6 gm/dL    A/G Ratio 1.8 g/dL    BUN/Creatinine Ratio 37.2 (H) 7.0 - 25.0    Anion Gap 9.5 5.0 - 15.0 mmol/L    eGFR 63.4 >60.0 mL/min/1.73   CBC Auto Differential    Collection Time: 10/07/24  7:53 PM    Specimen: Blood   Result Value Ref Range    WBC 6.07 3.40 - 10.80 10*3/mm3    RBC 4.42 3.77 - 5.28 10*6/mm3    Hemoglobin 13.6 12.0 - 15.9 g/dL    Hematocrit 40.0 34.0 - 46.6 %    MCV 90.5 79.0 - 97.0 fL    MCH 30.8 26.6 - 33.0 pg    MCHC 34.0 31.5 - 35.7 g/dL    RDW 12.9 12.3 - 15.4 %    RDW-SD 42.8 37.0 - 54.0 fl    MPV 11.0 6.0 - 12.0 fL    Platelets 178 140 - 450 10*3/mm3    Neutrophil % 60.5 42.7 - 76.0 %    Lymphocyte % 30.0 19.6 - 45.3 %    Monocyte % 7.6 5.0 - 12.0 %    Eosinophil % 1.0 0.3 - 6.2 %    Basophil %  0.2 0.0 - 1.5 %    Immature Grans % 0.7 (H) 0.0 - 0.5 %    Neutrophils, Absolute 3.68 1.70 - 7.00 10*3/mm3    Lymphocytes, Absolute 1.82 0.70 - 3.10 10*3/mm3    Monocytes, Absolute 0.46 0.10 - 0.90 10*3/mm3    Eosinophils, Absolute 0.06 0.00 - 0.40 10*3/mm3    Basophils, Absolute 0.01 0.00 - 0.20 10*3/mm3    Immature Grans, Absolute 0.04 0.00 - 0.05 10*3/mm3    nRBC 0.0 0.0 - 0.2 /100 WBC       Ordered the above labs and reviewed the results.        RADIOLOGY  CT Lumbar Spine Without Contrast    Result Date: 10/7/2024  Patient: RAFAEL MATHIAS  Time Out: 23:04 Exam(s): CT L SPINE EXAM:   CT Lumbar Spine Without Intravenous Contrast CLINICAL HISTORY:     low back pain w o sciatica. TECHNIQUE:   Axial computed tomography images of the lumbar spine without intravenous contrast.  CTDI is 25.36 mGy and DLP is 551.9 mGy-cm.  This CT exam was performed according to the principle of ALARA (As Low As Reasonably Achievable) by using one or more of the following dose reduction techniques: automated exposure control, adjustment of the mA and or kV according to patient size, and or use of iterative reconstruction technique. COMPARISON:   No relevant prior studies available. FINDINGS:   Vertebrae:  The vertebral body heights are maintained without acute compression fracture.  There is 4-5 mm retrolisthesis of L1 on L2.  Scoliosis, convex right is noted throughout the lumbar spine.  The pedicles, facet joints, spinous processes and transverse processes are intact.  Diffuse facet hypertrophic changes noted throughout the lumbar spine bilaterally. Hypertrophic changes noted involving the sacroiliac joints, left greater than right.   Discs spinal canal neural foramina: Severe disc spondylosis with narrowing, hypertrophic changes and vacuum phenomenon identified throughout the lumbar spine.  Evaluation the central canal is limited without intrathecal contrast.  However, no obvious posterior disc protrusion. Mild to moderate  multilevel osseous neural foraminal encroachment noted, left side greater than right.   Soft tissues:  No significant acute traumatic soft tissue abnormality identified. IMPRESSION:       No acute osseous traumatic injury or significant abnormal alignment involving the lumbar spine.  Severe multilevel degenerative changes throughout the lumbar spine, as detailed above.  No obvious posterior disc protrusion.  However, there is moderate multilevel neural foraminal encroachment noted, left side greater than right.    Electronically signed by José Christian MD on 10-07-24 at 2304     Ordered the above noted radiological studies. Reviewed by me in PACS.            PROCEDURES  Procedures          MEDICATIONS GIVEN IN ER  Medications   sodium chloride 0.9 % flush 10 mL (has no administration in time range)   diazePAM (VALIUM) injection 5 mg (5 mg Intravenous Given 10/7/24 2007)   ketorolac (TORADOL) injection 15 mg (15 mg Intravenous Given 10/7/24 2006)   ondansetron (ZOFRAN) injection 4 mg (4 mg Intravenous Given 10/7/24 2140)   HYDROmorphone (DILAUDID) injection 0.25 mg (0.25 mg Intravenous Given 10/7/24 2148)   HYDROmorphone (DILAUDID) injection 0.25 mg (0.25 mg Intravenous Given 10/8/24 0014)                   MEDICAL DECISION MAKING, PROGRESS, and CONSULTS    All labs have been independently reviewed by me.  All radiology studies have been reviewed by me and I have also reviewed the radiology report.   EKG's independently viewed and interpreted by me.  Discussion below represents my analysis of pertinent findings related to patient's condition, differential diagnosis, treatment plan and final disposition.      Additional sources:  - Discussed/ obtained information from independent historians: History obtained from the patient as well as the patient's family at bedside.    - External (non-ED) record review: Upon medical records review, the patient was last seen and evaluated in the outpatient office of her family  medicine provider on 10/1/2024 in follow-up for her known chronic low back pain with no evidence of sciatica as well as anxiety.    - Chronic or social conditions impacting care: Chronic back pain, type 2 diabetes mellitus, Eliquis dependent atrial fibrillation    - Shared decision making: Admission decision based on shared conversations have between myself, the patient and family at bedside, as well as MARK Clayton for [facility].      Orders placed during this visit:  Orders Placed This Encounter   Procedures    CT Lumbar Spine Without Contrast    Comprehensive Metabolic Panel    CBC Auto Differential    [facility] (on-call MD unless specified) Details    Insert Peripheral IV    Inpatient Admission    CBC & Differential           Differential diagnosis includes but is not limited to:    Acute lumbar/musculoskeletal pain, compression fracture, acute sciatica, epidural abscess, degenerative disc disease, or lumbosacral strain      Independent interpretation of labs, radiology studies, and discussions with consultants:    Laboratory results independently interpreted by myself with my interpretation showing a normal CBC result.      ED Course as of 10/08/24 0022   Tue Oct 08, 2024   0005 On reevaluation, the patient reports that her pain did improve initially after the Dilaudid however has returned and is spasmodic in nature.  I did inform the family that there is certain wear and tear as well as degenerative disc disease seen on CT scan.  Given the ongoing/uncontrolled pain as well as her multiple ED visits, would like to admit her to the hospital today for further management and treatment of her intractable back pain.  She agrees to that plan and all questions answered. [BM]   0021 The patient's presentation, workup, as well as diagnosis and treatment plan was discussed at length with AMRK Clayton for [facility].  She agrees to admit the patient to the hospital today for Dr. Copeland. [BM]      ED Course User  Index  [BM] Rancho Spears MD         DIAGNOSIS  Final diagnoses:   Intractable low back pain   Degeneration of intervertebral disc of lumbar region with discogenic back pain         DISPOSITION  DISCHARGE    Patient discharged in stable condition.    Reviewed implications of results, diagnosis, meds, responsibility to follow up, warning signs and symptoms of possible worsening, potential complications and reasons to return to ER.    Patient/Family voiced understanding of above instructions.    Discussed plan for discharge, as there is no emergent indication for admission. Patient referred to primary care provider for BP management due to today's BP. Pt/family is agreeable and understands need for follow up and repeat testing.  Pt is aware that discharge does not mean that nothing is wrong but it indicates no emergency is present that requires admission and they must continue care with follow-up as given below or physician of their choice.     FOLLOW-UP  No follow-up provider specified.       Medication List      No changes were made to your prescriptions during this visit.                   Latest Documented Vital Signs:  As of 00:22 EDT  BP- (!) 184/72 HR- 54 Temp- 97.5 °F (36.4 °C) (Tympanic) O2 sat- 100%              --    Please note that portions of this were completed with a voice recognition program.       Note Disclaimer: At [facility], we believe that sharing information builds trust and better relationships. You are receiving this note because you are receiving care at [facility] or recently visited. It is possible you will see health information before a provider has talked with you about it. This kind of information can be easy to misunderstand. To help you fully understand what it means for your health, we urge you to discuss this note with your provider.             Rancho Spears MD  10/08/24 0023

## 2024-10-08 ENCOUNTER — APPOINTMENT (OUTPATIENT)
Dept: MRI IMAGING | Facility: HOSPITAL | Age: 74
End: 2024-10-08
Payer: MEDICARE

## 2024-10-08 PROBLEM — M54.50 LOW BACK PAIN: Status: ACTIVE | Noted: 2024-10-08

## 2024-10-08 PROBLEM — R00.1 BRADYCARDIA: Status: ACTIVE | Noted: 2024-10-08

## 2024-10-08 PROBLEM — E11.65 TYPE 2 DIABETES MELLITUS WITH HYPERGLYCEMIA, WITHOUT LONG-TERM CURRENT USE OF INSULIN: Status: ACTIVE | Noted: 2024-10-08

## 2024-10-08 LAB
ANION GAP SERPL CALCULATED.3IONS-SCNC: 9.3 MMOL/L (ref 5–15)
BUN SERPL-MCNC: 38 MG/DL (ref 8–23)
BUN/CREAT SERPL: 40.9 (ref 7–25)
CALCIUM SPEC-SCNC: 10.4 MG/DL (ref 8.6–10.5)
CHLORIDE SERPL-SCNC: 96 MMOL/L (ref 98–107)
CO2 SERPL-SCNC: 32.7 MMOL/L (ref 22–29)
CREAT SERPL-MCNC: 0.93 MG/DL (ref 0.57–1)
DEPRECATED RDW RBC AUTO: 44.2 FL (ref 37–54)
EGFRCR SERPLBLD CKD-EPI 2021: 64.2 ML/MIN/1.73
ERYTHROCYTE [DISTWIDTH] IN BLOOD BY AUTOMATED COUNT: 13.1 % (ref 12.3–15.4)
GLUCOSE BLDC GLUCOMTR-MCNC: 156 MG/DL (ref 70–130)
GLUCOSE BLDC GLUCOMTR-MCNC: 166 MG/DL (ref 70–130)
GLUCOSE BLDC GLUCOMTR-MCNC: 186 MG/DL (ref 70–130)
GLUCOSE BLDC GLUCOMTR-MCNC: 200 MG/DL (ref 70–130)
GLUCOSE SERPL-MCNC: 138 MG/DL (ref 65–99)
HCT VFR BLD AUTO: 37.1 % (ref 34–46.6)
HGB BLD-MCNC: 12.7 G/DL (ref 12–15.9)
MAGNESIUM SERPL-MCNC: 1.9 MG/DL (ref 1.6–2.4)
MCH RBC QN AUTO: 31.3 PG (ref 26.6–33)
MCHC RBC AUTO-ENTMCNC: 34.2 G/DL (ref 31.5–35.7)
MCV RBC AUTO: 91.4 FL (ref 79–97)
PLATELET # BLD AUTO: 166 10*3/MM3 (ref 140–450)
PMV BLD AUTO: 10.9 FL (ref 6–12)
POTASSIUM SERPL-SCNC: 3.9 MMOL/L (ref 3.5–5.2)
QT INTERVAL: 449 MS
QTC INTERVAL: 414 MS
RBC # BLD AUTO: 4.06 10*6/MM3 (ref 3.77–5.28)
SODIUM SERPL-SCNC: 138 MMOL/L (ref 136–145)
WBC NRBC COR # BLD AUTO: 5.7 10*3/MM3 (ref 3.4–10.8)

## 2024-10-08 PROCEDURE — 82948 REAGENT STRIP/BLOOD GLUCOSE: CPT

## 2024-10-08 PROCEDURE — 94640 AIRWAY INHALATION TREATMENT: CPT

## 2024-10-08 PROCEDURE — 85027 COMPLETE CBC AUTOMATED: CPT

## 2024-10-08 PROCEDURE — 25010000002 HYDROMORPHONE PER 4 MG: Performed by: INTERNAL MEDICINE

## 2024-10-08 PROCEDURE — 94799 UNLISTED PULMONARY SVC/PX: CPT

## 2024-10-08 PROCEDURE — 0 GADOBENATE DIMEGLUMINE 529 MG/ML SOLUTION: Performed by: INTERNAL MEDICINE

## 2024-10-08 PROCEDURE — 93005 ELECTROCARDIOGRAM TRACING: CPT | Performed by: STUDENT IN AN ORGANIZED HEALTH CARE EDUCATION/TRAINING PROGRAM

## 2024-10-08 PROCEDURE — 72148 MRI LUMBAR SPINE W/O DYE: CPT

## 2024-10-08 PROCEDURE — 25010000002 HYDROMORPHONE PER 4 MG: Performed by: EMERGENCY MEDICINE

## 2024-10-08 PROCEDURE — 25010000002 HYDROMORPHONE 1 MG/ML SOLUTION: Performed by: STUDENT IN AN ORGANIZED HEALTH CARE EDUCATION/TRAINING PROGRAM

## 2024-10-08 PROCEDURE — 80048 BASIC METABOLIC PNL TOTAL CA: CPT

## 2024-10-08 PROCEDURE — 83735 ASSAY OF MAGNESIUM: CPT | Performed by: NURSE PRACTITIONER

## 2024-10-08 PROCEDURE — 99222 1ST HOSP IP/OBS MODERATE 55: CPT | Performed by: INTERNAL MEDICINE

## 2024-10-08 PROCEDURE — 99222 1ST HOSP IP/OBS MODERATE 55: CPT | Performed by: NURSE PRACTITIONER

## 2024-10-08 PROCEDURE — 36415 COLL VENOUS BLD VENIPUNCTURE: CPT

## 2024-10-08 PROCEDURE — 72157 MRI CHEST SPINE W/O & W/DYE: CPT

## 2024-10-08 PROCEDURE — 25010000002 LORAZEPAM PER 2 MG: Performed by: INTERNAL MEDICINE

## 2024-10-08 PROCEDURE — 93010 ELECTROCARDIOGRAM REPORT: CPT | Performed by: INTERNAL MEDICINE

## 2024-10-08 PROCEDURE — A9577 INJ MULTIHANCE: HCPCS | Performed by: INTERNAL MEDICINE

## 2024-10-08 PROCEDURE — 25010000002 HYDROMORPHONE PER 4 MG: Performed by: STUDENT IN AN ORGANIZED HEALTH CARE EDUCATION/TRAINING PROGRAM

## 2024-10-08 PROCEDURE — 63710000001 INSULIN LISPRO (HUMAN) PER 5 UNITS

## 2024-10-08 RX ORDER — LOSARTAN POTASSIUM 50 MG/1
50 TABLET ORAL
Status: DISCONTINUED | OUTPATIENT
Start: 2024-10-08 | End: 2024-10-14 | Stop reason: HOSPADM

## 2024-10-08 RX ORDER — BISACODYL 10 MG
10 SUPPOSITORY, RECTAL RECTAL DAILY PRN
Status: DISCONTINUED | OUTPATIENT
Start: 2024-10-08 | End: 2024-10-14 | Stop reason: HOSPADM

## 2024-10-08 RX ORDER — ONDANSETRON 2 MG/ML
4 INJECTION INTRAMUSCULAR; INTRAVENOUS EVERY 6 HOURS PRN
Status: DISCONTINUED | OUTPATIENT
Start: 2024-10-08 | End: 2024-10-14 | Stop reason: HOSPADM

## 2024-10-08 RX ORDER — IPRATROPIUM BROMIDE AND ALBUTEROL SULFATE 2.5; .5 MG/3ML; MG/3ML
3 SOLUTION RESPIRATORY (INHALATION) EVERY 4 HOURS PRN
Status: DISCONTINUED | OUTPATIENT
Start: 2024-10-08 | End: 2024-10-14 | Stop reason: HOSPADM

## 2024-10-08 RX ORDER — MONTELUKAST SODIUM 10 MG/1
10 TABLET ORAL NIGHTLY
Status: DISCONTINUED | OUTPATIENT
Start: 2024-10-08 | End: 2024-10-14 | Stop reason: HOSPADM

## 2024-10-08 RX ORDER — HYDROCODONE BITARTRATE AND ACETAMINOPHEN 5; 325 MG/1; MG/1
1 TABLET ORAL EVERY 6 HOURS PRN
Status: COMPLETED | OUTPATIENT
Start: 2024-10-08 | End: 2024-10-08

## 2024-10-08 RX ORDER — METHOCARBAMOL 500 MG/1
500 TABLET, FILM COATED ORAL EVERY 6 HOURS SCHEDULED
Status: DISCONTINUED | OUTPATIENT
Start: 2024-10-08 | End: 2024-10-14 | Stop reason: HOSPADM

## 2024-10-08 RX ORDER — METHOCARBAMOL 500 MG/1
500 TABLET, FILM COATED ORAL EVERY 8 HOURS PRN
Status: DISCONTINUED | OUTPATIENT
Start: 2024-10-08 | End: 2024-10-08

## 2024-10-08 RX ORDER — HYDROMORPHONE HYDROCHLORIDE 1 MG/ML
0.25 INJECTION, SOLUTION INTRAMUSCULAR; INTRAVENOUS; SUBCUTANEOUS ONCE
Status: COMPLETED | OUTPATIENT
Start: 2024-10-08 | End: 2024-10-08

## 2024-10-08 RX ORDER — HYDROMORPHONE HYDROCHLORIDE 1 MG/ML
0.5 INJECTION, SOLUTION INTRAMUSCULAR; INTRAVENOUS; SUBCUTANEOUS EVERY 4 HOURS PRN
Status: COMPLETED | OUTPATIENT
Start: 2024-10-08 | End: 2024-10-08

## 2024-10-08 RX ORDER — METOPROLOL TARTRATE 25 MG/1
25 TABLET, FILM COATED ORAL 2 TIMES DAILY
Status: DISCONTINUED | OUTPATIENT
Start: 2024-10-08 | End: 2024-10-08

## 2024-10-08 RX ORDER — BUDESONIDE AND FORMOTEROL FUMARATE DIHYDRATE 160; 4.5 UG/1; UG/1
2 AEROSOL RESPIRATORY (INHALATION)
Status: DISCONTINUED | OUTPATIENT
Start: 2024-10-08 | End: 2024-10-14 | Stop reason: HOSPADM

## 2024-10-08 RX ORDER — LATANOPROST 50 UG/ML
1 SOLUTION/ DROPS OPHTHALMIC NIGHTLY
Status: DISCONTINUED | OUTPATIENT
Start: 2024-10-08 | End: 2024-10-14 | Stop reason: HOSPADM

## 2024-10-08 RX ORDER — FAMOTIDINE 20 MG
1000 TABLET ORAL DAILY
Status: DISCONTINUED | OUTPATIENT
Start: 2024-10-08 | End: 2024-10-08

## 2024-10-08 RX ORDER — CHOLECALCIFEROL (VITAMIN D3) 25 MCG
1000 TABLET ORAL DAILY
Status: DISCONTINUED | OUTPATIENT
Start: 2024-10-08 | End: 2024-10-14 | Stop reason: HOSPADM

## 2024-10-08 RX ORDER — POLYETHYLENE GLYCOL 3350 17 G/17G
17 POWDER, FOR SOLUTION ORAL DAILY PRN
Status: DISCONTINUED | OUTPATIENT
Start: 2024-10-08 | End: 2024-10-14 | Stop reason: HOSPADM

## 2024-10-08 RX ORDER — DEXTROSE MONOHYDRATE 25 G/50ML
25 INJECTION, SOLUTION INTRAVENOUS
Status: DISCONTINUED | OUTPATIENT
Start: 2024-10-08 | End: 2024-10-14 | Stop reason: HOSPADM

## 2024-10-08 RX ORDER — PRIMIDONE 50 MG/1
50 TABLET ORAL 3 TIMES DAILY
Status: DISCONTINUED | OUTPATIENT
Start: 2024-10-08 | End: 2024-10-14 | Stop reason: HOSPADM

## 2024-10-08 RX ORDER — HYDROCHLOROTHIAZIDE 12.5 MG/1
12.5 TABLET ORAL DAILY
Status: DISCONTINUED | OUTPATIENT
Start: 2024-10-08 | End: 2024-10-14 | Stop reason: HOSPADM

## 2024-10-08 RX ORDER — FUROSEMIDE 20 MG
20 TABLET ORAL DAILY PRN
Status: DISCONTINUED | OUTPATIENT
Start: 2024-10-08 | End: 2024-10-14 | Stop reason: HOSPADM

## 2024-10-08 RX ORDER — HYDROMORPHONE HYDROCHLORIDE 1 MG/ML
0.5 INJECTION, SOLUTION INTRAMUSCULAR; INTRAVENOUS; SUBCUTANEOUS EVERY 4 HOURS PRN
Status: DISCONTINUED | OUTPATIENT
Start: 2024-10-08 | End: 2024-10-14 | Stop reason: HOSPADM

## 2024-10-08 RX ORDER — LORAZEPAM 2 MG/ML
0.5 INJECTION INTRAMUSCULAR ONCE
Status: COMPLETED | OUTPATIENT
Start: 2024-10-08 | End: 2024-10-08

## 2024-10-08 RX ORDER — NITROGLYCERIN 0.4 MG/1
0.4 TABLET SUBLINGUAL
Status: DISCONTINUED | OUTPATIENT
Start: 2024-10-08 | End: 2024-10-14 | Stop reason: HOSPADM

## 2024-10-08 RX ORDER — HYDROCODONE BITARTRATE AND ACETAMINOPHEN 5; 325 MG/1; MG/1
1 TABLET ORAL EVERY 6 HOURS PRN
Status: DISCONTINUED | OUTPATIENT
Start: 2024-10-08 | End: 2024-10-14 | Stop reason: HOSPADM

## 2024-10-08 RX ORDER — ONDANSETRON 4 MG/1
4 TABLET, ORALLY DISINTEGRATING ORAL EVERY 6 HOURS PRN
Status: DISCONTINUED | OUTPATIENT
Start: 2024-10-08 | End: 2024-10-14 | Stop reason: HOSPADM

## 2024-10-08 RX ORDER — BISACODYL 5 MG/1
5 TABLET, DELAYED RELEASE ORAL DAILY PRN
Status: DISCONTINUED | OUTPATIENT
Start: 2024-10-08 | End: 2024-10-14 | Stop reason: HOSPADM

## 2024-10-08 RX ORDER — ACETAMINOPHEN 325 MG/1
650 TABLET ORAL EVERY 4 HOURS PRN
Status: DISCONTINUED | OUTPATIENT
Start: 2024-10-08 | End: 2024-10-14 | Stop reason: HOSPADM

## 2024-10-08 RX ORDER — AMOXICILLIN 250 MG
2 CAPSULE ORAL 2 TIMES DAILY PRN
Status: DISCONTINUED | OUTPATIENT
Start: 2024-10-08 | End: 2024-10-14 | Stop reason: HOSPADM

## 2024-10-08 RX ORDER — NICOTINE POLACRILEX 4 MG
15 LOZENGE BUCCAL
Status: DISCONTINUED | OUTPATIENT
Start: 2024-10-08 | End: 2024-10-14 | Stop reason: HOSPADM

## 2024-10-08 RX ORDER — CITALOPRAM HYDROBROMIDE 20 MG/1
20 TABLET ORAL DAILY
Status: DISCONTINUED | OUTPATIENT
Start: 2024-10-08 | End: 2024-10-14 | Stop reason: HOSPADM

## 2024-10-08 RX ORDER — ALBUTEROL SULFATE 0.83 MG/ML
2.5 SOLUTION RESPIRATORY (INHALATION) EVERY 6 HOURS PRN
Status: DISCONTINUED | OUTPATIENT
Start: 2024-10-08 | End: 2024-10-14 | Stop reason: HOSPADM

## 2024-10-08 RX ORDER — LIDOCAINE 4 G/G
1 PATCH TOPICAL EVERY 24 HOURS
Status: DISCONTINUED | OUTPATIENT
Start: 2024-10-08 | End: 2024-10-14 | Stop reason: HOSPADM

## 2024-10-08 RX ORDER — INSULIN LISPRO 100 [IU]/ML
2-7 INJECTION, SOLUTION INTRAVENOUS; SUBCUTANEOUS
Status: DISCONTINUED | OUTPATIENT
Start: 2024-10-08 | End: 2024-10-14 | Stop reason: HOSPADM

## 2024-10-08 RX ADMIN — METHOCARBAMOL TABLETS 500 MG: 500 TABLET, COATED ORAL at 19:22

## 2024-10-08 RX ADMIN — INSULIN LISPRO 2 UNITS: 100 INJECTION, SOLUTION INTRAVENOUS; SUBCUTANEOUS at 16:50

## 2024-10-08 RX ADMIN — LORAZEPAM 0.5 MG: 2 INJECTION, SOLUTION INTRAMUSCULAR; INTRAVENOUS at 21:29

## 2024-10-08 RX ADMIN — METHOCARBAMOL TABLETS 500 MG: 500 TABLET, COATED ORAL at 12:55

## 2024-10-08 RX ADMIN — BUDESONIDE AND FORMOTEROL FUMARATE DIHYDRATE 2 PUFF: 160; 4.5 AEROSOL RESPIRATORY (INHALATION) at 20:27

## 2024-10-08 RX ADMIN — GADOBENATE DIMEGLUMINE 19 ML: 529 INJECTION, SOLUTION INTRAVENOUS at 22:42

## 2024-10-08 RX ADMIN — PRIMIDONE 50 MG: 50 TABLET ORAL at 20:49

## 2024-10-08 RX ADMIN — CITALOPRAM 20 MG: 20 TABLET, FILM COATED ORAL at 16:50

## 2024-10-08 RX ADMIN — LOSARTAN POTASSIUM 50 MG: 50 TABLET, FILM COATED ORAL at 16:50

## 2024-10-08 RX ADMIN — INSULIN LISPRO 2 UNITS: 100 INJECTION, SOLUTION INTRAVENOUS; SUBCUTANEOUS at 12:54

## 2024-10-08 RX ADMIN — HYDROCODONE BITARTRATE AND ACETAMINOPHEN 1 TABLET: 5; 325 TABLET ORAL at 14:00

## 2024-10-08 RX ADMIN — LIDOCAINE 1 PATCH: 4 PATCH TOPICAL at 16:50

## 2024-10-08 RX ADMIN — HYDROCODONE BITARTRATE AND ACETAMINOPHEN 1 TABLET: 5; 325 TABLET ORAL at 02:30

## 2024-10-08 RX ADMIN — HYDROMORPHONE HYDROCHLORIDE 0.5 MG: 1 INJECTION, SOLUTION INTRAMUSCULAR; INTRAVENOUS; SUBCUTANEOUS at 07:35

## 2024-10-08 RX ADMIN — HYDROMORPHONE HYDROCHLORIDE 0.25 MG: 1 INJECTION, SOLUTION INTRAMUSCULAR; INTRAVENOUS; SUBCUTANEOUS at 00:14

## 2024-10-08 RX ADMIN — HYDROCODONE BITARTRATE AND ACETAMINOPHEN 1 TABLET: 5; 325 TABLET ORAL at 08:10

## 2024-10-08 RX ADMIN — HYDROMORPHONE HYDROCHLORIDE 0.5 MG: 1 INJECTION, SOLUTION INTRAMUSCULAR; INTRAVENOUS; SUBCUTANEOUS at 20:47

## 2024-10-08 RX ADMIN — HYDROCHLOROTHIAZIDE 12.5 MG: 12.5 TABLET ORAL at 16:50

## 2024-10-08 RX ADMIN — INSULIN LISPRO 3 UNITS: 100 INJECTION, SOLUTION INTRAVENOUS; SUBCUTANEOUS at 20:47

## 2024-10-08 RX ADMIN — Medication 1000 UNITS: at 16:50

## 2024-10-08 RX ADMIN — PRIMIDONE 50 MG: 50 TABLET ORAL at 16:50

## 2024-10-08 RX ADMIN — HYDROMORPHONE HYDROCHLORIDE 0.5 MG: 1 INJECTION, SOLUTION INTRAMUSCULAR; INTRAVENOUS; SUBCUTANEOUS at 11:17

## 2024-10-08 RX ADMIN — MONTELUKAST SODIUM 10 MG: 10 TABLET, FILM COATED ORAL at 20:49

## 2024-10-08 NOTE — PLAN OF CARE
Goal Outcome Evaluation:              Outcome Evaluation: pt admitted to the floor this am. alert and oriented, on 3L NC and stable vital signs. Pt denies any chest pains but refers to pain from her lower back. prn pain med given as ordered. safety precautions maintained. call light within reach.

## 2024-10-08 NOTE — NURSING NOTE
Pt was sinus mihir during shift and complaining of lower back pain. Stat EKG done, pt asymptomatic. A notified, ordered to give prn Robaxin. Confirmed with pharmacy, robaxin held. Called [facility] again, spoke with Dr Navarro. To give pt prn IV Dilaudid 0.5mg.

## 2024-10-08 NOTE — CONSULTS
[facility] NEUROSURGERY CONSULT NOTE    Patient name: Kanwal Sauer  Referring Provider: MARK Akins  Reason for Consultation: Ct spine severe changes, back pain     Patient Care Team:  Nisha Britton MD as PCP - General (Family Medicine)  Corey Britton MD as Consulting Physician (Sleep Medicine)  Belen Khan APRN as Nurse Practitioner (Obstetrics and Gynecology)  Nia Zhang APRN as Nurse Practitioner (Cardiology)  José Valentino MD as Consulting Physician (Urology)  Indio Box MD as Consulting Physician (Pulmonary Disease)    Chief complaint: intractable back pain    Subjective .     History of present illness:    Patient is a 73 y.o.  female presented to ER yesterday evening for her third ER visit in the last week and a half for back pain.  Her first visit 9/29 she was complaining of spasms in her right neck and mid back which had previously occurred but had resolved with physical therapy which was recently stopped due to insurance issues.  She was found to have low magnesium level abnormal urinalysis, possible UTI both of which were treated.  She returned to the ER in 10/2 with ongoing back spasms.  She reported no improvement with tramadol or tizanidine.  She was given steroids and Valium and Tylenol.     Patient return to the ER yesterday evening for persistent worsening back pain for the past several weeks with no particular inciting event such as a fall or heavy lifting.  Her daughter states back in late July or early August she went to sit on the edge of her bed and slid down landing on her bottom.  A couple weeks later she began to complain of right hand pain and swelling which was evaluated but before she even got to the hand surgeon had resolved.  There was no abrasion or lesion to suggest infection.  The source of the swelling was unknown.  She has failed outpatient management with steroids, muscle relaxers, pain medication.  She reports the pain is moderate to  severe, nonradiating and a throbbing, constant discomfort aggravated by movement.  He does have difficulty finding any position of comfort.  She is having back pain in nearly any position.  No fever or chills, no new urinary issues, no weakness or numbness.  She has chronic UTIs as well as chronic urinary frequency and urgency.  She is followed by urology.  She continues to complain of some right sided neck pain but no radicular pain down her arms.  No numbness or tingling.  She reports that her back pain is isolated to her back with no pain in her legs.  She uses a walker.  Her daughter states she remains fairly active at her independent living facility with both activities of daily living as well as social activities.  She is able to manage her own care.  She has not noticed any decline in her ability to care for herself although she has noticed some very minor cognitive issues.  She has a chronic essential tremor of the right upper extremity.      History of breast cancer postmastectomy and chemotherapy, diagnosed in 2002.    No prior spine surgery.  Non-smoker.  On Eliquis (atrial fibrillation), history of DVT.    Review of Systems  Review of Systems   Constitutional:  Negative for fever.   Genitourinary:  Positive for frequency and urgency. Negative for enuresis.        History of recurrent UTI   Musculoskeletal:  Positive for back pain. Negative for gait problem.   Neurological:  Positive for tremors (Benign essential). Negative for weakness and numbness.   Psychiatric/Behavioral:  Negative for confusion.        History  PAST MEDICAL HISTORY  Past Medical History:   Diagnosis Date    Allergic     Anxiety     Asthma     Atrial fibrillation     Cancer     Breast    COPD (chronic obstructive pulmonary disease) 09/07/2022    Deep vein thrombosis     Diabetes mellitus     Difficulty walking     Diverticulosis     Drug therapy     Environmental allergies     Fractures     Headache, tension-type     Hyperlipidemia      Hypertension     Low back pain     Neuropathy in diabetes     Obesity     Scoliosis     Seizures     Sleep apnea     Tremor     Urinary tract infection     Weakness        PAST SURGICAL HISTORY  Past Surgical History:   Procedure Laterality Date    BREAST BIOPSY      CATARACT EXTRACTION, BILATERAL      DENTAL PROCEDURE      Removed teeth    FEMUR OPEN REDUCTION INTERNAL FIXATION Left 12/24/2020    Procedure: DISTAL FEMUR OPEN REDUCTION INTERNAL FIXATION;  Surgeon: Marley Hernandez MD;  Location: [facility];  Service: Orthopedics;  Laterality: Left;    MASTECTOMY Left     REPLACEMENT TOTAL KNEE BILATERAL         FAMILY HISTORY  Family History   Problem Relation Age of Onset    Arthritis Mother     Lung cancer Mother     Brain cancer Mother     Hypertension Mother     Cancer Mother         Lung cancer    Hypertension Father     Arthritis Sister     Breast cancer Sister     Diabetes Sister     Hypertension Sister     Dementia Sister     Cancer Sister         Brest cancer    Aneurysm Sister     Cancer Daughter         Colon cancer    Developmental Disability Daughter     Learning disabilities Daughter     Miscarriages / Stillbirths Daughter         Miscarriage       SOCIAL HISTORY  Social History     Tobacco Use    Smoking status: Never     Passive exposure: Never    Smokeless tobacco: Never    Tobacco comments:     no caffine   Vaping Use    Vaping status: Never Used   Substance Use Topics    Alcohol use: Not Currently    Drug use: Never       Retired  Lives in assisted living facility    Allergies:  Baclofen; Doxycycline; Egg-derived products; Iodine; Latex; Milk-related compounds; Msg [monosodium glutamate]; Penicillins; Metronidazole; Ezetimibe; Latex, natural rubber; Levaquin [levofloxacin]; Nylon; and Simvastatin    MEDICATIONS:  Medications Prior to Admission   Medication Sig Dispense Refill Last Dose    acetaminophen (TYLENOL) 325 MG tablet Take 2 tablets by mouth Every 4 (Four) Hours As  Needed for Mild Pain .   10/7/2024    albuterol sulfate  (90 Base) MCG/ACT inhaler INHALE 2 PUFFS BY MOUTH EVERY 4 HOURS AS NEEDED FOR WHEEZE 18 g 1 Past Month    apixaban (Eliquis) 5 MG tablet tablet Take 1 tablet by mouth Every 12 (Twelve) Hours. 60 tablet 2 10/7/2024    Cholecalciferol (Vitamin D3) 25 MCG (1000 UT) capsule Take 1 capsule by mouth Daily.   10/7/2024    citalopram (CeleXA) 20 MG tablet TAKE 1 TABLET BY MOUTH EVERY DAY 90 tablet 1 10/7/2024    CRANBERRY PO Take 650 mg by mouth.   10/7/2024    fenofibrate 160 MG tablet TAKE 1 TABLET BY MOUTH EVERY DAY 90 tablet 1 10/7/2024    hydroCHLOROthiazide (MICROZIDE) 12.5 MG capsule Take 1 capsule by mouth Daily. 90 capsule 3 10/7/2024    latanoprost (XALATAN) 0.005 % ophthalmic solution Administer 1 drop to both eyes every night at bedtime.   10/7/2024    lidocaine (LIDODERM) 5 % Place 1 patch on the skin as directed by provider Daily. Remove & Discard patch within 12 hours or as directed by MD 15 each 0 Past Week    LORazepam (Ativan) 0.5 MG tablet Take 0.5 mg 1/2 hr before the procedure , can repeat  again 2 tablet 0 Past Week    metFORMIN (GLUCOPHAGE) 1000 MG tablet TAKE 1 TABLET BY MOUTH TWICE A DAY WITH FOOD 180 tablet 0 10/7/2024    metoprolol tartrate (LOPRESSOR) 25 MG tablet TAKE 1 TABLET BY MOUTH TWICE A  tablet 1 10/7/2024    montelukast (SINGULAIR) 10 MG tablet TAKE 1 TABLET BY MOUTH EVERYDAY AT BEDTIME 90 tablet 1 10/7/2024    olmesartan (Benicar) 20 MG tablet Take 1 tablet by mouth Daily. 90 tablet 0 10/7/2024    tiZANidine (ZANAFLEX) 4 MG tablet Take 1 tablet by mouth At Night As Needed for Muscle Spasms. 30 tablet 1 10/7/2024    traMADol (ULTRAM) 50 MG tablet Take 1 tablet by mouth Every 8 (Eight) Hours As Needed for Moderate Pain or Severe Pain. 30 tablet 0 10/7/2024    Trelegy Ellipta 100-62.5-25 MCG/ACT inhaler INHALE 1 PUFF DAILY 60 each 1 10/7/2024    clotrimazole-betamethasone (LOTRISONE) 1-0.05 % cream Apply 1 Application  topically to the appropriate area as directed 2 (Two) Times a Day. Use sparingly keep dry avoid chronic use 45 g 2 More than a month    furosemide (LASIX) 40 MG tablet Take 0.5 tablets by mouth Daily As Needed (Take a dose of Lasix if you notice a 3 pound weight gain, increasing leg swelling, or an increase in your oxygen requirement.).   Unknown    hydrocortisone 2.5 % ointment APPLY TO FACE TWICE A DAY AS NEEDED   Unknown    ipratropium-albuterol (DUO-NEB) 0.5-2.5 mg/3 ml nebulizer Take 3 mL by nebulization Every 4 (Four) Hours As Needed for Wheezing. 150 mL 3 Unknown    ketoconazole (NIZORAL) 2 % shampoo    Unknown    Magnesium Oxide -Mg Supplement 400 (240 Mg) MG tablet    Unknown    methylPREDNISolone (MEDROL) 4 MG dose pack Take as directed on package instructions. 21 each 0     mupirocin (BACTROBAN) 2 % nasal ointment Administer 1 Application into the nostril(s) as directed by provider 2 (Two) Times a Day. 22 g 1 More than a month    nystatin (MYCOSTATIN) 282606 UNIT/GM powder Apply  topically to the appropriate area as directed 2 (Two) Times a Day. 60 g 1 More than a month    potassium chloride (KLOR-CON) 10 MEQ CR tablet Take 1 tablet by mouth Daily. 90 tablet 0 More than a month    primidone (MYSOLINE) 50 MG tablet TAKE 1 TABLET BY MOUTH THREE TIMES A  tablet 0 More than a month    Probiotic Product (PROBIOTIC-10 PO) Take  by mouth. (Patient not taking: Reported on 9/17/2024)            Current Facility-Administered Medications:     acetaminophen (TYLENOL) tablet 650 mg, 650 mg, Oral, Q4H PRN, Roseanna Donaldson APRN    albuterol (PROVENTIL) nebulizer solution 0.083% 2.5 mg/3mL, 2.5 mg, Nebulization, Q6H PRN, Sil Gordon APRN    [Held by provider] apixaban (ELIQUIS) tablet 5 mg, 5 mg, Oral, Q12H, Sil Gordon APRN    sennosides-docusate (PERICOLACE) 8.6-50 MG per tablet 2 tablet, 2 tablet, Oral, BID PRN **AND** polyethylene glycol (MIRALAX) packet 17 g, 17 g, Oral, Daily  PRN **AND** bisacodyl (DULCOLAX) EC tablet 5 mg, 5 mg, Oral, Daily PRN **AND** bisacodyl (DULCOLAX) suppository 10 mg, 10 mg, Rectal, Daily PRN, Roseanna Donaldson, MARK    budesonide-formoterol (SYMBICORT) 160-4.5 MCG/ACT inhaler 2 puff, 2 puff, Inhalation, BID - RT **AND** tiotropium (SPIRIVA RESPIMAT) 2.5 mcg/act aerosol solution inhaler, 2 puff, Inhalation, Daily - RT, Sil Gordon APRN    cholecalciferol (VITAMIN D3) tablet 1,000 Units, 1,000 Units, Oral, Daily, Kyle Ledesma MD, 1,000 Units at 10/08/24 1650    citalopram (CeleXA) tablet 20 mg, 20 mg, Oral, Daily, Sil Gordon APRN, 20 mg at 10/08/24 1650    dextrose (D50W) (25 g/50 mL) IV injection 25 g, 25 g, Intravenous, Q15 Min PRN, Roseanna Donaldson APRN    dextrose (GLUTOSE) oral gel 15 g, 15 g, Oral, Q15 Min PRN, Roseanna Donaldson APRN    furosemide (LASIX) tablet 20 mg, 20 mg, Oral, Daily PRN, Sil Gorodn APRN    hydroCHLOROthiazide tablet 12.5 mg, 12.5 mg, Oral, Daily, Sil Gordon APRN, 12.5 mg at 10/08/24 1650    HYDROcodone-acetaminophen (NORCO) 5-325 MG per tablet 1 tablet, 1 tablet, Oral, Q6H PRN, Kyle Ledesma MD, 1 tablet at 10/08/24 1400    HYDROmorphone (DILAUDID) injection 0.5 mg, 0.5 mg, Intravenous, Q4H PRN, Kyle Ledesma MD    insulin lispro (HUMALOG/ADMELOG) injection 2-7 Units, 2-7 Units, Subcutaneous, 4x Daily AC & at Bedtime, Roseanna Donaldson APRN, 2 Units at 10/08/24 1650    ipratropium-albuterol (DUO-NEB) nebulizer solution 3 mL, 3 mL, Nebulization, Q4H PRN, Sil Gordon APRN    latanoprost (XALATAN) 0.005 % ophthalmic solution 1 drop, 1 drop, Both Eyes, Nightly, Sil Gordon APRN    Lidocaine 4 % 1 patch, 1 patch, Transdermal, Q24H, Sil Gordon APRN, 1 patch at 10/08/24 1650    losartan (COZAAR) tablet 50 mg, 50 mg, Oral, Q24H, Sil Gordon APRN, 50 mg at 10/08/24 1650    methocarbamol (ROBAXIN) tablet 500 mg,  500 mg, Oral, Q6H, Mindy Carreon APRN    montelukast (SINGULAIR) tablet 10 mg, 10 mg, Oral, Nightly, Sil Gordon APRN    nitroglycerin (NITROSTAT) SL tablet 0.4 mg, 0.4 mg, Sublingual, Q5 Min PRN, Roseanna Donaldson APRN    ondansetron ODT (ZOFRAN-ODT) disintegrating tablet 4 mg, 4 mg, Oral, Q6H PRN **OR** ondansetron (ZOFRAN) injection 4 mg, 4 mg, Intravenous, Q6H PRN, Roseanna Donaldson APRN    primidone (MYSOLINE) tablet 50 mg, 50 mg, Oral, TID, Sil Gordon APRN, 50 mg at 10/08/24 1650    [COMPLETED] Insert Peripheral IV, , , Once **AND** sodium chloride 0.9 % flush 10 mL, 10 mL, Intravenous, PRN, Rancho Spears MD      Objective     Results Review:  LABS:  Results from last 7 days   Lab Units 10/08/24  0233 10/07/24  1953   WBC 10*3/mm3 5.70 6.07   HEMOGLOBIN g/dL 12.7 13.6   HEMATOCRIT % 37.1 40.0   PLATELETS 10*3/mm3 166 178     Results from last 7 days   Lab Units 10/08/24  0233 10/07/24  1953   SODIUM mmol/L 138 135*   POTASSIUM mmol/L 3.9 4.0   CHLORIDE mmol/L 96* 95*   CO2 mmol/L 32.7* 30.5*   BUN mg/dL 38* 35*   CREATININE mg/dL 0.93 0.94   CALCIUM mg/dL 10.4 11.0*   BILIRUBIN mg/dL  --  0.4   ALK PHOS U/L  --  41   ALT (SGPT) U/L  --  11   AST (SGOT) U/L  --  15   GLUCOSE mg/dL 138* 157*         DIAGNOSTICS:  Lumbar CT reveals no evidence of fracture.  There is fairly severe degenerative changes throughout the lumbar spine with vacuum disc phenomena at L1/2, L3/4, L4/5, L5/S1.  Severe disc height loss at L1/2, L2/3, L3/4.  There is mild retrolisthesis of L1 on 2, L2 on 3 and mild anterolisthesis of L5 on S1.  There is degenerative endplate changes at nearly every level but most predominant at inferior L3 and superior L4, inferior L5.  There is anterior osteophytosis with bridging osteophyte at T12/L1.  There is disc osteophyte complex posteriorly at L1/2 L4/5 and L5/S1.  There is mild to moderate central canal stenosis at L1/2 due to disc osteophyte complex.   There is mild to moderate right neuroforaminal narrowing at L2/3 due to disc protrusion there is moderate to at least canal stenosis at L3/4 due to disc bulging and ligamentum flavum hypertrophy.  There is moderate to severe canal stenosis at L4/5 due to severe facet arthropathy, ligamentum flavum hypertrophy, disc osteophyte complex.  There is also moderate bilateral left greater than right neuroforaminal narrowing at L4/5.  At L5/S1 disc osteophyte complex results in mild to moderate canal stenosis and severe right and moderate left neuroforaminal narrowing.  Degree of canal stenosis is difficult to assess although appears mild.    Results Review:   I reviewed the patient's new clinical results.  I personally viewed and interpreted the patient's CT lumbar spine.  Also reviewed by Dr. Magana    Vital Signs   Temp:  [97.5 °F (36.4 °C)-99.1 °F (37.3 °C)] 98.1 °F (36.7 °C)  Heart Rate:  [49-66] 54  Resp:  [16-18] 18  BP: (108-195)/() 108/52    Physical Exam:  Physical Exam  Vitals reviewed.   Constitutional:       Comments: Sitting up in chair.  Daughter at bedside.  Patient appears uncomfortable and changes position multiple times during my visit with visible discomfort in her expression   Pulmonary:      Effort: Pulmonary effort is normal.   Musculoskeletal:      Cervical back: Tenderness present. No bony tenderness. No pain with movement. Normal range of motion.      Thoracic back: Bony tenderness present.      Lumbar back: Bony tenderness present. Negative right straight leg raise test and negative left straight leg raise test.   Neurological:      Mental Status: She is alert and oriented to person, place, and time.      Motor: Motor strength is normal.     Deep Tendon Reflexes:      Reflex Scores:       Tricep reflexes are 1+ on the right side and 2+ on the left side.       Bicep reflexes are 1+ on the right side and 1+ on the left side.       Brachioradialis reflexes are 1+ on the right side and 1+ on  the left side.  Psychiatric:         Mood and Affect: Mood is anxious.         Speech: Speech is delayed.         Behavior: Behavior is hyperactive. Behavior is cooperative.         Cognition and Memory: Cognition normal.       Neurological Exam  Mental Status  Alert. Oriented to person, place and time. Oriented to person, place, and time.    Motor  Normal muscle bulk throughout. No fasciculations present. Normal muscle tone. The following abnormal movements were seen: Right side essential tremor.   Strength is 5/5 throughout all four extremities.    Sensory  Light touch is normal in upper and lower extremities.     Reflexes                                            Right                      Left  Brachioradialis                    1+                         1+  Biceps                                 1+                         1+  Triceps                                1+                         2+  Patellar                                Tr                         Tr    Right pathological reflexes: Kiesha's absent. Ankle clonus absent.  Left pathological reflexes: Kiesha's absent. Ankle clonus absent.    Gait      Not tested..      Assessment & Plan       Low back pain    Essential hypertension    PAF (paroxysmal atrial fibrillation)    TARSHA (obstructive sleep apnea)    History of seizure    Chronic midline low back pain without sciatica    COPD (chronic obstructive pulmonary disease)    Type 2 diabetes mellitus with hyperglycemia, without long-term current use of insulin    Bradycardia      Problem List Items Addressed This Visit    None  Visit Diagnoses       Intractable low back pain    -  Primary    Degeneration of intervertebral disc of lumbar region with discogenic back pain                 COMORBID CONDITIONS:  Cancer including (primary or metastatic), Coagulopathy due to antiplatelet agent/anticoagulant/tPA, COPD, Diabetes Type 2, Hypertension, and obstructive sleep apnea    Patient presents with  "persistent back pain over the last several weeks without known injury.  She states that she had similar pain earlier this year but was able to manage with PT and OT which was stopped due to insurance issues recently.  She complains of right-sided neck pain but no radiculopathy.  She also has mid and lower back pain that is throbbing with intermittent spasms.  No radicular pain in legs.  No numbness or weakness.  She has not had any falls except for approximately 2 months ago when she slid off the mattress of her bed and onto the floor.  She denies any injury from this.  No prior spine surgery.  She does have a history of breast cancer which was treated over 20 years ago and reportedly without any recurrence issues.  On exam she has no myelopathic reflexes, she has full strength and sensation in bilateral upper and lower extremities.  She has full neck range of motion with no pain.  She is tender along the thoracolumbar spine diffusely.  CT lumbar shows multilevel severe degenerative changes.  MRI lumbar spine ordered by primary service.  Will add thoracic spine due to her tenderness.  She is currently on as needed Robaxin, Norco.  Her home Eliquis is on hold.  Will make the Robaxin scheduled.  Add Lidoderm patch.  We will follow-up after MRI studies    PLAN:   MRI thoracic and lumbar spine with and without contrast  Change Robaxin to scheduled  Lidocaine patch  Continue heat  PT eval    I discussed the patient's findings and my recommendations with patient, family, and Dr. Magana    During patient visit, I utilized appropriate personal protective equipment including gloves. Appropriate PPE was worn during the entire visit.  Hand hygiene was completed before and after.     Mindy Carreon, APRN  10/08/24  17:08 EDT    \"Dictated utilizing Dragon dictation\".      "

## 2024-10-08 NOTE — ED NOTES
Nursing report ED to floor  Kanwal Sauer  73 y.o.  female    HPI :  HPI (Adult)  Stated Reason for Visit: back spasms  History Obtained From: patient    Chief Complaint  Chief Complaint   Patient presents with    Back Pain       Admitting doctor:   Katherine Copeland MD    Admitting diagnosis:   The primary encounter diagnosis was Intractable low back pain. A diagnosis of Degeneration of intervertebral disc of lumbar region with discogenic back pain was also pertinent to this visit.    Code status:   Current Code Status       Date Active Code Status Order ID Comments User Context       Prior            Allergies:   Baclofen; Doxycycline; Egg-derived products; Iodine; Latex; Milk-related compounds; Msg [monosodium glutamate]; Penicillins; Metronidazole; Ezetimibe; Latex, natural rubber; Levaquin [levofloxacin]; Nylon; and Simvastatin    Isolation:   No active isolations    Intake and Output  No intake or output data in the 24 hours ending 10/08/24 0050    Weight:       10/07/24  1744   Weight: 97.5 kg (215 lb)       Most recent vitals:   Vitals:    10/1950 10/07/24 1949 10/07/24 2146 10/07/24 2320   BP: (!) 195/112  (!) 188/79 (!) 184/72   BP Location:       Patient Position:       Pulse:  56 55 54   Resp:       Temp:       TempSrc:       SpO2:  98% 100% 100%   Weight:       Height:           Active LDAs/IV Access:   Lines, Drains & Airways       Active LDAs       Name Placement date Placement time Site Days    Peripheral IV 10/07/24 1953 Anterior;Right Forearm 10/07/24  1953  Forearm  less than 1                    Labs (abnormal labs have a star):   Labs Reviewed   COMPREHENSIVE METABOLIC PANEL - Abnormal; Notable for the following components:       Result Value    Glucose 157 (*)     BUN 35 (*)     Sodium 135 (*)     Chloride 95 (*)     CO2 30.5 (*)     Calcium 11.0 (*)     BUN/Creatinine Ratio 37.2 (*)     All other components within normal limits    Narrative:     GFR Normal >60  Chronic Kidney  Disease <60  Kidney Failure <15    The GFR formula is only valid for adults with stable renal function between ages 18 and 70.   CBC WITH AUTO DIFFERENTIAL - Abnormal; Notable for the following components:    Immature Grans % 0.7 (*)     All other components within normal limits   CBC AND DIFFERENTIAL    Narrative:     The following orders were created for panel order CBC & Differential.  Procedure                               Abnormality         Status                     ---------                               -----------         ------                     CBC Auto Differential[[id number]]        Abnormal            Final result                 Please view results for these tests on the individual orders.       EKG:   No orders to display       Meds given in ED:   Medications   sodium chloride 0.9 % flush 10 mL (has no administration in time range)   diazePAM (VALIUM) injection 5 mg (5 mg Intravenous Given 10/7/24 2007)   ketorolac (TORADOL) injection 15 mg (15 mg Intravenous Given 10/7/24 2006)   ondansetron (ZOFRAN) injection 4 mg (4 mg Intravenous Given 10/7/24 2140)   HYDROmorphone (DILAUDID) injection 0.25 mg (0.25 mg Intravenous Given 10/7/24 2148)   HYDROmorphone (DILAUDID) injection 0.25 mg (0.25 mg Intravenous Given 10/8/24 0014)       Imaging results:  CT Lumbar Spine Without Contrast    Result Date: 10/7/2024  Electronically signed by José Christian MD on 10-07-24 at 2304     Ambulatory status:   - up with assist x1     Social issues:   Social History     Socioeconomic History    Marital status:    Tobacco Use    Smoking status: Never     Passive exposure: Never    Smokeless tobacco: Never    Tobacco comments:     no caffine   Vaping Use    Vaping status: Never Used   Substance and Sexual Activity    Alcohol use: Not Currently    Drug use: Never    Sexual activity: Not Currently     Partners: Male       Peripheral Neurovascular  Peripheral Neurovascular (Adult)  Peripheral Neurovascular WDL:  WDL    Neuro Cognitive  Neuro Cognitive (Adult)  Cognitive/Neuro/Behavioral WDL: WDL    Learning  Learning Assessment (Adult)  Learning Readiness and Ability: no barriers identified    Respiratory  Respiratory (Adult)  Airway WDL: WDL  Respiratory WDL  Respiratory WDL: WDL    Abdominal Pain       Pain Assessments  Pain (Adult)  (0-10) Pain Rating: Rest: 10  Pain Location: back  Response to Pain Interventions: intervention not effective per patient (Provider notified.)    NIH Stroke Scale       Shu Urrutia RN  10/08/24 00:50 EDT

## 2024-10-08 NOTE — CONSULTS
Date of Consultation: 10/08/24    Referral Provider: Katherine Copeland MD     Reason for Consultation: Sinus bradycardia with pauses.    Encounter Provider: Darren Medina MD    Group of Service: [facility]     Patient Name: Kanwal Sauer    :1950    Chief complaint: Lower back pain.    History of Present Illness:      This is a 73 year-old female who follows with Dr. Romano in the office.  She has a past medical history significant for paroxysmal atrial fibrillation, obstructive sleep apnea, anxiety/depression, type 2 diabetes, hypertension, hyperlipidemia, COPD (on 3 L at baseline), pancreatic cyst, and breast cancer.  She resides at an assisted living facility, [facility].    In 2020 she had a cardioversion for paroxysmal atrial fibrillation.  This was done in [place], [place].  Later that year in December, she was hospitalized and had episodes of rapid atrial fibrillation in the setting of a UTI.  During that admission she was given IV Lopressor, and discharged home on metoprolol tartrate.    She has had 3 weeks of worsening severe lower back pain, and reported to the ER on 10/7/2024.  Since admission, she has continued to have lower back pain.  An MRI of her lumbar spine is pending.  She has had several episodes of sinus bradycardia since admission, although she is asymptomatic.  Her lowest heart rate has been 49, although she mainly runs in the upper 50s to lower 60s.  She has first-degree AV block on her EKG.  She has had no high-grade AV block.  She has had a few brief pauses of less than 2 seconds.            Cardiology has been asked to see this patient for sinus bradycardia as well as pauses.  There were no pauses in the telemetry greater than 2.5 seconds.    ECHO 2023    Left ventricular systolic function is normal. Left ventricular ejection fraction appears to be 56 - 60%.    Left ventricular wall thickness is consistent with mild to  moderate concentric hypertrophy.    Normal right ventricular cavity size and systolic function noted.    The left atrial cavity is mild to moderately dilated.    The aortic valve leaflets are moderately calcified    Mild aortic valve regurgitation is present. No aortic valve stenosis is present    There is moderate mitral annular calcification    Mild mitral valve regurgitation is present    Calculated right ventricular systolic pressure from tricuspid regurgitation is 21 mmHg.    There is a small (<1cm) circumferential pericardial effusion. There is no evidence of cardiac tamponade.    Holter 3/15/2024  Monitor placed on patient by [facility] on 3/15/2024  at 09:10 EDT. Instructions were provided to patient on use of holter monitor and duration of monitoring.  The patient was monitored for 14 days. Indications for this exam include Paroxysmal Atrial Fibrillation. Average HR:  55. Min HR:  30. Max HR:  154.     Study Findings    Patient diary was not submitted. No symptoms reported during the monitoring period. No complications noted. The predominant rhythm noted during the testing period was sinus rhythm. Premature atrial contractions occured rarely. There was no evidence of atrial arrhythmias. There were 5 episodes of supraventricular tachycardia. Premature ventricular contractions occured rarely. There were no episodes of ventricular tachycardia. Sinoatrial node conduction was normal. No atrioventricular block noted.     Study Impressions    A relatively benign monitor study.     Past Medical History:   Diagnosis Date    Allergic     Anxiety     Asthma     Atrial fibrillation     Cancer     Breast    COPD (chronic obstructive pulmonary disease) 09/07/2022    Deep vein thrombosis     Diabetes mellitus     Difficulty walking     Diverticulosis     Drug therapy     Environmental allergies     Fractures     Headache, tension-type     Hyperlipidemia     Hypertension     Low back pain     Neuropathy in diabetes     Obesity      Scoliosis     Seizures     Sleep apnea     Tremor     Urinary tract infection     Weakness          Past Surgical History:   Procedure Laterality Date    BREAST BIOPSY      CATARACT EXTRACTION, BILATERAL      DENTAL PROCEDURE      Removed teeth    FEMUR OPEN REDUCTION INTERNAL FIXATION Left 12/24/2020    Procedure: DISTAL FEMUR OPEN REDUCTION INTERNAL FIXATION;  Surgeon: Marley Hernandez MD;  Location: [facility] OR;  Service: Orthopedics;  Laterality: Left;    MASTECTOMY Left     REPLACEMENT TOTAL KNEE BILATERAL           Allergies   Allergen Reactions    Baclofen Other (See Comments)     dysrhythmia    Doxycycline Rash    Egg-Derived Products Anaphylaxis    Iodine Anaphylaxis    Latex Anaphylaxis    Milk-Related Compounds Unknown - High Severity    Msg [Monosodium Glutamate] Anaphylaxis    Penicillins Hives     Tolerated cefazolin during December 2020 admission    Metronidazole Unknown - High Severity    Ezetimibe Rash    Latex, Natural Rubber Rash    Levaquin [Levofloxacin] Rash    Nylon Rash    Simvastatin Rash         No current facility-administered medications on file prior to encounter.     Current Outpatient Medications on File Prior to Encounter   Medication Sig Dispense Refill    acetaminophen (TYLENOL) 325 MG tablet Take 2 tablets by mouth Every 4 (Four) Hours As Needed for Mild Pain .      albuterol sulfate  (90 Base) MCG/ACT inhaler INHALE 2 PUFFS BY MOUTH EVERY 4 HOURS AS NEEDED FOR WHEEZE 18 g 1    apixaban (Eliquis) 5 MG tablet tablet Take 1 tablet by mouth Every 12 (Twelve) Hours. 60 tablet 2    Cholecalciferol (Vitamin D3) 25 MCG (1000 UT) capsule Take 1 capsule by mouth Daily.      citalopram (CeleXA) 20 MG tablet TAKE 1 TABLET BY MOUTH EVERY DAY 90 tablet 1    CRANBERRY PO Take 650 mg by mouth.      fenofibrate 160 MG tablet TAKE 1 TABLET BY MOUTH EVERY DAY 90 tablet 1    hydroCHLOROthiazide (MICROZIDE) 12.5 MG capsule Take 1 capsule by mouth Daily. 90 capsule 3    latanoprost  (XALATAN) 0.005 % ophthalmic solution Administer 1 drop to both eyes every night at bedtime.      lidocaine (LIDODERM) 5 % Place 1 patch on the skin as directed by provider Daily. Remove & Discard patch within 12 hours or as directed by MD 15 each 0    LORazepam (Ativan) 0.5 MG tablet Take 0.5 mg 1/2 hr before the procedure , can repeat  again 2 tablet 0    metFORMIN (GLUCOPHAGE) 1000 MG tablet TAKE 1 TABLET BY MOUTH TWICE A DAY WITH FOOD 180 tablet 0    metoprolol tartrate (LOPRESSOR) 25 MG tablet TAKE 1 TABLET BY MOUTH TWICE A  tablet 1    montelukast (SINGULAIR) 10 MG tablet TAKE 1 TABLET BY MOUTH EVERYDAY AT BEDTIME 90 tablet 1    olmesartan (Benicar) 20 MG tablet Take 1 tablet by mouth Daily. 90 tablet 0    tiZANidine (ZANAFLEX) 4 MG tablet Take 1 tablet by mouth At Night As Needed for Muscle Spasms. 30 tablet 1    traMADol (ULTRAM) 50 MG tablet Take 1 tablet by mouth Every 8 (Eight) Hours As Needed for Moderate Pain or Severe Pain. 30 tablet 0    Trelegy Ellipta 100-62.5-25 MCG/ACT inhaler INHALE 1 PUFF DAILY 60 each 1    clotrimazole-betamethasone (LOTRISONE) 1-0.05 % cream Apply 1 Application topically to the appropriate area as directed 2 (Two) Times a Day. Use sparingly keep dry avoid chronic use 45 g 2    furosemide (LASIX) 40 MG tablet Take 0.5 tablets by mouth Daily As Needed (Take a dose of Lasix if you notice a 3 pound weight gain, increasing leg swelling, or an increase in your oxygen requirement.).      hydrocortisone 2.5 % ointment APPLY TO FACE TWICE A DAY AS NEEDED      ipratropium-albuterol (DUO-NEB) 0.5-2.5 mg/3 ml nebulizer Take 3 mL by nebulization Every 4 (Four) Hours As Needed for Wheezing. 150 mL 3    ketoconazole (NIZORAL) 2 % shampoo       Magnesium Oxide -Mg Supplement 400 (240 Mg) MG tablet       methylPREDNISolone (MEDROL) 4 MG dose pack Take as directed on package instructions. 21 each 0    mupirocin (BACTROBAN) 2 % nasal ointment Administer 1 Application into the nostril(s)  "as directed by provider 2 (Two) Times a Day. 22 g 1    nystatin (MYCOSTATIN) 348991 UNIT/GM powder Apply  topically to the appropriate area as directed 2 (Two) Times a Day. 60 g 1    potassium chloride (KLOR-CON) 10 MEQ CR tablet Take 1 tablet by mouth Daily. 90 tablet 0    primidone (MYSOLINE) 50 MG tablet TAKE 1 TABLET BY MOUTH THREE TIMES A  tablet 0    Probiotic Product (PROBIOTIC-10 PO) Take  by mouth. (Patient not taking: Reported on 9/17/2024)           Social History     Socioeconomic History    Marital status:    Tobacco Use    Smoking status: Never     Passive exposure: Never    Smokeless tobacco: Never    Tobacco comments:     no caffine   Vaping Use    Vaping status: Never Used   Substance and Sexual Activity    Alcohol use: Not Currently    Drug use: Never    Sexual activity: Defer     Partners: Male         Family History   Problem Relation Age of Onset    Arthritis Mother     Lung cancer Mother     Brain cancer Mother     Hypertension Mother     Cancer Mother         Lung cancer    Hypertension Father     Arthritis Sister     Breast cancer Sister     Diabetes Sister     Hypertension Sister     Dementia Sister     Cancer Sister         Brest cancer    Aneurysm Sister     Cancer Daughter         Colon cancer    Developmental Disability Daughter     Learning disabilities Daughter     Miscarriages / Stillbirths Daughter         Miscarriage       REVIEW OF SYSTEMS:   Pertinent positives are noted in the HPI above.  Otherwise, all other systems were reviewed, and are negative.     Objective:     Vitals:    10/08/24 0100 10/08/24 0150 10/08/24 0416 10/08/24 0750   BP: (!) 181/77 161/67 136/62 173/56   BP Location:  Right arm Right arm Right arm   Patient Position:  Lying Lying Sitting   Pulse: 53 50 (!) 49 66   Resp:  18  18   Temp:  99.1 °F (37.3 °C)  98.4 °F (36.9 °C)   TempSrc:  Oral  Oral   SpO2: 98% 100%  98%   Weight:  94.3 kg (208 lb)     Height:  165.1 cm (65\")       Body mass index is " "34.61 kg/m².  Flowsheet Rows      Flowsheet Row First Filed Value   Admission Height 165.1 cm (65\") Documented at 10/07/2024 1744   Admission Weight 97.5 kg (215 lb) Documented at 10/07/2024 1744             General:    No acute distress, alert and oriented x4, pleasant                   Head:    Normocephalic, atraumatic.   Eyes:          Conjunctivae and sclerae normal, no icterus.   Throat:   No oral lesions, no thrush, oral mucosa moist.    Neck:   Supple, trachea midline.   Lungs:     Clear to auscultation bilaterally     Heart:    Regular rhythm and normal rate.  No murmurs, gallops, or rubs noted.   Abdomen:     Soft, non-tender, non-distended, positive bowel sounds.    Extremities:   No clubbing, cyanosis, or edema.     Pulses:   Pulses palpable and equal bilaterally.    Skin:   No bleeding or rash.   Neuro:   Non-focal.  Moves all extremities well.    Psychiatric:   Normal mood and affect.         Lab Review:                Results from last 7 days   Lab Units 10/08/24  0233   SODIUM mmol/L 138   POTASSIUM mmol/L 3.9   CHLORIDE mmol/L 96*   CO2 mmol/L 32.7*   BUN mg/dL 38*   CREATININE mg/dL 0.93   GLUCOSE mg/dL 138*   CALCIUM mg/dL 10.4         Results from last 7 days   Lab Units 10/08/24  0233   WBC 10*3/mm3 5.70   HEMOGLOBIN g/dL 12.7   HEMATOCRIT % 37.1   PLATELETS 10*3/mm3 166                       EKG (reviewed by me personally):                Assessment:   1.  Severe lower back pain, workup in progress  2.  Asymptomatic sinus bradycardia  3.  Pauses less than 3 seconds, asymptomatic  4.  Paroxysmal atrial fibrillation  5.  Obesity, complicating all aspects of care  6.  COPD without acute exacerbation  7.  Type 2 diabetes  8.  History of breast cancer, status post left mastectomy and chemotherapy    Plan:       I reviewed the telemetry.  She has some intermittent sinus bradycardia with rates as low as 49.  She also has had less than 2-second pauses noted.  All of this is asymptomatic.    I suspect " that her vagal tone is heightened because of the pain currently.  She is also receiving pain medications, which may contribute.  She likely has untreated obstructive sleep apnea.  Her TSH was checked in July and was normal.  There is no indication for pacemaker at this time.  She is currently undergoing workup for her lower back pain, and a lumbar MRI is pending.  I would hold her metoprolol.  I would also hold her Eliquis for now until it is seen whether she will need surgical intervention.  She has not had atrial fibrillation thus far on this admission.    Really nothing further to add from a cardiac standpoint.  Cardiology will sign off for now.  Please call back if needed.    Thank you very much for this consult.    Florentin Medina MD

## 2024-10-08 NOTE — PLAN OF CARE
Problem: Adult Inpatient Plan of Care  Goal: Plan of Care Review  Outcome: Not Progressing  Flowsheets (Taken 10/8/2024 1749)  Progress: no change  Plan of Care Reviewed With: patient  Outcome Evaluation: VSS.Patient recieving pain medicaiton as needed for back pain, MRI paper work complete and faxed to MRI, paper work placed in in chart, pre medicaiton order for MRI. will continue to montior  Goal: Patient-Specific Goal (Individualized)  Outcome: Not Progressing  Goal: Absence of Hospital-Acquired Illness or Injury  Outcome: Not Progressing  Intervention: Identify and Manage Fall Risk  Recent Flowsheet Documentation  Taken 10/8/2024 1600 by Belinda Haynes RN  Safety Promotion/Fall Prevention:   elopement precautions   activity supervised   fall prevention program maintained   safety round/check completed  Taken 10/8/2024 1500 by Belinda Haynes RN  Safety Promotion/Fall Prevention:   activity supervised   assistive device/personal items within reach   clutter free environment maintained   fall prevention program maintained   nonskid shoes/slippers when out of bed   safety round/check completed  Taken 10/8/2024 1200 by Belinda Haynes RN  Safety Promotion/Fall Prevention:   activity supervised   assistive device/personal items within reach   clutter free environment maintained   fall prevention program maintained   nonskid shoes/slippers when out of bed   safety round/check completed  Taken 10/8/2024 1000 by Belinda Haynes RN  Safety Promotion/Fall Prevention:   activity supervised   assistive device/personal items within reach   clutter free environment maintained   fall prevention program maintained   nonskid shoes/slippers when out of bed   safety round/check completed  Taken 10/8/2024 0800 by Belinda Haynes RN  Safety Promotion/Fall Prevention:   activity supervised   clutter free environment maintained   assistive device/personal items within reach   fall prevention program maintained   nonskid  shoes/slippers when out of bed   safety round/check completed  Intervention: Prevent and Manage VTE (Venous Thromboembolism) Risk  Recent Flowsheet Documentation  Taken 10/8/2024 1600 by Belinda Haynes RN  Activity Management: activity encouraged  Taken 10/8/2024 1500 by Belinda Haynes RN  Activity Management: activity encouraged  Taken 10/8/2024 1200 by Belinda Haynes RN  Activity Management: activity encouraged  Taken 10/8/2024 1000 by Belinda Haynes RN  Activity Management: activity encouraged  Taken 10/8/2024 0800 by Belinda Haynes RN  Activity Management: activity encouraged  Intervention: Prevent Infection  Recent Flowsheet Documentation  Taken 10/8/2024 1600 by Belinda Haynes RN  Infection Prevention: single patient room provided  Taken 10/8/2024 1500 by Belinda Haynes RN  Infection Prevention: single patient room provided  Taken 10/8/2024 1200 by Belinda Haynes RN  Infection Prevention: single patient room provided  Taken 10/8/2024 1000 by Belinda Haynes RN  Infection Prevention: single patient room provided  Taken 10/8/2024 0800 by Belinda Haynes RN  Infection Prevention: single patient room provided  Goal: Optimal Comfort and Wellbeing  Outcome: Not Progressing  Intervention: Monitor Pain and Promote Comfort  Recent Flowsheet Documentation  Taken 10/8/2024 1500 by Belinda Haynes RN  Pain Management Interventions: see MAR  Taken 10/8/2024 1200 by Belinda Haynes RN  Pain Management Interventions: see MAR  Taken 10/8/2024 1000 by Belinda Haynes RN  Pain Management Interventions: see MAR  Intervention: Provide Person-Centered Care  Recent Flowsheet Documentation  Taken 10/8/2024 1500 by Belinda Haynes RN  Trust Relationship/Rapport:   care explained   choices provided  Goal: Readiness for Transition of Care  Outcome: Not Progressing   Goal Outcome Evaluation:  Plan of Care Reviewed With: patient        Progress: no change  Outcome Evaluation: VSS.Patient recieving pain medicaiton as  needed for back pain, MRI paper work complete and faxed to MRI, paper work placed in in chart, pre medicaiton order for MRI. will continue to nan

## 2024-10-08 NOTE — SIGNIFICANT NOTE
10/08/24 1324   OTHER   Discipline physical therapist   Rehab Time/Intention   Session Not Performed patient/family declined, not feeling well  (Attempted to work w/ pt this PM as she was sitting [facility] w/ a heating pad on her back. Pt had 3 painful muscle spasms during conversation. Edu on benefit of PT eval and roll of PT in the hospital. Pt requesting to hold PT eval until tomorrow d/t pain/spasms. RN made aware. PT will cont to follow.)   Therapy Assessment/Plan (PT)   Criteria for Skilled Interventions Met (PT) yes   Recommendation   PT - Next Appointment 10/09/24

## 2024-10-09 PROBLEM — M51.360 DEGENERATION OF INTERVERTEBRAL DISC OF LUMBAR REGION WITH DISCOGENIC BACK PAIN: Status: ACTIVE | Noted: 2024-10-09

## 2024-10-09 PROBLEM — M43.16 SPONDYLOLISTHESIS AT L4-L5 LEVEL: Status: ACTIVE | Noted: 2024-10-09

## 2024-10-09 LAB
ANION GAP SERPL CALCULATED.3IONS-SCNC: 10 MMOL/L (ref 5–15)
BUN SERPL-MCNC: 40 MG/DL (ref 8–23)
BUN/CREAT SERPL: 38.5 (ref 7–25)
CALCIUM SPEC-SCNC: 10.1 MG/DL (ref 8.6–10.5)
CHLORIDE SERPL-SCNC: 94 MMOL/L (ref 98–107)
CO2 SERPL-SCNC: 30 MMOL/L (ref 22–29)
CREAT SERPL-MCNC: 1.04 MG/DL (ref 0.57–1)
DEPRECATED RDW RBC AUTO: 44.2 FL (ref 37–54)
EGFRCR SERPLBLD CKD-EPI 2021: 56.2 ML/MIN/1.73
ERYTHROCYTE [DISTWIDTH] IN BLOOD BY AUTOMATED COUNT: 13 % (ref 12.3–15.4)
GLUCOSE BLDC GLUCOMTR-MCNC: 134 MG/DL (ref 70–130)
GLUCOSE BLDC GLUCOMTR-MCNC: 166 MG/DL (ref 70–130)
GLUCOSE BLDC GLUCOMTR-MCNC: 184 MG/DL (ref 70–130)
GLUCOSE BLDC GLUCOMTR-MCNC: 221 MG/DL (ref 70–130)
GLUCOSE SERPL-MCNC: 160 MG/DL (ref 65–99)
HCT VFR BLD AUTO: 36.3 % (ref 34–46.6)
HGB BLD-MCNC: 11.7 G/DL (ref 12–15.9)
MCH RBC QN AUTO: 29.9 PG (ref 26.6–33)
MCHC RBC AUTO-ENTMCNC: 32.2 G/DL (ref 31.5–35.7)
MCV RBC AUTO: 92.8 FL (ref 79–97)
PLATELET # BLD AUTO: 149 10*3/MM3 (ref 140–450)
PMV BLD AUTO: 11.3 FL (ref 6–12)
POTASSIUM SERPL-SCNC: 3.8 MMOL/L (ref 3.5–5.2)
RBC # BLD AUTO: 3.91 10*6/MM3 (ref 3.77–5.28)
SODIUM SERPL-SCNC: 134 MMOL/L (ref 136–145)
WBC NRBC COR # BLD AUTO: 5.7 10*3/MM3 (ref 3.4–10.8)

## 2024-10-09 PROCEDURE — 94761 N-INVAS EAR/PLS OXIMETRY MLT: CPT

## 2024-10-09 PROCEDURE — 25010000002 HYDROMORPHONE PER 4 MG: Performed by: INTERNAL MEDICINE

## 2024-10-09 PROCEDURE — 80048 BASIC METABOLIC PNL TOTAL CA: CPT | Performed by: NURSE PRACTITIONER

## 2024-10-09 PROCEDURE — 99231 SBSQ HOSP IP/OBS SF/LOW 25: CPT | Performed by: NURSE PRACTITIONER

## 2024-10-09 PROCEDURE — 97162 PT EVAL MOD COMPLEX 30 MIN: CPT

## 2024-10-09 PROCEDURE — 97110 THERAPEUTIC EXERCISES: CPT

## 2024-10-09 PROCEDURE — 94664 DEMO&/EVAL PT USE INHALER: CPT

## 2024-10-09 PROCEDURE — 94799 UNLISTED PULMONARY SVC/PX: CPT

## 2024-10-09 PROCEDURE — 85027 COMPLETE CBC AUTOMATED: CPT | Performed by: NURSE PRACTITIONER

## 2024-10-09 PROCEDURE — 63710000001 INSULIN LISPRO (HUMAN) PER 5 UNITS

## 2024-10-09 PROCEDURE — 82948 REAGENT STRIP/BLOOD GLUCOSE: CPT

## 2024-10-09 RX ORDER — NYSTATIN 100000 [USP'U]/G
POWDER TOPICAL 2 TIMES DAILY
Status: DISCONTINUED | OUTPATIENT
Start: 2024-10-09 | End: 2024-10-14 | Stop reason: HOSPADM

## 2024-10-09 RX ADMIN — HYDROCODONE BITARTRATE AND ACETAMINOPHEN 1 TABLET: 5; 325 TABLET ORAL at 14:26

## 2024-10-09 RX ADMIN — LIDOCAINE 1 PATCH: 4 PATCH TOPICAL at 14:31

## 2024-10-09 RX ADMIN — HYDROMORPHONE HYDROCHLORIDE 0.5 MG: 1 INJECTION, SOLUTION INTRAMUSCULAR; INTRAVENOUS; SUBCUTANEOUS at 10:17

## 2024-10-09 RX ADMIN — METHOCARBAMOL TABLETS 500 MG: 500 TABLET, COATED ORAL at 23:37

## 2024-10-09 RX ADMIN — BUDESONIDE AND FORMOTEROL FUMARATE DIHYDRATE 2 PUFF: 160; 4.5 AEROSOL RESPIRATORY (INHALATION) at 12:01

## 2024-10-09 RX ADMIN — PRIMIDONE 50 MG: 50 TABLET ORAL at 08:10

## 2024-10-09 RX ADMIN — BUDESONIDE AND FORMOTEROL FUMARATE DIHYDRATE 2 PUFF: 160; 4.5 AEROSOL RESPIRATORY (INHALATION) at 20:50

## 2024-10-09 RX ADMIN — HYDROMORPHONE HYDROCHLORIDE 0.5 MG: 1 INJECTION, SOLUTION INTRAMUSCULAR; INTRAVENOUS; SUBCUTANEOUS at 02:10

## 2024-10-09 RX ADMIN — HYDROCODONE BITARTRATE AND ACETAMINOPHEN 1 TABLET: 5; 325 TABLET ORAL at 20:58

## 2024-10-09 RX ADMIN — Medication 1000 UNITS: at 08:10

## 2024-10-09 RX ADMIN — NYSTATIN: 100000 POWDER TOPICAL at 03:03

## 2024-10-09 RX ADMIN — METHOCARBAMOL TABLETS 500 MG: 500 TABLET, COATED ORAL at 16:51

## 2024-10-09 RX ADMIN — METHOCARBAMOL TABLETS 500 MG: 500 TABLET, COATED ORAL at 00:08

## 2024-10-09 RX ADMIN — PRIMIDONE 50 MG: 50 TABLET ORAL at 14:26

## 2024-10-09 RX ADMIN — LATANOPROST 1 DROP: 50 SOLUTION OPHTHALMIC at 20:58

## 2024-10-09 RX ADMIN — TIOTROPIUM BROMIDE INHALATION SPRAY 2 PUFF: 3.12 SPRAY, METERED RESPIRATORY (INHALATION) at 12:00

## 2024-10-09 RX ADMIN — INSULIN LISPRO 2 UNITS: 100 INJECTION, SOLUTION INTRAVENOUS; SUBCUTANEOUS at 08:10

## 2024-10-09 RX ADMIN — MONTELUKAST SODIUM 10 MG: 10 TABLET, FILM COATED ORAL at 20:58

## 2024-10-09 RX ADMIN — NYSTATIN: 100000 POWDER TOPICAL at 08:10

## 2024-10-09 RX ADMIN — APIXABAN 5 MG: 5 TABLET, FILM COATED ORAL at 14:26

## 2024-10-09 RX ADMIN — LOSARTAN POTASSIUM 50 MG: 50 TABLET, FILM COATED ORAL at 08:10

## 2024-10-09 RX ADMIN — HYDROCHLOROTHIAZIDE 12.5 MG: 12.5 TABLET ORAL at 08:10

## 2024-10-09 RX ADMIN — NYSTATIN: 100000 POWDER TOPICAL at 20:58

## 2024-10-09 RX ADMIN — METHOCARBAMOL TABLETS 500 MG: 500 TABLET, COATED ORAL at 11:53

## 2024-10-09 RX ADMIN — METHOCARBAMOL TABLETS 500 MG: 500 TABLET, COATED ORAL at 05:54

## 2024-10-09 RX ADMIN — INSULIN LISPRO 2 UNITS: 100 INJECTION, SOLUTION INTRAVENOUS; SUBCUTANEOUS at 11:53

## 2024-10-09 RX ADMIN — HYDROMORPHONE HYDROCHLORIDE 0.5 MG: 1 INJECTION, SOLUTION INTRAMUSCULAR; INTRAVENOUS; SUBCUTANEOUS at 23:37

## 2024-10-09 RX ADMIN — HYDROCODONE BITARTRATE AND ACETAMINOPHEN 1 TABLET: 5; 325 TABLET ORAL at 08:19

## 2024-10-09 RX ADMIN — CITALOPRAM 20 MG: 20 TABLET, FILM COATED ORAL at 08:10

## 2024-10-09 RX ADMIN — INSULIN LISPRO 3 UNITS: 100 INJECTION, SOLUTION INTRAVENOUS; SUBCUTANEOUS at 16:51

## 2024-10-09 RX ADMIN — PRIMIDONE 50 MG: 50 TABLET ORAL at 20:58

## 2024-10-09 NOTE — PLAN OF CARE
Goal Outcome Evaluation:  Plan of Care Reviewed With: patient        Progress: no change  Outcome Evaluation: AAOx4. VSS. Sinus Ezio on monitor. Pain medication administered per MAR. Up to chair with PT.

## 2024-10-09 NOTE — PROGRESS NOTES
"     LOS: 1 day   Patient Care Team:  Nisha Britton MD as PCP - General (Family Medicine)  Corey Britton MD as Consulting Physician (Sleep Medicine)  Belen Khan APRN as Nurse Practitioner (Obstetrics and Gynecology)  Nia Zhang APRN as Nurse Practitioner (Cardiology)  José Valentino MD as Consulting Physician (Urology)  Indio Box MD as Consulting Physician (Pulmonary Disease)    Chief Complaint:  Back pain     Subjective     Today's visit is for neurosurgical follow up however this patient and medical problems are new to me as of today.     Interval History: Although muscle relaxers have helped some, the patient reports continued back pain. States \"I hurt all over.\" Denies radicular pain or sensation abnormality in the lower extremities.  Neurosurgery is seeing to reevaluate after MRI imaging of the thoracic and lumbar spine.    History taken from: patient chart    Objective        Vital Signs  Temp:  [98.1 °F (36.7 °C)-99 °F (37.2 °C)] 98.2 °F (36.8 °C)  Heart Rate:  [54-59] 55  Resp:  [18-20] 18  BP: (108-145)/(46-65) 124/46    Physical Exam:    AA&O x 3.   In no acute distress.   Moving legs without difficulty.   No motor or sensory deficits on exam.   SLR negative bilaterally.      Results Review:     I reviewed the patient's new clinical results.  I reviewed the patient's new imaging results and agree with the interpretation.  Discussed with the patient and Dr. Magana    MRI THORACIC SPINE W/WO CONTRAST 10/9/2024    Partial autofusion at the T10 and T11 vertebral bodies.  Multilevel degenerative changes.  No evidence of vertebral body fracture or vertebral body mass.  No abnormal enhancement or severe canal stenosis. Thoracic cord signal is normal.    MRI LUMBAR SPINE WO CONTRAST 10/9/2024    Multilevel degenerative changes with dextroscoliosis, minimal retrolisthesis of L1 on L2 and L2 on L3.  There is also anterolisthesis of L4 on L5.  Mild disc bulge L3-4 with partial " effacement of the left lateral recess.  There is mild left foraminal stenosis at L2-3 and L3-4.  Mild canal stenosis noted from a superiorly disc directed disc protrusion measuring approximately 5 mm resulting in mild canal stenosis and partial effacement of the lateral recesses bilaterally.  There is also mild L4-5 foraminal stenosis and moderate facet arthrosis at L4-5.  Severe right foraminal stenosis, moderate facet arthrosis L5-S1.    Assessment & Plan       Low back pain    Chronic midline low back pain without sciatica    Degeneration of intervertebral disc of lumbar region with discogenic back pain    Spondylolisthesis at L4-L5 level    73-year-old obese female with a history of worsening back pain for the last several weeks with no inciting cause.  She denies any radicular pain, numbness, tingling or weakness in the legs.  She also denies bowel or bladder incontinence.  She has been getting routine Robaxin since previous evaluation by neurosurgery yesterday.  It has helped to some degree.  MRI images of the thoracic and lumbar spine do not show any evidence of compression fracture or significant canal stenosis.  She is having no symptoms of leg pain.  No weakness or sensory deficits on exam.    I discussed the radiographic findings and symptom complaints with Dr. Magana.  He is in agreement with the recommendations below:    At this juncture, the neurosurgical recommendation would be for continued symptom management with medications, physical therapy.  If her back pain continues, we could recommend an evaluation by Dr. Abhilash Herrera, who is spinal deformity specialist. Surgery for deformity correction is not urgent and could be arranged as an outpatient. The patient was instructed to call the office if symptoms do not improve with the above.   Neurosurgery will see as needed from here.     Alisha Clayton, MARK  10/09/24  12:25 EDT

## 2024-10-09 NOTE — PLAN OF CARE
Goal Outcome Evaluation:              Outcome Evaluation: MRI scan done. vss. alert and oriented. 3L NC. precribed meds given for back pain and muscle spasms. safety precautions maintained. call light within reach.

## 2024-10-09 NOTE — CASE MANAGEMENT/SOCIAL WORK
Discharge Planning Assessment  [facility]     Patient Name: Kawnal Sauer  MRN: [id number]  Today's Date: 10/9/2024    Admit Date: 10/7/2024    Plan: Return to [facility] vs SNF, PT eval pending   Discharge Needs Assessment       Row Name 10/09/24 1443       Living Environment    People in Home alone    Current Living Arrangements assisted living facility    Potentially Unsafe Housing Conditions none    Family Caregiver if Needed child(kaye), adult    Quality of Family Relationships helpful;involved;supportive    Able to Return to Prior Arrangements other (see comments)       Transition Planning    Patient/Family Anticipates Transition to home with family;inpatient rehabilitation facility    Patient/Family Anticipated Services at Transition     Transportation Anticipated family or friend will provide;health plan transportation       Discharge Needs Assessment    Readmission Within the Last 30 Days no previous admission in last 30 days    Current Outpatient/Agency/Support Group assisted living facility    Equipment Currently Used at Home oxygen;rollator    Concerns to be Addressed discharge planning    Equipment Needed After Discharge none    Outpatient/Agency/Support Group Needs skilled nursing facility    Discharge Facility/Level of Care Needs nursing facility, skilled                   Discharge Plan       Row Name 10/09/24 1443       Plan    Plan Return to [facility] vs SNF, PT eval pending    Patient/Family in Agreement with Plan yes    Plan Comments CCP s/w Monica, pts daughter, d/t pts confusion. Introduced self and role of CCP. Face sheet information and pharmacy verified. Pt lives alone in an apartment at [facility]. The facility provides meals to the pt. Pt does not drive, mostly IADL's however lately pts daughter has been assisting with bathing, dressing etc. Monica has been working on additional assistance to help pt with ADL'S. Pt has a rollator, oxygen  concentrator and POC thru [facility]. Pt has a living will. Monica declines meds to beds and denies trouble affording or managing her medications. Pt has used HH in the past and has been to SNF at [facility]. Monica unsure of DC plan at this time. PT eval pending. Elvin RN/CCP                  Continued Care and Services - Admitted Since 10/7/2024    No active coordination exists for this encounter.       Selected Continued Care - Episodes Includes continued care and service providers with selected services from the active episodes listed below      [facility] Episode start date: 5/21/2024   There are no active outsourced providers for this episode.                 Expected Discharge Date and Time       Expected Discharge Date Expected Discharge Time    Oct 10, 2024            Demographic Summary       Row Name 10/09/24 1442       General Information    Admission Type inpatient    Arrived From home    Required Notices Provided Important Message from Medicare    Referral Source admission list;case finding    Reason for Consult discharge planning    Preferred Language English       Contact Information    Permission Granted to Share Info With     Contact Information Obtained for                    Functional Status       Row Name 10/09/24 1442       Functional Status    Usual Activity Tolerance moderate    Current Activity Tolerance moderate       Assessment of Health Literacy    How often do you have someone help you read hospital materials? Sometimes    How often do you have problems learning about your medical condition because of difficulty understanding written information? Sometimes    How often do you have a problem understanding what is told to you about your medical condition? Sometimes    How confident are you filling out medical forms by yourself? Somewhat    Health Literacy Moderate       Functional Status, IADL    Medications assistive person    Meal Preparation assistive person     Housekeeping assistive person    Laundry assistive person    Shopping assistive person                               Cassie Dumont, RN

## 2024-10-10 LAB
ALBUMIN SERPL-MCNC: 3.8 G/DL (ref 3.5–5.2)
ALBUMIN/GLOB SERPL: 1.7 G/DL
ALP SERPL-CCNC: 35 U/L (ref 39–117)
ALT SERPL W P-5'-P-CCNC: 9 U/L (ref 1–33)
ANION GAP SERPL CALCULATED.3IONS-SCNC: 5.6 MMOL/L (ref 5–15)
AST SERPL-CCNC: 14 U/L (ref 1–32)
BASOPHILS # BLD AUTO: 0.02 10*3/MM3 (ref 0–0.2)
BASOPHILS NFR BLD AUTO: 0.4 % (ref 0–1.5)
BILIRUB SERPL-MCNC: 0.4 MG/DL (ref 0–1.2)
BUN SERPL-MCNC: 35 MG/DL (ref 8–23)
BUN/CREAT SERPL: 37.2 (ref 7–25)
CALCIUM SPEC-SCNC: 9.9 MG/DL (ref 8.6–10.5)
CHLORIDE SERPL-SCNC: 97 MMOL/L (ref 98–107)
CO2 SERPL-SCNC: 35.4 MMOL/L (ref 22–29)
CREAT SERPL-MCNC: 0.94 MG/DL (ref 0.57–1)
DEPRECATED RDW RBC AUTO: 43 FL (ref 37–54)
EGFRCR SERPLBLD CKD-EPI 2021: 63.4 ML/MIN/1.73
EOSINOPHIL # BLD AUTO: 0.12 10*3/MM3 (ref 0–0.4)
EOSINOPHIL NFR BLD AUTO: 2.5 % (ref 0.3–6.2)
ERYTHROCYTE [DISTWIDTH] IN BLOOD BY AUTOMATED COUNT: 13 % (ref 12.3–15.4)
GLOBULIN UR ELPH-MCNC: 2.2 GM/DL
GLUCOSE BLDC GLUCOMTR-MCNC: 137 MG/DL (ref 70–130)
GLUCOSE BLDC GLUCOMTR-MCNC: 168 MG/DL (ref 70–130)
GLUCOSE BLDC GLUCOMTR-MCNC: 185 MG/DL (ref 70–130)
GLUCOSE BLDC GLUCOMTR-MCNC: 275 MG/DL (ref 70–130)
GLUCOSE SERPL-MCNC: 153 MG/DL (ref 65–99)
HCT VFR BLD AUTO: 35.4 % (ref 34–46.6)
HGB BLD-MCNC: 11.8 G/DL (ref 12–15.9)
IMM GRANULOCYTES # BLD AUTO: 0.02 10*3/MM3 (ref 0–0.05)
IMM GRANULOCYTES NFR BLD AUTO: 0.4 % (ref 0–0.5)
LYMPHOCYTES # BLD AUTO: 1.56 10*3/MM3 (ref 0.7–3.1)
LYMPHOCYTES NFR BLD AUTO: 32.6 % (ref 19.6–45.3)
MCH RBC QN AUTO: 30.5 PG (ref 26.6–33)
MCHC RBC AUTO-ENTMCNC: 33.3 G/DL (ref 31.5–35.7)
MCV RBC AUTO: 91.5 FL (ref 79–97)
MONOCYTES # BLD AUTO: 0.44 10*3/MM3 (ref 0.1–0.9)
MONOCYTES NFR BLD AUTO: 9.2 % (ref 5–12)
NEUTROPHILS NFR BLD AUTO: 2.63 10*3/MM3 (ref 1.7–7)
NEUTROPHILS NFR BLD AUTO: 54.9 % (ref 42.7–76)
NRBC BLD AUTO-RTO: 0 /100 WBC (ref 0–0.2)
PLATELET # BLD AUTO: 145 10*3/MM3 (ref 140–450)
PMV BLD AUTO: 11 FL (ref 6–12)
POTASSIUM SERPL-SCNC: 4.2 MMOL/L (ref 3.5–5.2)
PROT SERPL-MCNC: 6 G/DL (ref 6–8.5)
RBC # BLD AUTO: 3.87 10*6/MM3 (ref 3.77–5.28)
SODIUM SERPL-SCNC: 138 MMOL/L (ref 136–145)
WBC NRBC COR # BLD AUTO: 4.79 10*3/MM3 (ref 3.4–10.8)

## 2024-10-10 PROCEDURE — 94664 DEMO&/EVAL PT USE INHALER: CPT

## 2024-10-10 PROCEDURE — 80053 COMPREHEN METABOLIC PANEL: CPT | Performed by: INTERNAL MEDICINE

## 2024-10-10 PROCEDURE — 97530 THERAPEUTIC ACTIVITIES: CPT

## 2024-10-10 PROCEDURE — 25010000002 HYDROMORPHONE PER 4 MG: Performed by: INTERNAL MEDICINE

## 2024-10-10 PROCEDURE — 94799 UNLISTED PULMONARY SVC/PX: CPT

## 2024-10-10 PROCEDURE — 82948 REAGENT STRIP/BLOOD GLUCOSE: CPT

## 2024-10-10 PROCEDURE — 63710000001 INSULIN LISPRO (HUMAN) PER 5 UNITS

## 2024-10-10 PROCEDURE — 85025 COMPLETE CBC W/AUTO DIFF WBC: CPT | Performed by: INTERNAL MEDICINE

## 2024-10-10 PROCEDURE — 94761 N-INVAS EAR/PLS OXIMETRY MLT: CPT

## 2024-10-10 RX ADMIN — HYDROMORPHONE HYDROCHLORIDE 0.5 MG: 1 INJECTION, SOLUTION INTRAMUSCULAR; INTRAVENOUS; SUBCUTANEOUS at 22:49

## 2024-10-10 RX ADMIN — Medication 1000 UNITS: at 09:06

## 2024-10-10 RX ADMIN — METHOCARBAMOL TABLETS 500 MG: 500 TABLET, COATED ORAL at 16:51

## 2024-10-10 RX ADMIN — PRIMIDONE 50 MG: 50 TABLET ORAL at 20:53

## 2024-10-10 RX ADMIN — TIOTROPIUM BROMIDE INHALATION SPRAY 2 PUFF: 3.12 SPRAY, METERED RESPIRATORY (INHALATION) at 08:51

## 2024-10-10 RX ADMIN — BUDESONIDE AND FORMOTEROL FUMARATE DIHYDRATE 2 PUFF: 160; 4.5 AEROSOL RESPIRATORY (INHALATION) at 08:50

## 2024-10-10 RX ADMIN — LATANOPROST 1 DROP: 50 SOLUTION OPHTHALMIC at 20:53

## 2024-10-10 RX ADMIN — APIXABAN 5 MG: 5 TABLET, FILM COATED ORAL at 02:50

## 2024-10-10 RX ADMIN — Medication 10 ML: at 20:53

## 2024-10-10 RX ADMIN — NYSTATIN: 100000 POWDER TOPICAL at 09:06

## 2024-10-10 RX ADMIN — MONTELUKAST SODIUM 10 MG: 10 TABLET, FILM COATED ORAL at 20:53

## 2024-10-10 RX ADMIN — HYDROCODONE BITARTRATE AND ACETAMINOPHEN 1 TABLET: 5; 325 TABLET ORAL at 09:04

## 2024-10-10 RX ADMIN — PRIMIDONE 50 MG: 50 TABLET ORAL at 09:05

## 2024-10-10 RX ADMIN — NYSTATIN: 100000 POWDER TOPICAL at 20:54

## 2024-10-10 RX ADMIN — METHOCARBAMOL TABLETS 500 MG: 500 TABLET, COATED ORAL at 05:28

## 2024-10-10 RX ADMIN — HYDROCODONE BITARTRATE AND ACETAMINOPHEN 1 TABLET: 5; 325 TABLET ORAL at 02:50

## 2024-10-10 RX ADMIN — INSULIN LISPRO 2 UNITS: 100 INJECTION, SOLUTION INTRAVENOUS; SUBCUTANEOUS at 09:06

## 2024-10-10 RX ADMIN — ACETAMINOPHEN 325MG 650 MG: 325 TABLET ORAL at 20:53

## 2024-10-10 RX ADMIN — HYDROMORPHONE HYDROCHLORIDE 0.5 MG: 1 INJECTION, SOLUTION INTRAMUSCULAR; INTRAVENOUS; SUBCUTANEOUS at 10:08

## 2024-10-10 RX ADMIN — INSULIN LISPRO 2 UNITS: 100 INJECTION, SOLUTION INTRAVENOUS; SUBCUTANEOUS at 16:51

## 2024-10-10 RX ADMIN — HYDROMORPHONE HYDROCHLORIDE 0.5 MG: 1 INJECTION, SOLUTION INTRAMUSCULAR; INTRAVENOUS; SUBCUTANEOUS at 17:07

## 2024-10-10 RX ADMIN — INSULIN LISPRO 4 UNITS: 100 INJECTION, SOLUTION INTRAVENOUS; SUBCUTANEOUS at 11:46

## 2024-10-10 RX ADMIN — CITALOPRAM 20 MG: 20 TABLET, FILM COATED ORAL at 09:05

## 2024-10-10 RX ADMIN — APIXABAN 5 MG: 5 TABLET, FILM COATED ORAL at 15:10

## 2024-10-10 RX ADMIN — LOSARTAN POTASSIUM 50 MG: 50 TABLET, FILM COATED ORAL at 09:05

## 2024-10-10 RX ADMIN — METHOCARBAMOL TABLETS 500 MG: 500 TABLET, COATED ORAL at 11:46

## 2024-10-10 RX ADMIN — HYDROCODONE BITARTRATE AND ACETAMINOPHEN 1 TABLET: 5; 325 TABLET ORAL at 15:10

## 2024-10-10 RX ADMIN — BUDESONIDE AND FORMOTEROL FUMARATE DIHYDRATE 2 PUFF: 160; 4.5 AEROSOL RESPIRATORY (INHALATION) at 20:28

## 2024-10-10 RX ADMIN — PRIMIDONE 50 MG: 50 TABLET ORAL at 15:12

## 2024-10-10 RX ADMIN — HYDROMORPHONE HYDROCHLORIDE 0.5 MG: 1 INJECTION, SOLUTION INTRAMUSCULAR; INTRAVENOUS; SUBCUTANEOUS at 05:28

## 2024-10-10 RX ADMIN — HYDROCHLOROTHIAZIDE 12.5 MG: 12.5 TABLET ORAL at 09:05

## 2024-10-10 NOTE — PLAN OF CARE
Goal Outcome Evaluation:  Plan of Care Reviewed With: patient           Outcome Evaluation: Pain meds given as ordered.  Pt sleeps most of the day until awoken to medications and then will ask for pain medicine.  2lpn vioa n/c, 3liters at baseline at home.  As of right now, pt will need SN at DC if she does not move more than she does.  VSS, Vpaced.

## 2024-10-10 NOTE — CASE MANAGEMENT/SOCIAL WORK
Continued Stay Note  [facility]     Patient Name: aKnwal Sauer  MRN: [id number]  Today's Date: 10/10/2024    Admit Date: 10/7/2024    Plan: SNF referrals pending.   Discharge Plan       Row Name 10/10/24 1338       Plan    Plan SNF referrals pending.    Patient/Family in Agreement with Plan yes    Plan Comments PT notes reviewed. CCP s/w Monica, pts daughter, via phone to update. CCP explained PT recs for SNF. Monica is agreeable to SNF referrals in the [facility]. CCP explained we would notify her on who can accept and bed availability. Irma[facility] and Z[facility]an/[facility] notified of referral. LVM for [facility] notifying of referral. Elvin ADKINS/CCP                   Discharge Codes    No documentation.                 Expected Discharge Date and Time       Expected Discharge Date Expected Discharge Time    Oct 11, 2024               Cassie Dumont RN

## 2024-10-10 NOTE — DISCHARGE PLACEMENT REQUEST
"Kanwal Sauer (73 y.o. Female)       Date of Birth   1950    Social Security Number       Address   [address] [place] [address]    Home Phone       MRN   [id number]       [employer]   Non-Zoroastrianism    Marital Status                               Admission Date   10/7/24    Admission Type   Emergency    Admitting Provider   Kyle Ledesma MD    Attending Provider   Kyle Ledesma MD    Department, Room/Bed   [address], [address]/1       Discharge Date       Discharge Disposition       Discharge Destination                                 Attending Provider: Kyle Ledesma MD    Allergies: Baclofen, Doxycycline, Egg-derived Products, Iodine, Latex, Milk-related Compounds, Msg [Monosodium Glutamate], Penicillins, Metronidazole, Ezetimibe, Latex, Natural Rubber, Levaquin [Levofloxacin], Nylon, Simvastatin    Isolation: None   Infection: None   Code Status: CPR    Ht: 165.1 cm (65\")   Wt: 94.7 kg (208 lb 11.2 oz)    Admission Cmt: None   Principal Problem: Low back pain [M54.50]                   Active Insurance as of 10/7/2024       Primary Coverage       Payor Plan Insurance Group Employer/Plan Group    ANTHEM MEDICARE REPLACEMENT ANTHEM MEDICARE ADVANTAGE [id number]       Payor Plan Address Payor Plan Phone Number Payor Plan Fax Number Effective Dates    [address] [phone]  1/1/2024 - None Entered    [facility] [id number]         Subscriber Name Subscriber Birth Date Member ID       KANWAL SAUER 1950 [id number]                     Emergency Contacts        (Rel.) Home Phone Work Phone Mobile Phone    SHELDON TYSON(poa) (Daughter) [phone] -- [phone]    Chace Tyson (Relative) -- -- [phone]                "

## 2024-10-10 NOTE — PROGRESS NOTES
Name: Kanwal Sauer ADMIT: 10/7/2024   : 1950  PCP: Nisha Britton MD    MRN: [id number] LOS: 2 days   AGE/SEX: 73 y.o. female  ROOM: [facility]     Subjective   Subjective   Patient is seen at bedside, no new complaints.       Objective   Objective   Vital Signs  Temp:  [98.4 °F (36.9 °C)-99 °F (37.2 °C)] 98.4 °F (36.9 °C)  Heart Rate:  [54-61] 54  Resp:  [18] 18  BP: (113-164)/(58-91) 134/58  SpO2:  [95 %-100 %] 97 %  on  Flow (L/min):  [2-3] 2;   Device (Oxygen Therapy): nasal cannula  Body mass index is 34.73 kg/m².  Physical Exam  General, awake and alert.  Head and ENT, normocephalic and atraumatic.  Lungs, symmetric expansion, equal air entry bilaterally.  Heart, regular rate and rhythm.  Abdomen, soft and nontender.  Extremities, no clubbing or cyanosis.  Neuro, no focal deficits.  Skin: Warm and no rash.  Psych, normal mood and affect.  Musculoskeletal, joint examination is grossly normal.      Copied text material from yesterday's note has been reviewed for appropriate changes and remains accurate as of 10/10/24.      Results Review     I reviewed the patient's new clinical results.  Results from last 7 days   Lab Units 10/10/24  0300 10/09/24  0258 10/08/24  0233 10/07/24  1953   WBC 10*3/mm3 4.79 5.70 5.70 6.07   HEMOGLOBIN g/dL 11.8* 11.7* 12.7 13.6   PLATELETS 10*3/mm3 145 149 166 178     Results from last 7 days   Lab Units 10/10/24  0300 10/09/24  0258 10/08/24  0233 10/07/24  1953   SODIUM mmol/L 138 134* 138 135*   POTASSIUM mmol/L 4.2 3.8 3.9 4.0   CHLORIDE mmol/L 97* 94* 96* 95*   CO2 mmol/L 35.4* 30.0* 32.7* 30.5*   BUN mg/dL 35* 40* 38* 35*   CREATININE mg/dL 0.94 1.04* 0.93 0.94   GLUCOSE mg/dL 153* 160* 138* 157*   EGFR mL/min/1.73 63.4 56.2* 64.2 63.4     Results from last 7 days   Lab Units 10/10/24  0300 10/07/24  1953   ALBUMIN g/dL 3.8 4.7   BILIRUBIN mg/dL 0.4 0.4   ALK PHOS U/L 35* 41   AST (SGOT) U/L 14 15   ALT (SGPT) U/L 9 11     Results from last 7 days   Lab Units  10/10/24  0300 10/09/24  0258 10/08/24  0233 10/07/24  1953   CALCIUM mg/dL 9.9 10.1 10.4 11.0*   ALBUMIN g/dL 3.8  --   --  4.7   MAGNESIUM mg/dL  --   --  1.9  --        Glucose   Date/Time Value Ref Range Status   10/10/2024 1644 185 (H) 70 - 130 mg/dL Final   10/10/2024 1105 275 (H) 70 - 130 mg/dL Final   10/10/2024 0640 168 (H) 70 - 130 mg/dL Final   10/09/2024 2049 134 (H) 70 - 130 mg/dL Final   10/09/2024 1630 221 (H) 70 - 130 mg/dL Final   10/09/2024 1106 166 (H) 70 - 130 mg/dL Final   10/09/2024 0623 184 (H) 70 - 130 mg/dL Final       MRI Lumbar Spine Without Contrast    Result Date: 10/9/2024  Electronically signed by Tracy Shah MD on 10-09-24 at 0142    MRI Thoracic Spine With & Without Contrast    Result Date: 10/9/2024  Electronically signed by Tracy Shah MD on 10-09-24 at 0116     I have personally reviewed all medications:  Scheduled Medications  apixaban, 5 mg, Oral, Q12H  budesonide-formoterol, 2 puff, Inhalation, BID - RT   And  tiotropium bromide monohydrate, 2 puff, Inhalation, Daily - RT  cholecalciferol, 1,000 Units, Oral, Daily  citalopram, 20 mg, Oral, Daily  hydroCHLOROthiazide, 12.5 mg, Oral, Daily  insulin lispro, 2-7 Units, Subcutaneous, 4x Daily AC & at Bedtime  latanoprost, 1 drop, Both Eyes, Nightly  Lidocaine, 1 patch, Transdermal, Q24H  losartan, 50 mg, Oral, Q24H  methocarbamol, 500 mg, Oral, Q6H  montelukast, 10 mg, Oral, Nightly  nystatin, , Topical, BID  primidone, 50 mg, Oral, TID    Infusions   Diet  Diet: Cardiac; Healthy Heart (2-3 Na+); Fluid Consistency: Thin (IDDSI 0)    I have personally reviewed:  [x]  Laboratory   [x]  Microbiology   [x]  Radiology   [x]  EKG/Telemetry  [x]  Cardiology/Vascular   []  Pathology    []  Records       Assessment/Plan     Active Hospital Problems    Diagnosis  POA   • **Low back pain [M54.50]  Yes   • Degeneration of intervertebral disc of lumbar region with discogenic back pain [M51.360]  Yes   • Spondylolisthesis at L4-L5 level  [M43.16]  Not Applicable   • Chronic midline low back pain without sciatica [M54.50, G89.29]  Yes      Resolved Hospital Problems   No resolved problems to display.       73 y.o. female admitted with Low back pain.    Assessment and plan  1.  Low back pain, degeneration of intervertebral disc of lumbar region, neurosurgery on board, continue pain control, follow management recommendations.  Patient might require rehab placement upon discharge.     2.  Asymptomatic bradycardia, paroxysmal atrial fibrillation, cardiology on board, follow management recommendations.     3.  Obesity, complicates all aspects of care, BMI noted to be close to 35.     4.  Type 2 diabetes mellitus, initiate Accu-Cheks and sliding scale insulin coverage.     5.  History of breast cancer, status post left mastectomy and chemotherapy.     6.  CODE STATUS is full code.  Further plans based on hospital course.      Kyle Ledesma MD  [facility]  10/10/24  17:34 EDT

## 2024-10-10 NOTE — THERAPY EVALUATION
Acute Care - Physical Therapy Initial Evaluation  [facility]     Patient Name: Kanwal Sauer  : 1950  MRN: [id number]  Today's Date: 10/10/2024      Visit Dx:     ICD-10-CM ICD-9-CM   1. Intractable low back pain  M54.59 724.2   2. Degeneration of intervertebral disc of lumbar region with discogenic back pain  M51.360 722.52     Patient Active Problem List   Diagnosis    Closed fracture of left distal femur    Type 2 diabetes mellitus with circulatory disorder, without long-term current use of insulin    Essential hypertension    Tremors of nervous system    PAF (paroxysmal atrial fibrillation)    TARSHA (obstructive sleep apnea)    History of breast cancer    History of seizure    Risk for falls    Neck pain    Acute UTI    Chronic midline low back pain without sciatica    Vulvar rash    Elevated troponin    Hypomagnesemia    COPD (chronic obstructive pulmonary disease)    Low back pain    Type 2 diabetes mellitus with hyperglycemia, without long-term current use of insulin    Bradycardia    Degeneration of intervertebral disc of lumbar region with discogenic back pain    Spondylolisthesis at L4-L5 level     Past Medical History:   Diagnosis Date    Allergic     Anxiety     Asthma     Atrial fibrillation     Cancer     Breast    COPD (chronic obstructive pulmonary disease) 2022    Deep vein thrombosis     Diabetes mellitus     Difficulty walking     Diverticulosis     Drug therapy     Environmental allergies     Fractures     Headache, tension-type     Hyperlipidemia     Hypertension     Low back pain     Neuropathy in diabetes     Obesity     Scoliosis     Seizures     Sleep apnea     Tremor     Urinary tract infection     Weakness      Past Surgical History:   Procedure Laterality Date    BREAST BIOPSY      CATARACT EXTRACTION, BILATERAL      DENTAL PROCEDURE      Removed teeth    FEMUR OPEN REDUCTION INTERNAL FIXATION Left 2020    Procedure: DISTAL FEMUR OPEN REDUCTION INTERNAL FIXATION;   Surgeon: Marley Hernandez MD;  Location: [facility] OR;  Service: Orthopedics;  Laterality: Left;    MASTECTOMY Left     REPLACEMENT TOTAL KNEE BILATERAL       PT Assessment (Last 12 Hours)       PT Evaluation and Treatment       Row Name 10/10/24 0901 10/10/24 0802       Physical Therapy Time and Intention    Document Type evaluation  -AR evaluation  -AR    Mode of Treatment physical therapy  -AR physical therapy  -AR      Row Name 10/10/24 0901 10/10/24 0850       General Information    Patient Profile Reviewed yes  -AR --    Prior Level of Function min assist:  uses rollator, walks to dining [place], reports dressing/bathing by herself  -AR min assist:  -AR    Existing Precautions/Restrictions fall  -AR --    Barriers to Rehab none identified  -AR --      Row Name 10/10/24 0802          General Information    Patient Profile Reviewed yes  -AR     Existing Precautions/Restrictions fall  -AR       Row Name 10/10/24 0901          Living Environment    Current Living Arrangements assisted living facility  -AR     People in Home alone;facility resident  -AR       Row Name 10/10/24 0901          Home Main Entrance    Number of Stairs, Main Entrance none  -AR       Row Name 10/10/24 0901          Home Use of Assistive/Adaptive Equipment    Equipment Currently Used at Home oxygen;rollator  -AR       Row Name 10/10/24 0901          Pain    Pretreatment Pain Rating 5/10  -AR     Posttreatment Pain Rating 6/10  -AR     Pain Location - back  -AR     Pre/Posttreatment Pain Comment pain pilll ~1 hr prior to PT per RN  -AR     Pain Intervention(s) Medication (See MAR)  -AR       Row Name 10/10/24 0901 10/10/24 0802       Cognition    Orientation Status (Cognition) oriented to;person;place  -AR oriented to;person;place  -AR      Row Name 10/10/24 0901          Range of Motion Comprehensive    Comment, General Range of Motion B LE WFL  -AR       Row Name 10/10/24 0901          Strength Comprehensive (MMT)    Comment,  General Manual Muscle Testing (MMT) Assessment B LE 4/5  -AR       Row Name 10/10/24 0901          Bed Mobility    Bed Mobility supine-sit  -AR     Supine-Sit [place] (Bed Mobility) standby assist  -AR     Assistive Device (Bed Mobility) bed rails;head of bed elevated  -AR       Row Name 10/10/24 0901          Sit-Stand Transfer    Sit-Stand [place] (Transfers) contact guard  -AR     Assistive Device (Sit-Stand Transfers) walker, front-wheeled  -AR       Row Name 10/10/24 0901          Gait/Stairs (Locomotion)    [place] Level (Gait) contact guard  -AR     Assistive Device (Gait) walker, front-wheeled  -AR     Distance in Feet (Gait) 3  -AR     Deviations/Abnormal Patterns (Gait) mamadou decreased;festinating/shuffling;gait speed decreased;weight shifting decreased  -AR     Bilateral Gait Deviations forward flexed posture;heel strike decreased  -AR     Comment, (Gait/Stairs) limited by back pain  -AR       Row Name 10/10/24 0901 10/10/24 0802       Safety Issues, Functional Mobility    Impairments Affecting Function (Mobility) balance;cognition;endurance/activity tolerance;pain;postural/trunk control;strength  -AR balance;cognition;endurance/activity tolerance;pain;strength  -AR      Row Name 10/10/24 0901          Balance    Balance Assessment standing dynamic balance  -AR     Dynamic Standing Balance minimal assist  -AR     Position/Device Used, Standing Balance walker, rolling  -AR       Row Name 10/10/24 0800          Respiratory WDL    Respiratory WDL WDL  -AR       Row Name 10/10/24 0800          Breath Sounds    Breath Sounds All Fields  -AR     All Lung Fields Breath Sounds diminished  -AR       Row Name 10/10/24 0800          Skin WDL    Skin WDL X  -AR     Skin Temperature warm  -AR     Skin Moisture dry  -AR     Skin Elasticity quick return to original state  -AR     Skin Integrity scab  -AR       Row Name             Wound 10/08/24 Right gluteal Other (comment)    Wound - Properties Group  Placement Date: 10/08/24  -EA Side: Right  -EA Location: gluteal  -EA Primary Wound Type: Other  -EA, scab  Wound Outcome: Healed  -EA    Retired Wound - Properties Group Placement Date: 10/08/24  -EA Side: Right  -EA Location: gluteal  -EA Primary Wound Type: Other  -EA, scab  Wound Outcome: Healed  -EA    Retired Wound - Properties Group Date first assessed: 10/08/24  -EA Side: Right  -EA Location: gluteal  -EA Primary Wound Type: Other  -EA, scab  Wound Outcome: Healed  -EA      Row Name 10/10/24 0800          Coping    Observed Emotional State cooperative;calm;pleasant  -AR     Verbalized Emotional State acceptance  -AR     Family/Support Persons daughter  -AR     Involvement in Care not present at bedside;participating in care;supportive of patient  -AR       Row Name 10/10/24 0901          Vital Signs    O2 Delivery Pre Treatment supplemental O2  -AR       Row Name 10/10/24 0901          Positioning and Restraints    Pre-Treatment Position in bed  -AR     Post Treatment Position chair  -AR     In Chair notified nsg;reclined;sitting;call light within reach;encouraged to call for assist;exit alarm on  -AR       Row Name 10/10/24 0901          Therapy Assessment/Plan (PT)    Rehab Potential (PT) good, to achieve stated therapy goals  -AR     Criteria for Skilled Interventions Met (PT) yes  -AR     Therapy Frequency (PT) 5 times/wk  -AR       Row Name 10/10/24 0901          Bed Mobility Goal 1 (PT)    Activity/Assistive Device (Bed Mobility Goal 1, PT) bed mobility activities, all  -AR     [place] Level/Cues Needed (Bed Mobility Goal 1, PT) modified independence  -AR     Time Frame (Bed Mobility Goal 1, PT) 1 week  -AR       Row Name 10/10/24 0901          Transfer Goal 1 (PT)    Activity/Assistive Device (Transfer Goal 1, PT) sit-to-stand/stand-to-sit  -AR     [place] Level/Cues Needed (Transfer Goal 1, PT) standby assist  -AR     Time Frame (Transfer Goal 1, PT) 1 week  -AR       Row Name 10/10/24 0901           Gait Training Goal 1 (PT)    Activity/Assistive Device (Gait Training Goal 1, PT) gait (walking locomotion)  -AR     [place] Level (Gait Training Goal 1, PT) standby assist  -AR     Distance (Gait Training Goal 1, PT) 100  -AR     Time Frame (Gait Training Goal 1, PT) 1 week  -AR               User Key  (r) = Recorded By, (t) = Taken By, (c) = Cosigned By      Initials Name Provider Type    AR Rose Nance PT Physical Therapist    Piper Hunt RN Registered Nurse                    Physical Therapy Education       Title: PT OT SLP Therapies (In Progress)       Topic: Physical Therapy (In Progress)       Point: Mobility training (In Progress)       Learning Progress Summary             Patient Acceptance, E, NR by AR at 10/9/2024 1601                         Point: Home exercise program (In Progress)       Learning Progress Summary             Patient Acceptance, E, NR by AR at 10/9/2024 1601                         Point: Body mechanics (In Progress)       Learning Progress Summary             Patient Acceptance, E, NR by AR at 10/9/2024 1601                         Point: Precautions (In Progress)       Learning Progress Summary             Patient Acceptance, E, NR by AR at 10/9/2024 1601                                         User Key       Initials Effective Dates Name Provider Type Discipline    AR 06/16/21 -  Rose Nance, PT Physical Therapist PT                  PT Recommendation and Plan  Anticipated Discharge Disposition (PT): skilled nursing facility (depending on level of assist at facility)  Planned Therapy Interventions (PT): balance training, bed mobility training, gait training, home exercise program, patient/family education, postural re-education, strengthening, ROM (range of motion), transfer training  Therapy Frequency (PT): 5 times/wk  Plan of Care Reviewed With: patient  Outcome Evaluation: Pt admitted from independent living facility with low back pain.  Pt  reports using rollator, walks to dining [place], 1 recent fall, and normally dresses/bathes herself.  Today pt demonstrates generalized weakness and impaired activity tolerance but able to sit up w/ stand by assist.  Stood w/ CGA using RW.  Ambulated 3' from bed>BSC>recliner w/ CGA using RW.  Declined further distance 2/2 back pain. Pt reports owning a WC if needed.  Pt may be able to DC back to ILF if able to have increased assist, physical therapy, and use of WC for longer distances as needed; if unable to have more assist as ILF, may need SNU.       Time Calculation:     Therapy Charges for Today       Code Description Service Date Service Provider Modifiers Qty    80873804880 HC PT EVAL MOD COMPLEXITY 3 10/9/2024 Rose Nance, PT GP 1    93489072871 HC PT THER PROC EA 15 MIN 10/9/2024 Rose Nance, PT GP 1            PT G-Codes  Outcome Measure Options: AM-PAC 6 Clicks Basic Mobility (PT)  AM-PAC 6 Clicks Score (PT): 15    Rose Nance PT  10/10/2024

## 2024-10-10 NOTE — THERAPY TREATMENT NOTE
Patient Name: Kanwal Sauer  : 1950    MRN: [id number]                              Today's Date: 10/10/2024       Admit Date: 10/7/2024    Visit Dx:     ICD-10-CM ICD-9-CM   1. Intractable low back pain  M54.59 724.2   2. Degeneration of intervertebral disc of lumbar region with discogenic back pain  M51.360 722.52     Patient Active Problem List   Diagnosis    Closed fracture of left distal femur    Type 2 diabetes mellitus with circulatory disorder, without long-term current use of insulin    Essential hypertension    Tremors of nervous system    PAF (paroxysmal atrial fibrillation)    TARSHA (obstructive sleep apnea)    History of breast cancer    History of seizure    Risk for falls    Neck pain    Acute UTI    Chronic midline low back pain without sciatica    Vulvar rash    Elevated troponin    Hypomagnesemia    COPD (chronic obstructive pulmonary disease)    Low back pain    Type 2 diabetes mellitus with hyperglycemia, without long-term current use of insulin    Bradycardia    Degeneration of intervertebral disc of lumbar region with discogenic back pain    Spondylolisthesis at L4-L5 level     Past Medical History:   Diagnosis Date    Allergic     Anxiety     Asthma     Atrial fibrillation     Cancer     Breast    COPD (chronic obstructive pulmonary disease) 2022    Deep vein thrombosis     Diabetes mellitus     Difficulty walking     Diverticulosis     Drug therapy     Environmental allergies     Fractures     Headache, tension-type     Hyperlipidemia     Hypertension     Low back pain     Neuropathy in diabetes     Obesity     Scoliosis     Seizures     Sleep apnea     Tremor     Urinary tract infection     Weakness      Past Surgical History:   Procedure Laterality Date    BREAST BIOPSY      CATARACT EXTRACTION, BILATERAL      DENTAL PROCEDURE      Removed teeth    FEMUR OPEN REDUCTION INTERNAL FIXATION Left 2020    Procedure: DISTAL FEMUR OPEN REDUCTION INTERNAL FIXATION;  Surgeon:  Marley Hernandez MD;  Location: [facility] OR;  Service: Orthopedics;  Laterality: Left;    MASTECTOMY Left     REPLACEMENT TOTAL KNEE BILATERAL        General Information       Row Name 10/10/24 1517          Physical Therapy Time and Intention    Document Type therapy note (daily note)  -CW (r) SF (t) CW (c)     Mode of Treatment physical therapy  -CW (r) SF (t) CW (c)       Row Name 10/10/24 1517          General Information    Patient Profile Reviewed yes  -CW (r) SF (t) CW (c)     Existing Precautions/Restrictions fall  -CW (r) SF (t) CW (c)       Row Name 10/10/24 1517          Cognition    Orientation Status (Cognition) oriented to;person;place  -CW       Row Name 10/10/24 1517          Safety Issues, Functional Mobility    Impairments Affecting Function (Mobility) pain;endurance/activity tolerance;strength  -CW (r) SF (t) CW (c)               User Key  (r) = Recorded By, (t) = Taken By, (c) = Cosigned By      Initials Name Provider Type    CW Lacy English, PT Physical Therapist    Lizzy Morris, PT Student PT Student                   Mobility       Row Name 10/10/24 1517          Bed Mobility    Comment, (Bed Mobility) pt sitting at EOB on PT arrival, left in chair at end of session  -CW (r) SF (t) CW (c)       Row Name 10/10/24 1517          Sit-Stand Transfer    Sit-Stand [place] (Transfers) contact guard  -CW (r) SF (t) CW (c)     Assistive Device (Sit-Stand Transfers) walker, front-wheeled  -CW (r) SF (t) CW (c)       Row Name 10/10/24 1517          Gait/Stairs (Locomotion)    Beckley Level (Gait) contact guard  -CW (r) SF (t) CW (c)     Assistive Device (Gait) walker, front-wheeled  -CW (r) SF (t) CW (c)     Distance in Feet (Gait) 10  -CW (r) SF (t) CW (c)     Deviations/Abnormal Patterns (Gait) mamadou decreased;gait speed decreased  -CW (r) SF (t) CW (c)     Bilateral Gait Deviations forward flexed posture;heel strike decreased  -CW (r) SF (t) CW (c)               User  Key  (r) = Recorded By, (t) = Taken By, (c) = Cosigned By      Initials Name Provider Type    CW Lacy English, PT Physical Therapist    Lizzy Morris, PT Student PT Student                   Obj/Interventions       Row Name 10/10/24 1518          Balance    Balance Assessment sitting static balance;standing static balance;standing dynamic balance  -CW (r) SF (t) CW (c)     Static Sitting Balance standby assist  -CW (r) SF (t) CW (c)     Position, Sitting Balance sitting edge of bed  -CW (r) SF (t) CW (c)     Static Standing Balance contact guard  -CW (r) SF (t) CW (c)     Dynamic Standing Balance contact guard  -CW (r) SF (t) CW (c)     Position/Device Used, Standing Balance walker, front-wheeled  -CW (r) SF (t) CW (c)               User Key  (r) = Recorded By, (t) = Taken By, (c) = Cosigned By      Initials Name Provider Type    CW Lacy English, PT Physical Therapist    Lizzy Morris, PT Student PT Student                   Goals/Plan    No documentation.                  Clinical Impression       Row Name 10/10/24 1519          Pain    Pretreatment Pain Rating 10/10  -CW (r) SF (t) CW (c)     Posttreatment Pain Rating 10/10  -CW (r) SF (t) CW (c)     Pain Location lower  -CW (r) SF (t) CW (c)     Pain Location - back  -CW (r) SF (t) CW (c)     Pain Intervention(s) Ambulation/increased activity;Repositioned;Rest  -CW (r) SF (t) CW (c)       Row Name 10/10/24 1512          Plan of Care Review    Plan of Care Reviewed With patient  -CW (r) SF (t) CW (c)     Outcome Evaluation Pt seen for PT this afternoon. She was sitting EOB and agreeable to sit up in chair. She performed STS and increased AMB distance to 10 ft with CGA and rolling walker. She is limited by increased pain at this time, PT will continue to follow and progress as able. Recommend SNF at d/c.  -CW (r) SF (t) CW (c)       Row Name 10/10/24 1511          Vital Signs    O2 Delivery Pre Treatment nasal cannula  2L  -CW (r) SF (t) CW (c)      O2 Delivery Intra Treatment nasal cannula  2L  -CW (r) SF (t) CW (c)     O2 Delivery Post Treatment nasal cannula  2L  -CW (r) SF (t) CW (c)       Row Name 10/10/24 1519          Positioning and Restraints    Pre-Treatment Position in bed  -CW (r) SF (t) CW (c)     Post Treatment Position chair  -CW (r) SF (t) CW (c)     In Chair reclined;call light within reach;encouraged to call for assist;exit alarm on  -CW (r) SF (t) CW (c)               User Key  (r) = Recorded By, (t) = Taken By, (c) = Cosigned By      Initials Name Provider Type    CW Lacy English, PT Physical Therapist    Lizzy Morris, PT Student PT Student                   Outcome Measures       Row Name 10/10/24 1522 10/10/24 0906       How much help from another person do you currently need...    Turning from your back to your side while in flat bed without using bedrails? 3  -CW (r) SF (t) CW (c) 4  -RW    Moving from lying on back to sitting on the side of a flat bed without bedrails? 3  -CW (r) SF (t) CW (c) 3  -RW    Moving to and from a bed to a chair (including a wheelchair)? 3  -CW (r) SF (t) CW (c) 3  -RW    Standing up from a chair using your arms (e.g., wheelchair, bedside chair)? 3  -CW (r) SF (t) CW (c) 3  -RW    Climbing 3-5 steps with a railing? 2  -CW (r) SF (t) CW (c) 1  -RW    To walk in hospital room? 3  -CW (r) SF (t) CW (c) 1  -RW    AM-PAC 6 Clicks Score (PT) 17  -CW (r) SF (t) 15  -RW    Highest Level of Mobility Goal 5 --> Static standing  -CW (r) SF (t) 4 --> Transfer to chair/commode  -RW              User Key  (r) = Recorded By, (t) = Taken By, (c) = Cosigned By      Initials Name Provider Type    RW Bishop Castelan, RN Registered Nurse    Lacy Lock, PT Physical Therapist    Lizzy Morris, PT Student PT Student                                 Physical Therapy Education       Title: PT OT SLP Therapies (In Progress)       Topic: Physical Therapy (In Progress)       Point: Mobility training (Done)        Learning Progress Summary             Patient Acceptance, E, VU by  at 10/10/2024 1522    Acceptance, E,TB, NR by RW at 10/10/2024 1323    Acceptance, E, NR by AR at 10/9/2024 1601                         Point: Home exercise program (In Progress)       Learning Progress Summary             Patient Acceptance, E,TB, NR by RW at 10/10/2024 1323    Acceptance, E, NR by AR at 10/9/2024 1601                         Point: Body mechanics (In Progress)       Learning Progress Summary             Patient Acceptance, E,TB, NR by RW at 10/10/2024 1323    Acceptance, E, NR by AR at 10/9/2024 1601                         Point: Precautions (In Progress)       Learning Progress Summary             Patient Acceptance, E,TB, NR by RW at 10/10/2024 1323    Acceptance, E, NR by AR at 10/9/2024 1601                                         User Key       Initials Effective Dates Name Provider Type Discipline     06/16/21 -  Bishop Castelan, RN Registered Nurse Nurse    AR 06/16/21 -  Rose Nance, PT Physical Therapist PT     08/08/24 -  Lizzy Calles, DAVIN Student PT Student PT                  PT Recommendation and Plan     Plan of Care Reviewed With: patient  Outcome Evaluation: Pt seen for PT this afternoon. She was sitting EOB and agreeable to sit up in chair. She performed STS and increased AMB distance to 10 ft with CGA and rolling walker. She is limited by increased pain at this time, PT will continue to follow and progress as able. Recommend SNF at d/c.     Time Calculation:         PT Charges       Row Name 10/10/24 1523             Time Calculation    Start Time 1406  -CW (r) SF (t) CW (c)      Stop Time 1423  -CW (r) SF (t) CW (c)      Time Calculation (min) 17 min  -CW (r) SF (t)      PT Received On 10/10/24  -CW (r) SF (t) CW (c)      PT - Next Appointment 10/11/24  -CW (r) SF (t) CW (c)         Time Calculation- PT    Total Timed Code Minutes- PT 17 minute(s)  -CW (r) SF (t) CW (c)                User Key  (r) =  Recorded By, (t) = Taken By, (c) = Cosigned By      Initials Name Provider Type    Lacy Lock, PT Physical Therapist    Lizzy Morris, PT Student PT Student                  Therapy Charges for Today       Code Description Service Date Service Provider Modifiers Qty    26587405482 HC PT THERAPEUTIC ACT EA 15 MIN 10/10/2024 Lizzy Calles, PT Student GP 1            PT G-Codes  Outcome Measure Options: AM-PAC 6 Clicks Basic Mobility (PT)  AM-PAC 6 Clicks Score (PT): 17  PT Discharge Summary  Anticipated Discharge Disposition (PT): skilled nursing facility    Lizzy Calles PT Student  10/10/2024

## 2024-10-10 NOTE — PLAN OF CARE
Goal Outcome Evaluation:  Plan of Care Reviewed With: patient           Outcome Evaluation: Pt seen for PT this afternoon. She was sitting EOB and agreeable to sit up in chair. She performed STS and increased AMB distance to 10 ft with CGA and rolling walker. She is limited by increased pain at this time, PT will continue to follow and progress as able. Recommend SNF at d/c.      Anticipated Discharge Disposition (PT): skilled nursing facility

## 2024-10-10 NOTE — PROGRESS NOTES
Name: Kanwal Sauer ADMIT: 10/7/2024   : 1950  PCP: Nisha Britton MD    MRN: [id number] LOS: 1 days   AGE/SEX: 73 y.o. female  ROOM: [facility]     Subjective   Subjective   Patient is seen at bedside, no new complaints.       Objective   Objective   Vital Signs  Temp:  [97.3 °F (36.3 °C)-99 °F (37.2 °C)] 99 °F (37.2 °C)  Heart Rate:  [53-61] 60  Resp:  [18] 18  BP: (113-143)/(46-91) 113/91  SpO2:  [86 %-100 %] 100 %  on  Flow (L/min):  [3] 3;   Device (Oxygen Therapy): nasal cannula  Body mass index is 34.28 kg/m².  Physical Exam  General, awake and alert.  Head and ENT, normocephalic and atraumatic.  Lungs, symmetric expansion, equal air entry bilaterally.  Heart, regular rate and rhythm.  Abdomen, soft and nontender.  Extremities, no clubbing or cyanosis.  Neuro, no focal deficits.  Skin: Warm and no rash.  Psych, normal mood and affect.  Musculoskeletal, joint examination is grossly normal.      Results Review     I reviewed the patient's new clinical results.  Results from last 7 days   Lab Units 10/09/24  0258 10/08/24  0233 10/07/24  1953   WBC 10*3/mm3 5.70 5.70 6.07   HEMOGLOBIN g/dL 11.7* 12.7 13.6   PLATELETS 10*3/mm3 149 166 178     Results from last 7 days   Lab Units 10/09/24  0258 10/08/24  0233 10/07/24  1953   SODIUM mmol/L 134* 138 135*   POTASSIUM mmol/L 3.8 3.9 4.0   CHLORIDE mmol/L 94* 96* 95*   CO2 mmol/L 30.0* 32.7* 30.5*   BUN mg/dL 40* 38* 35*   CREATININE mg/dL 1.04* 0.93 0.94   GLUCOSE mg/dL 160* 138* 157*   EGFR mL/min/1.73 56.2* 64.2 63.4     Results from last 7 days   Lab Units 10/07/24  1953   ALBUMIN g/dL 4.7   BILIRUBIN mg/dL 0.4   ALK PHOS U/L 41   AST (SGOT) U/L 15   ALT (SGPT) U/L 11     Results from last 7 days   Lab Units 10/09/24  0258 10/08/24  0233 10/07/24  1953   CALCIUM mg/dL 10.1 10.4 11.0*   ALBUMIN g/dL  --   --  4.7   MAGNESIUM mg/dL  --  1.9  --        Glucose   Date/Time Value Ref Range Status   10/09/2024 2049 134 (H) 70 - 130 mg/dL Final    10/09/2024 1630 221 (H) 70 - 130 mg/dL Final   10/09/2024 1106 166 (H) 70 - 130 mg/dL Final   10/09/2024 0623 184 (H) 70 - 130 mg/dL Final   10/08/2024 2026 200 (H) 70 - 130 mg/dL Final   10/08/2024 1553 166 (H) 70 - 130 mg/dL Final   10/08/2024 1138 186 (H) 70 - 130 mg/dL Final       MRI Lumbar Spine Without Contrast    Result Date: 10/9/2024  Electronically signed by Tracy Shah MD on 10-09-24 at 0142    MRI Thoracic Spine With & Without Contrast    Result Date: 10/9/2024  Electronically signed by Tracy Shah MD on 10-09-24 at 0116    CT Lumbar Spine Without Contrast    Result Date: 10/7/2024  Electronically signed by José Christian MD on 10-07-24 at 2304     I have personally reviewed all medications:  Scheduled Medications  apixaban, 5 mg, Oral, Q12H  budesonide-formoterol, 2 puff, Inhalation, BID - RT   And  tiotropium bromide monohydrate, 2 puff, Inhalation, Daily - RT  cholecalciferol, 1,000 Units, Oral, Daily  citalopram, 20 mg, Oral, Daily  hydroCHLOROthiazide, 12.5 mg, Oral, Daily  insulin lispro, 2-7 Units, Subcutaneous, 4x Daily AC & at Bedtime  latanoprost, 1 drop, Both Eyes, Nightly  Lidocaine, 1 patch, Transdermal, Q24H  losartan, 50 mg, Oral, Q24H  methocarbamol, 500 mg, Oral, Q6H  montelukast, 10 mg, Oral, Nightly  nystatin, , Topical, BID  primidone, 50 mg, Oral, TID    Infusions   Diet  Diet: Cardiac; Healthy Heart (2-3 Na+); Fluid Consistency: Thin (IDDSI 0)    I have personally reviewed:  [x]  Laboratory   [x]  Microbiology   [x]  Radiology   [x]  EKG/Telemetry  [x]  Cardiology/Vascular   []  Pathology    []  Records       Assessment/Plan     Active Hospital Problems    Diagnosis  POA   • **Low back pain [M54.50]  Yes   • Degeneration of intervertebral disc of lumbar region with discogenic back pain [M51.360]  Yes   • Spondylolisthesis at L4-L5 level [M43.16]  Not Applicable   • Chronic midline low back pain without sciatica [M54.50, G89.29]  Yes      Resolved Hospital Problems   No  resolved problems to display.       73 y.o. female admitted with Low back pain.    Assessment and plan  1.  Low back pain, degeneration of intervertebral disc of lumbar region, neurosurgery on board, continue pain control, follow management recommendations.    2.  Asymptomatic bradycardia, paroxysmal atrial fibrillation, cardiology on board, follow management recommendations.    3.  Obesity, complicates all aspects of care, BMI noted to be close to 35.    4.  Type 2 diabetes mellitus, initiate Accu-Cheks and sliding scale insulin coverage.    5.  History of breast cancer, status post left mastectomy and chemotherapy.    6.  CODE STATUS is full code.  Further plans based on hospital course.      Kyle Ledesma MD  [facility]  10/09/24  21:09 EDT

## 2024-10-10 NOTE — PLAN OF CARE
Goal Outcome Evaluation:  Plan of Care Reviewed With: patient           Outcome Evaluation: Pt admitted from independent living facility with low back pain.  Pt reports using rollator, walks to [place], 1 recent fall, and normally dresses/bathes herself.  Today pt demonstrates generalized weakness and impaired activity tolerance but able to sit up w/ stand by assist.  Stood w/ CGA using RW.  Ambulated 3' from bed>BSC>recliner w/ CGA using RW.  Declined further distance 2/2 back pain. Pt reports owning a WC if needed.  Pt may be able to DC back to ILF if able to have increased assist, physical therapy, and use of WC for longer distances as needed; if unable to have more assist as ILF, may need SNU.      Anticipated Discharge Disposition (PT): skilled nursing facility, assisted living (depending on level of assist available at [facility])

## 2024-10-10 NOTE — THERAPY EVALUATION
Patient Name: Kanwal Sauer  : 1950    MRN: [id number]                              Today's Date: 10/10/2024       Admit Date: 10/7/2024    Visit Dx:     ICD-10-CM ICD-9-CM   1. Intractable low back pain  M54.59 724.2   2. Degeneration of intervertebral disc of lumbar region with discogenic back pain  M51.360 722.52     Patient Active Problem List   Diagnosis    Closed fracture of left distal femur    Type 2 diabetes mellitus with circulatory disorder, without long-term current use of insulin    Essential hypertension    Tremors of nervous system    PAF (paroxysmal atrial fibrillation)    TARSHA (obstructive sleep apnea)    History of breast cancer    History of seizure    Risk for falls    Neck pain    Acute UTI    Chronic midline low back pain without sciatica    Vulvar rash    Elevated troponin    Hypomagnesemia    COPD (chronic obstructive pulmonary disease)    Low back pain    Type 2 diabetes mellitus with hyperglycemia, without long-term current use of insulin    Bradycardia    Degeneration of intervertebral disc of lumbar region with discogenic back pain    Spondylolisthesis at L4-L5 level     Past Medical History:   Diagnosis Date    Allergic     Anxiety     Asthma     Atrial fibrillation     Cancer     Breast    COPD (chronic obstructive pulmonary disease) 2022    Deep vein thrombosis     Diabetes mellitus     Difficulty walking     Diverticulosis     Drug therapy     Environmental allergies     Fractures     Headache, tension-type     Hyperlipidemia     Hypertension     Low back pain     Neuropathy in diabetes     Obesity     Scoliosis     Seizures     Sleep apnea     Tremor     Urinary tract infection     Weakness      Past Surgical History:   Procedure Laterality Date    BREAST BIOPSY      CATARACT EXTRACTION, BILATERAL      DENTAL PROCEDURE      Removed teeth    FEMUR OPEN REDUCTION INTERNAL FIXATION Left 2020    Procedure: DISTAL FEMUR OPEN REDUCTION INTERNAL FIXATION;  Surgeon:  Marley Hernandez MD;  Location: [facility] OR;  Service: Orthopedics;  Laterality: Left;    MASTECTOMY Left     REPLACEMENT TOTAL KNEE BILATERAL        General Information    No documentation.                  Mobility    No documentation.                  Obj/Interventions    No documentation.                  Goals/Plan    No documentation.                  Clinical Impression    No documentation.                  Outcome Measures       Row Name 10/09/24 2050          How much help from another person do you currently need...    Turning from your back to your side while in flat bed without using bedrails? 4  -YC     Moving from lying on back to sitting on the side of a flat bed without bedrails? 3  -YC     Moving to and from a bed to a chair (including a wheelchair)? 3  -YC     Standing up from a chair using your arms (e.g., wheelchair, bedside chair)? 3  -YC     Climbing 3-5 steps with a railing? 1  -YC     To walk in hospital room? 1  -YC     AM-PAC 6 Clicks Score (PT) 15  -YC     Highest Level of Mobility Goal 4 --> Transfer to chair/commode  -YC               User Key  (r) = Recorded By, (t) = Taken By, (c) = Cosigned By      Initials Name Provider Type    YC Chilango Hummel, RN Registered Nurse                                 Physical Therapy Education       Title: PT OT SLP Therapies (In Progress)       Topic: Physical Therapy (In Progress)       Point: Mobility training (In Progress)       Learning Progress Summary             Patient Acceptance, E, NR by AR at 10/9/2024 1601                         Point: Home exercise program (In Progress)       Learning Progress Summary             Patient Acceptance, E, NR by AR at 10/9/2024 1601                         Point: Body mechanics (In Progress)       Learning Progress Summary             Patient Acceptance, E, NR by AR at 10/9/2024 1601                         Point: Precautions (In Progress)       Learning Progress Summary             Patient  Acceptance, E, NR by AR at 10/9/2024 1601                                         User Key       Initials Effective Dates Name Provider Type Discipline    AR 06/16/21 -  Rose Nance, PT Physical Therapist PT                  PT Recommendation and Plan  Planned Therapy Interventions (PT): balance training, bed mobility training, gait training, home exercise program, transfer training, strengthening, stair training, patient/family education  Plan of Care Reviewed With: patient  Outcome Evaluation: Pt admitted from independent living facility with low back pain.  Pt reports using rollator, walks to [place], 1 recent fall, and normally dresses/bathes herself.  Today pt demonstrates generalized weakness and impaired activity tolerance but able to sit up w/ stand by assist.  Stood w/ CGA using RW.  Ambulated 3' from bed>BSC>recliner w/ CGA using RW.  Declined further distance 2/2 back pain. Pt reports owning a WC if needed.  Pt may be able to DC back to ILF if able to have increased assist, physical therapy, and use of WC for longer distances as needed; if unable to have more assist as ILF, may need SNU.     Time Calculation:          Therapy Charges for Today       Code Description Service Date Service Provider Modifiers Qty    50304560975 HC PT EVAL MOD COMPLEXITY 3 10/9/2024 Rose Nance, PT GP 1    58395551782 HC PT THER PROC EA 15 MIN 10/9/2024 Rose Nance, PT GP 1            PT G-Codes  Outcome Measure Options: AM-PAC 6 Clicks Basic Mobility (PT)  AM-PAC 6 Clicks Score (PT): 15  PT Discharge Summary  Anticipated Discharge Disposition (PT): skilled nursing facility, assisted living (depending on level of assist available at ILF)    Rose Nance PT  10/10/2024

## 2024-10-11 LAB
ALBUMIN SERPL-MCNC: 3.9 G/DL (ref 3.5–5.2)
ALBUMIN/GLOB SERPL: 1.6 G/DL
ALP SERPL-CCNC: 37 U/L (ref 39–117)
ALT SERPL W P-5'-P-CCNC: 10 U/L (ref 1–33)
ANION GAP SERPL CALCULATED.3IONS-SCNC: 9.3 MMOL/L (ref 5–15)
AST SERPL-CCNC: 12 U/L (ref 1–32)
BACTERIA UR QL AUTO: NORMAL /HPF
BASOPHILS # BLD AUTO: 0.02 10*3/MM3 (ref 0–0.2)
BASOPHILS NFR BLD AUTO: 0.4 % (ref 0–1.5)
BILIRUB SERPL-MCNC: 0.4 MG/DL (ref 0–1.2)
BILIRUB UR QL STRIP: NEGATIVE
BUN SERPL-MCNC: 35 MG/DL (ref 8–23)
BUN/CREAT SERPL: 38 (ref 7–25)
CALCIUM SPEC-SCNC: 10.4 MG/DL (ref 8.6–10.5)
CHLORIDE SERPL-SCNC: 98 MMOL/L (ref 98–107)
CLARITY UR: CLEAR
CO2 SERPL-SCNC: 31.7 MMOL/L (ref 22–29)
COLOR UR: ABNORMAL
CREAT SERPL-MCNC: 0.92 MG/DL (ref 0.57–1)
DEPRECATED RDW RBC AUTO: 42.8 FL (ref 37–54)
EGFRCR SERPLBLD CKD-EPI 2021: 65.1 ML/MIN/1.73
EOSINOPHIL # BLD AUTO: 0.16 10*3/MM3 (ref 0–0.4)
EOSINOPHIL NFR BLD AUTO: 3.5 % (ref 0.3–6.2)
ERYTHROCYTE [DISTWIDTH] IN BLOOD BY AUTOMATED COUNT: 12.8 % (ref 12.3–15.4)
GLOBULIN UR ELPH-MCNC: 2.5 GM/DL
GLUCOSE BLDC GLUCOMTR-MCNC: 136 MG/DL (ref 70–130)
GLUCOSE BLDC GLUCOMTR-MCNC: 168 MG/DL (ref 70–130)
GLUCOSE BLDC GLUCOMTR-MCNC: 175 MG/DL (ref 70–130)
GLUCOSE BLDC GLUCOMTR-MCNC: 198 MG/DL (ref 70–130)
GLUCOSE SERPL-MCNC: 146 MG/DL (ref 65–99)
GLUCOSE UR STRIP-MCNC: NEGATIVE MG/DL
HCT VFR BLD AUTO: 34.7 % (ref 34–46.6)
HGB BLD-MCNC: 11.4 G/DL (ref 12–15.9)
HGB UR QL STRIP.AUTO: NEGATIVE
HYALINE CASTS UR QL AUTO: NORMAL /LPF
IMM GRANULOCYTES # BLD AUTO: 0.02 10*3/MM3 (ref 0–0.05)
IMM GRANULOCYTES NFR BLD AUTO: 0.4 % (ref 0–0.5)
KETONES UR QL STRIP: ABNORMAL
LEUKOCYTE ESTERASE UR QL STRIP.AUTO: NEGATIVE
LYMPHOCYTES # BLD AUTO: 1.58 10*3/MM3 (ref 0.7–3.1)
LYMPHOCYTES NFR BLD AUTO: 34.3 % (ref 19.6–45.3)
MCH RBC QN AUTO: 30.1 PG (ref 26.6–33)
MCHC RBC AUTO-ENTMCNC: 32.9 G/DL (ref 31.5–35.7)
MCV RBC AUTO: 91.6 FL (ref 79–97)
MONOCYTES # BLD AUTO: 0.46 10*3/MM3 (ref 0.1–0.9)
MONOCYTES NFR BLD AUTO: 10 % (ref 5–12)
NEUTROPHILS NFR BLD AUTO: 2.36 10*3/MM3 (ref 1.7–7)
NEUTROPHILS NFR BLD AUTO: 51.4 % (ref 42.7–76)
NITRITE UR QL STRIP: NEGATIVE
NRBC BLD AUTO-RTO: 0 /100 WBC (ref 0–0.2)
PH UR STRIP.AUTO: 6 [PH] (ref 5–8)
PLATELET # BLD AUTO: 144 10*3/MM3 (ref 140–450)
PMV BLD AUTO: 11.2 FL (ref 6–12)
POTASSIUM SERPL-SCNC: 4.1 MMOL/L (ref 3.5–5.2)
PROT SERPL-MCNC: 6.4 G/DL (ref 6–8.5)
PROT UR QL STRIP: ABNORMAL
RBC # BLD AUTO: 3.79 10*6/MM3 (ref 3.77–5.28)
RBC # UR STRIP: NORMAL /HPF
REF LAB TEST METHOD: NORMAL
SODIUM SERPL-SCNC: 139 MMOL/L (ref 136–145)
SP GR UR STRIP: 1.02 (ref 1–1.03)
SQUAMOUS #/AREA URNS HPF: NORMAL /HPF
UROBILINOGEN UR QL STRIP: ABNORMAL
WBC # UR STRIP: NORMAL /HPF
WBC NRBC COR # BLD AUTO: 4.6 10*3/MM3 (ref 3.4–10.8)

## 2024-10-11 PROCEDURE — 81001 URINALYSIS AUTO W/SCOPE: CPT | Performed by: STUDENT IN AN ORGANIZED HEALTH CARE EDUCATION/TRAINING PROGRAM

## 2024-10-11 PROCEDURE — 63710000001 INSULIN LISPRO (HUMAN) PER 5 UNITS

## 2024-10-11 PROCEDURE — 97530 THERAPEUTIC ACTIVITIES: CPT

## 2024-10-11 PROCEDURE — 25010000002 HYDROMORPHONE PER 4 MG: Performed by: INTERNAL MEDICINE

## 2024-10-11 PROCEDURE — 94799 UNLISTED PULMONARY SVC/PX: CPT

## 2024-10-11 PROCEDURE — 85025 COMPLETE CBC W/AUTO DIFF WBC: CPT | Performed by: INTERNAL MEDICINE

## 2024-10-11 PROCEDURE — 80053 COMPREHEN METABOLIC PANEL: CPT | Performed by: INTERNAL MEDICINE

## 2024-10-11 PROCEDURE — 82948 REAGENT STRIP/BLOOD GLUCOSE: CPT

## 2024-10-11 PROCEDURE — 94761 N-INVAS EAR/PLS OXIMETRY MLT: CPT

## 2024-10-11 PROCEDURE — 94760 N-INVAS EAR/PLS OXIMETRY 1: CPT

## 2024-10-11 PROCEDURE — 94664 DEMO&/EVAL PT USE INHALER: CPT

## 2024-10-11 RX ADMIN — LIDOCAINE 1 PATCH: 4 PATCH TOPICAL at 15:11

## 2024-10-11 RX ADMIN — METHOCARBAMOL TABLETS 500 MG: 500 TABLET, COATED ORAL at 01:00

## 2024-10-11 RX ADMIN — LATANOPROST 1 DROP: 50 SOLUTION OPHTHALMIC at 20:58

## 2024-10-11 RX ADMIN — PRIMIDONE 50 MG: 50 TABLET ORAL at 10:20

## 2024-10-11 RX ADMIN — BUDESONIDE AND FORMOTEROL FUMARATE DIHYDRATE 2 PUFF: 160; 4.5 AEROSOL RESPIRATORY (INHALATION) at 07:49

## 2024-10-11 RX ADMIN — BUDESONIDE AND FORMOTEROL FUMARATE DIHYDRATE 2 PUFF: 160; 4.5 AEROSOL RESPIRATORY (INHALATION) at 20:22

## 2024-10-11 RX ADMIN — HYDROCODONE BITARTRATE AND ACETAMINOPHEN 1 TABLET: 5; 325 TABLET ORAL at 10:20

## 2024-10-11 RX ADMIN — INSULIN LISPRO 2 UNITS: 100 INJECTION, SOLUTION INTRAVENOUS; SUBCUTANEOUS at 12:19

## 2024-10-11 RX ADMIN — Medication 10 ML: at 20:58

## 2024-10-11 RX ADMIN — INSULIN LISPRO 2 UNITS: 100 INJECTION, SOLUTION INTRAVENOUS; SUBCUTANEOUS at 18:00

## 2024-10-11 RX ADMIN — HYDROCHLOROTHIAZIDE 12.5 MG: 12.5 TABLET ORAL at 10:20

## 2024-10-11 RX ADMIN — APIXABAN 5 MG: 5 TABLET, FILM COATED ORAL at 02:39

## 2024-10-11 RX ADMIN — HYDROMORPHONE HYDROCHLORIDE 0.5 MG: 1 INJECTION, SOLUTION INTRAMUSCULAR; INTRAVENOUS; SUBCUTANEOUS at 20:58

## 2024-10-11 RX ADMIN — METHOCARBAMOL TABLETS 500 MG: 500 TABLET, COATED ORAL at 12:19

## 2024-10-11 RX ADMIN — HYDROCODONE BITARTRATE AND ACETAMINOPHEN 1 TABLET: 5; 325 TABLET ORAL at 04:21

## 2024-10-11 RX ADMIN — METHOCARBAMOL TABLETS 500 MG: 500 TABLET, COATED ORAL at 06:13

## 2024-10-11 RX ADMIN — CITALOPRAM 20 MG: 20 TABLET, FILM COATED ORAL at 10:20

## 2024-10-11 RX ADMIN — METHOCARBAMOL TABLETS 500 MG: 500 TABLET, COATED ORAL at 18:13

## 2024-10-11 RX ADMIN — TIOTROPIUM BROMIDE INHALATION SPRAY 2 PUFF: 3.12 SPRAY, METERED RESPIRATORY (INHALATION) at 07:50

## 2024-10-11 RX ADMIN — APIXABAN 5 MG: 5 TABLET, FILM COATED ORAL at 15:11

## 2024-10-11 RX ADMIN — INSULIN LISPRO 2 UNITS: 100 INJECTION, SOLUTION INTRAVENOUS; SUBCUTANEOUS at 20:58

## 2024-10-11 RX ADMIN — LOSARTAN POTASSIUM 50 MG: 50 TABLET, FILM COATED ORAL at 10:20

## 2024-10-11 RX ADMIN — PRIMIDONE 50 MG: 50 TABLET ORAL at 20:58

## 2024-10-11 RX ADMIN — PRIMIDONE 50 MG: 50 TABLET ORAL at 15:11

## 2024-10-11 RX ADMIN — NYSTATIN: 100000 POWDER TOPICAL at 10:21

## 2024-10-11 RX ADMIN — NYSTATIN: 100000 POWDER TOPICAL at 20:58

## 2024-10-11 RX ADMIN — Medication 1000 UNITS: at 10:20

## 2024-10-11 NOTE — PROGRESS NOTES
Name: Kanwal Sauer ADMIT: 10/7/2024   : 1950  PCP: Nisha Britton MD    MRN: [id number] LOS: 3 days   AGE/SEX: 73 y.o. female  ROOM: [facility]     Subjective   Subjective     Patient lying comfortably in bed.  Endorsing dysuria       Objective   Objective   Vital Signs  Temp:  [98 °F (36.7 °C)-98.4 °F (36.9 °C)] 98 °F (36.7 °C)  Heart Rate:  [51-61] 51  Resp:  [18] 18  BP: (134-175)/(58-77) 175/68  SpO2:  [97 %-100 %] 98 %  on  Flow (L/min):  [2] 2;   Device (Oxygen Therapy): nasal cannula  Body mass index is 35.3 kg/m².  Physical Exam  Constitutional:       General: She is not in acute distress.  HENT:      Head: Normocephalic and atraumatic.   Cardiovascular:      Rate and Rhythm: Normal rate and regular rhythm.   Pulmonary:      Effort: Pulmonary effort is normal. No respiratory distress.   Abdominal:      General: There is no distension.      Palpations: Abdomen is soft.      Tenderness: There is no abdominal tenderness.   Musculoskeletal:      Right lower leg: No edema.      Left lower leg: No edema.   Neurological:      Mental Status: She is alert and oriented to person, place, and time.      Motor: Weakness present.           Results Review     I reviewed the patient's new clinical results.  Results from last 7 days   Lab Units 10/11/24  0230 10/10/24  0300 10/09/24  0258 10/08/24  0233   WBC 10*3/mm3 4.60 4.79 5.70 5.70   HEMOGLOBIN g/dL 11.4* 11.8* 11.7* 12.7   PLATELETS 10*3/mm3 144 145 149 166     Results from last 7 days   Lab Units 10/11/24  0230 10/10/24  0300 10/09/24  0258 10/08/24  0233   SODIUM mmol/L 139 138 134* 138   POTASSIUM mmol/L 4.1 4.2 3.8 3.9   CHLORIDE mmol/L 98 97* 94* 96*   CO2 mmol/L 31.7* 35.4* 30.0* 32.7*   BUN mg/dL 35* 35* 40* 38*   CREATININE mg/dL 0.92 0.94 1.04* 0.93   GLUCOSE mg/dL 146* 153* 160* 138*   EGFR mL/min/1.73 65.1 63.4 56.2* 64.2     Results from last 7 days   Lab Units 10/11/24  0230 10/10/24  0300 10/07/24  1953   ALBUMIN g/dL 3.9 3.8 4.7    BILIRUBIN mg/dL 0.4 0.4 0.4   ALK PHOS U/L 37* 35* 41   AST (SGOT) U/L 12 14 15   ALT (SGPT) U/L 10 9 11     Results from last 7 days   Lab Units 10/11/24  0230 10/10/24  0300 10/09/24  0258 10/08/24  0233 10/07/24  1953   CALCIUM mg/dL 10.4 9.9 10.1 10.4 11.0*   ALBUMIN g/dL 3.9 3.8  --   --  4.7   MAGNESIUM mg/dL  --   --   --  1.9  --        Glucose   Date/Time Value Ref Range Status   10/11/2024 1208 198 (H) 70 - 130 mg/dL Final   10/11/2024 0753 136 (H) 70 - 130 mg/dL Final   10/10/2024 1926 137 (H) 70 - 130 mg/dL Final   10/10/2024 1644 185 (H) 70 - 130 mg/dL Final   10/10/2024 1105 275 (H) 70 - 130 mg/dL Final   10/10/2024 0640 168 (H) 70 - 130 mg/dL Final   10/09/2024 2049 134 (H) 70 - 130 mg/dL Final       No radiology results for the last day    I have personally reviewed all medications:  Scheduled Medications  apixaban, 5 mg, Oral, Q12H  budesonide-formoterol, 2 puff, Inhalation, BID - RT   And  tiotropium bromide monohydrate, 2 puff, Inhalation, Daily - RT  cholecalciferol, 1,000 Units, Oral, Daily  citalopram, 20 mg, Oral, Daily  hydroCHLOROthiazide, 12.5 mg, Oral, Daily  insulin lispro, 2-7 Units, Subcutaneous, 4x Daily AC & at Bedtime  latanoprost, 1 drop, Both Eyes, Nightly  Lidocaine, 1 patch, Transdermal, Q24H  losartan, 50 mg, Oral, Q24H  methocarbamol, 500 mg, Oral, Q6H  montelukast, 10 mg, Oral, Nightly  nystatin, , Topical, BID  primidone, 50 mg, Oral, TID    Infusions   Diet  Diet: Cardiac; Healthy Heart (2-3 Na+); Fluid Consistency: Thin (IDDSI 0)    I have personally reviewed:  [x]  Laboratory   [x]  Microbiology   [x]  Radiology   [x]  EKG/Telemetry  [x]  Cardiology/Vascular   []  Pathology    []  Records       Assessment/Plan     Active Hospital Problems    Diagnosis  POA    **Low back pain [M54.50]  Yes    Degeneration of intervertebral disc of lumbar region with discogenic back pain [M51.360]  Yes    Spondylolisthesis at L4-L5 level [M43.16]  Not Applicable    Chronic midline low back  pain without sciatica [M54.50, G89.29]  Yes      Resolved Hospital Problems   No resolved problems to display.       73 y.o. female admitted with Low back pain.    Assessment and plan   Low back pain, degeneration of intervertebral disc of lumbar region, neurosurgery on board, recommended symptomatic management.  Start to skilled nursing facility for physical therapy    Dysuria  Obtain urinalysis     Asymptomatic bradycardia, paroxysmal atrial fibrillation,-metoprolol held    Obesity, complicates all aspects of care, BMI noted to be close to 35.     Type 2 diabetes mellitus, initiate Accu-Cheks and sliding scale insulin coverage.     History of breast cancer, status post left mastectomy and chemotherapy.     CODE STATUS is full code.  Further plans based on hospital course.      Cameron Tolbert MD  [facility]  10/11/24  12:53 EDT

## 2024-10-11 NOTE — CASE MANAGEMENT/SOCIAL WORK
Continued Stay Note  [facility]     Patient Name: Kanwal Sauer  MRN: [id number]  Today's Date: 10/11/2024    Admit Date: 10/7/2024    Plan: [facility], pre-cert started.   Discharge Plan       Row Name 10/11/24 1458       Plan    Plan Comments Malena/Ginger  notified CCP she is following pt as her IL asked her to follow while pt at [facility]. Referral placed in Epic. Elvin RN/CCP                   Discharge Codes    No documentation.                 Expected Discharge Date and Time       Expected Discharge Date Expected Discharge Time    Oct 12, 2024               Cassie Dumont RN

## 2024-10-11 NOTE — CASE MANAGEMENT/SOCIAL WORK
Post-Acute Authorization Submission      Post Acute Pre-Cert Documentation  Request Submitted by Facility - Type:: Hospital  Post-Acute Authorization Type Submitted:: SNF  Date Post Acute Pre-Cert Inititated per Facility: 10/11/24  Accepting Facility: [facility] Discharge Date Requested: 10/11/24  All Clinicals Submitted?: Yes  Had Accepting Facility at Time of Submission: Yes  Response Communicated to:: , Accepting Facility Liaison  Authorization Number:: PENDING [id number]              ROHINI Dover

## 2024-10-11 NOTE — PLAN OF CARE
Problem: Adult Inpatient Plan of Care  Goal: Plan of Care Review  Outcome: Progressing  Flowsheets (Taken 10/11/2024 0543)  Progress: no change  Plan of Care Reviewed With: patient  Outcome Evaluation: Pt A&Ox4, VSS on 2L NC. Pt c/o back pain throught the night, PRN pain meds admin. Safety precautions in place, plan of care ongoing.  Goal: Patient-Specific Goal (Individualized)  Outcome: Progressing  Goal: Absence of Hospital-Acquired Illness or Injury  Outcome: Progressing  Intervention: Identify and Manage Fall Risk  Recent Flowsheet Documentation  Taken 10/11/2024 0413 by Floridalma Gandhi RN  Safety Promotion/Fall Prevention: safety round/check completed  Taken 10/10/2024 2210 by Floridalma Gandhi RN  Safety Promotion/Fall Prevention: safety round/check completed  Intervention: Prevent Skin Injury  Recent Flowsheet Documentation  Taken 10/11/2024 0413 by Floridalma Gandhi RN  Body Position: position changed independently  Taken 10/10/2024 2210 by Floridalma Gandhi RN  Body Position: position changed independently  Taken 10/10/2024 2028 by Floridalma Gandhi RN  Body Position: position changed independently  Intervention: Prevent and Manage VTE (Venous Thromboembolism) Risk  Recent Flowsheet Documentation  Taken 10/10/2024 2028 by Floridalma Gandhi RN  VTE Prevention/Management:   bilateral   sequential compression devices off   patient refused intervention  Range of Motion: active ROM (range of motion) encouraged  Intervention: Prevent Infection  Recent Flowsheet Documentation  Taken 10/10/2024 2028 by Floridalma Gandhi RN  Infection Prevention:   single patient room provided   rest/sleep promoted   environmental surveillance performed  Goal: Optimal Comfort and Wellbeing  Outcome: Progressing  Intervention: Provide Person-Centered Care  Recent Flowsheet Documentation  Taken 10/10/2024 2028 by Floridalma Gandhi RN  Trust Relationship/Rapport:   care explained   choices provided   questions answered   reassurance provided  Goal: Readiness for  Transition of Care  Outcome: Progressing     Problem: Pain Chronic (Persistent) (Comorbidity Management)  Goal: Acceptable Pain Control and Functional Ability  Outcome: Progressing  Intervention: Manage Persistent Pain  Recent Flowsheet Documentation  Taken 10/11/2024 0413 by Floridalma Gandhi RN  Medication Review/Management: medications reviewed  Taken 10/10/2024 2210 by Floridalma Gandhi RN  Medication Review/Management: medications reviewed  Intervention: Optimize Psychosocial Wellbeing  Recent Flowsheet Documentation  Taken 10/10/2024 2028 by Floridalma Gandhi RN  Diversional Activities: television     Problem: Pain Acute  Goal: Acceptable Pain Control and Functional Ability  Outcome: Progressing  Intervention: Prevent or Manage Pain  Recent Flowsheet Documentation  Taken 10/11/2024 0413 by Floridalma Gandhi RN  Medication Review/Management: medications reviewed  Taken 10/10/2024 2210 by Floridalma Gandhi RN  Medication Review/Management: medications reviewed  Intervention: Optimize Psychosocial Wellbeing  Recent Flowsheet Documentation  Taken 10/10/2024 2028 by Floridalma Gandhi RN  Diversional Activities: television     Problem: Fall Injury Risk  Goal: Absence of Fall and Fall-Related Injury  Outcome: Progressing  Intervention: Identify and Manage Contributors  Recent Flowsheet Documentation  Taken 10/11/2024 0413 by Floridalma Gandhi RN  Medication Review/Management: medications reviewed  Taken 10/10/2024 2210 by Floridalma Gandhi RN  Medication Review/Management: medications reviewed  Intervention: Promote Injury-Free Environment  Recent Flowsheet Documentation  Taken 10/11/2024 0413 by Floridalma Gandhi RN  Safety Promotion/Fall Prevention: safety round/check completed  Taken 10/10/2024 2210 by Floridalma Gandhi RN  Safety Promotion/Fall Prevention: safety round/check completed     Problem: Skin Injury Risk Increased  Goal: Skin Health and Integrity  Outcome: Progressing  Intervention: Optimize Skin Protection  Recent Flowsheet  Documentation  Taken 10/11/2024 0413 by Floridalma Gandhi RN  Head of Bed (HOB) Positioning: HOB at 20-30 degrees  Taken 10/10/2024 2210 by Floridalma Gandhi RN  Head of Bed (HOB) Positioning: HOB at 20-30 degrees  Taken 10/10/2024 2028 by Floridalma Gandhi RN  Head of Bed (HOB) Positioning: HOB at 20-30 degrees   Goal Outcome Evaluation:  Plan of Care Reviewed With: patient        Progress: no change  Outcome Evaluation: Pt A&Ox4, VSS on 2L NC. Pt c/o back pain throught the night, PRN pain meds admin. Safety precautions in place, plan of care ongoing.

## 2024-10-11 NOTE — PLAN OF CARE
Goal Outcome Evaluation:  Plan of Care Reviewed With: (P) patient           Outcome Evaluation: (P) Pt seen for PT this morning. She was in bed and agreeable to mobilize. She sat up to EOB with min A x 1 and stood with CGA and rolling walker. She increased gait distance to 30 ft with CGA and rolling walker and was assisted to chair following AMB. Pt is progressing well and PT will continue to follow. Recommend SNF at d/c.      Anticipated Discharge Disposition (PT): skilled nursing facility

## 2024-10-11 NOTE — THERAPY TREATMENT NOTE
Patient Name: Kanwal Sauer  : 1950    MRN: [id number]                              Today's Date: 10/11/2024       Admit Date: 10/7/2024    Visit Dx:     ICD-10-CM ICD-9-CM   1. Intractable low back pain  M54.59 724.2   2. Degeneration of intervertebral disc of lumbar region with discogenic back pain  M51.360 722.52     Patient Active Problem List   Diagnosis    Closed fracture of left distal femur    Type 2 diabetes mellitus with circulatory disorder, without long-term current use of insulin    Essential hypertension    Tremors of nervous system    PAF (paroxysmal atrial fibrillation)    TARSHA (obstructive sleep apnea)    History of breast cancer    History of seizure    Risk for falls    Neck pain    Acute UTI    Chronic midline low back pain without sciatica    Vulvar rash    Elevated troponin    Hypomagnesemia    COPD (chronic obstructive pulmonary disease)    Low back pain    Type 2 diabetes mellitus with hyperglycemia, without long-term current use of insulin    Bradycardia    Degeneration of intervertebral disc of lumbar region with discogenic back pain    Spondylolisthesis at L4-L5 level     Past Medical History:   Diagnosis Date    Allergic     Anxiety     Asthma     Atrial fibrillation     Cancer     Breast    COPD (chronic obstructive pulmonary disease) 2022    Deep vein thrombosis     Diabetes mellitus     Difficulty walking     Diverticulosis     Drug therapy     Environmental allergies     Fractures     Headache, tension-type     Hyperlipidemia     Hypertension     Low back pain     Neuropathy in diabetes     Obesity     Scoliosis     Seizures     Sleep apnea     Tremor     Urinary tract infection     Weakness      Past Surgical History:   Procedure Laterality Date    BREAST BIOPSY      CATARACT EXTRACTION, BILATERAL      DENTAL PROCEDURE      Removed teeth    FEMUR OPEN REDUCTION INTERNAL FIXATION Left 2020    Procedure: DISTAL FEMUR OPEN REDUCTION INTERNAL FIXATION;  Surgeon:  Marley Hernandez MD;  Location: [facility] OR;  Service: Orthopedics;  Laterality: Left;    MASTECTOMY Left     REPLACEMENT TOTAL KNEE BILATERAL        General Information       Row Name 10/11/24 1201          Physical Therapy Time and Intention    Document Type therapy note (daily note) (P)   -SF     Mode of Treatment physical therapy (P)   -SF       Row Name 10/11/24 1201          General Information    Patient Profile Reviewed yes (P)   -SF     Existing Precautions/Restrictions fall (P)   -SF       Row Name 10/11/24 1201          Cognition    Orientation Status (Cognition) oriented x 3 (P)   -       Row Name 10/11/24 1201          Safety Issues, Functional Mobility    Impairments Affecting Function (Mobility) endurance/activity tolerance;strength;pain (P)   -SF               User Key  (r) = Recorded By, (t) = Taken By, (c) = Cosigned By      Initials Name Provider Type    SF Lizzy Calles PT Student PT Student                   Mobility       Row Name 10/11/24 1202          Bed Mobility    Bed Mobility supine-sit (P)   -SF     Supine-Sit Cobb (Bed Mobility) minimum assist (75% patient effort);1 person assist;verbal cues (P)   -SF     Assistive Device (Bed Mobility) head of bed elevated;bed rails (P)   -       Row Name 10/11/24 1202          Sit-Stand Transfer    Sit-Stand Cobb (Transfers) contact guard;verbal cues (P)   -SF     Assistive Device (Sit-Stand Transfers) walker, front-wheeled (P)   -SF     Comment, (Sit-Stand Transfer) verbal cues for hand placement (P)   -       Row Name 10/11/24 1202          Gait/Stairs (Locomotion)    Cobb Level (Gait) contact guard;verbal cues (P)   -SF     Assistive Device (Gait) walker, front-wheeled (P)   -SF     Distance in Feet (Gait) 30 (P)   -SF     Deviations/Abnormal Patterns (Gait) mamadou decreased;gait speed decreased (P)   -SF     Bilateral Gait Deviations forward flexed posture;heel strike decreased (P)   -SF     Comment,  (Gait/Stairs) cues for sequencing and backing up to chair (P)   -SF               User Key  (r) = Recorded By, (t) = Taken By, (c) = Cosigned By      Initials Name Provider Type    Lizzy Morris, PT Student PT Student                   Obj/Interventions       Row Name 10/11/24 1204          Balance    Balance Assessment sitting static balance;standing static balance;standing dynamic balance (P)   -SF     Static Sitting Balance standby assist (P)   -SF     Position, Sitting Balance sitting edge of bed (P)   -SF     Static Standing Balance contact guard (P)   -SF     Dynamic Standing Balance contact guard (P)   -SF     Position/Device Used, Standing Balance walker, rolling (P)   -SF               User Key  (r) = Recorded By, (t) = Taken By, (c) = Cosigned By      Initials Name Provider Type    Lizzy Morris, PT Student PT Student                   Goals/Plan    No documentation.                  Clinical Impression       Row Name 10/11/24 1204          Pain    Pretreatment Pain Rating 10/10 (P)   -SF     Posttreatment Pain Rating 10/10 (P)   -SF     Pain Intervention(s) Ambulation/increased activity;Repositioned;Rest (P)   -SF       Row Name 10/11/24 1204          Plan of Care Review    Plan of Care Reviewed With patient (P)   -SF     Outcome Evaluation Pt seen for PT this morning. She was in bed and agreeable to mobilize. She sat up to EOB with min A x 1 and stood with CGA and rolling walker. She increased gait distance to 30 ft with CGA and rolling walker and was assisted to chair following AMB. Pt is progressing well and PT will continue to follow. Recommend SNF at d/c. (P)   -SF       Row Name 10/11/24 1204          Vital Signs    O2 Delivery Pre Treatment nasal cannula (P)   2L  -SF     O2 Delivery Intra Treatment nasal cannula (P)   2L  -SF     O2 Delivery Post Treatment nasal cannula (P)   2L  -SF       Row Name 10/11/24 1204          Positioning and Restraints    Pre-Treatment Position in bed (P)    -SF     Post Treatment Position chair (P)   -SF     In Chair reclined;call light within reach;exit alarm on;encouraged to call for assist (P)   -SF               User Key  (r) = Recorded By, (t) = Taken By, (c) = Cosigned By      Initials Name Provider Type    Lizzy Morris, PT Student PT Student                   Outcome Measures       Row Name 10/11/24 1208          How much help from another person do you currently need...    Turning from your back to your side while in flat bed without using bedrails? 3 (P)   -SF     Moving from lying on back to sitting on the side of a flat bed without bedrails? 3 (P)   -SF     Moving to and from a bed to a chair (including a wheelchair)? 3 (P)   -SF     Standing up from a chair using your arms (e.g., wheelchair, bedside chair)? 3 (P)   -SF     Climbing 3-5 steps with a railing? 2 (P)   -SF     To walk in hospital room? 3 (P)   -SF     AM-PAC 6 Clicks Score (PT) 17 (P)   -SF     Highest Level of Mobility Goal 5 --> Static standing (P)   -SF               User Key  (r) = Recorded By, (t) = Taken By, (c) = Cosigned By      Initials Name Provider Type    Lizzy Morris, PT Student PT Student                                 Physical Therapy Education       Title: PT OT SLP Therapies (In Progress)       Topic: Physical Therapy (In Progress)       Point: Mobility training (Done)       Learning Progress Summary             Patient Acceptance, E, VU by  at 10/11/2024 1208    Acceptance, E, VU by  at 10/10/2024 1522    Acceptance, E,TB, NR by RW at 10/10/2024 1323    Acceptance, E, NR by AR at 10/9/2024 1601                         Point: Home exercise program (In Progress)       Learning Progress Summary             Patient Acceptance, E,TB, NR by RW at 10/10/2024 1323    Acceptance, E, NR by AR at 10/9/2024 1601                         Point: Body mechanics (In Progress)       Learning Progress Summary             Patient Acceptance, E,TB, NR by RW at 10/10/2024 1323     Acceptance, E, NR by AR at 10/9/2024 1601                         Point: Precautions (In Progress)       Learning Progress Summary             Patient Acceptance, E,TB, NR by RW at 10/10/2024 1323    Acceptance, E, NR by AR at 10/9/2024 1601                                         User Key       Initials Effective Dates Name Provider Type Discipline     06/16/21 -  Bishop Castelan, RN Registered Nurse Nurse    AR 06/16/21 -  Rose Nance, PT Physical Therapist PT     08/08/24 -  Lizzy Calles, PT Student PT Student PT                  PT Recommendation and Plan     Plan of Care Reviewed With: (P) patient  Outcome Evaluation: (P) Pt seen for PT this morning. She was in bed and agreeable to mobilize. She sat up to EOB with min A x 1 and stood with CGA and rolling walker. She increased gait distance to 30 ft with CGA and rolling walker and was assisted to chair following AMB. Pt is progressing well and PT will continue to follow. Recommend SNF at d/c.     Time Calculation:         PT Charges       Row Name 10/11/24 1208             Time Calculation    Start Time 1142 (P)   -SF      Stop Time 1157 (P)   -SF      Time Calculation (min) 15 min (P)   -SF      PT Received On 10/11/24 (P)   -SF      PT - Next Appointment 10/14/24 (P)   -SF         Time Calculation- PT    Total Timed Code Minutes- PT 15 minute(s) (P)   -SF                User Key  (r) = Recorded By, (t) = Taken By, (c) = Cosigned By      Initials Name Provider Type     Lizzy Calles, PT Student PT Student                  Therapy Charges for Today       Code Description Service Date Service Provider Modifiers Qty    05255135955 HC PT THERAPEUTIC ACT EA 15 MIN 10/10/2024 Lizzy Calles, PT Student GP 1    84231549368 HC PT THERAPEUTIC ACT EA 15 MIN 10/11/2024 Lizzy Calles, PT Student GP 1            PT G-Codes  Outcome Measure Options: AM-PAC 6 Clicks Basic Mobility (PT)  AM-PAC 6 Clicks Score (PT): (P) 17  PT Discharge Summary  Anticipated  Discharge Disposition (PT): skilled nursing facility    Lizzy Calles, PT Student  10/11/2024

## 2024-10-11 NOTE — CASE MANAGEMENT/SOCIAL WORK
Continued Stay Note  [facility]     Patient Name: Kanwal Sauer  MRN: [id number]  Today's Date: 10/11/2024    Admit Date: 10/7/2024    Plan: [facility], pre-cert started.   Discharge Plan       Row Name 10/11/24 1104       Plan    Plan [facility], pre-cert started.    Patient/Family in Agreement with Plan yes    Plan Comments Irma/Tr[employer] notified CCP pt has been accepted at [facility] and bed available. Zoran/Liz notified CCP pt has been accepted at [facility] and bed available. Referral still pending for [facility]. CCP s/w Monica, pts daughter, regarding SNF referrals. CCP explained [facility] and [facility] have accepted and bed available. Monica verbalized understanding and accepts bed at [facility]. CCP discussed transport to [facility] at [place]; Monica stated family will transport. Irma/Jocelyne notified of pt's family's acceptance of bed at [facility]. Pt submitted to Post Acute Authorizations to initiate pre-cert. Partial packet in "[employer] pharmacy updated to [facility] in [employer]. Elvin ADKINS/CCP                   Discharge Codes    No documentation.                 Expected Discharge Date and Time       Expected Discharge Date Expected Discharge Time    Oct 11, 2024               Cassie Dumont RN

## 2024-10-12 LAB
GLUCOSE BLDC GLUCOMTR-MCNC: 147 MG/DL (ref 70–130)
GLUCOSE BLDC GLUCOMTR-MCNC: 162 MG/DL (ref 70–130)
GLUCOSE BLDC GLUCOMTR-MCNC: 228 MG/DL (ref 70–130)
GLUCOSE BLDC GLUCOMTR-MCNC: 291 MG/DL (ref 70–130)

## 2024-10-12 PROCEDURE — 94664 DEMO&/EVAL PT USE INHALER: CPT

## 2024-10-12 PROCEDURE — 63710000001 INSULIN LISPRO (HUMAN) PER 5 UNITS

## 2024-10-12 PROCEDURE — 82948 REAGENT STRIP/BLOOD GLUCOSE: CPT

## 2024-10-12 PROCEDURE — 94760 N-INVAS EAR/PLS OXIMETRY 1: CPT

## 2024-10-12 PROCEDURE — 94761 N-INVAS EAR/PLS OXIMETRY MLT: CPT

## 2024-10-12 PROCEDURE — 94799 UNLISTED PULMONARY SVC/PX: CPT

## 2024-10-12 PROCEDURE — 93010 ELECTROCARDIOGRAM REPORT: CPT | Performed by: INTERNAL MEDICINE

## 2024-10-12 PROCEDURE — 93005 ELECTROCARDIOGRAM TRACING: CPT | Performed by: STUDENT IN AN ORGANIZED HEALTH CARE EDUCATION/TRAINING PROGRAM

## 2024-10-12 RX ADMIN — LOSARTAN POTASSIUM 50 MG: 50 TABLET, FILM COATED ORAL at 08:01

## 2024-10-12 RX ADMIN — HYDROCHLOROTHIAZIDE 12.5 MG: 12.5 TABLET ORAL at 08:00

## 2024-10-12 RX ADMIN — INSULIN LISPRO 2 UNITS: 100 INJECTION, SOLUTION INTRAVENOUS; SUBCUTANEOUS at 20:25

## 2024-10-12 RX ADMIN — METHOCARBAMOL TABLETS 500 MG: 500 TABLET, COATED ORAL at 12:40

## 2024-10-12 RX ADMIN — MONTELUKAST SODIUM 10 MG: 10 TABLET, FILM COATED ORAL at 20:25

## 2024-10-12 RX ADMIN — PRIMIDONE 50 MG: 50 TABLET ORAL at 08:01

## 2024-10-12 RX ADMIN — PRIMIDONE 50 MG: 50 TABLET ORAL at 20:25

## 2024-10-12 RX ADMIN — NYSTATIN: 100000 POWDER TOPICAL at 08:01

## 2024-10-12 RX ADMIN — METHOCARBAMOL TABLETS 500 MG: 500 TABLET, COATED ORAL at 17:12

## 2024-10-12 RX ADMIN — METHOCARBAMOL TABLETS 500 MG: 500 TABLET, COATED ORAL at 05:46

## 2024-10-12 RX ADMIN — APIXABAN 5 MG: 5 TABLET, FILM COATED ORAL at 14:45

## 2024-10-12 RX ADMIN — INSULIN LISPRO 2 UNITS: 100 INJECTION, SOLUTION INTRAVENOUS; SUBCUTANEOUS at 12:40

## 2024-10-12 RX ADMIN — INSULIN LISPRO 3 UNITS: 100 INJECTION, SOLUTION INTRAVENOUS; SUBCUTANEOUS at 17:12

## 2024-10-12 RX ADMIN — CITALOPRAM 20 MG: 20 TABLET, FILM COATED ORAL at 08:01

## 2024-10-12 RX ADMIN — BUDESONIDE AND FORMOTEROL FUMARATE DIHYDRATE 2 PUFF: 160; 4.5 AEROSOL RESPIRATORY (INHALATION) at 07:50

## 2024-10-12 RX ADMIN — LIDOCAINE 1 PATCH: 4 PATCH TOPICAL at 14:45

## 2024-10-12 RX ADMIN — NYSTATIN: 100000 POWDER TOPICAL at 20:26

## 2024-10-12 RX ADMIN — ACETAMINOPHEN 325MG 650 MG: 325 TABLET ORAL at 12:40

## 2024-10-12 RX ADMIN — PRIMIDONE 50 MG: 50 TABLET ORAL at 17:12

## 2024-10-12 RX ADMIN — APIXABAN 5 MG: 5 TABLET, FILM COATED ORAL at 04:02

## 2024-10-12 RX ADMIN — BUDESONIDE AND FORMOTEROL FUMARATE DIHYDRATE 2 PUFF: 160; 4.5 AEROSOL RESPIRATORY (INHALATION) at 20:36

## 2024-10-12 RX ADMIN — ACETAMINOPHEN 325MG 650 MG: 325 TABLET ORAL at 20:30

## 2024-10-12 RX ADMIN — LATANOPROST 1 DROP: 50 SOLUTION OPHTHALMIC at 20:25

## 2024-10-12 RX ADMIN — METHOCARBAMOL TABLETS 500 MG: 500 TABLET, COATED ORAL at 00:20

## 2024-10-12 RX ADMIN — Medication 1000 UNITS: at 08:01

## 2024-10-12 RX ADMIN — TIOTROPIUM BROMIDE INHALATION SPRAY 2 PUFF: 3.12 SPRAY, METERED RESPIRATORY (INHALATION) at 07:49

## 2024-10-12 NOTE — PLAN OF CARE
Problem: Adult Inpatient Plan of Care  Goal: Plan of Care Review  Outcome: Progressing  Flowsheets (Taken 10/12/2024 0611)  Progress: no change  Outcome Evaluation: Pt A&Ox4, VSS on RA. Pt c/o back pain throughout the night, PRN pain meds admin. No acute change in condition noted on assessment. Safety precautions in place, plan of care ongoing.  Plan of Care Reviewed With: patient  Goal: Patient-Specific Goal (Individualized)  Outcome: Progressing  Goal: Absence of Hospital-Acquired Illness or Injury  Outcome: Progressing  Intervention: Identify and Manage Fall Risk  Recent Flowsheet Documentation  Taken 10/12/2024 0610 by Floridalma Gandhi RN  Safety Promotion/Fall Prevention: safety round/check completed  Taken 10/12/2024 0423 by Floridalma Gandhi RN  Safety Promotion/Fall Prevention: safety round/check completed  Taken 10/12/2024 0202 by Floridalma Gandhi RN  Safety Promotion/Fall Prevention: safety round/check completed  Taken 10/12/2024 0015 by Floridalma Gandhi RN  Safety Promotion/Fall Prevention:   activity supervised   clutter free environment maintained   assistive device/personal items within reach   fall prevention program maintained   lighting adjusted   nonskid shoes/slippers when out of bed   room organization consistent   safety round/check completed  Taken 10/11/2024 2236 by Floridalma Gandhi RN  Safety Promotion/Fall Prevention: safety round/check completed  Taken 10/11/2024 2058 by Floridalma Gandhi RN  Safety Promotion/Fall Prevention:   activity supervised   assistive device/personal items within reach   clutter free environment maintained   fall prevention program maintained   lighting adjusted   nonskid shoes/slippers when out of bed   room organization consistent   safety round/check completed  Intervention: Prevent Skin Injury  Recent Flowsheet Documentation  Taken 10/12/2024 0610 by Floridalma Gandhi RN  Body Position: position changed independently  Taken 10/12/2024 0423 by Floridalma Gandhi RN  Body Position: position  changed independently  Taken 10/12/2024 0202 by Floridalma Gandhi RN  Body Position: position changed independently  Taken 10/12/2024 0015 by Floridalma Gandhi RN  Body Position: position changed independently  Skin Protection:   drying agents applied   incontinence pads utilized  Taken 10/11/2024 2236 by Floridalma Gandhi RN  Body Position: position changed independently  Taken 10/11/2024 2058 by Floridalma Gandhi RN  Body Position: position changed independently  Intervention: Prevent and Manage VTE (Venous Thromboembolism) Risk  Recent Flowsheet Documentation  Taken 10/11/2024 2058 by Floridalma Gandhi RN  VTE Prevention/Management:   bilateral   sequential compression devices off  Intervention: Prevent Infection  Recent Flowsheet Documentation  Taken 10/12/2024 0015 by Floridalma Gandhi RN  Infection Prevention:   rest/sleep promoted   single patient room provided   environmental surveillance performed  Taken 10/11/2024 2058 by Floridalma Gandhi RN  Infection Prevention:   single patient room provided   rest/sleep promoted   environmental surveillance performed  Goal: Optimal Comfort and Wellbeing  Outcome: Progressing  Intervention: Provide Person-Centered Care  Recent Flowsheet Documentation  Taken 10/12/2024 0015 by Floridalma Gandhi RN  Trust Relationship/Rapport:   care explained   choices provided  Taken 10/11/2024 2058 by Floridalma Gandhi RN  Trust Relationship/Rapport:   care explained   choices provided  Goal: Readiness for Transition of Care  Outcome: Progressing     Problem: Pain Chronic (Persistent) (Comorbidity Management)  Goal: Acceptable Pain Control and Functional Ability  Outcome: Progressing  Intervention: Manage Persistent Pain  Recent Flowsheet Documentation  Taken 10/12/2024 0610 by Floridalma Gandhi RN  Medication Review/Management: medications reviewed  Taken 10/12/2024 0423 by Floridalma Gandhi RN  Medication Review/Management: medications reviewed  Taken 10/12/2024 0202 by Floridalma Gandhi RN  Medication Review/Management:  medications reviewed  Taken 10/12/2024 0015 by Floridalma Gandhi RN  Medication Review/Management: medications reviewed  Taken 10/11/2024 2236 by Floridalma Gandhi RN  Medication Review/Management: medications reviewed  Taken 10/11/2024 2058 by Floridalma Gandhi RN  Medication Review/Management: medications reviewed  Intervention: Optimize Psychosocial Wellbeing  Recent Flowsheet Documentation  Taken 10/12/2024 0015 by Floridalma Gandhi RN  Diversional Activities: television  Taken 10/11/2024 2058 by Floridalma Gandhi RN  Diversional Activities: television     Problem: Pain Acute  Goal: Acceptable Pain Control and Functional Ability  Outcome: Progressing  Intervention: Prevent or Manage Pain  Recent Flowsheet Documentation  Taken 10/12/2024 0610 by Floridalma Gandhi RN  Medication Review/Management: medications reviewed  Taken 10/12/2024 0423 by Floridalma Gandhi RN  Medication Review/Management: medications reviewed  Taken 10/12/2024 0202 by Floridalma Gandhi RN  Medication Review/Management: medications reviewed  Taken 10/12/2024 0015 by Floridalma Gandhi RN  Medication Review/Management: medications reviewed  Taken 10/11/2024 2236 by Floridalma Gandhi RN  Medication Review/Management: medications reviewed  Taken 10/11/2024 2058 by Floridalma Gandhi RN  Medication Review/Management: medications reviewed  Intervention: Optimize Psychosocial Wellbeing  Recent Flowsheet Documentation  Taken 10/12/2024 0015 by Floridalma Gandhi RN  Diversional Activities: television  Taken 10/11/2024 2058 by Floridalma Gandhi RN  Diversional Activities: television     Problem: Fall Injury Risk  Goal: Absence of Fall and Fall-Related Injury  Outcome: Progressing  Intervention: Identify and Manage Contributors  Recent Flowsheet Documentation  Taken 10/12/2024 0610 by Floridalma Gandhi RN  Medication Review/Management: medications reviewed  Taken 10/12/2024 0423 by Floridalma Gandhi RN  Medication Review/Management: medications reviewed  Taken 10/12/2024 0202 by Floridalma Gandhi RN  Medication  Review/Management: medications reviewed  Taken 10/12/2024 0015 by Floridalma Gandhi RN  Medication Review/Management: medications reviewed  Taken 10/11/2024 2236 by Floridalma Gandhi RN  Medication Review/Management: medications reviewed  Taken 10/11/2024 2058 by Floridalma Gandhi RN  Medication Review/Management: medications reviewed  Intervention: Promote Injury-Free Environment  Recent Flowsheet Documentation  Taken 10/12/2024 0610 by Floridalma Gandhi RN  Safety Promotion/Fall Prevention: safety round/check completed  Taken 10/12/2024 0423 by Floridalma Gandhi RN  Safety Promotion/Fall Prevention: safety round/check completed  Taken 10/12/2024 0202 by Floridalma Gandhi RN  Safety Promotion/Fall Prevention: safety round/check completed  Taken 10/12/2024 0015 by Floridalma Gandhi RN  Safety Promotion/Fall Prevention:   activity supervised   clutter free environment maintained   assistive device/personal items within reach   fall prevention program maintained   lighting adjusted   nonskid shoes/slippers when out of bed   room organization consistent   safety round/check completed  Taken 10/11/2024 2236 by Floridalma Gandhi RN  Safety Promotion/Fall Prevention: safety round/check completed  Taken 10/11/2024 2058 by Floridalma Gandhi RN  Safety Promotion/Fall Prevention:   activity supervised   assistive device/personal items within reach   clutter free environment maintained   fall prevention program maintained   lighting adjusted   nonskid shoes/slippers when out of bed   room organization consistent   safety round/check completed     Problem: Skin Injury Risk Increased  Goal: Skin Health and Integrity  Outcome: Progressing  Intervention: Optimize Skin Protection  Recent Flowsheet Documentation  Taken 10/12/2024 0610 by Floridalma Gandhi RN  Head of Bed (HOB) Positioning: HOB at 20-30 degrees  Taken 10/12/2024 0423 by Floridalma Gandhi RN  Head of Bed (HOB) Positioning: HOB at 20-30 degrees  Taken 10/12/2024 0202 by Floridalma Gandhi RN  Head of Bed (HOB)  Positioning: HOB at 20-30 degrees  Taken 10/12/2024 0015 by Floridalma Gandhi RN  Pressure Reduction Techniques: frequent weight shift encouraged  Head of Bed (HOB) Positioning: HOB at 20-30 degrees  Pressure Reduction Devices: pressure-redistributing mattress utilized  Skin Protection:   drying agents applied   incontinence pads utilized  Taken 10/11/2024 2236 by Floridalma Gandhi RN  Head of Bed ([facility]) Positioning: HOB at 20-30 degrees  Taken 10/11/2024 2058 by Floridalma Gandhi RN  Head of Bed ([facility]) Positioning: HOB at 20-30 degrees     Problem: Comorbidity Management  Goal: Blood Glucose Level Within Target Range  Outcome: Progressing  Intervention: Monitor and Manage Glycemia  Recent Flowsheet Documentation  Taken 10/12/2024 0610 by Floridalma Gandhi RN  Medication Review/Management: medications reviewed  Taken 10/12/2024 0423 by Floridalma Gandhi RN  Medication Review/Management: medications reviewed  Taken 10/12/2024 0202 by Floridalma Gandhi RN  Medication Review/Management: medications reviewed  Taken 10/12/2024 0015 by Floridalma Gandhi RN  Medication Review/Management: medications reviewed  Taken 10/11/2024 2236 by Floridalma Gandhi RN  Medication Review/Management: medications reviewed  Taken 10/11/2024 2058 by Floridalma Gandhi RN  Medication Review/Management: medications reviewed  Goal: Blood Pressure in Desired Range  Outcome: Progressing  Intervention: Maintain Blood Pressure Management  Recent Flowsheet Documentation  Taken 10/12/2024 0610 by Floridalma Gandhi RN  Medication Review/Management: medications reviewed  Taken 10/12/2024 0423 by Floridalma Gandhi RN  Medication Review/Management: medications reviewed  Taken 10/12/2024 0202 by Floridalma Gandhi RN  Medication Review/Management: medications reviewed  Taken 10/12/2024 0015 by Floridalma Gandhi RN  Medication Review/Management: medications reviewed  Taken 10/11/2024 2236 by Floridalma Gandhi RN  Medication Review/Management: medications reviewed  Taken 10/11/2024 2058 by Floridalma Gandhi  RN  Medication Review/Management: medications reviewed   Goal Outcome Evaluation:  Plan of Care Reviewed With: patient  Plan of Care Reviewed With: patient        Progress: no change  Outcome Evaluation: Pt A&Ox4, VSS on RA. Pt c/o back pain throughout the night, PRN pain meds admin. No acute change in condition noted on assessment. Safety precautions in place, plan of care ongoing.

## 2024-10-12 NOTE — PLAN OF CARE
Goal Outcome Evaluation:  Plan of Care Reviewed With: patient, child        Progress: improving  Outcome Evaluation: Patient had no acute events during shift. Patient didn't receive any opioids this shift. Patient doing fine with tylenol and scheduled robaxin. Patients family at bedside. No issues

## 2024-10-12 NOTE — PROGRESS NOTES
Name: Kanwal Sauer ADMIT: 10/7/2024   : 1950  PCP: Nisha Britton MD    MRN: [id number] LOS: 4 days   AGE/SEX: 73 y.o. female  ROOM: [facility]     Subjective   Subjective     She has no new complaints today.       Objective   Objective   Vital Signs  Temp:  [97.5 °F (36.4 °C)-98.6 °F (37 °C)] 97.5 °F (36.4 °C)  Heart Rate:  [50-64] 50  Resp:  [18-20] 18  BP: (135-182)/(58-76) 135/64  SpO2:  [97 %-100 %] 97 %  on  Flow (L/min) (Oxygen Therapy):  [2] 2;   Device (Oxygen Therapy): nasal cannula  Body mass index is 35.62 kg/m².  Physical Exam  Constitutional:       General: She is not in acute distress.  HENT:      Head: Normocephalic and atraumatic.   Cardiovascular:      Rate and Rhythm: Normal rate and regular rhythm.   Pulmonary:      Effort: Pulmonary effort is normal. No respiratory distress.   Abdominal:      General: There is no distension.      Palpations: Abdomen is soft.      Tenderness: There is no abdominal tenderness.   Musculoskeletal:      Right lower leg: No edema.      Left lower leg: No edema.   Neurological:      Mental Status: She is alert and oriented to person, place, and time.      Motor: Weakness present.     Exam reviewed and updated on 2024      Results Review     I reviewed the patient's new clinical results.  Results from last 7 days   Lab Units 10/11/24  0230 10/10/24  0300 10/09/24  0258 10/08/24  0233   WBC 10*3/mm3 4.60 4.79 5.70 5.70   HEMOGLOBIN g/dL 11.4* 11.8* 11.7* 12.7   PLATELETS 10*3/mm3 144 145 149 166     Results from last 7 days   Lab Units 10/11/24  0230 10/10/24  0300 10/09/24  0258 10/08/24  0233   SODIUM mmol/L 139 138 134* 138   POTASSIUM mmol/L 4.1 4.2 3.8 3.9   CHLORIDE mmol/L 98 97* 94* 96*   CO2 mmol/L 31.7* 35.4* 30.0* 32.7*   BUN mg/dL 35* 35* 40* 38*   CREATININE mg/dL 0.92 0.94 1.04* 0.93   GLUCOSE mg/dL 146* 153* 160* 138*   EGFR mL/min/1.73 65.1 63.4 56.2* 64.2     Results from last 7 days   Lab Units 10/11/24  0230 10/10/24  0307  10/07/24  1953   ALBUMIN g/dL 3.9 3.8 4.7   BILIRUBIN mg/dL 0.4 0.4 0.4   ALK PHOS U/L 37* 35* 41   AST (SGOT) U/L 12 14 15   ALT (SGPT) U/L 10 9 11     Results from last 7 days   Lab Units 10/11/24  0230 10/10/24  0300 10/09/24  0258 10/08/24  0233 10/07/24  1953   CALCIUM mg/dL 10.4 9.9 10.1 10.4 11.0*   ALBUMIN g/dL 3.9 3.8  --   --  4.7   MAGNESIUM mg/dL  --   --   --  1.9  --        Glucose   Date/Time Value Ref Range Status   10/12/2024 1124 291 (H) 70 - 130 mg/dL Final   10/12/2024 0755 147 (H) 70 - 130 mg/dL Final   10/11/2024 2010 168 (H) 70 - 130 mg/dL Final   10/11/2024 1627 175 (H) 70 - 130 mg/dL Final   10/11/2024 1208 198 (H) 70 - 130 mg/dL Final   10/11/2024 0753 136 (H) 70 - 130 mg/dL Final   10/10/2024 1926 137 (H) 70 - 130 mg/dL Final       No radiology results for the last day    I have personally reviewed all medications:  Scheduled Medications  apixaban, 5 mg, Oral, Q12H  budesonide-formoterol, 2 puff, Inhalation, BID - RT   And  tiotropium bromide monohydrate, 2 puff, Inhalation, Daily - RT  cholecalciferol, 1,000 Units, Oral, Daily  citalopram, 20 mg, Oral, Daily  hydroCHLOROthiazide, 12.5 mg, Oral, Daily  insulin lispro, 2-7 Units, Subcutaneous, 4x Daily AC & at Bedtime  latanoprost, 1 drop, Both Eyes, Nightly  Lidocaine, 1 patch, Transdermal, Q24H  losartan, 50 mg, Oral, Q24H  methocarbamol, 500 mg, Oral, Q6H  montelukast, 10 mg, Oral, Nightly  nystatin, , Topical, BID  primidone, 50 mg, Oral, TID    Infusions   Diet  Diet: Cardiac; Healthy Heart (2-3 Na+); Fluid Consistency: Thin (IDDSI 0)    I have personally reviewed:  [x]  Laboratory   [x]  Microbiology   [x]  Radiology   [x]  EKG/Telemetry  [x]  Cardiology/Vascular   []  Pathology    []  Records       Assessment/Plan     Active Hospital Problems    Diagnosis  POA    **Low back pain [M54.50]  Yes    Degeneration of intervertebral disc of lumbar region with discogenic back pain [M51.360]  Yes    Spondylolisthesis at L4-L5 level [M43.16]   Not Applicable    Chronic midline low back pain without sciatica [M54.50, G89.29]  Yes      Resolved Hospital Problems   No resolved problems to display.       73 y.o. female admitted with Low back pain.    Assessment and plan  Low back pain, degeneration of intervertebral disc of lumbar region, neurosurgery on board, recommended symptomatic management.  Start to skilled nursing facility for physical therapy    Dysuria  Obtain urinalysis-negative UTI     Asymptomatic bradycardia, paroxysmal atrial fibrillation,-metoprolol held    Obesity, complicates all aspects of care, BMI noted to be close to 35.     Type 2 diabetes mellitus, initiate Accu-Cheks and sliding scale insulin coverage.     History of breast cancer, status post left mastectomy and chemotherapy.     CODE STATUS is full code.    Anticipate discharge to skilled nursing facility whenever arrangements have been made.      Cameron Tolbert MD  [facility]  10/12/24  13:22 EDT

## 2024-10-13 LAB
ANION GAP SERPL CALCULATED.3IONS-SCNC: 8 MMOL/L (ref 5–15)
BUN SERPL-MCNC: 28 MG/DL (ref 8–23)
BUN/CREAT SERPL: 33.7 (ref 7–25)
CALCIUM SPEC-SCNC: 10.3 MG/DL (ref 8.6–10.5)
CHLORIDE SERPL-SCNC: 97 MMOL/L (ref 98–107)
CO2 SERPL-SCNC: 33 MMOL/L (ref 22–29)
CREAT SERPL-MCNC: 0.83 MG/DL (ref 0.57–1)
DEPRECATED RDW RBC AUTO: 43.4 FL (ref 37–54)
EGFRCR SERPLBLD CKD-EPI 2021: 73.6 ML/MIN/1.73
ERYTHROCYTE [DISTWIDTH] IN BLOOD BY AUTOMATED COUNT: 12.9 % (ref 12.3–15.4)
GLUCOSE BLDC GLUCOMTR-MCNC: 163 MG/DL (ref 70–130)
GLUCOSE BLDC GLUCOMTR-MCNC: 187 MG/DL (ref 70–130)
GLUCOSE BLDC GLUCOMTR-MCNC: 205 MG/DL (ref 70–130)
GLUCOSE SERPL-MCNC: 132 MG/DL (ref 65–99)
HCT VFR BLD AUTO: 35 % (ref 34–46.6)
HGB BLD-MCNC: 11.5 G/DL (ref 12–15.9)
MCH RBC QN AUTO: 30.2 PG (ref 26.6–33)
MCHC RBC AUTO-ENTMCNC: 32.9 G/DL (ref 31.5–35.7)
MCV RBC AUTO: 91.9 FL (ref 79–97)
PLATELET # BLD AUTO: 146 10*3/MM3 (ref 140–450)
PMV BLD AUTO: 11.6 FL (ref 6–12)
POTASSIUM SERPL-SCNC: 3.9 MMOL/L (ref 3.5–5.2)
QT INTERVAL: 422 MS
QTC INTERVAL: 389 MS
RBC # BLD AUTO: 3.81 10*6/MM3 (ref 3.77–5.28)
SODIUM SERPL-SCNC: 138 MMOL/L (ref 136–145)
WBC NRBC COR # BLD AUTO: 4.21 10*3/MM3 (ref 3.4–10.8)

## 2024-10-13 PROCEDURE — 94664 DEMO&/EVAL PT USE INHALER: CPT

## 2024-10-13 PROCEDURE — 94799 UNLISTED PULMONARY SVC/PX: CPT

## 2024-10-13 PROCEDURE — 82948 REAGENT STRIP/BLOOD GLUCOSE: CPT

## 2024-10-13 PROCEDURE — 85027 COMPLETE CBC AUTOMATED: CPT | Performed by: STUDENT IN AN ORGANIZED HEALTH CARE EDUCATION/TRAINING PROGRAM

## 2024-10-13 PROCEDURE — 63710000001 INSULIN LISPRO (HUMAN) PER 5 UNITS

## 2024-10-13 PROCEDURE — 94761 N-INVAS EAR/PLS OXIMETRY MLT: CPT

## 2024-10-13 PROCEDURE — 80048 BASIC METABOLIC PNL TOTAL CA: CPT | Performed by: STUDENT IN AN ORGANIZED HEALTH CARE EDUCATION/TRAINING PROGRAM

## 2024-10-13 RX ORDER — METHOCARBAMOL 500 MG/1
500 TABLET, FILM COATED ORAL EVERY 6 HOURS SCHEDULED
Qty: 30 TABLET | Refills: 0 | Status: SHIPPED | OUTPATIENT
Start: 2024-10-13

## 2024-10-13 RX ORDER — HYDROCODONE BITARTRATE AND ACETAMINOPHEN 5; 325 MG/1; MG/1
1 TABLET ORAL EVERY 6 HOURS PRN
Qty: 12 TABLET | Refills: 0 | Status: SHIPPED | OUTPATIENT
Start: 2024-10-13 | End: 2024-10-28

## 2024-10-13 RX ADMIN — METHOCARBAMOL TABLETS 500 MG: 500 TABLET, COATED ORAL at 00:29

## 2024-10-13 RX ADMIN — APIXABAN 5 MG: 5 TABLET, FILM COATED ORAL at 04:26

## 2024-10-13 RX ADMIN — APIXABAN 5 MG: 5 TABLET, FILM COATED ORAL at 17:49

## 2024-10-13 RX ADMIN — PRIMIDONE 50 MG: 50 TABLET ORAL at 22:31

## 2024-10-13 RX ADMIN — HYDROCHLOROTHIAZIDE 12.5 MG: 12.5 TABLET ORAL at 09:25

## 2024-10-13 RX ADMIN — LIDOCAINE 1 PATCH: 4 PATCH TOPICAL at 17:49

## 2024-10-13 RX ADMIN — LOSARTAN POTASSIUM 50 MG: 50 TABLET, FILM COATED ORAL at 09:25

## 2024-10-13 RX ADMIN — NYSTATIN: 100000 POWDER TOPICAL at 09:27

## 2024-10-13 RX ADMIN — INSULIN LISPRO 3 UNITS: 100 INJECTION, SOLUTION INTRAVENOUS; SUBCUTANEOUS at 12:09

## 2024-10-13 RX ADMIN — ACETAMINOPHEN 325MG 650 MG: 325 TABLET ORAL at 17:49

## 2024-10-13 RX ADMIN — PRIMIDONE 50 MG: 50 TABLET ORAL at 09:25

## 2024-10-13 RX ADMIN — MONTELUKAST SODIUM 10 MG: 10 TABLET, FILM COATED ORAL at 22:31

## 2024-10-13 RX ADMIN — BUDESONIDE AND FORMOTEROL FUMARATE DIHYDRATE 2 PUFF: 160; 4.5 AEROSOL RESPIRATORY (INHALATION) at 10:37

## 2024-10-13 RX ADMIN — INSULIN LISPRO 2 UNITS: 100 INJECTION, SOLUTION INTRAVENOUS; SUBCUTANEOUS at 17:48

## 2024-10-13 RX ADMIN — METHOCARBAMOL TABLETS 500 MG: 500 TABLET, COATED ORAL at 17:49

## 2024-10-13 RX ADMIN — Medication 1000 UNITS: at 09:25

## 2024-10-13 RX ADMIN — METHOCARBAMOL TABLETS 500 MG: 500 TABLET, COATED ORAL at 12:09

## 2024-10-13 RX ADMIN — HYDROCODONE BITARTRATE AND ACETAMINOPHEN 1 TABLET: 5; 325 TABLET ORAL at 09:25

## 2024-10-13 RX ADMIN — TIOTROPIUM BROMIDE INHALATION SPRAY 2 PUFF: 3.12 SPRAY, METERED RESPIRATORY (INHALATION) at 10:35

## 2024-10-13 RX ADMIN — CITALOPRAM 20 MG: 20 TABLET, FILM COATED ORAL at 09:25

## 2024-10-13 RX ADMIN — METHOCARBAMOL TABLETS 500 MG: 500 TABLET, COATED ORAL at 06:26

## 2024-10-13 RX ADMIN — PRIMIDONE 50 MG: 50 TABLET ORAL at 17:49

## 2024-10-13 RX ADMIN — NYSTATIN: 100000 POWDER TOPICAL at 22:32

## 2024-10-13 RX ADMIN — INSULIN LISPRO 2 UNITS: 100 INJECTION, SOLUTION INTRAVENOUS; SUBCUTANEOUS at 22:31

## 2024-10-13 NOTE — PROGRESS NOTES
Name: Kanwal Sauer ADMIT: 10/7/2024   : 1950  PCP: Nisha Britton MD    MRN: [id number] LOS: 5 days   AGE/SEX: 73 y.o. female  ROOM: [facility]/     Subjective   Subjective     She has no new complaints today. Still weak. Discussed with family at bedside.        Objective   Objective   Vital Signs  Temp:  [97.7 °F (36.5 °C)-99 °F (37.2 °C)] 98.2 °F (36.8 °C)  Heart Rate:  [50-57] 51  Resp:  [16-18] 16  BP: (121-152)/(53-73) 132/53  SpO2:  [98 %-100 %] 98 %  on  Flow (L/min) (Oxygen Therapy):  [1-2] 1;   Device (Oxygen Therapy): nasal cannula  Body mass index is 34.49 kg/m².  Physical Exam  Constitutional:       General: She is not in acute distress.  HENT:      Head: Normocephalic and atraumatic.   Cardiovascular:      Rate and Rhythm: Normal rate and regular rhythm.   Pulmonary:      Effort: Pulmonary effort is normal. No respiratory distress.   Abdominal:      General: There is no distension.      Palpations: Abdomen is soft.      Tenderness: There is no abdominal tenderness.   Musculoskeletal:      Right lower leg: No edema.      Left lower leg: No edema.   Neurological:      Mental Status: She is alert and oriented to person, place, and time.      Motor: Weakness present.     Exam reviewed and updated on 10/13/2024      Results Review     I reviewed the patient's new clinical results.  Results from last 7 days   Lab Units 10/13/24  0420 10/11/24  0230 10/10/24  0300 10/09/24  0258   WBC 10*3/mm3 4.21 4.60 4.79 5.70   HEMOGLOBIN g/dL 11.5* 11.4* 11.8* 11.7*   PLATELETS 10*3/mm3 146 144 145 149     Results from last 7 days   Lab Units 10/13/24  0420 10/11/24  0230 10/10/24  0300 10/09/24  0258   SODIUM mmol/L 138 139 138 134*   POTASSIUM mmol/L 3.9 4.1 4.2 3.8   CHLORIDE mmol/L 97* 98 97* 94*   CO2 mmol/L 33.0* 31.7* 35.4* 30.0*   BUN mg/dL 28* 35* 35* 40*   CREATININE mg/dL 0.83 0.92 0.94 1.04*   GLUCOSE mg/dL 132* 146* 153* 160*   EGFR mL/min/1.73 73.6 65.1 63.4 56.2*     Results from last 7  days   Lab Units 10/11/24  0230 10/10/24  0300 10/07/24  1953   ALBUMIN g/dL 3.9 3.8 4.7   BILIRUBIN mg/dL 0.4 0.4 0.4   ALK PHOS U/L 37* 35* 41   AST (SGOT) U/L 12 14 15   ALT (SGPT) U/L 10 9 11     Results from last 7 days   Lab Units 10/13/24  0420 10/11/24  0230 10/10/24  0300 10/09/24  0258 10/08/24  0233 10/07/24  1953   CALCIUM mg/dL 10.3 10.4 9.9 10.1 10.4 11.0*   ALBUMIN g/dL  --  3.9 3.8  --   --  4.7   MAGNESIUM mg/dL  --   --   --   --  1.9  --        Glucose   Date/Time Value Ref Range Status   10/13/2024 1107 205 (H) 70 - 130 mg/dL Final   10/12/2024 1923 162 (H) 70 - 130 mg/dL Final   10/12/2024 1606 228 (H) 70 - 130 mg/dL Final   10/12/2024 1124 291 (H) 70 - 130 mg/dL Final   10/12/2024 0755 147 (H) 70 - 130 mg/dL Final   10/11/2024 2010 168 (H) 70 - 130 mg/dL Final   10/11/2024 1627 175 (H) 70 - 130 mg/dL Final       No radiology results for the last day    I have personally reviewed all medications:  Scheduled Medications  apixaban, 5 mg, Oral, Q12H  budesonide-formoterol, 2 puff, Inhalation, BID - RT   And  tiotropium bromide monohydrate, 2 puff, Inhalation, Daily - RT  cholecalciferol, 1,000 Units, Oral, Daily  citalopram, 20 mg, Oral, Daily  hydroCHLOROthiazide, 12.5 mg, Oral, Daily  insulin lispro, 2-7 Units, Subcutaneous, 4x Daily AC & at Bedtime  latanoprost, 1 drop, Both Eyes, Nightly  Lidocaine, 1 patch, Transdermal, Q24H  losartan, 50 mg, Oral, Q24H  methocarbamol, 500 mg, Oral, Q6H  montelukast, 10 mg, Oral, Nightly  nystatin, , Topical, BID  primidone, 50 mg, Oral, TID    Infusions   Diet  Diet: Cardiac; Healthy Heart (2-3 Na+); Fluid Consistency: Thin (IDDSI 0)    I have personally reviewed:  [x]  Laboratory   [x]  Microbiology   [x]  Radiology   [x]  EKG/Telemetry  [x]  Cardiology/Vascular   []  Pathology    []  Records       Assessment/Plan     Active Hospital Problems    Diagnosis  POA    **Low back pain [M54.50]  Yes    Degeneration of intervertebral disc of lumbar region with  discogenic back pain [M51.360]  Yes    Spondylolisthesis at L4-L5 level [M43.16]  Not Applicable    Chronic midline low back pain without sciatica [M54.50, G89.29]  Yes      Resolved Hospital Problems   No resolved problems to display.       73 y.o. female admitted with Low back pain.    Assessment and plan  Low back pain, degeneration of intervertebral disc of lumbar region, neurosurgery on board, recommended symptomatic management.  Start to skilled nursing facility for physical therapy    Dysuria  Obtain urinalysis-negative UTI     Asymptomatic bradycardia, paroxysmal atrial fibrillation,-metoprolol held    Obesity, complicates all aspects of care, BMI noted to be close to 35.     Type 2 diabetes mellitus, initiate Accu-Cheks and sliding scale insulin coverage.     History of breast cancer, status post left mastectomy and chemotherapy.     CODE STATUS is full code.    Anticipate discharge to skilled nursing facility whenever arrangements have been made.      Cameron Tolbert MD  [facility]  10/13/24  11:24 EDT

## 2024-10-13 NOTE — DISCHARGE SUMMARY
Patient Name: Kanwal Suaer  : 1950  MRN: [id number]    Date of Admission: 10/7/2024  Date of Discharge:  10/13/2024  Primary Care Physician: Nisha Britton MD      Chief Complaint:   Back Pain      Discharge Diagnoses     Active Hospital Problems    Diagnosis  POA    **Low back pain [M54.50]  Yes    Degeneration of intervertebral disc of lumbar region with discogenic back pain [M51.360]  Yes    Spondylolisthesis at L4-L5 level [M43.16]  Not Applicable    Chronic midline low back pain without sciatica [M54.50, G89.29]  Yes      Resolved Hospital Problems   No resolved problems to display.        Hospital Course         This is a 73-year-old woman with a past medical history of atrial fibrillation on chronic anticoagulation with Eliquis who presented to hospital after experiencing worsening lower back pain for several weeks without any significant inciting event.  She underwent an MRI of the thoracic and lumbar spine that showed spondylolisthesis at the L4-L5 level.  Neurosurgical recommendations were conservative symptom management.  It was recommended that if her back pain continues that she should follow-up by Dr. Abhilash urrutia, spine deformity specialist.  It was recommended that if surgery was required, it would be done as an outpatient as surgery for deformation correction is not urgent.  She did have some lower heart rates.  Cardiology was consulted and this was thought to be secondary to a vagal response from back pain. Metoprolol was discontinued and her heart rates have remained acceptable without any beta-blockade.  She follow-up with her primary care provider or cardiologist within 2 weeks of discharge for further assessment of heart rate and blood pressure.      Physical Exam:  Temp:  [97.7 °F (36.5 °C)-99 °F (37.2 °C)] 98.2 °F (36.8 °C)  Heart Rate:  [50-57] 51  Resp:  [16-18] 16  BP: (121-152)/(53-73) 132/53  Body mass index is 34.49 kg/m².  Physical Exam  Constitutional:        General: She is not in acute distress.  HENT:      Head: Normocephalic and atraumatic.   Cardiovascular:      Rate and Rhythm: Normal rate and regular rhythm.   Pulmonary:      Effort: Pulmonary effort is normal. No respiratory distress.   Abdominal:      General: There is no distension.      Palpations: Abdomen is soft.      Tenderness: There is no abdominal tenderness.   Neurological:      General: No focal deficit present.      Mental Status: She is alert and oriented to person, place, and time.         Consultants     Consult Orders (all) (From admission, onward)       Start     Ordered    10/08/24 1430  Inpatient Neurosurgery Consult  Once        Specialty:  Neurosurgery  Provider:  Mitul Magana MD    10/08/24 1430    10/08/24 1019  Inpatient Cardiology Consult  Once        Specialty:  Cardiology  Provider:  Yasir Romano MD    10/08/24 1020    10/08/24 0005  [facility] (on-call MD unless specified) Details  Once        Specialty:  Hospitalist  Provider:  (Not yet assigned)    10/08/24 0005                  Procedures     Imaging Results (All)       Procedure Component Value Units Date/Time    MRI Lumbar Spine Without Contrast [[id number]] Collected: 10/09/24 0143     Updated: 10/09/24 0143    Narrative:        Patient: RAFAEL MATHIAS  Time Out: 01:42  Exam(s): MRI L SPINE Without Contrast     EXAM:    MR Lumbar Spine Without Intravenous Contrast    CLINICAL HISTORY:     Reason for exam: back pain.    TECHNIQUE:    Magnetic resonance images of the lumbar spine without intravenous   contrast in multiple planes.    COMPARISON:  10 7 2024     FINDINGS:    Vertebrae:  No acute fracture.  Dextroscoliosis.  Minimal   retrolisthesis of L1 on L2 and L2 on L3 and anterolisthesis of L4 on L5.    Interspaces: Multilevel severe disc height loss.    Spinal cord:  No abnormal signal at the conus.    Soft tissues:  Unremarkable.     DISCS SPINAL CANAL NEURAL FORAMINA:    L1-L2: Disc osteophyte complex results  in mild to moderate spinal canal   stenosis.  Mild bilateral foraminal stenosis.    L2-3: Mild disc bulge.  No spinal canal stenosis.  Partial effacement of   the left lateral recess potentially impinging the left traversing L3   nerve.  Mild left foraminal stenosis.    L3-4: L3-4: Mild disc bulge. Partial effacement of the left lateral   recess potentially impinging the left traversing L4 nerve.  Mild left   foraminal stenosis.    L4-5: Superiorly directed extruded disc measuring 5 mm.  Results in mild   spinal canal stenosis.  Partial effacement of the bilateral recess with   potential impingement of the bilateral traversing L5 nerve.  Mild   bilateral foraminal stenosis.  Moderate facet arthrosis.    L5-S1: Mild disc bulge.  No spinal canal stenosis.  Partial effacement   the bilateral recess potentially impinging the traversing right more than   left S1 nerve root.  Severe right foraminal stenosis.  Moderate facet   arthrosis.    IMPRESSION:         Multilevel degenerative disc disease accentuated by dextroscoliosis.   No   acute findings.      Impression:          Electronically signed by Tracy Shah MD on 10-09-24 at 0142    MRI Thoracic Spine With & Without Contrast [[id number]] Collected: 10/09/24 0117     Updated: 10/09/24 0117    Narrative:        Patient: RAFAEL MATHIAS  Time Out: 01:16  Exam(s): MRI T SPINE W WO Contrast IV Amt: 19mL multihance    EXAM:    MR Thoracic Spine Without and With Intravenous Contrast    CLINICAL HISTORY:     Reason for exam: mid back pain, spasms, hx breast CA.    TECHNIQUE:    Magnetic resonance images of the thoracic spine without and with   intravenous contrast in multiple planes.    COMPARISON:    No relevant prior studies available.    FINDINGS:    Vertebrae:  No acute fracture.  Partially autofused T10 and T11   vertebral bodies.  Mild scoliosis.    Discs spinal canal neural foramina: Multilevel degenerative disc   disease without significant central canal  stenosis at any level.    Spinal cord:  Normal signal.  No abnormal enhancement.    Soft tissues:  Unremarkable.    IMPRESSION:         No acute findings.  No marrow replacing lesions identified.      Impression:          Electronically signed by Tracy Shah MD on 10-09-24 at 0116    CT Lumbar Spine Without Contrast [[id number]] Collected: 10/07/24 2304     Updated: 10/07/24 2304    Narrative:        Patient: RAFAEL MATHIAS  Time Out: 23:04  Exam(s): CT L SPINE     EXAM:    CT Lumbar Spine Without Intravenous Contrast    CLINICAL HISTORY:      low back pain w o sciatica.    TECHNIQUE:    Axial computed tomography images of the lumbar spine without   intravenous contrast.  CTDI is 25.36 mGy and DLP is 551.9 mGy-cm.  This   CT exam was performed according to the principle of ALARA (As Low As   Reasonably Achievable) by using one or more of the following dose   reduction techniques: automated exposure control, adjustment of the mA   and or kV according to patient size, and or use of iterative   reconstruction technique.    COMPARISON:    No relevant prior studies available.    FINDINGS:    Vertebrae:  The vertebral body heights are maintained without acute   compression fracture.  There is 4-5 mm retrolisthesis of L1 on L2.    Scoliosis, convex right is noted throughout the lumbar spine.  The   pedicles, facet joints, spinous processes and transverse processes are   intact.  Diffuse facet hypertrophic changes noted throughout the lumbar   spine bilaterally. Hypertrophic changes noted involving the sacroiliac   joints, left greater than right.    Discs spinal canal neural foramina: Severe disc spondylosis with   narrowing, hypertrophic changes and vacuum phenomenon identified   throughout the lumbar spine.  Evaluation the central canal is limited   without intrathecal contrast.  However, no obvious posterior disc   protrusion. Mild to moderate multilevel osseous neural foraminal   encroachment noted, left side  "greater than right.    Soft tissues:  No significant acute traumatic soft tissue abnormality   identified.    IMPRESSION:         No acute osseous traumatic injury or significant abnormal alignment   involving the lumbar spine.  Severe multilevel degenerative changes   throughout the lumbar spine, as detailed above.  No obvious posterior   disc protrusion.  However, there is moderate multilevel neural foraminal   encroachment noted, left side greater than right.    Impression:          Electronically signed by José Christian MD on 10-07-24 at 2304            Pertinent Labs     Results from last 7 days   Lab Units 10/13/24  0420 10/11/24  0230 10/10/24  0300 10/09/24  0258   WBC 10*3/mm3 4.21 4.60 4.79 5.70   HEMOGLOBIN g/dL 11.5* 11.4* 11.8* 11.7*   PLATELETS 10*3/mm3 146 144 145 149     Results from last 7 days   Lab Units 10/13/24  0420 10/11/24  0230 10/10/24  0300 10/09/24  0258   SODIUM mmol/L 138 139 138 134*   POTASSIUM mmol/L 3.9 4.1 4.2 3.8   CHLORIDE mmol/L 97* 98 97* 94*   CO2 mmol/L 33.0* 31.7* 35.4* 30.0*   BUN mg/dL 28* 35* 35* 40*   CREATININE mg/dL 0.83 0.92 0.94 1.04*   GLUCOSE mg/dL 132* 146* 153* 160*   Estimated Creatinine Clearance: 66.4 mL/min (by C-G formula based on SCr of 0.83 mg/dL).  Results from last 7 days   Lab Units 10/11/24  0230 10/10/24  0300 10/07/24  1953   ALBUMIN g/dL 3.9 3.8 4.7   BILIRUBIN mg/dL 0.4 0.4 0.4   ALK PHOS U/L 37* 35* 41   AST (SGOT) U/L 12 14 15   ALT (SGPT) U/L 10 9 11     Results from last 7 days   Lab Units 10/13/24  0420 10/11/24  0230 10/10/24  0300 10/09/24  0258 10/08/24  0233 10/07/24  1953   CALCIUM mg/dL 10.3 10.4 9.9 10.1 10.4 11.0*   ALBUMIN g/dL  --  3.9 3.8  --   --  4.7   MAGNESIUM mg/dL  --   --   --   --  1.9  --                Invalid input(s): \"LDLCALC\"        Test Results Pending at Discharge       Discharge Details        Discharge Medications        New Medications        Instructions Start Date   HYDROcodone-acetaminophen 5-325 MG per " tablet  Commonly known as: NORCO   1 tablet, Oral, Every 6 Hours PRN      methocarbamol 500 MG tablet  Commonly known as: ROBAXIN   500 mg, Oral, Every 6 Hours Scheduled             Continue These Medications        Instructions Start Date   acetaminophen 325 MG tablet  Commonly known as: TYLENOL   650 mg, Oral, Every 4 Hours PRN      albuterol sulfate  (90 Base) MCG/ACT inhaler  Commonly known as: PROVENTIL HFA;VENTOLIN HFA;PROAIR HFA   2 puffs, Inhalation, Every 4 Hours PRN      apixaban 5 MG tablet tablet  Commonly known as: Eliquis   5 mg, Oral, Every 12 Hours      citalopram 20 MG tablet  Commonly known as: CeleXA   TAKE 1 TABLET BY MOUTH EVERY DAY      clotrimazole-betamethasone 1-0.05 % cream  Commonly known as: LOTRISONE   1 Application, Topical, 2 Times Daily, Use sparingly keep dry avoid chronic use      CRANBERRY PO   650 mg, Oral      fenofibrate 160 MG tablet   TAKE 1 TABLET BY MOUTH EVERY DAY      furosemide 40 MG tablet  Commonly known as: LASIX   20 mg, Oral, Daily PRN      hydroCHLOROthiazide 12.5 MG capsule  Commonly known as: MICROZIDE   12.5 mg, Oral, Daily      hydrocortisone 2.5 % ointment   APPLY TO FACE TWICE A DAY AS NEEDED      ipratropium-albuterol 0.5-2.5 mg/3 ml nebulizer  Commonly known as: DUO-NEB   3 mL, Nebulization, Every 4 Hours PRN      ketoconazole 2 % shampoo  Commonly known as: NIZORAL       latanoprost 0.005 % ophthalmic solution  Commonly known as: XALATAN   1 drop, Both Eyes, Every Night at Bedtime      lidocaine 5 %  Commonly known as: LIDODERM   1 patch, Transdermal, Every 24 Hours, Remove & Discard patch within 12 hours or as directed by MD      Magnesium Oxide -Mg Supplement 400 (240 Mg) MG tablet       metFORMIN 1000 MG tablet  Commonly known as: GLUCOPHAGE   1,000 mg, Oral, 2 Times Daily With Meals      montelukast 10 MG tablet  Commonly known as: SINGULAIR   10 mg, Oral, Every Night at Bedtime      mupirocin 2 % nasal ointment  Commonly known as: BACTROBAN   1  Application, Nasal, 2 Times Daily      nystatin 790391 UNIT/GM powder  Commonly known as: MYCOSTATIN   Topical, 2 Times Daily      olmesartan 20 MG tablet  Commonly known as: Benicar   20 mg, Oral, Daily      potassium chloride 10 MEQ CR tablet  Commonly known as: Klor-Con M10   10 mEq, Oral, Daily      primidone 50 MG tablet  Commonly known as: MYSOLINE   TAKE 1 TABLET BY MOUTH THREE TIMES A DAY      PROBIOTIC-10 PO   Take  by mouth.      Trelegy Ellipta 100-62.5-25 MCG/ACT inhaler  Generic drug: Fluticasone-Umeclidin-Vilant   1 puff, Daily      Vitamin D3 25 MCG (1000 UT) capsule   1,000 Units, Oral, Daily             Stop These Medications      LORazepam 0.5 MG tablet  Commonly known as: Ativan     methylPREDNISolone 4 MG dose pack  Commonly known as: MEDROL     metoprolol tartrate 25 MG tablet  Commonly known as: LOPRESSOR     tiZANidine 4 MG tablet  Commonly known as: ZANAFLEX     traMADol 50 MG tablet  Commonly known as: ULTRAM              Allergies   Allergen Reactions    Baclofen Other (See Comments)     dysrhythmia    Doxycycline Rash    Egg-Derived Products Anaphylaxis    Iodine Anaphylaxis    Latex Anaphylaxis    Milk-Related Compounds Unknown - High Severity    Msg [Monosodium Glutamate] Anaphylaxis    Penicillins Hives     Tolerated cefazolin during December 2020 admission    Metronidazole Unknown - High Severity    Ezetimibe Rash    Latex, Natural Rubber Rash    Levaquin [Levofloxacin] Rash    Nylon Rash    Simvastatin Rash         Discharge Disposition:      Discharge Diet:  Diet Order   Procedures    Diet: Cardiac; Healthy Heart (2-3 Na+); Fluid Consistency: Thin (IDDSI 0)       Discharge Activity:  AS TOLEATED      CODE STATUS:    Code Status and Medical Interventions: CPR (Attempt to Resuscitate); Full   Ordered at: 10/08/24 0112     Code Status (Patient has no pulse and is not breathing):    CPR (Attempt to Resuscitate)     Medical Interventions (Patient has pulse or is breathing):    Full        Future Appointments   Date Time Provider Department Center   10/18/2024 10:15 AM Yasir Romano MD [facility]   10/24/2024  3:30 PM [facility] MRI 1 BH [facility] MRI [facility]   10/24/2024  4:15 PM [facility] MRI 1 BH [facility] MRI [facility]   10/28/2024  1:00 PM Nisha Britton MD [facility]   10/29/2024  4:00 PM [facility] MRI MOBILE BH [facility] MRI E [facility]   11/5/2024 12:45 PM Yasir Romano MD [facility]      Contact information for after-discharge care       Destination       [facility] .    Service: Skilled Nursing  Contact information:  [address] [address] [phone]                                   Time Spent on Discharge:  Greater than 30 minutes      Cameron Tolbert MD  [facility]  10/13/24  11:28 EDT

## 2024-10-13 NOTE — PLAN OF CARE
Goal Outcome Evaluation:  Plan of Care Reviewed With: patient        Progress: improving  Outcome Evaluation: Pt A&O, on 2L NC. Purewick in place. Tylenol given for back pain. SB on monitor. Rested well. VSS.

## 2024-10-14 VITALS
WEIGHT: 207.23 LBS | BODY MASS INDEX: 34.53 KG/M2 | DIASTOLIC BLOOD PRESSURE: 68 MMHG | HEART RATE: 58 BPM | TEMPERATURE: 98.2 F | HEIGHT: 65 IN | SYSTOLIC BLOOD PRESSURE: 136 MMHG | OXYGEN SATURATION: 94 % | RESPIRATION RATE: 16 BRPM

## 2024-10-14 LAB
GLUCOSE BLDC GLUCOMTR-MCNC: 151 MG/DL (ref 70–130)
GLUCOSE BLDC GLUCOMTR-MCNC: 235 MG/DL (ref 70–130)

## 2024-10-14 PROCEDURE — 63710000001 INSULIN LISPRO (HUMAN) PER 5 UNITS

## 2024-10-14 PROCEDURE — 94760 N-INVAS EAR/PLS OXIMETRY 1: CPT

## 2024-10-14 PROCEDURE — 94799 UNLISTED PULMONARY SVC/PX: CPT

## 2024-10-14 PROCEDURE — 94761 N-INVAS EAR/PLS OXIMETRY MLT: CPT

## 2024-10-14 PROCEDURE — 94664 DEMO&/EVAL PT USE INHALER: CPT

## 2024-10-14 PROCEDURE — 82948 REAGENT STRIP/BLOOD GLUCOSE: CPT

## 2024-10-14 RX ADMIN — NYSTATIN: 100000 POWDER TOPICAL at 08:53

## 2024-10-14 RX ADMIN — Medication 1000 UNITS: at 08:53

## 2024-10-14 RX ADMIN — METHOCARBAMOL TABLETS 500 MG: 500 TABLET, COATED ORAL at 11:29

## 2024-10-14 RX ADMIN — PRIMIDONE 50 MG: 50 TABLET ORAL at 08:52

## 2024-10-14 RX ADMIN — LOSARTAN POTASSIUM 50 MG: 50 TABLET, FILM COATED ORAL at 08:52

## 2024-10-14 RX ADMIN — SENNOSIDES AND DOCUSATE SODIUM 2 TABLET: 50; 8.6 TABLET ORAL at 06:07

## 2024-10-14 RX ADMIN — APIXABAN 5 MG: 5 TABLET, FILM COATED ORAL at 03:19

## 2024-10-14 RX ADMIN — CITALOPRAM 20 MG: 20 TABLET, FILM COATED ORAL at 08:52

## 2024-10-14 RX ADMIN — METHOCARBAMOL TABLETS 500 MG: 500 TABLET, COATED ORAL at 00:58

## 2024-10-14 RX ADMIN — LATANOPROST 1 DROP: 50 SOLUTION OPHTHALMIC at 01:00

## 2024-10-14 RX ADMIN — BUDESONIDE AND FORMOTEROL FUMARATE DIHYDRATE 2 PUFF: 160; 4.5 AEROSOL RESPIRATORY (INHALATION) at 06:49

## 2024-10-14 RX ADMIN — HYDROCHLOROTHIAZIDE 12.5 MG: 12.5 TABLET ORAL at 08:52

## 2024-10-14 RX ADMIN — INSULIN LISPRO 3 UNITS: 100 INJECTION, SOLUTION INTRAVENOUS; SUBCUTANEOUS at 11:29

## 2024-10-14 RX ADMIN — TIOTROPIUM BROMIDE INHALATION SPRAY 2 PUFF: 3.12 SPRAY, METERED RESPIRATORY (INHALATION) at 06:48

## 2024-10-14 RX ADMIN — METHOCARBAMOL TABLETS 500 MG: 500 TABLET, COATED ORAL at 05:48

## 2024-10-14 NOTE — CASE MANAGEMENT/SOCIAL WORK
Case Management Discharge Note      Final Note: [facility], pre-cert approved. Daughter to transport. No additional CCP needs.         Selected Continued Care - Admitted Since 10/7/2024       Destination Coordination complete.      Service Provider Services Address Phone Fax Patient Preferred    [facility] [address] [address] [phone] --              Durable Medical Equipment    No services have been selected for the patient.                Dialysis/Infusion    No services have been selected for the patient.                Home Medical Care    No services have been selected for the patient.                Therapy    No services have been selected for the patient.                Community Resources    No services have been selected for the patient.                Community & DME    No services have been selected for the patient.                    Selected Continued Care - Episodes Includes continued care and service providers with selected services from the active episodes listed below      [facility] Episode start date: 5/21/2024   There are no active outsourced providers for this episode.                      Final Discharge Disposition Code: 03 - skilled nursing facility (SNF)

## 2024-10-14 NOTE — DISCHARGE SUMMARY
Patient Name: Kanwal Sauer  : 1950  MRN: [id number]    Date of Admission: 10/7/2024  Date of Discharge:  10/14/2024  Primary Care Physician: Nisha Britton MD      Chief Complaint:   Back Pain      Discharge Diagnoses     Active Hospital Problems    Diagnosis  POA    **Low back pain [M54.50]  Yes    Degeneration of intervertebral disc of lumbar region with discogenic back pain [M51.360]  Yes    Spondylolisthesis at L4-L5 level [M43.16]  Not Applicable    Chronic midline low back pain without sciatica [M54.50, G89.29]  Yes      Resolved Hospital Problems   No resolved problems to display.        Hospital Course         This is a 73-year-old woman with a past medical history of atrial fibrillation on chronic anticoagulation with Eliquis who presented to hospital after experiencing worsening lower back pain for several weeks without any significant inciting event.  She underwent an MRI of the thoracic and lumbar spine that showed spondylolisthesis at the L4-L5 level.  Neurosurgical recommendations were conservative symptom management.  It was recommended that if her back pain continues that she should follow-up by Dr. Abhilash Herrera, spine deformity specialist.  It was recommended that if surgery was required, it would be done as an outpatient as surgery for deformation correction is not urgent.  She did have some lower heart rates.  Cardiology was consulted and this was thought to be secondary to a vagal response from back pain. Metoprolol was discontinued and her heart rates have remained acceptable without any beta-blockade.      She follow-up with her primary care provider or cardiologist within 2 weeks of discharge for further assessment of heart rate and blood pressure.      Physical Exam:  Temp:  [97.9 °F (36.6 °C)-98.2 °F (36.8 °C)] 98.2 °F (36.8 °C)  Heart Rate:  [45-55] 52  Resp:  [16] 16  BP: (135-176)/(50-72) 137/50  Body mass index is 34.49 kg/m².  Physical Exam  Constitutional:        General: She is not in acute distress.  HENT:      Head: Normocephalic and atraumatic.   Cardiovascular:      Rate and Rhythm: Normal rate and regular rhythm.   Pulmonary:      Effort: Pulmonary effort is normal. No respiratory distress.   Abdominal:      General: There is no distension.      Palpations: Abdomen is soft.      Tenderness: There is no abdominal tenderness.   Neurological:      General: No focal deficit present.      Mental Status: She is alert and oriented to person, place, and time.         Consultants     Consult Orders (all) (From admission, onward)       Start     Ordered    10/08/24 1430  Inpatient Neurosurgery Consult  Once        Specialty:  Neurosurgery  Provider:  Mitul Magana MD    10/08/24 1430    10/08/24 1019  Inpatient Cardiology Consult  Once        Specialty:  Cardiology  Provider:  Yasir Romano MD    10/08/24 1020    10/08/24 0005  [facility] (on-call MD unless specified) Details  Once        Specialty:  Hospitalist  Provider:  (Not yet assigned)    10/08/24 0005                  Procedures     Imaging Results (All)       Procedure Component Value Units Date/Time    MRI Lumbar Spine Without Contrast [[id number]] Collected: 10/09/24 0143     Updated: 10/09/24 0143    Narrative:        Patient: RAFAEL MATHIAS  Time Out: 01:42  Exam(s): MRI L SPINE Without Contrast     EXAM:    MR Lumbar Spine Without Intravenous Contrast    CLINICAL HISTORY:     Reason for exam: back pain.    TECHNIQUE:    Magnetic resonance images of the lumbar spine without intravenous   contrast in multiple planes.    COMPARISON:  10 7 2024     FINDINGS:    Vertebrae:  No acute fracture.  Dextroscoliosis.  Minimal   retrolisthesis of L1 on L2 and L2 on L3 and anterolisthesis of L4 on L5.    Interspaces: Multilevel severe disc height loss.    Spinal cord:  No abnormal signal at the conus.    Soft tissues:  Unremarkable.     DISCS SPINAL CANAL NEURAL FORAMINA:    L1-L2: Disc osteophyte complex results  in mild to moderate spinal canal   stenosis.  Mild bilateral foraminal stenosis.    L2-3: Mild disc bulge.  No spinal canal stenosis.  Partial effacement of   the left lateral recess potentially impinging the left traversing L3   nerve.  Mild left foraminal stenosis.    L3-4: L3-4: Mild disc bulge. Partial effacement of the left lateral   recess potentially impinging the left traversing L4 nerve.  Mild left   foraminal stenosis.    L4-5: Superiorly directed extruded disc measuring 5 mm.  Results in mild   spinal canal stenosis.  Partial effacement of the bilateral recess with   potential impingement of the bilateral traversing L5 nerve.  Mild   bilateral foraminal stenosis.  Moderate facet arthrosis.    L5-S1: Mild disc bulge.  No spinal canal stenosis.  Partial effacement   the bilateral recess potentially impinging the traversing right more than   left S1 nerve root.  Severe right foraminal stenosis.  Moderate facet   arthrosis.    IMPRESSION:         Multilevel degenerative disc disease accentuated by dextroscoliosis.   No   acute findings.      Impression:          Electronically signed by Tracy Shah MD on 10-09-24 at 0142    MRI Thoracic Spine With & Without Contrast [[id number]] Collected: 10/09/24 0117     Updated: 10/09/24 0117    Narrative:        Patient: RAFAEL MATHIAS  Time Out: 01:16  Exam(s): MRI T SPINE W WO Contrast IV Amt: 19mL multihance    EXAM:    MR Thoracic Spine Without and With Intravenous Contrast    CLINICAL HISTORY:     Reason for exam: mid back pain, spasms, hx breast CA.    TECHNIQUE:    Magnetic resonance images of the thoracic spine without and with   intravenous contrast in multiple planes.    COMPARISON:    No relevant prior studies available.    FINDINGS:    Vertebrae:  No acute fracture.  Partially autofused T10 and T11   vertebral bodies.  Mild scoliosis.    Discs spinal canal neural foramina: Multilevel degenerative disc   disease without significant central canal  stenosis at any level.    Spinal cord:  Normal signal.  No abnormal enhancement.    Soft tissues:  Unremarkable.    IMPRESSION:         No acute findings.  No marrow replacing lesions identified.      Impression:          Electronically signed by Tracy Shah MD on 10-09-24 at 0116    CT Lumbar Spine Without Contrast [[id number]] Collected: 10/07/24 2304     Updated: 10/07/24 2304    Narrative:        Patient: RAFAEL MATHIAS  Time Out: 23:04  Exam(s): CT L SPINE     EXAM:    CT Lumbar Spine Without Intravenous Contrast    CLINICAL HISTORY:      low back pain w o sciatica.    TECHNIQUE:    Axial computed tomography images of the lumbar spine without   intravenous contrast.  CTDI is 25.36 mGy and DLP is 551.9 mGy-cm.  This   CT exam was performed according to the principle of ALARA (As Low As   Reasonably Achievable) by using one or more of the following dose   reduction techniques: automated exposure control, adjustment of the mA   and or kV according to patient size, and or use of iterative   reconstruction technique.    COMPARISON:    No relevant prior studies available.    FINDINGS:    Vertebrae:  The vertebral body heights are maintained without acute   compression fracture.  There is 4-5 mm retrolisthesis of L1 on L2.    Scoliosis, convex right is noted throughout the lumbar spine.  The   pedicles, facet joints, spinous processes and transverse processes are   intact.  Diffuse facet hypertrophic changes noted throughout the lumbar   spine bilaterally. Hypertrophic changes noted involving the sacroiliac   joints, left greater than right.    Discs spinal canal neural foramina: Severe disc spondylosis with   narrowing, hypertrophic changes and vacuum phenomenon identified   throughout the lumbar spine.  Evaluation the central canal is limited   without intrathecal contrast.  However, no obvious posterior disc   protrusion. Mild to moderate multilevel osseous neural foraminal   encroachment noted, left side  "greater than right.    Soft tissues:  No significant acute traumatic soft tissue abnormality   identified.    IMPRESSION:         No acute osseous traumatic injury or significant abnormal alignment   involving the lumbar spine.  Severe multilevel degenerative changes   throughout the lumbar spine, as detailed above.  No obvious posterior   disc protrusion.  However, there is moderate multilevel neural foraminal   encroachment noted, left side greater than right.    Impression:          Electronically signed by José Christian MD on 10-07-24 at 2304            Pertinent Labs     Results from last 7 days   Lab Units 10/13/24  0420 10/11/24  0230 10/10/24  0300 10/09/24  0258   WBC 10*3/mm3 4.21 4.60 4.79 5.70   HEMOGLOBIN g/dL 11.5* 11.4* 11.8* 11.7*   PLATELETS 10*3/mm3 146 144 145 149     Results from last 7 days   Lab Units 10/13/24  0420 10/11/24  0230 10/10/24  0300 10/09/24  0258   SODIUM mmol/L 138 139 138 134*   POTASSIUM mmol/L 3.9 4.1 4.2 3.8   CHLORIDE mmol/L 97* 98 97* 94*   CO2 mmol/L 33.0* 31.7* 35.4* 30.0*   BUN mg/dL 28* 35* 35* 40*   CREATININE mg/dL 0.83 0.92 0.94 1.04*   GLUCOSE mg/dL 132* 146* 153* 160*   Estimated Creatinine Clearance: 66.4 mL/min (by C-G formula based on SCr of 0.83 mg/dL).  Results from last 7 days   Lab Units 10/11/24  0230 10/10/24  0300 10/07/24  1953   ALBUMIN g/dL 3.9 3.8 4.7   BILIRUBIN mg/dL 0.4 0.4 0.4   ALK PHOS U/L 37* 35* 41   AST (SGOT) U/L 12 14 15   ALT (SGPT) U/L 10 9 11     Results from last 7 days   Lab Units 10/13/24  0420 10/11/24  0230 10/10/24  0300 10/09/24  0258 10/08/24  0233 10/07/24  1953   CALCIUM mg/dL 10.3 10.4 9.9 10.1 10.4 11.0*   ALBUMIN g/dL  --  3.9 3.8  --   --  4.7   MAGNESIUM mg/dL  --   --   --   --  1.9  --                Invalid input(s): \"LDLCALC\"        Test Results Pending at Discharge       Discharge Details        Discharge Medications        New Medications        Instructions Start Date   HYDROcodone-acetaminophen 5-325 MG per " tablet  Commonly known as: NORCO   1 tablet, Oral, Every 6 Hours PRN      methocarbamol 500 MG tablet  Commonly known as: ROBAXIN   500 mg, Oral, Every 6 Hours Scheduled             Continue These Medications        Instructions Start Date   acetaminophen 325 MG tablet  Commonly known as: TYLENOL   650 mg, Oral, Every 4 Hours PRN      albuterol sulfate  (90 Base) MCG/ACT inhaler  Commonly known as: PROVENTIL HFA;VENTOLIN HFA;PROAIR HFA   2 puffs, Inhalation, Every 4 Hours PRN      apixaban 5 MG tablet tablet  Commonly known as: Eliquis   5 mg, Oral, Every 12 Hours      citalopram 20 MG tablet  Commonly known as: CeleXA   TAKE 1 TABLET BY MOUTH EVERY DAY      clotrimazole-betamethasone 1-0.05 % cream  Commonly known as: LOTRISONE   1 Application, Topical, 2 Times Daily, Use sparingly keep dry avoid chronic use      CRANBERRY PO   650 mg, Oral      fenofibrate 160 MG tablet   TAKE 1 TABLET BY MOUTH EVERY DAY      furosemide 40 MG tablet  Commonly known as: LASIX   20 mg, Oral, Daily PRN      hydroCHLOROthiazide 12.5 MG capsule  Commonly known as: MICROZIDE   12.5 mg, Oral, Daily      hydrocortisone 2.5 % ointment   APPLY TO FACE TWICE A DAY AS NEEDED      ipratropium-albuterol 0.5-2.5 mg/3 ml nebulizer  Commonly known as: DUO-NEB   3 mL, Nebulization, Every 4 Hours PRN      ketoconazole 2 % shampoo  Commonly known as: NIZORAL       latanoprost 0.005 % ophthalmic solution  Commonly known as: XALATAN   1 drop, Both Eyes, Every Night at Bedtime      lidocaine 5 %  Commonly known as: LIDODERM   1 patch, Transdermal, Every 24 Hours, Remove & Discard patch within 12 hours or as directed by MD      Magnesium Oxide -Mg Supplement 400 (240 Mg) MG tablet       metFORMIN 1000 MG tablet  Commonly known as: GLUCOPHAGE   1,000 mg, Oral, 2 Times Daily With Meals      montelukast 10 MG tablet  Commonly known as: SINGULAIR   10 mg, Oral, Every Night at Bedtime      mupirocin 2 % nasal ointment  Commonly known as: BACTROBAN   1  Application, Nasal, 2 Times Daily      nystatin 912713 UNIT/GM powder  Commonly known as: MYCOSTATIN   Topical, 2 Times Daily      olmesartan 20 MG tablet  Commonly known as: Benicar   20 mg, Oral, Daily      potassium chloride 10 MEQ CR tablet  Commonly known as: Klor-Con M10   10 mEq, Oral, Daily      primidone 50 MG tablet  Commonly known as: MYSOLINE   TAKE 1 TABLET BY MOUTH THREE TIMES A DAY      PROBIOTIC-10 PO   Take  by mouth.      Trelegy Ellipta 100-62.5-25 MCG/ACT inhaler  Generic drug: Fluticasone-Umeclidin-Vilant   1 puff, Daily      Vitamin D3 25 MCG (1000 UT) capsule   1,000 Units, Oral, Daily             Stop These Medications      LORazepam 0.5 MG tablet  Commonly known as: Ativan     methylPREDNISolone 4 MG dose pack  Commonly known as: MEDROL     metoprolol tartrate 25 MG tablet  Commonly known as: LOPRESSOR     tiZANidine 4 MG tablet  Commonly known as: ZANAFLEX     traMADol 50 MG tablet  Commonly known as: ULTRAM              Allergies   Allergen Reactions    Baclofen Other (See Comments)     dysrhythmia    Doxycycline Rash    Egg-Derived Products Anaphylaxis    Iodine Anaphylaxis    Latex Anaphylaxis    Milk-Related Compounds Unknown - High Severity    Msg [Monosodium Glutamate] Anaphylaxis    Penicillins Hives     Tolerated cefazolin during December 2020 admission    Metronidazole Unknown - High Severity    Ezetimibe Rash    Latex, Natural Rubber Rash    Levaquin [Levofloxacin] Rash    Nylon Rash    Simvastatin Rash         Discharge Disposition:  Skilled Nursing Facility (DC - External)    Discharge Diet:  Diet Order   Procedures    Diet: Cardiac; Healthy Heart (2-3 Na+); Fluid Consistency: Thin (IDDSI 0)       Discharge Activity:  AS TOLEATED      CODE STATUS:    Code Status and Medical Interventions: CPR (Attempt to Resuscitate); Full   Ordered at: 10/08/24 0112     Code Status (Patient has no pulse and is not breathing):    CPR (Attempt to Resuscitate)     Medical Interventions (Patient  has pulse or is breathing):    Full       Future Appointments   Date Time Provider Department Center   10/18/2024 10:15 AM Yasir Romano MD [facility]   10/24/2024  3:30 PM [facility] MRI 1 BH L[facility] MRI [facility]   10/24/2024  4:15 PM [facility] MRI 1 BH L[facility] MRI L[facility]   10/28/2024  1:00 PM Nisha Britton MD [facility] EASPT [facility]   10/29/2024  4:00 PM [facility] MRI MOBILE BH [facility] MRI E [facility]CE   11/5/2024 12:45 PM Yasir Romano MD [facility]      Contact information for follow-up providers       Nisha Britton MD .    Specialty: Family Medicine  Contact information:  [address]  [facility] [address]  [facility] [id number] [phone]                       Contact information for after-discharge care       Destination       [facility] .    Service: Skilled Nursing  Contact information:  [address] [facility] [address] [phone]                                   Time Spent on Discharge:  Greater than 30 minutes      Cameron Tolbert MD  [facility]  10/14/24  11:28 EDT

## 2024-10-14 NOTE — PLAN OF CARE
Goal Outcome Evaluation:            Here for back pain. Denies pain overnight, used the pure wick without difficulty. Currently awaiting provider in the AM.

## 2024-10-14 NOTE — CASE MANAGEMENT/SOCIAL WORK
Continued Stay Note  [facility]     Patient Name: Kanwal Sauer  MRN: [id number]  Today's Date: 10/14/2024    Admit Date: 10/7/2024    Plan: [facility], pre-cert approved. Daughter to transport.   Discharge Plan       Row Name 10/14/24 1232       Plan    Plan [facility], pre-cert approved. Daughter to transport.    Patient/Family in Agreement with Plan yes    Plan Comments Omid ASKEW/CCP notified CCP pre-cert approved over the weekend and valid to admit until 10/18/24. MD, RN and Irma/Jocelyne notified. DC orders noted. Packet given to RN. Elvin ADKINS/CCP    Final Discharge Disposition Code 03 - skilled nursing facility (SNF)    Final Note Edd Mendez [facility], pre-cert approved. Daughter to transport. No additional CCP needs.                   Discharge Codes    No documentation.                 Expected Discharge Date and Time       Expected Discharge Date Expected Discharge Time    Oct 14, 2024               Cassie Dumont RN

## 2024-10-14 NOTE — CASE MANAGEMENT/SOCIAL WORK
Post-Acute Authorization Submission      Post Acute Pre-Cert Documentation  Request Submitted by Facility - Type:: Hospital  Post-Acute Authorization Type Submitted:: SNF  Date Post Acute Pre-Cert Inititated per Facility: 10/11/24  Date Post Acute Pre-Cert Completed: 10/14/24  Accepting Facility: [facility] Discharge Date Requested: 10/11/24  All Clinicals Submitted?: Yes  Had Accepting Facility at Time of Submission: Yes  Response Received from Insurance?: Approval  Response Communicated to:: , Accepting Facility Liaison, Accepting Facility Auth Department  Authorization Number:: APPROVED [id number]  Post Acute Pre-Cert Initiated Comment: VALID TO ADMIT [facility]PTO 10/18/24.              Omid Sanchez, PCT

## 2024-10-16 ENCOUNTER — TELEPHONE (OUTPATIENT)
Dept: CARDIOLOGY | Facility: CLINIC | Age: 74
End: 2024-10-16

## 2024-10-16 NOTE — TELEPHONE ENCOUNTER
Caller: SHELDON TYSON(poa)    Relationship to patient: Emergency Contact    Best call back number: [phone]     Chief complaint: POST HOSPITAL CHECK IN    Type of visit: HFU    Requested date: AS NEEDED     If rescheduling, when is the original appointment: 11.05.24     Additional notes:PT DAUGHTER CALLING TO [facility] PT APPT AS PT HAS BEEN IN THE HOSPITAL AND IS HAVING APPTS ADDED AND MOVED AROUND - SHE IS PTS RIDE AND POA, SHE HAS THE MOST FLEXIBILITY ON M, W, F - PT HAS HAD MEDICATION CHANGES SINCE BEING IN THE HOSPITAL, OTHERWISE, NO SPECIFIC CONCERNS OR QUESTIONS - PT WAS IN HOSPITAL ORIGINALLY FOR LOW HR BUT PT DAUGHTER STATES THAT HAS COME BACK UP - PT HAD APPT ON 10.18.24 BUT WAS NOT AWARE, THAT APPT WAS CANCELLED, PT HAD ANOTHER APPT ON 11.05.24 AND PT DAUGHTER STATES THEY CANNOT MAKE THAT ONE EITHER, CANCELLED THE 10.18.24 DUE TO AVAILABILITY AND LEAVING 11.05.24 APPT FOR [facility]

## 2024-10-21 ENCOUNTER — OFFICE VISIT (OUTPATIENT)
Dept: FAMILY MEDICINE CLINIC | Facility: CLINIC | Age: 74
End: 2024-10-21
Payer: MEDICARE

## 2024-10-21 VITALS
HEART RATE: 55 BPM | OXYGEN SATURATION: 99 % | DIASTOLIC BLOOD PRESSURE: 78 MMHG | WEIGHT: 212 LBS | SYSTOLIC BLOOD PRESSURE: 124 MMHG | HEIGHT: 65 IN | RESPIRATION RATE: 17 BRPM | BODY MASS INDEX: 35.32 KG/M2

## 2024-10-21 DIAGNOSIS — I10 ESSENTIAL HYPERTENSION: Primary | ICD-10-CM

## 2024-10-21 DIAGNOSIS — M51.360 DEGENERATION OF INTERVERTEBRAL DISC OF LUMBAR REGION WITH DISCOGENIC BACK PAIN: ICD-10-CM

## 2024-10-21 RX ORDER — FENOFIBRATE 160 MG/1
TABLET ORAL
Qty: 90 TABLET | Refills: 1 | Status: SHIPPED | OUTPATIENT
Start: 2024-10-21

## 2024-10-21 NOTE — PROGRESS NOTES
Chief Complaint  Chief Complaint   Patient presents with    Hospital Follow Up Visit     Pt here for f/u of low back pain        Transitional Care Progress Note        Subjective    History of Present Illness        Kanwal Sauer is a 74 y.o. female who presents for a transitional care management visit.    Within 48 business hours after discharge our office contacted her via telephone to coordinate her care and needs.      I reviewed and discussed the details of that call along with the discharge summary, hospital problems, inpatient lab results, inpatient diagnostic studies, and consultation reports with Kanwal.     Current outpatient and discharge medications have been reconciled for the patient.  Reviewed by: Indio Cotton Sr., MD        Risk for Readmission (LACE) No data recorded      Kanwal Sauer presents to [facility] for   History of Present Illness  The patient is a 73-year-old female who presents for a hospital follow-up visit. She is accompanied by her daughter.    She was hospitalized for 8 days due to severe back pain that had been persisting for approximately a month. Prior to her admission, she had visited the emergency room three times. Her hospital stay lasted from 02/07/2024 to 02/14/2024, after which she was transferred to [place] for rehabilitation. She has since been discharged from there.    Her back pain, which used to occur more frequently, typically started in her neck, radiated down the right side of her back, and then crossed over. However, she has not experienced an episode since 01/2024 or 02/2024. The last time she had an episode, she was hospitalized for a few days in early 2024. During that period, she underwent physical and occupational therapy. An MRI of her lumbar spine revealed degenerative disc disease. She is scheduled for a cervical thoracic MRI this Thursday. Her condition has significantly improved.    Initially, her low magnesium  "level (1.3) was thought to be the cause of her back pain. Despite treatment, her pain persisted. She was prescribed hydrocodone, which she took once. She also has a tendency to not drink enough water, leading to dehydration.     History of Present Illness      Objective   Vital Signs:   Visit Vitals  /78   Pulse 55   Resp 17   Ht 165.1 cm (65\")   Wt 96.2 kg (212 lb)   SpO2 99%   BMI 35.28 kg/m²             Physical Exam  Vitals reviewed.   Constitutional:       Appearance: She is well-developed.   HENT:      Head: Normocephalic.      Right Ear: External ear normal.      Left Ear: External ear normal.      Nose: Nose normal.   Eyes:      Conjunctiva/sclera: Conjunctivae normal.   Cardiovascular:      Rate and Rhythm: Normal rate and regular rhythm.   Pulmonary:      Effort: Pulmonary effort is normal.      Breath sounds: Normal breath sounds.   Musculoskeletal:         General: Normal range of motion.      Cervical back: Normal range of motion and neck supple.   Skin:     General: Skin is warm and dry.      Capillary Refill: Capillary refill takes less than 2 seconds.   Neurological:      Mental Status: She is alert and oriented to person, place, and time.        Physical Exam          Result Review :           Results  Laboratory Studies  Magnesium level was 1.3.    Imaging  MRI on the lumbar showed degenerative disc.             Assessment and Plan      Diagnoses and all orders for this visit:    1. Essential hypertension (Primary)  Assessment & Plan:  Hypertension is stable and controlled  Continue current treatment regimen.  Dietary sodium restriction.  Weight loss.  Regular aerobic exercise.  Blood pressure will be reassessed in 6 months.      2. Degeneration of intervertebral disc of lumbar region with discogenic back pain  Assessment & Plan:  The patient was hospitalized for severe back pain and was diagnosed with degenerative disc disease following an MRI of the lumbar spine. She is scheduled for a " cervical and thoracic MRI this Thursday to further evaluate her condition. Continuation of physical therapy was advised to help manage symptoms and prevent recurrence. Regular exercise and muscle strengthening were recommended. Hydration was emphasized with the suggestion to consume water or Gatorade. The use of a heating pad was suggested for muscle relaxation, with caution to avoid skin burns. Massage therapy was also recommended as a potential treatment option.         Assessment & Plan            Follow Up   No follow-ups on file.  Patient was given instructions and counseling regarding her condition or for health maintenance advice. Please see specific information pulled into the AVS if appropriate.

## 2024-10-23 ENCOUNTER — TELEPHONE (OUTPATIENT)
Dept: FAMILY MEDICINE CLINIC | Facility: CLINIC | Age: 74
End: 2024-10-23
Payer: MEDICARE

## 2024-10-23 NOTE — TELEPHONE ENCOUNTER
Care tenders called and said that the patient started PT today and she needed a verbal okay to continue PT.    Karolyn WALTERS:[phone]

## 2024-10-23 NOTE — TELEPHONE ENCOUNTER
Called caretenders back and gave the okay to continue with PT    Terri Dowell MA  10/23/24, 15:52 EDT

## 2024-10-24 ENCOUNTER — HOSPITAL ENCOUNTER (OUTPATIENT)
Dept: MRI IMAGING | Facility: HOSPITAL | Age: 74
Discharge: HOME | End: 2024-10-24
Payer: MEDICARE

## 2024-10-24 ENCOUNTER — APPOINTMENT (OUTPATIENT)
Dept: MRI IMAGING | Facility: HOSPITAL | Age: 74
End: 2024-10-24
Payer: MEDICARE

## 2024-10-24 DIAGNOSIS — M54.6 ACUTE BILATERAL THORACIC BACK PAIN: ICD-10-CM

## 2024-10-24 PROCEDURE — 72141 MRI NECK SPINE W/O DYE: CPT

## 2024-10-25 NOTE — TELEPHONE ENCOUNTER
Urology Surgery Operative Note    
Operative Note    
Procedure Date: 10/25/24    
Pre-op Diagnosis: bilateral ureter stones/bulbar urethral stricture    
Post-op Diagnosis: same as pre-op    
Procedures performed: bilateral urs/holmium laser stones and basket extraction   
stones and jj stents --all bilateral.    Also urethral dilation using wolf   
sounds 18-26fr     
    
Findings: stones in bilateral distal ureters removed; bulbar urethra approx 20fr  
stx.    
Specimens:     
stones    
Detailed description of Procedure:     
    
    
Pt prepped/draped.   Given iv abx.   21fr cystscopy done and bulbar urethra   
narrowed and dilated 18-26fr w/ wolf sounds.   Prostate appears fibrotic.       
ureteral catheter advanced in right ureter and 2 wires into right kidney.     
rigid urs over wire and stone encountered distal ureter.    Holmium 270 fiber   
used 10/1 to break stone and then basket extracted.    variable length/6fr jj   
stent placed w/ good postioning noted.   Same exact procedure w/ same findings   
on left.    Bladder emptied and stones sent for anlaysis    
    
    
    
    
dispostion---WILL NEED JJ STENTS REMOVED IN FEW WEEKS W/ EITHER DR CAMARGO OR   
DEBORA.   PT SON SAYS HAS SEEN BOTH SO THEY WILL NEED TO DECIDE ON FOLLOW UP.    
CAN D/C TOMORROW ON ABX PRN.   NO OTHER MEDS NEEDED. yes

## 2024-10-28 ENCOUNTER — OFFICE VISIT (OUTPATIENT)
Dept: FAMILY MEDICINE CLINIC | Facility: CLINIC | Age: 74
End: 2024-10-28
Payer: MEDICARE

## 2024-10-28 VITALS
TEMPERATURE: 99.2 F | DIASTOLIC BLOOD PRESSURE: 48 MMHG | HEART RATE: 60 BPM | OXYGEN SATURATION: 92 % | RESPIRATION RATE: 16 BRPM | HEIGHT: 65 IN | BODY MASS INDEX: 35.81 KG/M2 | WEIGHT: 214.9 LBS | SYSTOLIC BLOOD PRESSURE: 130 MMHG

## 2024-10-28 DIAGNOSIS — M51.360 DEGENERATION OF INTERVERTEBRAL DISC OF LUMBAR REGION WITH DISCOGENIC BACK PAIN: ICD-10-CM

## 2024-10-28 DIAGNOSIS — L22 CANDIDAL DIAPER DERMATITIS: ICD-10-CM

## 2024-10-28 DIAGNOSIS — Z00.00 MEDICARE ANNUAL WELLNESS VISIT, SUBSEQUENT: Primary | ICD-10-CM

## 2024-10-28 DIAGNOSIS — Z78.0 POSTMENOPAUSAL: ICD-10-CM

## 2024-10-28 DIAGNOSIS — E11.65 TYPE 2 DIABETES MELLITUS WITH HYPERGLYCEMIA, WITHOUT LONG-TERM CURRENT USE OF INSULIN: ICD-10-CM

## 2024-10-28 DIAGNOSIS — B37.2 CANDIDAL DIAPER DERMATITIS: ICD-10-CM

## 2024-10-28 PROCEDURE — G0439 PPPS, SUBSEQ VISIT: HCPCS | Performed by: FAMILY MEDICINE

## 2024-10-28 PROCEDURE — 3078F DIAST BP <80 MM HG: CPT | Performed by: FAMILY MEDICINE

## 2024-10-28 PROCEDURE — 3075F SYST BP GE 130 - 139MM HG: CPT | Performed by: FAMILY MEDICINE

## 2024-10-28 PROCEDURE — 99214 OFFICE O/P EST MOD 30 MIN: CPT | Performed by: FAMILY MEDICINE

## 2024-10-28 PROCEDURE — 3051F HG A1C>EQUAL 7.0%<8.0%: CPT | Performed by: FAMILY MEDICINE

## 2024-10-28 PROCEDURE — 1125F AMNT PAIN NOTED PAIN PRSNT: CPT | Performed by: FAMILY MEDICINE

## 2024-10-28 RX ORDER — PRIMIDONE 50 MG/1
50 TABLET ORAL 3 TIMES DAILY
Qty: 270 TABLET | Refills: 1 | Status: SHIPPED | OUTPATIENT
Start: 2024-10-28

## 2024-10-28 RX ORDER — PRIMIDONE 50 MG/1
TABLET ORAL
Qty: 270 TABLET | Refills: 0 | OUTPATIENT
Start: 2024-10-28

## 2024-10-28 NOTE — PROGRESS NOTES
Subjective   The ABCs of the Annual Wellness Visit  Medicare Wellness Visit      Kanwal Sauer is a 74 y.o. patient who presents for a Medicare Wellness Visit.    The following portions of the patient's history were reviewed and   updated as appropriate: allergies, current medications, past family history, past medical history, past social history, past surgical history, and problem list.    Compared to one year ago, the patient's physical   health is worse.  Compared to one year ago, the patient's mental   health is the same.    Recent Hospitalizations:  This patient has had a [facility] admission record on file within the last 365 days.  Current Medical Providers:  Patient Care Team:  Nisha Britton MD as PCP - General (Family Medicine)  Corey Britton MD as Consulting Physician (Sleep Medicine)  Belen Khan APRN as Nurse Practitioner (Obstetrics and Gynecology)  Nia Zhang APRN as Nurse Practitioner (Cardiology)  José Valentino MD as Consulting Physician (Urology)  Indio Box MD as Consulting Physician (Pulmonary Disease)    Outpatient Medications Prior to Visit   Medication Sig Dispense Refill    acetaminophen (TYLENOL) 325 MG tablet Take 2 tablets by mouth Every 4 (Four) Hours As Needed for Mild Pain .      albuterol sulfate  (90 Base) MCG/ACT inhaler INHALE 2 PUFFS BY MOUTH EVERY 4 HOURS AS NEEDED FOR WHEEZE 18 g 1    apixaban (Eliquis) 5 MG tablet tablet Take 1 tablet by mouth Every 12 (Twelve) Hours. 60 tablet 2    Cholecalciferol (Vitamin D3) 25 MCG (1000 UT) capsule Take 1 capsule by mouth Daily.      citalopram (CeleXA) 20 MG tablet TAKE 1 TABLET BY MOUTH EVERY DAY 90 tablet 1    CRANBERRY PO Take 650 mg by mouth.      fenofibrate 160 MG tablet TAKE 1 TABLET BY MOUTH EVERY DAY 90 tablet 1    furosemide (LASIX) 40 MG tablet Take 0.5 tablets by mouth Daily As Needed (Take a dose of Lasix if you notice a 3 pound weight gain, increasing leg swelling, or an  increase in your oxygen requirement.).      hydroCHLOROthiazide (MICROZIDE) 12.5 MG capsule Take 1 capsule by mouth Daily. 90 capsule 3    ketoconazole (NIZORAL) 2 % shampoo       latanoprost (XALATAN) 0.005 % ophthalmic solution Administer 1 drop to both eyes every night at bedtime.      lidocaine (LIDODERM) 5 % Place 1 patch on the skin as directed by provider Daily. Remove & Discard patch within 12 hours or as directed by MD 15 each 0    Magnesium Oxide -Mg Supplement 400 (240 Mg) MG tablet       montelukast (SINGULAIR) 10 MG tablet TAKE 1 TABLET BY MOUTH EVERYDAY AT BEDTIME 90 tablet 1    nystatin (MYCOSTATIN) 948552 UNIT/GM powder Apply  topically to the appropriate area as directed 2 (Two) Times a Day. 60 g 1    olmesartan (Benicar) 20 MG tablet Take 1 tablet by mouth Daily. 90 tablet 0    potassium chloride (KLOR-CON) 10 MEQ CR tablet Take 1 tablet by mouth Daily. 90 tablet 0    Probiotic Product (PROBIOTIC-10 PO) Take  by mouth.      Trelegy Ellipta 100-62.5-25 MCG/ACT inhaler INHALE 1 PUFF DAILY 60 each 1    metFORMIN (GLUCOPHAGE) 1000 MG tablet TAKE 1 TABLET BY MOUTH TWICE A DAY WITH FOOD 180 tablet 0    primidone (MYSOLINE) 50 MG tablet TAKE 1 TABLET BY MOUTH THREE TIMES A  tablet 0    clotrimazole-betamethasone (LOTRISONE) 1-0.05 % cream Apply 1 Application topically to the appropriate area as directed 2 (Two) Times a Day. Use sparingly keep dry avoid chronic use (Patient not taking: Reported on 10/28/2024) 45 g 2    ipratropium-albuterol (DUO-NEB) 0.5-2.5 mg/3 ml nebulizer Take 3 mL by nebulization Every 4 (Four) Hours As Needed for Wheezing. (Patient not taking: Reported on 10/28/2024) 150 mL 3    methocarbamol (ROBAXIN) 500 MG tablet Take 1 tablet by mouth Every 6 (Six) Hours. (Patient not taking: Reported on 10/28/2024) 30 tablet 0    mupirocin (BACTROBAN) 2 % nasal ointment Administer 1 Application into the nostril(s) as directed by provider 2 (Two) Times a Day. (Patient not taking: Reported  "on 10/28/2024) 22 g 1    HYDROcodone-acetaminophen (NORCO) 5-325 MG per tablet Take 1 tablet by mouth Every 6 (Six) Hours As Needed for Moderate Pain. (Patient not taking: Reported on 10/21/2024) 12 tablet 0    hydrocortisone 2.5 % ointment APPLY TO FACE TWICE A DAY AS NEEDED (Patient not taking: Reported on 10/28/2024)       No facility-administered medications prior to visit.     No opioid medication identified on active medication list. I have reviewed chart for other potential  high risk medication/s and harmful drug interactions in the elderly.      Aspirin is not on active medication list.  Aspirin use is contraindicated for this patient due to: current use of Eliquis.  .    Patient Active Problem List   Diagnosis    Closed fracture of left distal femur    Type 2 diabetes mellitus with circulatory disorder, without long-term current use of insulin    Essential hypertension    Tremors of nervous system    PAF (paroxysmal atrial fibrillation)    TARSHA (obstructive sleep apnea)    History of breast cancer    History of seizure    Risk for falls    Neck pain    Acute UTI    Chronic midline low back pain without sciatica    Vulvar rash    Elevated troponin    Hypomagnesemia    COPD (chronic obstructive pulmonary disease)    Low back pain    Type 2 diabetes mellitus with hyperglycemia, without long-term current use of insulin    Bradycardia    Degeneration of intervertebral disc of lumbar region with discogenic back pain    Spondylolisthesis at L4-L5 level     Advance Care Planning Advance Directive is not on file.  ACP discussion was held with the patient during this visit. Patient has an advance directive (not in EMR), copy requested.            Objective   Vitals:    10/28/24 1307   BP: 130/48   BP Location: Right arm   Patient Position: Sitting   Cuff Size: Adult   Pulse: 60   Resp: 16   Temp: 99.2 °F (37.3 °C)   TempSrc: Oral   SpO2: 92%   Weight: 97.5 kg (214 lb 14.4 oz)   Height: 165.1 cm (65\")   PainSc:   2 " "  PainLoc: Ear  Comment: and back       Estimated body mass index is 35.76 kg/m² as calculated from the following:    Height as of this encounter: 165.1 cm (65\").    Weight as of this encounter: 97.5 kg (214 lb 14.4 oz).    Class 2 Severe Obesity (BMI >=35 and <=39.9). Obesity-related health conditions include the following: obstructive sleep apnea, hypertension, coronary heart disease, diabetes mellitus, dyslipidemias, GERD, and osteoarthritis. Obesity is worsening. BMI is is above average; BMI management plan is completed. We discussed portion control.       Does the patient have evidence of cognitive impairment? Yes                                                                                                Health  Risk Assessment    Smoking Status:  Social History     Tobacco Use   Smoking Status Never    Passive exposure: Never   Smokeless Tobacco Never   Tobacco Comments    no caffine     Alcohol Consumption:  Social History     Substance and Sexual Activity   Alcohol Use Not Currently       Fall Risk Screen  STEADI Fall Risk Assessment was completed, and patient is at MODERATE risk for falls. Assessment completed on:10/28/2024    Depression Screening:      10/29/2024     7:00 AM   PHQ-2/PHQ-9 Depression Screening   Little interest or pleasure in doing things Not at all   Feeling down, depressed, or hopeless Several days   Trouble falling or staying asleep, or sleeping too much Several days   Feeling tired or having little energy Over half   Poor appetite or overeating Not at all   Feeling bad about yourself - or that you are a failure or have let yourself or your family down Several days   Trouble concentrating on things, such as reading the newspaper or watching television Not at all   Moving or speaking so slowly that other people could have noticed? Or the opposite - being so fidgety or restless that you have been moving around a lot more than usual. Not at all   Thoughts that you would be better off " dead or hurting yourself in some way Not at all   Patient Health Questionnaire-9 Score 5   How difficult have these problems made it for you to do your work, take care of things at home, or get along with other people? Not difficult at all     Health Habits and Functional and Cognitive Screening:      10/28/2024     1:00 PM   Functional & Cognitive Status   Do you have difficulty preparing food and eating? No   Do you have difficulty bathing yourself, getting dressed or grooming yourself? No   Do you have difficulty using the toilet? No   Do you have difficulty moving around from place to place? Yes   Do you have trouble with steps or getting out of a bed or a chair? Yes   Current Diet Well Balanced Diet   Dental Exam Not up to date   Eye Exam Up to date   Exercise (times per week) 7 times per week   Current Exercises Include Aerobics;Home Fitness Gym   Do you need help using the phone?  No   Are you deaf or do you have serious difficulty hearing?  Yes   Do you need help to go to places out of walking distance? Yes   Do you need help shopping? No   Do you need help preparing meals?  Yes   Do you need help with housework?  Yes   Do you need help with laundry? Yes   Do you need help taking your medications? Yes   Do you need help managing money? Yes   Do you ever drive or ride in a car without wearing a seat belt? No   Have you felt unusual stress, anger or loneliness in the last month? No   Who do you live with? Community   If you need help, do you have trouble finding someone available to you? No   Have you been bothered in the last four weeks by sexual problems? No   Do you have difficulty concentrating, remembering or making decisions? Yes           Age-appropriate Screening Schedule:  Refer to the list below for future screening recommendations based on patient's age, sex and/or medical conditions. Orders for these recommended tests are listed in the plan section. The patient has been provided with a written  plan.    Health Maintenance List  Health Maintenance   Topic Date Due    TDAP/TD VACCINES (1 - Tdap) Never done    DXA SCAN  03/16/2023    RSV Vaccine - Adults (1 - 1-dose 75+ series) Never done    COVID-19 Vaccine (4 - 2024-25 season) 09/01/2024    HEMOGLOBIN A1C  01/26/2025    Pneumococcal Vaccine 65+ (1 of 2 - PCV) 10/28/2025 (Originally 10/30/1956)    LIPID PANEL  07/26/2025    URINE MICROALBUMIN  07/26/2025    ANNUAL WELLNESS VISIT  10/28/2025    BMI FOLLOWUP  10/28/2025    DIABETIC EYE EXAM  11/01/2025    HEPATITIS C SCREENING  Completed    INFLUENZA VACCINE  Discontinued    ZOSTER VACCINE  Discontinued    COLORECTAL CANCER SCREENING  Discontinued                                                                                                                                                CMS Preventative Services Quick Reference  Risk Factors Identified During Encounter  Immunizations Discussed/Encouraged: Prevnar 20 (Pneumococcal 20-valent conjugate), COVID19, and RSV (Respiratory Syncytial Virus)    The above risks/problems have been discussed with the patient.  Pertinent information has been shared with the patient in the After Visit Summary.  An After Visit Summary and PPPS were made available to the patient.    Follow Up:   Next Medicare Wellness visit to be scheduled in 1 year.         Additional E&M Note during same encounter follows:  Patient has additional, significant, and separately identifiable condition(s)/problem(s) that require work above and beyond the Medicare Wellness Visit     Chief Complaint  Medicare Wellness-subsequent, Sore (On buttock, daughter believes it's a bed sore.), Imaging Only (Review most recent MRI results and discuss next steps.), and Earache (The back part of the ear every so often theres a pain, might be from the oxygen tubing?)    Subjective   HPI  Kanwal is also being seen today for additional medical problem/s.  She is here for follow-up on her back pain patient with  "history of neck pain upper back pain and low back pain with history of spasm had MRI of C-spine lumbar spine and thoracic spine.  Was admitted in the hospital.  Daughter to discuss the findings.  His past and pain is stable since her hospitalization  She is also complaining of sore at her buttocks.  She she is also complaining of left ear pain, her pain is outside her ear denies any congestion cold.                  Objective   Vital Signs:  /48 (BP Location: Right arm, Patient Position: Sitting, Cuff Size: Adult)   Pulse 60   Temp 99.2 °F (37.3 °C) (Oral)   Resp 16   Ht 165.1 cm (65\")   Wt 97.5 kg (214 lb 14.4 oz)   SpO2 92%   BMI 35.76 kg/m²   Physical Exam  Constitutional:       General: She is not in acute distress.     Appearance: Normal appearance. She is well-developed.   HENT:      Head: Normocephalic and atraumatic.      Right Ear: Tympanic membrane normal.      Left Ear: Tympanic membrane normal.      Mouth/Throat:      Mouth: Mucous membranes are moist.   Eyes:      General:         Right eye: No discharge.         Left eye: No discharge.      Extraocular Movements: Extraocular movements intact.      Pupils: Pupils are equal, round, and reactive to light.   Cardiovascular:      Rate and Rhythm: Normal rate and regular rhythm.      Pulses: Normal pulses.      Heart sounds: Normal heart sounds.   Pulmonary:      Effort: Pulmonary effort is normal.      Breath sounds: Normal breath sounds. No wheezing or rales.   Abdominal:      General: Bowel sounds are normal.      Palpations: Abdomen is soft. There is no mass.      Tenderness: There is no abdominal tenderness.   Musculoskeletal:      Cervical back: Normal range of motion and neck supple.      Right lower leg: No edema.      Left lower leg: No edema.   Lymphadenopathy:      Cervical: No cervical adenopathy.   Skin:     Comments: Skin irritation behind left ear auricle secondary to oxygen tubing  B/l buttocks  erythematous rash involving both " buttocks    Neurological:      General: No focal deficit present.      Mental Status: She is alert and oriented to person, place, and time.                 Assessment and Plan               Medicare annual wellness visit, subsequent    Postmenopausal  Will schedule for bone density, continue vitamin D3  Degeneration of intervertebral disc of lumbar region with discogenic back pain  Imaging reviewed with daughter, continue physical therapy will refer to neurosurgery.  Type 2 diabetes mellitus with hyperglycemia, without long-term current use of insulin  Lab results from hospital admission reviewed.  Continue metformin  Candidal diaper dermatitis  Continue diaper rash cream, advised to not to wear diaper for few hours in the daytime to keep the area dry and frequent change of diaper    Orders Placed This Encounter   Procedures    DEXA Bone Density Axial     Order Specific Question:   Is patient taking or have taken long term Glucocorticoid (steroids)?     Answer:   No     Order Specific Question:   Does the patient have rheumatoid arthritis?     Answer:   No     Order Specific Question:   Does the patient have secondary osteoporosis?     Answer:   No     Order Specific Question:   Reason for Exam:     Answer:   postmenopausal     Order Specific Question:   Release to patient     Answer:   Routine Release [[id number]]    Ambulatory Referral to Neurosurgery     Referral Priority:   Routine     Referral Type:   Consultation     Referral Reason:   Specialty Services Required     Referred to Provider:   Abhilash Herrera IV, MD     Requested Specialty:   Neurosurgery     Number of Visits Requested:   1     New Medications Ordered This Visit   Medications    metFORMIN (GLUCOPHAGE) 1000 MG tablet     Sig: Take 1 tablet by mouth 2 (Two) Times a Day With Meals.     Dispense:  180 tablet     Refill:  1    primidone (MYSOLINE) 50 MG tablet     Sig: Take 1 tablet by mouth 3 (Three) Times a Day.     Dispense:  270 tablet     Refill:  1           Follow Up   No follow-ups on file.  Patient was given instructions and counseling regarding her condition or for health maintenance advice. Please see specific information pulled into the AVS if appropriate.

## 2024-10-28 NOTE — PROGRESS NOTES
"Chief Complaint  Medicare Wellness-subsequent, Sore (On buttock, daughter believes it's a bed sore.), Imaging Only (Review most recent MRI results and discuss next steps.), and Earache (The back part of the ear every so often theres a pain, might be from the oxygen tubing?)    Subjective    {Problem List  Visit Diagnosis   Encounters  Notes  Medications  Labs  Result Review Imaging  Media :23}    Kanwal Sauer presents to [facility]  History of Present Illness    History of Present Illness       Objective   Vital Signs:  /48 (BP Location: Right arm, Patient Position: Sitting, Cuff Size: Adult)   Pulse 60   Temp 99.2 °F (37.3 °C) (Oral)   Resp 16   Ht 165.1 cm (65\")   Wt 97.5 kg (214 lb 14.4 oz)   SpO2 92%   BMI 35.76 kg/m²   Estimated body mass index is 35.76 kg/m² as calculated from the following:    Height as of this encounter: 165.1 cm (65\").    Weight as of this encounter: 97.5 kg (214 lb 14.4 oz).       {Class 2 Severe Obesity (BMI >=35 and <=39.9. (Optional):41773}      Physical Exam   Result Review :{Labs  Result Review  Imaging  Med Tab  Media  Procedures :23}  {The following data was reviewed by (Optional):75666}  {[place] PCP Labs (Optional):64435}  {Data reviewed (Optional):96123:::1}             Assessment and Plan {CC Problem List  Visit Diagnosis   ROS  Review (Popup)  Health Maintenance  Quality  BestPractice  Medications  SmartSets  SnapShot Encounters  Media :23}  Diagnoses and all orders for this visit:    1. Type 2 diabetes mellitus with hyperglycemia, without long-term current use of insulin        Assessment & Plan          {Time Spent (Optional):69114}  Follow Up {Instructions Charge Capture  Follow-up Communications :23}  There are no Patient Instructions on file for this visit.   No follow-ups on file.  Patient was given instructions and counseling regarding her condition or for health maintenance advice. Please see " specific information pulled into the AVS if appropriate.     {[facility] [employer] Provider Statement:09891}

## 2024-10-29 ENCOUNTER — TELEPHONE (OUTPATIENT)
Dept: FAMILY MEDICINE CLINIC | Facility: CLINIC | Age: 74
End: 2024-10-29
Payer: MEDICARE

## 2024-10-29 NOTE — TELEPHONE ENCOUNTER
Gave the okay to [facility] for OT 1 time a week for 8 weeks.    Terri Dowell MA  10/29/24, 15:31 EDT

## 2024-10-29 NOTE — TELEPHONE ENCOUNTER
[facility] calling in regards to requesting verbal orders for OT 1 time a week for 8 weeks. Terri was rooming a pt at the moment but did let [facility] know I would send a message back to have MA call when available.

## 2024-10-31 NOTE — ASSESSMENT & PLAN NOTE
The patient was hospitalized for severe back pain and was diagnosed with degenerative disc disease following an MRI of the lumbar spine. She is scheduled for a cervical and thoracic MRI this Thursday to further evaluate her condition. Continuation of physical therapy was advised to help manage symptoms and prevent recurrence. Regular exercise and muscle strengthening were recommended. Hydration was emphasized with the suggestion to consume water or Gatorade. The use of a heating pad was suggested for muscle relaxation, with caution to avoid skin burns. Massage therapy was also recommended as a potential treatment option.

## 2024-11-13 ENCOUNTER — HOSPITAL ENCOUNTER (OUTPATIENT)
Dept: BONE DENSITY | Facility: HOSPITAL | Age: 74
Discharge: HOME | End: 2024-11-13
Admitting: FAMILY MEDICINE
Payer: MEDICARE

## 2024-11-13 PROCEDURE — 77080 DXA BONE DENSITY AXIAL: CPT

## 2024-11-25 RX ORDER — OLMESARTAN MEDOXOMIL 20 MG/1
20 TABLET ORAL DAILY
Qty: 90 TABLET | Refills: 1 | Status: SHIPPED | OUTPATIENT
Start: 2024-11-25

## 2024-11-25 RX ORDER — CITALOPRAM HYDROBROMIDE 20 MG/1
TABLET ORAL
Qty: 90 TABLET | Refills: 1 | Status: SHIPPED | OUTPATIENT
Start: 2024-11-25

## 2024-12-19 NOTE — PROGRESS NOTES
"Subjective   History of Present Illness: Kanwal Sauer is a 74 y.o. female is being seen for consultation today at the request of Nisha Britton MD. patient has a several year history of flareups of significant pain.  The pain will start in her neck and tends to spread into her mid and low back.  Patient denies any radiating pain into her upper or lower extremities.  No overt weakness.  Currently the patient does not have significant symptoms.  She has done physical therapy in the past which was helpful and her most recent flareup which occurred in the fall.  She has not undergone injections.    Previous treatment: PT    Previous neurosurgery:      Previous injections:     The following portions of the patient's history were reviewed and updated as appropriate: allergies, current medications, past family history, past medical history, past social history, past surgical history, and problem list.    Review of Systems   Constitutional:  Positive for activity change.   Musculoskeletal:  Positive for back pain and neck stiffness.   Neurological:  Positive for weakness and numbness.       Objective      Pulse 60   Temp 98.4 °F (36.9 °C) (Temporal)   Resp 18   Ht 165.1 cm (65\")   Wt 96.9 kg (213 lb 9.6 oz)   SpO2 91%   BMI 35.54 kg/m²    Body mass index is 35.54 kg/m².  Vitals:    12/20/24 1138   PainSc:   4   PainLoc: Neck         Neurological Exam  Mental Status  Awake, alert and oriented to person, place and time.    Motor   Strength is 5/5 in all four extremities except as noted.  Left deltoid exam deferred due to significant pain with movement.    Sensory  Sensation is intact to light touch, pinprick, vibration and proprioception in all four extremities.    Reflexes    Right pathological reflexes: Kiesha's absent.  Left pathological reflexes: Kiesha's absent.          Assessment & Plan   Independent Review of Radiographic Studies:      I personally reviewed and interpreted the images from the " following studies.    MRI cervical thoracic and lumbar spine: There is mild spondylolisthesis of C3-4 and moderate central stenosis with CSF reserve around the spinal cord.  Significant degenerative changes throughout the cervical spine most severe at C5-6 with no high-grade stenosis.  Degenerative changes throughout the thoracic spine without high-grade stenosis.  There are degenerative changes throughout the lumbar spine with foraminal stenosis most severe at L5-S1 on the right.  There is significant coronal curvature    Medical Decision Making:      Kanwal Sauer is a 74 y.o. female with a several year history of flareups of pain that tends to begin in the neck and spread into the mid and low back.  Pain is primarily axial with no significant radicular symptoms.  No other signs or symptoms of myelopathy.  No high-grade stenosis or spinal cord compression on imaging.  Pain is likely related to the degeneration as well as the scoliotic deformity.  In discussion with the patient and her daughter they would not be interested in scoliotic deformity correction and I believe medical comorbidities would put her at significant risk of such a major surgery.  Patient can continue with physical therapy.  I will send her to establish care with pain management and for possible cervical JAMAL.  She can follow-up with me as needed      Diagnoses and all orders for this visit:    1. Degeneration of intervertebral disc of lumbar region with discogenic back pain and lower extremity pain (Primary)    2. DDD (degenerative disc disease), cervical    3. Acquired spondylolisthesis of cervical vertebra      No follow-ups on file.    This patient was examined wearing appropriate personal protective equipment.                      Dr. Abhilash Herrera IV    12/20/24  12:03 EST

## 2024-12-20 ENCOUNTER — OFFICE VISIT (OUTPATIENT)
Dept: NEUROSURGERY | Facility: CLINIC | Age: 74
End: 2024-12-20
Payer: MEDICARE

## 2024-12-20 ENCOUNTER — PATIENT ROUNDING (BHMG ONLY) (OUTPATIENT)
Dept: NEUROSURGERY | Facility: CLINIC | Age: 74
End: 2024-12-20
Payer: MEDICARE

## 2024-12-20 VITALS
WEIGHT: 213.6 LBS | BODY MASS INDEX: 35.59 KG/M2 | HEART RATE: 60 BPM | RESPIRATION RATE: 18 BRPM | HEIGHT: 65 IN | TEMPERATURE: 98.4 F | OXYGEN SATURATION: 91 %

## 2024-12-20 DIAGNOSIS — M43.12 ACQUIRED SPONDYLOLISTHESIS OF CERVICAL VERTEBRA: ICD-10-CM

## 2024-12-20 DIAGNOSIS — M51.362 DEGENERATION OF INTERVERTEBRAL DISC OF LUMBAR REGION WITH DISCOGENIC BACK PAIN AND LOWER EXTREMITY PAIN: Primary | ICD-10-CM

## 2024-12-20 DIAGNOSIS — M50.30 DDD (DEGENERATIVE DISC DISEASE), CERVICAL: ICD-10-CM

## 2025-01-10 ENCOUNTER — OFFICE VISIT (OUTPATIENT)
Dept: FAMILY MEDICINE CLINIC | Facility: CLINIC | Age: 75
End: 2025-01-10
Payer: MEDICARE

## 2025-01-10 ENCOUNTER — PREP FOR SURGERY (OUTPATIENT)
Dept: SURGERY | Facility: SURGERY CENTER | Age: 75
End: 2025-01-10
Payer: MEDICARE

## 2025-01-10 ENCOUNTER — TELEPHONE (OUTPATIENT)
Dept: PAIN MEDICINE | Facility: CLINIC | Age: 75
End: 2025-01-10

## 2025-01-10 ENCOUNTER — OFFICE VISIT (OUTPATIENT)
Dept: PAIN MEDICINE | Facility: CLINIC | Age: 75
End: 2025-01-10
Payer: MEDICARE

## 2025-01-10 VITALS
BODY MASS INDEX: 35.85 KG/M2 | OXYGEN SATURATION: 98 % | TEMPERATURE: 96.9 F | SYSTOLIC BLOOD PRESSURE: 138 MMHG | HEIGHT: 65 IN | DIASTOLIC BLOOD PRESSURE: 61 MMHG | WEIGHT: 215.2 LBS | HEART RATE: 57 BPM

## 2025-01-10 VITALS
DIASTOLIC BLOOD PRESSURE: 65 MMHG | WEIGHT: 214 LBS | HEART RATE: 58 BPM | BODY MASS INDEX: 35.65 KG/M2 | TEMPERATURE: 97.9 F | HEIGHT: 65 IN | OXYGEN SATURATION: 95 % | SYSTOLIC BLOOD PRESSURE: 145 MMHG | RESPIRATION RATE: 20 BRPM

## 2025-01-10 DIAGNOSIS — M50.30 DDD (DEGENERATIVE DISC DISEASE), CERVICAL: Primary | ICD-10-CM

## 2025-01-10 DIAGNOSIS — M54.16 LUMBAR RADICULOPATHY: Primary | ICD-10-CM

## 2025-01-10 DIAGNOSIS — M50.30 DDD (DEGENERATIVE DISC DISEASE), CERVICAL: ICD-10-CM

## 2025-01-10 DIAGNOSIS — R22.1 NECK MASS: Primary | ICD-10-CM

## 2025-01-10 PROCEDURE — 1159F MED LIST DOCD IN RCRD: CPT | Performed by: NURSE PRACTITIONER

## 2025-01-10 PROCEDURE — 3078F DIAST BP <80 MM HG: CPT | Performed by: FAMILY MEDICINE

## 2025-01-10 PROCEDURE — 3075F SYST BP GE 130 - 139MM HG: CPT | Performed by: FAMILY MEDICINE

## 2025-01-10 PROCEDURE — 1160F RVW MEDS BY RX/DR IN RCRD: CPT | Performed by: NURSE PRACTITIONER

## 2025-01-10 PROCEDURE — 99214 OFFICE O/P EST MOD 30 MIN: CPT | Performed by: FAMILY MEDICINE

## 2025-01-10 PROCEDURE — 99214 OFFICE O/P EST MOD 30 MIN: CPT | Performed by: NURSE PRACTITIONER

## 2025-01-10 PROCEDURE — 3078F DIAST BP <80 MM HG: CPT | Performed by: NURSE PRACTITIONER

## 2025-01-10 PROCEDURE — 1125F AMNT PAIN NOTED PAIN PRSNT: CPT | Performed by: NURSE PRACTITIONER

## 2025-01-10 PROCEDURE — 3077F SYST BP >= 140 MM HG: CPT | Performed by: NURSE PRACTITIONER

## 2025-01-10 PROCEDURE — 1125F AMNT PAIN NOTED PAIN PRSNT: CPT | Performed by: FAMILY MEDICINE

## 2025-01-10 RX ORDER — DIAZEPAM 5 MG/1
10 TABLET ORAL ONCE
OUTPATIENT
Start: 2025-01-10 | End: 2025-01-10

## 2025-01-10 NOTE — TELEPHONE ENCOUNTER
We need clearance to hold Eliquis for 3 days prior to cervical epidural steroid injection.     Thank You,   Sintia FLORES

## 2025-01-10 NOTE — PROGRESS NOTES
CHIEF COMPLAINT  BACK,NECK PAIN  Pain fluctuates.    Subjective   Kanwal Sauer is a 74 y.o. female  who presents for follow-up.  She has a history of chronic back and neck pain.      Patient was 2/19/2024 by MARK Lainez as a new patient at this clinic.  She is complaining of neck and low back pain.  Interventional options were discussed with the patient including epidurals MBB/RFA.  Patient opted not to proceed at that time due to severity being mild.  She was recently evaluated by neurosurgeon Dr. Abhilash Herrera on 12/20/2024.  He has suggested continuation of physical therapy as well as considering a cervical epidural steroid injection.    Pain today 4/10 VAS.  The pain seems to be most severe in the neck.  The pain is present on the right side of the neck and radiates into the right shoulder.  Pain is constant, unable to isolate and specific triggers or aggravating factors.  PT exercises seem to help.  She utilizes Tramadol sparingly for the pain whch is prescribed by the PCP.      Eliquis is taken for A-fib.  This is managed by her PCP Dr. Britton     Past pain medications: Gabapentin - not effective      Current pain medications: Tramadol, Tylenol     Past therapies:  Physical Therapy: Yes  Chiropractor: Yes  Massage Therapy: No  TENS: Yes  Neck or back surgery: No  Past pain management: No    History of Present Illness     PEG Assessment   What number best describes your pain on average in the past week?6  What number best describes how, during the past week, pain has interfered with your enjoyment of life?5  What number best describes how, during the past week, pain has interfered with your general activity?  5    Review of Pertinent Medical Data ---  MRI CERVICAL SPINE      The following portions of the patient's history were reviewed and updated as appropriate: allergies, current medications, past family history, past medical history, past social history, past surgical history, and problem  "list.    Review of Systems   Constitutional:  Negative for activity change, fatigue and fever.   Respiratory:  Positive for shortness of breath. Negative for cough and chest tightness.    Cardiovascular:  Negative for chest pain.   Gastrointestinal:  Negative for abdominal pain, constipation and diarrhea.   Musculoskeletal:  Positive for back pain and neck pain.   Neurological:  Positive for dizziness and light-headedness. Negative for weakness, numbness and headaches.   Psychiatric/Behavioral:  Positive for agitation. Negative for sleep disturbance and suicidal ideas. The patient is not nervous/anxious.      I have reviewed and confirmed the accuracy of the ROS as documented by the MA/LPN/RN MARK Gonzáles    Vitals:    01/10/25 0850   BP: 145/65   BP Location: Right arm   Patient Position: Sitting   Cuff Size: Adult   Pulse: 58   Resp: 20   Temp: 97.9 °F (36.6 °C)   TempSrc: Temporal   SpO2: 95%  Comment: 3LITERS   Weight: 97.1 kg (214 lb)   Height: 165.1 cm (65\")   PainSc:   4     Objective   Physical Exam  Vitals and nursing note reviewed.   Constitutional:       General: She is not in acute distress.     Appearance: Normal appearance. She is not ill-appearing.   Pulmonary:      Effort: Pulmonary effort is normal. No respiratory distress.   Musculoskeletal:      Cervical back: Spasms, tenderness and bony tenderness present. No rigidity. Pain with movement present.      Lumbar back: Tenderness and bony tenderness present.   Neurological:      Mental Status: She is alert and oriented to person, place, and time.      Motor: Motor function is intact. Weakness (generalized, mild) present.      Gait: Gait abnormal (slowed ambulates with walker).   Psychiatric:         Mood and Affect: Mood normal.         Behavior: Behavior normal.           Assessment & Plan   Diagnoses and all orders for this visit:    1. Lumbar radiculopathy (Primary)    2. DDD (degenerative disc disease), cervical      --- YANCY (C5/C6 - " anticipated level)    ---  Indications for epidural injection:  Plan is to proceed with epidural at the appropriate level.  If the patient receives significant pain reduction and improvement in function and the plan will be to repeat the epidural when the pain worsens.  If a second epidural provides at least 6 weeks of sustained improvement that includes both pain reduction and improvement in function then an epidural injection could be repeated once again at the same level.  This is a mutual decision between the clinician and the patient that includes discussions including risks and benefits in detail as well as alternative therapies.  Patient's questions were answered to their satisfaction and to their understanding.  ---    --- Hold Eliquis for procedure (3 days prior)  --- Will reach out to Dr. Britton for clearance to hold    --------    Anticoagulation risks for procedures....   - there are risks to discontinuation of anticoagulants for neuraxial procedures and surgery.  Risks include but are not limited to deep vein thromboses, pulmonary emboli, myocardial infarction, and stroke    - Neuraxial access with a needle is relatively contraindicated while anticoagulated because of the risk of hemorrhage and/or epidural hematoma, which can be a neurosurgical emergency to evacuate bleeding.  Left unchecked, bleeding can cause nerve damage leading to paralysis and/or death.  --------    Kanwal Sauer reports a pain score of 4.  Given her pain assessment as noted, treatment options were discussed and the following options were decided upon as a follow-up plan to address the patient's pain: steroid injections.      --- Follow-up for procedure                Dictated utilizing Dragon dictation.

## 2025-01-10 NOTE — PROGRESS NOTES
"Chief Complaint  Mass (Lump on rt side of neck. 7 days)    Subjective        Kanwal Sauer presents to [facility]  History of Present Illness    Uncomfortable right neck mass.  Under the right mandible.  She thinks it popped up about a week ago.  She states it felt better this morning but that she ate and got bigger perhaps and more painful.  She otherwise feels fine.  She has no lower teeth.  No gum or dental issues.  No sore throat.  No sinus symptoms.  No cough.  No lumps anywhere else.  She has history of diabetes.  Last A1c in the 7% range.  Her kidney function is normal.  She follows with multiple specialties.  Her primary care provider is not available today.    Objective   Vital Signs:  /61   Pulse 57   Temp 96.9 °F (36.1 °C) (Temporal)   Ht 165.1 cm (65\")   Wt 97.6 kg (215 lb 3.2 oz)   SpO2 98%   BMI 35.81 kg/m²   Estimated body mass index is 35.81 kg/m² as calculated from the following:    Height as of this encounter: 165.1 cm (65\").    Weight as of this encounter: 97.6 kg (215 lb 3.2 oz).            Physical Exam  Constitutional:       General: She is not in acute distress.     Appearance: She is not toxic-appearing.   HENT:      Right Ear: Tympanic membrane and ear canal normal.      Left Ear: Tympanic membrane and ear canal normal.      Mouth/Throat:      Mouth: Mucous membranes are moist.      Pharynx: Oropharynx is clear. No oropharyngeal exudate or posterior oropharyngeal erythema.   Neck:      Comments: There is a palpable tender oval-shaped cystic like mass 2 x 3 cm in the right anterior triangle may be right submandibular area.  Point-of-care ultrasound reveals a isoechogenic 2 x 3 cm smooth outlined nodular area.  With some hypoechoic central features.  No definitive necrosis breakdown.  There is a prominent calcium deposit, however it does not appear to be blocking.  Tender to the probe.    The submandibular gland outlets under the tongue are " unremarkable.  No erythema.       Result Review :                Assessment and Plan   Diagnoses and all orders for this visit:    1. Neck mass (Primary)  -     CT soft tissue neck wo contrast; Future  -     Ambulatory Referral to ENT (Otolaryngology)    Anterior neck mass.  Submandibular gland inflammation versus lymph node inflammation.  CT scan indicated.  ENT follow-up indicated.  At this point ongoing with a diagnosis of sialoadenitis.  Likely nonpurulent.  At this time holding off antibiotics.  I am recommending sour candy through the weekend.  And then CT scan next week.  With worse symptoms let us know.  I also placed ENT consultation.  I instructed both patient and daughter.         Follow Up   No follow-ups on file.  Patient was given instructions and counseling regarding her condition or for health maintenance advice. Please see specific information pulled into the AVS if appropriate.

## 2025-01-13 ENCOUNTER — TRANSCRIBE ORDERS (OUTPATIENT)
Dept: SURGERY | Facility: SURGERY CENTER | Age: 75
End: 2025-01-13
Payer: MEDICARE

## 2025-01-13 DIAGNOSIS — Z41.9 SURGERY, ELECTIVE: Primary | ICD-10-CM

## 2025-01-17 ENCOUNTER — HOSPITAL ENCOUNTER (OUTPATIENT)
Dept: CT IMAGING | Facility: HOSPITAL | Age: 75
Discharge: HOME | End: 2025-01-17
Payer: MEDICARE

## 2025-01-17 DIAGNOSIS — R22.1 NECK MASS: ICD-10-CM

## 2025-01-17 PROCEDURE — 70490 CT SOFT TISSUE NECK W/O DYE: CPT

## 2025-01-24 ENCOUNTER — OFFICE VISIT (OUTPATIENT)
Dept: CARDIOLOGY | Facility: CLINIC | Age: 75
End: 2025-01-24
Payer: MEDICARE

## 2025-01-24 VITALS
HEIGHT: 66 IN | SYSTOLIC BLOOD PRESSURE: 134 MMHG | WEIGHT: 216.6 LBS | BODY MASS INDEX: 34.81 KG/M2 | DIASTOLIC BLOOD PRESSURE: 76 MMHG | HEART RATE: 66 BPM | OXYGEN SATURATION: 97 %

## 2025-01-24 DIAGNOSIS — Z79.01 CHRONIC ANTICOAGULATION: ICD-10-CM

## 2025-01-24 DIAGNOSIS — G47.33 OSA (OBSTRUCTIVE SLEEP APNEA): ICD-10-CM

## 2025-01-24 DIAGNOSIS — R00.1 BRADYCARDIA: ICD-10-CM

## 2025-01-24 DIAGNOSIS — I10 ESSENTIAL HYPERTENSION: Primary | ICD-10-CM

## 2025-01-24 RX ORDER — CEPHALEXIN 500 MG/1
500 CAPSULE ORAL 2 TIMES DAILY
COMMUNITY
Start: 2025-01-21

## 2025-01-24 NOTE — PROGRESS NOTES
PATIENTINFORMATION    Date of Office Visit: 2025  Encounter Provider: Yasir Romano MD  Place of Service: [facility] CARDIOLOGY  Patient Name: Kanwal Sauer  : 1950    Subjective:     Encounter Date:2025      Patient ID: Kanwal Sauer is a 74 y.o. female.    Chief Complaint   Patient presents with    Atrial Fibrillation       HPI  Ms. Sauer is a pleasant 74 years old lady who came to cardiac clinic for follow-up visit.  She denies any new symptoms.  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      ROS  All systems reviewed and negative except as noted in HPI.    Past Medical History:   Diagnosis Date    Allergic     Anxiety     Asthma     Atrial fibrillation     Cancer     Breast    COPD (chronic obstructive pulmonary disease) 2022    Deep vein thrombosis     Diabetes mellitus     Difficulty walking     Diverticulosis     Drug therapy     Environmental allergies     Fractures     Headache, tension-type     Hyperlipidemia     Hypertension     Low back pain     Neuropathy in diabetes     Obesity     Scoliosis     Seizures     Sleep apnea     Tremor     Urinary tract infection     Weakness        Past Surgical History:   Procedure Laterality Date    BREAST BIOPSY      CATARACT EXTRACTION, BILATERAL      DENTAL PROCEDURE      Removed teeth    FEMUR OPEN REDUCTION INTERNAL FIXATION Left 2020    Procedure: DISTAL FEMUR OPEN REDUCTION INTERNAL FIXATION;  Surgeon: Marley Hernandez MD;  Location: [facility];  Service: Orthopedics;  Laterality: Left;    MASTECTOMY Left     REPLACEMENT TOTAL KNEE BILATERAL         Social History     Socioeconomic History    Marital status:    Tobacco Use    Smoking status: Never     Passive exposure: Never    Smokeless tobacco: Never    Tobacco comments:     no caffine   Vaping Use    Vaping status: Never Used   Substance and Sexual Activity    Alcohol use: Not Currently    Drug use: Never    Sexual activity:  "Defer     Partners: Male       Family History   Problem Relation Age of Onset    Arthritis Mother     Lung cancer Mother     Brain cancer Mother     Hypertension Mother     Cancer Mother         Lung cancer    Hypertension Father     Arthritis Sister     Breast cancer Sister     Diabetes Sister     Hypertension Sister     Dementia Sister     Cancer Sister         Brest cancer    Aneurysm Sister     Cancer Daughter         Colon cancer    Developmental Disability Daughter     Learning disabilities Daughter     Miscarriages / Stillbirths Daughter         Miscarriage         Procedures       Objective:     /76 (BP Location: Right arm, Patient Position: Sitting, Cuff Size: Large Adult)   Pulse 66   Ht 167.6 cm (66\")   Wt 98.2 kg (216 lb 9.6 oz)   SpO2 97%   BMI 34.96 kg/m²  Body mass index is 34.96 kg/m².     Constitutional:       General: Not in acute distress.     Appearance: Well-developed. Not diaphoretic.   Eyes:      Pupils: Pupils are equal, round, and reactive to light.   HENT:      Head: Normocephalic and atraumatic.   Neck:      Thyroid: No thyromegaly.   Pulmonary:      Effort: Pulmonary effort is normal. No respiratory distress.      Breath sounds: Normal breath sounds. No wheezing. No rales.   Chest:      Chest wall: Not tender to palpatation.   Cardiovascular:      Normal rate. Regular rhythm.      No gallop.    Pulses:     Intact distal pulses.   Edema:     Peripheral edema absent.   Abdominal:      General: Bowel sounds are normal. There is no distension.      Palpations: Abdomen is soft.      Tenderness: There is no guarding.   Musculoskeletal: Normal range of motion.         General: No deformity.      Cervical back: Normal range of motion and neck supple. Skin:     General: Skin is warm and dry.      Findings: No rash.   Neurological:      Mental Status: Alert and oriented to person, place, and time.      Cranial Nerves: No cranial nerve deficit.      Deep Tendon Reflexes: Reflexes are " normal and symmetric.   Psychiatric:         Judgment: Judgment normal.         Review Of Data: I have reviewed pertinent recent labs, images and documents and pertinent findings included in HPI or assessment below.    Lipid Panel          7/26/2024    11:43   Lipid Panel   Total Cholesterol 172    Triglycerides 70    HDL Cholesterol 68    VLDL Cholesterol 13    LDL Cholesterol  91      Assessment/Plan:       Paroxysmal atrial fibrillation. Echocardiogram on 1/17/2020 reported: Left ventricular ejection fraction 51-55%, mild AI/MR, moderate TR, RVSP 48 mmHg. Tolerating metoprolol and Eliquis well without any side effects.  Obesity with TARSHA compliant with CPAP  Essential hypertension hypertension on olmesartan, Eliquis is not going to be transferred hydrochlorothiazide, losartan and metoprol  Hyperlipidemia on fenofibrate-most recent lipid panel at goal. History of of intolerance to different statins in the past  Benign tremor on primidone-follows up with neurology  History of mechanical fall with a scalp laceration/left femoral and tibial fracture in December 2020-currently ambulates using a walker  Hx of left breast cancer s/p left mastectomy and chemotherapy in the past-in remission  Chronic hypoxemic respiratory failure from COPD on supplemental oxygen.  Patient denies prior history of tobacco use.     Ambulates using a walker and on supplemental oxygen  No significant new complaints today.  Vital signs are within range.  Continue current care  Follow-up in 1 year or sooner with any concerning symptoms.    Diagnosis and plan of care discussed with patient and verbalized understanding.            Your medication list            Accurate as of January 24, 2025  4:54 PM. If you have any questions, ask your nurse or doctor.                CHANGE how you take these medications        Instructions Last Dose Given Next Dose Due   potassium chloride 10 MEQ CR tablet  Commonly known as: Patricia M10  What changed:   when  to take this  reasons to take this      Take 1 tablet by mouth Daily.              CONTINUE taking these medications        Instructions Last Dose Given Next Dose Due   acetaminophen 325 MG tablet  Commonly known as: TYLENOL      Take 2 tablets by mouth Every 4 (Four) Hours As Needed for Mild Pain .       albuterol sulfate  (90 Base) MCG/ACT inhaler  Commonly known as: PROVENTIL HFA;VENTOLIN HFA;PROAIR HFA      INHALE 2 PUFFS BY MOUTH EVERY 4 HOURS AS NEEDED FOR WHEEZE       apixaban 5 MG tablet tablet  Commonly known as: Eliquis      Take 1 tablet by mouth Every 12 (Twelve) Hours.       cephalexin 500 MG capsule  Commonly known as: KEFLEX      Take 1 capsule by mouth 2 (Two) Times a Day.       citalopram 20 MG tablet  Commonly known as: CeleXA      TAKE 1 TABLET BY MOUTH EVERY DAY       CRANBERRY PO      Take 650 mg by mouth.       fenofibrate 160 MG tablet      TAKE 1 TABLET BY MOUTH EVERY DAY       furosemide 40 MG tablet  Commonly known as: LASIX      Take 0.5 tablets by mouth Daily As Needed (Take a dose of Lasix if you notice a 3 pound weight gain, increasing leg swelling, or an increase in your oxygen requirement.).       hydroCHLOROthiazide 12.5 MG capsule  Commonly known as: MICROZIDE      Take 1 capsule by mouth Daily.       ipratropium-albuterol 0.5-2.5 mg/3 ml nebulizer  Commonly known as: DUO-NEB      Take 3 mL by nebulization Every 4 (Four) Hours As Needed for Wheezing.       ketoconazole 2 % shampoo  Commonly known as: NIZORAL           latanoprost 0.005 % ophthalmic solution  Commonly known as: XALATAN      Administer 1 drop to both eyes every night at bedtime.       lidocaine 5 %  Commonly known as: LIDODERM      Place 1 patch on the skin as directed by provider Daily. Remove & Discard patch within 12 hours or as directed by MD       Magnesium Oxide -Mg Supplement 400 (240 Mg) MG tablet           metFORMIN 1000 MG tablet  Commonly known as: GLUCOPHAGE      Take 1 tablet by mouth 2 (Two) Times  a Day With Meals.       montelukast 10 MG tablet  Commonly known as: SINGULAIR      TAKE 1 TABLET BY MOUTH EVERYDAY AT BEDTIME       olmesartan 20 MG tablet  Commonly known as: BENICAR      TAKE 1 TABLET BY MOUTH EVERY DAY       primidone 50 MG tablet  Commonly known as: MYSOLINE      Take 1 tablet by mouth 3 (Three) Times a Day.       PROBIOTIC-10 PO      Take  by mouth.       Trelegy Ellipta 100-62.5-25 MCG/ACT inhaler  Generic drug: Fluticasone-Umeclidin-Vilant      INHALE 1 PUFF DAILY       Vitamin D3 25 MCG (1000 UT) capsule      Take 1 capsule by mouth Daily.                    Yasir Romano MD  01/24/25  16:54 EST

## 2025-01-28 RX ORDER — APIXABAN 5 MG/1
5 TABLET, FILM COATED ORAL
Qty: 60 TABLET | Refills: 2 | Status: SHIPPED | OUTPATIENT
Start: 2025-01-28

## 2025-01-28 NOTE — TELEPHONE ENCOUNTER
Rx Refill Note  Requested Prescriptions     Pending Prescriptions Disp Refills    Eliquis 5 MG tablet tablet [Pharmacy Med Name: ELIQUIS 5 MG TABLET] 60 tablet 2     Sig: TAKE 1 TABLET BY MOUTH EVERY 12 HOURS      Last office visit with prescribing clinician: 10/28/2024   Last telemedicine visit with prescribing clinician: Visit date not found   Next office visit with prescribing clinician: Visit date not found                         Would you like a call back once the refill request has been completed: [] Yes [] No    If the office needs to give you a call back, can they leave a voicemail: [] Yes [] No    Lynn Garvin MA  01/28/25, 10:38 EST

## 2025-01-29 ENCOUNTER — TELEPHONE (OUTPATIENT)
Dept: FAMILY MEDICINE CLINIC | Facility: CLINIC | Age: 75
End: 2025-01-29
Payer: MEDICARE

## 2025-01-29 ENCOUNTER — TELEPHONE (OUTPATIENT)
Dept: PAIN MEDICINE | Facility: CLINIC | Age: 75
End: 2025-01-29
Payer: MEDICARE

## 2025-01-29 ENCOUNTER — NURSE TRIAGE (OUTPATIENT)
Dept: CALL CENTER | Facility: HOSPITAL | Age: 75
End: 2025-01-29
Payer: MEDICARE

## 2025-01-29 ENCOUNTER — TELEPHONE (OUTPATIENT)
Dept: PAIN MEDICINE | Facility: CLINIC | Age: 75
End: 2025-01-29

## 2025-01-29 DIAGNOSIS — E11.65 TYPE 2 DIABETES MELLITUS WITH HYPERGLYCEMIA, WITHOUT LONG-TERM CURRENT USE OF INSULIN: ICD-10-CM

## 2025-01-29 NOTE — TELEPHONE ENCOUNTER
"IC FROM NURSE TRIAGE. STATING PATIENT FELL 1/28/25 LATE EVENING. NURSE STATED PATIENT DAUGHTER \"SHELDON\" ON PHONE NEEDING TO SCHEDULE PATIENT AN APPOINTMENT. NURSE INFORMED PATIENT DAUGHTER SHELDON IF PATIENT EXPERIENCES ANY SIDE EFFECTS FROM FALL TO TAKE PATIENT TO ER. PATIENT UNDERSTOOD AND SCHEDULED APPOINTMENT FOR PATIENT FOR 1/30/25 WITH PCP. PATIENT DAUGHTER DECLINED SOONER APPT FOR 1/29/25 WITH PARTNER PROVIDER.   "

## 2025-01-29 NOTE — TELEPHONE ENCOUNTER
Hub staff attempted to follow warm transfer process and was unsuccessful     Caller: SHELDON TYSON(poa)    Relationship to patient: Emergency Contact    Best call back number: [phone] (home)     Patient is needing: PATIENTS DAUGHTER CALLED STATING THE PATIENT FELL ON HER BUTT YESTERDAY AND SHE IS WANTING TO KNOW IF THIS WOULD AFFECT HER PROCEDURE THAT IS SCHEDUELD FOR ON 1/30/25 WITH DR AMBROCIO   PATIENT LIVES IN A REHAB FACILITY AND WAS CHECKED OUT BY THE FACILITY AND WILL ALSO BE CHECKED OUT WITH HER PCP 1/30/25 IN THE AM.   DAUGHTER STATES PATIENT IS FEELING SORE ALL OVER, BUT OTHERWISE IS FINE.   PLEASE ADVISE SHELDON IF OKAY TO PROCEED WITH PROCEDURE TOMORROW

## 2025-01-29 NOTE — TELEPHONE ENCOUNTER
Caller: SHELDON TYSON(poa)    Relationship to patient: Emergency Contact    Best call back number: [phone]    Type of visit: EPIDURAL    Requested date: ASAP    If rescheduling, when is the original appointment: 01/30/25     Additional notes:PT TOOK ELIQUIS AND NEEDS TO RESCHEDULE. PLEASE CONTACT PT'S DAUGHTER ABOVE TO RESCHEDULE.

## 2025-01-29 NOTE — TELEPHONE ENCOUNTER
Reason for Disposition  • [1] Falling (two or more falls) AND [2] in past year    Additional Information  • Negative: Major injury from dangerous force (e.g., fall > 10 feet or 3 meters)  • Negative: [1] Major bleeding (e.g., actively dripping or spurting) AND [2] can't be stopped  • Negative: Shock suspected (e.g., cold/pale/clammy skin, too weak to stand)  • Negative: Difficult to awaken or acting confused (e.g., disoriented, slurred speech)  • Negative: [1] SEVERE weakness (i.e., unable to walk or barely able to walk, requires support) AND [2] new-onset or worsening  • Negative: [1] Can't stand (bear weight) or walk AND [2] new-onset after fall  • Negative: Sounds like a life-threatening emergency to the triager  • Negative: Fainted (passed out)  • Negative: New-onset or worsening weakness of the face, arm or leg on one side of the body  • Negative: [1] New-onset or worsening dizziness AND [2] described as spinning or off balance (i.e., vertigo)  • Negative: [1] New-onset or worsening dizziness AND [2] NO spinning sensation or trouble with balance  • Negative: New-onset or worsening pale skin (pallor)  • Negative: Pregnant and fall  • Negative: Patient has a concerning injury to a specific part of the body (e.g., chest, leg, head)  • Negative: Patient has a wound (abrasion, cut, puncture, other skin injury or tear)  • Negative: Injury (or injuries) that need emergency care  • Negative: Sounds like a serious injury to the triager  • Negative: [1] Muscle pain AND [2] dark (cola colored) or red-colored urine  • Negative: [1] Unable to get up until help (e.g., caregiver, family, friend) arrived AND [2] on the ground 1 hour or more  • Negative: Patient sounds very sick or weak to the triager  • Negative: [1] MODERATE weakness (i.e., interferes with work, school, normal activities) AND [2] new-onset or worsening  • Negative: [1] Fever > 101 F (38.3 C) AND [2] age > 60 years  • Negative: [1] Fever > 100.0 F (37.8 C) AND  "[2] bedridden (e.g., CVA, chronic illness, recovering from surgery)  • Negative: [1] Fever > 100.0 F (37.8 C) AND [2] diabetes mellitus or weak immune system (e.g., HIV positive, cancer chemo, splenectomy, organ transplant, chronic steroids)  • Negative: Suspicious history for the fall  • Negative: [1] Caller has URGENT question AND [2] triager unable to answer question  • Negative: [1] No prior tetanus shots (or is not fully vaccinated) AND [2] any wound (e.g., cut or scrape)  • Negative: [1] HIV positive or severe immunodeficiency (severely weak immune system) AND [2] DIRTY cut or scrape  • Negative: [1] Caller has NON-URGENT question AND [2] triager unable to answer question  • Negative: MILD weakness (i.e., does not interfere with ability to work, go to school, normal activities)  (Exception: Mild weakness is a chronic symptom.)  • Negative: [1] Last tetanus shot > 5 years ago AND [2] DIRTY cut or scrape  • Negative: [1] Last tetanus shot > 10 years ago AND [2] CLEAN cut or scrape (e.g., object AND skin were clean)  • Negative: [1] Fall AND [2] went to emergency department for evaluation or treatment  • Negative: [1] Fall AND [2] patient seems very anxious and fearful of falling again    Answer Assessment - Initial Assessment Questions  1. MECHANISM: \"How did the fall happen?\"      Fell out of chair yesterday; she stood up from chair to plug up oxygen in wall got dizzy and fell and hit her head .  2. DOMESTIC VIOLENCE AND ELDER ABUSE SCREENING: \"Did you fall because someone pushed you or tried to hurt you?\" If Yes, ask: \"Are you safe now?\"      N/a  3. ONSET: \"When did the fall happen?\" (e.g., minutes, hours, or days ago)      Yesterday afternoon   4. LOCATION: \"What part of the body hit the ground?\" (e.g., back, buttocks, head, hips, knees, hands, head, stomach)      head  5. INJURY: \"Did you hurt (injure) yourself when you fell?\" If Yes, ask: \"What did you injure? Tell me more about this?\" (e.g., body area; " "type of injury; pain severity)\"      No visible injuries noted but pt can feel a knot on her head she states.  I am speaking to daughter about his incident on phone in which she is not with her Mother at this time.    6. PAIN: \"Is there any pain?\" If Yes, ask: \"How bad is the pain?\" (e.g., Scale 1-10; or mild,   moderate, severe)    - NONE (0): No pain    - MILD (1-3): Doesn't interfere with normal activities     - MODERATE (4-7): Interferes with normal activities or awakens from sleep     - SEVERE (8-10): Excruciating pain, unable to do any normal activities       Mild to moderate   7. SIZE: For cuts, bruises, or swelling, ask: \"How large is it?\" (e.g., inches or centimeters)       Knot on  head small but no cuts   8. PREGNANCY: \"Is there any chance you are pregnant?\" \"When was your last menstrual period?\"      no  9. OTHER SYMPTOMS: \"Do you have any other symptoms?\" (e.g., dizziness, fever, weakness; new onset or worsening).       No  10. CAUSE: \"What do you think caused the fall (or falling)?\" (e.g., tripped, dizzy spell)        She was trying to plug up her oxygen and fell over out of chair    Protocols used: Falls and Falling-ADULT-AH    "

## 2025-01-29 NOTE — TELEPHONE ENCOUNTER
HUB--Transferred to me due to pt having a fall yesterday.  Spoke to pts daughter, Monica, pt not with Monica at this time.  Monica states that yesterday pt fell at her [facility] she lives, pt ended up falling yesterday, she got dizzy fell out of her chair and hit her head.  She seems to be acting ok per daughter but pt is on blood thinners, which she has not had in 2 days per daughter, and she has hx of some confusion/heart issues etc.  Tremors are increasing today seems like per daughter.  Pt is sore all over, but she states she is ok and not dizzy today.  She states she has a place/knot on her head, no other injuries.  Pt is on oxygen at all times.  NO headache or lightheadedness.  Chronic UTIs noted as well.  Care advice given.  Instructed on taking to Er if pt s/s worsen, daughter verbalized understanding.  Transferred to office at [facility] to make an appt.  Appt made for tomorrow morning.

## 2025-01-30 ENCOUNTER — HOSPITAL ENCOUNTER (OUTPATIENT)
Dept: GENERAL RADIOLOGY | Facility: HOSPITAL | Age: 75
Discharge: HOME | End: 2025-01-30
Admitting: FAMILY MEDICINE
Payer: MEDICARE

## 2025-01-30 ENCOUNTER — OFFICE VISIT (OUTPATIENT)
Dept: FAMILY MEDICINE CLINIC | Facility: CLINIC | Age: 75
End: 2025-01-30
Payer: MEDICARE

## 2025-01-30 ENCOUNTER — APPOINTMENT (OUTPATIENT)
Dept: GENERAL RADIOLOGY | Facility: SURGERY CENTER | Age: 75
End: 2025-01-30
Payer: MEDICARE

## 2025-01-30 VITALS
DIASTOLIC BLOOD PRESSURE: 74 MMHG | SYSTOLIC BLOOD PRESSURE: 146 MMHG | TEMPERATURE: 97.5 F | WEIGHT: 216.7 LBS | HEIGHT: 66 IN | OXYGEN SATURATION: 96 % | BODY MASS INDEX: 34.82 KG/M2 | HEART RATE: 63 BPM

## 2025-01-30 DIAGNOSIS — M54.50 CHRONIC MIDLINE LOW BACK PAIN WITHOUT SCIATICA: ICD-10-CM

## 2025-01-30 DIAGNOSIS — G89.29 CHRONIC MIDLINE LOW BACK PAIN WITHOUT SCIATICA: ICD-10-CM
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
## 2025-01-30 DIAGNOSIS — M25.552 PAIN OF LEFT HIP: ICD-10-CM

## 2025-01-30 PROCEDURE — 3078F DIAST BP <80 MM HG: CPT | Performed by: FAMILY MEDICINE

## 2025-01-30 PROCEDURE — 73502 X-RAY EXAM HIP UNI 2-3 VIEWS: CPT

## 2025-01-30 PROCEDURE — 3077F SYST BP >= 140 MM HG: CPT | Performed by: FAMILY MEDICINE

## 2025-01-30 PROCEDURE — 1125F AMNT PAIN NOTED PAIN PRSNT: CPT | Performed by: FAMILY MEDICINE

## 2025-01-30 PROCEDURE — 99213 OFFICE O/P EST LOW 20 MIN: CPT | Performed by: FAMILY MEDICINE

## 2025-01-30 NOTE — TELEPHONE ENCOUNTER
Rx Refill Note  Requested Prescriptions     Pending Prescriptions Disp Refills    metFORMIN (GLUCOPHAGE) 1000 MG tablet [Pharmacy Med Name: METFORMIN HCL 1,000 MG TABLET] 180 tablet 1     Sig: TAKE 1 TABLET BY MOUTH TWICE A DAY WITH MEALS      Last office visit with prescribing clinician: 10/28/2024   Last telemedicine visit with prescribing clinician: Visit date not found   Next office visit with prescribing clinician: 1/30/2025                         Would you like a call back once the refill request has been completed: [] Yes [] No    If the office needs to give you a call back, can they leave a voicemail: [] Yes [] No    Lynn Garvin MA  01/30/25, 08:17 EST

## 2025-01-30 NOTE — TELEPHONE ENCOUNTER
Patient's daughter called and left message on nurse's VM after hours yesterday. Patient's procedure was canceled as she has been taking her Eliquis. Please call daughter, Monica, to reschedule.

## 2025-01-30 NOTE — PROGRESS NOTES
Chief Complaint  Fall (Fell on Tuesday. Having generalized pain)    Ton Sauer presents to [facility]  History of Present Illness    History of Present Illness  The patient presents for evaluation of a fall, back pain, and arm pain. She is accompanied by her daughter.    Two days ago, she experienced a fall while attempting to plug in her oxygen device. She was in a bent position when she lost her balance and fell backwards, hitting her head on an unknown object. Her daughter reports that she had exited her chair to plug in the device, which was located nearby. She experienced dizziness prior to the fall and has no recollection of the event. Her daughter expresses concern about a potential decline in her mother's health, suspecting a urinary tract infection or other underlying condition. The patient reported finding blood in the toilet and on the toilet paper after the fall, but her daughter was unable to identify the source of the bleeding. The patient has not experienced any further bleeding since the incident. Her daughter also notes an increase in tremors and difficulty with attention, prompting a scheduled appointment with a neurologist. The patient has been non-compliant with her oxygen therapy, believing it to be unnecessary, which has led to episodes of dizziness and falls. She also reports numbness in her legs. Her daughter is seeking assistance from [facility] to provide morning and evening care for her mother.    She has a history of back pain and is currently undergoing treatment for it. She reports experiencing lower back pain following the fall.    Additionally, she has persistent arm pain, which has been present even prior to the fall, and is unable to lift her arm due to the pain.    MEDICATIONS  Current: Eliquis     Objective   Vital Signs:  /74 (BP Location: Left arm, Patient Position: Sitting, Cuff Size: Adult)   Pulse 63   Temp 97.5  "°F (36.4 °C) (Temporal)   Ht 167.6 cm (66\")   Wt 98.3 kg (216 lb 11.2 oz)   SpO2 96%   BMI 34.98 kg/m²   Estimated body mass index is 34.98 kg/m² as calculated from the following:    Height as of this encounter: 167.6 cm (66\").    Weight as of this encounter: 98.3 kg (216 lb 11.2 oz).               Physical Exam  Constitutional:       General: She is not in acute distress.     Appearance: Normal appearance. She is well-developed.   HENT:      Head: Normocephalic and atraumatic.      Right Ear: Tympanic membrane normal.      Left Ear: Tympanic membrane normal.      Mouth/Throat:      Mouth: Mucous membranes are moist.   Eyes:      General:         Right eye: No discharge.         Left eye: No discharge.      Extraocular Movements: Extraocular movements intact.      Pupils: Pupils are equal, round, and reactive to light.   Cardiovascular:      Rate and Rhythm: Normal rate and regular rhythm.      Pulses: Normal pulses.      Heart sounds: Normal heart sounds.   Pulmonary:      Effort: Pulmonary effort is normal.      Breath sounds: Normal breath sounds. No wheezing or rales.   Abdominal:      General: Bowel sounds are normal.      Palpations: Abdomen is soft. There is no mass.      Tenderness: There is no abdominal tenderness.   Musculoskeletal:      Cervical back: Neck supple. No rigidity. Decreased range of motion.      Right lower leg: No edema.      Left lower leg: No edema.   Lymphadenopathy:      Cervical: No cervical adenopathy.   Neurological:      General: No focal deficit present.      Mental Status: She is alert and oriented to person, place, and time.        Result Review :                   Assessment and Plan   There are no diagnoses linked to this encounter.    Assessment & Plan  1. Fall.  She experienced a fall 2 days ago while attempting to plug in her oxygen. She fell from a bent position, which likely reduced the impact. She reports lower back pain and difficulty raising her arm, which is " consistent with her pre-existing conditions. There is no new or worsening pain since the fall. She is on Eliquis but had run out of the medication around the time of the fall, which may have reduced the risk of bleeding. An x-ray of the left hip will be ordered to rule out any fractures or other injuries. She is advised to continue monitoring for any new symptoms and to maintain her current medication regimen.    2. Lower back pain.  She reports persistent lower back pain, which is consistent with her pre-existing condition. She is advised to continue her current treatment plan and to monitor for any changes in pain or new symptoms.    3 left hip pain  Will order left hip .          Follow Up   There are no Patient Instructions on file for this visit.   No follow-ups on file.  Patient was given instructions and counseling regarding her condition or for health maintenance advice. Please see specific information pulled into the AVS if appropriate.     Patient or patient representative verbalized consent for the use of Ambient Listening during the visit with  Nisha Britton MD for chart documentation. 2/13/2025  00:06 EST

## 2025-02-11 ENCOUNTER — TELEPHONE (OUTPATIENT)
Dept: PAIN MEDICINE | Facility: CLINIC | Age: 75
End: 2025-02-11
Payer: MEDICARE

## 2025-02-11 NOTE — TELEPHONE ENCOUNTER
Tanisha with [facility] called. She is asking for verbal order for PT and OT evaluation for patient related to some recent falls that patient has had. Please advise    Tanisha [phone]

## 2025-02-14 ENCOUNTER — TELEPHONE (OUTPATIENT)
Dept: PAIN MEDICINE | Facility: CLINIC | Age: 75
End: 2025-02-14
Payer: MEDICARE

## 2025-02-14 NOTE — TELEPHONE ENCOUNTER
I am not sure what exactly the PT request is.  See documentation from 2 days ago giving PT order.     Regarding drug interaction - I do not prescribe these medications. PCP should be notified.

## 2025-02-14 NOTE — TELEPHONE ENCOUNTER
Reynold Schuler from [facility] stated PT will be for 2X week for 2 weeks, then 1X week for 6 weeks, verbal order given for this. She also stated that upon entering her medications, there was an interaction that she was required to report with Primidone and Eliquis - I did explain that we did not prescribe either of those medications, so that would need to be reported to prescriber.

## 2025-02-14 NOTE — TELEPHONE ENCOUNTER
Karolyn Mares PT from [facility] called asking for more orders,2 week 2,1 week 6.  Also wanted to let you know patient had a one level 2 medication interaction with eliquis and primidone.    She left a call back # if you had any questions.    [phone]

## 2025-02-17 ENCOUNTER — TELEPHONE (OUTPATIENT)
Dept: PAIN MEDICINE | Facility: CLINIC | Age: 75
End: 2025-02-17
Payer: MEDICARE

## 2025-02-17 NOTE — TELEPHONE ENCOUNTER
[facility] would like a verbal order to provide the pain with occupational therapy/ home health once weekly for 8 weeks. Please advise.

## 2025-02-21 ENCOUNTER — TELEPHONE (OUTPATIENT)
Dept: FAMILY MEDICINE CLINIC | Facility: CLINIC | Age: 75
End: 2025-02-21

## 2025-02-21 NOTE — TELEPHONE ENCOUNTER
Caller: SHELDON TYSON(poa)    Relationship: Emergency Contact    Best call back number: [phone]     What medication are you requesting: ANTIBIOTIC     What are your current symptoms: [facility] WAS IN TO SEE HER TODAY AND RAN UA ON PATIENT URINE AND IT CAME BACK BLOOD IN URINE AND HIGH LEVELS OF LEUKOCYTES     Have you had these symptoms before:    [x] Yes  [] No    Have you been treated for these symptoms before:   [x] Yes  [] No    If a prescription is needed, what is your preferred pharmacy and phone number: [id number] [place] [place] [facility] [address] AT [facility] - [phone] [facility] [phone] FX     PLEASE CALL AND ADVISE WHAT SHOULD DO NEXT

## 2025-02-22 RX ORDER — CEPHALEXIN 500 MG/1
500 CAPSULE ORAL 2 TIMES DAILY
Qty: 14 CAPSULE | Refills: 0 | Status: SHIPPED | OUTPATIENT
Start: 2025-02-22

## 2025-02-24 ENCOUNTER — TELEPHONE (OUTPATIENT)
Dept: PAIN MEDICINE | Facility: CLINIC | Age: 75
End: 2025-02-24

## 2025-02-24 NOTE — TELEPHONE ENCOUNTER
Caller: SHELDON TYSON(poa)    Relationship to patient: Emergency Contact    Best call back number: [phone]    Type of visit: INJECTION    Requested date: ASAP     If rescheduling, when is the original appointment: 02/25/25     Additional notes:SHELDON STATES PATIENT WAS PUT ON AN ANTIBIOTIC FOR A UTI AND NEEDS TO R/S

## 2025-02-25 ENCOUNTER — APPOINTMENT (OUTPATIENT)
Dept: GENERAL RADIOLOGY | Facility: SURGERY CENTER | Age: 75
End: 2025-02-25
Payer: MEDICARE

## 2025-02-25 ENCOUNTER — OFFICE VISIT (OUTPATIENT)
Dept: PAIN MEDICINE | Facility: CLINIC | Age: 75
End: 2025-02-25
Payer: MEDICARE

## 2025-02-25 VITALS
DIASTOLIC BLOOD PRESSURE: 66 MMHG | HEIGHT: 66 IN | SYSTOLIC BLOOD PRESSURE: 122 MMHG | RESPIRATION RATE: 18 BRPM | WEIGHT: 212.2 LBS | HEART RATE: 68 BPM | BODY MASS INDEX: 34.1 KG/M2 | TEMPERATURE: 99.7 F | OXYGEN SATURATION: 94 %

## 2025-02-25 DIAGNOSIS — M50.30 DDD (DEGENERATIVE DISC DISEASE), CERVICAL: ICD-10-CM

## 2025-02-25 DIAGNOSIS — M54.16 LUMBAR RADICULOPATHY: Primary | ICD-10-CM

## 2025-02-25 PROCEDURE — 3078F DIAST BP <80 MM HG: CPT | Performed by: NURSE PRACTITIONER

## 2025-02-25 PROCEDURE — 1159F MED LIST DOCD IN RCRD: CPT | Performed by: NURSE PRACTITIONER

## 2025-02-25 PROCEDURE — 99214 OFFICE O/P EST MOD 30 MIN: CPT | Performed by: NURSE PRACTITIONER

## 2025-02-25 PROCEDURE — 1125F AMNT PAIN NOTED PAIN PRSNT: CPT | Performed by: NURSE PRACTITIONER

## 2025-02-25 PROCEDURE — 1160F RVW MEDS BY RX/DR IN RCRD: CPT | Performed by: NURSE PRACTITIONER

## 2025-02-25 PROCEDURE — 3074F SYST BP LT 130 MM HG: CPT | Performed by: NURSE PRACTITIONER

## 2025-02-25 RX ORDER — BRIMONIDINE TARTRATE 2 MG/ML
1 SOLUTION/ DROPS OPHTHALMIC 2 TIMES DAILY
COMMUNITY
Start: 2025-01-31

## 2025-02-25 NOTE — PROGRESS NOTES
CHIEF COMPLAINT  Follow-up for back and neck pain.    Ton Saeur is a 74 y.o. female  who presents for follow-up.  She has a history of neck and back pain.  At patients last office visit we discussed a cervical JAMAL and actually scheduled the procedure. Initially it was cancelled due to patient forgetting to hold Eliquis. A second time the surgery was cancelled due to antibiotic therapy.  She started an antibiotic for a UTI a couple of days ago, she will be on this for another 5 days.      Pain today 4/10 VAS.  The pain seems to be most severe in the neck.  The pain is present on the right side of the neck and radiates into the right shoulder.  Pain is constant, unable to isolate and specific triggers or aggravating factors.  PT exercises seem to help.  She utilizes Tramadol sparingly for the pain whch is prescribed by the PCP.       Eliquis is taken for A-fib.  This is managed by her PCP Dr. Britton. Clearance to hold for procedure is on chart.      Past pain medications: Gabapentin - not effective      Current pain medications: Tramadol, Tylenol     Past therapies:  Physical Therapy: Yes  Chiropractor: Yes  Massage Therapy: No  TENS: Yes  Neck or back surgery: No  Past pain management: No      Neck Pain   This is a recurrent problem. The problem occurs daily. The pain is present in the right side. The pain is at a severity of 6/10. The symptoms are aggravated by position and twisting. Pertinent negatives include no numbness or weakness. She has tried acetaminophen and home exercises for the symptoms. The treatment provided mild relief.        PEG Assessment   What number best describes your pain on average in the past week?6    Review of Pertinent Medical Data ---      The following portions of the patient's history were reviewed and updated as appropriate: allergies, current medications, past family history, past medical history, past social history, past surgical history, and problem  "list.    Review of Systems   Gastrointestinal:  Negative for constipation and diarrhea.   Genitourinary:  Negative for difficulty urinating.   Musculoskeletal:  Positive for back pain and neck pain.   Neurological:  Negative for weakness and numbness.   Psychiatric/Behavioral:  Negative for sleep disturbance and suicidal ideas. The patient is nervous/anxious.      I have reviewed and confirmed the accuracy of the ROS as documented by the MA/LPN/RN Alma JuliánMARK    Vitals:    02/25/25 1336   Resp: 18   SpO2: Comment: O2 3L   Weight: 96.3 kg (212 lb 3.2 oz)   Height: 167.6 cm (66\")   PainSc: 6   Comment: neck pain 6/10   PainLoc: Back         Objective   Physical Exam  Vitals and nursing note reviewed.   Constitutional:       General: She is not in acute distress.     Appearance: Normal appearance. She is not ill-appearing.      Interventions: Nasal cannula in place.   Pulmonary:      Effort: Pulmonary effort is normal. No respiratory distress.   Musculoskeletal:      Cervical back: Spasms, tenderness and bony tenderness present. No rigidity. Pain with movement present.   Neurological:      Mental Status: She is alert and oriented to person, place, and time.      Motor: Motor function is intact. Weakness (generalized, mild) present.      Gait: Gait abnormal (slowed ambulates with walker).   Psychiatric:         Mood and Affect: Mood normal.         Behavior: Behavior normal.         Assessment & Plan   Diagnoses and all orders for this visit:    1. Lumbar radiculopathy (Primary)    2. DDD (degenerative disc disease), cervical      --- Cervical JAMAL (anticipate C5/C6)      ---  Indications for epidural injection:  Plan is to proceed with epidural at the appropriate level.  If the patient receives significant pain reduction and improvement in function and the plan will be to repeat the epidural when the pain worsens.  If a second epidural provides at least 6 weeks of sustained improvement that includes both " pain reduction and improvement in function then an epidural injection could be repeated once again at the same level.  This is a mutual decision between the clinician and the patient that includes discussions including risks and benefits in detail as well as alternative therapies.  Patient's questions were answered to their satisfaction and to their understanding.  ---    --- Eliquis must be held for 3 days prior to injection    --------    Anticoagulation risks for procedures....   - there are risks to discontinuation of anticoagulants for neuraxial procedures and surgery.  Risks include but are not limited to deep vein thromboses, pulmonary emboli, myocardial infarction, and stroke    - Neuraxial access with a needle is relatively contraindicated while anticoagulated because of the risk of hemorrhage and/or epidural hematoma, which can be a neurosurgical emergency to evacuate bleeding.  Left unchecked, bleeding can cause nerve damage leading to paralysis and/or death.  --------        Kanwal Sauer reports a pain score of 6.  Given her pain assessment as noted, treatment options were discussed and the following options were decided upon as a follow-up plan to address the patient's pain: steroid injections.      --- Follow-up for procedure

## 2025-02-25 NOTE — H&P (VIEW-ONLY)
CHIEF COMPLAINT  Follow-up for back and neck pain.    Ton Sauer is a 74 y.o. female  who presents for follow-up.  She has a history of neck and back pain.  At patients last office visit we discussed a cervical JAMAL and actually scheduled the procedure. Initially it was cancelled due to patient forgetting to hold Eliquis. A second time the surgery was cancelled due to antibiotic therapy.  She started an antibiotic for a UTI a couple of days ago, she will be on this for another 5 days.      Pain today 4/10 VAS.  The pain seems to be most severe in the neck.  The pain is present on the right side of the neck and radiates into the right shoulder.  Pain is constant, unable to isolate and specific triggers or aggravating factors.  PT exercises seem to help.  She utilizes Tramadol sparingly for the pain whch is prescribed by the PCP.       Eliquis is taken for A-fib.  This is managed by her PCP Dr. Britton. Clearance to hold for procedure is on chart.      Past pain medications: Gabapentin - not effective      Current pain medications: Tramadol, Tylenol     Past therapies:  Physical Therapy: Yes  Chiropractor: Yes  Massage Therapy: No  TENS: Yes  Neck or back surgery: No  Past pain management: No      Neck Pain   This is a recurrent problem. The problem occurs daily. The pain is present in the right side. The pain is at a severity of 6/10. The symptoms are aggravated by position and twisting. Pertinent negatives include no numbness or weakness. She has tried acetaminophen and home exercises for the symptoms. The treatment provided mild relief.        PEG Assessment   What number best describes your pain on average in the past week?6    Review of Pertinent Medical Data ---      The following portions of the patient's history were reviewed and updated as appropriate: allergies, current medications, past family history, past medical history, past social history, past surgical history, and problem  "list.    Review of Systems   Gastrointestinal:  Negative for constipation and diarrhea.   Genitourinary:  Negative for difficulty urinating.   Musculoskeletal:  Positive for back pain and neck pain.   Neurological:  Negative for weakness and numbness.   Psychiatric/Behavioral:  Negative for sleep disturbance and suicidal ideas. The patient is nervous/anxious.      I have reviewed and confirmed the accuracy of the ROS as documented by the MA/LPN/RN Alma JuliánMARK    Vitals:    02/25/25 1336   Resp: 18   SpO2: Comment: O2 3L   Weight: 96.3 kg (212 lb 3.2 oz)   Height: 167.6 cm (66\")   PainSc: 6   Comment: neck pain 6/10   PainLoc: Back         Objective   Physical Exam  Vitals and nursing note reviewed.   Constitutional:       General: She is not in acute distress.     Appearance: Normal appearance. She is not ill-appearing.      Interventions: Nasal cannula in place.   Pulmonary:      Effort: Pulmonary effort is normal. No respiratory distress.   Musculoskeletal:      Cervical back: Spasms, tenderness and bony tenderness present. No rigidity. Pain with movement present.   Neurological:      Mental Status: She is alert and oriented to person, place, and time.      Motor: Motor function is intact. Weakness (generalized, mild) present.      Gait: Gait abnormal (slowed ambulates with walker).   Psychiatric:         Mood and Affect: Mood normal.         Behavior: Behavior normal.         Assessment & Plan   Diagnoses and all orders for this visit:    1. Lumbar radiculopathy (Primary)    2. DDD (degenerative disc disease), cervical      --- Cervical JAMAL (anticipate C5/C6)      ---  Indications for epidural injection:  Plan is to proceed with epidural at the appropriate level.  If the patient receives significant pain reduction and improvement in function and the plan will be to repeat the epidural when the pain worsens.  If a second epidural provides at least 6 weeks of sustained improvement that includes both " pain reduction and improvement in function then an epidural injection could be repeated once again at the same level.  This is a mutual decision between the clinician and the patient that includes discussions including risks and benefits in detail as well as alternative therapies.  Patient's questions were answered to their satisfaction and to their understanding.  ---    --- Eliquis must be held for 3 days prior to injection    --------    Anticoagulation risks for procedures....   - there are risks to discontinuation of anticoagulants for neuraxial procedures and surgery.  Risks include but are not limited to deep vein thromboses, pulmonary emboli, myocardial infarction, and stroke    - Neuraxial access with a needle is relatively contraindicated while anticoagulated because of the risk of hemorrhage and/or epidural hematoma, which can be a neurosurgical emergency to evacuate bleeding.  Left unchecked, bleeding can cause nerve damage leading to paralysis and/or death.  --------        Kanwal Saure reports a pain score of 6.  Given her pain assessment as noted, treatment options were discussed and the following options were decided upon as a follow-up plan to address the patient's pain: steroid injections.      --- Follow-up for procedure

## 2025-03-14 ENCOUNTER — TELEPHONE (OUTPATIENT)
Dept: FAMILY MEDICINE CLINIC | Facility: CLINIC | Age: 75
End: 2025-03-14

## 2025-03-14 NOTE — TELEPHONE ENCOUNTER
Caller: GEORGE Pauline HUA    Relationship:     Best call back number: [phone]         Who are you requesting to speak with (clinical staff, provider,  specific staff member): PCP OR CLINICAL        What was the call regarding: REQUESTING VERBAL ORDERS FOR NURSING TO EVALUATE.  STILL HAS SIGNS AND SYMPTOMS OF UTI.

## 2025-03-18 ENCOUNTER — HOSPITAL ENCOUNTER (OUTPATIENT)
Dept: GENERAL RADIOLOGY | Facility: SURGERY CENTER | Age: 75
Setting detail: HOSPITAL OUTPATIENT SURGERY
End: 2025-03-18
Payer: MEDICARE

## 2025-03-18 ENCOUNTER — HOSPITAL ENCOUNTER (OUTPATIENT)
Facility: SURGERY CENTER | Age: 75
Setting detail: HOSPITAL OUTPATIENT SURGERY
Discharge: HOME | End: 2025-03-18
Attending: ANESTHESIOLOGY | Admitting: ANESTHESIOLOGY
Payer: MEDICARE

## 2025-03-18 VITALS
BODY MASS INDEX: 35.82 KG/M2 | TEMPERATURE: 98.7 F | WEIGHT: 215 LBS | HEART RATE: 54 BPM | OXYGEN SATURATION: 98 % | DIASTOLIC BLOOD PRESSURE: 100 MMHG | HEIGHT: 65 IN | RESPIRATION RATE: 20 BRPM | SYSTOLIC BLOOD PRESSURE: 147 MMHG

## 2025-03-18 DIAGNOSIS — M50.30 DDD (DEGENERATIVE DISC DISEASE), CERVICAL: ICD-10-CM

## 2025-03-18 DIAGNOSIS — Z41.9 SURGERY, ELECTIVE: ICD-10-CM

## 2025-03-18 PROCEDURE — 76000 FLUOROSCOPY <1 HR PHYS/QHP: CPT

## 2025-03-18 PROCEDURE — 25510000001 IOPAMIDOL 61 % SOLUTION 30 ML VIAL: Performed by: ANESTHESIOLOGY

## 2025-03-18 PROCEDURE — 25010000002 BUPIVACAINE (PF) 0.5 % SOLUTION 10 ML VIAL: Performed by: ANESTHESIOLOGY

## 2025-03-18 PROCEDURE — 62321 NJX INTERLAMINAR CRV/THRC: CPT | Performed by: ANESTHESIOLOGY

## 2025-03-18 PROCEDURE — 25010000002 DIPHENHYDRAMINE PER 50 MG: Performed by: ANESTHESIOLOGY

## 2025-03-18 PROCEDURE — 25010000002 LIDOCAINE PF 1% 1 % SOLUTION 5 ML VIAL: Performed by: ANESTHESIOLOGY

## 2025-03-18 PROCEDURE — 25010000002 METHYLPREDNISOLONE PER 80 MG: Performed by: ANESTHESIOLOGY

## 2025-03-18 PROCEDURE — 77002 NEEDLE LOCALIZATION BY XRAY: CPT

## 2025-03-18 RX ORDER — DIPHENHYDRAMINE HYDROCHLORIDE 50 MG/ML
10 INJECTION INTRAMUSCULAR; INTRAVENOUS ONCE
Status: COMPLETED | OUTPATIENT
Start: 2025-03-18 | End: 2025-03-18

## 2025-03-18 RX ORDER — DIAZEPAM 5 MG/1
10 TABLET ORAL ONCE
Status: COMPLETED | OUTPATIENT
Start: 2025-03-18 | End: 2025-03-18

## 2025-03-18 RX ADMIN — DIPHENHYDRAMINE HYDROCHLORIDE 10 MG: 50 INJECTION INTRAMUSCULAR; INTRAVENOUS at 11:39

## 2025-03-18 RX ADMIN — DIAZEPAM 10 MG: 5 TABLET ORAL at 10:47

## 2025-03-18 NOTE — DISCHARGE INSTRUCTIONS
[facility] - Post-procedure Instructions          --  While there are no absolute restrictions, it is recommended that you do not perform strenuous activity today. In the morning, you may resume your level of activity as before your block.    --  If you have a band-aid at your injection site, please remove it later today. Observe the area for any redness, swelling, pus-like drainage, or a temperature over 101°. If any of these symptoms occur, please call your doctor at [phone]. If after office hours, leave a message and the on-call provider will return your call.    --  Ice may be applied to your injection site. It is recommended you avoid direct heat (heating pad; hot tub) for 1-2 days.    --  Call [facility] at [phone] if you experience persistent headache, persistent bleeding from the injection site, or severe pain not relieved by heat or oral medication.    --  Do not make important decisions today.    --  Due to the effects of the block and/or the I.V. Sedation, DO NOT drive or operate hazardous machinery for 12 hours.  Local anesthetics may cause numbness after procedure and precautions must be taken with regards to operating equipment as well as with walking, even if ambulating with assistance of another person or with an assistive device.    --  Do not drink alcohol for 12 hours.    -- You may return to work tomorrow, or as directed by your referring doctor.    --  Occasionally you may notice a slight increase in your pain after the procedure. This should start to improve within the next 24-48 hours. Radiofrequency ablation procedure pain may last 3-4 weeks.    --  It may take as long as 3-4 days before you notice a gradual improvement in your pain and/or other symptoms.    -- You may continue to take your prescribed pain medication as needed.    --  Some normal possible side effects of steroid use could include fluid retention, increased blood sugar, dull headache,  increased sweating, increased appetite, mood swings and flushing.    --  Diabetics are recommended to watch their blood glucose level closely for 24-48 hours after the injection.    --  Must stay in PACU for 20 min upon arrival and prove no leg weakness before being discharged.    --  IN THE EVENT OF A LIFE THREATENING EMERGENCY, (CHEST PAIN, BREATHING DIFFICULTIES, PARALYSIS…) YOU SHOULD GO TO YOUR NEAREST EMERGENCY ROOM.    --  You should be contacted by our office within 2-3 days to schedule follow up or next appointment date.  If not contacted within 7 days, please call the office at [phone]

## 2025-03-18 NOTE — OP NOTE
Cervical Epidural Steroid Injection @ C5-C6  [facility]    PREOPERATIVE DIAGNOSIS:  Right Cervical Radiculopathy  POSTOPERATIVE DIAGNOSIS:  Same as preop diagnosis    PROCEDURE:   Cervical Epidural Steroid Injection, Therapeutic Translaminar Injection, with epidurogram, at  C5-C6 level    PRE-PROCEDURE DISCUSSION WITH PATIENT:    Risks and complications were discussed with the patient prior to starting the procedure and informed consent was obtained.  We discussed various topics including but not limited to bleeding, infection, injury, paralysis, nerve injury, dural puncture, coma, death, worsening of clinical picture, lack of pain relief, and postprocedural soreness.    SURGEON:  Buzz James MD    REASON FOR PROCEDURE:   Degenerative changes are noted in the area. and Radiating pattern of pain is likely consistent with degenerative changes in the area.    SEDATION:  The patient had higher than average levels of procedural anxiety and the need to provide additional procedural sedation was needed to safely proceed. and she received oral diazepam 10 mg in the preoperative area.  She received 10 mg of the IV diphenhydramine in the procedure suite  ANESTHETIC:  Marcaine 0.25%  STEROID:   Methylprednisolone (DEPO MEDROL) 80mg/ml    DESCRIPTON OF PROCEDURE:    After obtaining informed consent, I.V. was started in the preop area.   The patient was taken to the operating room and placed in the prone position.  EKG, blood pressure, and pulse oximeter were monitored throughout, and sedation was provided as needed by the RN under my guidance. All pressure points were well padded.  The cervicothoracic spine area was prepped with Chloraprep and draped in a sterile fashion.  Under fluoroscopic guidance, the aforementioned interlaminar space was identified. Skin and subcutaneous tissues were anesthetized with 1% lidocaine in the middle of the space. A Tuohy needle was introduced through the skin and  advanced to this interlaminar space and into the epidural space under fluoroscopic guidance and verified with loss-of-resistance technique to air.  After confirming the position of the needle with the fluoroscope with all the views, and after aspiration was confirmed negative for blood and CSF, 1.5 mL of Omnipaque was injected.  After seeing appropriate epidurogram with lateral and PA views, a total of 3 cc solution was injected, consisting of 1cc of local anesthetic as above, with normal saline and injectable steroid as above.     ESTIMATED BLOOD LOSS:  <5 mL  SPECIMENS:  None    COMPLICATIONS:     No complications were noted., There was no indication of vascular uptake on live injection of contrast dye., There was no indication of intrathecal uptake on live injection of contrast dye., There was not any evidence of dural puncture.  , and The patient did not have any signs of postprocedure numbness nor weakness.    TOLERANCE & DISCHARGE CONDITION:    The patient tolerated the procedure well.  The patient was transported to the recovery area without difficulties.  The patient was discharged to home under the care of family in stable and satisfactory condition.    PLAN OF CARE:  The patient was given our standard instruction sheet.  The patient will Return to clinic 4-6 wks.  The patient will resume all medications as per the medication reconciliation sheet.

## 2025-03-20 ENCOUNTER — TELEPHONE (OUTPATIENT)
Dept: FAMILY MEDICINE CLINIC | Facility: CLINIC | Age: 75
End: 2025-03-20

## 2025-03-20 NOTE — TELEPHONE ENCOUNTER
Caller: HERMAN/VIRY LUONG    Relationship: Home Health    Best call back number: [phone]/CAN LEAVE VOICEMAIL     What orders are you requesting (i.e. lab or imaging): SKILLED NURSING 1 TIME A WEEK FOR 2 WEEKS     In what timeframe would the patient need to come in: ASAP     Where will you receive your lab/imaging services: IN HER HOME     Additional notes: PLEASE CALL WITH VERBAL

## 2025-03-21 ENCOUNTER — OFFICE VISIT (OUTPATIENT)
Dept: NEUROLOGY | Facility: CLINIC | Age: 75
End: 2025-03-21
Payer: MEDICARE

## 2025-03-21 VITALS
WEIGHT: 210 LBS | HEIGHT: 65 IN | BODY MASS INDEX: 34.99 KG/M2 | OXYGEN SATURATION: 95 % | DIASTOLIC BLOOD PRESSURE: 58 MMHG | SYSTOLIC BLOOD PRESSURE: 108 MMHG | HEART RATE: 77 BPM

## 2025-03-21 DIAGNOSIS — R41.0 CONFUSION: Primary | ICD-10-CM

## 2025-03-21 DIAGNOSIS — G25.0 BENIGN ESSENTIAL TREMOR: ICD-10-CM

## 2025-03-21 DIAGNOSIS — R68.89 FORGETFULNESS: ICD-10-CM

## 2025-03-21 PROCEDURE — 99214 OFFICE O/P EST MOD 30 MIN: CPT | Performed by: PSYCHIATRY & NEUROLOGY

## 2025-03-21 PROCEDURE — 1159F MED LIST DOCD IN RCRD: CPT | Performed by: PSYCHIATRY & NEUROLOGY

## 2025-03-21 PROCEDURE — 3074F SYST BP LT 130 MM HG: CPT | Performed by: PSYCHIATRY & NEUROLOGY

## 2025-03-21 PROCEDURE — 3078F DIAST BP <80 MM HG: CPT | Performed by: PSYCHIATRY & NEUROLOGY

## 2025-03-21 PROCEDURE — 1160F RVW MEDS BY RX/DR IN RCRD: CPT | Performed by: PSYCHIATRY & NEUROLOGY

## 2025-03-21 NOTE — PROGRESS NOTES
Chief Complaint   Patient presents with    Tremors       Patient ID: Kanwal Sauer is a 74 y.o. female.    HPI: I have had the pleasure of seeing your patient today.  As you may know she is a 74-year-old female here for the management of benign essential tremor.  Her tremor tends to come and go in intensity.  For instance if she has a urinary tract infection the tremor may worsen some.  She has been having more confusion for the past few months also.  They are concerned that she may have a UTI again.  She does have a long history of chronic UTIs.  She has been more forgetful as of late however.  Apparently she we will repeat herself.  She may need reminding when it comes to appointments.    The following portions of the patient's history were reviewed and updated as appropriate: allergies, current medications, past family history, past medical history, past social history, past surgical history and problem list.    Review of Systems   Constitutional:  Positive for fatigue.   Musculoskeletal:  Positive for gait problem.   Neurological:  Positive for tremors, weakness, light-headedness and headaches. Negative for dizziness, seizures, syncope, facial asymmetry, speech difficulty and numbness.   Psychiatric/Behavioral:  Positive for confusion and decreased concentration. Negative for agitation, behavioral problems, dysphoric mood, hallucinations, self-injury, sleep disturbance and suicidal ideas. The patient is nervous/anxious. The patient is not hyperactive.       I have reviewed the review of systems above performed by my medical assistant.      Vitals:    03/21/25 1453   BP: 108/58   Pulse: 77   SpO2: 95%       Neurological Exam  Mental Status  Awake, alert and oriented to person, place and time. Recalls 3 of 3 objects immediately. At 3 minutes recalls 2 of 3 objects. Recalls 2 of 3 objects with prompting. Speech is normal. Language is fluent with no aphasia. Attention and concentration are normal. Fund of  knowledge is appropriate for level of education. MMSE score: 24.    Cranial Nerves  CN I: Sense of smell is normal.  CN II: Visual acuity is normal.  CN III, IV, VI: Extraocular movements intact bilaterally. Pupils equal round and reactive to light bilaterally.  CN V: Facial sensation is normal.  CN VII: Full and symmetric facial movement.  CN XI: Shoulder shrug strength is normal.  CN XII: Tongue midline without atrophy or fasciculations.    Motor  Normal muscle bulk throughout. No fasciculations present. Normal muscle tone. The following abnormal movements were seen: Intention tremor bilaterally.   No pronator drift.                                             Right                     Left  Rhomboids                            5                          5  Infraspinatus                          5                          5  Supraspinatus                       5                          5  Deltoid                                   5                          5   Biceps                                   5                          5  Brachioradialis                      5                          5   Triceps                                  5                          5   Pronator                                5                          5   Supinator                              5                           5   Wrist flexor                            5                          5   Wrist extensor                       5                          5   Finger flexor                          5                          5   Finger extensor                     5                          5   Interossei                              5                          5   Abductor pollicis brevis         5                          5   Flexor pollicis brevis             5                          5   Opponens pollicis                 5                          5  Extensor digitorum               5                          5  Abductor  digiti minimi           5                          5   Abdominal                            5                          5  Glutei                                    5                          5  Hip abductor                         5                          5  Hip adductor                         5                          5   Iliopsoas                               5                          5   Quadriceps                           5                          5   Hamstring                             5                          5   Gastrocnemius                     5                           5   Anterior tibialis                      5                          5   Posterior tibialis                    5                          5   Peroneal                               5                          5  Ankle dorsiflexor                   5                          5  Ankle plantar flexor              5                           5  Extensor hallucis longus      5                           5    Sensory  Sensation is intact to light touch, pinprick, vibration and proprioception in all four extremities.    Reflexes  Deep tendon reflexes are 2+ and symmetric in all four extremities.    Right pathological reflexes: Kiesha's absent.  Left pathological reflexes: Kiesha's absent.    Coordination    Finger-to-nose, rapid alternating movements and heel-to-shin normal bilaterally without dysmetria.    Gait  Normal casual, toe, heel and tandem gait.       Physical Exam  Vitals reviewed.   Constitutional:       Appearance: She is well-developed.   HENT:      Head: Normocephalic and atraumatic.   Eyes:      Extraocular Movements: Extraocular movements intact.      Pupils: Pupils are equal, round, and reactive to light.   Cardiovascular:      Rate and Rhythm: Normal rate and regular rhythm.   Pulmonary:      Breath sounds: Normal breath sounds.   Musculoskeletal:         General: Normal range of motion.   Skin:     General: Skin  is warm.   Neurological:      Coordination: Coordination is intact.      Deep Tendon Reflexes: Reflexes are normal and symmetric.   Psychiatric:         Speech: Speech normal.         Procedures    Assessment/Plan: We are going to schedule her for MRI imaging of the brain as well as neuropsych testing due to the issues related to short-term memory loss.  We are still going to forego medical management of the essential tremor.  Will see her back after her neuropsych testing has been completed.  A total of 30 minutes was spent face-to-face with the patient today.  Of that greater than 50% of this time was spent discussing signs and symptoms of confusion, forgetfulness, essential tremor, patient education, plan of care and prognosis.         Diagnoses and all orders for this visit:    1. Confusion (Primary)  -     Ambulatory Referral to Neuropsychology  -     MRI Brain With & Without Contrast; Future    2. Forgetfulness  -     Ambulatory Referral to Neuropsychology  -     MRI Brain With & Without Contrast; Future    3. Benign essential tremor           Ash Washington II, MD

## 2025-03-28 RX ORDER — PRIMIDONE 50 MG/1
50 TABLET ORAL 3 TIMES DAILY
Qty: 270 TABLET | Refills: 1 | Status: SHIPPED | OUTPATIENT
Start: 2025-03-28

## 2025-03-28 RX ORDER — FENOFIBRATE 160 MG/1
160 TABLET ORAL DAILY
Qty: 90 TABLET | Refills: 1 | Status: SHIPPED | OUTPATIENT
Start: 2025-03-28

## 2025-03-28 RX ORDER — MONTELUKAST SODIUM 10 MG/1
10 TABLET ORAL
Qty: 90 TABLET | Refills: 1 | Status: SHIPPED | OUTPATIENT
Start: 2025-03-28

## 2025-03-28 RX ORDER — OLMESARTAN MEDOXOMIL 20 MG/1
20 TABLET ORAL DAILY
Qty: 90 TABLET | Refills: 1 | Status: SHIPPED | OUTPATIENT
Start: 2025-03-28

## 2025-03-28 RX ORDER — HYDROCHLOROTHIAZIDE 12.5 MG/1
12.5 CAPSULE ORAL DAILY
Qty: 90 CAPSULE | Refills: 3 | Status: SHIPPED | OUTPATIENT
Start: 2025-03-28

## 2025-03-28 NOTE — TELEPHONE ENCOUNTER
Rx Refill Note  Requested Prescriptions     Pending Prescriptions Disp Refills    olmesartan (BENICAR) 20 MG tablet [Pharmacy Med Name: OLMESARTAN MEDOXOMIL 20 MG TAB] 90 tablet 1     Sig: TAKE 1 TABLET BY MOUTH EVERY DAY    fenofibrate 160 MG tablet [Pharmacy Med Name: FENOFIBRATE 160 MG TABLET] 90 tablet 1     Sig: TAKE 1 TABLET BY MOUTH EVERY DAY    primidone (MYSOLINE) 50 MG tablet [Pharmacy Med Name: PRIMIDONE 50 MG TABLET] 270 tablet 1     Sig: TAKE 1 TABLET BY MOUTH THREE TIMES A DAY    montelukast (SINGULAIR) 10 MG tablet [Pharmacy Med Name: MONTELUKAST SOD 10 MG TABLET] 90 tablet 1     Sig: TAKE 1 TABLET BY MOUTH EVERYDAY AT BEDTIME      Last office visit with prescribing clinician: 1/30/2025   Last telemedicine visit with prescribing clinician: Visit date not found   Next office visit with prescribing clinician: Visit date not found                         Would you like a call back once the refill request has been completed: [] Yes [] No    If the office needs to give you a call back, can they leave a voicemail: [] Yes [] No    Lynn Garvin MA  03/28/25, 12:37 EDT

## 2025-04-10 ENCOUNTER — TELEPHONE (OUTPATIENT)
Dept: PAIN MEDICINE | Facility: CLINIC | Age: 75
End: 2025-04-10

## 2025-04-10 NOTE — TELEPHONE ENCOUNTER
Karolyn JIN from [facility] called asking if patient can continue care for 4 more weeks once a week.  Also is it ok for one of their nurses to come out and eval patient? She may have a possible UTI.    CALL BACK#  [phone]      show

## 2025-04-10 NOTE — TELEPHONE ENCOUNTER
Did we order the PT?  Sounds like this may be a more appropriate question for her PCP.  I would not be the one to manage a UTI if that is what is going on.

## 2025-04-11 ENCOUNTER — TELEPHONE (OUTPATIENT)
Dept: FAMILY MEDICINE CLINIC | Facility: CLINIC | Age: 75
End: 2025-04-11
Payer: MEDICARE

## 2025-04-11 NOTE — TELEPHONE ENCOUNTER
[facility] called wanting verbal orders for continuation of PT. Pt was seen within last 6 months and gave verbal orders.

## 2025-04-14 ENCOUNTER — TELEPHONE (OUTPATIENT)
Dept: FAMILY MEDICINE CLINIC | Facility: CLINIC | Age: 75
End: 2025-04-14
Payer: MEDICARE

## 2025-04-14 NOTE — TELEPHONE ENCOUNTER
Christa w/ [facility]  called in to let provider know they will be able to take pt on for skilled nursing. Pt will continue skilled nursing for another week. Pt will continue PT w/ [facility].   Originally called and stated they wouldn't take her on for skilled nursing but is now able to.   P: [phone]

## 2025-04-16 ENCOUNTER — TELEPHONE (OUTPATIENT)
Dept: NEUROLOGY | Facility: CLINIC | Age: 75
End: 2025-04-16
Payer: MEDICARE

## 2025-04-16 DIAGNOSIS — Z91.041 CONTRAST MEDIA ALLERGY: Primary | ICD-10-CM

## 2025-04-16 RX ORDER — PREDNISONE 50 MG/1
TABLET ORAL
Qty: 3 TABLET | Refills: 0 | Status: SHIPPED | OUTPATIENT
Start: 2025-04-16

## 2025-04-16 NOTE — TELEPHONE ENCOUNTER
Provider: [facility]    Caller: SHELDON TYSON(poa)    Relationship to Patient: Emergency Contact    Phone Number: [phone]    Reason for Call: PATIENTS' DAUGHTER IS REQUESTING A CALL BACK TO CONFIRM IF A PROTOCOL WILL BE COMPLETED BEFORE THE PATIENTS' MRI. SHELDON STATES THAT PATIENT IS ALLERGIC TO THE IODINE IN CONTRAST. THE MRI IS ON 4/21/25.    PLEASE REVIEW

## 2025-04-16 NOTE — TELEPHONE ENCOUNTER
Spoke with daughter, informed her that we will send the rx for contrast allergy protocol & directions for protocol via [employer] as well. She verbally acknowledge. Confirmed the pharmacy & that they can access pt's [facility].

## 2025-04-22 ENCOUNTER — OFFICE VISIT (OUTPATIENT)
Dept: PAIN MEDICINE | Facility: CLINIC | Age: 75
End: 2025-04-22
Payer: MEDICARE

## 2025-04-22 VITALS
OXYGEN SATURATION: 95 % | DIASTOLIC BLOOD PRESSURE: 70 MMHG | HEIGHT: 65 IN | BODY MASS INDEX: 35.42 KG/M2 | TEMPERATURE: 95.9 F | WEIGHT: 212.6 LBS | HEART RATE: 63 BPM | SYSTOLIC BLOOD PRESSURE: 118 MMHG

## 2025-04-22 DIAGNOSIS — M54.16 LUMBAR RADICULOPATHY: Primary | ICD-10-CM

## 2025-04-22 DIAGNOSIS — M51.362 DEGENERATION OF INTERVERTEBRAL DISC OF LUMBAR REGION WITH DISCOGENIC BACK PAIN AND LOWER EXTREMITY PAIN: ICD-10-CM

## 2025-04-22 DIAGNOSIS — M50.30 DDD (DEGENERATIVE DISC DISEASE), CERVICAL: ICD-10-CM

## 2025-04-22 PROCEDURE — 1125F AMNT PAIN NOTED PAIN PRSNT: CPT | Performed by: NURSE PRACTITIONER

## 2025-04-22 PROCEDURE — 1159F MED LIST DOCD IN RCRD: CPT | Performed by: NURSE PRACTITIONER

## 2025-04-22 PROCEDURE — 3078F DIAST BP <80 MM HG: CPT | Performed by: NURSE PRACTITIONER

## 2025-04-22 PROCEDURE — 3074F SYST BP LT 130 MM HG: CPT | Performed by: NURSE PRACTITIONER

## 2025-04-22 PROCEDURE — 1160F RVW MEDS BY RX/DR IN RCRD: CPT | Performed by: NURSE PRACTITIONER

## 2025-04-22 PROCEDURE — 99212 OFFICE O/P EST SF 10 MIN: CPT | Performed by: NURSE PRACTITIONER

## 2025-04-22 NOTE — PROGRESS NOTES
CHIEF COMPLAINT  Neck pain    Subjective   Kanwal Sauer is a 74 y.o. female  who presents to the office for follow-up of procedure.  She completed a CERVICAL EPIDURAL C5-C6   on  3/18/25 performed by Dr. James for management of neck pain. Patient reports some relief from the procedure.  She has not experienced severe neck pain since having the injection and her daughter who presents with her today says she is not complaining near as often.  She does report itching at the injection site for several days following the procedure which improved with Benadryl.      Pain today 7/10 VAS.        Eliquis is taken for A-fib.  This is managed by her PCP Dr. Britton. Clearance to hold for procedure is on chart.      Past pain medications: Gabapentin - not effective      Current pain medications: Tramadol, Tylenol     Past therapies:  Physical Therapy: Yes  Chiropractor: Yes  Massage Therapy: No  TENS: Yes  Neck or back surgery: No  Past pain management: No    Neck Pain   Pertinent negatives include no fever, numbness or weakness.      PEG Assessment   What number best describes your pain on average in the past week?7  What number best describes how, during the past week, pain has interfered with your enjoyment of life?5  What number best describes how, during the past week, pain has interfered with your general activity?  5    Review of Pertinent Medical Data ---    The following portions of the patient's history were reviewed and updated as appropriate: allergies, current medications, past family history, past medical history, past social history, past surgical history, and problem list.    Review of Systems   Constitutional:  Negative for chills and fever.   Respiratory:  Negative for cough and shortness of breath.    Gastrointestinal:  Negative for constipation and diarrhea.   Genitourinary:  Negative for difficulty urinating.   Musculoskeletal:  Positive for gait problem (ambulates with a walker) and neck pain.  "  Neurological:  Positive for dizziness (intermittent episodes). Negative for weakness and numbness.   Psychiatric/Behavioral:  Negative for agitation.      I have reviewed and confirmed the accuracy of the ROS as documented by the MA/LPN/RN MARK Gonzáles     Vitals:    04/22/25 0946   BP: 118/70   BP Location: Right arm   Pulse: 63   Temp: 95.9 °F (35.5 °C)   TempSrc: Temporal   SpO2: 95%   Weight: 96.4 kg (212 lb 9.6 oz)   Height: 165.1 cm (65\")   PainSc: 7    PainLoc: Neck       Objective   Physical Exam  Vitals and nursing note reviewed.   Constitutional:       General: She is not in acute distress.     Appearance: Normal appearance. She is not ill-appearing.      Interventions: Nasal cannula in place.   Pulmonary:      Effort: Pulmonary effort is normal. No respiratory distress.   Musculoskeletal:      Cervical back: Tenderness and bony tenderness present. No rigidity. Pain with movement present.   Neurological:      Mental Status: She is alert and oriented to person, place, and time.      Motor: Motor function is intact. Weakness (generalized, mild) present.      Gait: Gait abnormal (slowed ambulates with walker).   Psychiatric:         Mood and Affect: Mood normal.         Behavior: Behavior normal.         Assessment & Plan   Diagnoses and all orders for this visit:    1. Lumbar radiculopathy (Primary)    2. DDD (degenerative disc disease), cervical    3. Degeneration of intervertebral disc of lumbar region with discogenic back pain and lower extremity pain      --- YANCY could possibly be repeated PRN. Difficult to assess relief due to patient confusion, but it does seem to be improved.  For now we will just watch and wait.      Kanwal Sauer reports a pain score of 7.  Given her pain assessment as noted, treatment options were discussed and the following options were decided upon as a follow-up plan to address the patient's pain: continuation of current treatment plan for pain.    --- Patient " screened positive for depression based on a PHQ-9 score of 13 on 1/10/2025. Follow-up recommendations include: PCP managing depression.    --- Follow-up PRN

## 2025-05-07 ENCOUNTER — TELEPHONE (OUTPATIENT)
Dept: FAMILY MEDICINE CLINIC | Facility: CLINIC | Age: 75
End: 2025-05-07

## 2025-05-07 NOTE — TELEPHONE ENCOUNTER
Caller: BENITO HUA    Relationship: Other    Best call back number: [phone]       Who are you requesting to speak with (clinical staff, provider,  specific staff member): CLINICAL STAFF       What was the call regarding: NOTIFYING PATIENT IS BEING DISCHARGE FROM THE AGENCY BECAUSE OF INSURANCE ISSUES NOT PAYING PATIENT TOLD HER SHE FELL MONDAY MORNING ON HER WAY BACK TO BED FROM THE BATHROOM HAD A MILD BRUISE AND SMALL ABRASION  ON LEFT HIP

## 2025-05-12 ENCOUNTER — TRANSCRIBE ORDERS (OUTPATIENT)
Dept: ADMINISTRATIVE | Facility: HOSPITAL | Age: 75
End: 2025-05-12
Payer: MEDICARE

## 2025-05-12 DIAGNOSIS — R06.02 SHORTNESS OF BREATH: Primary | ICD-10-CM

## 2025-05-13 ENCOUNTER — OFFICE VISIT (OUTPATIENT)
Dept: FAMILY MEDICINE CLINIC | Facility: CLINIC | Age: 75
End: 2025-05-13
Payer: MEDICARE

## 2025-05-13 VITALS
TEMPERATURE: 99.8 F | RESPIRATION RATE: 16 BRPM | DIASTOLIC BLOOD PRESSURE: 58 MMHG | HEIGHT: 65 IN | SYSTOLIC BLOOD PRESSURE: 134 MMHG | WEIGHT: 215.2 LBS | HEART RATE: 70 BPM | BODY MASS INDEX: 35.85 KG/M2 | OXYGEN SATURATION: 95 %

## 2025-05-13 DIAGNOSIS — S70.02XA HEMATOMA OF LEFT HIP, INITIAL ENCOUNTER: Primary | ICD-10-CM

## 2025-05-13 DIAGNOSIS — R41.0 EPISODE OF CONFUSION: ICD-10-CM

## 2025-05-13 RX ORDER — DIPHENHYDRAMINE HCL 50 MG/1
CAPSULE ORAL
COMMUNITY
Start: 2025-04-16

## 2025-05-26 NOTE — PROGRESS NOTES
"Chief Complaint  Fall (From the bed, trying to get back into the bed and missed it. Fell on the left side. Right now hurting on the left side of the back, shoulder, and the hip)    Ton Sauer presents to [facility]  Fall        History of Present Illness  The patient presents for evaluation of left hip pain.    She experienced a fall from her bed approximately 1 week ago, which necessitated assistance to regain her footing and return to the bathroom. She has been experiencing persistent pain in her left hip, rated as 6 on a scale of 10, which has not shown signs of improvement. Despite the pain, she retains the ability to ambulate. She has not applied ice to the affected area but has been managing the pain with Tylenol, which provides some relief. She is currently on Eliquis, a blood thinner. There is a noted hematoma on the left hip, likely exacerbated by the blood thinner.    Additionally, she has been exhibiting signs of confusion and forgetfulness, although no urinary tract infection has been detected. An MRI is scheduled for 06/2025, followed by cognitive testing in 09/2025. It is noteworthy that these symptoms emerged post-procedure, during which she was administered anesthesia and subsequently experienced an allergic reaction requiring Benadryl.     Objective   Vital Signs:  /58 (BP Location: Right arm, Patient Position: Sitting, Cuff Size: Adult)   Pulse 70   Temp 99.8 °F (37.7 °C) (Oral)   Resp 16   Ht 165.1 cm (65\")   Wt 97.6 kg (215 lb 3.2 oz)   SpO2 95%   BMI 35.81 kg/m²   Estimated body mass index is 35.81 kg/m² as calculated from the following:    Height as of this encounter: 165.1 cm (65\").    Weight as of this encounter: 97.6 kg (215 lb 3.2 oz).               Physical Exam  Constitutional:       General: She is not in acute distress.     Appearance: Normal appearance. She is well-developed.   HENT:      Head: Normocephalic and " atraumatic.      Right Ear: Tympanic membrane normal.      Left Ear: Tympanic membrane normal.      Mouth/Throat:      Mouth: Mucous membranes are moist.   Eyes:      General:         Right eye: No discharge.         Left eye: No discharge.      Extraocular Movements: Extraocular movements intact.      Pupils: Pupils are equal, round, and reactive to light.   Cardiovascular:      Rate and Rhythm: Normal rate and regular rhythm.      Pulses: Normal pulses.      Heart sounds: Normal heart sounds.   Pulmonary:      Effort: Pulmonary effort is normal.      Breath sounds: Normal breath sounds. No wheezing or rales.   Abdominal:      General: Bowel sounds are normal.      Palpations: Abdomen is soft. There is no mass.      Tenderness: There is no abdominal tenderness.   Musculoskeletal:      Cervical back: Normal range of motion and neck supple.      Right lower leg: No edema.      Left lower leg: No edema.      Comments: LEFT LEG /HIP   HEMATOMA , TENDER ON TOUCH    Lymphadenopathy:      Cervical: No cervical adenopathy.   Neurological:      General: No focal deficit present.      Mental Status: She is alert and oriented to person, place, and time.        Result Review :      Data reviewed : Consultant notes neurology note dated 3/21             Assessment and Plan   Diagnoses and all orders for this visit:    1. Hematoma of left hip, initial encounter (Primary)    2. Episode of confusion        Assessment & Plan  1. Left hip hematoma.  - The patient's ability to ambulate suggests the absence of a fracture.- - She is advised to apply heat and ice alternately to the affected area. Voltaren gel may be used once daily.  - She should continue taking Tylenol for pain management and avoid ibuprofen due to her use of Eliquis. An x-ray is not deemed necessary at this point.     2. Confusion and forgetfulness.  - The patient has been experiencing episodes of confusion and forgetfulness.    - Her neurologist is scheduled to  perform an MRI in 06/2025, and cognitive testing is planned for 09/2025.  Causes of confusion again discussed with her daughter including medications.  She is also scheduled for neuropsychological testing.  Scheduled for neurology psychological testing  - The patient is on a waiting list for a home and community-based waiver to have someone assist her in the morning and evening.          Follow Up   There are no Patient Instructions on file for this visit.   No follow-ups on file.  Patient was given instructions and counseling regarding her condition or for health maintenance advice. Please see specific information pulled into the AVS if appropriate.     Patient or patient representative verbalized consent for the use of Ambient Listening during the visit with  Nisha Britton MD for chart documentation. 5/26/2025  19:22 EDT

## 2025-05-28 ENCOUNTER — TRANSCRIBE ORDERS (OUTPATIENT)
Dept: CARDIAC REHAB | Facility: HOSPITAL | Age: 75
End: 2025-05-28
Payer: MEDICARE

## 2025-05-28 DIAGNOSIS — J44.9 COPD, SEVERE: Primary | ICD-10-CM

## 2025-06-09 RX ORDER — APIXABAN 5 MG/1
5 TABLET, FILM COATED ORAL EVERY 12 HOURS SCHEDULED
Qty: 180 TABLET | Refills: 1 | Status: SHIPPED | OUTPATIENT
Start: 2025-06-09

## 2025-06-10 ENCOUNTER — HOSPITAL ENCOUNTER (OUTPATIENT)
Dept: MRI IMAGING | Facility: HOSPITAL | Age: 75
Discharge: HOME | End: 2025-06-10
Admitting: PSYCHIATRY & NEUROLOGY
Payer: MEDICARE

## 2025-06-10 DIAGNOSIS — R41.0 CONFUSION: ICD-10-CM

## 2025-06-10 DIAGNOSIS — R68.89 FORGETFULNESS: ICD-10-CM

## 2025-06-10 PROCEDURE — 76014 MR SFTY IMPLT&/FB ASMT STF 1: CPT

## 2025-06-10 PROCEDURE — 70553 MRI BRAIN STEM W/O & W/DYE: CPT

## 2025-06-10 PROCEDURE — 25510000002 GADOBENATE DIMEGLUMINE 529 MG/ML SOLUTION: Performed by: PSYCHIATRY & NEUROLOGY

## 2025-06-10 PROCEDURE — A9577 INJ MULTIHANCE: HCPCS | Performed by: PSYCHIATRY & NEUROLOGY

## 2025-06-10 RX ADMIN — GADOBENATE DIMEGLUMINE 20 ML: 529 INJECTION, SOLUTION INTRAVENOUS at 16:03

## 2025-06-17 ENCOUNTER — TELEPHONE (OUTPATIENT)
Dept: NEUROLOGY | Facility: CLINIC | Age: 75
End: 2025-06-17
Payer: MEDICARE

## 2025-06-17 DIAGNOSIS — E07.9 THYROID DISEASE: ICD-10-CM

## 2025-06-17 DIAGNOSIS — I63.412 CEREBROVASCULAR ACCIDENT (CVA) DUE TO EMBOLISM OF LEFT MIDDLE CEREBRAL ARTERY: Primary | ICD-10-CM

## 2025-06-17 DIAGNOSIS — R73.9 HYPERGLYCEMIA: ICD-10-CM

## 2025-06-17 RX ORDER — DIPHENHYDRAMINE HCL 25 MG
50 TABLET ORAL
OUTPATIENT
Start: 2025-06-17 | End: 2025-06-17

## 2025-06-17 RX ORDER — PREDNISONE 50 MG/1
50 TABLET ORAL TAKE AS DIRECTED
Qty: 3 TABLET | Refills: 0 | Status: SHIPPED | OUTPATIENT
Start: 2025-06-17

## 2025-06-17 RX ORDER — DIPHENHYDRAMINE HYDROCHLORIDE 50 MG/ML
50 INJECTION, SOLUTION INTRAMUSCULAR; INTRAVENOUS
OUTPATIENT
Start: 2025-06-17 | End: 2025-06-17

## 2025-06-17 NOTE — TELEPHONE ENCOUNTER
MRI brain demonstrates two subacute infarcts. Relayed results to patient's daughter Monica. 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 Patient takes Eliquis but missed doses due to surgery and antibiotics. Will check CTA head/neck, contrast allergy protocol ordered. Will check 2D echo, A1c, TSH, lipid panel to complete stroke work-up. This was discussed with Dr. Washington.

## 2025-06-18 RX ORDER — CITALOPRAM HYDROBROMIDE 20 MG/1
20 TABLET ORAL DAILY
Qty: 90 TABLET | Refills: 1 | Status: SHIPPED | OUTPATIENT
Start: 2025-06-18

## 2025-07-01 ENCOUNTER — HOSPITAL ENCOUNTER (OUTPATIENT)
Dept: CARDIOLOGY | Facility: HOSPITAL | Age: 75
Discharge: HOME | End: 2025-07-01
Admitting: INTERNAL MEDICINE
Payer: MEDICARE

## 2025-07-01 VITALS — BODY MASS INDEX: 35.82 KG/M2 | WEIGHT: 215 LBS | HEIGHT: 65 IN

## 2025-07-01 DIAGNOSIS — R06.02 SHORTNESS OF BREATH: ICD-10-CM

## 2025-07-01 LAB
AORTIC DIMENSIONLESS INDEX: 0.67 (DI)
AV MEAN PRESS GRAD SYS DOP V1V2: 6 MMHG
AV VMAX SYS DOP: 172 CM/SEC
BH CV ECHO MEAS - AI P1/2T: 671.9 MSEC
BH CV ECHO MEAS - AO MAX PG: 11.8 MMHG
BH CV ECHO MEAS - AO ROOT DIAM: 3.8 CM
BH CV ECHO MEAS - AO V2 VTI: 41.2 CM
BH CV ECHO MEAS - AVA(I,D): 2.5 CM2
BH CV ECHO MEAS - EDV(CUBED): 175.6 ML
BH CV ECHO MEAS - EDV(MOD-SP2): 127 ML
BH CV ECHO MEAS - EDV(MOD-SP4): 119 ML
BH CV ECHO MEAS - EF(MOD-SP2): 57.5 %
BH CV ECHO MEAS - EF(MOD-SP4): 61.3 %
BH CV ECHO MEAS - ESV(CUBED): 49.7 ML
BH CV ECHO MEAS - ESV(MOD-SP2): 54 ML
BH CV ECHO MEAS - ESV(MOD-SP4): 46 ML
BH CV ECHO MEAS - FS: 34.4 %
BH CV ECHO MEAS - IVS/LVPW: 0.91 CM
BH CV ECHO MEAS - IVSD: 1 CM
BH CV ECHO MEAS - LAT PEAK E' VEL: 7 CM/SEC
BH CV ECHO MEAS - LV DIASTOLIC VOL/BSA (35-75): 58.3 CM2
BH CV ECHO MEAS - LV MASS(C)D: 234.3 GRAMS
BH CV ECHO MEAS - LV MAX PG: 4.8 MMHG
BH CV ECHO MEAS - LV MEAN PG: 2 MMHG
BH CV ECHO MEAS - LV SYSTOLIC VOL/BSA (12-30): 22.6 CM2
BH CV ECHO MEAS - LV V1 MAX: 109 CM/SEC
BH CV ECHO MEAS - LV V1 VTI: 27.5 CM
BH CV ECHO MEAS - LVIDD: 5.6 CM
BH CV ECHO MEAS - LVIDS: 3.7 CM
BH CV ECHO MEAS - LVOT AREA: 3.8 CM2
BH CV ECHO MEAS - LVOT DIAM: 2.2 CM
BH CV ECHO MEAS - LVPWD: 1.1 CM
BH CV ECHO MEAS - MED PEAK E' VEL: 5.2 CM/SEC
BH CV ECHO MEAS - MR MAX PG: 99.8 MMHG
BH CV ECHO MEAS - MR MAX VEL: 499.5 CM/SEC
BH CV ECHO MEAS - MV A DUR: 0.16 SEC
BH CV ECHO MEAS - MV A MAX VEL: 87.3 CM/SEC
BH CV ECHO MEAS - MV DEC SLOPE: 327.6 CM/SEC2
BH CV ECHO MEAS - MV DEC TIME: 0.25 SEC
BH CV ECHO MEAS - MV E MAX VEL: 87.9 CM/SEC
BH CV ECHO MEAS - MV E/A: 1.01
BH CV ECHO MEAS - MV MAX PG: 3.8 MMHG
BH CV ECHO MEAS - MV MEAN PG: 1.54 MMHG
BH CV ECHO MEAS - MV P1/2T: 82 MSEC
BH CV ECHO MEAS - MV V2 VTI: 37.4 CM
BH CV ECHO MEAS - MVA(P1/2T): 2.7 CM2
BH CV ECHO MEAS - MVA(VTI): 2.8 CM2
BH CV ECHO MEAS - PA ACC TIME: 0.1 SEC
BH CV ECHO MEAS - PA V2 MAX: 120.5 CM/SEC
BH CV ECHO MEAS - PULM A REVS DUR: 0.15 SEC
BH CV ECHO MEAS - PULM A REVS VEL: 23.3 CM/SEC
BH CV ECHO MEAS - PULM DIAS VEL: 33.8 CM/SEC
BH CV ECHO MEAS - PULM S/D: 1.07
BH CV ECHO MEAS - PULM SYS VEL: 36.2 CM/SEC
BH CV ECHO MEAS - RAP SYSTOLE: 3 MMHG
BH CV ECHO MEAS - RV MAX PG: 3.1 MMHG
BH CV ECHO MEAS - RV V1 MAX: 88.6 CM/SEC
BH CV ECHO MEAS - RV V1 VTI: 19.6 CM
BH CV ECHO MEAS - RVSP: 32.5 MMHG
BH CV ECHO MEAS - SV(LVOT): 104.1 ML
BH CV ECHO MEAS - SV(MOD-SP2): 73 ML
BH CV ECHO MEAS - SV(MOD-SP4): 73 ML
BH CV ECHO MEAS - SVI(LVOT): 51 ML/M2
BH CV ECHO MEAS - SVI(MOD-SP2): 35.8 ML/M2
BH CV ECHO MEAS - SVI(MOD-SP4): 35.8 ML/M2
BH CV ECHO MEAS - TAPSE (>1.6): 3.5 CM
BH CV ECHO MEAS - TR MAX PG: 29.5 MMHG
BH CV ECHO MEAS - TR MAX VEL: 271.5 CM/SEC
BH CV ECHO MEASUREMENTS AVERAGE E/E' RATIO: 14.41
BH CV XLRA - RV BASE: 3.6 CM
BH CV XLRA - TDI S': 15.2 CM/SEC
LEFT ATRIUM VOLUME INDEX: 29.7 ML/M2
LV EF BIPLANE MOD: 57.2 %

## 2025-07-01 PROCEDURE — 93306 TTE W/DOPPLER COMPLETE: CPT

## 2025-07-09 ENCOUNTER — OFFICE VISIT (OUTPATIENT)
Dept: CARDIAC REHAB | Facility: HOSPITAL | Age: 75
End: 2025-07-09
Payer: MEDICARE

## 2025-07-09 ENCOUNTER — TELEPHONE (OUTPATIENT)
Dept: NEUROLOGY | Facility: CLINIC | Age: 75
End: 2025-07-09
Payer: MEDICARE

## 2025-07-09 VITALS
OXYGEN SATURATION: 96 % | HEIGHT: 65 IN | BODY MASS INDEX: 35.19 KG/M2 | WEIGHT: 211.2 LBS | HEART RATE: 53 BPM | DIASTOLIC BLOOD PRESSURE: 80 MMHG | SYSTOLIC BLOOD PRESSURE: 124 MMHG

## 2025-07-09 DIAGNOSIS — Z91.041 CONTRAST MEDIA ALLERGY: Primary | ICD-10-CM

## 2025-07-09 DIAGNOSIS — J44.9 COPD, SEVERE: Primary | ICD-10-CM

## 2025-07-09 PROCEDURE — 94625 PHY/QHP OP PULM RHB W/O MNTR: CPT

## 2025-07-09 RX ORDER — PREDNISONE 50 MG/1
TABLET ORAL
Qty: 3 TABLET | Refills: 0 | Status: SHIPPED | OUTPATIENT
Start: 2025-07-09

## 2025-07-09 NOTE — PROGRESS NOTES
"Pulmonary Rehab Initial Assessment      Name: Kanwal Sauer  :1950 Allergies:Baclofen; Doxycycline; Egg-derived products; Iodine; Latex; Milk-related compounds; Msg [monosodium glutamate]; Penicillins; Etodolac; Metronidazole; Ezetimibe; Latex, natural rubber; Levaquin [levofloxacin]; Nylon; and Simvastatin   MRN: [id number] 74 y.o. Physician: Nisha Britton MD   Primary Diagnosis:    Diagnosis Plan   1. COPD, severe         Event Date: 2025 Specialist: [place] Box MD   Secondary Diagnosis: Asthma  Note Author: Yakelin Dobson RN     Cardiovascular History: HTN, PAF, bradycardia, hyperlipidemia, atrial fib     EXERCISE AT HOME  yes  30min  3 days per week          Ambulatory Status:Walker-rollator  Ambulatory Fall Risk Assessed on Initial Visit: yes 6 Minute Walk Pre- Pulmonary Rehab:  Distance:740 ft      RPE:3        RPD: 3              MPH: 1.4  Max. HR: 96        SPO2:92    MET: 2.1  Resting BP: 124/80     Peak BP: 152/76  Recovery BP: 130/76  Comments: Pt tolerated exercise well. No complaints noted. Sp02 was 92-98% with exercise. Rollator used for balance during walk.       NUTRITION  Lipids:yes If yes, labs as follows;  Total: No components found for: \"CHOLESTEROL\"  HDL:   HDL Cholesterol   Date Value Ref Range Status   2024 68 >39 mg/dL Final   2021 60 40 - 60 mg/dL Final    Lipids continued:  LDL:  LDL Chol Calc (NIH)   Date Value Ref Range Status   2024 91 0 - 99 mg/dL Final     Triglyceride: No components found for: \"TRIGLYCERIDE\"   Weight Management:                 Weight: 211.2 lbs  Height: 65 in                                     BMI: Body mass index is 35.15 kg/m².  Waist Circumference: n/a inches   Alcohol Use: social drinker Diabetes:Yes,  Monitors BS at home- yes, Frequency: occasionally, Random BS: n/a    Last HGBA1C with date if applicable:No components found for: \"A1C\"         SOCIAL HISTORY  Social History     Socioeconomic History    Marital " status:    Tobacco Use    Smoking status: Never     Passive exposure: Never    Smokeless tobacco: Never    Tobacco comments:     no caffine   Vaping Use    Vaping status: Never Used   Substance and Sexual Activity    Alcohol use: Not Currently    Drug use: Never    Sexual activity: Defer     Partners: Male    Learning Barriers:Cognitive-memory  Family Support:yes  Living Arrangement: lives in an assisted living facility Tobacco Adjunct:no  Do you live with a smoker: no     PSYCHOSOCIAL  Clinical Depression: yes    Stress: no     Assess presence or absence of depression using a valid screening tool: no      Assessment: Pt is alert and oriented. Forgetful at times.            Are you being hurt, hit, or freightened by anyone at home or in your life? no    Are you being neglected by a caregiver? No Shoulder flexibility/Range of motion: Average     Recommended arm activity: Any    Chair sit and reach within: 1 inches   Leg flexibility: Excellent    Leg Strength/Balance/Five times sit to stand: 20 seconds.   Pt used upper body strength to push out of the chair and also for balance.     Recommended stretching: Chair   Balance: Fall Risk Assessment: Crackles noted lungs bilat.     Family attends [place]: yes      COMORBIDITIES  Sleep Apnea: yes If yes, Choose: CPAP    Cancer: yes breast cancer 2002    Stroke: yes-x2   Pneumonia: yes If yes, how many times 5    Osteoporosis: no    GI Problems: yes diverticulitis Frequent colds/allergies: no    Other illnesses, surgeries, or comments hypomagnesemia, chronic low back pain, tremors, hx seizure, cervical DDD, anxiety, DVT, scoliosis, distal femur open reduction internal fixation, left mastectomy, bilat knee replacement   PAIN:  Are you having pain? no  If yes where is pain? N/a If yes, pain scale: 1      PULMONARY:  Do you use a nebulizer?: yes   If yes, prn    Do you use oxygen at home?: yes  If yes, amount 3L    With rest: yes  With activity: yes Do you have a daily cough?:  no  If yes, choose: n/a    Do you every notice yourself wheezing?: yes  If yes, when: at night Other pulmonary/breathing problems?: no   OTHER:  Do you have physical limitations?: yes  If yes, tremors, back pain, hx stroke    Do you need assistance with ADLs?: yes  If yes, Pt lives in an assisted living apartment. Do you climb stairs at home?:no  If yes, how many times n/a    Have you ever attended a pulmonary rehab?: no  If yes, n/a [facility] Dyspnea Scale: 0 - 4:  3 = stops for breath after walking about 100 yards or after a few minutes on the level     Patient Goals: Pt would like to breathe better. She would like to be able to walk more.      DISCHARGE PLANNING:  Do you have any home exercise equipment?: yes    What are you plans for continuing exercise after completion of pulmonary rehab? Pt plans to use the gym at her assisted living.     EDUCATION:  Pursed - lip breathing, Diaphragmatic breathing, Relaxation techniques, and Program information folder       PRE-PROGRAM ASSESSMENT:  PFT Date: 12-    FEV1/FVC: 57 FEV1: 38    FVC: 51 DLCO: n/a     Time of arrival: 9:15    Time of departure: 11:00           7/9/2025  09:33 EDT  Yakelin Dobson RN

## 2025-07-10 ENCOUNTER — HOSPITAL ENCOUNTER (OUTPATIENT)
Dept: CT IMAGING | Facility: HOSPITAL | Age: 75
Discharge: HOME | End: 2025-07-10
Payer: MEDICARE

## 2025-07-10 DIAGNOSIS — I63.412 CEREBROVASCULAR ACCIDENT (CVA) DUE TO EMBOLISM OF LEFT MIDDLE CEREBRAL ARTERY: ICD-10-CM

## 2025-07-10 PROCEDURE — 70496 CT ANGIOGRAPHY HEAD: CPT

## 2025-07-10 PROCEDURE — 25510000001 IOPAMIDOL PER 1 ML

## 2025-07-10 PROCEDURE — 70498 CT ANGIOGRAPHY NECK: CPT

## 2025-07-10 PROCEDURE — 82565 ASSAY OF CREATININE: CPT

## 2025-07-10 RX ORDER — IOPAMIDOL 755 MG/ML
100 INJECTION, SOLUTION INTRAVASCULAR
Status: COMPLETED | OUTPATIENT
Start: 2025-07-10 | End: 2025-07-10

## 2025-07-10 RX ADMIN — IOPAMIDOL 95 ML: 755 INJECTION, SOLUTION INTRAVENOUS at 16:04

## 2025-07-11 LAB — CREAT BLDA-MCNC: 1.2 MG/DL (ref 0.6–1.3)

## 2025-07-22 ENCOUNTER — TREATMENT (OUTPATIENT)
Dept: CARDIAC REHAB | Facility: HOSPITAL | Age: 75
End: 2025-07-22
Payer: MEDICARE

## 2025-07-22 DIAGNOSIS — J44.9 COPD, SEVERE: Primary | ICD-10-CM

## 2025-07-22 PROCEDURE — 94625 PHY/QHP OP PULM RHB W/O MNTR: CPT

## 2025-07-24 ENCOUNTER — TREATMENT (OUTPATIENT)
Dept: CARDIAC REHAB | Facility: HOSPITAL | Age: 75
End: 2025-07-24
Payer: MEDICARE

## 2025-07-24 DIAGNOSIS — J44.9 COPD, SEVERE: Primary | ICD-10-CM

## 2025-07-24 PROCEDURE — 94625 PHY/QHP OP PULM RHB W/O MNTR: CPT

## 2025-07-29 ENCOUNTER — TREATMENT (OUTPATIENT)
Dept: CARDIAC REHAB | Facility: HOSPITAL | Age: 75
End: 2025-07-29
Payer: MEDICARE

## 2025-07-29 DIAGNOSIS — J44.9 COPD, SEVERE: Primary | ICD-10-CM

## 2025-07-29 PROCEDURE — 94625 PHY/QHP OP PULM RHB W/O MNTR: CPT

## 2025-07-31 ENCOUNTER — TREATMENT (OUTPATIENT)
Dept: CARDIAC REHAB | Facility: HOSPITAL | Age: 75
End: 2025-07-31
Payer: MEDICARE

## 2025-07-31 DIAGNOSIS — J44.9 COPD, SEVERE: Primary | ICD-10-CM

## 2025-07-31 PROCEDURE — 94625 PHY/QHP OP PULM RHB W/O MNTR: CPT

## 2025-08-05 ENCOUNTER — TREATMENT (OUTPATIENT)
Dept: CARDIAC REHAB | Facility: HOSPITAL | Age: 75
End: 2025-08-05
Payer: MEDICARE

## 2025-08-05 DIAGNOSIS — E11.65 TYPE 2 DIABETES MELLITUS WITH HYPERGLYCEMIA, WITHOUT LONG-TERM CURRENT USE OF INSULIN: ICD-10-CM

## 2025-08-05 DIAGNOSIS — J44.9 COPD, SEVERE: Primary | ICD-10-CM

## 2025-08-05 DIAGNOSIS — J44.9 CHRONIC OBSTRUCTIVE PULMONARY DISEASE, UNSPECIFIED COPD TYPE: ICD-10-CM

## 2025-08-05 PROCEDURE — 94625 PHY/QHP OP PULM RHB W/O MNTR: CPT

## 2025-08-05 RX ORDER — HYDROCHLOROTHIAZIDE 12.5 MG/1
12.5 CAPSULE ORAL DAILY
Qty: 90 CAPSULE | Refills: 2 | Status: SHIPPED | OUTPATIENT
Start: 2025-08-05

## 2025-08-07 ENCOUNTER — TREATMENT (OUTPATIENT)
Dept: CARDIAC REHAB | Facility: HOSPITAL | Age: 75
End: 2025-08-07
Payer: MEDICARE

## 2025-08-07 DIAGNOSIS — J44.9 COPD, SEVERE: Primary | ICD-10-CM

## 2025-08-07 PROCEDURE — 94625 PHY/QHP OP PULM RHB W/O MNTR: CPT

## 2025-08-07 RX ORDER — MONTELUKAST SODIUM 10 MG/1
10 TABLET ORAL
Qty: 90 TABLET | Refills: 1 | Status: SHIPPED | OUTPATIENT
Start: 2025-08-07

## 2025-08-07 RX ORDER — CITALOPRAM HYDROBROMIDE 20 MG/1
20 TABLET ORAL DAILY
Qty: 90 TABLET | Refills: 1 | Status: SHIPPED | OUTPATIENT
Start: 2025-08-07

## 2025-08-07 RX ORDER — PRIMIDONE 50 MG/1
50 TABLET ORAL 3 TIMES DAILY
Qty: 270 TABLET | Refills: 1 | Status: SHIPPED | OUTPATIENT
Start: 2025-08-07

## 2025-08-07 RX ORDER — OLMESARTAN MEDOXOMIL 20 MG/1
20 TABLET ORAL DAILY
Qty: 90 TABLET | Refills: 1 | Status: SHIPPED | OUTPATIENT
Start: 2025-08-07

## 2025-08-07 RX ORDER — FLUTICASONE FUROATE, UMECLIDINIUM BROMIDE AND VILANTEROL TRIFENATATE 100; 62.5; 25 UG/1; UG/1; UG/1
1 POWDER RESPIRATORY (INHALATION) DAILY
Qty: 60 EACH | Refills: 1 | Status: SHIPPED | OUTPATIENT
Start: 2025-08-07

## 2025-08-07 RX ORDER — FENOFIBRATE 160 MG/1
160 TABLET ORAL DAILY
Qty: 90 TABLET | Refills: 1 | Status: SHIPPED | OUTPATIENT
Start: 2025-08-07

## 2025-08-12 ENCOUNTER — TREATMENT (OUTPATIENT)
Dept: CARDIAC REHAB | Facility: HOSPITAL | Age: 75
End: 2025-08-12
Payer: MEDICARE

## 2025-08-12 DIAGNOSIS — J44.9 COPD, SEVERE: Primary | ICD-10-CM

## 2025-08-12 PROCEDURE — 94625 PHY/QHP OP PULM RHB W/O MNTR: CPT

## 2025-08-14 ENCOUNTER — TREATMENT (OUTPATIENT)
Dept: CARDIAC REHAB | Facility: HOSPITAL | Age: 75
End: 2025-08-14
Payer: MEDICARE

## 2025-08-14 DIAGNOSIS — J44.9 COPD, SEVERE: Primary | ICD-10-CM

## 2025-08-14 PROCEDURE — 94625 PHY/QHP OP PULM RHB W/O MNTR: CPT

## 2025-08-19 ENCOUNTER — TREATMENT (OUTPATIENT)
Dept: CARDIAC REHAB | Facility: HOSPITAL | Age: 75
End: 2025-08-19
Payer: MEDICARE

## 2025-08-19 DIAGNOSIS — J44.9 COPD, SEVERE: Primary | ICD-10-CM

## 2025-08-19 PROCEDURE — 94625 PHY/QHP OP PULM RHB W/O MNTR: CPT

## 2025-08-26 ENCOUNTER — TREATMENT (OUTPATIENT)
Dept: CARDIAC REHAB | Facility: HOSPITAL | Age: 75
End: 2025-08-26
Payer: MEDICARE

## 2025-08-26 DIAGNOSIS — J44.9 COPD, SEVERE: Primary | ICD-10-CM

## 2025-08-26 PROCEDURE — 94625 PHY/QHP OP PULM RHB W/O MNTR: CPT

## 2025-08-28 ENCOUNTER — TREATMENT (OUTPATIENT)
Dept: CARDIAC REHAB | Facility: HOSPITAL | Age: 75
End: 2025-08-28
Payer: MEDICARE

## 2025-08-28 DIAGNOSIS — J44.9 COPD, SEVERE: Primary | ICD-10-CM

## 2025-08-28 PROCEDURE — 94625 PHY/QHP OP PULM RHB W/O MNTR: CPT

## (undated) DEVICE — ELECTRD BLD EZ CLN STD 6.5IN

## (undated) DEVICE — DRSNG WND GEL FIBR OPTICELL AG PLS W/SLV LF 4X5IN  STRL

## (undated) DEVICE — DRAPE,REIN 53X77,STERILE: Brand: MEDLINE

## (undated) DEVICE — 3M™ IOBAN™ 2 ANTIMICROBIAL INCISE DRAPE 6650EZ: Brand: IOBAN™ 2

## (undated) DEVICE — GLV SURG TRIUMPH PF LTX 7.5 STRL

## (undated) DEVICE — SUT VIC 2/0 CT1 36IN

## (undated) DEVICE — EPIDURAL TRAY: Brand: MEDLINE INDUSTRIES, INC.

## (undated) DEVICE — TRAP FLD MINIVAC MEGADYNE 100ML

## (undated) DEVICE — GLV SURG PREMIERPRO ORTHO LTX PF SZ8 BRN

## (undated) DEVICE — TOWEL,OR,DSP,ST,BLUE,STD,4/PK,20PK/CS: Brand: MEDLINE

## (undated) DEVICE — PROXIMATE RH ROTATING HEAD SKIN STAPLERS (35 WIDE) CONTAINS 35 STAINLESS STEEL STAPLES: Brand: PROXIMATE

## (undated) DEVICE — DRSNG WND GEL FIBR OPTICELL AG PLS W/SLV LF 6X6IN  STRL

## (undated) DEVICE — IMMOB KN 3PNL DLX CANVS 22IN BLU

## (undated) DEVICE — BNDG ELAS CO-FLEX SLF ADHR 6IN 5YD LF STRL

## (undated) DEVICE — COVER,TABLE,HEAVY DUTY,79"X110",STRL: Brand: MEDLINE

## (undated) DEVICE — PAD,ABDOMINAL,8"X10",ST,LF: Brand: MEDLINE

## (undated) DEVICE — DRAPE,U/ SHT,SPLIT,PLAS,STERIL: Brand: MEDLINE

## (undated) DEVICE — PENCL E/S ULTRAVAC TELESCP NOSE HOLSTR 10FT

## (undated) DEVICE — GLV SURG SIGNATURE ESSENTIAL PF LTX SZ8

## (undated) DEVICE — PK ORTHO MAJ 40

## (undated) DEVICE — FRAME FIXATOR DIAM.4.2MM, AO FITTING: Brand: AXSOS

## (undated) DEVICE — SKIN PREP TRAY W/CHG: Brand: MEDLINE INDUSTRIES, INC.

## (undated) DEVICE — NEEDLE, QUINCKE 22GX3.5": Brand: MEDLINE INDUSTRIES, INC.

## (undated) DEVICE — NDL EPID TUOHY W/WINGS 20G 3.5IN

## (undated) DEVICE — T-DRAPE,EXTREMITY,STERILE: Brand: MEDLINE

## (undated) DEVICE — SUT VIC 1 CT1 36IN J947H

## (undated) DEVICE — SOL ISO/ALC RUB 70PCT 4OZ

## (undated) DEVICE — CONTAINER,SPECIMEN,OR STERILE,4OZ: Brand: MEDLINE

## (undated) DEVICE — 3M™ IOBAN™ 2 ANTIMICROBIAL INCISE DRAPE 6640EZ: Brand: IOBAN™ 2

## (undated) DEVICE — BOOT WALK INTEGRITY FX TALL MD

## (undated) DEVICE — GLV SURG BIOGEL LTX PF 8

## (undated) DEVICE — DRP C/ARMOR

## (undated) DEVICE — CALIBRATED DRILL BIT , AO: Brand: AXSOS

## (undated) DEVICE — SYR CONTRL LUERLOK 10CC

## (undated) DEVICE — BNDG ELAS ELITE V/CLOSE 6IN 5YD LF STRL

## (undated) DEVICE — APPL CHLORAPREP HI/LITE 26ML ORNG

## (undated) DEVICE — Device: Brand: PORTEX